# Patient Record
Sex: MALE | Race: WHITE | NOT HISPANIC OR LATINO | Employment: FULL TIME | URBAN - METROPOLITAN AREA
[De-identification: names, ages, dates, MRNs, and addresses within clinical notes are randomized per-mention and may not be internally consistent; named-entity substitution may affect disease eponyms.]

---

## 2017-01-01 ENCOUNTER — APPOINTMENT (OUTPATIENT)
Dept: LAB | Facility: MEDICAL CENTER | Age: 82
End: 2017-01-01
Payer: MEDICARE

## 2017-01-01 ENCOUNTER — ALLSCRIPTS OFFICE VISIT (OUTPATIENT)
Dept: OTHER | Facility: OTHER | Age: 82
End: 2017-01-01

## 2017-01-01 ENCOUNTER — HOSPITAL ENCOUNTER (OUTPATIENT)
Dept: NON INVASIVE DIAGNOSTICS | Facility: CLINIC | Age: 82
Discharge: HOME | End: 2017-09-25
Payer: MEDICARE

## 2017-01-01 ENCOUNTER — GENERIC CONVERSION - ENCOUNTER (OUTPATIENT)
Dept: OTHER | Facility: OTHER | Age: 82
End: 2017-01-01

## 2017-01-01 ENCOUNTER — APPOINTMENT (OUTPATIENT)
Dept: RADIOLOGY | Facility: HOSPITAL | Age: 82
End: 2017-01-01
Payer: MEDICARE

## 2017-01-01 ENCOUNTER — APPOINTMENT (OUTPATIENT)
Dept: WOUND CARE | Facility: HOSPITAL | Age: 82
End: 2017-01-01
Payer: MEDICARE

## 2017-01-01 ENCOUNTER — APPOINTMENT (OUTPATIENT)
Dept: PHYSICAL THERAPY | Facility: CLINIC | Age: 82
End: 2017-01-01
Payer: MEDICARE

## 2017-01-01 ENCOUNTER — ANESTHESIA EVENT (OUTPATIENT)
Dept: PERIOP | Facility: HOSPITAL | Age: 82
End: 2017-01-01
Payer: MEDICARE

## 2017-01-01 ENCOUNTER — HOSPITAL ENCOUNTER (OUTPATIENT)
Dept: NON INVASIVE DIAGNOSTICS | Facility: CLINIC | Age: 82
Discharge: HOME | End: 2017-08-23
Payer: MEDICARE

## 2017-01-01 ENCOUNTER — TRANSCRIBE ORDERS (OUTPATIENT)
Dept: ADMINISTRATIVE | Facility: HOSPITAL | Age: 82
End: 2017-01-01

## 2017-01-01 ENCOUNTER — ANESTHESIA (OUTPATIENT)
Dept: PERIOP | Facility: HOSPITAL | Age: 82
End: 2017-01-01
Payer: MEDICARE

## 2017-01-01 ENCOUNTER — HOSPITAL ENCOUNTER (OUTPATIENT)
Dept: RADIOLOGY | Facility: HOSPITAL | Age: 82
Discharge: HOME | End: 2017-11-01
Attending: ORTHOPAEDIC SURGERY
Payer: COMMERCIAL

## 2017-01-01 ENCOUNTER — HOSPITAL ENCOUNTER (OUTPATIENT)
Facility: HOSPITAL | Age: 82
Setting detail: OUTPATIENT SURGERY
Discharge: HOME | End: 2017-12-22
Attending: SURGERY | Admitting: SURGERY
Payer: MEDICARE

## 2017-01-01 ENCOUNTER — APPOINTMENT (OUTPATIENT)
Dept: LAB | Facility: CLINIC | Age: 82
End: 2017-01-01
Payer: MEDICARE

## 2017-01-01 ENCOUNTER — APPOINTMENT (EMERGENCY)
Dept: RADIOLOGY | Facility: HOSPITAL | Age: 82
DRG: 091 | End: 2017-01-01
Payer: MEDICARE

## 2017-01-01 ENCOUNTER — HOSPITAL ENCOUNTER (INPATIENT)
Facility: HOSPITAL | Age: 82
LOS: 9 days | Discharge: NURSING, REHAB OR LONG-TERM CARE | DRG: 091 | End: 2017-10-20
Attending: EMERGENCY MEDICINE | Admitting: HOSPITALIST
Payer: MEDICARE

## 2017-01-01 ENCOUNTER — APPOINTMENT (INPATIENT)
Dept: RADIOLOGY | Facility: HOSPITAL | Age: 82
DRG: 091 | End: 2017-01-01
Payer: MEDICARE

## 2017-01-01 ENCOUNTER — HOSPITAL ENCOUNTER (OUTPATIENT)
Dept: RADIOLOGY | Facility: HOSPITAL | Age: 82
Discharge: HOME | End: 2017-10-09
Payer: MEDICARE

## 2017-01-01 VITALS
RESPIRATION RATE: 16 BRPM | SYSTOLIC BLOOD PRESSURE: 145 MMHG | HEART RATE: 93 BPM | HEIGHT: 67 IN | TEMPERATURE: 99.3 F | DIASTOLIC BLOOD PRESSURE: 67 MMHG | WEIGHT: 209 LBS | OXYGEN SATURATION: 98 % | BODY MASS INDEX: 32.8 KG/M2

## 2017-01-01 VITALS
HEART RATE: 84 BPM | DIASTOLIC BLOOD PRESSURE: 55 MMHG | BODY MASS INDEX: 33.21 KG/M2 | WEIGHT: 211.6 LBS | SYSTOLIC BLOOD PRESSURE: 113 MMHG | RESPIRATION RATE: 20 BRPM | TEMPERATURE: 98.6 F | OXYGEN SATURATION: 93 % | HEIGHT: 67 IN

## 2017-01-01 DIAGNOSIS — I73.9 PERIPHERAL VASCULAR DISEASE OF EXTREMITY WITH CLAUDICATION (HCC): ICD-10-CM

## 2017-01-01 DIAGNOSIS — E11.9 TYPE 2 DIABETES MELLITUS WITHOUT COMPLICATIONS (HCC): ICD-10-CM

## 2017-01-01 DIAGNOSIS — E78.5 HYPERLIPIDEMIA: ICD-10-CM

## 2017-01-01 DIAGNOSIS — S42.255A CLOSED NONDISPLACED FRACTURE OF GREATER TUBEROSITY OF LEFT HUMERUS: ICD-10-CM

## 2017-01-01 DIAGNOSIS — Z95.2 PRESENCE OF PROSTHETIC HEART VALVE: ICD-10-CM

## 2017-01-01 DIAGNOSIS — Z95.3 S/P AORTIC VALVE REPLACEMENT WITH BIOPROSTHETIC VALVE: Chronic | ICD-10-CM

## 2017-01-01 DIAGNOSIS — I25.10 ATHEROSCLEROTIC HEART DISEASE OF NATIVE CORONARY ARTERY WITHOUT ANGINA PECTORIS: ICD-10-CM

## 2017-01-01 DIAGNOSIS — N18.30 CHRONIC KIDNEY DISEASE, STAGE III (MODERATE) (HCC): ICD-10-CM

## 2017-01-01 DIAGNOSIS — N25.81 SECONDARY HYPERPARATHYROIDISM OF RENAL ORIGIN (HCC): ICD-10-CM

## 2017-01-01 DIAGNOSIS — N18.30 CKD (CHRONIC KIDNEY DISEASE), STAGE III (HCC): ICD-10-CM

## 2017-01-01 DIAGNOSIS — R55 SYNCOPE: ICD-10-CM

## 2017-01-01 DIAGNOSIS — Z86.79 HISTORY OF CHF (CONGESTIVE HEART FAILURE): ICD-10-CM

## 2017-01-01 DIAGNOSIS — S42.252D DISPLACED FRACTURE OF GREATER TUBEROSITY OF LEFT HUMERUS WITH ROUTINE HEALING: ICD-10-CM

## 2017-01-01 DIAGNOSIS — I48.0 PAROXYSMAL ATRIAL FIBRILLATION (HCC): ICD-10-CM

## 2017-01-01 DIAGNOSIS — S01.21XS NASAL LACERATION, SEQUELA: Chronic | ICD-10-CM

## 2017-01-01 DIAGNOSIS — I50.32 CHRONIC DIASTOLIC HEART FAILURE (HCC): Primary | ICD-10-CM

## 2017-01-01 DIAGNOSIS — N18.30 ACUTE RENAL FAILURE SUPERIMPOSED ON STAGE 3 CHRONIC KIDNEY DISEASE (HCC): ICD-10-CM

## 2017-01-01 DIAGNOSIS — E55.9 VITAMIN D DEFICIENCY: ICD-10-CM

## 2017-01-01 DIAGNOSIS — M75.42 IMPINGEMENT SYNDROME OF LEFT SHOULDER: ICD-10-CM

## 2017-01-01 DIAGNOSIS — S49.92XS LEFT UPPER ARM INJURY, SEQUELA: ICD-10-CM

## 2017-01-01 DIAGNOSIS — R53.1 WEAKNESS: Primary | ICD-10-CM

## 2017-01-01 DIAGNOSIS — M79.602 PAIN OF LEFT ARM: ICD-10-CM

## 2017-01-01 DIAGNOSIS — I50.9 HEART FAILURE (HCC): ICD-10-CM

## 2017-01-01 DIAGNOSIS — I70.299 OTHER ATHEROSCLEROSIS OF NATIVE ARTERIES OF EXTREMITIES, UNSPECIFIED EXTREMITY (HCC): ICD-10-CM

## 2017-01-01 DIAGNOSIS — L03.90 CELLULITIS: ICD-10-CM

## 2017-01-01 DIAGNOSIS — I65.23 CAROTID STENOSIS, ASYMPTOMATIC, BILATERAL: ICD-10-CM

## 2017-01-01 DIAGNOSIS — I50.32 CHRONIC DIASTOLIC CHF (CONGESTIVE HEART FAILURE) (HCC): Chronic | ICD-10-CM

## 2017-01-01 DIAGNOSIS — G93.40 ACUTE ENCEPHALOPATHY: ICD-10-CM

## 2017-01-01 DIAGNOSIS — S92.912A TOE FRACTURE, LEFT: ICD-10-CM

## 2017-01-01 DIAGNOSIS — N17.9 ACUTE RENAL FAILURE SUPERIMPOSED ON STAGE 3 CHRONIC KIDNEY DISEASE (HCC): ICD-10-CM

## 2017-01-01 DIAGNOSIS — S42.252A: ICD-10-CM

## 2017-01-01 DIAGNOSIS — I73.9 PERIPHERAL VASCULAR DISEASE OF EXTREMITY WITH CLAUDICATION (HCC): Primary | ICD-10-CM

## 2017-01-01 LAB
ALBUMIN SERPL BCP-MCNC: 2.9 G/DL (ref 3.5–5)
ALBUMIN SERPL BCP-MCNC: 3 G/DL (ref 3.5–5)
ALBUMIN SERPL BCP-MCNC: 3.3 G/DL (ref 3.5–5)
ALBUMIN SERPL BCP-MCNC: 3.7 G/DL (ref 3.5–5)
ALP SERPL-CCNC: 63 U/L (ref 46–116)
ALP SERPL-CCNC: 83 U/L (ref 46–116)
ALP SERPL-CCNC: 92 U/L (ref 46–116)
ALP SERPL-CCNC: 95 U/L (ref 46–116)
ALT SERPL W P-5'-P-CCNC: 16 U/L (ref 12–78)
ALT SERPL W P-5'-P-CCNC: 18 U/L (ref 12–78)
ALT SERPL W P-5'-P-CCNC: 19 U/L (ref 12–78)
ALT SERPL W P-5'-P-CCNC: 22 U/L (ref 12–78)
ANION GAP SERPL CALCULATED.3IONS-SCNC: 4 MMOL/L (ref 4–13)
ANION GAP SERPL CALCULATED.3IONS-SCNC: 6 MMOL/L (ref 4–13)
ANION GAP SERPL CALCULATED.3IONS-SCNC: 7 MMOL/L (ref 4–13)
ANION GAP SERPL CALCULATED.3IONS-SCNC: 8 MMOL/L (ref 4–13)
ASO AB TITR SER LA: NORMAL {TITER}
AST SERPL W P-5'-P-CCNC: 13 U/L (ref 5–45)
AST SERPL W P-5'-P-CCNC: 14 U/L (ref 5–45)
AST SERPL W P-5'-P-CCNC: 18 U/L (ref 5–45)
AST SERPL W P-5'-P-CCNC: 19 U/L (ref 5–45)
ATRIAL RATE: 62 BPM
ATRIAL RATE: 81 BPM
BACTERIA UR QL AUTO: NORMAL /HPF
BASOPHILS # BLD AUTO: 0.02 THOUSANDS/ΜL (ref 0–0.1)
BASOPHILS # BLD AUTO: 0.03 THOUSANDS/ΜL (ref 0–0.1)
BASOPHILS NFR BLD AUTO: 0 % (ref 0–1)
BILIRUB SERPL-MCNC: 0.46 MG/DL (ref 0.2–1)
BILIRUB SERPL-MCNC: 0.47 MG/DL (ref 0.2–1)
BILIRUB SERPL-MCNC: 0.61 MG/DL (ref 0.2–1)
BILIRUB SERPL-MCNC: 0.62 MG/DL (ref 0.2–1)
BILIRUB UR QL STRIP: NEGATIVE
BILIRUB UR QL STRIP: NEGATIVE
BILIRUB UR QL STRIP: NORMAL
BUN SERPL-MCNC: 33 MG/DL (ref 5–25)
BUN SERPL-MCNC: 36 MG/DL (ref 5–25)
BUN SERPL-MCNC: 39 MG/DL (ref 5–25)
BUN SERPL-MCNC: 39 MG/DL (ref 5–25)
BUN SERPL-MCNC: 40 MG/DL (ref 5–25)
BUN SERPL-MCNC: 41 MG/DL (ref 5–25)
BUN SERPL-MCNC: 43 MG/DL (ref 5–25)
BUN SERPL-MCNC: 47 MG/DL (ref 5–25)
BUN SERPL-MCNC: 48 MG/DL (ref 5–25)
BUN SERPL-MCNC: 48 MG/DL (ref 5–25)
BUN SERPL-MCNC: 51 MG/DL (ref 5–25)
BUN SERPL-MCNC: 58 MG/DL (ref 5–25)
CALCIUM SERPL-MCNC: 8.5 MG/DL (ref 8.3–10.1)
CALCIUM SERPL-MCNC: 8.5 MG/DL (ref 8.3–10.1)
CALCIUM SERPL-MCNC: 8.6 MG/DL (ref 8.3–10.1)
CALCIUM SERPL-MCNC: 8.8 MG/DL (ref 8.3–10.1)
CALCIUM SERPL-MCNC: 8.9 MG/DL (ref 8.3–10.1)
CALCIUM SERPL-MCNC: 9 MG/DL (ref 8.3–10.1)
CALCIUM SERPL-MCNC: 9.1 MG/DL (ref 8.3–10.1)
CALCIUM SERPL-MCNC: 9.2 MG/DL (ref 8.3–10.1)
CALCIUM SERPL-MCNC: 9.5 MG/DL (ref 8.3–10.1)
CALCIUM SERPL-MCNC: 9.6 MG/DL (ref 8.3–10.1)
CHLORIDE SERPL-SCNC: 100 MMOL/L (ref 100–108)
CHLORIDE SERPL-SCNC: 100 MMOL/L (ref 100–108)
CHLORIDE SERPL-SCNC: 101 MMOL/L (ref 100–108)
CHLORIDE SERPL-SCNC: 102 MMOL/L (ref 100–108)
CHLORIDE SERPL-SCNC: 103 MMOL/L (ref 100–108)
CHLORIDE SERPL-SCNC: 103 MMOL/L (ref 100–108)
CHLORIDE SERPL-SCNC: 98 MMOL/L (ref 100–108)
CHLORIDE SERPL-SCNC: 98 MMOL/L (ref 100–108)
CLARITY UR: CLEAR
CLARITY UR: CLEAR
CLARITY UR: NORMAL
CLARITY, POC: CLEAR
CO2 SERPL-SCNC: 28 MMOL/L (ref 21–32)
CO2 SERPL-SCNC: 29 MMOL/L (ref 21–32)
CO2 SERPL-SCNC: 30 MMOL/L (ref 21–32)
CO2 SERPL-SCNC: 30 MMOL/L (ref 21–32)
CO2 SERPL-SCNC: 31 MMOL/L (ref 21–32)
COLOR UR: YELLOW
COLOR, POC: YELLOW
CREAT SERPL-MCNC: 1.54 MG/DL (ref 0.6–1.3)
CREAT SERPL-MCNC: 1.68 MG/DL (ref 0.6–1.3)
CREAT SERPL-MCNC: 1.81 MG/DL (ref 0.6–1.3)
CREAT SERPL-MCNC: 1.83 MG/DL (ref 0.6–1.3)
CREAT SERPL-MCNC: 1.84 MG/DL (ref 0.6–1.3)
CREAT SERPL-MCNC: 1.87 MG/DL (ref 0.6–1.3)
CREAT SERPL-MCNC: 1.89 MG/DL (ref 0.6–1.3)
CREAT SERPL-MCNC: 1.9 MG/DL (ref 0.6–1.3)
CREAT SERPL-MCNC: 1.92 MG/DL (ref 0.6–1.3)
CREAT SERPL-MCNC: 1.92 MG/DL (ref 0.6–1.3)
CREAT SERPL-MCNC: 1.93 MG/DL (ref 0.6–1.3)
CREAT SERPL-MCNC: 1.99 MG/DL (ref 0.6–1.3)
CREAT UR-MCNC: 69.1 MG/DL
EOSINOPHIL # BLD AUTO: 0.22 THOUSAND/ΜL (ref 0–0.61)
EOSINOPHIL # BLD AUTO: 0.35 THOUSAND/ΜL (ref 0–0.61)
EOSINOPHIL # BLD AUTO: 0.38 THOUSAND/ΜL (ref 0–0.61)
EOSINOPHIL # BLD AUTO: 0.41 THOUSAND/ΜL (ref 0–0.61)
EOSINOPHIL # BLD AUTO: 0.42 THOUSAND/ΜL (ref 0–0.61)
EOSINOPHIL # BLD AUTO: 0.43 THOUSAND/ΜL (ref 0–0.61)
EOSINOPHIL # BLD AUTO: 0.5 THOUSAND/ΜL (ref 0–0.61)
EOSINOPHIL # BLD AUTO: 0.53 THOUSAND/ΜL (ref 0–0.61)
EOSINOPHIL # BLD AUTO: 0.55 THOUSAND/ΜL (ref 0–0.61)
EOSINOPHIL # BLD AUTO: 0.57 THOUSAND/ΜL (ref 0–0.61)
EOSINOPHIL NFR BLD AUTO: 3 % (ref 0–6)
EOSINOPHIL NFR BLD AUTO: 3 % (ref 0–6)
EOSINOPHIL NFR BLD AUTO: 4 % (ref 0–6)
EOSINOPHIL NFR BLD AUTO: 5 % (ref 0–6)
EOSINOPHIL NFR BLD AUTO: 6 % (ref 0–6)
EOSINOPHIL NFR BLD AUTO: 7 % (ref 0–6)
EOSINOPHIL NFR BLD AUTO: 7 % (ref 0–6)
EOSINOPHIL NFR BLD AUTO: 8 % (ref 0–6)
ERYTHROCYTE [DISTWIDTH] IN BLOOD BY AUTOMATED COUNT: 14.1 % (ref 11.6–15.1)
ERYTHROCYTE [DISTWIDTH] IN BLOOD BY AUTOMATED COUNT: 14.1 % (ref 11.6–15.1)
ERYTHROCYTE [DISTWIDTH] IN BLOOD BY AUTOMATED COUNT: 14.2 % (ref 11.6–15.1)
ERYTHROCYTE [DISTWIDTH] IN BLOOD BY AUTOMATED COUNT: 14.3 % (ref 11.6–15.1)
ERYTHROCYTE [DISTWIDTH] IN BLOOD BY AUTOMATED COUNT: 14.4 % (ref 11.6–15.1)
ERYTHROCYTE [DISTWIDTH] IN BLOOD BY AUTOMATED COUNT: 14.5 % (ref 11.6–15.1)
EST. AVERAGE GLUCOSE BLD GHB EST-MCNC: 146 MG/DL
GFR SERPL CREATININE-BSD FRML MDRD: 31 ML/MIN/1.73SQ M
GFR SERPL CREATININE-BSD FRML MDRD: 32 ML/MIN/1.73SQ M
GFR SERPL CREATININE-BSD FRML MDRD: 33 ML/MIN/1.73SQ M
GFR SERPL CREATININE-BSD FRML MDRD: 34 ML/MIN/1.73SQ M
GFR SERPL CREATININE-BSD FRML MDRD: 34 ML/MIN/1.73SQ M
GFR SERPL CREATININE-BSD FRML MDRD: 36.3 ML/MIN/1.73SQ M
GFR SERPL CREATININE-BSD FRML MDRD: 38 ML/MIN/1.73SQ M
GFR SERPL CREATININE-BSD FRML MDRD: 42 ML/MIN/1.73SQ M
GLUCOSE (HISTORICAL): NORMAL
GLUCOSE P FAST SERPL-MCNC: 175 MG/DL (ref 65–99)
GLUCOSE P FAST SERPL-MCNC: 187 MG/DL (ref 65–99)
GLUCOSE SERPL-MCNC: 100 MG/DL (ref 65–140)
GLUCOSE SERPL-MCNC: 104 MG/DL (ref 65–140)
GLUCOSE SERPL-MCNC: 106 MG/DL (ref 65–140)
GLUCOSE SERPL-MCNC: 111 MG/DL (ref 65–140)
GLUCOSE SERPL-MCNC: 113 MG/DL (ref 65–140)
GLUCOSE SERPL-MCNC: 116 MG/DL (ref 65–140)
GLUCOSE SERPL-MCNC: 118 MG/DL (ref 65–140)
GLUCOSE SERPL-MCNC: 119 MG/DL (ref 65–140)
GLUCOSE SERPL-MCNC: 122 MG/DL (ref 65–140)
GLUCOSE SERPL-MCNC: 122 MG/DL (ref 65–140)
GLUCOSE SERPL-MCNC: 124 MG/DL (ref 65–140)
GLUCOSE SERPL-MCNC: 125 MG/DL (ref 65–140)
GLUCOSE SERPL-MCNC: 125 MG/DL (ref 65–140)
GLUCOSE SERPL-MCNC: 126 MG/DL (ref 65–140)
GLUCOSE SERPL-MCNC: 129 MG/DL (ref 65–140)
GLUCOSE SERPL-MCNC: 130 MG/DL (ref 65–140)
GLUCOSE SERPL-MCNC: 130 MG/DL (ref 65–140)
GLUCOSE SERPL-MCNC: 131 MG/DL (ref 65–140)
GLUCOSE SERPL-MCNC: 131 MG/DL (ref 65–140)
GLUCOSE SERPL-MCNC: 138 MG/DL (ref 65–140)
GLUCOSE SERPL-MCNC: 138 MG/DL (ref 65–140)
GLUCOSE SERPL-MCNC: 141 MG/DL (ref 65–140)
GLUCOSE SERPL-MCNC: 143 MG/DL (ref 65–140)
GLUCOSE SERPL-MCNC: 145 MG/DL (ref 65–140)
GLUCOSE SERPL-MCNC: 149 MG/DL (ref 65–140)
GLUCOSE SERPL-MCNC: 156 MG/DL (ref 65–140)
GLUCOSE SERPL-MCNC: 157 MG/DL (ref 65–140)
GLUCOSE SERPL-MCNC: 163 MG/DL (ref 65–140)
GLUCOSE SERPL-MCNC: 166 MG/DL (ref 65–140)
GLUCOSE SERPL-MCNC: 166 MG/DL (ref 65–140)
GLUCOSE SERPL-MCNC: 170 MG/DL (ref 65–140)
GLUCOSE SERPL-MCNC: 172 MG/DL (ref 65–140)
GLUCOSE SERPL-MCNC: 172 MG/DL (ref 65–140)
GLUCOSE SERPL-MCNC: 175 MG/DL (ref 65–140)
GLUCOSE SERPL-MCNC: 175 MG/DL (ref 65–140)
GLUCOSE SERPL-MCNC: 177 MG/DL (ref 65–140)
GLUCOSE SERPL-MCNC: 181 MG/DL (ref 65–140)
GLUCOSE SERPL-MCNC: 185 MG/DL (ref 65–140)
GLUCOSE SERPL-MCNC: 186 MG/DL (ref 65–140)
GLUCOSE SERPL-MCNC: 189 MG/DL (ref 65–140)
GLUCOSE SERPL-MCNC: 189 MG/DL (ref 65–140)
GLUCOSE SERPL-MCNC: 190 MG/DL (ref 65–140)
GLUCOSE SERPL-MCNC: 190 MG/DL (ref 65–140)
GLUCOSE SERPL-MCNC: 197 MG/DL (ref 65–140)
GLUCOSE SERPL-MCNC: 207 MG/DL (ref 65–140)
GLUCOSE SERPL-MCNC: 213 MG/DL (ref 65–140)
GLUCOSE SERPL-MCNC: 223 MG/DL (ref 65–140)
GLUCOSE SERPL-MCNC: 247 MG/DL (ref 65–140)
GLUCOSE SERPL-MCNC: 52 MG/DL (ref 65–140)
GLUCOSE UR STRIP-MCNC: NEGATIVE MG/DL
GLUCOSE UR STRIP-MCNC: NEGATIVE MG/DL
HBA1C MFR BLD: 6.7 % (ref 4.2–6.3)
HCT VFR BLD AUTO: 33.5 % (ref 36.5–49.3)
HCT VFR BLD AUTO: 33.7 % (ref 36.5–49.3)
HCT VFR BLD AUTO: 34.2 % (ref 36.5–49.3)
HCT VFR BLD AUTO: 34.2 % (ref 36.5–49.3)
HCT VFR BLD AUTO: 34.5 % (ref 36.5–49.3)
HCT VFR BLD AUTO: 34.8 % (ref 36.5–49.3)
HCT VFR BLD AUTO: 35 % (ref 36.5–49.3)
HCT VFR BLD AUTO: 35.2 % (ref 36.5–49.3)
HCT VFR BLD AUTO: 36.6 % (ref 36.5–49.3)
HCT VFR BLD AUTO: 39.1 % (ref 36.5–49.3)
HCT VFR BLD AUTO: 39.4 % (ref 36.5–49.3)
HGB BLD-MCNC: 10.8 G/DL (ref 12–17)
HGB BLD-MCNC: 10.9 G/DL (ref 12–17)
HGB BLD-MCNC: 11.1 G/DL (ref 12–17)
HGB BLD-MCNC: 11.2 G/DL (ref 12–17)
HGB BLD-MCNC: 11.2 G/DL (ref 12–17)
HGB BLD-MCNC: 11.5 G/DL (ref 12–17)
HGB BLD-MCNC: 11.5 G/DL (ref 12–17)
HGB BLD-MCNC: 11.6 G/DL (ref 12–17)
HGB BLD-MCNC: 11.9 G/DL (ref 12–17)
HGB BLD-MCNC: 12.8 G/DL (ref 12–17)
HGB BLD-MCNC: 13 G/DL (ref 12–17)
HGB UR QL STRIP.AUTO: NEGATIVE
HGB UR QL STRIP.AUTO: NEGATIVE
HGB UR QL STRIP.AUTO: NORMAL
HYALINE CASTS #/AREA URNS LPF: NORMAL /LPF
INR PPP: 1.08 (ref 0.86–1.16)
KETONES UR STRIP-MCNC: NEGATIVE MG/DL
KETONES UR STRIP-MCNC: NEGATIVE MG/DL
KETONES UR STRIP-MCNC: NORMAL MG/DL
LEUKOCYTE ESTERASE UR QL STRIP: NEGATIVE
LEUKOCYTE ESTERASE UR QL STRIP: NEGATIVE
LEUKOCYTE ESTERASE UR QL STRIP: NORMAL
LYMPHOCYTES # BLD AUTO: 1.49 THOUSANDS/ΜL (ref 0.6–4.47)
LYMPHOCYTES # BLD AUTO: 1.49 THOUSANDS/ΜL (ref 0.6–4.47)
LYMPHOCYTES # BLD AUTO: 1.51 THOUSANDS/ΜL (ref 0.6–4.47)
LYMPHOCYTES # BLD AUTO: 1.6 THOUSANDS/ΜL (ref 0.6–4.47)
LYMPHOCYTES # BLD AUTO: 1.77 THOUSANDS/ΜL (ref 0.6–4.47)
LYMPHOCYTES # BLD AUTO: 1.8 THOUSANDS/ΜL (ref 0.6–4.47)
LYMPHOCYTES # BLD AUTO: 1.93 THOUSANDS/ΜL (ref 0.6–4.47)
LYMPHOCYTES # BLD AUTO: 1.96 THOUSANDS/ΜL (ref 0.6–4.47)
LYMPHOCYTES # BLD AUTO: 1.98 THOUSANDS/ΜL (ref 0.6–4.47)
LYMPHOCYTES # BLD AUTO: 2 THOUSANDS/ΜL (ref 0.6–4.47)
LYMPHOCYTES NFR BLD AUTO: 17 % (ref 14–44)
LYMPHOCYTES NFR BLD AUTO: 17 % (ref 14–44)
LYMPHOCYTES NFR BLD AUTO: 18 % (ref 14–44)
LYMPHOCYTES NFR BLD AUTO: 19 % (ref 14–44)
LYMPHOCYTES NFR BLD AUTO: 20 % (ref 14–44)
LYMPHOCYTES NFR BLD AUTO: 21 % (ref 14–44)
LYMPHOCYTES NFR BLD AUTO: 24 % (ref 14–44)
LYMPHOCYTES NFR BLD AUTO: 26 % (ref 14–44)
MAGNESIUM SERPL-MCNC: 2.5 MG/DL (ref 1.6–2.6)
MCH RBC QN AUTO: 27.3 PG (ref 26.8–34.3)
MCH RBC QN AUTO: 27.5 PG (ref 26.8–34.3)
MCH RBC QN AUTO: 27.8 PG (ref 26.8–34.3)
MCH RBC QN AUTO: 27.9 PG (ref 26.8–34.3)
MCH RBC QN AUTO: 28.1 PG (ref 26.8–34.3)
MCH RBC QN AUTO: 28.1 PG (ref 26.8–34.3)
MCH RBC QN AUTO: 28.2 PG (ref 26.8–34.3)
MCH RBC QN AUTO: 28.2 PG (ref 26.8–34.3)
MCH RBC QN AUTO: 28.3 PG (ref 26.8–34.3)
MCH RBC QN AUTO: 28.4 PG (ref 26.8–34.3)
MCH RBC QN AUTO: 28.9 PG (ref 26.8–34.3)
MCHC RBC AUTO-ENTMCNC: 31.3 G/DL (ref 31.4–37.4)
MCHC RBC AUTO-ENTMCNC: 31.8 G/DL (ref 31.4–37.4)
MCHC RBC AUTO-ENTMCNC: 32.5 G/DL (ref 31.4–37.4)
MCHC RBC AUTO-ENTMCNC: 32.5 G/DL (ref 31.4–37.4)
MCHC RBC AUTO-ENTMCNC: 32.7 G/DL (ref 31.4–37.4)
MCHC RBC AUTO-ENTMCNC: 32.7 G/DL (ref 31.4–37.4)
MCHC RBC AUTO-ENTMCNC: 32.9 G/DL (ref 31.4–37.4)
MCHC RBC AUTO-ENTMCNC: 32.9 G/DL (ref 31.4–37.4)
MCHC RBC AUTO-ENTMCNC: 33 G/DL (ref 31.4–37.4)
MCHC RBC AUTO-ENTMCNC: 33 G/DL (ref 31.4–37.4)
MCHC RBC AUTO-ENTMCNC: 33.9 G/DL (ref 31.4–37.4)
MCV RBC AUTO: 85 FL (ref 82–98)
MCV RBC AUTO: 86 FL (ref 82–98)
MCV RBC AUTO: 87 FL (ref 82–98)
MCV RBC AUTO: 88 FL (ref 82–98)
MONOCYTES # BLD AUTO: 0.56 THOUSAND/ΜL (ref 0.17–1.22)
MONOCYTES # BLD AUTO: 0.63 THOUSAND/ΜL (ref 0.17–1.22)
MONOCYTES # BLD AUTO: 0.66 THOUSAND/ΜL (ref 0.17–1.22)
MONOCYTES # BLD AUTO: 0.67 THOUSAND/ΜL (ref 0.17–1.22)
MONOCYTES # BLD AUTO: 0.73 THOUSAND/ΜL (ref 0.17–1.22)
MONOCYTES # BLD AUTO: 0.77 THOUSAND/ΜL (ref 0.17–1.22)
MONOCYTES # BLD AUTO: 0.77 THOUSAND/ΜL (ref 0.17–1.22)
MONOCYTES # BLD AUTO: 0.78 THOUSAND/ΜL (ref 0.17–1.22)
MONOCYTES # BLD AUTO: 0.82 THOUSAND/ΜL (ref 0.17–1.22)
MONOCYTES # BLD AUTO: 0.85 THOUSAND/ΜL (ref 0.17–1.22)
MONOCYTES NFR BLD AUTO: 10 % (ref 4–12)
MONOCYTES NFR BLD AUTO: 10 % (ref 4–12)
MONOCYTES NFR BLD AUTO: 7 % (ref 4–12)
MONOCYTES NFR BLD AUTO: 8 % (ref 4–12)
MONOCYTES NFR BLD AUTO: 9 % (ref 4–12)
NEUTROPHILS # BLD AUTO: 4.38 THOUSANDS/ΜL (ref 1.85–7.62)
NEUTROPHILS # BLD AUTO: 4.41 THOUSANDS/ΜL (ref 1.85–7.62)
NEUTROPHILS # BLD AUTO: 4.58 THOUSANDS/ΜL (ref 1.85–7.62)
NEUTROPHILS # BLD AUTO: 4.8 THOUSANDS/ΜL (ref 1.85–7.62)
NEUTROPHILS # BLD AUTO: 4.98 THOUSANDS/ΜL (ref 1.85–7.62)
NEUTROPHILS # BLD AUTO: 5.09 THOUSANDS/ΜL (ref 1.85–7.62)
NEUTROPHILS # BLD AUTO: 5.57 THOUSANDS/ΜL (ref 1.85–7.62)
NEUTROPHILS # BLD AUTO: 6.24 THOUSANDS/ΜL (ref 1.85–7.62)
NEUTROPHILS # BLD AUTO: 7.18 THOUSANDS/ΜL (ref 1.85–7.62)
NEUTROPHILS # BLD AUTO: 7.61 THOUSANDS/ΜL (ref 1.85–7.62)
NEUTS SEG NFR BLD AUTO: 59 % (ref 43–75)
NEUTS SEG NFR BLD AUTO: 59 % (ref 43–75)
NEUTS SEG NFR BLD AUTO: 60 % (ref 43–75)
NEUTS SEG NFR BLD AUTO: 62 % (ref 43–75)
NEUTS SEG NFR BLD AUTO: 62 % (ref 43–75)
NEUTS SEG NFR BLD AUTO: 67 % (ref 43–75)
NEUTS SEG NFR BLD AUTO: 69 % (ref 43–75)
NEUTS SEG NFR BLD AUTO: 70 % (ref 43–75)
NEUTS SEG NFR BLD AUTO: 71 % (ref 43–75)
NEUTS SEG NFR BLD AUTO: 72 % (ref 43–75)
NITRITE UR QL STRIP: NEGATIVE
NITRITE UR QL STRIP: NEGATIVE
NITRITE UR QL STRIP: NORMAL
NON-SQ EPI CELLS URNS QL MICRO: NORMAL /HPF
NRBC BLD AUTO-RTO: 0 /100 WBCS
NT-PROBNP SERPL-MCNC: 1076 PG/ML
P AXIS: 41 DEGREES
P AXIS: 84 DEGREES
PH UR STRIP.AUTO: 5 [PH]
PH UR STRIP.AUTO: 5.5 [PH] (ref 4.5–8)
PH UR STRIP.AUTO: 6 [PH] (ref 4.5–8)
PLATELET # BLD AUTO: 162 THOUSANDS/UL (ref 149–390)
PLATELET # BLD AUTO: 178 THOUSANDS/UL (ref 149–390)
PLATELET # BLD AUTO: 183 THOUSANDS/UL (ref 149–390)
PLATELET # BLD AUTO: 187 THOUSANDS/UL (ref 149–390)
PLATELET # BLD AUTO: 195 THOUSANDS/UL (ref 149–390)
PLATELET # BLD AUTO: 203 THOUSANDS/UL (ref 149–390)
PLATELET # BLD AUTO: 207 THOUSANDS/UL (ref 149–390)
PLATELET # BLD AUTO: 214 THOUSANDS/UL (ref 149–390)
PLATELET # BLD AUTO: 216 THOUSANDS/UL (ref 149–390)
PLATELET # BLD AUTO: 235 THOUSANDS/UL (ref 149–390)
PLATELET # BLD AUTO: 236 THOUSANDS/UL (ref 149–390)
PMV BLD AUTO: 10.1 FL (ref 8.9–12.7)
PMV BLD AUTO: 10.2 FL (ref 8.9–12.7)
PMV BLD AUTO: 10.7 FL (ref 8.9–12.7)
PMV BLD AUTO: 10.8 FL (ref 8.9–12.7)
PMV BLD AUTO: 9.5 FL (ref 8.9–12.7)
PMV BLD AUTO: 9.7 FL (ref 8.9–12.7)
PMV BLD AUTO: 9.9 FL (ref 8.9–12.7)
POTASSIUM SERPL-SCNC: 3.8 MMOL/L (ref 3.5–5.3)
POTASSIUM SERPL-SCNC: 4.1 MMOL/L (ref 3.5–5.3)
POTASSIUM SERPL-SCNC: 4.2 MMOL/L (ref 3.5–5.3)
POTASSIUM SERPL-SCNC: 4.2 MMOL/L (ref 3.5–5.3)
POTASSIUM SERPL-SCNC: 4.3 MMOL/L (ref 3.5–5.3)
POTASSIUM SERPL-SCNC: 4.3 MMOL/L (ref 3.5–5.3)
POTASSIUM SERPL-SCNC: 4.4 MMOL/L (ref 3.5–5.3)
POTASSIUM SERPL-SCNC: 4.4 MMOL/L (ref 3.5–5.3)
POTASSIUM SERPL-SCNC: 4.5 MMOL/L (ref 3.5–5.3)
POTASSIUM SERPL-SCNC: 4.7 MMOL/L (ref 3.5–5.3)
POTASSIUM SERPL-SCNC: 4.8 MMOL/L (ref 3.5–5.3)
POTASSIUM SERPL-SCNC: 4.8 MMOL/L (ref 3.5–5.3)
PR INTERVAL: 242 MS
PR INTERVAL: 268 MS
PROT SERPL-MCNC: 6.4 G/DL (ref 6.4–8.2)
PROT SERPL-MCNC: 7 G/DL (ref 6.4–8.2)
PROT SERPL-MCNC: 7.4 G/DL (ref 6.4–8.2)
PROT SERPL-MCNC: 7.5 G/DL (ref 6.4–8.2)
PROT UR STRIP-MCNC: NEGATIVE MG/DL
PROT UR STRIP-MCNC: NEGATIVE MG/DL
PROT UR STRIP-MCNC: NORMAL MG/DL
PROT UR-MCNC: 13 MG/DL
PROT/CREAT UR: 0.19 MG/G{CREAT} (ref 0–0.1)
PROTHROMBIN TIME: 14 SECONDS (ref 12.1–14.4)
QRS AXIS: -22 DEGREES
QRS AXIS: -26 DEGREES
QRSD INTERVAL: 128 MS
QRSD INTERVAL: 130 MS
QT INTERVAL: 404 MS
QT INTERVAL: 422 MS
QTC INTERVAL: 428 MS
QTC INTERVAL: 469 MS
RBC # BLD AUTO: 3.86 MILLION/UL (ref 3.88–5.62)
RBC # BLD AUTO: 3.95 MILLION/UL (ref 3.88–5.62)
RBC # BLD AUTO: 3.96 MILLION/UL (ref 3.88–5.62)
RBC # BLD AUTO: 3.99 MILLION/UL (ref 3.88–5.62)
RBC # BLD AUTO: 4.02 MILLION/UL (ref 3.88–5.62)
RBC # BLD AUTO: 4.02 MILLION/UL (ref 3.88–5.62)
RBC # BLD AUTO: 4.07 MILLION/UL (ref 3.88–5.62)
RBC # BLD AUTO: 4.09 MILLION/UL (ref 3.88–5.62)
RBC # BLD AUTO: 4.32 MILLION/UL (ref 3.88–5.62)
RBC # BLD AUTO: 4.55 MILLION/UL (ref 3.88–5.62)
RBC # BLD AUTO: 4.61 MILLION/UL (ref 3.88–5.62)
RBC #/AREA URNS AUTO: NORMAL /HPF
SODIUM SERPL-SCNC: 135 MMOL/L (ref 136–145)
SODIUM SERPL-SCNC: 137 MMOL/L (ref 136–145)
SODIUM SERPL-SCNC: 138 MMOL/L (ref 136–145)
SODIUM SERPL-SCNC: 141 MMOL/L (ref 136–145)
SP GR UR STRIP.AUTO: 1
SP GR UR STRIP.AUTO: 1.01 (ref 1–1.03)
SP GR UR STRIP.AUTO: 1.01 (ref 1–1.03)
T WAVE AXIS: 41 DEGREES
T WAVE AXIS: 44 DEGREES
TROPONIN I SERPL-MCNC: <0.02 NG/ML
TROPONIN I SERPL-MCNC: <0.02 NG/ML
URATE SERPL-MCNC: 8.1 MG/DL (ref 4.2–8)
UROBILINOGEN UR QL STRIP.AUTO: 0.2
UROBILINOGEN UR QL STRIP.AUTO: 0.2 E.U./DL
UROBILINOGEN UR QL STRIP.AUTO: 0.2 E.U./DL
VENTRICULAR RATE: 62 BPM
VENTRICULAR RATE: 81 BPM
WBC # BLD AUTO: 10.48 THOUSAND/UL (ref 4.31–10.16)
WBC # BLD AUTO: 10.69 THOUSAND/UL (ref 4.31–10.16)
WBC # BLD AUTO: 7.45 THOUSAND/UL (ref 4.31–10.16)
WBC # BLD AUTO: 7.46 THOUSAND/UL (ref 4.31–10.16)
WBC # BLD AUTO: 7.57 THOUSAND/UL (ref 4.31–10.16)
WBC # BLD AUTO: 7.59 THOUSAND/UL (ref 4.31–10.16)
WBC # BLD AUTO: 7.77 THOUSAND/UL (ref 4.31–10.16)
WBC # BLD AUTO: 8.07 THOUSAND/UL (ref 4.31–10.16)
WBC # BLD AUTO: 8.27 THOUSAND/UL (ref 4.31–10.16)
WBC # BLD AUTO: 8.31 THOUSAND/UL (ref 4.31–10.16)
WBC # BLD AUTO: 8.83 THOUSAND/UL (ref 4.31–10.16)
WBC #/AREA URNS AUTO: NORMAL /HPF

## 2017-01-01 PROCEDURE — 76937 US GUIDE VASCULAR ACCESS: CPT

## 2017-01-01 PROCEDURE — 85025 COMPLETE CBC W/AUTO DIFF WBC: CPT | Performed by: SURGERY

## 2017-01-01 PROCEDURE — 93923 UPR/LXTR ART STDY 3+ LVLS: CPT

## 2017-01-01 PROCEDURE — 82948 REAGENT STRIP/BLOOD GLUCOSE: CPT

## 2017-01-01 PROCEDURE — 73560 X-RAY EXAM OF KNEE 1 OR 2: CPT

## 2017-01-01 PROCEDURE — C1769 GUIDE WIRE: HCPCS | Performed by: SURGERY

## 2017-01-01 PROCEDURE — 97535 SELF CARE MNGMENT TRAINING: CPT

## 2017-01-01 PROCEDURE — 85025 COMPLETE CBC W/AUTO DIFF WBC: CPT | Performed by: HOSPITALIST

## 2017-01-01 PROCEDURE — 85025 COMPLETE CBC W/AUTO DIFF WBC: CPT

## 2017-01-01 PROCEDURE — 97110 THERAPEUTIC EXERCISES: CPT

## 2017-01-01 PROCEDURE — 80053 COMPREHEN METABOLIC PANEL: CPT | Performed by: PHYSICIAN ASSISTANT

## 2017-01-01 PROCEDURE — 75710 ARTERY X-RAYS ARM/LEG: CPT

## 2017-01-01 PROCEDURE — 97140 MANUAL THERAPY 1/> REGIONS: CPT

## 2017-01-01 PROCEDURE — 80048 BASIC METABOLIC PNL TOTAL CA: CPT | Performed by: HOSPITALIST

## 2017-01-01 PROCEDURE — 97530 THERAPEUTIC ACTIVITIES: CPT

## 2017-01-01 PROCEDURE — 97161 PT EVAL LOW COMPLEX 20 MIN: CPT

## 2017-01-01 PROCEDURE — G8990 OTHER PT/OT CURRENT STATUS: HCPCS

## 2017-01-01 PROCEDURE — 80053 COMPREHEN METABOLIC PANEL: CPT

## 2017-01-01 PROCEDURE — 36415 COLL VENOUS BLD VENIPUNCTURE: CPT

## 2017-01-01 PROCEDURE — 97163 PT EVAL HIGH COMPLEX 45 MIN: CPT

## 2017-01-01 PROCEDURE — G8991 OTHER PT/OT GOAL STATUS: HCPCS

## 2017-01-01 PROCEDURE — 80048 BASIC METABOLIC PNL TOTAL CA: CPT | Performed by: PHYSICIAN ASSISTANT

## 2017-01-01 PROCEDURE — 86063 ANTISTREPTOLYSIN O SCREEN: CPT | Performed by: INTERNAL MEDICINE

## 2017-01-01 PROCEDURE — G8978 MOBILITY CURRENT STATUS: HCPCS

## 2017-01-01 PROCEDURE — 97112 NEUROMUSCULAR REEDUCATION: CPT

## 2017-01-01 PROCEDURE — 73030 X-RAY EXAM OF SHOULDER: CPT

## 2017-01-01 PROCEDURE — 93225 XTRNL ECG REC<48 HRS REC: CPT

## 2017-01-01 PROCEDURE — 83735 ASSAY OF MAGNESIUM: CPT | Performed by: PHYSICIAN ASSISTANT

## 2017-01-01 PROCEDURE — 85610 PROTHROMBIN TIME: CPT

## 2017-01-01 PROCEDURE — 93306 TTE W/DOPPLER COMPLETE: CPT

## 2017-01-01 PROCEDURE — C1887 CATHETER, GUIDING: HCPCS | Performed by: SURGERY

## 2017-01-01 PROCEDURE — 97116 GAIT TRAINING THERAPY: CPT

## 2017-01-01 PROCEDURE — 71020 HB CHEST X-RAY 2VW FRONTAL&LATL: CPT

## 2017-01-01 PROCEDURE — G8979 MOBILITY GOAL STATUS: HCPCS

## 2017-01-01 PROCEDURE — 85027 COMPLETE CBC AUTOMATED: CPT | Performed by: PHYSICIAN ASSISTANT

## 2017-01-01 PROCEDURE — 83880 ASSAY OF NATRIURETIC PEPTIDE: CPT | Performed by: PHYSICIAN ASSISTANT

## 2017-01-01 PROCEDURE — G8988 SELF CARE GOAL STATUS: HCPCS

## 2017-01-01 PROCEDURE — 93226 XTRNL ECG REC<48 HR SCAN A/R: CPT

## 2017-01-01 PROCEDURE — 85025 COMPLETE CBC W/AUTO DIFF WBC: CPT | Performed by: PHYSICIAN ASSISTANT

## 2017-01-01 PROCEDURE — C1894 INTRO/SHEATH, NON-LASER: HCPCS | Performed by: SURGERY

## 2017-01-01 PROCEDURE — 84484 ASSAY OF TROPONIN QUANT: CPT | Performed by: PHYSICIAN ASSISTANT

## 2017-01-01 PROCEDURE — 83036 HEMOGLOBIN GLYCOSYLATED A1C: CPT | Performed by: PHYSICIAN ASSISTANT

## 2017-01-01 PROCEDURE — 36415 COLL VENOUS BLD VENIPUNCTURE: CPT | Performed by: EMERGENCY MEDICINE

## 2017-01-01 PROCEDURE — C1760 CLOSURE DEV, VASC: HCPCS | Performed by: SURGERY

## 2017-01-01 PROCEDURE — 81001 URINALYSIS AUTO W/SCOPE: CPT

## 2017-01-01 PROCEDURE — 82570 ASSAY OF URINE CREATININE: CPT

## 2017-01-01 PROCEDURE — 75625 CONTRAST EXAM ABDOMINL AORTA: CPT

## 2017-01-01 PROCEDURE — 97166 OT EVAL MOD COMPLEX 45 MIN: CPT

## 2017-01-01 PROCEDURE — 99285 EMERGENCY DEPT VISIT HI MDM: CPT

## 2017-01-01 PROCEDURE — 96360 HYDRATION IV INFUSION INIT: CPT

## 2017-01-01 PROCEDURE — 93880 EXTRACRANIAL BILAT STUDY: CPT

## 2017-01-01 PROCEDURE — 80048 BASIC METABOLIC PNL TOTAL CA: CPT | Performed by: EMERGENCY MEDICINE

## 2017-01-01 PROCEDURE — 93005 ELECTROCARDIOGRAM TRACING: CPT | Performed by: PHYSICIAN ASSISTANT

## 2017-01-01 PROCEDURE — 81002 URINALYSIS NONAUTO W/O SCOPE: CPT | Performed by: EMERGENCY MEDICINE

## 2017-01-01 PROCEDURE — 84156 ASSAY OF PROTEIN URINE: CPT

## 2017-01-01 PROCEDURE — 93005 ELECTROCARDIOGRAM TRACING: CPT | Performed by: EMERGENCY MEDICINE

## 2017-01-01 PROCEDURE — 84550 ASSAY OF BLOOD/URIC ACID: CPT | Performed by: HOSPITALIST

## 2017-01-01 PROCEDURE — 99213 OFFICE O/P EST LOW 20 MIN: CPT | Performed by: PREVENTIVE MEDICINE

## 2017-01-01 PROCEDURE — 73060 X-RAY EXAM OF HUMERUS: CPT

## 2017-01-01 PROCEDURE — 81003 URINALYSIS AUTO W/O SCOPE: CPT

## 2017-01-01 PROCEDURE — 80048 BASIC METABOLIC PNL TOTAL CA: CPT | Performed by: SURGERY

## 2017-01-01 PROCEDURE — 73630 X-RAY EXAM OF FOOT: CPT

## 2017-01-01 PROCEDURE — G8987 SELF CARE CURRENT STATUS: HCPCS

## 2017-01-01 PROCEDURE — 93925 LOWER EXTREMITY STUDY: CPT

## 2017-01-01 PROCEDURE — 85025 COMPLETE CBC W/AUTO DIFF WBC: CPT | Performed by: EMERGENCY MEDICINE

## 2017-01-01 DEVICE — CLOSURE DEVICE MYNX ACE 6F/7FR: Type: IMPLANTABLE DEVICE | Site: GROIN | Status: FUNCTIONAL

## 2017-01-01 RX ORDER — FEBUXOSTAT 40 MG/1
40 TABLET, FILM COATED ORAL DAILY
Status: DISCONTINUED | OUTPATIENT
Start: 2017-01-01 | End: 2017-01-01

## 2017-01-01 RX ORDER — TORSEMIDE 20 MG/1
40 TABLET ORAL EVERY OTHER DAY
Status: DISCONTINUED | OUTPATIENT
Start: 2017-01-01 | End: 2017-01-01 | Stop reason: HOSPADM

## 2017-01-01 RX ORDER — MAGNESIUM HYDROXIDE/ALUMINUM HYDROXICE/SIMETHICONE 120; 1200; 1200 MG/30ML; MG/30ML; MG/30ML
30 SUSPENSION ORAL EVERY 6 HOURS PRN
Status: DISCONTINUED | OUTPATIENT
Start: 2017-01-01 | End: 2017-01-01 | Stop reason: HOSPADM

## 2017-01-01 RX ORDER — PRAVASTATIN SODIUM 20 MG
20 TABLET ORAL
Status: DISCONTINUED | OUTPATIENT
Start: 2017-01-01 | End: 2017-01-01 | Stop reason: HOSPADM

## 2017-01-01 RX ORDER — HEPARIN SODIUM 5000 [USP'U]/ML
5000 INJECTION, SOLUTION INTRAVENOUS; SUBCUTANEOUS EVERY 8 HOURS SCHEDULED
Status: DISCONTINUED | OUTPATIENT
Start: 2017-01-01 | End: 2017-01-01 | Stop reason: HOSPADM

## 2017-01-01 RX ORDER — ONDANSETRON 2 MG/ML
INJECTION INTRAMUSCULAR; INTRAVENOUS AS NEEDED
Status: DISCONTINUED | OUTPATIENT
Start: 2017-01-01 | End: 2017-01-01 | Stop reason: SURG

## 2017-01-01 RX ORDER — TRAMADOL HYDROCHLORIDE 50 MG/1
50 TABLET ORAL EVERY 6 HOURS PRN
Status: DISCONTINUED | OUTPATIENT
Start: 2017-01-01 | End: 2017-01-01 | Stop reason: HOSPADM

## 2017-01-01 RX ORDER — AMLODIPINE BESYLATE 2.5 MG/1
2.5 TABLET ORAL DAILY
Status: DISCONTINUED | OUTPATIENT
Start: 2017-01-01 | End: 2017-01-01 | Stop reason: HOSPADM

## 2017-01-01 RX ORDER — SODIUM CHLORIDE 9 MG/ML
100 INJECTION, SOLUTION INTRAVENOUS CONTINUOUS
Status: DISCONTINUED | OUTPATIENT
Start: 2017-01-01 | End: 2017-01-01 | Stop reason: HOSPADM

## 2017-01-01 RX ORDER — DEXTROSE MONOHYDRATE 25 G/50ML
50 INJECTION, SOLUTION INTRAVENOUS ONCE
Status: DISCONTINUED | OUTPATIENT
Start: 2017-01-01 | End: 2017-01-01

## 2017-01-01 RX ORDER — ATORVASTATIN CALCIUM 40 MG/1
40 TABLET, FILM COATED ORAL DAILY
COMMUNITY

## 2017-01-01 RX ORDER — LIDOCAINE 50 MG/G
1 PATCH TOPICAL EVERY 24 HOURS
Qty: 30 PATCH | Refills: 0 | Status: SHIPPED | OUTPATIENT
Start: 2017-01-01 | End: 2017-01-01 | Stop reason: ALTCHOICE

## 2017-01-01 RX ORDER — METHOCARBAMOL 500 MG/1
500 TABLET, FILM COATED ORAL EVERY 8 HOURS PRN
Status: DISCONTINUED | OUTPATIENT
Start: 2017-01-01 | End: 2017-01-01 | Stop reason: HOSPADM

## 2017-01-01 RX ORDER — LIDOCAINE HYDROCHLORIDE 10 MG/ML
INJECTION, SOLUTION EPIDURAL; INFILTRATION; INTRACAUDAL; PERINEURAL AS NEEDED
Status: DISCONTINUED | OUTPATIENT
Start: 2017-01-01 | End: 2017-01-01 | Stop reason: HOSPADM

## 2017-01-01 RX ORDER — FENTANYL CITRATE/PF 50 MCG/ML
25 SYRINGE (ML) INJECTION
Status: DISCONTINUED | OUTPATIENT
Start: 2017-01-01 | End: 2017-01-01 | Stop reason: HOSPADM

## 2017-01-01 RX ORDER — FENTANYL CITRATE 50 UG/ML
INJECTION, SOLUTION INTRAMUSCULAR; INTRAVENOUS AS NEEDED
Status: DISCONTINUED | OUTPATIENT
Start: 2017-01-01 | End: 2017-01-01 | Stop reason: SURG

## 2017-01-01 RX ORDER — PROPOFOL 10 MG/ML
INJECTION, EMULSION INTRAVENOUS CONTINUOUS PRN
Status: DISCONTINUED | OUTPATIENT
Start: 2017-01-01 | End: 2017-01-01 | Stop reason: SURG

## 2017-01-01 RX ORDER — RAMIPRIL 2.5 MG/1
2.5 CAPSULE ORAL DAILY
Status: DISCONTINUED | OUTPATIENT
Start: 2017-01-01 | End: 2017-01-01

## 2017-01-01 RX ORDER — CEPHALEXIN 500 MG/1
500 CAPSULE ORAL EVERY 12 HOURS SCHEDULED
Qty: 10 CAPSULE | Refills: 0 | Status: SHIPPED | OUTPATIENT
Start: 2017-01-01 | End: 2017-01-01 | Stop reason: HOSPADM

## 2017-01-01 RX ORDER — TORSEMIDE 20 MG/1
40 TABLET ORAL EVERY OTHER DAY
Refills: 0
Start: 2017-01-01 | End: 2018-01-01 | Stop reason: SDUPTHER

## 2017-01-01 RX ORDER — CEPHALEXIN 250 MG/1
250 CAPSULE ORAL 4 TIMES DAILY
Qty: 40 CAPSULE | Refills: 0
Start: 2017-01-01 | End: 2017-01-01

## 2017-01-01 RX ORDER — ASPIRIN 81 MG/1
81 TABLET, CHEWABLE ORAL EVERY OTHER DAY
Status: DISCONTINUED | OUTPATIENT
Start: 2017-01-01 | End: 2017-01-01 | Stop reason: HOSPADM

## 2017-01-01 RX ORDER — ACETAMINOPHEN 325 MG/1
975 TABLET ORAL EVERY 8 HOURS SCHEDULED
Status: DISCONTINUED | OUTPATIENT
Start: 2017-01-01 | End: 2017-01-01 | Stop reason: HOSPADM

## 2017-01-01 RX ORDER — ACETYLCYSTEINE 200 MG/ML
600 SOLUTION ORAL; RESPIRATORY (INHALATION) ONCE
Status: COMPLETED | OUTPATIENT
Start: 2017-01-01 | End: 2017-01-01

## 2017-01-01 RX ORDER — ACETAMINOPHEN 325 MG/1
650 TABLET ORAL EVERY 4 HOURS PRN
Status: DISCONTINUED | OUTPATIENT
Start: 2017-01-01 | End: 2017-01-01

## 2017-01-01 RX ORDER — RAMIPRIL 2.5 MG/1
2.5 CAPSULE ORAL DAILY
COMMUNITY
End: 2018-01-01 | Stop reason: HOSPADM

## 2017-01-01 RX ORDER — FEBUXOSTAT 40 MG/1
40 TABLET, FILM COATED ORAL DAILY
Status: DISCONTINUED | OUTPATIENT
Start: 2017-01-01 | End: 2017-01-01 | Stop reason: HOSPADM

## 2017-01-01 RX ORDER — TRAMADOL HYDROCHLORIDE 50 MG/1
50 TABLET ORAL EVERY 6 HOURS PRN
COMMUNITY
End: 2017-01-01

## 2017-01-01 RX ORDER — INSULIN ASPART 100 [IU]/ML
20 INJECTION, SUSPENSION SUBCUTANEOUS
Status: DISCONTINUED | OUTPATIENT
Start: 2017-01-01 | End: 2017-01-01 | Stop reason: HOSPADM

## 2017-01-01 RX ORDER — ACETAMINOPHEN 325 MG/1
650 TABLET ORAL EVERY 6 HOURS PRN
Status: DISCONTINUED | OUTPATIENT
Start: 2017-01-01 | End: 2017-01-01 | Stop reason: HOSPADM

## 2017-01-01 RX ORDER — PARICALCITOL 1 UG/1
1 CAPSULE, LIQUID FILLED ORAL 3 TIMES WEEKLY
Status: DISCONTINUED | OUTPATIENT
Start: 2017-01-01 | End: 2017-01-01

## 2017-01-01 RX ORDER — TORSEMIDE 20 MG/1
20 TABLET ORAL EVERY OTHER DAY
Refills: 0
Start: 2017-01-01 | End: 2017-01-01 | Stop reason: ALTCHOICE

## 2017-01-01 RX ORDER — SODIUM CHLORIDE 9 MG/ML
20 INJECTION, SOLUTION INTRAVENOUS CONTINUOUS
Status: DISCONTINUED | OUTPATIENT
Start: 2017-01-01 | End: 2017-01-01 | Stop reason: HOSPADM

## 2017-01-01 RX ORDER — CEPHALEXIN 500 MG/1
500 CAPSULE ORAL EVERY 12 HOURS SCHEDULED
Status: DISCONTINUED | OUTPATIENT
Start: 2017-01-01 | End: 2017-01-01

## 2017-01-01 RX ORDER — ONDANSETRON 2 MG/ML
4 INJECTION INTRAMUSCULAR; INTRAVENOUS EVERY 6 HOURS PRN
Status: DISCONTINUED | OUTPATIENT
Start: 2017-01-01 | End: 2017-01-01 | Stop reason: HOSPADM

## 2017-01-01 RX ORDER — TRAMADOL HYDROCHLORIDE 50 MG/1
50 TABLET ORAL EVERY 6 HOURS PRN
Qty: 30 TABLET | Refills: 0 | Status: SHIPPED | OUTPATIENT
Start: 2017-01-01 | End: 2018-01-01

## 2017-01-01 RX ORDER — CEPHALEXIN 250 MG/1
250 CAPSULE ORAL 4 TIMES DAILY
Status: DISCONTINUED | OUTPATIENT
Start: 2017-01-01 | End: 2017-01-01 | Stop reason: HOSPADM

## 2017-01-01 RX ORDER — INSULIN ASPART 100 [IU]/ML
20 INJECTION, SUSPENSION SUBCUTANEOUS
Refills: 0
Start: 2017-01-01 | End: 2018-01-01 | Stop reason: SDUPTHER

## 2017-01-01 RX ORDER — RAMIPRIL 2.5 MG/1
2.5 CAPSULE ORAL DAILY
Status: DISCONTINUED | OUTPATIENT
Start: 2017-01-01 | End: 2017-01-01 | Stop reason: HOSPADM

## 2017-01-01 RX ORDER — DEXTROSE MONOHYDRATE 25 G/50ML
25 INJECTION, SOLUTION INTRAVENOUS ONCE
Status: COMPLETED | OUTPATIENT
Start: 2017-01-01 | End: 2017-01-01

## 2017-01-01 RX ORDER — INSULIN ASPART 100 [IU]/ML
32 INJECTION, SUSPENSION SUBCUTANEOUS
Status: ON HOLD | COMMUNITY
End: 2017-01-01

## 2017-01-01 RX ORDER — AMOXICILLIN 250 MG
1 CAPSULE ORAL 2 TIMES DAILY
Status: DISCONTINUED | OUTPATIENT
Start: 2017-01-01 | End: 2017-01-01 | Stop reason: HOSPADM

## 2017-01-01 RX ORDER — TRAMADOL HYDROCHLORIDE 50 MG/1
50 TABLET ORAL EVERY 6 HOURS PRN
COMMUNITY
End: 2017-01-01 | Stop reason: HOSPADM

## 2017-01-01 RX ORDER — PANTOPRAZOLE SODIUM 40 MG/1
40 TABLET, DELAYED RELEASE ORAL
Status: DISCONTINUED | OUTPATIENT
Start: 2017-01-01 | End: 2017-01-01 | Stop reason: HOSPADM

## 2017-01-01 RX ORDER — TORSEMIDE 20 MG/1
20 TABLET ORAL EVERY OTHER DAY
Status: DISCONTINUED | OUTPATIENT
Start: 2017-01-01 | End: 2017-01-01 | Stop reason: HOSPADM

## 2017-01-01 RX ORDER — B-COMPLEX WITH VITAMIN C
1 TABLET ORAL DAILY
Status: DISCONTINUED | OUTPATIENT
Start: 2017-01-01 | End: 2017-01-01 | Stop reason: HOSPADM

## 2017-01-01 RX ORDER — NITROGLYCERIN 0.4 MG/1
0.4 TABLET SUBLINGUAL
Status: DISCONTINUED | OUTPATIENT
Start: 2017-01-01 | End: 2017-01-01 | Stop reason: HOSPADM

## 2017-01-01 RX ORDER — CHLORHEXIDINE GLUCONATE 0.12 MG/ML
15 RINSE ORAL ONCE
Status: DISCONTINUED | OUTPATIENT
Start: 2017-01-01 | End: 2017-01-01

## 2017-01-01 RX ORDER — PARICALCITOL 1 UG/1
1 CAPSULE, LIQUID FILLED ORAL 3 TIMES WEEKLY
Status: DISCONTINUED | OUTPATIENT
Start: 2017-01-01 | End: 2017-01-01 | Stop reason: HOSPADM

## 2017-01-01 RX ORDER — OXYCODONE HYDROCHLORIDE 5 MG/1
2.5 TABLET ORAL EVERY 6 HOURS PRN
Status: DISCONTINUED | OUTPATIENT
Start: 2017-01-01 | End: 2017-01-01 | Stop reason: HOSPADM

## 2017-01-01 RX ORDER — FUROSEMIDE 10 MG/ML
40 INJECTION INTRAMUSCULAR; INTRAVENOUS
Status: DISCONTINUED | OUTPATIENT
Start: 2017-01-01 | End: 2017-01-01

## 2017-01-01 RX ORDER — LIDOCAINE 50 MG/G
1 PATCH TOPICAL EVERY 24 HOURS
Status: DISCONTINUED | OUTPATIENT
Start: 2017-01-01 | End: 2017-01-01 | Stop reason: HOSPADM

## 2017-01-01 RX ADMIN — PANTOPRAZOLE SODIUM 40 MG: 40 TABLET, DELAYED RELEASE ORAL at 05:20

## 2017-01-01 RX ADMIN — CEFAZOLIN SODIUM 1000 MG: 1 SOLUTION INTRAVENOUS at 19:13

## 2017-01-01 RX ADMIN — INSULIN ASPART 20 UNITS: 100 INJECTION, SUSPENSION SUBCUTANEOUS at 08:23

## 2017-01-01 RX ADMIN — DICLOFENAC 2 G: 10 GEL TOPICAL at 08:12

## 2017-01-01 RX ADMIN — HEPARIN SODIUM 5000 UNITS: 5000 INJECTION, SOLUTION INTRAVENOUS; SUBCUTANEOUS at 06:20

## 2017-01-01 RX ADMIN — PANTOPRAZOLE SODIUM 40 MG: 40 TABLET, DELAYED RELEASE ORAL at 06:20

## 2017-01-01 RX ADMIN — HEPARIN SODIUM 5000 UNITS: 5000 INJECTION, SOLUTION INTRAVENOUS; SUBCUTANEOUS at 06:08

## 2017-01-01 RX ADMIN — HEPARIN SODIUM 5000 UNITS: 5000 INJECTION, SOLUTION INTRAVENOUS; SUBCUTANEOUS at 14:49

## 2017-01-01 RX ADMIN — PRAVASTATIN SODIUM 20 MG: 20 TABLET ORAL at 16:30

## 2017-01-01 RX ADMIN — PRAVASTATIN SODIUM 20 MG: 20 TABLET ORAL at 16:38

## 2017-01-01 RX ADMIN — ASPIRIN 81 MG 81 MG: 81 TABLET ORAL at 08:20

## 2017-01-01 RX ADMIN — CEFAZOLIN SODIUM 1000 MG: 1 SOLUTION INTRAVENOUS at 21:01

## 2017-01-01 RX ADMIN — ACETAMINOPHEN 975 MG: 325 TABLET, FILM COATED ORAL at 22:09

## 2017-01-01 RX ADMIN — INSULIN LISPRO 2 UNITS: 100 INJECTION, SOLUTION INTRAVENOUS; SUBCUTANEOUS at 17:34

## 2017-01-01 RX ADMIN — AMLODIPINE BESYLATE 2.5 MG: 2.5 TABLET ORAL at 10:36

## 2017-01-01 RX ADMIN — DICLOFENAC 2 G: 10 GEL TOPICAL at 12:10

## 2017-01-01 RX ADMIN — PRAVASTATIN SODIUM 20 MG: 20 TABLET ORAL at 16:37

## 2017-01-01 RX ADMIN — INSULIN ASPART 20 UNITS: 100 INJECTION, SUSPENSION SUBCUTANEOUS at 08:26

## 2017-01-01 RX ADMIN — METOPROLOL TARTRATE 75 MG: 50 TABLET ORAL at 21:14

## 2017-01-01 RX ADMIN — CALCIUM CARBONATE 500 MG (1,250 MG)-VITAMIN D3 200 UNIT TABLET 1 TABLET: at 10:36

## 2017-01-01 RX ADMIN — DICLOFENAC 2 G: 10 GEL TOPICAL at 09:50

## 2017-01-01 RX ADMIN — ASPIRIN 81 MG 81 MG: 81 TABLET ORAL at 08:33

## 2017-01-01 RX ADMIN — INSULIN ASPART 20 UNITS: 100 INJECTION, SUSPENSION SUBCUTANEOUS at 16:40

## 2017-01-01 RX ADMIN — PANTOPRAZOLE SODIUM 40 MG: 40 TABLET, DELAYED RELEASE ORAL at 06:01

## 2017-01-01 RX ADMIN — ACETAMINOPHEN 975 MG: 325 TABLET, FILM COATED ORAL at 21:44

## 2017-01-01 RX ADMIN — INSULIN LISPRO 1 UNITS: 100 INJECTION, SOLUTION INTRAVENOUS; SUBCUTANEOUS at 21:16

## 2017-01-01 RX ADMIN — ACETAMINOPHEN 975 MG: 325 TABLET, FILM COATED ORAL at 06:07

## 2017-01-01 RX ADMIN — TORSEMIDE 40 MG: 20 TABLET ORAL at 08:33

## 2017-01-01 RX ADMIN — HEPARIN SODIUM 5000 UNITS: 5000 INJECTION, SOLUTION INTRAVENOUS; SUBCUTANEOUS at 21:04

## 2017-01-01 RX ADMIN — HEPARIN SODIUM 5000 UNITS: 5000 INJECTION, SOLUTION INTRAVENOUS; SUBCUTANEOUS at 06:03

## 2017-01-01 RX ADMIN — RAMIPRIL 2.5 MG: 2.5 CAPSULE ORAL at 08:28

## 2017-01-01 RX ADMIN — CALCIUM CARBONATE 500 MG (1,250 MG)-VITAMIN D3 200 UNIT TABLET 1 TABLET: at 08:19

## 2017-01-01 RX ADMIN — HEPARIN SODIUM 5000 UNITS: 5000 INJECTION, SOLUTION INTRAVENOUS; SUBCUTANEOUS at 21:08

## 2017-01-01 RX ADMIN — CALCIUM CARBONATE 500 MG (1,250 MG)-VITAMIN D3 200 UNIT TABLET 1 TABLET: at 08:20

## 2017-01-01 RX ADMIN — INSULIN ASPART 20 UNITS: 100 INJECTION, SUSPENSION SUBCUTANEOUS at 17:33

## 2017-01-01 RX ADMIN — INSULIN LISPRO 1 UNITS: 100 INJECTION, SOLUTION INTRAVENOUS; SUBCUTANEOUS at 16:38

## 2017-01-01 RX ADMIN — RAMIPRIL 2.5 MG: 2.5 CAPSULE ORAL at 08:36

## 2017-01-01 RX ADMIN — ACETAMINOPHEN 975 MG: 325 TABLET, FILM COATED ORAL at 05:24

## 2017-01-01 RX ADMIN — CEFAZOLIN SODIUM 1000 MG: 1 SOLUTION INTRAVENOUS at 06:28

## 2017-01-01 RX ADMIN — DICLOFENAC 2 G: 10 GEL TOPICAL at 12:50

## 2017-01-01 RX ADMIN — Medication 1 TABLET: at 09:48

## 2017-01-01 RX ADMIN — LIDOCAINE 1 PATCH: 50 PATCH CUTANEOUS at 06:23

## 2017-01-01 RX ADMIN — ACETAMINOPHEN 975 MG: 325 TABLET, FILM COATED ORAL at 15:05

## 2017-01-01 RX ADMIN — ACETAMINOPHEN 975 MG: 325 TABLET, FILM COATED ORAL at 05:20

## 2017-01-01 RX ADMIN — PRAVASTATIN SODIUM 20 MG: 20 TABLET ORAL at 16:48

## 2017-01-01 RX ADMIN — FEBUXOSTAT 40 MG: 40 TABLET, FILM COATED ORAL at 08:34

## 2017-01-01 RX ADMIN — METOPROLOL TARTRATE 75 MG: 50 TABLET ORAL at 22:10

## 2017-01-01 RX ADMIN — PARICALCITOL 1 MCG: 1 CAPSULE, LIQUID FILLED ORAL at 08:28

## 2017-01-01 RX ADMIN — HEPARIN SODIUM 5000 UNITS: 5000 INJECTION, SOLUTION INTRAVENOUS; SUBCUTANEOUS at 15:29

## 2017-01-01 RX ADMIN — INSULIN LISPRO 1 UNITS: 100 INJECTION, SOLUTION INTRAVENOUS; SUBCUTANEOUS at 17:20

## 2017-01-01 RX ADMIN — DICLOFENAC 2 G: 10 GEL TOPICAL at 21:04

## 2017-01-01 RX ADMIN — DICLOFENAC 2 G: 10 GEL TOPICAL at 11:47

## 2017-01-01 RX ADMIN — HEPARIN SODIUM 5000 UNITS: 5000 INJECTION, SOLUTION INTRAVENOUS; SUBCUTANEOUS at 21:46

## 2017-01-01 RX ADMIN — CALCIUM CARBONATE 500 MG (1,250 MG)-VITAMIN D3 200 UNIT TABLET 1 TABLET: at 08:33

## 2017-01-01 RX ADMIN — METOPROLOL TARTRATE 75 MG: 50 TABLET ORAL at 08:33

## 2017-01-01 RX ADMIN — METOPROLOL TARTRATE 75 MG: 50 TABLET ORAL at 09:48

## 2017-01-01 RX ADMIN — ACETAMINOPHEN 975 MG: 325 TABLET, FILM COATED ORAL at 21:17

## 2017-01-01 RX ADMIN — DICLOFENAC 2 G: 10 GEL TOPICAL at 12:13

## 2017-01-01 RX ADMIN — INSULIN ASPART 20 UNITS: 100 INJECTION, SUSPENSION SUBCUTANEOUS at 08:20

## 2017-01-01 RX ADMIN — FEBUXOSTAT 40 MG: 40 TABLET, FILM COATED ORAL at 08:49

## 2017-01-01 RX ADMIN — CALCIUM CARBONATE 500 MG (1,250 MG)-VITAMIN D3 200 UNIT TABLET 1 TABLET: at 08:57

## 2017-01-01 RX ADMIN — HEPARIN SODIUM 5000 UNITS: 5000 INJECTION, SOLUTION INTRAVENOUS; SUBCUTANEOUS at 14:36

## 2017-01-01 RX ADMIN — ACETAMINOPHEN 975 MG: 325 TABLET, FILM COATED ORAL at 14:14

## 2017-01-01 RX ADMIN — PRAVASTATIN SODIUM 20 MG: 20 TABLET ORAL at 16:40

## 2017-01-01 RX ADMIN — DICLOFENAC 2 G: 10 GEL TOPICAL at 21:34

## 2017-01-01 RX ADMIN — DICLOFENAC 2 G: 10 GEL TOPICAL at 17:48

## 2017-01-01 RX ADMIN — INSULIN LISPRO 2 UNITS: 100 INJECTION, SOLUTION INTRAVENOUS; SUBCUTANEOUS at 21:15

## 2017-01-01 RX ADMIN — INSULIN LISPRO 2 UNITS: 100 INJECTION, SOLUTION INTRAVENOUS; SUBCUTANEOUS at 16:40

## 2017-01-01 RX ADMIN — LIDOCAINE 1 PATCH: 50 PATCH CUTANEOUS at 06:18

## 2017-01-01 RX ADMIN — ACETAMINOPHEN 975 MG: 325 TABLET, FILM COATED ORAL at 15:10

## 2017-01-01 RX ADMIN — DICLOFENAC 2 G: 10 GEL TOPICAL at 12:30

## 2017-01-01 RX ADMIN — Medication 1 TABLET: at 17:07

## 2017-01-01 RX ADMIN — Medication 1 TABLET: at 09:00

## 2017-01-01 RX ADMIN — HEPARIN SODIUM 5000 UNITS: 5000 INJECTION, SOLUTION INTRAVENOUS; SUBCUTANEOUS at 13:58

## 2017-01-01 RX ADMIN — PROPOFOL 70 MCG/KG/MIN: 10 INJECTION, EMULSION INTRAVENOUS at 08:08

## 2017-01-01 RX ADMIN — ACETAMINOPHEN 650 MG: 325 TABLET, FILM COATED ORAL at 15:27

## 2017-01-01 RX ADMIN — FEBUXOSTAT 40 MG: 40 TABLET, FILM COATED ORAL at 08:21

## 2017-01-01 RX ADMIN — LIDOCAINE 1 PATCH: 50 PATCH CUTANEOUS at 05:00

## 2017-01-01 RX ADMIN — DICLOFENAC 2 G: 10 GEL TOPICAL at 21:17

## 2017-01-01 RX ADMIN — ACETAMINOPHEN 975 MG: 325 TABLET, FILM COATED ORAL at 21:30

## 2017-01-01 RX ADMIN — AMLODIPINE BESYLATE 2.5 MG: 2.5 TABLET ORAL at 09:00

## 2017-01-01 RX ADMIN — LIDOCAINE 1 PATCH: 50 PATCH CUTANEOUS at 05:25

## 2017-01-01 RX ADMIN — DICLOFENAC 2 G: 10 GEL TOPICAL at 12:00

## 2017-01-01 RX ADMIN — INSULIN LISPRO 1 UNITS: 100 INJECTION, SOLUTION INTRAVENOUS; SUBCUTANEOUS at 11:43

## 2017-01-01 RX ADMIN — Medication 1 TABLET: at 10:36

## 2017-01-01 RX ADMIN — CEFAZOLIN SODIUM 1000 MG: 1 SOLUTION INTRAVENOUS at 08:23

## 2017-01-01 RX ADMIN — HEPARIN SODIUM 5000 UNITS: 5000 INJECTION, SOLUTION INTRAVENOUS; SUBCUTANEOUS at 21:16

## 2017-01-01 RX ADMIN — ACETAMINOPHEN 975 MG: 325 TABLET, FILM COATED ORAL at 21:15

## 2017-01-01 RX ADMIN — INSULIN LISPRO 1 UNITS: 100 INJECTION, SOLUTION INTRAVENOUS; SUBCUTANEOUS at 21:47

## 2017-01-01 RX ADMIN — ACETAMINOPHEN 975 MG: 325 TABLET, FILM COATED ORAL at 21:36

## 2017-01-01 RX ADMIN — METOPROLOL TARTRATE 75 MG: 50 TABLET ORAL at 08:20

## 2017-01-01 RX ADMIN — PARICALCITOL 1 MCG: 1 CAPSULE, LIQUID FILLED ORAL at 09:05

## 2017-01-01 RX ADMIN — CALCIUM CARBONATE 500 MG (1,250 MG)-VITAMIN D3 200 UNIT TABLET 1 TABLET: at 09:49

## 2017-01-01 RX ADMIN — HEPARIN SODIUM 5000 UNITS: 5000 INJECTION, SOLUTION INTRAVENOUS; SUBCUTANEOUS at 14:15

## 2017-01-01 RX ADMIN — DICLOFENAC 2 G: 10 GEL TOPICAL at 17:42

## 2017-01-01 RX ADMIN — INSULIN ASPART 20 UNITS: 100 INJECTION, SUSPENSION SUBCUTANEOUS at 16:29

## 2017-01-01 RX ADMIN — INSULIN LISPRO 1 UNITS: 100 INJECTION, SOLUTION INTRAVENOUS; SUBCUTANEOUS at 12:13

## 2017-01-01 RX ADMIN — FEBUXOSTAT 40 MG: 40 TABLET, FILM COATED ORAL at 08:22

## 2017-01-01 RX ADMIN — ACETAMINOPHEN 975 MG: 325 TABLET, FILM COATED ORAL at 06:03

## 2017-01-01 RX ADMIN — DICLOFENAC 2 G: 10 GEL TOPICAL at 08:24

## 2017-01-01 RX ADMIN — RAMIPRIL 2.5 MG: 2.5 CAPSULE ORAL at 08:21

## 2017-01-01 RX ADMIN — ACETAMINOPHEN 975 MG: 325 TABLET, FILM COATED ORAL at 05:06

## 2017-01-01 RX ADMIN — FEBUXOSTAT 40 MG: 40 TABLET, FILM COATED ORAL at 08:28

## 2017-01-01 RX ADMIN — CEPHALEXIN 500 MG: 500 CAPSULE ORAL at 14:14

## 2017-01-01 RX ADMIN — ONDANSETRON 4 MG: 2 INJECTION INTRAMUSCULAR; INTRAVENOUS at 09:19

## 2017-01-01 RX ADMIN — PRAVASTATIN SODIUM 20 MG: 20 TABLET ORAL at 17:07

## 2017-01-01 RX ADMIN — INSULIN ASPART 20 UNITS: 100 INJECTION, SUSPENSION SUBCUTANEOUS at 08:32

## 2017-01-01 RX ADMIN — INSULIN LISPRO 1 UNITS: 100 INJECTION, SOLUTION INTRAVENOUS; SUBCUTANEOUS at 12:10

## 2017-01-01 RX ADMIN — HEPARIN SODIUM 5000 UNITS: 5000 INJECTION, SOLUTION INTRAVENOUS; SUBCUTANEOUS at 22:09

## 2017-01-01 RX ADMIN — FUROSEMIDE 40 MG: 10 INJECTION, SOLUTION INTRAMUSCULAR; INTRAVENOUS at 08:12

## 2017-01-01 RX ADMIN — FUROSEMIDE 40 MG: 10 INJECTION, SOLUTION INTRAMUSCULAR; INTRAVENOUS at 03:48

## 2017-01-01 RX ADMIN — HEPARIN SODIUM 5000 UNITS: 5000 INJECTION, SOLUTION INTRAVENOUS; SUBCUTANEOUS at 05:24

## 2017-01-01 RX ADMIN — PHENYLEPHRINE HYDROCHLORIDE 20 MCG/MIN: 10 INJECTION INTRAVENOUS at 08:27

## 2017-01-01 RX ADMIN — METOPROLOL TARTRATE 75 MG: 50 TABLET ORAL at 21:32

## 2017-01-01 RX ADMIN — RAMIPRIL 2.5 MG: 2.5 CAPSULE ORAL at 08:25

## 2017-01-01 RX ADMIN — METOPROLOL TARTRATE 75 MG: 50 TABLET ORAL at 08:09

## 2017-01-01 RX ADMIN — HEPARIN SODIUM 5000 UNITS: 5000 INJECTION, SOLUTION INTRAVENOUS; SUBCUTANEOUS at 04:56

## 2017-01-01 RX ADMIN — DICLOFENAC 2 G: 10 GEL TOPICAL at 21:32

## 2017-01-01 RX ADMIN — AMLODIPINE BESYLATE 2.5 MG: 2.5 TABLET ORAL at 08:20

## 2017-01-01 RX ADMIN — AMLODIPINE BESYLATE 2.5 MG: 2.5 TABLET ORAL at 08:22

## 2017-01-01 RX ADMIN — INSULIN ASPART 20 UNITS: 100 INJECTION, SUSPENSION SUBCUTANEOUS at 07:08

## 2017-01-01 RX ADMIN — KETAMINE HYDROCHLORIDE 0.5 MG/KG/HR: 50 INJECTION, SOLUTION INTRAMUSCULAR; INTRAVENOUS at 08:10

## 2017-01-01 RX ADMIN — CEFAZOLIN SODIUM 1000 MG: 1 SOLUTION INTRAVENOUS at 06:05

## 2017-01-01 RX ADMIN — Medication 1 TABLET: at 08:10

## 2017-01-01 RX ADMIN — FEBUXOSTAT 40 MG: 40 TABLET, FILM COATED ORAL at 08:53

## 2017-01-01 RX ADMIN — CEFAZOLIN SODIUM 1000 MG: 1 SOLUTION INTRAVENOUS at 09:00

## 2017-01-01 RX ADMIN — TORSEMIDE 20 MG: 20 TABLET ORAL at 09:49

## 2017-01-01 RX ADMIN — LIDOCAINE 1 PATCH: 50 PATCH CUTANEOUS at 04:56

## 2017-01-01 RX ADMIN — DICLOFENAC 2 G: 10 GEL TOPICAL at 16:39

## 2017-01-01 RX ADMIN — HEPARIN SODIUM 5000 UNITS: 5000 INJECTION, SOLUTION INTRAVENOUS; SUBCUTANEOUS at 05:45

## 2017-01-01 RX ADMIN — DEXTROSE MONOHYDRATE 25 ML: 25 INJECTION, SOLUTION INTRAVENOUS at 02:09

## 2017-01-01 RX ADMIN — ACETAMINOPHEN 975 MG: 325 TABLET, FILM COATED ORAL at 21:46

## 2017-01-01 RX ADMIN — PARICALCITOL 1 MCG: 1 CAPSULE, LIQUID FILLED ORAL at 14:15

## 2017-01-01 RX ADMIN — INSULIN LISPRO 3 UNITS: 100 INJECTION, SOLUTION INTRAVENOUS; SUBCUTANEOUS at 12:48

## 2017-01-01 RX ADMIN — FUROSEMIDE 40 MG: 10 INJECTION, SOLUTION INTRAMUSCULAR; INTRAVENOUS at 15:20

## 2017-01-01 RX ADMIN — Medication 1 TABLET: at 08:11

## 2017-01-01 RX ADMIN — PANTOPRAZOLE SODIUM 40 MG: 40 TABLET, DELAYED RELEASE ORAL at 05:45

## 2017-01-01 RX ADMIN — Medication 1 TABLET: at 08:33

## 2017-01-01 RX ADMIN — ACETAMINOPHEN 975 MG: 325 TABLET, FILM COATED ORAL at 06:01

## 2017-01-01 RX ADMIN — INSULIN LISPRO 1 UNITS: 100 INJECTION, SOLUTION INTRAVENOUS; SUBCUTANEOUS at 11:47

## 2017-01-01 RX ADMIN — PRAVASTATIN SODIUM 20 MG: 20 TABLET ORAL at 17:34

## 2017-01-01 RX ADMIN — Medication 1 TABLET: at 08:26

## 2017-01-01 RX ADMIN — Medication 1 TABLET: at 08:40

## 2017-01-01 RX ADMIN — PRAVASTATIN SODIUM 20 MG: 20 TABLET ORAL at 17:20

## 2017-01-01 RX ADMIN — FEBUXOSTAT 40 MG: 40 TABLET, FILM COATED ORAL at 08:24

## 2017-01-01 RX ADMIN — CEFAZOLIN SODIUM 1000 MG: 1 SOLUTION INTRAVENOUS at 20:26

## 2017-01-01 RX ADMIN — Medication 1 TABLET: at 08:20

## 2017-01-01 RX ADMIN — PANTOPRAZOLE SODIUM 40 MG: 40 TABLET, DELAYED RELEASE ORAL at 06:03

## 2017-01-01 RX ADMIN — DICLOFENAC 2 G: 10 GEL TOPICAL at 17:21

## 2017-01-01 RX ADMIN — DICLOFENAC 2 G: 10 GEL TOPICAL at 21:47

## 2017-01-01 RX ADMIN — TORSEMIDE 20 MG: 20 TABLET ORAL at 08:27

## 2017-01-01 RX ADMIN — ACETAMINOPHEN 975 MG: 325 TABLET, FILM COATED ORAL at 15:22

## 2017-01-01 RX ADMIN — HEPARIN SODIUM 5000 UNITS: 5000 INJECTION, SOLUTION INTRAVENOUS; SUBCUTANEOUS at 14:18

## 2017-01-01 RX ADMIN — CEFAZOLIN SODIUM 2000 MG: 2 SOLUTION INTRAVENOUS at 08:12

## 2017-01-01 RX ADMIN — DICLOFENAC 2 G: 10 GEL TOPICAL at 22:15

## 2017-01-01 RX ADMIN — TORSEMIDE 40 MG: 20 TABLET ORAL at 08:20

## 2017-01-01 RX ADMIN — METOPROLOL TARTRATE 75 MG: 50 TABLET ORAL at 21:16

## 2017-01-01 RX ADMIN — AMLODIPINE BESYLATE 2.5 MG: 2.5 TABLET ORAL at 08:55

## 2017-01-01 RX ADMIN — AMLODIPINE BESYLATE 2.5 MG: 2.5 TABLET ORAL at 08:33

## 2017-01-01 RX ADMIN — CEFAZOLIN SODIUM 1000 MG: 1 SOLUTION INTRAVENOUS at 19:45

## 2017-01-01 RX ADMIN — CEFAZOLIN SODIUM 1000 MG: 1 SOLUTION INTRAVENOUS at 06:15

## 2017-01-01 RX ADMIN — INSULIN ASPART 20 UNITS: 100 INJECTION, SUSPENSION SUBCUTANEOUS at 07:16

## 2017-01-01 RX ADMIN — METOPROLOL TARTRATE 75 MG: 50 TABLET ORAL at 10:35

## 2017-01-01 RX ADMIN — AMLODIPINE BESYLATE 2.5 MG: 2.5 TABLET ORAL at 08:26

## 2017-01-01 RX ADMIN — ACETAMINOPHEN 975 MG: 325 TABLET, FILM COATED ORAL at 15:29

## 2017-01-01 RX ADMIN — Medication 1 TABLET: at 18:59

## 2017-01-01 RX ADMIN — HEPARIN SODIUM 5000 UNITS: 5000 INJECTION, SOLUTION INTRAVENOUS; SUBCUTANEOUS at 15:22

## 2017-01-01 RX ADMIN — Medication 1 TABLET: at 17:34

## 2017-01-01 RX ADMIN — METOPROLOL TARTRATE 75 MG: 50 TABLET ORAL at 21:43

## 2017-01-01 RX ADMIN — FEBUXOSTAT 40 MG: 40 TABLET, FILM COATED ORAL at 09:48

## 2017-01-01 RX ADMIN — CALCIUM CARBONATE 500 MG (1,250 MG)-VITAMIN D3 200 UNIT TABLET 1 TABLET: at 08:11

## 2017-01-01 RX ADMIN — TORSEMIDE 20 MG: 20 TABLET ORAL at 08:40

## 2017-01-01 RX ADMIN — OXYCODONE HYDROCHLORIDE 2.5 MG: 5 TABLET ORAL at 06:47

## 2017-01-01 RX ADMIN — DICLOFENAC 2 G: 10 GEL TOPICAL at 08:48

## 2017-01-01 RX ADMIN — CEFAZOLIN SODIUM 1000 MG: 1 SOLUTION INTRAVENOUS at 08:25

## 2017-01-01 RX ADMIN — DICLOFENAC 2 G: 10 GEL TOPICAL at 18:59

## 2017-01-01 RX ADMIN — ACETAMINOPHEN 975 MG: 325 TABLET, FILM COATED ORAL at 14:22

## 2017-01-01 RX ADMIN — Medication 1 TABLET: at 08:32

## 2017-01-01 RX ADMIN — RAMIPRIL 2.5 MG: 2.5 CAPSULE ORAL at 08:55

## 2017-01-01 RX ADMIN — RAMIPRIL 2.5 MG: 2.5 CAPSULE ORAL at 08:54

## 2017-01-01 RX ADMIN — HEPARIN SODIUM 5000 UNITS: 5000 INJECTION, SOLUTION INTRAVENOUS; SUBCUTANEOUS at 21:36

## 2017-01-01 RX ADMIN — HEPARIN SODIUM 5000 UNITS: 5000 INJECTION, SOLUTION INTRAVENOUS; SUBCUTANEOUS at 05:20

## 2017-01-01 RX ADMIN — INSULIN ASPART 20 UNITS: 100 INJECTION, SUSPENSION SUBCUTANEOUS at 16:37

## 2017-01-01 RX ADMIN — DICLOFENAC 2 G: 10 GEL TOPICAL at 08:35

## 2017-01-01 RX ADMIN — AMLODIPINE BESYLATE 2.5 MG: 2.5 TABLET ORAL at 08:19

## 2017-01-01 RX ADMIN — RAMIPRIL 2.5 MG: 2.5 CAPSULE ORAL at 09:49

## 2017-01-01 RX ADMIN — RAMIPRIL 2.5 MG: 2.5 CAPSULE ORAL at 08:22

## 2017-01-01 RX ADMIN — DICLOFENAC 2 G: 10 GEL TOPICAL at 08:21

## 2017-01-01 RX ADMIN — PANTOPRAZOLE SODIUM 40 MG: 40 TABLET, DELAYED RELEASE ORAL at 05:06

## 2017-01-01 RX ADMIN — ASPIRIN 81 MG 81 MG: 81 TABLET ORAL at 08:10

## 2017-01-01 RX ADMIN — DICLOFENAC 2 G: 10 GEL TOPICAL at 08:53

## 2017-01-01 RX ADMIN — Medication 1 TABLET: at 17:49

## 2017-01-01 RX ADMIN — INSULIN LISPRO 2 UNITS: 100 INJECTION, SOLUTION INTRAVENOUS; SUBCUTANEOUS at 12:05

## 2017-01-01 RX ADMIN — HEPARIN SODIUM 5000 UNITS: 5000 INJECTION, SOLUTION INTRAVENOUS; SUBCUTANEOUS at 15:06

## 2017-01-01 RX ADMIN — ACETAMINOPHEN 975 MG: 325 TABLET, FILM COATED ORAL at 06:20

## 2017-01-01 RX ADMIN — METOPROLOL TARTRATE 75 MG: 50 TABLET ORAL at 09:00

## 2017-01-01 RX ADMIN — Medication 1 TABLET: at 08:27

## 2017-01-01 RX ADMIN — AMLODIPINE BESYLATE 2.5 MG: 2.5 TABLET ORAL at 09:48

## 2017-01-01 RX ADMIN — DICLOFENAC 2 G: 10 GEL TOPICAL at 17:18

## 2017-01-01 RX ADMIN — Medication 1 TABLET: at 17:20

## 2017-01-01 RX ADMIN — LIDOCAINE 1 PATCH: 50 PATCH CUTANEOUS at 06:08

## 2017-01-01 RX ADMIN — ACETAMINOPHEN 975 MG: 325 TABLET, FILM COATED ORAL at 14:35

## 2017-01-01 RX ADMIN — RAMIPRIL 2.5 MG: 2.5 CAPSULE ORAL at 08:11

## 2017-01-01 RX ADMIN — Medication 1 TABLET: at 08:19

## 2017-01-01 RX ADMIN — METOPROLOL TARTRATE 75 MG: 50 TABLET ORAL at 21:03

## 2017-01-01 RX ADMIN — LIDOCAINE 1 PATCH: 50 PATCH CUTANEOUS at 03:46

## 2017-01-01 RX ADMIN — LIDOCAINE 1 PATCH: 50 PATCH CUTANEOUS at 04:25

## 2017-01-01 RX ADMIN — ACETYLCYSTEINE 600 MG: 200 SOLUTION ORAL; RESPIRATORY (INHALATION) at 06:08

## 2017-01-01 RX ADMIN — INSULIN LISPRO 1 UNITS: 100 INJECTION, SOLUTION INTRAVENOUS; SUBCUTANEOUS at 16:40

## 2017-01-01 RX ADMIN — HEPARIN SODIUM 5000 UNITS: 5000 INJECTION, SOLUTION INTRAVENOUS; SUBCUTANEOUS at 15:10

## 2017-01-01 RX ADMIN — METOPROLOL TARTRATE 75 MG: 50 TABLET ORAL at 08:26

## 2017-01-01 RX ADMIN — Medication 1 TABLET: at 10:35

## 2017-01-01 RX ADMIN — METOPROLOL TARTRATE 75 MG: 50 TABLET ORAL at 08:19

## 2017-01-01 RX ADMIN — INSULIN LISPRO 1 UNITS: 100 INJECTION, SOLUTION INTRAVENOUS; SUBCUTANEOUS at 12:30

## 2017-01-01 RX ADMIN — SODIUM CHLORIDE 1000 ML: 0.9 INJECTION, SOLUTION INTRAVENOUS at 00:29

## 2017-01-01 RX ADMIN — PANTOPRAZOLE SODIUM 40 MG: 40 TABLET, DELAYED RELEASE ORAL at 04:57

## 2017-01-01 RX ADMIN — DICLOFENAC 2 G: 10 GEL TOPICAL at 08:28

## 2017-01-01 RX ADMIN — CALCIUM CARBONATE 500 MG (1,250 MG)-VITAMIN D3 200 UNIT TABLET 1 TABLET: at 08:22

## 2017-01-01 RX ADMIN — FEBUXOSTAT 40 MG: 40 TABLET, FILM COATED ORAL at 10:38

## 2017-01-01 RX ADMIN — PANTOPRAZOLE SODIUM 40 MG: 40 TABLET, DELAYED RELEASE ORAL at 06:07

## 2017-01-01 RX ADMIN — SODIUM CHLORIDE: 0.9 INJECTION, SOLUTION INTRAVENOUS at 07:25

## 2017-01-01 RX ADMIN — INSULIN LISPRO 1 UNITS: 100 INJECTION, SOLUTION INTRAVENOUS; SUBCUTANEOUS at 08:56

## 2017-01-01 RX ADMIN — METOPROLOL TARTRATE 75 MG: 50 TABLET ORAL at 08:55

## 2017-01-01 RX ADMIN — FENTANYL CITRATE 25 MCG: 50 INJECTION, SOLUTION INTRAMUSCULAR; INTRAVENOUS at 08:24

## 2017-01-01 RX ADMIN — INSULIN ASPART 20 UNITS: 100 INJECTION, SUSPENSION SUBCUTANEOUS at 17:08

## 2017-01-01 RX ADMIN — FUROSEMIDE 40 MG: 10 INJECTION, SOLUTION INTRAMUSCULAR; INTRAVENOUS at 07:57

## 2017-01-01 RX ADMIN — DICLOFENAC 2 G: 10 GEL TOPICAL at 21:44

## 2017-01-01 RX ADMIN — ACETAMINOPHEN 975 MG: 325 TABLET, FILM COATED ORAL at 14:49

## 2017-01-01 RX ADMIN — INSULIN ASPART 20 UNITS: 100 INJECTION, SUSPENSION SUBCUTANEOUS at 07:45

## 2017-01-01 RX ADMIN — METOPROLOL TARTRATE 75 MG: 50 TABLET ORAL at 20:26

## 2017-01-01 RX ADMIN — Medication 1 TABLET: at 22:09

## 2017-01-01 RX ADMIN — RAMIPRIL 2.5 MG: 2.5 CAPSULE ORAL at 17:18

## 2017-01-01 RX ADMIN — HEPARIN SODIUM 5000 UNITS: 5000 INJECTION, SOLUTION INTRAVENOUS; SUBCUTANEOUS at 21:17

## 2017-01-01 RX ADMIN — DICLOFENAC 2 G: 10 GEL TOPICAL at 12:25

## 2017-01-01 RX ADMIN — DICLOFENAC 2 G: 10 GEL TOPICAL at 17:44

## 2017-01-01 RX ADMIN — PRAVASTATIN SODIUM 20 MG: 20 TABLET ORAL at 17:18

## 2017-01-01 RX ADMIN — OXYCODONE HYDROCHLORIDE 2.5 MG: 5 TABLET ORAL at 17:14

## 2017-01-01 RX ADMIN — ACETAMINOPHEN 975 MG: 325 TABLET, FILM COATED ORAL at 21:03

## 2017-01-01 RX ADMIN — CEFAZOLIN SODIUM 1000 MG: 1 SOLUTION INTRAVENOUS at 19:06

## 2017-01-01 RX ADMIN — ACETAMINOPHEN 975 MG: 325 TABLET, FILM COATED ORAL at 21:08

## 2017-01-01 RX ADMIN — PANTOPRAZOLE SODIUM 40 MG: 40 TABLET, DELAYED RELEASE ORAL at 05:25

## 2017-01-01 RX ADMIN — DICLOFENAC 2 G: 10 GEL TOPICAL at 12:57

## 2017-01-01 RX ADMIN — INSULIN ASPART 20 UNITS: 100 INJECTION, SUSPENSION SUBCUTANEOUS at 08:41

## 2017-01-01 RX ADMIN — TORSEMIDE 20 MG: 20 TABLET ORAL at 09:02

## 2017-01-01 RX ADMIN — INSULIN LISPRO 1 UNITS: 100 INJECTION, SOLUTION INTRAVENOUS; SUBCUTANEOUS at 21:17

## 2017-01-01 RX ADMIN — AMLODIPINE BESYLATE 2.5 MG: 2.5 TABLET ORAL at 08:10

## 2017-01-01 RX ADMIN — ACETAMINOPHEN 975 MG: 325 TABLET, FILM COATED ORAL at 04:56

## 2017-01-01 RX ADMIN — HEPARIN SODIUM 5000 UNITS: 5000 INJECTION, SOLUTION INTRAVENOUS; SUBCUTANEOUS at 06:01

## 2017-01-01 RX ADMIN — Medication 1 TABLET: at 16:39

## 2017-01-01 RX ADMIN — INSULIN ASPART 20 UNITS: 100 INJECTION, SUSPENSION SUBCUTANEOUS at 08:18

## 2017-01-01 RX ADMIN — LIDOCAINE 1 PATCH: 50 PATCH CUTANEOUS at 04:50

## 2017-01-01 RX ADMIN — HEPARIN SODIUM 5000 UNITS: 5000 INJECTION, SOLUTION INTRAVENOUS; SUBCUTANEOUS at 06:18

## 2017-01-01 RX ADMIN — HEPARIN SODIUM 5000 UNITS: 5000 INJECTION, SOLUTION INTRAVENOUS; SUBCUTANEOUS at 21:44

## 2017-01-01 RX ADMIN — METOPROLOL TARTRATE 75 MG: 50 TABLET ORAL at 21:35

## 2017-01-01 RX ADMIN — CEFAZOLIN SODIUM 1000 MG: 1 SOLUTION INTRAVENOUS at 19:51

## 2017-01-01 RX ADMIN — LIDOCAINE 1 PATCH: 50 PATCH CUTANEOUS at 06:00

## 2017-01-01 RX ADMIN — METOPROLOL TARTRATE 75 MG: 50 TABLET ORAL at 21:12

## 2017-01-01 RX ADMIN — TORSEMIDE 40 MG: 20 TABLET ORAL at 08:19

## 2017-01-01 RX ADMIN — CALCIUM CARBONATE 500 MG (1,250 MG)-VITAMIN D3 200 UNIT TABLET 1 TABLET: at 08:26

## 2017-01-01 RX ADMIN — INSULIN LISPRO 2 UNITS: 100 INJECTION, SOLUTION INTRAVENOUS; SUBCUTANEOUS at 13:06

## 2017-01-01 RX ADMIN — INSULIN ASPART 20 UNITS: 100 INJECTION, SUSPENSION SUBCUTANEOUS at 17:20

## 2017-01-01 RX ADMIN — CALCIUM CARBONATE 500 MG (1,250 MG)-VITAMIN D3 200 UNIT TABLET 1 TABLET: at 09:00

## 2017-01-01 RX ADMIN — FENTANYL CITRATE 25 MCG: 50 INJECTION, SOLUTION INTRAMUSCULAR; INTRAVENOUS at 08:11

## 2017-01-01 RX ADMIN — ACETAMINOPHEN 975 MG: 325 TABLET, FILM COATED ORAL at 06:18

## 2017-01-01 RX ADMIN — ACETAMINOPHEN 975 MG: 325 TABLET, FILM COATED ORAL at 14:18

## 2017-01-01 RX ADMIN — HEPARIN SODIUM 5000 UNITS: 5000 INJECTION, SOLUTION INTRAVENOUS; SUBCUTANEOUS at 21:32

## 2017-01-01 RX ADMIN — PANTOPRAZOLE SODIUM 40 MG: 40 TABLET, DELAYED RELEASE ORAL at 06:18

## 2017-01-01 RX ADMIN — INSULIN LISPRO 1 UNITS: 100 INJECTION, SOLUTION INTRAVENOUS; SUBCUTANEOUS at 12:25

## 2017-01-01 RX ADMIN — DICLOFENAC 2 G: 10 GEL TOPICAL at 10:37

## 2017-01-01 RX ADMIN — CEFAZOLIN SODIUM 1000 MG: 1 SOLUTION INTRAVENOUS at 18:59

## 2017-01-01 RX ADMIN — INSULIN ASPART 20 UNITS: 100 INJECTION, SUSPENSION SUBCUTANEOUS at 16:48

## 2017-01-01 RX ADMIN — DICLOFENAC 2 G: 10 GEL TOPICAL at 17:38

## 2017-01-01 RX ADMIN — CEFAZOLIN SODIUM 1000 MG: 1 SOLUTION INTRAVENOUS at 04:57

## 2017-01-01 RX ADMIN — HEPARIN SODIUM 5000 UNITS: 5000 INJECTION, SOLUTION INTRAVENOUS; SUBCUTANEOUS at 05:06

## 2017-01-01 RX ADMIN — DICLOFENAC 2 G: 10 GEL TOPICAL at 12:05

## 2017-01-02 ENCOUNTER — HOSPITAL ENCOUNTER (OUTPATIENT)
Dept: RADIOLOGY | Age: 82
Discharge: HOME | End: 2017-01-02
Attending: PHYSICIAN ASSISTANT | Admitting: PHYSICIAN ASSISTANT
Payer: MEDICARE

## 2017-01-02 ENCOUNTER — TRANSCRIBE ORDERS (OUTPATIENT)
Dept: URGENT CARE | Age: 82
End: 2017-01-02

## 2017-01-02 ENCOUNTER — APPOINTMENT (OUTPATIENT)
Dept: URGENT CARE | Age: 82
End: 2017-01-02
Payer: MEDICARE

## 2017-01-02 ENCOUNTER — HOSPITAL ENCOUNTER (INPATIENT)
Facility: HOSPITAL | Age: 82
LOS: 2 days | Discharge: HOME | DRG: 280 | End: 2017-01-05
Attending: EMERGENCY MEDICINE | Admitting: HOSPITALIST
Payer: MEDICARE

## 2017-01-02 DIAGNOSIS — I48.0 PAROXYSMAL ATRIAL FIBRILLATION (HCC): ICD-10-CM

## 2017-01-02 DIAGNOSIS — E11.9 TYPE 2 DIABETES MELLITUS (HCC): ICD-10-CM

## 2017-01-02 DIAGNOSIS — R06.2 WHEEZING: ICD-10-CM

## 2017-01-02 DIAGNOSIS — R09.02 HYPOXIA: ICD-10-CM

## 2017-01-02 DIAGNOSIS — I50.33 ACUTE ON CHRONIC DIASTOLIC (CONGESTIVE) HEART FAILURE (HCC): ICD-10-CM

## 2017-01-02 DIAGNOSIS — I50.9 HEART FAILURE (HCC): ICD-10-CM

## 2017-01-02 DIAGNOSIS — I50.9 CONGESTIVE HEART FAILURE (HCC): Primary | ICD-10-CM

## 2017-01-02 PROCEDURE — 94640 AIRWAY INHALATION TREATMENT: CPT | Performed by: FAMILY MEDICINE

## 2017-01-02 PROCEDURE — 99214 OFFICE O/P EST MOD 30 MIN: CPT | Performed by: FAMILY MEDICINE

## 2017-01-02 PROCEDURE — G0463 HOSPITAL OUTPT CLINIC VISIT: HCPCS | Performed by: FAMILY MEDICINE

## 2017-01-02 PROCEDURE — 71020 HB CHEST X-RAY 2VW FRONTAL&LATL: CPT

## 2017-01-03 ENCOUNTER — APPOINTMENT (INPATIENT)
Dept: RADIOLOGY | Facility: HOSPITAL | Age: 82
DRG: 280 | End: 2017-01-03
Payer: MEDICARE

## 2017-01-03 ENCOUNTER — APPOINTMENT (INPATIENT)
Dept: NON INVASIVE DIAGNOSTICS | Facility: HOSPITAL | Age: 82
DRG: 280 | End: 2017-01-03
Attending: HOSPITALIST
Payer: MEDICARE

## 2017-01-03 ENCOUNTER — GENERIC CONVERSION - ENCOUNTER (OUTPATIENT)
Dept: OTHER | Facility: OTHER | Age: 82
End: 2017-01-03

## 2017-01-03 PROBLEM — J40 BRONCHITIS: Status: ACTIVE | Noted: 2017-01-03

## 2017-01-03 PROBLEM — R06.00 DOE (DYSPNEA ON EXERTION): Status: ACTIVE | Noted: 2017-01-03

## 2017-01-03 PROBLEM — R05.9 COUGH: Status: ACTIVE | Noted: 2017-01-03

## 2017-01-03 PROBLEM — R09.02 HYPOXIA: Status: ACTIVE | Noted: 2017-01-03

## 2017-01-03 PROBLEM — E11.9 TYPE 2 DIABETES MELLITUS (HCC): Status: ACTIVE | Noted: 2017-01-03

## 2017-01-03 PROBLEM — J96.01 ACUTE RESPIRATORY FAILURE WITH HYPOXIA (HCC): Status: ACTIVE | Noted: 2017-01-03

## 2017-01-03 LAB
ALBUMIN SERPL BCP-MCNC: 3.1 G/DL (ref 3.5–5)
ALP SERPL-CCNC: 95 U/L (ref 46–116)
ALT SERPL W P-5'-P-CCNC: 17 U/L (ref 12–78)
ANION GAP SERPL CALCULATED.3IONS-SCNC: 6 MMOL/L (ref 4–13)
AST SERPL W P-5'-P-CCNC: 14 U/L (ref 5–45)
ATRIAL RATE: 90 BPM
BASOPHILS # BLD AUTO: 0.01 THOUSANDS/ΜL (ref 0–0.1)
BASOPHILS NFR BLD AUTO: 0 % (ref 0–1)
BILIRUB SERPL-MCNC: 0.53 MG/DL (ref 0.2–1)
BUN SERPL-MCNC: 27 MG/DL (ref 5–25)
CALCIUM SERPL-MCNC: 8.8 MG/DL (ref 8.3–10.1)
CHLORIDE SERPL-SCNC: 97 MMOL/L (ref 100–108)
CHOLEST SERPL-MCNC: 125 MG/DL (ref 50–200)
CO2 SERPL-SCNC: 34 MMOL/L (ref 21–32)
CREAT SERPL-MCNC: 2.08 MG/DL (ref 0.6–1.3)
EOSINOPHIL # BLD AUTO: 0.09 THOUSAND/ΜL (ref 0–0.61)
EOSINOPHIL NFR BLD AUTO: 2 % (ref 0–6)
ERYTHROCYTE [DISTWIDTH] IN BLOOD BY AUTOMATED COUNT: 15.2 % (ref 11.6–15.1)
ERYTHROCYTE [DISTWIDTH] IN BLOOD BY AUTOMATED COUNT: 15.2 % (ref 11.6–15.1)
GFR SERPL CREATININE-BSD FRML MDRD: 31 ML/MIN/1.73SQ M
GLUCOSE SERPL-MCNC: 189 MG/DL (ref 65–140)
GLUCOSE SERPL-MCNC: 192 MG/DL (ref 65–140)
GLUCOSE SERPL-MCNC: 196 MG/DL (ref 65–140)
GLUCOSE SERPL-MCNC: 222 MG/DL (ref 65–140)
GLUCOSE SERPL-MCNC: 226 MG/DL (ref 65–140)
HCT VFR BLD AUTO: 37 % (ref 36.5–49.3)
HCT VFR BLD AUTO: 38 % (ref 36.5–49.3)
HDLC SERPL-MCNC: 35 MG/DL (ref 40–60)
HGB BLD-MCNC: 11.9 G/DL (ref 12–17)
HGB BLD-MCNC: 12.1 G/DL (ref 12–17)
LDLC SERPL CALC-MCNC: 71 MG/DL (ref 0–100)
LYMPHOCYTES # BLD AUTO: 1.74 THOUSANDS/ΜL (ref 0.6–4.47)
LYMPHOCYTES NFR BLD AUTO: 28 % (ref 14–44)
MCH RBC QN AUTO: 26.9 PG (ref 26.8–34.3)
MCH RBC QN AUTO: 27.1 PG (ref 26.8–34.3)
MCHC RBC AUTO-ENTMCNC: 31.8 G/DL (ref 31.4–37.4)
MCHC RBC AUTO-ENTMCNC: 32.2 G/DL (ref 31.4–37.4)
MCV RBC AUTO: 84 FL (ref 82–98)
MCV RBC AUTO: 85 FL (ref 82–98)
MONOCYTES # BLD AUTO: 0.74 THOUSAND/ΜL (ref 0.17–1.22)
MONOCYTES NFR BLD AUTO: 12 % (ref 4–12)
NEUTROPHILS # BLD AUTO: 3.51 THOUSANDS/ΜL (ref 1.85–7.62)
NEUTS SEG NFR BLD AUTO: 58 % (ref 43–75)
NRBC BLD AUTO-RTO: 0 /100 WBCS
NT-PROBNP SERPL-MCNC: 1795 PG/ML
P AXIS: 92 DEGREES
PLATELET # BLD AUTO: 175 THOUSANDS/UL (ref 149–390)
PLATELET # BLD AUTO: 175 THOUSANDS/UL (ref 149–390)
PMV BLD AUTO: 10.1 FL (ref 8.9–12.7)
PMV BLD AUTO: 10.1 FL (ref 8.9–12.7)
POTASSIUM SERPL-SCNC: 3.6 MMOL/L (ref 3.5–5.3)
PR INTERVAL: 240 MS
PROT SERPL-MCNC: 7 G/DL (ref 6.4–8.2)
QRS AXIS: -57 DEGREES
QRSD INTERVAL: 126 MS
QT INTERVAL: 366 MS
QTC INTERVAL: 447 MS
RBC # BLD AUTO: 4.39 MILLION/UL (ref 3.88–5.62)
RBC # BLD AUTO: 4.49 MILLION/UL (ref 3.88–5.62)
SODIUM SERPL-SCNC: 137 MMOL/L (ref 136–145)
SPECIMEN SOURCE: NORMAL
T WAVE AXIS: 78 DEGREES
TRIGL SERPL-MCNC: 96 MG/DL
TROPONIN I BLD-MCNC: 0.08 NG/ML (ref 0–0.08)
TROPONIN I SERPL-MCNC: 0.07 NG/ML
TROPONIN I SERPL-MCNC: 0.09 NG/ML
TSH SERPL DL<=0.05 MIU/L-ACNC: 2.11 UIU/ML (ref 0.36–3.74)
VENTRICULAR RATE: 90 BPM
WBC # BLD AUTO: 5.57 THOUSAND/UL (ref 4.31–10.16)
WBC # BLD AUTO: 6.14 THOUSAND/UL (ref 4.31–10.16)

## 2017-01-03 PROCEDURE — 85027 COMPLETE CBC AUTOMATED: CPT | Performed by: PHYSICIAN ASSISTANT

## 2017-01-03 PROCEDURE — 80061 LIPID PANEL: CPT | Performed by: PHYSICIAN ASSISTANT

## 2017-01-03 PROCEDURE — 85025 COMPLETE CBC W/AUTO DIFF WBC: CPT | Performed by: EMERGENCY MEDICINE

## 2017-01-03 PROCEDURE — 84484 ASSAY OF TROPONIN QUANT: CPT

## 2017-01-03 PROCEDURE — 83880 ASSAY OF NATRIURETIC PEPTIDE: CPT | Performed by: EMERGENCY MEDICINE

## 2017-01-03 PROCEDURE — 93005 ELECTROCARDIOGRAM TRACING: CPT | Performed by: EMERGENCY MEDICINE

## 2017-01-03 PROCEDURE — 93970 EXTREMITY STUDY: CPT

## 2017-01-03 PROCEDURE — 36415 COLL VENOUS BLD VENIPUNCTURE: CPT | Performed by: EMERGENCY MEDICINE

## 2017-01-03 PROCEDURE — 93308 TTE F-UP OR LMTD: CPT

## 2017-01-03 PROCEDURE — 84443 ASSAY THYROID STIM HORMONE: CPT | Performed by: PHYSICIAN ASSISTANT

## 2017-01-03 PROCEDURE — 82948 REAGENT STRIP/BLOOD GLUCOSE: CPT

## 2017-01-03 PROCEDURE — 80053 COMPREHEN METABOLIC PANEL: CPT | Performed by: EMERGENCY MEDICINE

## 2017-01-03 PROCEDURE — 99285 EMERGENCY DEPT VISIT HI MDM: CPT

## 2017-01-03 PROCEDURE — 84484 ASSAY OF TROPONIN QUANT: CPT | Performed by: PHYSICIAN ASSISTANT

## 2017-01-03 RX ORDER — AMLODIPINE BESYLATE 5 MG/1
5 TABLET ORAL DAILY
Status: DISCONTINUED | OUTPATIENT
Start: 2017-01-03 | End: 2017-01-05 | Stop reason: HOSPADM

## 2017-01-03 RX ORDER — ASPIRIN 81 MG/1
81 TABLET ORAL EVERY OTHER DAY
Status: DISCONTINUED | OUTPATIENT
Start: 2017-01-03 | End: 2017-01-05 | Stop reason: HOSPADM

## 2017-01-03 RX ORDER — FEBUXOSTAT 40 MG/1
40 TABLET, FILM COATED ORAL DAILY
Status: DISCONTINUED | OUTPATIENT
Start: 2017-01-03 | End: 2017-01-05 | Stop reason: HOSPADM

## 2017-01-03 RX ORDER — LEVALBUTEROL 1.25 MG/.5ML
0.63 SOLUTION, CONCENTRATE RESPIRATORY (INHALATION) EVERY 8 HOURS PRN
Status: DISCONTINUED | OUTPATIENT
Start: 2017-01-03 | End: 2017-01-03

## 2017-01-03 RX ORDER — PRAVASTATIN SODIUM 20 MG
20 TABLET ORAL
Status: DISCONTINUED | OUTPATIENT
Start: 2017-01-03 | End: 2017-01-05 | Stop reason: HOSPADM

## 2017-01-03 RX ORDER — CALCITRIOL 0.25 UG/1
0.25 CAPSULE, LIQUID FILLED ORAL DAILY
Status: DISCONTINUED | OUTPATIENT
Start: 2017-01-03 | End: 2017-01-05 | Stop reason: HOSPADM

## 2017-01-03 RX ORDER — FUROSEMIDE 10 MG/ML
40 INJECTION INTRAMUSCULAR; INTRAVENOUS ONCE
Status: COMPLETED | OUTPATIENT
Start: 2017-01-03 | End: 2017-01-03

## 2017-01-03 RX ORDER — B-COMPLEX WITH VITAMIN C
1 TABLET ORAL DAILY
Status: DISCONTINUED | OUTPATIENT
Start: 2017-01-03 | End: 2017-01-05 | Stop reason: HOSPADM

## 2017-01-03 RX ORDER — METOPROLOL TARTRATE 50 MG/1
50 TABLET, FILM COATED ORAL 2 TIMES DAILY
COMMUNITY
End: 2017-01-05 | Stop reason: HOSPADM

## 2017-01-03 RX ORDER — ASPIRIN 81 MG/1
162 TABLET ORAL EVERY OTHER DAY
Status: DISCONTINUED | OUTPATIENT
Start: 2017-01-04 | End: 2017-01-05 | Stop reason: HOSPADM

## 2017-01-03 RX ORDER — PANTOPRAZOLE SODIUM 40 MG/1
40 TABLET, DELAYED RELEASE ORAL
Status: DISCONTINUED | OUTPATIENT
Start: 2017-01-03 | End: 2017-01-05 | Stop reason: HOSPADM

## 2017-01-03 RX ORDER — MAGNESIUM HYDROXIDE/ALUMINUM HYDROXICE/SIMETHICONE 120; 1200; 1200 MG/30ML; MG/30ML; MG/30ML
30 SUSPENSION ORAL EVERY 6 HOURS PRN
Status: DISCONTINUED | OUTPATIENT
Start: 2017-01-03 | End: 2017-01-05 | Stop reason: HOSPADM

## 2017-01-03 RX ORDER — PARICALCITOL 1 UG/1
1 CAPSULE, LIQUID FILLED ORAL DAILY
Status: ON HOLD | COMMUNITY
End: 2017-01-05

## 2017-01-03 RX ORDER — FEBUXOSTAT 40 MG/1
40 TABLET, FILM COATED ORAL DAILY
COMMUNITY
End: 2018-01-01 | Stop reason: HOSPADM

## 2017-01-03 RX ORDER — INSULIN ASPART 100 [IU]/ML
32 INJECTION, SUSPENSION SUBCUTANEOUS
Status: DISCONTINUED | OUTPATIENT
Start: 2017-01-03 | End: 2017-01-04

## 2017-01-03 RX ORDER — ACETAMINOPHEN 325 MG/1
650 TABLET ORAL EVERY 6 HOURS PRN
Status: DISCONTINUED | OUTPATIENT
Start: 2017-01-03 | End: 2017-01-05 | Stop reason: HOSPADM

## 2017-01-03 RX ORDER — ONDANSETRON 2 MG/ML
4 INJECTION INTRAMUSCULAR; INTRAVENOUS EVERY 6 HOURS PRN
Status: DISCONTINUED | OUTPATIENT
Start: 2017-01-03 | End: 2017-01-05 | Stop reason: HOSPADM

## 2017-01-03 RX ORDER — SODIUM CHLORIDE 9 MG/ML
50 INJECTION, SOLUTION INTRAVENOUS ONCE
Status: DISCONTINUED | OUTPATIENT
Start: 2017-01-03 | End: 2017-01-05 | Stop reason: HOSPADM

## 2017-01-03 RX ORDER — POTASSIUM CHLORIDE 20 MEQ/1
20 TABLET, EXTENDED RELEASE ORAL DAILY
Status: DISCONTINUED | OUTPATIENT
Start: 2017-01-03 | End: 2017-01-05 | Stop reason: HOSPADM

## 2017-01-03 RX ORDER — FUROSEMIDE 10 MG/ML
40 INJECTION INTRAMUSCULAR; INTRAVENOUS
Status: DISCONTINUED | OUTPATIENT
Start: 2017-01-03 | End: 2017-01-05 | Stop reason: HOSPADM

## 2017-01-03 RX ORDER — DOCUSATE SODIUM 100 MG/1
100 CAPSULE, LIQUID FILLED ORAL 2 TIMES DAILY
Status: DISCONTINUED | OUTPATIENT
Start: 2017-01-03 | End: 2017-01-05 | Stop reason: HOSPADM

## 2017-01-03 RX ORDER — INSULIN ASPART 100 [IU]/ML
28 INJECTION, SUSPENSION SUBCUTANEOUS EVERY EVENING
Status: DISCONTINUED | OUTPATIENT
Start: 2017-01-03 | End: 2017-01-04

## 2017-01-03 RX ADMIN — CALCIUM CARBONATE 500 MG (1,250 MG)-VITAMIN D3 200 UNIT TABLET 1 TABLET: at 08:39

## 2017-01-03 RX ADMIN — INSULIN LISPRO 2 UNITS: 100 INJECTION, SOLUTION INTRAVENOUS; SUBCUTANEOUS at 12:32

## 2017-01-03 RX ADMIN — METOPROLOL TARTRATE 75 MG: 50 TABLET ORAL at 08:42

## 2017-01-03 RX ADMIN — PANTOPRAZOLE SODIUM 40 MG: 40 TABLET, DELAYED RELEASE ORAL at 05:54

## 2017-01-03 RX ADMIN — FUROSEMIDE 40 MG: 10 INJECTION, SOLUTION INTRAMUSCULAR; INTRAVENOUS at 08:26

## 2017-01-03 RX ADMIN — INSULIN LISPRO 1 UNITS: 100 INJECTION, SOLUTION INTRAVENOUS; SUBCUTANEOUS at 17:15

## 2017-01-03 RX ADMIN — DOCUSATE SODIUM 100 MG: 100 CAPSULE, LIQUID FILLED ORAL at 08:41

## 2017-01-03 RX ADMIN — AMLODIPINE BESYLATE 5 MG: 5 TABLET ORAL at 08:40

## 2017-01-03 RX ADMIN — INSULIN ASPART 28 UNITS: 100 INJECTION, SUSPENSION SUBCUTANEOUS at 18:01

## 2017-01-03 RX ADMIN — FUROSEMIDE 40 MG: 10 INJECTION, SOLUTION INTRAMUSCULAR; INTRAVENOUS at 01:54

## 2017-01-03 RX ADMIN — FUROSEMIDE 40 MG: 10 INJECTION, SOLUTION INTRAMUSCULAR; INTRAVENOUS at 15:17

## 2017-01-03 RX ADMIN — METOPROLOL TARTRATE 75 MG: 50 TABLET ORAL at 20:26

## 2017-01-03 RX ADMIN — APIXABAN 2.5 MG: 2.5 TABLET, FILM COATED ORAL at 08:41

## 2017-01-03 RX ADMIN — CALCITRIOL 0.25 MCG: 0.25 CAPSULE, LIQUID FILLED ORAL at 08:41

## 2017-01-03 RX ADMIN — APIXABAN 2.5 MG: 2.5 TABLET, FILM COATED ORAL at 18:00

## 2017-01-03 RX ADMIN — Medication 1 TABLET: at 08:40

## 2017-01-03 RX ADMIN — INSULIN ASPART 32 UNITS: 100 INJECTION, SUSPENSION SUBCUTANEOUS at 08:34

## 2017-01-03 RX ADMIN — ASPIRIN 81 MG: 81 TABLET, COATED ORAL at 08:39

## 2017-01-03 RX ADMIN — INSULIN LISPRO 2 UNITS: 100 INJECTION, SOLUTION INTRAVENOUS; SUBCUTANEOUS at 08:34

## 2017-01-03 RX ADMIN — POTASSIUM CHLORIDE 20 MEQ: 1500 TABLET, EXTENDED RELEASE ORAL at 08:42

## 2017-01-03 RX ADMIN — PRAVASTATIN SODIUM 20 MG: 20 TABLET ORAL at 18:00

## 2017-01-03 RX ADMIN — DOCUSATE SODIUM 100 MG: 100 CAPSULE, LIQUID FILLED ORAL at 18:00

## 2017-01-04 LAB
ANION GAP SERPL CALCULATED.3IONS-SCNC: 7 MMOL/L (ref 4–13)
BUN SERPL-MCNC: 29 MG/DL (ref 5–25)
CALCIUM SERPL-MCNC: 8.9 MG/DL (ref 8.3–10.1)
CHLORIDE SERPL-SCNC: 96 MMOL/L (ref 100–108)
CO2 SERPL-SCNC: 33 MMOL/L (ref 21–32)
CREAT SERPL-MCNC: 1.76 MG/DL (ref 0.6–1.3)
GFR SERPL CREATININE-BSD FRML MDRD: 37.5 ML/MIN/1.73SQ M
GLUCOSE SERPL-MCNC: 129 MG/DL (ref 65–140)
GLUCOSE SERPL-MCNC: 198 MG/DL (ref 65–140)
GLUCOSE SERPL-MCNC: 208 MG/DL (ref 65–140)
GLUCOSE SERPL-MCNC: 227 MG/DL (ref 65–140)
GLUCOSE SERPL-MCNC: 247 MG/DL (ref 65–140)
MAGNESIUM SERPL-MCNC: 2.3 MG/DL (ref 1.6–2.6)
POTASSIUM SERPL-SCNC: 4 MMOL/L (ref 3.5–5.3)
SODIUM SERPL-SCNC: 136 MMOL/L (ref 136–145)

## 2017-01-04 PROCEDURE — G8978 MOBILITY CURRENT STATUS: HCPCS | Performed by: PHYSICAL THERAPIST

## 2017-01-04 PROCEDURE — 80048 BASIC METABOLIC PNL TOTAL CA: CPT | Performed by: PHYSICIAN ASSISTANT

## 2017-01-04 PROCEDURE — G8979 MOBILITY GOAL STATUS: HCPCS | Performed by: PHYSICAL THERAPIST

## 2017-01-04 PROCEDURE — 82948 REAGENT STRIP/BLOOD GLUCOSE: CPT

## 2017-01-04 PROCEDURE — 97163 PT EVAL HIGH COMPLEX 45 MIN: CPT | Performed by: PHYSICAL THERAPIST

## 2017-01-04 PROCEDURE — 83735 ASSAY OF MAGNESIUM: CPT | Performed by: PHYSICIAN ASSISTANT

## 2017-01-04 RX ORDER — POLYETHYLENE GLYCOL 3350 17 G/17G
17 POWDER, FOR SOLUTION ORAL
Status: DISCONTINUED | OUTPATIENT
Start: 2017-01-04 | End: 2017-01-05 | Stop reason: HOSPADM

## 2017-01-04 RX ORDER — INSULIN ASPART 100 [IU]/ML
30 INJECTION, SUSPENSION SUBCUTANEOUS EVERY EVENING
Status: DISCONTINUED | OUTPATIENT
Start: 2017-01-04 | End: 2017-01-05 | Stop reason: HOSPADM

## 2017-01-04 RX ORDER — SENNOSIDES 8.6 MG
2 TABLET ORAL
Status: DISCONTINUED | OUTPATIENT
Start: 2017-01-04 | End: 2017-01-05 | Stop reason: HOSPADM

## 2017-01-04 RX ORDER — INSULIN ASPART 100 [IU]/ML
34 INJECTION, SUSPENSION SUBCUTANEOUS
Status: DISCONTINUED | OUTPATIENT
Start: 2017-01-05 | End: 2017-01-05 | Stop reason: HOSPADM

## 2017-01-04 RX ADMIN — METOPROLOL TARTRATE 75 MG: 50 TABLET ORAL at 21:32

## 2017-01-04 RX ADMIN — INSULIN ASPART 32 UNITS: 100 INJECTION, SUSPENSION SUBCUTANEOUS at 07:06

## 2017-01-04 RX ADMIN — DOCUSATE SODIUM 100 MG: 100 CAPSULE, LIQUID FILLED ORAL at 17:35

## 2017-01-04 RX ADMIN — FUROSEMIDE 40 MG: 10 INJECTION, SOLUTION INTRAMUSCULAR; INTRAVENOUS at 17:34

## 2017-01-04 RX ADMIN — PRAVASTATIN SODIUM 20 MG: 20 TABLET ORAL at 17:35

## 2017-01-04 RX ADMIN — INSULIN LISPRO 2 UNITS: 100 INJECTION, SOLUTION INTRAVENOUS; SUBCUTANEOUS at 12:39

## 2017-01-04 RX ADMIN — CALCIUM CARBONATE 500 MG (1,250 MG)-VITAMIN D3 200 UNIT TABLET 1 TABLET: at 09:43

## 2017-01-04 RX ADMIN — POTASSIUM CHLORIDE 20 MEQ: 1500 TABLET, EXTENDED RELEASE ORAL at 09:44

## 2017-01-04 RX ADMIN — METOPROLOL TARTRATE 75 MG: 50 TABLET ORAL at 09:43

## 2017-01-04 RX ADMIN — DOCUSATE SODIUM 100 MG: 100 CAPSULE, LIQUID FILLED ORAL at 09:43

## 2017-01-04 RX ADMIN — SENNOSIDES 17.2 MG: 8.6 TABLET, FILM COATED ORAL at 21:33

## 2017-01-04 RX ADMIN — Medication 1 TABLET: at 09:44

## 2017-01-04 RX ADMIN — PANTOPRAZOLE SODIUM 40 MG: 40 TABLET, DELAYED RELEASE ORAL at 07:00

## 2017-01-04 RX ADMIN — ASPIRIN 162 MG: 81 TABLET, COATED ORAL at 09:49

## 2017-01-04 RX ADMIN — INSULIN LISPRO 2 UNITS: 100 INJECTION, SOLUTION INTRAVENOUS; SUBCUTANEOUS at 07:06

## 2017-01-04 RX ADMIN — FUROSEMIDE 40 MG: 10 INJECTION, SOLUTION INTRAMUSCULAR; INTRAVENOUS at 07:07

## 2017-01-04 RX ADMIN — INSULIN LISPRO 3 UNITS: 100 INJECTION, SOLUTION INTRAVENOUS; SUBCUTANEOUS at 17:36

## 2017-01-04 RX ADMIN — APIXABAN 2.5 MG: 2.5 TABLET, FILM COATED ORAL at 17:37

## 2017-01-04 RX ADMIN — FEBUXOSTAT 40 MG: 40 TABLET, FILM COATED ORAL at 12:38

## 2017-01-04 RX ADMIN — CALCITRIOL 0.25 MCG: 0.25 CAPSULE, LIQUID FILLED ORAL at 09:50

## 2017-01-04 RX ADMIN — AMLODIPINE BESYLATE 5 MG: 5 TABLET ORAL at 09:43

## 2017-01-04 RX ADMIN — POLYETHYLENE GLYCOL 3350 17 G: 17 POWDER, FOR SOLUTION ORAL at 21:33

## 2017-01-04 RX ADMIN — INSULIN ASPART 30 UNITS: 100 INJECTION, SUSPENSION SUBCUTANEOUS at 17:36

## 2017-01-04 RX ADMIN — APIXABAN 2.5 MG: 2.5 TABLET, FILM COATED ORAL at 09:45

## 2017-01-05 VITALS
OXYGEN SATURATION: 92 % | BODY MASS INDEX: 33.87 KG/M2 | HEART RATE: 81 BPM | SYSTOLIC BLOOD PRESSURE: 143 MMHG | TEMPERATURE: 98 F | RESPIRATION RATE: 16 BRPM | HEIGHT: 67 IN | DIASTOLIC BLOOD PRESSURE: 97 MMHG | WEIGHT: 215.8 LBS

## 2017-01-05 PROBLEM — R09.02 HYPOXIA: Status: RESOLVED | Noted: 2017-01-03 | Resolved: 2017-01-05

## 2017-01-05 PROBLEM — J40 BRONCHITIS: Status: RESOLVED | Noted: 2017-01-03 | Resolved: 2017-01-05

## 2017-01-05 LAB
ALBUMIN SERPL BCP-MCNC: 3 G/DL (ref 3.5–5)
ALP SERPL-CCNC: 86 U/L (ref 46–116)
ALT SERPL W P-5'-P-CCNC: 16 U/L (ref 12–78)
ANION GAP SERPL CALCULATED.3IONS-SCNC: 7 MMOL/L (ref 4–13)
AST SERPL W P-5'-P-CCNC: 13 U/L (ref 5–45)
BASOPHILS # BLD AUTO: 0.01 THOUSANDS/ΜL (ref 0–0.1)
BASOPHILS NFR BLD AUTO: 0 % (ref 0–1)
BILIRUB SERPL-MCNC: 0.63 MG/DL (ref 0.2–1)
BUN SERPL-MCNC: 27 MG/DL (ref 5–25)
CALCIUM SERPL-MCNC: 9.2 MG/DL (ref 8.3–10.1)
CHLORIDE SERPL-SCNC: 97 MMOL/L (ref 100–108)
CO2 SERPL-SCNC: 33 MMOL/L (ref 21–32)
CREAT SERPL-MCNC: 1.61 MG/DL (ref 0.6–1.3)
EOSINOPHIL # BLD AUTO: 0.06 THOUSAND/ΜL (ref 0–0.61)
EOSINOPHIL NFR BLD AUTO: 1 % (ref 0–6)
ERYTHROCYTE [DISTWIDTH] IN BLOOD BY AUTOMATED COUNT: 15.1 % (ref 11.6–15.1)
GFR SERPL CREATININE-BSD FRML MDRD: 41.6 ML/MIN/1.73SQ M
GLUCOSE SERPL-MCNC: 173 MG/DL (ref 65–140)
GLUCOSE SERPL-MCNC: 179 MG/DL (ref 65–140)
GLUCOSE SERPL-MCNC: 200 MG/DL (ref 65–140)
HCT VFR BLD AUTO: 38.3 % (ref 36.5–49.3)
HGB BLD-MCNC: 12.4 G/DL (ref 12–17)
LYMPHOCYTES # BLD AUTO: 1.77 THOUSANDS/ΜL (ref 0.6–4.47)
LYMPHOCYTES NFR BLD AUTO: 23 % (ref 14–44)
MCH RBC QN AUTO: 27.6 PG (ref 26.8–34.3)
MCHC RBC AUTO-ENTMCNC: 32.4 G/DL (ref 31.4–37.4)
MCV RBC AUTO: 85 FL (ref 82–98)
MONOCYTES # BLD AUTO: 0.67 THOUSAND/ΜL (ref 0.17–1.22)
MONOCYTES NFR BLD AUTO: 9 % (ref 4–12)
NEUTROPHILS # BLD AUTO: 5.11 THOUSANDS/ΜL (ref 1.85–7.62)
NEUTS SEG NFR BLD AUTO: 67 % (ref 43–75)
NRBC BLD AUTO-RTO: 0 /100 WBCS
NT-PROBNP SERPL-MCNC: 1153 PG/ML
PLATELET # BLD AUTO: 195 THOUSANDS/UL (ref 149–390)
PMV BLD AUTO: 10.4 FL (ref 8.9–12.7)
POTASSIUM SERPL-SCNC: 4 MMOL/L (ref 3.5–5.3)
PROT SERPL-MCNC: 6.8 G/DL (ref 6.4–8.2)
RBC # BLD AUTO: 4.49 MILLION/UL (ref 3.88–5.62)
SODIUM SERPL-SCNC: 137 MMOL/L (ref 136–145)
WBC # BLD AUTO: 7.64 THOUSAND/UL (ref 4.31–10.16)

## 2017-01-05 PROCEDURE — 97116 GAIT TRAINING THERAPY: CPT

## 2017-01-05 PROCEDURE — 82948 REAGENT STRIP/BLOOD GLUCOSE: CPT

## 2017-01-05 PROCEDURE — 85025 COMPLETE CBC W/AUTO DIFF WBC: CPT | Performed by: FAMILY MEDICINE

## 2017-01-05 PROCEDURE — 83880 ASSAY OF NATRIURETIC PEPTIDE: CPT | Performed by: FAMILY MEDICINE

## 2017-01-05 PROCEDURE — 97110 THERAPEUTIC EXERCISES: CPT

## 2017-01-05 PROCEDURE — 80053 COMPREHEN METABOLIC PANEL: CPT | Performed by: FAMILY MEDICINE

## 2017-01-05 RX ORDER — DOCUSATE SODIUM 100 MG/1
100 CAPSULE, LIQUID FILLED ORAL 2 TIMES DAILY
Qty: 60 CAPSULE | Refills: 0 | Status: SHIPPED | OUTPATIENT
Start: 2017-01-05 | End: 2017-01-01

## 2017-01-05 RX ORDER — TORSEMIDE 20 MG/1
40 TABLET ORAL DAILY
Qty: 60 TABLET | Refills: 0 | Status: SHIPPED | OUTPATIENT
Start: 2017-01-05 | End: 2017-01-01 | Stop reason: HOSPADM

## 2017-01-05 RX ORDER — TORSEMIDE 20 MG/1
40 TABLET ORAL DAILY
Qty: 60 TABLET | Refills: 0 | Status: SHIPPED | OUTPATIENT
Start: 2017-01-05 | End: 2017-01-05 | Stop reason: HOSPADM

## 2017-01-05 RX ORDER — INSULIN ASPART 100 [IU]/ML
35 INJECTION, SUSPENSION SUBCUTANEOUS
Qty: 1050 UNITS | Refills: 0
Start: 2017-01-05 | End: 2017-01-01 | Stop reason: HOSPADM

## 2017-01-05 RX ORDER — METOPROLOL TARTRATE 75 MG/1
75 TABLET, FILM COATED ORAL EVERY 12 HOURS SCHEDULED
Qty: 60 TABLET | Refills: 0
Start: 2017-01-05 | End: 2018-01-01 | Stop reason: ALTCHOICE

## 2017-01-05 RX ORDER — INSULIN ASPART 100 [IU]/ML
30 INJECTION, SUSPENSION SUBCUTANEOUS EVERY EVENING
Qty: 900 UNITS | Refills: 0
Start: 2017-01-05 | End: 2017-01-01

## 2017-01-05 RX ORDER — PARICALCITOL 1 UG/1
1 CAPSULE, LIQUID FILLED ORAL 3 TIMES WEEKLY
Qty: 12 CAPSULE | Refills: 0 | Status: SHIPPED | OUTPATIENT
Start: 2017-01-06 | End: 2018-01-01 | Stop reason: HOSPADM

## 2017-01-05 RX ADMIN — INSULIN LISPRO 2 UNITS: 100 INJECTION, SOLUTION INTRAVENOUS; SUBCUTANEOUS at 12:09

## 2017-01-05 RX ADMIN — DOCUSATE SODIUM 100 MG: 100 CAPSULE, LIQUID FILLED ORAL at 08:36

## 2017-01-05 RX ADMIN — Medication 1 TABLET: at 08:36

## 2017-01-05 RX ADMIN — FUROSEMIDE 40 MG: 10 INJECTION, SOLUTION INTRAMUSCULAR; INTRAVENOUS at 08:36

## 2017-01-05 RX ADMIN — METOPROLOL TARTRATE 75 MG: 50 TABLET ORAL at 08:38

## 2017-01-05 RX ADMIN — FEBUXOSTAT 40 MG: 40 TABLET, FILM COATED ORAL at 08:36

## 2017-01-05 RX ADMIN — ASPIRIN 81 MG: 81 TABLET, COATED ORAL at 08:37

## 2017-01-05 RX ADMIN — AMLODIPINE BESYLATE 5 MG: 5 TABLET ORAL at 08:37

## 2017-01-05 RX ADMIN — CALCIUM CARBONATE 500 MG (1,250 MG)-VITAMIN D3 200 UNIT TABLET 1 TABLET: at 08:37

## 2017-01-05 RX ADMIN — INSULIN ASPART 34 UNITS: 100 INJECTION, SUSPENSION SUBCUTANEOUS at 06:25

## 2017-01-05 RX ADMIN — PANTOPRAZOLE SODIUM 40 MG: 40 TABLET, DELAYED RELEASE ORAL at 05:31

## 2017-01-05 RX ADMIN — CALCITRIOL 0.25 MCG: 0.25 CAPSULE, LIQUID FILLED ORAL at 08:36

## 2017-01-05 RX ADMIN — POTASSIUM CHLORIDE 20 MEQ: 1500 TABLET, EXTENDED RELEASE ORAL at 08:36

## 2017-01-05 RX ADMIN — INSULIN LISPRO 1 UNITS: 100 INJECTION, SOLUTION INTRAVENOUS; SUBCUTANEOUS at 06:26

## 2017-01-05 RX ADMIN — APIXABAN 2.5 MG: 2.5 TABLET, FILM COATED ORAL at 08:37

## 2017-01-10 ENCOUNTER — APPOINTMENT (OUTPATIENT)
Dept: LAB | Facility: CLINIC | Age: 82
End: 2017-01-10
Payer: MEDICARE

## 2017-01-10 ENCOUNTER — ALLSCRIPTS OFFICE VISIT (OUTPATIENT)
Dept: OTHER | Facility: OTHER | Age: 82
End: 2017-01-10

## 2017-01-10 DIAGNOSIS — I50.9 HEART FAILURE (HCC): ICD-10-CM

## 2017-01-10 LAB
ANION GAP SERPL CALCULATED.3IONS-SCNC: 6 MMOL/L (ref 4–13)
BUN SERPL-MCNC: 39 MG/DL (ref 5–25)
CALCIUM SERPL-MCNC: 9.3 MG/DL (ref 8.3–10.1)
CHLORIDE SERPL-SCNC: 98 MMOL/L (ref 100–108)
CO2 SERPL-SCNC: 34 MMOL/L (ref 21–32)
CREAT SERPL-MCNC: 2.18 MG/DL (ref 0.6–1.3)
GFR SERPL CREATININE-BSD FRML MDRD: 29.3 ML/MIN/1.73SQ M
GLUCOSE SERPL-MCNC: 203 MG/DL (ref 65–140)
POTASSIUM SERPL-SCNC: 4.4 MMOL/L (ref 3.5–5.3)
SODIUM SERPL-SCNC: 138 MMOL/L (ref 136–145)

## 2017-01-10 PROCEDURE — 36415 COLL VENOUS BLD VENIPUNCTURE: CPT

## 2017-01-10 PROCEDURE — 80048 BASIC METABOLIC PNL TOTAL CA: CPT

## 2017-01-11 ENCOUNTER — ALLSCRIPTS OFFICE VISIT (OUTPATIENT)
Dept: OTHER | Facility: OTHER | Age: 82
End: 2017-01-11

## 2017-01-12 ENCOUNTER — GENERIC CONVERSION - ENCOUNTER (OUTPATIENT)
Dept: OTHER | Facility: OTHER | Age: 82
End: 2017-01-12

## 2017-01-24 ENCOUNTER — APPOINTMENT (OUTPATIENT)
Dept: LAB | Facility: MEDICAL CENTER | Age: 82
End: 2017-01-24
Payer: MEDICARE

## 2017-01-24 ENCOUNTER — TRANSCRIBE ORDERS (OUTPATIENT)
Dept: ADMINISTRATIVE | Facility: HOSPITAL | Age: 82
End: 2017-01-24

## 2017-01-24 DIAGNOSIS — I48.0 PAROXYSMAL ATRIAL FIBRILLATION (HCC): ICD-10-CM

## 2017-01-24 LAB
ANION GAP SERPL CALCULATED.3IONS-SCNC: 7 MMOL/L (ref 4–13)
BUN SERPL-MCNC: 39 MG/DL (ref 5–25)
CALCIUM SERPL-MCNC: 9.5 MG/DL (ref 8.3–10.1)
CHLORIDE SERPL-SCNC: 100 MMOL/L (ref 100–108)
CO2 SERPL-SCNC: 31 MMOL/L (ref 21–32)
CREAT SERPL-MCNC: 1.96 MG/DL (ref 0.6–1.3)
GFR SERPL CREATININE-BSD FRML MDRD: 33.2 ML/MIN/1.73SQ M
GLUCOSE SERPL-MCNC: 171 MG/DL (ref 65–140)
MAGNESIUM SERPL-MCNC: 2.5 MG/DL (ref 1.6–2.6)
POTASSIUM SERPL-SCNC: 4.4 MMOL/L (ref 3.5–5.3)
SODIUM SERPL-SCNC: 138 MMOL/L (ref 136–145)

## 2017-01-24 PROCEDURE — 83735 ASSAY OF MAGNESIUM: CPT

## 2017-01-24 PROCEDURE — 80048 BASIC METABOLIC PNL TOTAL CA: CPT

## 2017-01-24 PROCEDURE — 36415 COLL VENOUS BLD VENIPUNCTURE: CPT

## 2017-02-01 ENCOUNTER — ALLSCRIPTS OFFICE VISIT (OUTPATIENT)
Dept: OTHER | Facility: OTHER | Age: 82
End: 2017-02-01

## 2017-02-03 ENCOUNTER — GENERIC CONVERSION - ENCOUNTER (OUTPATIENT)
Dept: OTHER | Facility: OTHER | Age: 82
End: 2017-02-03

## 2017-02-08 ENCOUNTER — HOSPITAL ENCOUNTER (OUTPATIENT)
Dept: NON INVASIVE DIAGNOSTICS | Facility: CLINIC | Age: 82
Discharge: HOME | End: 2017-02-08
Payer: MEDICARE

## 2017-02-08 DIAGNOSIS — I73.9 PERIPHERAL VASCULAR DISEASE OF EXTREMITY WITH CLAUDICATION (HCC): ICD-10-CM

## 2017-02-08 DIAGNOSIS — I65.23 CAROTID STENOSIS, ASYMPTOMATIC, BILATERAL: ICD-10-CM

## 2017-02-08 PROCEDURE — 93925 LOWER EXTREMITY STUDY: CPT

## 2017-02-08 PROCEDURE — 93880 EXTRACRANIAL BILAT STUDY: CPT

## 2017-02-08 PROCEDURE — 93923 UPR/LXTR ART STDY 3+ LVLS: CPT

## 2017-02-13 ENCOUNTER — ALLSCRIPTS OFFICE VISIT (OUTPATIENT)
Dept: OTHER | Facility: OTHER | Age: 82
End: 2017-02-13

## 2017-02-27 ENCOUNTER — LAB (OUTPATIENT)
Dept: LAB | Facility: MEDICAL CENTER | Age: 82
End: 2017-02-27
Payer: MEDICARE

## 2017-02-27 ENCOUNTER — TRANSCRIBE ORDERS (OUTPATIENT)
Dept: ADMINISTRATIVE | Facility: HOSPITAL | Age: 82
End: 2017-02-27

## 2017-02-27 DIAGNOSIS — N18.4 CHRONIC KIDNEY DISEASE, STAGE IV (SEVERE) (HCC): ICD-10-CM

## 2017-02-27 DIAGNOSIS — D47.2 MONOCLONAL PARAPROTEINEMIA: Primary | ICD-10-CM

## 2017-02-27 LAB
ALBUMIN SERPL BCP-MCNC: 3.4 G/DL (ref 3.5–5)
ALP SERPL-CCNC: 85 U/L (ref 46–116)
ALT SERPL W P-5'-P-CCNC: 19 U/L (ref 12–78)
ANION GAP SERPL CALCULATED.3IONS-SCNC: 7 MMOL/L (ref 4–13)
AST SERPL W P-5'-P-CCNC: 14 U/L (ref 5–45)
BACTERIA UR QL AUTO: ABNORMAL /HPF
BASOPHILS # BLD AUTO: 0.02 THOUSANDS/ΜL (ref 0–0.1)
BASOPHILS NFR BLD AUTO: 0 % (ref 0–1)
BILIRUB SERPL-MCNC: 0.66 MG/DL (ref 0.2–1)
BILIRUB UR QL STRIP: NEGATIVE
BUN SERPL-MCNC: 21 MG/DL (ref 5–25)
CALCIUM SERPL-MCNC: 9.5 MG/DL (ref 8.3–10.1)
CHLORIDE SERPL-SCNC: 101 MMOL/L (ref 100–108)
CLARITY UR: CLEAR
CO2 SERPL-SCNC: 30 MMOL/L (ref 21–32)
COLOR UR: YELLOW
CREAT SERPL-MCNC: 1.75 MG/DL (ref 0.6–1.3)
CREAT UR-MCNC: 81.7 MG/DL
EOSINOPHIL # BLD AUTO: 0.38 THOUSAND/ΜL (ref 0–0.61)
EOSINOPHIL NFR BLD AUTO: 5 % (ref 0–6)
ERYTHROCYTE [DISTWIDTH] IN BLOOD BY AUTOMATED COUNT: 14.3 % (ref 11.6–15.1)
GFR SERPL CREATININE-BSD FRML MDRD: 37.8 ML/MIN/1.73SQ M
GLUCOSE SERPL-MCNC: 230 MG/DL (ref 65–140)
GLUCOSE UR STRIP-MCNC: ABNORMAL MG/DL
HCT VFR BLD AUTO: 41.1 % (ref 36.5–49.3)
HGB BLD-MCNC: 12.8 G/DL (ref 12–17)
HGB UR QL STRIP.AUTO: NEGATIVE
HYALINE CASTS #/AREA URNS LPF: ABNORMAL /LPF
IGA SERPL-MCNC: 167 MG/DL (ref 70–400)
IGG SERPL-MCNC: 859 MG/DL (ref 700–1600)
IGM SERPL-MCNC: 367 MG/DL (ref 40–230)
KETONES UR STRIP-MCNC: NEGATIVE MG/DL
LEUKOCYTE ESTERASE UR QL STRIP: NEGATIVE
LYMPHOCYTES # BLD AUTO: 2.06 THOUSANDS/ΜL (ref 0.6–4.47)
LYMPHOCYTES NFR BLD AUTO: 26 % (ref 14–44)
MAGNESIUM SERPL-MCNC: 2.6 MG/DL (ref 1.6–2.6)
MCH RBC QN AUTO: 26.5 PG (ref 26.8–34.3)
MCHC RBC AUTO-ENTMCNC: 31.1 G/DL (ref 31.4–37.4)
MCV RBC AUTO: 85 FL (ref 82–98)
MONOCYTES # BLD AUTO: 0.58 THOUSAND/ΜL (ref 0.17–1.22)
MONOCYTES NFR BLD AUTO: 7 % (ref 4–12)
NEUTROPHILS # BLD AUTO: 4.72 THOUSANDS/ΜL (ref 1.85–7.62)
NEUTS SEG NFR BLD AUTO: 62 % (ref 43–75)
NITRITE UR QL STRIP: NEGATIVE
NON-SQ EPI CELLS URNS QL MICRO: ABNORMAL /HPF
NRBC BLD AUTO-RTO: 0 /100 WBCS
PH UR STRIP.AUTO: 7 [PH] (ref 4.5–8)
PHOSPHATE SERPL-MCNC: 2.8 MG/DL (ref 2.3–4.1)
PLATELET # BLD AUTO: 255 THOUSANDS/UL (ref 149–390)
PMV BLD AUTO: 10.4 FL (ref 8.9–12.7)
POTASSIUM SERPL-SCNC: 4.1 MMOL/L (ref 3.5–5.3)
PROT SERPL-MCNC: 7.4 G/DL (ref 6.4–8.2)
PROT UR STRIP-MCNC: ABNORMAL MG/DL
PROT UR-MCNC: 28 MG/DL
PROT/CREAT UR: 0.34 MG/G{CREAT} (ref 0–0.1)
PTH-INTACT SERPL-MCNC: 61.2 PG/ML (ref 14–72)
RBC # BLD AUTO: 4.83 MILLION/UL (ref 3.88–5.62)
RBC #/AREA URNS AUTO: ABNORMAL /HPF
SODIUM SERPL-SCNC: 138 MMOL/L (ref 136–145)
SP GR UR STRIP.AUTO: 1.02 (ref 1–1.03)
UROBILINOGEN UR QL STRIP.AUTO: 1 E.U./DL
WBC # BLD AUTO: 7.8 THOUSAND/UL (ref 4.31–10.16)
WBC #/AREA URNS AUTO: ABNORMAL /HPF

## 2017-02-27 PROCEDURE — 83735 ASSAY OF MAGNESIUM: CPT

## 2017-02-27 PROCEDURE — 83970 ASSAY OF PARATHORMONE: CPT

## 2017-02-27 PROCEDURE — 83883 ASSAY NEPHELOMETRY NOT SPEC: CPT

## 2017-02-27 PROCEDURE — 85025 COMPLETE CBC W/AUTO DIFF WBC: CPT | Performed by: INTERNAL MEDICINE

## 2017-02-27 PROCEDURE — 82570 ASSAY OF URINE CREATININE: CPT

## 2017-02-27 PROCEDURE — 86334 IMMUNOFIX E-PHORESIS SERUM: CPT | Performed by: INTERNAL MEDICINE

## 2017-02-27 PROCEDURE — 84156 ASSAY OF PROTEIN URINE: CPT

## 2017-02-27 PROCEDURE — 36415 COLL VENOUS BLD VENIPUNCTURE: CPT | Performed by: INTERNAL MEDICINE

## 2017-02-27 PROCEDURE — 82784 ASSAY IGA/IGD/IGG/IGM EACH: CPT | Performed by: INTERNAL MEDICINE

## 2017-02-27 PROCEDURE — 81001 URINALYSIS AUTO W/SCOPE: CPT

## 2017-02-27 PROCEDURE — 84165 PROTEIN E-PHORESIS SERUM: CPT | Performed by: INTERNAL MEDICINE

## 2017-02-27 PROCEDURE — 84100 ASSAY OF PHOSPHORUS: CPT

## 2017-02-27 PROCEDURE — 80053 COMPREHEN METABOLIC PANEL: CPT | Performed by: INTERNAL MEDICINE

## 2017-02-28 LAB
KAPPA LC FREE SER-MCNC: 42.7 MG/L (ref 3.3–19.4)
KAPPA LC FREE/LAMBDA FREE SER: 1.39 {RATIO} (ref 0.26–1.65)
LAMBDA LC FREE SERPL-MCNC: 30.69 MG/L (ref 5.71–26.3)

## 2017-03-01 ENCOUNTER — ALLSCRIPTS OFFICE VISIT (OUTPATIENT)
Dept: OTHER | Facility: OTHER | Age: 82
End: 2017-03-01

## 2017-03-01 LAB
ALBUMIN SERPL ELPH-MCNC: 3.52 G/DL (ref 3.5–5)
ALBUMIN SERPL ELPH-MCNC: 52.5 % (ref 52–65)
ALPHA1 GLOB SERPL ELPH-MCNC: 0.5 G/DL (ref 0.1–0.4)
ALPHA1 GLOB SERPL ELPH-MCNC: 7.4 % (ref 2.5–5)
ALPHA2 GLOB SERPL ELPH-MCNC: 0.93 G/DL (ref 0.4–1.2)
ALPHA2 GLOB SERPL ELPH-MCNC: 13.9 % (ref 7–13)
BETA GLOB ABNORMAL SERPL ELPH-MCNC: 0.51 G/DL (ref 0.4–0.8)
BETA1 GLOB SERPL ELPH-MCNC: 7.6 % (ref 5–13)
BETA2 GLOB SERPL ELPH-MCNC: 4.7 % (ref 2–8)
BETA2+GAMMA GLOB SERPL ELPH-MCNC: 0.31 G/DL (ref 0.2–0.5)
GAMMA GLOB ABNORMAL SERPL ELPH-MCNC: 0.93 G/DL (ref 0.5–1.6)
GAMMA GLOB SERPL ELPH-MCNC: 13.9 % (ref 12–22)
IGG/ALB SER: 1.11 {RATIO} (ref 1.1–1.8)
INTERPRETATION UR IFE-IMP: NORMAL
M PEAK ID 2: 4.5 %
M PROTEIN 1 MFR SERPL ELPH: 2 %
M PROTEIN 1 SERPL ELPH-MCNC: 0.13 G/DL
M PROTEIN 2 SERPL ELPH-MCNC: 0.3 G/DL
PROT SERPL-MCNC: 6.7 G/DL (ref 6.4–8.2)

## 2017-03-03 ENCOUNTER — ALLSCRIPTS OFFICE VISIT (OUTPATIENT)
Dept: OTHER | Facility: OTHER | Age: 82
End: 2017-03-03

## 2017-04-17 ENCOUNTER — APPOINTMENT (OUTPATIENT)
Dept: LAB | Facility: MEDICAL CENTER | Age: 82
End: 2017-04-17
Payer: MEDICARE

## 2017-04-17 ENCOUNTER — TRANSCRIBE ORDERS (OUTPATIENT)
Dept: ADMINISTRATIVE | Facility: HOSPITAL | Age: 82
End: 2017-04-17

## 2017-04-17 DIAGNOSIS — M10.9 GOUT, UNSPECIFIED: ICD-10-CM

## 2017-04-17 DIAGNOSIS — M10.9 GOUT, UNSPECIFIED: Primary | ICD-10-CM

## 2017-04-17 LAB
ALBUMIN SERPL BCP-MCNC: 3.2 G/DL (ref 3.5–5)
ALP SERPL-CCNC: 84 U/L (ref 46–116)
ALT SERPL W P-5'-P-CCNC: 18 U/L (ref 12–78)
ANION GAP SERPL CALCULATED.3IONS-SCNC: 7 MMOL/L (ref 4–13)
AST SERPL W P-5'-P-CCNC: 10 U/L (ref 5–45)
BILIRUB SERPL-MCNC: 0.52 MG/DL (ref 0.2–1)
BUN SERPL-MCNC: 44 MG/DL (ref 5–25)
CALCIUM SERPL-MCNC: 9 MG/DL (ref 8.3–10.1)
CHLORIDE SERPL-SCNC: 102 MMOL/L (ref 100–108)
CO2 SERPL-SCNC: 30 MMOL/L (ref 21–32)
CREAT SERPL-MCNC: 2.14 MG/DL (ref 0.6–1.3)
ERYTHROCYTE [DISTWIDTH] IN BLOOD BY AUTOMATED COUNT: 15.4 % (ref 11.6–15.1)
GFR SERPL CREATININE-BSD FRML MDRD: 30 ML/MIN/1.73SQ M
GLUCOSE P FAST SERPL-MCNC: 198 MG/DL (ref 65–99)
HCT VFR BLD AUTO: 38.5 % (ref 36.5–49.3)
HGB BLD-MCNC: 11.9 G/DL (ref 12–17)
MCH RBC QN AUTO: 26.2 PG (ref 26.8–34.3)
MCHC RBC AUTO-ENTMCNC: 30.9 G/DL (ref 31.4–37.4)
MCV RBC AUTO: 85 FL (ref 82–98)
PLATELET # BLD AUTO: 187 THOUSANDS/UL (ref 149–390)
PMV BLD AUTO: 10.2 FL (ref 8.9–12.7)
POTASSIUM SERPL-SCNC: 4.4 MMOL/L (ref 3.5–5.3)
PROT SERPL-MCNC: 6.8 G/DL (ref 6.4–8.2)
RBC # BLD AUTO: 4.55 MILLION/UL (ref 3.88–5.62)
SODIUM SERPL-SCNC: 139 MMOL/L (ref 136–145)
URATE SERPL-MCNC: 7.1 MG/DL (ref 4.2–8)
WBC # BLD AUTO: 8.23 THOUSAND/UL (ref 4.31–10.16)

## 2017-04-17 PROCEDURE — 80053 COMPREHEN METABOLIC PANEL: CPT

## 2017-04-17 PROCEDURE — 84550 ASSAY OF BLOOD/URIC ACID: CPT

## 2017-04-17 PROCEDURE — 36415 COLL VENOUS BLD VENIPUNCTURE: CPT

## 2017-04-17 PROCEDURE — 85027 COMPLETE CBC AUTOMATED: CPT

## 2017-04-27 ENCOUNTER — GENERIC CONVERSION - ENCOUNTER (OUTPATIENT)
Dept: OTHER | Facility: OTHER | Age: 82
End: 2017-04-27

## 2017-05-10 ENCOUNTER — APPOINTMENT (OUTPATIENT)
Dept: LAB | Facility: MEDICAL CENTER | Age: 82
End: 2017-05-10
Payer: MEDICARE

## 2017-05-10 ENCOUNTER — TRANSCRIBE ORDERS (OUTPATIENT)
Dept: ADMINISTRATIVE | Facility: HOSPITAL | Age: 82
End: 2017-05-10

## 2017-05-10 DIAGNOSIS — Z79.4 ENCOUNTER FOR LONG-TERM (CURRENT) USE OF INSULIN (HCC): Primary | ICD-10-CM

## 2017-05-10 DIAGNOSIS — E78.2 MIXED HYPERLIPIDEMIA: ICD-10-CM

## 2017-05-10 LAB
ANION GAP SERPL CALCULATED.3IONS-SCNC: 7 MMOL/L (ref 4–13)
BUN SERPL-MCNC: 34 MG/DL (ref 5–25)
CALCIUM SERPL-MCNC: 9.4 MG/DL (ref 8.3–10.1)
CHLORIDE SERPL-SCNC: 99 MMOL/L (ref 100–108)
CHOLEST SERPL-MCNC: 137 MG/DL (ref 50–200)
CO2 SERPL-SCNC: 29 MMOL/L (ref 21–32)
CREAT SERPL-MCNC: 1.88 MG/DL (ref 0.6–1.3)
CREAT UR-MCNC: 90.3 MG/DL
EST. AVERAGE GLUCOSE BLD GHB EST-MCNC: 209 MG/DL
GFR SERPL CREATININE-BSD FRML MDRD: 34.8 ML/MIN/1.73SQ M
GLUCOSE P FAST SERPL-MCNC: 201 MG/DL (ref 65–99)
HBA1C MFR BLD: 8.9 % (ref 4.2–6.3)
HDLC SERPL-MCNC: 36 MG/DL (ref 40–60)
LDLC SERPL CALC-MCNC: 89 MG/DL (ref 0–100)
MICROALBUMIN UR-MCNC: 44.3 MG/L (ref 0–20)
MICROALBUMIN/CREAT 24H UR: 49 MG/G CREATININE (ref 0–30)
POTASSIUM SERPL-SCNC: 4.7 MMOL/L (ref 3.5–5.3)
SODIUM SERPL-SCNC: 135 MMOL/L (ref 136–145)
TRIGL SERPL-MCNC: 61 MG/DL
TSH SERPL DL<=0.05 MIU/L-ACNC: 2.77 UIU/ML (ref 0.36–3.74)

## 2017-05-10 PROCEDURE — 36415 COLL VENOUS BLD VENIPUNCTURE: CPT | Performed by: INTERNAL MEDICINE

## 2017-05-10 PROCEDURE — 82570 ASSAY OF URINE CREATININE: CPT | Performed by: INTERNAL MEDICINE

## 2017-05-10 PROCEDURE — 80048 BASIC METABOLIC PNL TOTAL CA: CPT | Performed by: INTERNAL MEDICINE

## 2017-05-10 PROCEDURE — 80061 LIPID PANEL: CPT | Performed by: INTERNAL MEDICINE

## 2017-05-10 PROCEDURE — 83036 HEMOGLOBIN GLYCOSYLATED A1C: CPT | Performed by: INTERNAL MEDICINE

## 2017-05-10 PROCEDURE — 84443 ASSAY THYROID STIM HORMONE: CPT | Performed by: INTERNAL MEDICINE

## 2017-05-10 PROCEDURE — 82043 UR ALBUMIN QUANTITATIVE: CPT | Performed by: INTERNAL MEDICINE

## 2017-05-23 ENCOUNTER — ALLSCRIPTS OFFICE VISIT (OUTPATIENT)
Dept: OTHER | Facility: OTHER | Age: 82
End: 2017-05-23

## 2017-05-23 LAB
BILIRUB UR QL STRIP: NORMAL
CLARITY UR: NORMAL
COLOR UR: YELLOW
GLUCOSE (HISTORICAL): NORMAL
HGB UR QL STRIP.AUTO: NORMAL
KETONES UR STRIP-MCNC: NORMAL MG/DL
LEUKOCYTE ESTERASE UR QL STRIP: NORMAL
NITRITE UR QL STRIP: NORMAL
PH UR STRIP.AUTO: 6 [PH]
PROT UR STRIP-MCNC: NORMAL MG/DL
SP GR UR STRIP.AUTO: 0.01
UROBILINOGEN UR QL STRIP.AUTO: 0.2

## 2017-10-10 NOTE — PROGRESS NOTES
Assessment  1  Arm pain, lateral, left (729 5) (M09 602)    Plan  Arm pain, lateral, left    · * XR HUMERUS LEFT; Status:Active; Requested GOA:[id number];    · * XR SHOULDER 2+ VIEW LEFT; Status:Active; Requested JSP:[id number];    · Wheelchair Lightweight; Status:Complete;   Done: 70WQN6563    Discussion/Summary    Left arm pain  Patient will try Tylenol and or tramadol for pain  He will obtain x-rays as ordered for further evaluation  He will return to the office later this week to have sutures removed from his lacerations  Chief Complaint  Pt is here w/ c/o of left arm pain s/p fall on Saturday  All meds/allergies reviewed and updated w/ pt  History of Present Illness  HPI: Patient sustained a fall at a hotel and fell onto marbled tile floor  He subsequently had a laceration on his forehead and nose and received several stitches  During emergency room evaluation he had a CAT scan but at the time did not have significant left arm pain  He developed more discomfort in the following 24-48 hours  Review of Systems    Constitutional: no fever or chills, feels well, no tiredness, no recent weight loss or weight gain  Cardiovascular: no complaints of slow or fast heart rate, no chest pain, no palpitations, no leg claudication or lower extremity edema  Respiratory: no complaints of shortness of breath, no wheezing or cough, no dyspnea on exertion, no orthopnea or PND  Musculoskeletal: as noted in HPI  Active Problems  1  Aortic valve disorder (424 1) (I35 9)   2  Back pain (724 5) (M54 9)   3  Benign essential hypertension (401 1) (I10)   4  Carotid stenosis (433 10) (I65 29)   5  Chronic diastolic congestive heart failure (428 32,428 0) (I50 32)   6  Chronic kidney disease, stage 3 (585 3) (N18 3)   7  CKD (chronic kidney disease), stage 4 (severe) (585 4) (N18 4)   8  Congestive heart failure (428 0) (I50 9)   9  Coronary atherosclerosis of native coronary vessel (414 01) (I25 10)   10   Cough with hemoptysis (786 39) (R04 2)   11  Degenerative joint disease (715 90) (M19 90)   12  Diabetes Mellitus (250 00)   13  Dyspnea (786 09) (R06 00)   14  Edema (782 3) (R60 9)   15  Esophageal reflux (530 81) (K21 9)   16  Extremity atherosclerosis with intermittent claudication (440 21) (I70 219)   17  Gout (274 9) (M10 9)   18  Hiccups (786 8) (R06 6)   19  Hyperlipidemia (272 4) (E78 5)   20  Hyperparathyroidism (252 00) (E21 3)   21  Hypoxia (799 02) (R09 02)   22  Left low back pain (724 2) (M54 5)   23  Mild mitral regurgitation (424 0) (I34 0)   24  Monoclonal gammopathy of undetermined significance (273 1) (D47 2)   25  Need for vaccination with 13-polyvalent pneumococcal conjugate vaccine (V03 82) (Z23)   26  Paroxysmal atrial fibrillation (427 31) (I48 0)   27  Peripheral vascular disease (443 9) (I73 9)   28  S/P AVR (V43 3) (Z95 2)   29  Screening for diabetic retinopathy (V80 2) (Z13 5)   30  Secondary hyperparathyroidism, renal (588 81) (N25 81)   31  SOB (shortness of breath) on exertion (786 05) (R06 02)   32  Tachycardia (785 0) (R00 0)   33  Visit for pre-operative examination (V72 84) (Z01 818)   34  Vitamin D deficiency (268 9) (E55 9)   35  Wheeze (786 07) (R06 2)    Past Medical History  1  History of Dysfunction of both eustachian tubes (381 81) (H69 83)   2  History of Dyspnea (786 09) (R06 00)   3  History of Embolism, arterial, leg, right (444 22) (I74 3)   4  History of Herniated disc (722 2)   5  History of acute bronchitis (V12 69) (Z87 09)   6  History of cough   7  History of hematuria (V13 09) (Z87 448)   8  History of muscle pain (V13 59) (Z87 39)   9  History of myocardial infarction (412) (I25 2)   10  History of pneumonia (V12 61) (Z87 01)   11  History of Premature atrial contractions (427 61) (I49 1)   12  History of Productive cough (786 2) (R05)   13  History of URI, acute (465 9) (J06 9)    Family History  Sibling    1   Family history of Diabetes    Social History   · Denies alcohol consumption (V49 89) (Z78 9)   · Denied: History of Drug use   · Former smoker (V15 82) (Z87 891)   · Former Smoker Cigarettes - Heavy (20-25 / Day)  The social history was reviewed and updated today  Surgical History  1  History of Aortic Valve Replacement   2  History of Bypass Graft Using Vein: Femoral-popliteal   3  History of CABG   4  History of Cataract Surgery   5  History of Knee Replacement    Current Meds   1  AmLODIPine Besylate 2 5 MG Oral Tablet; TAKE ONE TABLET TUESDAY, THURSDAY,   SATURDAY; Therapy: 21Tgo6923 to (Evaluate:07Jun2018)  Requested for: 12Jun2017 Recorded   2  Aspirin EC 81 MG Oral Tablet Delayed Release; TAKE 1 TABLET DAILY; Therapy: 17JBD3154 to (Evaluate:06Jan2018); Last Rx:11Jan2017 Ordered   3  Centrum Silver TABS; Take 1 tablet daily; Therapy: (Recorded:05Vbr9604) to Recorded   4  Eliquis 2 5 MG Oral Tablet; take 1 tablet by mouth twice daily; Therapy: 03Ftx3718 to (Lynda Jasso)  Requested for: 70Kfg3016; Last   Rx:88Dsl1724 Ordered   5  Metoprolol Tartrate 25 MG Oral Tablet; TAKE 1 TABLET TWICE DAILY ALONG WITH THE   50 MG TABLET; Therapy: (Recorded:99Iqp5701) to  Requested for: 87Ipf0497 Recorded   6  Metoprolol Tartrate 50 MG Oral Tablet; Take 1 tablet by mouth twice daily along with 1   tablet of 25 mg;   Therapy: 31QEC9654 to (Evaluate:67Ufy3270)  Requested for: 68Odu3937 Recorded   7  NovoLOG Mix 70/30 FlexPen (70-30) 100 UNIT/ML SUSP; Use 38 units in the am and 32   units in the pm;   Therapy: (Recorded:22Fjq7291) to Recorded   8  Omeprazole 20 MG Oral Capsule Delayed Release; take 1 capsule by mouth daily; Therapy: 23JZM3309 to (Evaluate:76Huo4309)  Requested for: 11Uig6913; Last   Rx:42Mbt8887 Ordered   9  Paricalcitol 1 MCG Oral Capsule; take 1 capsule by mouth ON MONDAY, [facility]; Therapy: 86MXV5269 to (Kristina Olmstead)  Requested for: 85CTA0951; Last   Rx:17Fty4195 Ordered   10   Ramipril 2 5 MG Oral Capsule; take 1 capsule by mouth every morning; Therapy: 50UHM6701 to (Evaluate:80Jpp4261)  Requested for: 40EOM8828; Last    Rx:89Rit5102 Ordered   11  Simvastatin 10 MG Oral Tablet; TAKE 1 TABLET BY MOUTH DAILY; Therapy: 30BSI0687 to (Evaluate:12Apr2018)  Requested for: 73Lxv6593; Last    Rx:52Sdn6373 Ordered   12  Torsemide 20 MG Oral Tablet; TAKE 2 TABLETS BY MOUTH DAILY ON MWF, 1 PO REST    OF WEEK; Therapy: 57UNA9691 to (Evaluate:29Wck1811)  Requested for: 05Qyx3891 Recorded   13  TraMADol HCl - 50 MG Oral Tablet; ONE PO Q 8 HRS  PRN; Therapy: 47UQK9248 to (Last Rx:12Jun2017) Ordered   14  Uloric 40 MG Oral Tablet; TAKE 1 TABLET DAILY; Therapy: (Recorded:46Lbn1320) to Recorded    The medication list was reviewed and updated today  Allergies  1  Acetaminophen-Codeine SOLN   2  Hydrocodone-Acetaminophen TABS   3  MetFORMIN HCl TABS   4  Codeine Derivatives   5  hydrocodone    Vitals   Recorded: 73LQS5826 09:15AM   Temperature 97 9 F   Heart Rate 88   Respiration 16   Systolic 773   Diastolic 62   Height 5 ft 7 in   Weight 229 lb 2 oz   BMI Calculated 35 89   BSA Calculated 2 14     Physical Exam    Constitutional   General appearance: No acute distress, well appearing and well nourished  Pulmonary   Respiratory effort: No increased work of breathing or signs of respiratory distress  Auscultation of lungs: Clear to auscultation, equal breath sounds bilaterally, no wheezes, no rales, no rhonci  Cardiovascular   Auscultation of heart: Normal rate and rhythm, normal S1 and S2, without murmurs  Musculoskeletal   Inspection/palpation of joints, bones, and muscles: Abnormal  -+5/5 muscle strength with grasping of his left hand  Decrease range of motion with shoulder elevation or abduction  Patient with no point tenderness of shoulder or elbow  No ecchymosis noted on upper arm     Skin   Skin and subcutaneous tissue: Abnormal  -Patient is a laceration above his right eyebrow approximately 1 inch in length as well as a laceration on the bridge of his nose approximately 1 inch in length  There is no discharge noted or any signs of infection  There is some dry scabs on laceration  Patient has multiple ecchymotic areas which are not unusual for his being on Eliquis medication  Future Appointments    Date/Time Provider Specialty Site   22/02/8045 01:17 PM LUKE Munoz  [address]   11/02/2017 03:15 PM Doctor, José Anaya MD Vascular Surgery [address] [place] [address]   02/28/2018 02:00 PM LUKE Jin   Hematology Oncology [facility]   11/08/2017 03:30 PM Angélica Alexis DO Nephrology [facility]     Signatures   Electronically signed by : LUKE Palomo ; Oct  9 7976 12:44PM EST                       (Author)

## 2017-10-11 PROBLEM — I25.10 CORONARY ARTERY DISEASE INVOLVING NATIVE CORONARY ARTERY OF NATIVE HEART: Chronic | Status: ACTIVE | Noted: 2017-01-01

## 2017-10-11 PROBLEM — S01.21XS: Chronic | Status: ACTIVE | Noted: 2017-01-01

## 2017-10-11 PROBLEM — Z95.3 S/P AORTIC VALVE REPLACEMENT WITH BIOPROSTHETIC VALVE: Chronic | Status: ACTIVE | Noted: 2017-01-01

## 2017-10-11 PROBLEM — G93.40 ACUTE ENCEPHALOPATHY: Status: ACTIVE | Noted: 2017-01-01

## 2017-10-11 NOTE — CONSULTS
[address]  [de-identified] y o  male MRN: [id number]  Unit/Bed#: ED 8      Chief Complaint:   left shoulder pain    HPI:  [de-identified] yo RHD male complaining of left shoulder pain s/p mechanical fall on 10/8/2017  Patient reports not having this pain before or having previous surgeries on the shoulder  Pain is made worse by movement or pressure  Other than a sutured nasal laceration, no other injuries from fall  No fevers, sweats, or chills  Review Of Systems:   · Skin: Normal  · Neuro: See HPI  · Musculoskeletal: See HPI  · 14 point review of systems negative except as stated above     Past Medical History:   Past Medical History:   Diagnosis Date    Cardiac disease     [facility] [address] [facility]     [facility]     Hypertension     Renal disorder        Past Surgical History:   Past Surgical History:   Procedure Laterality Date    AORTIC VALVE REPLACEMENT      CARDIAC VALVE REPLACEMENT      CATARACT EXTRACTION, BILATERAL      COLONOSCOPY      REPLACEMENT TOTAL KNEE BILATERAL      VASCULAR SURGERY         Family History:  Family history reviewed and non-contributory  History reviewed  No pertinent family history  Social History:  Social History     Social History    Marital status: /Civil Union     Spouse name: N/A    Number of children: N/A    Years of education: N/A     Social History Main Topics    Smoking status: Never Smoker    Smokeless tobacco: Never Used    Alcohol use No    Drug use: No    Sexual activity: No     Other Topics Concern    None     Social History Narrative    None       Allergies: Allergies   Allergen Reactions    Hydrocodone      Other reaction(s):  Other (See Comments)  Heart racing           Labs:    0  Lab Value Date/Time   HCT 33 5 (L) 10/11/2017 0448   HCT 34 2 (L) 10/11/2017 0028   HCT 38 5 04/17/2017 0712   HCT 41 0 11/24/2015 0714   HCT 40 5 10/09/2015 0720   HCT 39 5 04/14/2015 0748   HGB 10 9 (L) 10/11/2017 0448   HGB 11 6 (L) 10/11/2017 0028   HGB 11 9 (L) 04/17/2017 0712   HGB 13 0 11/24/2015 0714   HGB 12 6 10/09/2015 0720   HGB 12 6 04/14/2015 0748   WBC 7 77 10/11/2017 0448   WBC 8 83 10/11/2017 0028   WBC 8 23 04/17/2017 0712   WBC 9 45 11/24/2015 0714   WBC 8 28 10/09/2015 0720   WBC 9 52 04/14/2015 0748   ESR 23 (H) 10/09/2015 0720   CRP 20 8 (H) 10/09/2015 0720       Meds:    Current Facility-Administered Medications:     acetaminophen (TYLENOL) tablet 650 mg, 650 mg, Oral, Q4H PRN, BETI Zimmerman-LUIS MANUEL    aluminum-magnesium hydroxide-simethicone (MYLANTA) 200-200-20 mg/5 mL oral suspension 30 mL, 30 mL, Oral, Q6H PRN, Delaney Yi PA-C    amLODIPine (NORVASC) tablet 2 5 mg, 2 5 mg, Oral, Daily, Delaney Yi PA-C, 2 5 mg at 10/11/17 0810    aspirin chewable tablet 81 mg, 81 mg, Oral, Every Other Day, BETI Zimmerman-LUIS MANUEL, 81 mg at 10/11/17 0810    calcium carbonate-vitamin D (OSCAL-D) 500 mg-200 units per tablet 1 tablet, 1 tablet, Oral, Daily, Altagracia Hernandez PA-C, 1 tablet at 10/11/17 2790    diclofenac sodium (VOLTAREN) 1 % topical gel 2 g, 2 g, Topical, 4x Daily, BETI Zimmerman-C, 2 g at 10/11/17 6993    febuxostat (ULORIC) tablet 40 mg, 40 mg, Oral, Daily, BETI Zimmerman-C    furosemide (LASIX) injection 40 mg, 40 mg, Intravenous, BID (diuretic), BETI Zimmerman-C, 40 mg at 10/11/17 9694    heparin (porcine) subcutaneous injection 5,000 Units, 5,000 Units, Subcutaneous, Q8H [facility], 5,000 Units at 10/11/17 0545 **AND** Platelet count, , , Once, Delaney Yi PA-C    insulin lispro (HumaLOG) 100 units/mL subcutaneous injection 1-6 Units, 1-6 Units, Subcutaneous, TID With Meals **AND** Fingerstick Glucose (POCT), , , 4 times day, Delaney Yi PA-C    insulin lispro (HumaLOG) 100 units/mL subcutaneous injection 1-6 Units, 1-6 Units, Subcutaneous, HS, Delaney Yi PA-C    lidocaine (LIDODERM) 5 % patch 1 patch, 1 patch, Transdermal, Q24H, Delaney Yi PA-C, 1 patch at 10/11/17 0346    methocarbamol (ROBAXIN) tablet 500 mg, 500 mg, Oral, Q8H PRN, BETI Zimmerman-C    metoprolol tartrate (LOPRESSOR) tablet 75 mg, 75 mg, Oral, Q12H KENDRICK, Delaney Yi PA-C, 75 mg at 10/11/17 0809    multivitamin-minerals (CENTRUM) tablet 1 tablet, 1 tablet, Oral, Daily, Dionicioclotilde Ortiz PA-C, 1 tablet at 10/11/17 0810    nitroglycerin (NITROSTAT) SL tablet 0 4 mg, 0 4 mg, Sublingual, Q5 Min PRN, Delaney Yi PA-C    ondansetron (ZOFRAN) injection 4 mg, 4 mg, Intravenous, Q6H PRN, BETI Zimmerman-C    oxyCODONE (ROXICODONE) IR tablet 2 5 mg, 2 5 mg, Oral, Q6H PRN, Delaney Yi PA-C    pantoprazole (PROTONIX) EC tablet 40 mg, 40 mg, Oral, Early Morning, BETI Zimmerman-C, 40 mg at 10/11/17 0545    paricalcitol (ZEMPLAR) capsule 1 mcg, 1 mcg, Oral, Once per day on Mon Wed Fri, FERMÍN ZimmermanC    pravastatin (PRAVACHOL) tablet 20 mg, 20 mg, Oral, Daily With Dinner, Delaney Yi PA-C    ramipril (ALTACE) capsule 2 5 mg, 2 5 mg, Oral, Daily, BETI Zimmerman-C, 2 5 mg at 10/11/17 1600    senna-docusate sodium (SENOKOT S) 8 6-50 mg per tablet 1 tablet, 1 tablet, Oral, BID, BETI Lara-C, 1 tablet at 10/11/17 0637    Current Outpatient Prescriptions:     AMLODIPINE BESYLATE PO, Take 2 5 mg by mouth  , Disp: , Rfl:     aspirin (ECOTRIN LOW STRENGTH) 81 mg EC tablet, Take 81 mg by mouth every other day , Disp: , Rfl:     febuxostat (ULORIC) 40 mg tablet, Take 40 mg by mouth daily, Disp: , Rfl:     insulin aspart protamine-insulin aspart (NovoLOG 70/30) 100 units/mL injection, Inject 35 Units under the skin daily before breakfast for 30 days, Disp: 1050 Units, Rfl: 0    metoprolol tartrate 75 MG TABS, Take 1 tablet by mouth every 12 (twelve) hours for 30 days, Disp: 60 tablet, Rfl: 0    multivitamin-iron-minerals-folic acid (CENTRUM) chewable tablet, Chew 1 tablet daily  , Disp: , Rfl:     omeprazole (PriLOSEC) 20 mg delayed release capsule, Take 20 mg by mouth daily  , Disp: , Rfl:     paricalcitol (ZEMPLAR) 1 mcg capsule, Take 1 capsule by mouth 3 (three) times a week for 30 days Monday , Wednesday, friday, Disp: 12 capsule, Rfl: 0    ramipril (ALTACE) 2 5 mg capsule, Take 2 5 mg by mouth daily, Disp: , Rfl:     simvastatin (ZOCOR) 10 mg tablet, Take 10 mg by mouth daily at bedtime  , Disp: , Rfl:     torsemide (DEMADEX) 20 mg tablet, Take 2 tablets by mouth daily for 30 days, Disp: 60 tablet, Rfl: 0    calcium-vitamin D (OSCAL 500 + D) 500 mg-200 units per tablet, Take 1 tablet by mouth daily  , Disp: , Rfl:     Blood Culture:   No results found for: BLOODCX    Wound Culture:   No results found for: WOUNDCULT    Ins and Outs:  I/O last 24 hours: In: -   Out: 700 [Urine:700]          Physical Exam:   /64   Pulse 72   Temp 97 8 °F (36 6 °C) (Oral)   Resp 17   Wt 104 kg (229 lb)   SpO2 98%   BMI 35 87 kg/m²   Gen: Alert and oriented to person, place, time  HEENT: EOMI, eyes clear, moist mucus membranes, hearing intact  Respiratory: Bilateral chest rise  No audible wheezing found  Cardiovascular: No audible murmurs  Musculoskeletal: left upper extremity  · Skin pink dry and intact  · TTP over lateral aspect of shoulder  · Painful passive range of motion  · Sensation intact to axillary, musculocutaneous, radial, ulna, median nerves  · 5/5 motor strength to axillary, musculocutaneous, radial, ulna, median nerves      Radiology:   I personally reviewed the films  X-rays of left shoulder shows minimally displaced greater tuberosity avulsion fracture  Assessment:  [de-identified] y o male with  left shoulder    Plan:   · NWB LUE in sling  No operative treatment indicated at this time    · Pain control per primary team  · Activity as tolerated  · FU with Dr Aarti Machuca in 2 weeks  · Dispo: Ortho signing off      Zoe Tafoya MD

## 2017-10-11 NOTE — ED PROCEDURE NOTE
Procedure  ECG 12 Lead Documentation  Date/Time: 10/11/2017 12:40 AM  Performed by: Briana Dugan  Authorized by: Harley Marinelli     Indications / Diagnosis:  Weakness  ECG reviewed by me, the ED Provider: yes    Patient location:  ED  Previous ECG:     Previous ECG:  Compared to current    Similarity:  No change    Comparison to cardiac monitor: Yes    Interpretation:     Interpretation: abnormal    Rate:     ECG rate:  81    ECG rate assessment: normal    Rhythm:     Rhythm: A-V block    Ectopy:     Ectopy: none    QRS:     QRS axis:  Normal    QRS intervals:  Normal  Conduction:     Conduction: normal    ST segments:     ST segments:  Normal  T waves:     T waves: normal    Other findings:     Other findings: poor R wave progression

## 2017-10-11 NOTE — ED PROVIDER NOTES
History  Chief Complaint   Patient presents with    Weakness - Generalized     pt fell and was seen in hospital on saturday  Pt was put on tramadol and tonight was slow to respond to questions and weak     51-year-old male history of CHF AFib CKD stage 3 comes to the emergency department for evaluation of weakness and near-syncope  Of note, patient was evaluated last week emergent department due to having a mechanical fall to which he had a laceration that was repaired of his nose  Patient also had left arm pain at that time however did not reveal this complaint to the emergency physician  Patient has been having left arm pain for 1 week and was seen at [facility] last week and was given an x-ray of his left arm  Patient's x-ray results were forwarded to his family physician and have yet to be formally read  Patient's family physician prescribed patient tramadol today for his left arm pain  For the past couple of days patient has been more weak having on balance and an hour and a half prior to ED arrival he had a brief loss of consciousness while his daughter was walking into the bathroom to which she leaned backwards onto his daughter  At this time, patient denies any fever, chest pain, shortness of breath, nausea, vomiting, abdominal pain  Medical management decision making:  I will work patient up for syncope by getting an EKG troponin chest x-ray  I will also assess for metabolic/infectious derangements by ordering a CBC BMP and urinalysis  Ultimately given patient's and difficulty of ambulation and syncopal episode with just generalized asthenia I will admit him to the slim  Prior to Admission Medications   Prescriptions Last Dose Informant Patient Reported? Taking? AMLODIPINE BESYLATE PO 10/10/2017 at Unknown time  Yes Yes   Sig: Take 2 5 mg by mouth     aspirin (ECOTRIN LOW STRENGTH) 81 mg EC tablet 10/10/2017 at Unknown time  Yes Yes   Sig: Take 81 mg by mouth every other day  calcium-vitamin D (OSCAL 500 + D) 500 mg-200 units per tablet   Yes No   Sig: Take 1 tablet by mouth daily  febuxostat (ULORIC) 40 mg tablet 10/10/2017 at Unknown time  Yes Yes   Sig: Take 40 mg by mouth daily   insulin aspart protamine-insulin aspart (NovoLOG 70/30) 100 units/mL injection 10/11/2017 at Unknown time  No Yes   Sig: Inject 35 Units under the skin daily before breakfast for 30 days   metoprolol tartrate 75 MG TABS 10/10/2017 at Unknown time  No Yes   Sig: Take 1 tablet by mouth every 12 (twelve) hours for 30 days   multivitamin-iron-minerals-folic acid (CENTRUM) chewable tablet 10/10/2017 at Unknown time  Yes Yes   Sig: Chew 1 tablet daily  omeprazole (PriLOSEC) 20 mg delayed release capsule 10/10/2017 at Unknown time  Yes Yes   Sig: Take 20 mg by mouth daily  paricalcitol (ZEMPLAR) 1 mcg capsule 10/10/2017 at Unknown time  No Yes   Sig: Take 1 capsule by mouth 3 (three) times a week for 30 days Monday , Wednesday, friday   ramipril (ALTACE) 2 5 mg capsule 10/10/2017 at Unknown time  Yes Yes   Sig: Take 2 5 mg by mouth daily   simvastatin (ZOCOR) 10 mg tablet 10/10/2017 at Unknown time  Yes Yes   Sig: Take 10 mg by mouth daily at bedtime  torsemide (DEMADEX) 20 mg tablet 10/10/2017 at Unknown time  No Yes   Sig: Take 2 tablets by mouth daily for 30 days      Facility-Administered Medications: None       Past Medical History:   Diagnosis Date    Cardiac disease     [facility] [address] [facility]     Diabetes mellitus ([facility] )     Hypertension     Renal disorder        Past Surgical History:   Procedure Laterality Date    AORTIC VALVE REPLACEMENT      CARDIAC VALVE REPLACEMENT      CATARACT EXTRACTION, BILATERAL      COLONOSCOPY      REPLACEMENT TOTAL KNEE BILATERAL      VASCULAR SURGERY         History reviewed  No pertinent family history  I have reviewed and agree with the history as documented      Social History   Substance Use Topics    Smoking status: Never Smoker    Smokeless tobacco: Never Used    Alcohol use No        Review of Systems   Constitutional: Negative for appetite change, chills, diaphoresis, fatigue and fever  HENT: Negative for congestion, ear discharge, ear pain, hearing loss, postnasal drip, rhinorrhea, sneezing and sore throat  Eyes: Negative for pain, discharge and redness  Respiratory: Negative for cough, choking, chest tightness, shortness of breath, wheezing and stridor  Cardiovascular: Negative for chest pain and palpitations  Gastrointestinal: Negative for abdominal distention, abdominal pain, blood in stool, constipation, diarrhea, nausea and vomiting  Genitourinary: Negative for decreased urine volume, difficulty urinating, dysuria, flank pain, frequency and hematuria  Musculoskeletal: Negative for arthralgias, gait problem, joint swelling and neck pain  Skin: Negative for color change, pallor and rash  Allergic/Immunologic: Negative for environmental allergies, food allergies and immunocompromised state  Neurological: Positive for weakness  Negative for dizziness, seizures, light-headedness, numbness and headaches  Hematological: Negative for adenopathy  Does not bruise/bleed easily  Psychiatric/Behavioral: Negative for agitation and behavioral problems  Physical Exam  ED Triage Vitals   Temperature Pulse Respirations Blood Pressure SpO2   10/10/17 2327 10/10/17 2327 10/10/17 2327 10/10/17 2327 10/10/17 2327   (!) 97 °F (36 1 °C) 84 18 113/58 95 %      Temp Source Heart Rate Source Patient Position - Orthostatic VS BP Location FiO2 (%)   10/10/17 2327 10/10/17 2327 10/10/17 2327 10/10/17 2327 --   Tympanic Monitor Sitting Left arm       Pain Score       10/11/17 0300       No Pain           Physical Exam   Constitutional: He is oriented to person, place, and time  He appears well-developed and well-nourished  HENT:   Head: Normocephalic and atraumatic     Nose: Nose normal    Mouth/Throat: Oropharynx is clear and moist    Eyes: Conjunctivae and EOM are normal  Pupils are equal, round, and reactive to light  Neck: Normal range of motion  Neck supple  Cardiovascular: Normal rate, regular rhythm and normal heart sounds  Exam reveals no gallop and no friction rub  No murmur heard  Pulmonary/Chest: Effort normal and breath sounds normal  No respiratory distress  He has no wheezes  He has no rales  Abdominal: Soft  Bowel sounds are normal  He exhibits no distension  There is no tenderness  There is no rebound and no guarding  Musculoskeletal: Normal range of motion  Neurological: He is alert and oriented to person, place, and time  No cranial nerve deficit or sensory deficit  Gait abnormal    Skin: Skin is warm and dry  Psychiatric: He has a normal mood and affect  His behavior is normal    Nursing note and vitals reviewed        ED Medications  Medications   pantoprazole (PROTONIX) EC tablet 40 mg (40 mg Oral Given 10/11/17 0545)   calcium carbonate-vitamin D (OSCAL-D) 500 mg-200 units per tablet 1 tablet (not administered)   aspirin chewable tablet 81 mg (not administered)   pravastatin (PRAVACHOL) tablet 20 mg (not administered)   multivitamin-minerals (CENTRUM) tablet 1 tablet (not administered)   amLODIPine (NORVASC) tablet 2 5 mg (not administered)   febuxostat (ULORIC) tablet 40 mg (not administered)   metoprolol tartrate (LOPRESSOR) tablet 75 mg (not administered)   paricalcitol (ZEMPLAR) capsule 1 mcg (not administered)   ramipril (ALTACE) capsule 2 5 mg (not administered)   ondansetron (ZOFRAN) injection 4 mg (not administered)   senna-docusate sodium (SENOKOT S) 8 6-50 mg per tablet 1 tablet (not administered)   aluminum-magnesium hydroxide-simethicone (MYLANTA) 200-200-20 mg/5 mL oral suspension 30 mL (not administered)   nitroglycerin (NITROSTAT) SL tablet 0 4 mg (not administered)   heparin (porcine) subcutaneous injection 5,000 Units (5,000 Units Subcutaneous Given 10/11/17 0545)   insulin lispro (HumaLOG) 100 units/mL subcutaneous injection 1-6 Units (not administered)   insulin lispro (HumaLOG) 100 units/mL subcutaneous injection 1-6 Units (not administered)   acetaminophen (TYLENOL) tablet 650 mg (not administered)   diclofenac sodium (VOLTAREN) 1 % topical gel 2 g (not administered)   lidocaine (LIDODERM) 5 % patch 1 patch (1 patch Transdermal Medication Applied 10/11/17 0346)   oxyCODONE (ROXICODONE) IR tablet 2 5 mg (not administered)   methocarbamol (ROBAXIN) tablet 500 mg (not administered)   furosemide (LASIX) injection 40 mg (40 mg Intravenous Given 10/11/17 0348)   sodium chloride 0 9 % bolus 1,000 mL (1,000 mL Intravenous New Bag 10/11/17 0029)   dextrose 50 % IV solution 25 mL (25 mL Intravenous Given 10/11/17 0209)       Diagnostic Studies  Labs Reviewed   CBC AND DIFFERENTIAL - Abnormal        Result Value Ref Range Status    Hemoglobin 11 6 (*) 12 0 - 17 0 g/dL Final    Hematocrit 34 2 (*) 36 5 - 49 3 % Final    WBC 8 83  4 31 - 10 16 Thousand/uL Final    RBC 4 02  3 88 - 5 62 Million/uL Final    MCV 85  82 - 98 fL Final    MCH 28 9  26 8 - 34 3 pg Final    MCHC 33 9  31 4 - 37 4 g/dL Final    RDW 14 4  11 6 - 15 1 % Final    MPV 9 9  8 9 - 12 7 fL Final    Platelets 484  991 - 390 Thousands/uL Final    nRBC 0  /100 WBCs Final    Neutrophils Relative 71  43 - 75 % Final    Lymphocytes Relative 17  14 - 44 % Final    Monocytes Relative 9  4 - 12 % Final    Eosinophils Relative 3  0 - 6 % Final    Basophils Relative 0  0 - 1 % Final    Neutrophils Absolute 6 24  1 85 - 7 62 Thousands/µL Final    Lymphocytes Absolute 1 49  0 60 - 4 47 Thousands/µL Final    Monocytes Absolute 0 82  0 17 - 1 22 Thousand/µL Final    Eosinophils Absolute 0 22  0 00 - 0 61 Thousand/µL Final    Basophils Absolute 0 02  0 00 - 0 10 Thousands/µL Final   BASIC METABOLIC PANEL - Abnormal     BUN 39 (*) 5 - 25 mg/dL Final    Creatinine 1 93 (*) 0 60 - 1 30 mg/dL Final    Comment: Standardized to IDMS reference method Glucose 52 (*) 65 - 140 mg/dL Final    Comment:   If the patient is fasting, the ADA then defines impaired fasting glucose as > 100 mg/dL and diabetes as > or equal to 123 mg/dL  Specimen collection should occur prior to Sulfasalazine administration due to the potential for falsely depressed results  Specimen collection should occur prior to Sulfapyridine administration due to the potential for falsely elevated results  Sodium 137  136 - 145 mmol/L Final    Potassium 4 1  3 5 - 5 3 mmol/L Final    Chloride 102  100 - 108 mmol/L Final    CO2 28  21 - 32 mmol/L Final    Anion Gap 7  4 - 13 mmol/L Final    Calcium 8 5  8 3 - 10 1 mg/dL Final    eGFR 32  ml/min/1 73sq m Final    Narrative:     National Kidney Disease Education Program recommendations are as follows:  GFR calculation is accurate only with a steady state creatinine  Chronic Kidney disease less than 60 ml/min/1 73 sq  meters  Kidney failure less than 15 ml/min/1 73 sq  meters  NT-BNP PRO (BRAIN NATRIURETIC PEPTIDE) - Abnormal     NT-proBNP 1,076 (*) <450 pg/mL Final   HEMOGLOBIN A1C - Abnormal     Hemoglobin A1C 6 7 (*) 4 2 - 6 3 % Final      mg/dl Final   COMPREHENSIVE METABOLIC PANEL - Abnormal     Sodium 135 (*) 136 - 145 mmol/L Final    BUN 36 (*) 5 - 25 mg/dL Final    Creatinine 1 90 (*) 0 60 - 1 30 mg/dL Final    Comment: Standardized to IDMS reference method    Glucose 143 (*) 65 - 140 mg/dL Final    Comment:   If the patient is fasting, the ADA then defines impaired fasting glucose as > 100 mg/dL and diabetes as > or equal to 123 mg/dL  Specimen collection should occur prior to Sulfasalazine administration due to the potential for falsely depressed results  Specimen collection should occur prior to Sulfapyridine administration due to the potential for falsely elevated results      Albumin 2 9 (*) 3 5 - 5 0 g/dL Final    Potassium 3 8  3 5 - 5 3 mmol/L Final    Chloride 101  100 - 108 mmol/L Final    CO2 28  21 - 32 mmol/L Final Anion Gap 6  4 - 13 mmol/L Final    Calcium 8 5  8 3 - 10 1 mg/dL Final    AST 18  5 - 45 U/L Final    Comment:   Specimen collection should occur prior to Sulfasalazine administration due to the potential for falsely depressed results  ALT 16  12 - 78 U/L Final    Comment:   Specimen collection should occur prior to Sulfasalazine and/or Sulfapyridine administration due to the potential for falsely depressed results  Alkaline Phosphatase 63  46 - 116 U/L Final    Total Protein 6 4  6 4 - 8 2 g/dL Final    Total Bilirubin 0 61  0 20 - 1 00 mg/dL Final    eGFR 33  ml/min/1 73sq m Final    Narrative:     National Kidney Disease Education Program recommendations are as follows:  GFR calculation is accurate only with a steady state creatinine  Chronic Kidney disease less than 60 ml/min/1 73 sq  meters  Kidney failure less than 15 ml/min/1 73 sq  meters  CBC AND PLATELET - Abnormal     RBC 3 86 (*) 3 88 - 5 62 Million/uL Final    Hemoglobin 10 9 (*) 12 0 - 17 0 g/dL Final    Hematocrit 33 5 (*) 36 5 - 49 3 % Final    WBC 7 77  4 31 - 10 16 Thousand/uL Final    MCV 87  82 - 98 fL Final    MCH 28 2  26 8 - 34 3 pg Final    MCHC 32 5  31 4 - 37 4 g/dL Final    RDW 14 4  11 6 - 15 1 % Final    Platelets 364  604 - 390 Thousands/uL Final    MPV 9 7  8 9 - 12 7 fL Final   TROPONIN I - Normal    Troponin I <0 02  <=0 04 ng/mL Final    Narrative:     [employer] Chemistry analyzer 99% cutoff is > 0 04 ng/mL in network labs    o cTnI 99% cutoff is useful only when applied to patients in the clinical setting of myocardial ischemia  o cTnI 99% cutoff should be interpreted in the context of clinical history, ECG findings and possibly cardiac imaging to establish correct diagnosis  o cTnI 99% cutoff may be suggestive but clearly not indicative of a coronary event without the clinical setting of myocardial ischemia     MAGNESIUM - Normal    Magnesium 2 5  1 6 - 2 6 mg/dL Final   TROPONIN I - Normal    Troponin I <0 02  <=0 04 ng/mL Final    Narrative:     [employer] Chemistry analyzer 99% cutoff is > 0 04 ng/mL in network labs    o cTnI 99% cutoff is useful only when applied to patients in the clinical setting of myocardial ischemia  o cTnI 99% cutoff should be interpreted in the context of clinical history, ECG findings and possibly cardiac imaging to establish correct diagnosis  o cTnI 99% cutoff may be suggestive but clearly not indicative of a coronary event without the clinical setting of myocardial ischemia  POCT URINALYSIS DIPSTICK - Normal    Color, UA yellow   Final    Clarity, UA clear   Final   ED URINE MACROSCOPIC - Normal    Color, UA Yellow   Final    Clarity, UA Clear   Final    pH, UA 5 5  4 5 - 8 0 Final    Leukocytes, UA Negative  Negative Final    Nitrite, UA Negative  Negative Final    Protein, UA Negative  Negative mg/dl Final    Glucose, UA Negative  Negative mg/dl Final    Ketones, UA Negative  Negative mg/dl Final    Urobilinogen, UA 0 2  0 2, 1 0 E U /dl E U /dl Final    Bilirubin, UA Negative  Negative Final    Blood, UA Negative  Negative Final    Specific [place], UA 1 015  1 003 - 1 030 Final    Narrative:     CLINITEK RESULT   POCT GLUCOSE - Normal    POC Glucose 100  65 - 140 mg/dl Final   PLATELET COUNT   TROPONIN I       XR chest 2 views   ED Interpretation   Pulmonary edema      XR knee 1 or 2 vw right    (Results Pending)       Procedures  Procedures      Phone Consults  ED Phone Contact    ED Course  ED Course                                MDM  CritCare Time    Disposition  Final diagnoses:   Weakness   Syncope   History of CHF (congestive heart failure)   CKD (chronic kidney disease), stage III     ED Disposition     ED Disposition Condition Comment    Admit  Case was discussed with Dr Buddy Braden and the patient's admission status was agreed to be Admission Status: inpatient status to the service of Dr Buddy Braden           Follow-up Information    None       Patient's Medications   Discharge Prescriptions    No medications on file     No discharge procedures on file  ED Provider  Attending physically available and evaluated Angelito Turner I managed the patient along with the ED Attending      Electronically Signed by       Chaim Marin MD  Resident  10/11/17 4880

## 2017-10-11 NOTE — PROGRESS NOTES
POST-ADMIT PROGRESS NOTE:    S: Patient feeling well today  No SOB or chest pain  Report pain in right knee  O:  Gen: NAD  HEENT: Laceration nose with sutures in place  Heart: RRR, +murmur  Lungs: CTA B/L  Ext: +edema, right knee TTP and decreased ROM, LUE in sling  Skin: Multiple areas of ecchymoses    A/P:  1  Encephalopathy:  -Likely due to Tramadol use and hypoglycemia  -Improved today    2  Acute on chronic diastolic HF:  -IV diuresis, I&Os  -Cardiology consult pending    3  Left shoulder, minimally displaced greater tuberosity avulsion fracture:  -Radiology reading with no fracture but fracture noted per ortho  -NWB LUE in sling  -F/U ortho two weeks  -PT/OT    4  Nasal laceration:  -sutures placed 10/8/17  Needs removal 10/13/17    5  DM:  -With hypoglycemia on admission  -Will restart 70/30 at lower dose, 20U BID  -Continue sliding scale  -Adjust as needed    6   PAF, s/p bioAVR, CAD:  -AC recently discontinued by outpatient cardiologist  -Continue BB, ASA, statin

## 2017-10-11 NOTE — ED ATTENDING ATTESTATION
Carrie Rodrigues MD, saw and evaluated the patient  I have discussed the patient with the resident/non-physician practitioner and agree with the resident's/non-physician practitioner's findings, Plan of Care, and MDM as documented in the resident's/non-physician practitioner's note, except where noted  All available labs and Radiology studies were reviewed  At this point I agree with the current assessment done in the Emergency Department  I have conducted an independent evaluation of this patient including a focused history of:    Emergency Department Note- William Jacobsen  [de-identified] y o  male MRN: [id number]    Unit/Bed#: ED 08 Encounter: [id number]    William Jacobsen  is a [de-identified] y o  male who presents with   Chief Complaint   Patient presents with    Weakness - Generalized     pt fell and was seen in hospital on saturday  Pt was put on tramadol and tonight was slow to respond to questions and weak         History of Present Illness   HPI:  William Jacobsen  is a [de-identified] y o  male who presents for evaluation of:  Exacerbation of weakness and near syncope several hours ago today  Patient was recently seen after a fall last week and was seen in the ED yesterday at [facility]  Patient started on tramadol recently for pain  Any sort of activity exacerbates the patient's weakness  He denies associated chest pain, dyspnea, fevers, chills, nausea, vomiting, abdominal pain, posterior thoracic pain, lower abdominal pain  The patient does have left upper extremity pain below his shoulder but above his elbow  The a 10 system review of systems is otherwise unremarkable      Review of Systems    Historical Information   Past Medical History:   Diagnosis Date    Cardiac disease     [facility]est[facility] [address] [facility]     Diabetes mellitus [facility] [address] [facility]     Hypertension     Renal disorder      Past Surgical History:   Procedure Laterality Date    AORTIC VALVE REPLACEMENT      CARDIAC VALVE REPLACEMENT      CATARACT EXTRACTION, BILATERAL      COLONOSCOPY      REPLACEMENT TOTAL KNEE BILATERAL      VASCULAR SURGERY       Social History   History   Alcohol Use No     History   Drug Use No     History   Smoking Status    Never Smoker   Smokeless Tobacco    Never Used     Family History: non-contributory    Meds/Allergies   all medications and allergies reviewed  Allergies   Allergen Reactions    Hydrocodone      Other reaction(s): Other (See Comments)  Heart racing       Objective   First Vitals:   Blood Pressure: 113/58 (10/10/17 2327)  Pulse: 84 (10/10/17 2327)  Temperature: (!) 97 °F (36 1 °C) (10/10/17 2327)  Temp Source: Tympanic (10/10/17 2327)  Respirations: 18 (10/10/17 2327)  Weight - Scale: 104 kg (229 lb) (10/10/17 2327)  SpO2: 95 % (10/10/17 2327)    Current Vitals:   Blood Pressure: 111/53 (10/11/17 1145)  Pulse: 66 (10/11/17 1145)  Temperature: 97 8 °F (36 6 °C) (10/11/17 0734)  Temp Source: Oral (10/11/17 0734)  Respirations: 17 (10/11/17 1145)  Weight - Scale: 104 kg (229 lb) (10/10/17 2327)  SpO2: 94 % (10/11/17 1145)      Intake/Output Summary (Last 24 hours) at 10/11/17 1407  Last data filed at 10/11/17 0543   Gross per 24 hour   Intake                0 ml   Output              700 ml   Net             -700 ml       Invasive Devices     Peripheral Intravenous Line            Peripheral IV 10/11/17 Right Antecubital less than 1 day                Physical Exam   Constitutional: He is oriented to person, place, and time  He appears well-developed and well-nourished  HENT:   Head: Normocephalic and atraumatic  Eyes: Conjunctivae are normal  Pupils are equal, round, and reactive to light  Cardiovascular: Normal rate and regular rhythm  Abdominal: Soft  Bowel sounds are normal    Musculoskeletal: Normal range of motion  He exhibits no deformity  Neurological: He is alert and oriented to person, place, and time  Skin: Skin is warm and dry  Psychiatric: He has a normal mood and affect   His behavior is normal  Judgment and thought content normal    Nursing note and vitals reviewed  Medical Decision Makin  Acute asthenia and near syncope: possibly secondary to tramadol initiation; CBC r/o anemia;  Tn and ECG r/o ACS; BMP r/o uremia and hyponatremia    Recent Results (from the past 36 hour(s))   CBC and differential    Collection Time: 10/11/17 12:28 AM   Result Value Ref Range    WBC 8 83 4 31 - 10 16 Thousand/uL    RBC 4 02 3 88 - 5 62 Million/uL    Hemoglobin 11 6 (L) 12 0 - 17 0 g/dL    Hematocrit 34 2 (L) 36 5 - 49 3 %    MCV 85 82 - 98 fL    MCH 28 9 26 8 - 34 3 pg    MCHC 33 9 31 4 - 37 4 g/dL    RDW 14 4 11 6 - 15 1 %    MPV 9 9 8 9 - 12 7 fL    Platelets 664 395 - 929 Thousands/uL    nRBC 0 /100 WBCs    Neutrophils Relative 71 43 - 75 %    Lymphocytes Relative 17 14 - 44 %    Monocytes Relative 9 4 - 12 %    Eosinophils Relative 3 0 - 6 %    Basophils Relative 0 0 - 1 %    Neutrophils Absolute 6 24 1 85 - 7 62 Thousands/µL    Lymphocytes Absolute 1 49 0 60 - 4 47 Thousands/µL    Monocytes Absolute 0 82 0 17 - 1 22 Thousand/µL    Eosinophils Absolute 0 22 0 00 - 0 61 Thousand/µL    Basophils Absolute 0 02 0 00 - 0 10 Thousands/µL   Basic metabolic panel    Collection Time: 10/11/17 12:28 AM   Result Value Ref Range    Sodium 137 136 - 145 mmol/L    Potassium 4 1 3 5 - 5 3 mmol/L    Chloride 102 100 - 108 mmol/L    CO2 28 21 - 32 mmol/L    Anion Gap 7 4 - 13 mmol/L    BUN 39 (H) 5 - 25 mg/dL    Creatinine 1 93 (H) 0 60 - 1 30 mg/dL    Glucose 52 (L) 65 - 140 mg/dL    Calcium 8 5 8 3 - 10 1 mg/dL    eGFR 32 ml/min/1 73sq m   NT-BNP PRO    Collection Time: 10/11/17 12:28 AM   Result Value Ref Range    NT-proBNP 1,076 (H) <450 pg/mL   POCT urinalysis dipstick    Collection Time: 10/11/17 12:47 AM   Result Value Ref Range    Color, UA yellow     Clarity, UA clear    ED Urine Macroscopic    Collection Time: 10/11/17 12:50 AM   Result Value Ref Range    Color, UA Yellow     Clarity, UA Clear     pH, UA 5 5 4 5 - 8 0    Leukocytes, UA Negative Negative    Nitrite, UA Negative Negative    Protein, UA Negative Negative mg/dl    Glucose, UA Negative Negative mg/dl    Ketones, UA Negative Negative mg/dl    Urobilinogen, UA 0 2 0 2, 1 0 E U /dl E U /dl    Bilirubin, UA Negative Negative    Blood, UA Negative Negative    Specific Gravity, UA 1 015 1 003 - 1 030   Troponin I    Collection Time: 10/11/17  2:06 AM   Result Value Ref Range    Troponin I <0 02 <=0 04 ng/mL   Fingerstick Glucose (POCT)    Collection Time: 10/11/17  3:10 AM   Result Value Ref Range    POC Glucose 100 65 - 140 mg/dl   Hemoglobin A1c (Orders if not completed within the last 90 days)    Collection Time: 10/11/17  4:48 AM   Result Value Ref Range    Hemoglobin A1C 6 7 (H) 4 2 - 6 3 %     mg/dl   Comprehensive metabolic panel    Collection Time: 10/11/17  4:48 AM   Result Value Ref Range    Sodium 135 (L) 136 - 145 mmol/L    Potassium 3 8 3 5 - 5 3 mmol/L    Chloride 101 100 - 108 mmol/L    CO2 28 21 - 32 mmol/L    Anion Gap 6 4 - 13 mmol/L    BUN 36 (H) 5 - 25 mg/dL    Creatinine 1 90 (H) 0 60 - 1 30 mg/dL    Glucose 143 (H) 65 - 140 mg/dL    Calcium 8 5 8 3 - 10 1 mg/dL    AST 18 5 - 45 U/L    ALT 16 12 - 78 U/L    Alkaline Phosphatase 63 46 - 116 U/L    Total Protein 6 4 6 4 - 8 2 g/dL    Albumin 2 9 (L) 3 5 - 5 0 g/dL    Total Bilirubin 0 61 0 20 - 1 00 mg/dL    eGFR 33 ml/min/1 73sq m   Magnesium    Collection Time: 10/11/17  4:48 AM   Result Value Ref Range    Magnesium 2 5 1 6 - 2 6 mg/dL   CBC (With Platelets)    Collection Time: 10/11/17  4:48 AM   Result Value Ref Range    WBC 7 77 4 31 - 10 16 Thousand/uL    RBC 3 86 (L) 3 88 - 5 62 Million/uL    Hemoglobin 10 9 (L) 12 0 - 17 0 g/dL    Hematocrit 33 5 (L) 36 5 - 49 3 %    MCV 87 82 - 98 fL    MCH 28 2 26 8 - 34 3 pg    MCHC 32 5 31 4 - 37 4 g/dL    RDW 14 4 11 6 - 15 1 %    Platelets 014 638 - 311 Thousands/uL    MPV 9 7 8 9 - 12 7 fL   Troponin I    Collection Time: 10/11/17  4:48 AM Result Value Ref Range    Troponin I <0 02 <=0 04 ng/mL   Fingerstick Glucose (POCT)    Collection Time: 10/11/17  8:09 AM   Result Value Ref Range    POC Glucose 106 65 - 140 mg/dl   Fingerstick Glucose (POCT)    Collection Time: 10/11/17  1:04 PM   Result Value Ref Range    POC Glucose 197 (H) 65 - 140 mg/dl     XR knee 1 or 2 vw right   Final Result      Post total joint replacement  No evidence of fracture or other acute bony abnormality in the right knee  Workstation performed: [id number]         XR chest 2 views   ED Interpretation   Pulmonary edema      Final Result      Findings suggest pulmonary vascular congestion  Mild enlargement of cardiac silhouette  Findings concur with the referring clinician's preliminary interpretation already in the patient's electronic health record  Workstation performed: [id number]               Portions of the record may have been created with voice recognition software  Occasional wrong word or "sound a like" substitutions may have occurred due to the inherent limitations of voice recognition software  Read the chart carefully and recognize, using context, where substitutions have occurred

## 2017-10-11 NOTE — H&P
History and Physical - [facility] Internal Medicine    Patient Information: Annamarie Arana  [de-identified] y o  male MRN: [id number]  Unit/Bed#: ED 01 Encounter: [id number]  Admitting Physician: Shade Devine PA-C  PCP: Pal Mckeon MD  Date of Admission:  10/11/17    Assessment/Plan:    Hospital Problem List:     Principal Problem:    Acute encephalopathy  Active Problems:    Paroxysmal atrial fibrillation (HCC)    Acute on chronic diastolic congestive heart failure ([facility] )    Acute renal failure superimposed on stage 3 chronic kidney disease ([facility] )    Essential hypertension    Type 2 diabetes mellitus ([facility] )    S/P aortic valve replacement with bioprosthetic valve    Nasal laceration, sequela    Coronary artery disease involving native coronary artery of native heart      Plan for the Primary Problem(s):  ·   Acute encephalopathy-prior to admission difficult to arouse following administration of tramadol around 10:00 p m  mentation has improved and is resolving per patient's wife returning back to baseline multifactorial likely hypoglycemia and toxic encephalopathy secondary to tramadol in the setting of acute on chronic diastolic congestive heart failure  · UA negative for nitrites and leukocytes , chest x-ray pulmonary edema with small left pleural effusion pulmonary read,  · BG 52 upon admission given half an amp of dextrose in the ED resulting to BGs 100   Hold home medication NovoLog 35 units a c  continue insulin sliding scale, Accu-Cheks q i d , repeat hemoglobin A1c  · Neurochecks X 24 hours, telemetry  · PT/OT may need short-term rehabilitation  · If patient mentation continued to climb consider repeat head CT STAT given recent fall on 10/08/2017 while on vacation    ·   Acute on chronic diastolic congestive heart failure (HCC)-increased shortness of breath with 6 lb weight gain ~1 month with worsening bilateral lower extremity edema history of noncompliance   · Chest x-ray-mild pulmonary edema with blunting in the left costophrenic angle preliminary read, pro BNP 1067 common ischial troponin 0 02  · Most recent echocardiogram 09/25/2017 LVEF 65% mild MS with an aortic valve replacement intact moderately dilated left and right atrium  · Consult heart failure team in regards to appropriate diuresis  Initiate Lasix 40 mg b i d  continue to hold home torsemide 40 mg b i d  3 times a week and 20 mg b i d  every other day  · Telemetry, trend troponin, monitor ins/outs, daily standing weights, salt/fluid restriction 1 8 L elevate lower extremities  · Continue home medication-amlodipine, aspirin 81 mg, metoprolol tartrate 75 mg b i d ,simvastatin    ·   Acute renal failure superimposed on stage 3 chronic kidney disease (HCC)-elevated BUN/creatinine 39/1 93 in the setting of acute on chronic diastolic congestive heart failure likely secondary to fluid overload  · UA negative for nitrites and leukocytes   · Continue IV diuresis with Lasix 40 mg b i d  hold home torsemide 40 mg b i d  3 times weekly and 20 mg b i d  every other day  · Monitor ins/outs, daily weights elevate lower extremities  · Hold home nephrotoxins medication-ramipril    ·  Left arm pain -tenderness to palpation of anterior left brachialis status post anterior fall on marble floor 10/08/2017 may be tendinitis however given significant decreased range of motion possible fracture  · Left humeral/shoulder x-ray obtained outpatient PCP preliminary read no signs of bony abnormalities  · Consult orthopedic surgery in regards to appropriate management  · Given transient encephalopathy with tramadol need to be wary of appropriate pain regimen  Continue pain control-Tylenol, lidocaine patch, Voltaren gel, oxycodone 2 5 mg Q 6 p r n  severe pain  · PT/OT consulted    · Nasal laceration, sequela-3 sutures placed on 10/08/2017 by emergency department [facility] in [place]  · Was seen by PCP in the office recommended suture removal this Friday 10/13/2017    May need to remove suture within 48 hours  · Serial wound exams, wound care    Plan for Additional Problems:   ·   Essential hypertension-stable BP  · Continue home medication-amlodipine  · Hold ramipril given acute kidney injury  ·   Type 2 diabetes mellitus (HCC)-uncontrolled hemoglobin A1c 8 9 low BGs upon admission 52  · Hold home medication-NovoLog 35 units a c  given half an amp of dextrose 50 in the ED  · Obtain hemoglobin A1c  · Continue insulin sliding scale, Accu-Cheks q i d , hypoglycemic protocol  ·    Paroxysmal atrial fibrillation (HCC)S/P aortic valve replacement with bioprosthetic valve-sinus rhythm AV block EKG without palpitations, diaphoresis  · Holter monitor on 09/25/2017 revealed no episodes of atrial fibrillation less outpatient cardiologist Dr Cristina Evans discontinued Eliquis given recent fall continue to hold Eliquis await recommendations by Cardiology  Continuation of ASA 81 mg p o  q day for stroke prevention chads Vasc score 2  · Continue home medication-metoprolol tartrate 75 mg b i d   ·   Coronary artery disease involving native coronary artery of native heart-increased shortness of breath with bilateral lower extremity edema fairly stable no evidence of ACS   · Trend troponin, telemetry  · Hold home medication torsemide given IV diuresis with Lasix  · Continue home medication-metoprolol, amlodipine, ASA 81 mg, pravastatin        VTE Prophylaxis: Heparin  / sequential compression device   Code Status:  Full code-discussed with patient and patient's wife at the bedside  POLST: POLST information provided but not completed    Anticipated Length of Stay:  Patient will be admitted on an Inpatient basis with an anticipated length of stay of  at least 2 midnights  Justification for Hospital Stay:  Acute encephalopathy likely toxic encephalopathy and hypoglycemia     Total Time for Visit, including Counseling / Coordination of Care: 1 hour    Greater than 50% of this total time spent on direct patient counseling and coordination of care  Chief Complaint:   "I could not get out of bed today "    History of Present Illness:    Yunior Cuba  is a [de-identified] y o  male past medical history of paroxysmal atrial fibrillation status post aortic valve replacement 2002, hypertension, CAD status post CABG, chronic diastolic congestive heart failure, type 2 diabetes mellitus right who presents with transient altered mental status and bilateral lower extremity edema earlier prior to admission  Patient and patient's wife stated and change in mentation reporting increased weakness with lethargy throughout the day and worsened following administration of tramadol 50 mg around 10:00 p m this evening  Patient was given a total of 2 tablets tramadol 50 mg throughout the day for left arm pain following a mechanical fall for which patient reported his cane slipped out from underneath on 10/08/2017 landing anteriorly face 1st while in [place] last weekend  Patient reported to [facility] for which a head CT negative for hemorrhaging/hematoma per patient's wife and 3 sutures were placed at the nasal bridge given prescription for Keflex X 3 days  Yesterday patient was seen by PCP who prescribed tramadol for an left arm pain and follow-up this Friday for suture removal   Patient's wife attempted to assist patient out of bed however was unable to given weakness primarily in left upper extremity and right lower extremity  Patient's wife also noted transient confusion unwilling to answer questions and hard to arouse  Thus patient was brought to the emergency department for further evaluation of generalized weakness and transient altered mental status  Patient admitted to increased bilateral lower extremity edema shortness of breath with exertion difficulty lying flat with increased weight gain roughly 6 lb within 1 month  Patient admits to compliance of all home medications    Patient denies fevers or chills, dysuria/hematuria, abdominal pain, nausea vomiting and diarrhea  Patient reports high BGs however has not been checking regularly  Of note, patient was recently seen by outpatient cardiologist Dr Marino Posey 09/11/2017 ordered Holter monitor to determine continuation of anticoagulation for paroxysmal atrial fibrillation for which episodes were found and echocardiogram which revealed LVEF of 65% mild MS with an aortic valve replacement  In the ED, patient was found to be hemodynamically stable lower and oriented to person place and time UA was negative for nitrates and leukocytes chest x-ray preliminary results of pulmonary edema, proBNP elevated at 1076 and initial troponin negative at 0 02 and elevation in BUN/creatinine 39/1 93 BG noted to be 52 and was given half an amp of dextrose 50  Patient was admitted to the medical-surgical floor for further evaluation of acute encephalopathy likely toxic given tramadol and hypoglycemia  Review of Systems:    Review of Systems   Constitutional: Positive for activity change, appetite change, fatigue and unexpected weight change  Negative for chills, diaphoresis and fever  HENT: Positive for congestion  Negative for rhinorrhea, sinus pain and sinus pressure  Eyes: Negative for photophobia, redness and visual disturbance  Respiratory: Positive for shortness of breath  Negative for cough, choking, chest tightness and wheezing  Cardiovascular: Positive for leg swelling  Negative for chest pain and palpitations  Gastrointestinal: Positive for constipation  Negative for abdominal pain, diarrhea, nausea, rectal pain and vomiting  Genitourinary: Negative for difficulty urinating, enuresis, flank pain, genital sores, hematuria and urgency  Musculoskeletal: Positive for arthralgias, back pain, gait problem and joint swelling  Negative for myalgias, neck pain and neck stiffness  Skin: Negative for pallor and rash     Neurological: Positive for weakness and light-headedness  Negative for dizziness, tremors, seizures, syncope, facial asymmetry, speech difficulty, numbness and headaches  Psychiatric/Behavioral: Negative for decreased concentration  Past Medical and Surgical History:     Past Medical History:   Diagnosis Date    Cardiac disease     [facility]congestive [facility] failure) ([facility] )     Diabetes mellitus ([facility] )     Hypertension     Renal disorder        Past Surgical History:   Procedure Laterality Date    AORTIC VALVE REPLACEMENT      CARDIAC VALVE REPLACEMENT      CATARACT EXTRACTION, BILATERAL      COLONOSCOPY      REPLACEMENT TOTAL KNEE BILATERAL      VASCULAR SURGERY         Meds/Allergies:    Prior to Admission medications    Medication Sig Start Date End Date Taking? Authorizing Provider   AMLODIPINE BESYLATE PO Take 2 5 mg by mouth     Yes Historical Provider, MD   aspirin (ECOTRIN LOW STRENGTH) 81 mg EC tablet Take 81 mg by mouth every other day  Yes Historical Provider, MD   febuxostat (ULORIC) 40 mg tablet Take 40 mg by mouth daily   Yes Historical Provider, MD   insulin aspart protamine-insulin aspart (NovoLOG 70/30) 100 units/mL injection Inject 35 Units under the skin daily before breakfast for 30 days 1/5/17 10/11/17 Yes Kvng Donaldson MD   metoprolol tartrate 75 MG TABS Take 1 tablet by mouth every 12 (twelve) hours for 30 days 1/5/17 10/11/17 Yes Kvng Donaldson MD   multivitamin-iron-minerals-folic acid (CENTRUM) chewable tablet Chew 1 tablet daily  Yes Historical Provider, MD   omeprazole (PriLOSEC) 20 mg delayed release capsule Take 20 mg by mouth daily  Yes Historical Provider, MD   paricalcitol [facility] 1 mcg capsule Take 1 capsule by mouth 3 (three) times a week for 30 days Monday , Wednesday, friday 1/6/17 10/11/17 Yes Kvng Donaldson MD   ramipril (ALTACE) 2 5 mg capsule Take 2 5 mg by mouth daily   Yes Historical Provider, MD   simvastatin (ZOCOR) 10 mg tablet Take 10 mg by mouth daily at bedtime     Yes Historical Provider, MD torsemide (DEMADEX) 20 mg tablet Take 2 tablets by mouth daily for 30 days 1/5/17 10/11/17 Yes Kvng Donaldson MD   insulin aspart protamine-insulin aspart (NovoLOG 70/30) 100 units/mL injection Inject 30 Units under the skin every evening for 30 days 1/5/17 10/11/17 Yes Kvng Donaldson MD   traMADol Vickye Charlee) 50 mg tablet Take 50 mg by mouth every 6 (six) hours as needed for moderate pain  10/11/17 Yes Historical Provider, MD   calcium-vitamin D (OSCAL 500 + D) 500 mg-200 units per tablet Take 1 tablet by mouth daily  Historical Provider, MD   apixaban (ELIQUIS) 2 5 mg Take 2 5 mg by mouth 2 (two) times a day  10/11/17  Historical Provider, MD   aspirin (ECOTRIN LOW STRENGTH) 81 mg EC tablet Take 162 mg by mouth every other day Alternate days with 81 dose    10/11/17  Historical Provider, MD   docusate sodium (COLACE) 100 mg capsule Take 1 capsule by mouth 2 (two) times a day for 30 days 1/5/17 10/11/17  Kvng Donaldson MD   Vitamin D, Cholecalciferol, 400 UNITS CAPS Take by mouth   10/11/17  Historical Provider, MD     I have reviewed home medications with patient family member  Allergies: Allergies   Allergen Reactions    Hydrocodone      Other reaction(s): Other (See Comments)  Heart racing       Social History:     Marital Status: /Civil Union   Occupation:  Works at a [employer]  Patient Pre-hospital Living Situation:  Lives with wife at home  Patient Pre-hospital Level of Mobility:  Ambulates with cane occasionally   Patient Pre-hospital Diet Restrictions:  None  Substance Use History:   History   Alcohol Use No     History   Smoking Status    Never Smoker   Smokeless Tobacco    Never Used     History   Drug Use No       Family History:    History reviewed  No pertinent family history      Physical Exam:     Vitals:   Blood Pressure: 113/56 (10/11/17 0336)  Pulse: 63 (10/11/17 0336)  Temperature: (!) 97 °F (36 1 °C) (10/10/17 2327)  Temp Source: Tympanic (10/10/17 2327)  Respirations: 18 (10/11/17 4669)  Weight - Scale: 104 kg (229 lb) (10/10/17 6757)  SpO2: 99 % (10/11/17 0336)    Physical Exam   Constitutional: He is oriented to person, place, and time  He appears well-developed  No distress  sitting upright in bed fairly comfortable in no acute distress   HENT:   Head: Normocephalic  Mouth/Throat: Oropharynx is clear and moist    Eyes: Conjunctivae and EOM are normal  Pupils are equal, round, and reactive to light  Right eye exhibits no discharge  Left eye exhibits no discharge  No scleral icterus  Neck: Normal range of motion  Neck supple  JVD present  No tracheal deviation present  No thyromegaly present  Cardiovascular: Regular rhythm and intact distal pulses  Exam reveals no gallop and no friction rub  Murmur heard  Systolic murmur is present with a grade of 3/6   Bilateral lower extremity edema +3 pitting, DP pulses diminished bilaterally   Pulmonary/Chest: Effort normal  He has wheezes  He has no rales  He exhibits no tenderness  Diminished breath sounds at base bilaterally left worse than right expiratory wheezes   Abdominal: Soft  Bowel sounds are normal  He exhibits no distension  There is no tenderness  There is no guarding  No suprapubic tenderness or CVA tenderness   Musculoskeletal: Normal range of motion  He exhibits edema  He exhibits no tenderness or deformity  Lymphadenopathy:     He has no cervical adenopathy  Neurological: He is alert and oriented to person, place, and time  He displays normal reflexes  No cranial nerve deficit  Coordination abnormal    Skin: Skin is warm and dry  No rash noted  He is not diaphoretic  There is erythema  No pallor  Psychiatric: His behavior is normal  Thought content normal        Additional Data:     Lab Results: I have personally reviewed pertinent reports          Results from last 7 days  Lab Units 10/11/17  0028   WBC Thousand/uL 8 83   HEMOGLOBIN g/dL 11 6*   HEMATOCRIT % 34 2*   PLATELETS Thousands/uL 183   NEUTROS PCT % 71   LYMPHS PCT % 17   MONOS PCT % 9   EOS PCT % 3       Results from last 7 days  Lab Units 10/11/17  0028   SODIUM mmol/L 137   POTASSIUM mmol/L 4 1   CHLORIDE mmol/L 102   CO2 mmol/L 28   BUN mg/dL 39*   CREATININE mg/dL 1 93*   CALCIUM mg/dL 8 5   GLUCOSE RANDOM mg/dL 52*           Imaging: I have personally reviewed pertinent films in PACS    No results found  EKG, Pathology, and Other Studies Reviewed on Admission:   · EKG:  Sinus rhythm AV block HR 81, without ST elevation or depression  · Chest x-ray-left minimal pleural effusion with bilateral pulmonary congestion  · Left humeral and shoulder x-ray-pending final results    Allscripts / Epic Records Reviewed: Yes     ** Please Note: This note has been constructed using a voice recognition system   **

## 2017-10-12 PROBLEM — N18.30 STAGE 3 CHRONIC KIDNEY DISEASE (HCC): Status: ACTIVE | Noted: 2017-01-01

## 2017-10-12 PROBLEM — S42.252A CLOSED FRACTURE OF GREATER TUBEROSITY OF LEFT HUMERUS: Status: ACTIVE | Noted: 2017-01-01

## 2017-10-12 NOTE — ASSESSMENT & PLAN NOTE
· BP well controlled while in the hospital, last reading (135/63)  · Continue metoprolol tartrate 75 mg, amlodipine 2 5 mg daily     · Restart ramipril 2 5 mg daily

## 2017-10-12 NOTE — ASSESSMENT & PLAN NOTE
· Reviewed labs from previous admissions and current admission  Creatinine fluctuating at patient's baseline

## 2017-10-12 NOTE — ASSESSMENT & PLAN NOTE
· Continue IV lasix 40 mg BID and continue to hold home medication Torsemide  · Patient reports his SOB has been improving since starting IV Lasix

## 2017-10-12 NOTE — ASSESSMENT & PLAN NOTE
· Continue current diuretic management with IV lasix 40 mg BID  · Transition to PO lasix tomorrow  · Continue to monitor I&O's

## 2017-10-12 NOTE — ASSESSMENT & PLAN NOTE
· Patient was admitted due to [address] [place] [address] following administration of Tramadol around 10 pm  He was also found to be hypoglycemic on admission (BS 52)  Patient was found difficult to arouse by his wife  · Resolved  · Likely toxic metabolic due to Tramadol and hypoglycemia  · Continue to monitor patients mentation and blood sugars     · If mental status worsens, consider repeat head CT, due to patient's recent fall on anticoagulation

## 2017-10-12 NOTE — PROGRESS NOTES
Progress Note - [facility] So[facility]  [de-identified] y o  male MRN: [id number]    Unit/Bed#: -01 Encounter: [id number]    * Acute encephalopathy   Assessment & Plan    · Patient was admitted due to AMS following administration of Tramadol around 10 pm  He was also found to be hypoglycemic on admission (BS 52)  Patient was found difficult to arouse by his wife  · Resolved  · Likely toxic metabolic due to Tramadol and hypoglycemia  · Continue to monitor patients mentation and blood sugars  · If mental status worsens, consider repeat head CT, due to patient's recent fall on anticoagulation          Acute on chronic diastolic congestive heart failure (HCC)   Assessment & Plan    · Continue current diuretic management with IV lasix 40 mg BID  · Transition to PO lasix tomorrow  · Continue to monitor I&O's  Closed fracture of greater tuberosity of left humerus   Assessment & Plan    · Non weight bearing left UE  · F/U Ortho in 2 weeks        Stage 3 chronic kidney disease   Assessment & Plan    · Reviewed labs from previous admissions and current admission  Creatinine fluctuating at patient's baseline  Coronary artery disease involving native coronary artery of native heart   Assessment & Plan    · Continue ASA, statin, BB        Nasal laceration, sequela   Assessment & Plan    ·  Sutures placed on 10/8/17  Suture removal Friday 10/13/17  S/P aortic valve replacement with bioprosthetic valve   Assessment & Plan    · Continue home medications ASA 81 mg QD and continue to hold Eliquis  Outpatient cardiologist discontinued it  [place] (dyspnea on exertion)   Assessment & Plan    · Continue IV lasix 40 mg BID and continue to hold home medication Torsemide  · Patient reports his SOB has been improving since starting IV Lasix             Type 2 diabetes mellitus ([facility], [place] [address] )   Assessment & Plan    · Patient hypoglycemic on admission with blood glucose of 52  · Patient's blood sugars have stabilized  · Restarted Novolog 70/30 at lower dose, 20U BID yesterday  Blood sugars stable  Will likely discharge on this  · Continue sliding scale  · Adjust as needed        Essential hypertension   Assessment & Plan    · BP well controlled while in the hospital, last reading (135/63)  · Continue metoprolol tartrate 75 mg, amlodipine 2 5 mg daily  · Restart ramipril 2 5 mg daily         Paroxysmal atrial fibrillation (HCC)   Assessment & Plan    · Continue home medications BB, ASA, and Statin   · Continue to hold Eliquis  Discontinued by outpatient cardiologist              VTE Pharmacologic Prophylaxis:   Pharmacologic: Heparin  Mechanical VTE Prophylaxis in Place: Yes    Patient Centered Rounds: I have performed bedside rounds with nursing staff today  Discussions with Specialists or Other Care Team Provider: None    Education and Discussions with Family / Patient: Patient and Wife    Time Spent for Care: 30 minutes  More than 50% of total time spent on counseling and coordination of care as described above  Current Length of Stay: 1 day(s)    Current Patient Status: Inpatient   Certification Statement: The patient will continue to require additional inpatient hospital stay due to diuresis  Discharge Plan: Likely discharge tomorrow to rehabilitation facility, if patient agreeable  Code Status: Level 1 - Full Code      Subjective:   Patient reports continuing improvement with SOB and denies having any repeat unresponsive episodes  Patient's wife states that his mental status has returned to baseline  Patient reports his LE edema has significantly decreased  Patient denies any chest pain, palpitations, abdominal pain, tremors, numbness/tingling, or tremors  Patient reports his pain in his left shoulder and right leg is well controlled      Objective:     Vitals:   Temp (24hrs), Av 4 °F (36 9 °C), Min:97 5 °F (36 4 °C), Max:98 9 °F (37 2 °C)    HR:  [70-90] 87  Resp:  [16-20] 16  BP: (118-148)/(55-72) 135/63  SpO2:  [92 %-97 %] 95 %  Body mass index is 34 94 kg/m²  Input and Output Summary (last 24 hours): Intake/Output Summary (Last 24 hours) at 10/12/17 1424  Last data filed at 10/12/17 1100   Gross per 24 hour   Intake              340 ml   Output             1250 ml   Net             -910 ml       Physical Exam:     Physical Exam   Constitutional: He is oriented to person, place, and time  He appears well-developed and well-nourished  No distress  HENT:   Head: Normocephalic and atraumatic  Eyes: EOM are normal  Pupils are equal, round, and reactive to light  Neck: Normal range of motion  Neck supple  No JVD present  No tracheal deviation present  Cardiovascular: Normal rate and regular rhythm  Murmur heard  Pulmonary/Chest: Effort normal and breath sounds normal  No respiratory distress  He has no wheezes  Abdominal: Soft  Bowel sounds are normal  He exhibits no distension  There is no tenderness  Musculoskeletal: He exhibits edema  C-spine non tender to palpation  Decreased strength in the right LE  B/L LE edema  Decreased ROM in left LE and right UE  Neurological: He is alert and oriented to person, place, and time  CN's 2-12 intact  Crude touch in tact and equal B/L  Skin: Skin is warm and dry  Psychiatric: He has a normal mood and affect   His behavior is normal        Additional Data:     Labs:      Results from last 7 days  Lab Units 10/12/17  0446   WBC Thousand/uL 7 57   HEMOGLOBIN g/dL 11 1*   HEMATOCRIT % 33 7*   PLATELETS Thousands/uL 178   NEUTROS PCT % 59   LYMPHS PCT % 26   MONOS PCT % 10   EOS PCT % 5       Results from last 7 days  Lab Units 10/12/17  0446 10/11/17  0448   SODIUM mmol/L 137 135*   POTASSIUM mmol/L 4 2 3 8   CHLORIDE mmol/L 102 101   CO2 mmol/L 28 28   BUN mg/dL 39* 36*   CREATININE mg/dL 1 68* 1 90*   CALCIUM mg/dL 8 6 8 5   TOTAL PROTEIN g/dL  --  6 4   BILIRUBIN TOTAL mg/dL  --  0 61   ALK PHOS U/L  --  63   ALT U/L  -- 16   AST U/L  --  18   GLUCOSE RANDOM mg/dL 143* 143*           * I Have Reviewed All Lab Data Listed Above  * Additional Pertinent Lab Tests Reviewed: Colin 68 Admission Reviewed    Imaging:    Imaging Reports Reviewed Today Include: None  Imaging Personally Reviewed by Myself Includes:  None    Recent Cultures (last 7 days):           Last 24 Hours Medication List:     acetaminophen 975 mg Oral Q8H [facility]   amLODIPine 2 5 mg Oral Daily   aspirin 81 mg Oral Every Other Day   calcium carbonate-vitamin D 1 tablet Oral Daily   diclofenac sodium 2 g Topical 4x Daily   febuxostat 40 mg Oral Daily   furosemide 40 mg Intravenous BID (diuretic)   heparin (porcine) 5,000 Units Subcutaneous Q8H [facility]   insulin aspart protamine-insulin aspart 20 Units Subcutaneous BID AC   insulin lispro 1-6 Units Subcutaneous TID With Meals   insulin lispro 1-6 Units Subcutaneous HS   lidocaine 1 patch Transdermal Q24H   metoprolol tartrate 75 mg Oral Q12H [facility]   multivitamin-minerals 1 tablet Oral Daily   pantoprazole 40 mg Oral Early Morning   [START ON 10/13/2017] paricalcitol 1 mcg Oral Once per day on Mon Wed Fri   pravastatin 20 mg Oral Daily With Dinner   ramipril 2 5 mg Oral Daily   senna-docusate sodium 1 tablet Oral BID        Today, Patient Was Seen By: Roberto Tucker PA-C    ** Please Note: Dragon 360 Dictation voice to text software may have been used in the creation of this document   **

## 2017-10-12 NOTE — ASSESSMENT & PLAN NOTE
· Patient hypoglycemic on admission with blood glucose of 52  · Patient's blood sugars have stabilized  · Restarted Novolog 70/30 at lower dose, 20U BID yesterday  Blood sugars stable  Will likely discharge on this     · Continue sliding scale  · Adjust as needed

## 2017-10-12 NOTE — PLAN OF CARE
Problem: OCCUPATIONAL THERAPY ADULT  Goal: Performs self-care activities at highest level of function for planned discharge setting  See evaluation for individualized goals  Treatment Interventions: ADL retraining, Functional transfer training, Endurance training, Cognitive reorientation, Patient/family training, Equipment evaluation/education, Compensatory technique education, Activityengagement          See flowsheet documentation for full assessment, interventions and recommendations  Limitation: Decreased Safe judgement during ADL, Decreased UE ROM, Decreased endurance, Decreased self-care trans, Non-func L UE  Prognosis: Good  Assessment: Pt is a [de-identified] y o  male who was admitted to [facility] on 10/10/2017 with Acute encephalopathy, weakness, pain and difficulty ambulating s/p fall ~1wk PTA   Pt's problem list also includes PMH of DM, HTN, previous surgery and cardiac disease, CHF, renal disorder, AVR, B TKR, vascular sx  At baseline pt was completing adls independently and ambulated w/ recent use of spc  Pt lives with spouse  Currently pt requires mod to max assist  for overall ADLS and mod a x 1-2  for functional mobility/transfers  Pt currently presents with impairments in the following categories -steps to enter environment, difficulty performing ADLS and difficulty performing IADLS  activity tolerance, endurance, standing balance/tolerance, memory and insight  These impairments, as well as pt's fatigue, pain, WBS  and risk for falls  limit pt's ability to safely engage in all baseline areas of occupation, includinggrooming, bathing, dressing, toileting, functional mobility/transfers, community mobility, work/volunteer work , social participation  and leisure activities  From OT standpoint, recommend inpt rehab upon D/C  OT will continue to follow to address the below stated goals        OT Discharge Recommendation: Short Term Rehab

## 2017-10-12 NOTE — ASSESSMENT & PLAN NOTE
· Continue home medications ASA 81 mg QD and continue to hold Eliquis  Outpatient cardiologist discontinued it

## 2017-10-12 NOTE — CASE MANAGEMENT
Initial Clinical Review    Admission: Date/Time/Statement: 10/11/17 @ 0141     Orders Placed This Encounter   Procedures    Inpatient Admission (expected length of stay for this patient is greater than two midnights)     Standing Status:   Standing     Number of Occurrences:   1     Order Specific Question:   Admitting Physician     Answer:   Pa Strange     Order Specific Question:   Level of Care     Answer:   Med Surg [16]     Order Specific Question:   Estimated length of stay     Answer:   More than 2 Midnights     Order Specific Question:   Certification     Answer:   I certify that inpatient services are medically necessary for this patient for a duration of greater than two midnights  See H&P and MD Progress Notes for additional information about the patient's course of treatment  ED: Date/Time/Mode of Arrival:   ED Arrival Information     Expected Arrival Acuity Means of Arrival Escorted By Service Admission Type    - 10/10/2017 23:20 Urgent Wheelchair Family Member General Medicine Urgent    Arrival Complaint    Fall        Chief Complaint:   Chief Complaint   Patient presents with    Weakness - Generalized     pt fell and was seen in hospital on saturday  Pt was put on tramadol and tonight was slow to respond to questions and weak     History of Illness: [de-identified] y o  male past medical history of paroxysmal atrial fibrillation status post aortic valve replacement 2002, hypertension, CAD status post CABG, chronic diastolic congestive heart failure, type 2 diabetes mellitus right who presents with transient altered mental status and bilateral lower extremity edema earlier prior to admission  Patient and patient's wife stated and change in mentation reporting increased weakness with lethargy throughout the day and worsened following administration of tramadol 50 mg around 10:00 p m this evening    Patient was given a total of 2 tablets tramadol 50 mg throughout the day for left arm pain following a mechanical fall for which patient reported his cane slipped out from underneath on 10/08/2017 landing anteriorly face 1st while in [place] last weekend  Patient reported to [facility] for which a head CT negative for hemorrhaging/hematoma per patient's wife and 3 sutures were placed at the nasal bridge given prescription for Keflex X 3 days  Yesterday patient was seen by PCP who prescribed tramadol for an left arm pain and follow-up this Friday for suture removal   Patient's wife attempted to assist patient out of bed however was unable to given weakness primarily in left upper extremity and right lower extremity  Patient's wife also noted transient confusion unwilling to answer questions and hard to arouse  Thus patient was brought to the emergency department for further evaluation of generalized weakness and transient altered mental status  Patient admitted to increased bilateral lower extremity edema shortness of breath with exertion difficulty lying flat with increased weight gain roughly 6 lb within 1 month  Patient admits to compliance of all home medications  Patient denies fevers or chills, dysuria/hematuria, abdominal pain, nausea vomiting and diarrhea    Patient reports high BGs however has not been checking regularly      Of note, patient was recently seen by outpatient cardiologist Dr Sergio Kruger 09/11/2017 ordered Holter monitor to determine continuation of anticoagulation for paroxysmal atrial fibrillation for which episodes were found and echocardiogram which revealed LVEF of 65% mild MS with an aortic valve replacement        In the ED, patient was found to be hemodynamically stable lower and oriented to person place and time UA was negative for nitrates and leukocytes chest x-ray preliminary results of pulmonary edema, proBNP elevated at 1076 and initial troponin negative at 0 02 and elevation in BUN/creatinine 39/1 93 BG noted to be 52 and was given half an amp of dextrose 50   Patient was admitted to the medical-surgical floor for further evaluation of acute encephalopathy likely toxic given tramadol and hypoglycemia  ED Vital Signs:   ED Triage Vitals   Temperature Pulse Respirations Blood Pressure SpO2   10/10/17 2327 10/10/17 2327 10/10/17 2327 10/10/17 2327 10/10/17 2327   (!) 97 °F (36 1 °C) 84 18 113/58 95 %      Temp Source Heart Rate Source Patient Position - Orthostatic VS BP Location FiO2 (%)   10/10/17 2327 10/10/17 2327 10/10/17 2327 10/10/17 2327 --   Tympanic Monitor Sitting Left arm       Pain Score       10/11/17 0300       No Pain        Wt Readings from Last 1 Encounters:   10/12/17 101 kg (223 lb 1 6 oz)     O2 93% RA    Vital Signs (abnormal): WNL    Abnormal Labs:   BUN 39 (H) 5 - 25 mg/dL     Creatinine 1 93 (H) 0 60 - 1 30 mg/dL     Updated: 10/11/17 0500        WBC 7 77 4 31 - 10 16 Thousand/uL     RBC 3 86 (L) 3 88 - 5 62 Million/uL     Hemoglobin 10 9 (L) 12 0 - 17 0 g/dL     Hematocrit 33 5 (L) 36 5 - 49 3 %      Updated: 10/11/17 0231       Troponin I <0 02 <=0 04 ng/mL     Diagnostic Test Results: CXR - Findings suggest pulmonary vascular congestion  Mild enlargement of cardiac silhouette  Findings concur with the referring clinician's preliminary interpretation already in the patient's electronic health record        ED Treatment:   Medication Administration from 10/10/2017 2320 to 10/11/2017 2124       Date/Time Order Dose Route Action     10/11/2017 0029 sodium chloride 0 9 % bolus 1,000 mL 1,000 mL Intravenous New Bag     10/11/2017 0209 dextrose 50 % IV solution 25 mL 25 mL Intravenous Given     10/11/2017 0545 pantoprazole (PROTONIX) EC tablet 40 mg 40 mg Oral Given     10/11/2017 0811 calcium carbonate-vitamin D (OSCAL-D) 500 mg-200 units per tablet 1 tablet 1 tablet Oral Given     10/11/2017 0810 aspirin chewable tablet 81 mg 81 mg Oral Given     10/11/2017 1630 pravastatin (PRAVACHOL) tablet 20 mg 20 mg Oral Given     10/11/2017 0810 multivitamin-minerals (CENTRUM) tablet 1 tablet 1 tablet Oral Given     10/11/2017 0810 amLODIPine (NORVASC) tablet 2 5 mg 2 5 mg Oral Given     10/11/2017 0809 metoprolol tartrate (LOPRESSOR) tablet 75 mg 75 mg Oral Given     10/11/2017 0811 ramipril (ALTACE) capsule 2 5 mg 2 5 mg Oral Given     10/11/2017 0811 senna-docusate sodium (SENOKOT S) 8 6-50 mg per tablet 1 tablet 1 tablet Oral Given     10/11/2017 1358 heparin (porcine) subcutaneous injection 5,000 Units 5,000 Units Subcutaneous Given     10/11/2017 0545 heparin (porcine) subcutaneous injection 5,000 Units 5,000 Units Subcutaneous Given     10/11/2017 1306 insulin lispro (HumaLOG) 100 units/mL subcutaneous injection 1-6 Units 2 Units Subcutaneous Given     10/11/2017 1527 acetaminophen (TYLENOL) tablet 650 mg 650 mg Oral Given     10/11/2017 1257 diclofenac sodium (VOLTAREN) 1 % topical gel 2 g 2 g Topical Given     10/11/2017 0812 diclofenac sodium (VOLTAREN) 1 % topical gel 2 g 2 g Topical Given     10/11/2017 1519 lidocaine (LIDODERM) 5 % patch 1 patch 1 patch Transdermal Patch Removed     10/11/2017 0346 lidocaine (LIDODERM) 5 % patch 1 patch 1 patch Transdermal Medication Applied     10/11/2017 1520 furosemide (LASIX) injection 40 mg 40 mg Intravenous Given     10/11/2017 0812 furosemide (LASIX) injection 40 mg 40 mg Intravenous Given     10/11/2017 0348 furosemide (LASIX) injection 40 mg 40 mg Intravenous Given     10/11/2017 1629 insulin aspart protamine-insulin aspart (NovoLOG 70/30) 100 units/mL subcutaneous injection 20 Units 20 Units Subcutaneous Given     Past Medical/Surgical History:    Active Ambulatory Problems     Diagnosis Date Noted    [facility]oxysmal [facility] fibrillation ([facility] ) 06/22/2016    Acute on chronic diastolic congestive heart failure ([facility] ) 06/22/2016    Acute renal failure superimposed on stage 3 chronic kidney disease ([facility] ) 06/22/2016    Essential hypertension 06/22/2016    Acute respiratory failure with hypoxia (HCC) 01/03/2017    Type 2 diabetes mellitus ([facility] ) 01/03/2017    MADISON (dyspnea on exertion) 01/03/2017    Cough 01/03/2017     Resolved Ambulatory Problems     Diagnosis Date Noted    Hypoxia 01/03/2017    Bronchitis 01/03/2017     Past Medical History:   Diagnosis Date    Cardiac disease     CHF (congestive heart failure) ([facility] )     Diabetes mellitus ([facility] )     Hypertension     Renal disorder      Admitting Diagnosis: Syncope [R55]  Injury [T14 90XA]  Weakness [R53 1]  CKD (chronic kidney disease), stage III [N18 3]  History of CHF (congestive heart failure) [Z86 79]  Acute encephalopathy [G93 40]  S/P aortic valve replacement with bioprosthetic valve [Z95 3]  Left upper arm injury, sequela [S49 92XS]  Nasal laceration, sequela [O84 60EA]  Chronic diastolic CHF (congestive heart failure) (HCC) [I50 32]  Acute renal failure superimposed on stage 3 chronic kidney disease ([facility] ) [N17 9, N18 3]    Age/Sex: [de-identified] y o  male    Assessment/Plan:   Hospital Problem List:      Principal Problem:    Acute encephalopathy  Active Problems:    Paroxysmal atrial fibrillation (HCC)    Acute on chronic diastolic congestive heart failure (HCC)    Acute renal failure superimposed on stage 3 chronic kidney disease (HCC)    Essential hypertension    Type 2 diabetes mellitus (HCC)    S/P aortic valve replacement with bioprosthetic valve    Nasal laceration, sequela    Coronary artery disease involving native coronary artery of native heart      Plan for the Primary Problem(s):  ·   Acute encephalopathy-prior to admission difficult to arouse following administration of tramadol around 10:00 p m  mentation has improved and is resolving per patient's wife returning back to baseline multifactorial likely hypoglycemia and toxic encephalopathy secondary to tramadol in the setting of acute on chronic diastolic congestive heart failure  ?  UA negative for nitrites and leukocytes , chest x-ray pulmonary edema with small left pleural effusion pulmonary read,  ? BG 52 upon admission given half an amp of dextrose in the ED resulting to BGs 100  Hold home medication NovoLog 35 units a c  continue insulin sliding scale, Accu-Cheks q i d , repeat hemoglobin A1c  ? Neurochecks X 24 hours, telemetry  ? PT/OT may need short-term rehabilitation  ? If patient mentation continued to climb consider repeat head CT STAT given recent fall on 10/08/2017 while on vacation     ·   Acute on chronic diastolic congestive heart failure (HCC)-increased shortness of breath with 6 lb weight gain ~1 month with worsening bilateral lower extremity edema history of noncompliance   ? Chest x-ray-mild pulmonary edema with blunting in the left costophrenic angle preliminary read, pro BNP 1067 common ischial troponin 0 02  ? Most recent echocardiogram 09/25/2017 LVEF 65% mild MS with an aortic valve replacement intact moderately dilated left and right atrium  ? Consult heart failure team in regards to appropriate diuresis  Initiate Lasix 40 mg b i d  continue to hold home torsemide 40 mg b i d  3 times a week and 20 mg b i d  every other day  ? Telemetry, trend troponin, monitor ins/outs, daily standing weights, salt/fluid restriction 1 8 L elevate lower extremities  ? Continue home medication-amlodipine, aspirin 81 mg, metoprolol tartrate 75 mg b i d ,simvastatin     ·   Acute renal failure superimposed on stage 3 chronic kidney disease (HCC)-elevated BUN/creatinine 39/1 93 in the setting of acute on chronic diastolic congestive heart failure likely secondary to fluid overload  ? UA negative for nitrites and leukocytes   ? Continue IV diuresis with Lasix 40 mg b i d  hold home torsemide 40 mg b i d  3 times weekly and 20 mg b i d  every other day  ? Monitor ins/outs, daily weights elevate lower extremities  ?  Hold home nephrotoxins medication-ramipril     ·  Left arm pain -tenderness to palpation of anterior left brachialis status post anterior fall on marble floor 10/08/2017 may be tendinitis however given significant decreased range of motion possible fracture  ? Left humeral/shoulder x-ray obtained outpatient PCP preliminary read no signs of bony abnormalities  ? Consult orthopedic surgery in regards to appropriate management  ? Given transient encephalopathy with tramadol need to be wary of appropriate pain regimen  Continue pain control-Tylenol, lidocaine patch, Voltaren gel, oxycodone 2 5 mg Q 6 p r n  severe pain  ? PT/OT consulted     · Nasal laceration, sequela-3 sutures placed on 10/08/2017 by emergency department [facility] in [place]  ? Was seen by PCP in the office recommended suture removal this Friday 10/13/2017  May need to remove suture within 48 hours  ? Serial wound exams, wound care     Plan for Additional Problems:   ·   Essential hypertension-stable BP  ? Continue home medication-amlodipine  ? Hold ramipril given acute kidney injury  ·   Type 2 diabetes mellitus (HCC)-uncontrolled hemoglobin A1c 8 9 low BGs upon admission 52  ? Hold home medication-NovoLog 35 units a c  given half an amp of dextrose 50 in the ED  ? Obtain hemoglobin A1c  ? Continue insulin sliding scale, Accu-Cheks q i d , hypoglycemic protocol  ·    Paroxysmal atrial fibrillation (HCC)S/P aortic valve replacement with bioprosthetic valve-sinus rhythm AV block EKG without palpitations, diaphoresis  ? Holter monitor on 09/25/2017 revealed no episodes of atrial fibrillation less outpatient cardiologist Dr Roselyn Vazquez discontinued Eliquis given recent fall continue to hold Eliquis await recommendations by Cardiology  Continuation of ASA 81 mg p o  q day for stroke prevention chads Vasc score 2  ? Continue home medication-metoprolol tartrate 75 mg b i d   ·   Coronary artery disease involving native coronary artery of native heart-increased shortness of breath with bilateral lower extremity edema fairly stable no evidence of ACS   ? Trend troponin, telemetry  ?  Hold home medication torsemide given IV diuresis with Lasix  ? Continue home medication-metoprolol, amlodipine, ASA 81 mg, pravastatin     VTE Prophylaxis: Heparin  / sequential compression device   Code Status:  Full code-discussed with patient and patient's wife at the bedside  POLST: POLST information provided but not completed     Anticipated Length of Stay:  Patient will be admitted on an Inpatient basis with an anticipated length of stay of  at least 2 midnights     Justification for Hospital Stay:  Acute encephalopathy likely toxic encephalopathy and hypoglycemia      Admission Orders:  Tele monitoring  Neurological checks q4h  Orthostatic B/P  Wound care  Heart Failure Service cons  Orthopedic Surgery cons  PT/OT eval and treat    Scheduled Meds:   acetaminophen 975 mg Oral Q8H [facility]   amLODIPine 2 5 mg Oral Daily   aspirin 81 mg Oral Every Other Day   calcium carbonate-vitamin D 1 tablet Oral Daily   diclofenac sodium 2 g Topical 4x Daily   febuxostat 40 mg Oral Daily   furosemide 40 mg Intravenous BID (diuretic)   heparin (porcine) 5,000 Units Subcutaneous Q8H [facility]   insulin aspart protamine-insulin aspart 20 Units Subcutaneous BID AC   insulin lispro 1-6 Units Subcutaneous TID With Meals   insulin lispro 1-6 Units Subcutaneous HS   lidocaine 1 patch Transdermal Q24H   metoprolol tartrate 75 mg Oral Q12H [facility]   multivitamin-minerals 1 tablet Oral Daily   pantoprazole 40 mg Oral Early Morning   [START ON 10/13/2017] paricalcitol 1 mcg Oral Once per day on Mon Wed Fri   pravastatin 20 mg Oral Daily With Dinner   senna-docusate sodium 1 tablet Oral BID     Continuous Infusions:    PRN Meds: aluminum-magnesium hydroxide-simethicone    methocarbamol    nitroglycerin    ondansetron    oxyCODONE

## 2017-10-12 NOTE — CONSULTS
Consultation - Cardiology   Geraldine Damian  [de-identified] y o  male MRN: [id number]  Unit/Bed#: -01 Encounter: [id number]    Assessment/Plan     Assessment:    Congestive heart failure with preserved ejection fraction   Likely 2/2 medication non compliance and non adherence to low salt diet   Echo 9/25/17 EF 65%, severe concentric hypertrophy, grade 1 diastolic dysfunction, normal functioning AVR  Increased lower extremity edema, MADISON, pillow orthopnea with hx of 6 lb weight gain   CXR shows increased vascular congestion  Home diuretic management: Torsemide 40 mg daily MWF and 20 daily mg all other days  Placed on IV lasix 40 mg BID   I/0: currently -1500  Wt: 229 -> 223  Outpatient weights fluctuate from 220-225  Continue current diuretic management   Reinforce and educate on importance of taking medications on D/C      Paroxysmal atrial fibrillation s/p AVR with bioprosthetic valve 2002  Holter monitor shows NSR average 77 without episodes Afib or Aflutter   Patient previously on Eliquis but discontinued by Dr Kurt Argueta score of 4  HAS-BLED 2  Continue AC with Aspirin     CAD s/p CABG  SOB and MADISON likley 2/2 to volume overload due to exacerbation of CHF and not likely ACS  Troponin negative x 3   EKG without signs of ischemia   Telemetry without significant events   Continue bb, asa, statin     Essential Hypertension   Well controlled while in hospital   Continue Metoprolol 75 mg BID, and amlodipine 2 5 daily   Ramipril held in setting of SABINE  Resume when SABINE resolves        SABINE on CKD 3  Baseline CR 1 8 -> 2   Currently 1 68  Ramipril on hold     Encephalopathy   Likely 2/2 tramadol and hypoglycemia   Improved, patient AAOx3        History of Present Illness   Physician Requesting Consult: Sofi Ward MD  Reason for Consult / Principal Problem: Acute on Chronic Diastolic Heart Failure   HPI: Geraldine Damian  is a [de-identified]y o  year old male With past medical history HFpEF, CAD status post CABG x 4 2002, paroxysmal atrial fibrillation status post bioprosthetic AVR, hypertension, CKD3, and DM2 who presents with altered mental status and worsening lower extremity edema  10/8/2017 patient had a fall from standing requiring ED visit to [facility]   At that time CT head was negative the patient required sutures to his nose  On follow-up visit with primary care provider 10/10/2017, patient complained of left arm pain and was given prescription of Toradol  Throughout the day the patient's wife noticed confusion and somnolence  Additionally, patient admitted to increased shortness of breath, dyspnea on exertion, bilateral lower extremity edema, 2 pillow orthopnea, and 6 lb weight gain over 1 month  Patient reports that he does not like taking medication and throws away pills some days of the week  He also notes that he does not adhere to a low salt diet  Patient follows with outpatient cardiologist Dr Isai Ron  Most recently seen 9/11/2017  At that time echo and Holter monitor ordered  Echo 9/25/2017 showed ejection fraction of 65%, severe concentric hypertrophy, grade 1 diastolic dysfunction left atrium moderately dilated, right atrium mildly dilated, mild mitral regurgitation, bioprosthetic aortic valve with normal function, mild tricuspid regurgitation  Holter monitor 9/25/2017 revealed normal sinus rhythm with first-degree AV block an average rate of 77 beats per minute, occasional PVCs, frequent PACs, no atrial fibrillation or atrial flutter present  Due to recent test results, Dr Isai Ron discontinued eliquis  In ED initial vital signs showed temperature 97°, pulse 84, respirations 18, blood pressure 113/58, oxygen saturation 95% on room air  Initial vital signs show troponin less than 0 02, hemoglobin 10 9, BNP 1076  EKG shows normal sinus rhythm with first-degree AV block, incomplete left bundle branch block  These findings present on previous EKGs    Chest x-ray shows pulmonary vascular congestion, mild enlargement of cardiac silhouette  Inpatient consult to Heart Failure Service  Performed by: Tashi Vences  Authorized by: Keaton Kirk           Review of Systems   Constitutional: Negative for chills and fever  HENT: Negative for sore throat and trouble swallowing  Eyes: Negative for photophobia and visual disturbance  Respiratory: Positive for shortness of breath  Negative for chest tightness and wheezing  Cardiovascular: Positive for leg swelling  Negative for chest pain and palpitations  Gastrointestinal: Negative for abdominal pain, constipation, diarrhea and nausea  Genitourinary: Negative for difficulty urinating and dysuria  Musculoskeletal: Positive for arthralgias and joint swelling  Neurological: Negative for dizziness, light-headedness and headaches  Historical Information   Past Medical History:   Diagnosis Date    Cardiac disease     [facility]cong[facility] failure) [facility] )     Diabetes mellitus ([facility] )     Hypertension     Renal disorder      Past Surgical History:   Procedure Laterality Date    AORTIC VALVE REPLACEMENT      CARDIAC VALVE REPLACEMENT      CATARACT EXTRACTION, BILATERAL      COLONOSCOPY      REPLACEMENT TOTAL KNEE BILATERAL      VASCULAR SURGERY       History   Alcohol Use No     History   Drug Use No     History   Smoking Status    Former Smoker    Quit date: 10/11/1987   Smokeless Tobacco    Never Used     Family History: History reviewed  No pertinent family history  Meds/Allergies   PTA meds:   Prior to Admission Medications   Prescriptions Last Dose Informant Patient Reported? Taking?    AMLODIPINE BESYLATE PO 10/10/2017 at Unknown time  Yes Yes   Sig: Take 2 5 mg by mouth daily     aspirin (ECOTRIN LOW STRENGTH) 81 mg EC tablet 10/10/2017 at Unknown time  Yes Yes   Sig: Take 81 mg by mouth daily     febuxostat (ULORIC) 40 mg tablet 10/10/2017 at Unknown time  Yes Yes   Sig: Take 40 mg by mouth daily insulin aspart protamine-insulin aspart (NovoLOG 70/30) 100 units/mL injection 10/11/2017 at Unknown time  No Yes   Sig: Inject 35 Units under the skin daily before breakfast for 30 days   Patient taking differently: Inject 38 Units under the skin daily before breakfast     insulin aspart protamine-insulin aspart (NovoLOG 70/30) 100 units/mL injection   Yes Yes   Sig: Inject 32 Units under the skin daily before dinner   metoprolol tartrate 75 MG TABS 10/10/2017 at Unknown time  No Yes   Sig: Take 1 tablet by mouth every 12 (twelve) hours for 30 days   multivitamin-iron-minerals-folic acid (CENTRUM) chewable tablet 10/10/2017 at Unknown time  Yes Yes   Sig: Chew 1 tablet daily  omeprazole (PriLOSEC) 20 mg delayed release capsule 10/10/2017 at Unknown time  Yes Yes   Sig: Take 20 mg by mouth daily  paricalcitol (ZEMPLAR) 1 mcg capsule 10/10/2017 at Unknown time  No Yes   Sig: Take 1 capsule by mouth 3 (three) times a week for 30 days Monday , Wednesday, friday   ramipril (ALTACE) 2 5 mg capsule 10/10/2017 at Unknown time  Yes Yes   Sig: Take 2 5 mg by mouth daily   simvastatin (ZOCOR) 10 mg tablet 10/10/2017 at Unknown time  Yes Yes   Sig: Take 10 mg by mouth daily at bedtime  torsemide (DEMADEX) 20 mg tablet 10/10/2017 at Unknown time  No Yes   Sig: Take 2 tablets by mouth daily for 30 days   Patient taking differently: Take 20 mg by mouth daily     traMADol (ULTRAM) 50 mg tablet   Yes Yes   Sig: Take 50 mg by mouth every 6 (six) hours as needed for moderate pain      Facility-Administered Medications: None     Allergies   Allergen Reactions    Hydrocodone      Other reaction(s): Other (See Comments)  Heart racing       Objective   Vitals: Blood pressure 135/64, pulse 73, temperature 98 8 °F (37 1 °C), temperature source Oral, resp  rate 18, height 5' 7" (1 702 m), weight 101 kg (223 lb 1 6 oz), SpO2 97 %    Orthostatic Blood Pressures    Flowsheet Row Most Recent Value   Blood Pressure  135/64 filed at 10/12/2017 0305   Patient Position - Orthostatic VS  Lying filed at 10/12/2017 0305            Intake/Output Summary (Last 24 hours) at 10/12/17 0801  Last data filed at 10/12/17 0631   Gross per 24 hour   Intake                0 ml   Output              650 ml   Net             -650 ml       Invasive Devices     Peripheral Intravenous Line            Peripheral IV 10/11/17 Right Antecubital 1 day                Physical Exam   Constitutional: He is oriented to person, place, and time  He appears well-developed and well-nourished  No distress  HENT:   Head: Normocephalic and atraumatic  Mouth/Throat: Oropharynx is clear and moist  No oropharyngeal exudate  Eyes: Conjunctivae are normal  Pupils are equal, round, and reactive to light  No scleral icterus  Cardiovascular: Normal rate and regular rhythm  Murmur (systolic ejection) heard  Pulmonary/Chest: Effort normal and breath sounds normal  No respiratory distress  He has no wheezes  He has no rales  Abdominal: Soft  Bowel sounds are normal  He exhibits no distension  There is no tenderness  There is no rebound  Musculoskeletal:   Pitting edema in b/l lower extremities, LUE in sling    Neurological: He is alert and oriented to person, place, and time  Skin: He is not diaphoretic  Psychiatric: He has a normal mood and affect   His behavior is normal  Judgment and thought content normal        Lab Results:   CBC with diff:   Results from last 7 days  Lab Units 10/12/17  0446   WBC Thousand/uL 7 57   RBC Million/uL 3 99   HEMOGLOBIN g/dL 11 1*   HEMATOCRIT % 33 7*   MCV fL 85   MCH pg 27 8   MCHC g/dL 32 9   RDW % 14 1   MPV fL 10 1   PLATELETS Thousands/uL 178     CMP:   Results from last 7 days  Lab Units 10/12/17  0446 10/11/17  0448   SODIUM mmol/L 137 135*   POTASSIUM mmol/L 4 2 3 8   CHLORIDE mmol/L 102 101   CO2 mmol/L 28 28   ANION GAP mmol/L 7 6   BUN mg/dL 39* 36*   CREATININE mg/dL 1 68* 1 90*   GLUCOSE RANDOM mg/dL 143* 143*   CALCIUM mg/dL 8 6 8 5   AST U/L  --  18   ALT U/L  --  16   ALK PHOS U/L  --  63   TOTAL PROTEIN g/dL  --  6 4   ALBUMIN g/dL  --  2 9*   BILIRUBIN TOTAL mg/dL  --  0 61   EGFR ml/min/1 73sq m 38 33     Troponin:   0  Lab Value Date/Time   TROPONINI <0 02 10/11/2017 0448   TROPONINI <0 02 10/11/2017 0206   TROPONINI 0 07 (H) 01/03/2017 0844   TROPONINI 0 09 (H) 01/03/2017 0539   TROPONINI <0 02 06/22/2016 1902     BNP:   Results from last 7 days  Lab Units 10/12/17  0446   SODIUM mmol/L 137   POTASSIUM mmol/L 4 2   CHLORIDE mmol/L 102   CO2 mmol/L 28   ANION GAP mmol/L 7   BUN mg/dL 39*   CREATININE mg/dL 1 68*   GLUCOSE RANDOM mg/dL 143*   CALCIUM mg/dL 8 6   EGFR ml/min/1 73sq m 38     Coags:     TSH:     Magnesium:   Results from last 7 days  Lab Units 10/11/17  0448   MAGNESIUM mg/dL 2 5     Lipid Profile:     Imaging: I have personally reviewed pertinent reports  EKG: NSR with 1st degree AV block   VTE Prophylaxis: Heparin    Code Status: Level 1 - Full Code  Advance Directive and Living Will:      Power of :    POLST:      Counseling / Coordination of Care  Total floor / unit time spent today 45 minutes  Greater than 50% of total time was spent with the patient and / or family counseling and / or coordination of care    A description of the counseling / coordination of care:

## 2017-10-12 NOTE — SOCIAL WORK
PT recommendation for rehab  CM spoke to patient, wife and daughter about making rehab choices, family undecided if they want inpt rehab  Daughter Marlon Buys states they may want Home PT, would like a hospital bed

## 2017-10-12 NOTE — PROGRESS NOTES
New onset redness on top of L foot- noticed 10/12 on 3-11p shift  Outlined area of redness with body marker  Rash not raised, not warm, not itchy, not painful  Will continue to monitor, sticky note to Physicians requesting evaluation of rash during daily rounds tomorrow morning

## 2017-10-12 NOTE — OCCUPATIONAL THERAPY NOTE
[address] [facility] Evaluation     Patient Name: Ozzie Ly  Today's Date: 10/12/2017  Problem List  Patient Active Problem List   Diagnosis    Paroxysmal atrial fibrillation (HCC)    [facility] on chronic diastolic [facility]    Essential hypertension    Acute respiratory failure with hypoxia (HCC)    Type 2 diabetes mellitus ([facility]    MADISON (dyspnea on exertion)    Cough    S/P aortic valve replacement with bioprosthetic valve    Acute encephalopathy    Nasal laceration, sequela    Coronary artery disease involving native coronary artery of native heart    Stage 3 chronic kidney disease     Past Medical History  Past Medical History:   Diagnosis Date    Cardiac disease     [facility]     Diabetes mellitus ([facility] )     Hypertension     Renal disorder      Past Surgical History  Past Surgical History:   Procedure Laterality Date    AORTIC VALVE REPLACEMENT      CARDIAC VALVE REPLACEMENT      CATARACT EXTRACTION, BILATERAL      COLONOSCOPY      REPLACEMENT TOTAL KNEE BILATERAL      VASCULAR SURGERY        10/12/17 1200   Note Type   Note type Eval/Treat   Restrictions/Precautions   Weight Bearing Precautions Per Order Yes   RUE Weight Bearing Per Order WBAT   LUE Weight Bearing Per Order NWB   RLE Weight Bearing Per Order WBAT   LLE Weight Bearing Per Order WBAT   Braces or Orthoses Sling   Other Precautions Chair Alarm; Fall Risk;Pain   Pain Assessment   Pain Assessment No/denies pain   Home Living   Type of [address] Two level;Stairs to enter with rails   Prior Function   Level of [place] Independent with ADLs and functional mobility; Needs assistance with IADLs   Lives With Spouse   Receives Help From Family   ADL Assistance Independent   IADLs Needs assistance   Falls in the last 6 months 1 to 4   Vocational Full time employment   Lifestyle   Autonomy I ADLS AND MOBILITY WITH RECENT USE OF SPC - S/P FALL ~[address] PTA WITH PROGRESSIVE DECLINE IN MOBILITY SINCE FALL RESULTING IN ADMISSION - WIFE MANAGES MAJORITY OF IADLS   Reciprocal Relationships SUPPORTIVE FAMILY   Service to Others WORKS FT AT [facility] [employer] [facility] PTA   Subjective   Subjective OFFERS NO C/O    ADL   Eating Assistance 7  Independent   Grooming Assistance 5  Supervision/Setup   UB Bathing Assistance 3  Moderate Assistance   LB Bathing Assistance 2  Maximal Assistance   UB Dressing Assistance 3  Moderate Assistance   LB Dressing Assistance 2  Maximal Assistance   Toileting Assistance  2  Maximal Assistance   Additional Comments INITIALLY REFUSED TO GET OOB TO TRY TO WALK TO BR 2* C/O PAIN IN LE'S - INSISTED UPON USE OF BEDPAN HOWEVER WHEN THERAPIST RETURNED AFTER PT COMPLETED USE OF BEDPAN AND PT DENIED HAVING ANY PAIN    Bed Mobility   Rolling R 3  Moderate assistance   Rolling L 3  Moderate assistance   Supine to Sit 3  Moderate assistance   Transfers   Sit to Stand 3  Moderate assistance   Additional items (FROM ELEVATED SURFACE)   Stand to Sit 3  Moderate assistance   Stand pivot 3  Moderate assistance   Additional items Assist x 2   Balance   Static Sitting Fair +   Dynamic Sitting Fair   Static Standing Fair -   Dynamic Standing Poor   Activity Tolerance   Activity Tolerance Patient limited by fatigue;Treatment limited secondary to medical complications (Comment)   RUE Assessment   RUE Assessment WFL   LUE Assessment   LUE Assessment (IN SLING - PT MOVING ARM W/O DIFFICULTY )   Cognition   Arousal/Participation Alert   Attention Attends with cues to redirect   Orientation Level Oriented X4   Memory Decreased recall of precautions;Decreased short term memory   Following Commands Follows one step commands without difficulty   Assessment   Limitation Decreased Safe judgement during ADL;Decreased UE ROM; Decreased endurance;Decreased self-care trans; Non-func L UE   Prognosis Good   Assessment Pt is a [de-identified] y o  male who was admitted to [facility] on 10/10/2017 with Acute encephalopathy, weakness, pain and difficulty ambulating s/p fall ~1wk PTA   Pt's problem list also includes PMH of DM, HTN, previous surgery and cardiac disease, CHF, renal disorder, AVR, B TKR, vascular sx  At baseline pt was completing adls independently and ambulated w/ recent use of spc  Pt lives with spouse  Currently pt requires mod to max assist  for overall ADLS and mod a x 1-2  for functional mobility/transfers  Pt currently presents with impairments in the following categories -steps to enter environment, difficulty performing ADLS and difficulty performing IADLS  activity tolerance, endurance, standing balance/tolerance, memory and insight  These impairments, as well as pt's fatigue, pain, WBS  and risk for falls  limit pt's ability to safely engage in all baseline areas of occupation, includinggrooming, bathing, dressing, toileting, functional mobility/transfers, community mobility, work/volunteer work , social participation  and leisure activities  From OT standpoint, recommend inpt rehab upon D/C  OT will continue to follow to address the below stated goals  Goals   Patient Goals go home   LTG Time Frame 10-14   Long Term Goal #1 refer to established goals below   Plan   Treatment Interventions ADL retraining;Functional transfer training; Endurance training;Cognitive reorientation;Patient/family training;Equipment evaluation/education; Compensatory technique education; Activityengagement   Goal Expiration Date 10/26/17   OT Frequency 3-5x/wk   Recommendation   OT Discharge Recommendation Short Term Rehab   Barthel Index   Feeding 10   Bathing 0   Grooming Score 0   Dressing Score 5   Bladder Score 10   Bowels Score 10   Toilet Use Score 5   Transfers (Bed/Chair) Score 5   Mobility (Level Surface) Score 0   Stairs Score 0   Barthel Index Score 45       OCCUPATIONAL THERAPY GOALS:    *MOD I ADLS AFTER SETUP WITH USE OF ADAPTIVE DEVICES AND 1 HANDED TECHNIQUES  *MOD I TOILETING AND CLOTHING MANAGEMENT   *MOD I FUNCTIONAL MOB AND TRANSFERS TO ALL SURFACES WITH FAIR+ TO GOOD BALANCE/SAFETY FOR PARTICIPATION IN DYNAMIC ADLS   *DEMONSTRATE GOOD CARRYOVER WITH SAFE USE OF AD, NWB LUE AND USE OF 1 HANDED TECHNIQUES  DURING FUNCTIONAL TASKS  *ASSESS DME NEEDS  *INCREASE ACTIVITY TOLERANCE TO 40-45 MIN FOR PARTICIPATION IN ADLS AND ENJOYABLE ACTIVITIES   *PT TO PARTICIPATE IN ONGOING FUNCTIONAL COGNITIVE ASSESSMENT WITH GOOD ATTENTION/PARTICIPATION TO ASSIST WITH SAFE D/C RECOMMENDATIONS     Antoinette Tovar, OT

## 2017-10-12 NOTE — PLAN OF CARE
Problem: SAFETY ADULT  Goal: Patient will remain free of falls  INTERVENTIONS:  - Assess patient frequently for physical needs  -  Identify cognitive and physical deficits and behaviors that affect risk of falls    -  [place] fall precautions as indicated by assessment   - Educate patient/family on patient safety including physical limitations  - Instruct patient to call for assistance with activity based on assessment  - Modify environment to reduce risk of injury  - Consider OT/PT consult to assist with strengthening/mobility   Outcome: Progressing    Goal: Maintain or return to baseline ADL function  INTERVENTIONS:  -  Assess patient's ability to carry out ADLs; assess patient's baseline for ADL function and identify physical deficits which impact ability to perform ADLs (bathing, care of mouth/teeth, toileting, grooming, dressing, etc )  - Assess/evaluate cause of self-care deficits   - Assess range of motion  - Assess patient's mobility; develop plan if impaired  - Assess patient's need for assistive devices and provide as appropriate  - Encourage maximum independence but intervene and supervise when necessary  ¯ Involve family in performance of ADLs  ¯ Assess for home care needs following discharge   ¯ Request OT consult to assist with ADL evaluation and planning for discharge  ¯ Provide patient education as appropriate   Outcome: Progressing      Problem: DISCHARGE PLANNING  Goal: Discharge to home or other facility with appropriate resources  INTERVENTIONS:  - Identify barriers to discharge w/patient and caregiver  - Arrange for needed discharge resources and transportation as appropriate  - Identify discharge learning needs (meds, wound care, etc )  - Arrange for interpretive services to assist at discharge as needed  - Refer to Case Management Department for coordinating discharge planning if the patient needs post-hospital services based on physician/advanced practitioner order or complex needs related to functional status, cognitive ability, or social support system   Outcome: Progressing      Problem: METABOLIC, FLUID AND ELECTROLYTES - ADULT  Goal: Fluid balance maintained  INTERVENTIONS:  - Monitor labs and assess for signs and symptoms of volume excess or deficit  - Monitor I/O and WT  - Instruct patient on fluid and nutrition as appropriate   Outcome: Progressing      Problem: Prexisting or High Potential for Compromised Skin Integrity  Goal: Skin integrity is maintained or improved  INTERVENTIONS:  - Identify patients at risk for skin breakdown  - Assess and monitor skin integrity  - Assess and monitor nutrition and hydration status  - Monitor labs (i e  albumin)  - Assess for incontinence   - Turn and reposition patient  - Assist with mobility/ambulation  - Relieve pressure over bony prominences  - Avoid friction and shearing  - Provide appropriate hygiene as needed including keeping skin clean and dry  - Evaluate need for skin moisturizer/barrier cream  - Collaborate with interdisciplinary team (i e  Nutrition, Rehabilitation, etc )   - Patient/family teaching   Outcome: Progressing

## 2017-10-12 NOTE — PHYSICAL THERAPY NOTE
Physical Therapy Evaluation    Patient's Name:Mat Daniel  Today's Date:10/12/17    Patient Active Problem List   Diagnosis    Paroxysmal atrial fibrillation (HCC)    Acute on chronic diastolic congestive heart failure (HCC)    Acute renal failure superimposed on stage 3 chronic kidney disease ([facility] [address] [facility]    Essential hypertension    Acute respiratory failure with hypoxia (HCC)    Type 2 diabetes mellitus (HCC)    MADISON (dyspnea on exertion)    Cough    S/P aortic valve replacement with bioprosthetic valve    Acute encephalopathy    Nasal laceration, sequela    Coronary artery disease involving native coronary artery of native heart       Past Medical History:   Diagnosis Date    Cardiac disease     CHF (congestive heart failure) ([facility] [address] [facility]     Diabetes mellitus ([facility] [address] [facility]     Hypertension     Renal disorder        Past Surgical History:   Procedure Laterality Date    AORTIC VALVE REPLACEMENT      CARDIAC VALVE REPLACEMENT      CATARACT EXTRACTION, BILATERAL      COLONOSCOPY      REPLACEMENT TOTAL KNEE BILATERAL      VASCULAR SURGERY          10/12/17 1145   Note Type   Note type Eval only   Pain Assessment   Pain Assessment No/denies pain   Pain Score No Pain   Home Living   Type of Home House   Home Layout Two level;Stairs to enter with rails   Home Equipment Cane   Additional Comments Pt lives in 2 story home with wife and has 3 NUNO  Prior Function   Level of [place] Independent with ADLs and functional mobility   Lives With Spouse   Receives Help From Family   ADL Assistance Independent   IADLs Independent   Falls in the last 6 months 1 to 4   Vocational Full time employment  ([address] [place] [address])   Restrictions/Precautions   Christ Art Bearing Precautions Per Order Yes   LUE Wells Silvana Bearing Per Order [employer]   Other Precautions Chair Alarm; Bed Alarm;Telemetry; Fall Risk   Cognition   Overall Cognitive Status WFL   Arousal/Participation Alert   Orientation Level Oriented X4   Following Commands Follows one step commands without difficulty   RLE Assessment   RLE Assessment X  (Pt overall strength 3+/5 in RLE assessed with ambulation )   LLE Assessment   LLE Assessment X  (Pt overall strength 3+/5 in LLE assessed with ambulation )   Coordination   Movements are Fluid and Coordinated 0   Sensation WFL   Bed Mobility   Rolling R 3  Moderate assistance   Additional items Assist x 1   Rolling L 3  Moderate assistance   Additional items Assist x 1   Supine to Sit 3  Moderate assistance   Additional items Assist x 2   Additional Comments Pt was left sitting up in chair at end of session  Transfers   Sit to Stand 3  Moderate assistance   Additional items Assist x 2; Increased time required;Verbal cues   Stand to Sit 3  Moderate assistance   Additional items Assist x 2; Increased time required;Verbal cues   Ambulation/Elevation   Gait pattern Forward Flexion;Decreased foot clearance;Shuffling; Short stride; Excessively slow   Gait Assistance 3  Moderate assist   Additional items Assist x 2;Verbal cues   Assistive Device Straight cane   Distance 5'   Balance   Static Sitting Fair +   Dynamic Sitting Fair   Static Standing Fair -   Dynamic Standing Poor +   Ambulatory Poor +   Endurance Deficit   Endurance Deficit Yes   Activity Tolerance   Activity Tolerance Patient limited by fatigue;Treatment limited secondary to medical complications (Comment)   Assessment   Prognosis Fair   Problem List Decreased strength;Decreased endurance; Impaired balance;Decreased mobility; Decreased safety awareness   Assessment Pt is an [de-identified] y/o male who presented with weakness, altered mental status, BLE edema, and near-syncope episode  Pt was admitted 10/8 s/p fall and nose laceration and L arm pain  X-ray showed minimally displaced greater tuberosity avulsion fracture and is NWB on LUE  Pt has a history of HTN, CHF, a-fib, CAD, type 2 DM, and stage 3 CKD  PTA, pt was (I) with ADLs and was ambulating with a SPC   During IE, pt required increased encouragement to participate in therapy session  Pt was able to perform bed mobility with mod Ax1 and performed transfers with mod Ax2  Pt required VCing for proper hand placement during transfer  Pt was able to ambulate 5' from bed to chair with mod Ax2 and SPC  Pt required VCing for use of cane and had flexed posture when ambulating  Pt required frequent reminders about NWB status on LUE  Pt reported feeling fatigued at end of session  Pt would benefit from continued inpatient physical therapy to improve strength, endurance, and balance  Recommend rehab at d/c  Goals   Patient Goals To get better   [facility] Expiration Date 10/22/17   Short Term Goal #1 Pt will be able to ambulate 76' with SPC and min A; pt will perform bed mobility with S; pt will perform tranfers with min A; pt will improve standing balance to fair; pt will require cues less than 25% of the time to remind him of NWB status of LUE   Plan   Treatment/Interventions Functional transfer training;LE strengthening/ROM; Therapeutic exercise; Endurance training;Bed mobility;Gait training;Elevations   PT Frequency 5x/wk   Recommendation   Recommendation Short-term skilled PT   PT - OK to Discharge (when medically stable to rehab)   Modified Tracy Scale   Modified [place] Scale 4   Barthel Index   Feeding 5   Bathing 0   Grooming Score 5   Dressing Score 5   Bladder Score 10   Bowels Score 10   Toilet Use Score 5   Transfers (Bed/Chair) Score 5   Mobility (Level Surface) Score 0   Stairs Score 0   Barthel Index Score 45   Claritza Rahman, SPT

## 2017-10-12 NOTE — ASSESSMENT & PLAN NOTE
· Continue home medications BB, ASA, and Statin   · Continue to hold Eliquis   Discontinued by outpatient cardiologist

## 2017-10-12 NOTE — PLAN OF CARE
Problem: PHYSICAL THERAPY ADULT  Goal: Performs mobility at highest level of function for planned discharge setting  See evaluation for individualized goals  Treatment/Interventions: Functional transfer training, LE strengthening/ROM, Therapeutic exercise, Endurance training, Bed mobility, Gait training, Elevations          See flowsheet documentation for full assessment, interventions and recommendations  Prognosis: Fair  Problem List: Decreased strength, Decreased endurance, Impaired balance, Decreased mobility, Decreased safety awareness  Assessment: Pt is an [de-identified] y/o male who presented with weakness, altered mental status, BLE edema, and near-syncope episode  Pt was admitted 10/8 s/p fall and nose laceration and L arm pain  X-ray showed minimally displaced greater tuberosity avulsion fracture and is NWB on LUE  Pt has a history of HTN, CHF, a-fib, CAD, type 2 DM, and stage 3 CKD  PTA, pt was (I) with ADLs and was ambulating with a SPC  During IE, pt required increased encouragement to participate in therapy session  Pt was able to perform bed mobility with mod Ax1 and performed transfers with mod Ax2  Pt required VCing for proper hand placement during transfer  Pt was able to ambulate 5' from bed to chair with mod Ax2 and SPC  Pt required VCing for use of cane and had flexed posture when ambulating  Pt required frequent reminders about NWB status on LUE  Pt reported feeling fatigued at end of session  Pt would benefit from continued inpatient physical therapy to improve strength, endurance, and balance  Recommend rehab at d/c  Recommendation: Short-term skilled PT     PT - OK to Discharge:  (when medically stable to rehab)    See flowsheet documentation for full assessment

## 2017-10-12 NOTE — SOCIAL WORK
Pt new admit to the floor  CM met with patient and explained cm role  Pt alert and oriented  Pt reports he lives in a 2 story home w/wife Andrei Blind [phone] w/2 lawrence  Pt reports being independent PTA, reports good support at home, he drives and has transport for d/c  Pt reports DME: cane, and Rw, previous rehab w/SL, and previous VNA w/SL  Pts pharmacy is [facility]  POA is wife Ashwini Whittaker (daughter) [phone]  Pt denies hx/admission for drugs/etoh and psych/mental health  CM reviewed d/c planning process including the following: identifying help at home, patient preference for d/c planning needs, Discharge Lounge, [facility] Meds to Bed program, availability of treatment team to discuss questions or concerns patient and/or family may have regarding understanding medications and recognizing signs and symptoms once discharged  CM also encouraged patient to follow up with all recommended appointments after discharge  Patient advised of importance for patient and family to participate in managing patients medical well being

## 2017-10-13 PROBLEM — L03.119 CELLULITIS AND ABSCESS OF FOOT: Status: ACTIVE | Noted: 2017-01-01

## 2017-10-13 PROBLEM — L03.90 CELLULITIS: Status: ACTIVE | Noted: 2017-01-01

## 2017-10-13 PROBLEM — L02.619 CELLULITIS AND ABSCESS OF FOOT: Status: ACTIVE | Noted: 2017-01-01

## 2017-10-13 NOTE — SOCIAL WORK
Pts wife Jyoti Wakefield signed and given a copy of the IMM to appeal d/c  CM informed pts wife of patient's denial to [facility], Nurse Manager Palak present during discussion

## 2017-10-13 NOTE — PROGRESS NOTES
10/13/17 1200   Clinical Encounter Type   Visited With Patient   Amish Encounters   Amish Needs Prayer   Patient Spiritual Encounters   Spiritual Assessment 5   Suffering Severity 5   Fear Level 5   Spiritual Encounter Notes Provided prayer PT and spirtual support     Azul Rouse

## 2017-10-13 NOTE — PLAN OF CARE
Problem: PHYSICAL THERAPY ADULT  Goal: Performs mobility at highest level of function for planned discharge setting  See evaluation for individualized goals  Treatment/Interventions: Functional transfer training, LE strengthening/ROM, Therapeutic exercise, Endurance training, Bed mobility, Gait training, Elevations          See flowsheet documentation for full assessment, interventions and recommendations  Outcome: Progressing  Prognosis: Fair  Problem List: Decreased strength, Decreased endurance, Impaired balance, Decreased safety awareness, Pain, Decreased coordination, Decreased mobility  Assessment: Introduced pt to one handed walker 2* to instability with SPC on evaluation  Pt demonstrated improved ability to complete bed mobility this session with decreased A  Continues to require constant instruction during transfers to avoid use LUE with NWB precautions  Verbal and visual instruction for one handed walker use  Demonstrated improved stability with one handed walker with decreased need for A for balance during ambulation  1 mild LOB which pt was able to self correct with one handed walker  Pt tolerated LE exercises with minimal A  During 5x2 sit<>stand for LE strengthening and balance required increased time and A to ensure balance  Second ambulation with decreased distance 2* to fatigue and decreased activity tolerance  Continues to be high fall risk with decreased knowledge of current limitations and safety awareness  High risk for non compliance with LUE NWB as pt requires continued instruction with poor carryover  Pt will continue to benefit from inpt skilled PT and rehab to maximize functional mobility and decrease risk of falls  Pt and wife offered no additional questions at this time  Barriers to Discharge: Decreased caregiver support  Barriers to Discharge Comments: Pt wife reported on evaluation 10/12 concerns with A pt at home 2* to balance deficits   Reported daughter returned to work and she was worried about caring for pt at home  Recommendation: Post acute IP rehab     PT - OK to Discharge:  (To rehab when medically stable )    See flowsheet documentation for full assessment

## 2017-10-13 NOTE — PROCEDURES
Patient had sutures placed at outside facility on 10/7/17  Sutures were removed today using sterile technique and sterile equipment  Three sutures removed from right eyebrow and four sutures removed from bridge of nose  Patient tolerated procedure well without complication  Lacerations were well-healed and showed no signs of infection

## 2017-10-13 NOTE — PROGRESS NOTES
I was called to re-evaluate the patient's left foot again  The patient was evaluated by podiatrist prior to admission  He was told that he had 3 small fractures in his foot, although I have no imaging to review  He was told he did not need to wear boot and that he could walk on his foot  He was on Eliquis at the time he hurt his foot  The patient's daughter is concerned because the dorsum of his left foot appears red  I examined the patient's foot earlier today and the redness looks the same  In fact, it looks more purple and I suspect delayed bruising  There was a line drawn around the redness last evening and the redness is slightly outside the border  The redness was slightly outside the border this morning when I examined his foot  His foot is tender to palpation of metatarsals  The patient was already started on Keflex this morning, although again I do not suspect infection  He has no white blood cell count or fever  The daughter states she feels as though his foot looks like it has a MRSA infection  She also states his foot was not tender prior to admission  I offered to x-ray his foot, but the patient and family declined  The daughter also demands an Infectious Disease specialist see the patient  I have counseled out infectious Disease consultation is not indicated at this time  SLIM attending to evaluate patient

## 2017-10-13 NOTE — PROGRESS NOTES
Progress Note - Walker Pretty  [de-identified] y o  male MRN: [id number]    Unit/Bed#: -01 Encounter: [id number]    Dispo: Patient medically stable for discharge however has appealed discharge by Medicare  Please see below  * Acute encephalopathy   Assessment & Plan    · Patient was admitted due to AMS following administration of Tramadol around 10 pm  He was also found to be hypoglycemic on admission (BS 52)  Patient was found difficult to arouse by his wife  · Resolved  · Likely toxic metabolic due to Tramadol and hypoglycemia  Acute on chronic diastolic congestive heart failure (HCC)   Assessment & Plan    · Back on home dose of Torsemide  · Resume home dose at discharge  Cellulitis   Assessment & Plan    · LLE cellulitis  · PO Keflex x 5 days        Closed fracture of greater tuberosity of left humerus   Assessment & Plan    · Non weight bearing left UE  · F/U Ortho in 2 weeks        Stage 3 chronic kidney disease   Assessment & Plan    · Reviewed labs from previous admissions and current admission  Creatinine fluctuating at patient's baseline  Coronary artery disease involving native coronary artery of native heart   Assessment & Plan    · Continue ASA, statin, BB        Nasal laceration, sequela   Assessment & Plan    ·  Sutures placed on 10/8/17  Sutures removed today  S/P aortic valve replacement with bioprosthetic valve   Assessment & Plan    · Continue home medications ASA 81 mg QD and continue to hold Eliquis  Outpatient cardiologist discontinued it  Type 2 diabetes mellitus ([address] )   Assessment & Plan    · Patient hypoglycemic on admission with blood glucose of 52  · Patient's blood sugars have stabilized  · Restarted Novolog 70/30 at lower dose, 20U BID yesterday  Blood sugars stable  Will likely discharge on this     · Continue sliding scale  · Adjust as needed        Essential hypertension   Assessment & Plan    ·  BP well controlled while in the hospital, last reading (135/63)  · Continue home meds        Paroxysmal atrial fibrillation (HCC)   Assessment & Plan    · Continue home medications BB, ASA, and Statin   · Continue to hold Eliquis  Discontinued by outpatient cardiologist              VTE Pharmacologic Prophylaxis:   Pharmacologic: Heparin  Mechanical VTE Prophylaxis in Place: Yes    Patient Centered Rounds: I have performed bedside rounds with nursing staff today  Discussions with Specialists or Other Care Team Provider: Cardiology    Education and Discussions with Family / Patient: Patient  Time Spent for Care: 45 minutes  More than 50% of total time spent on counseling and coordination of care as described above  Current Length of Stay: 2 day(s)    Current Patient Status: Inpatient   Certification Statement: The patient will continue to require additional inpatient hospital stay due to patient medically stable but appealing discharge with Medicare  Discharge Plan: Patient is medically stable for discharge  He has been resumed on his home dose of diuretic  Rehab was recommended, however patient and family refusing rehab  Home PT was offered  However, patient's wife does not feel safe taking him home, however she refuses him to be placed in a rehab  They have appealed discharge with Medicare  Code Status: Level 1 - Full Code      Subjective:   Patient feeling well today  No SOB  No chest pain  Some redness of left foot  Sutures were removed today by myself and PA student  Objective:     Vitals:   Temp (24hrs), Av 7 °F (37 1 °C), Min:97 4 °F (36 3 °C), Max:99 3 °F (37 4 °C)    HR:  [74-86] 78  Resp:  [16-18] 18  BP: (118-150)/(56-71) 120/57  SpO2:  [90 %-97 %] 96 %  Body mass index is 35 46 kg/m²  Input and Output Summary (last 24 hours):        Intake/Output Summary (Last 24 hours) at 10/13/17 1451  Last data filed at 10/13/17 1435   Gross per 24 hour   Intake              640 ml   Output             1300 ml   Net             -660 ml       Physical Exam:     Physical Exam   Constitutional: He is oriented to person, place, and time  No distress  HENT:   Laceration on nose and above right eye well-healed  No signs of infection  Sutures removed without difficulty  Eyes: No scleral icterus  Neck: Normal range of motion  Neck supple  Cardiovascular: Normal rate, regular rhythm and normal heart sounds  Pulmonary/Chest: Effort normal and breath sounds normal  No respiratory distress  He has no wheezes  He has no rales  Abdominal: Soft  Bowel sounds are normal  He exhibits no distension  There is no tenderness  Musculoskeletal: He exhibits edema  Redness, warmth dorsum left foot   Neurological: He is alert and oriented to person, place, and time  Skin: Skin is warm and dry  He is not diaphoretic  Psychiatric: He has a normal mood and affect  His behavior is normal        Additional Data:     Labs:      Results from last 7 days  Lab Units 10/12/17  0446   WBC Thousand/uL 7 57   HEMOGLOBIN g/dL 11 1*   HEMATOCRIT % 33 7*   PLATELETS Thousands/uL 178   NEUTROS PCT % 59   LYMPHS PCT % 26   MONOS PCT % 10   EOS PCT % 5       Results from last 7 days  Lab Units 10/13/17  0505  10/11/17  0448   SODIUM mmol/L 137  < > 135*   POTASSIUM mmol/L 4 4  < > 3 8   CHLORIDE mmol/L 102  < > 101   CO2 mmol/L 28  < > 28   BUN mg/dL 41*  < > 36*   CREATININE mg/dL 1 84*  < > 1 90*   CALCIUM mg/dL 8 8  < > 8 5   TOTAL PROTEIN g/dL  --   --  6 4   BILIRUBIN TOTAL mg/dL  --   --  0 61   ALK PHOS U/L  --   --  63   ALT U/L  --   --  16   AST U/L  --   --  18   GLUCOSE RANDOM mg/dL 122  < > 143*   < > = values in this interval not displayed  * I Have Reviewed All Lab Data Listed Above  * Additional Pertinent Lab Tests Reviewed:  All Labs Within Last 24 Hours Reviewed    Imaging:    Imaging Reports Reviewed Today Include: none  Imaging Personally Reviewed by Myself Includes:  none    Recent Cultures (last 7 days):           Last 24 Hours Medication List:     acetaminophen 975 mg Oral Q8H [address]   amLODIPine 2 5 mg Oral Daily   aspirin 81 mg Oral Every Other Day   calcium carbonate-vitamin D 1 tablet Oral Daily   cephalexin 500 mg Oral Q12H [address]   diclofenac sodium 2 g Topical 4x Daily   febuxostat 40 mg Oral Daily   heparin (porcine) 5,000 Units Subcutaneous Q8H [address]   insulin aspart protamine-insulin aspart 20 Units Subcutaneous BID AC   insulin lispro 1-6 Units Subcutaneous TID With Meals   insulin lispro 1-6 Units Subcutaneous HS   lidocaine 1 patch Transdermal Q24H   metoprolol tartrate 75 mg Oral Q12H [address]   multivitamin-minerals 1 tablet Oral Daily   pantoprazole 40 mg Oral Early Morning   paricalcitol 1 mcg Oral Once per day on Mon Wed Fri   pravastatin 20 mg Oral Daily With Dinner   ramipril 2 5 mg Oral Daily   senna-docusate sodium 1 tablet Oral BID   [START ON 10/14/2017] torsemide 20 mg Oral Every Other Day   torsemide 40 mg Oral Every Other Day        Today, Patient Was Seen By: Rd Valencia PA-C    ** Please Note: Dragon 360 Dictation voice to text software may have been used in the creation of this document   **

## 2017-10-13 NOTE — SOCIAL WORK
Pt medically cleared for d/c  Pt is refusing inpt rehab, and wants Home PT  Sent referral to  [facility] for PT via [facility], pt in agreement

## 2017-10-13 NOTE — PLAN OF CARE
Problem: OCCUPATIONAL THERAPY ADULT  Goal: Performs self-care activities at highest level of function for planned discharge setting  See evaluation for individualized goals  Treatment Interventions: ADL retraining, Functional transfer training, Endurance training, Cognitive reorientation, Patient/family training, Equipment evaluation/education, Compensatory technique education, Activityengagement          See flowsheet documentation for full assessment, interventions and recommendations  Outcome: Progressing  Limitation: Decreased Safe judgement during ADL, Decreased UE ROM, Decreased endurance, Decreased self-care trans, Non-func L UE  Prognosis: Good  Assessment: Pt participated in occupational therapy with focus on activity tolerance, UB and LB self-care and functional transfers/standing tolerance for pt engagement in self-care tasks    Pt wife present and supportive throughout session  Pt wife willing to assist pt with LB dressing tasks however pt continues to require min A for functional transfers while adhering to NWB precautions on pt L UE  Pt denied pain to L UE  Monica Cutting Pt would benefit from in-pt rehab to continue to address pt deficits with decreased strength, coordination and balance which currently impair pt ADL and functional mob        OT Discharge Recommendation: Short Term Rehab

## 2017-10-13 NOTE — PHYSICIAN ADVISOR
Current patient class: Inpatient  The patient is currently on Hospital Day: 3      The patient was admitted to the hospital at [address] [place] [address] on 10/11/17 for the following diagnosis:  Syncope [R55]  Injury [T14 90XA]  Weakness [R53 1]  CKD (chronic kidney disease), stage III [N18 3]  History of CHF (congestive heart failure) [Z86 79]  Acute encephalopathy [G93 40]  S/P aortic valve replacement with bioprosthetic valve [Z95 3]  Left upper arm injury, sequela [S49 92XS]  Nasal laceration, sequela [E35 71PF]  Chronic diastolic CHF (congestive heart failure) ([facility]) [I50 32]  Acute renal failure superimposed on stage 3 chronic kidney disease ([address] ) [N17 9, N18 3]       There is documentation in the medical record of an expected length of stay of at least 2 midnights  The patient is therefore expected to satisfy the 2 midnight benchmark and given the 2 midnight presumption is appropriate for INPATIENT ADMISSION  Given this expectation of a satisfying stay, CMS instructs us that the patient is most often appropriate for inpatient admission under part A provided medical necessity is documented in the chart  After review of the relevant documentation, labs, vital signs and test results, the patient is appropriate for INPATIENT ADMISSION  Admission to the hospital as an inpatient is a complex decision making process which requires the practitioner to consider the patients presenting complaint, history and physical examination and all relevant testing  With this in mind, in this case, the patient was deemed appropriate for INPATIENT ADMISSION  After review of the documentation and testing available at the time of the admission I concur with this clinical determination of medical necessity  Rationale is as follows: The patient is a [de-identified] yrs old Male who presented to the ED at 10/10/2017 11:42 PM with a chief complaint of Weakness - Generalized (pt fell and was seen in hospital on saturday   Pt was put on tramadol and tonight was slow to respond to questions and weak)     This 17-year-old male was admitted to the hospital for acute encephalopathy, which have resolved  The patient was found to have hypoglycemia  Patient visually was found to have acute on chronic CHF, and is currently receiving IV Lasix b i d  Godwin Balint At present time the patient is expected to remain hospitalized for at least 2 midnights for continued management, evaluation, of altered mental status, CHF, and closed fracture of the left humerus  Given this, the patient is appropriate for inpatient admission  Patient does have recent fall, requiring close continuous neurological monitoring, acute fracture, acute CHF, and hypoglycemia  Given satisfaction of the benchmark the patient is appropriate for inpatient admission      The patients vitals on arrival were ED Triage Vitals   Temperature Pulse Respirations Blood Pressure SpO2   10/10/17 2327 10/10/17 2327 10/10/17 2327 10/10/17 2327 10/10/17 2327   (!) 97 °F (36 1 °C) 84 18 113/58 95 %      Temp Source Heart Rate Source Patient Position - Orthostatic VS BP Location FiO2 (%)   10/10/17 2327 10/10/17 2327 10/10/17 2327 10/10/17 2327 --   Tympanic Monitor Sitting Left arm       Pain Score       10/11/17 0300       No Pain           Past Medical History:   Diagnosis Date    Cardiac disease     [facility]     Diabetes mellitus [facility]     Hypertension     Renal disorder      Past Surgical History:   Procedure Laterality Date    AORTIC VALVE REPLACEMENT      CARDIAC VALVE REPLACEMENT      CATARACT EXTRACTION, BILATERAL      COLONOSCOPY      REPLACEMENT TOTAL KNEE BILATERAL      VASCULAR SURGERY             Consults have been placed to:   IP CONSULT TO ORTHOPEDIC SURGERY  IP CONSULT TO HEART FAILURE SERVICE    Vitals:    10/12/17 0700 10/12/17 1031 10/12/17 1525 10/12/17 1901   BP: 146/65 135/63 149/66 118/56   Pulse: 70 87 82 74   Resp: 18 16 16 16   Temp: 97 5 °F (36 4 °C) 98 9 °F (37 2 °C) [employer] ) 97 4 °F (36 3 °C) 99 3 °F (37 4 °C)   TempSrc: Oral Oral Oral Oral   SpO2: 93% 95% 95% 97%   Weight:       Height:           Most recent labs:    Recent Labs      10/11/17   0448  10/12/17   0446   WBC  7 77  7 57   HGB  10 9*  11 1*   HCT  33 5*  33 7*   PLT  162  178   K  3 8  4 2   NA  135*  137   CALCIUM  8 5  8 6   BUN  36*  39*   CREATININE  1 90*  1 68*   TROPONINI  <0 02   --    AST  18   --    ALT  16   --    ALKPHOS  63   --    BILITOT  0 61   --        Scheduled Meds:  acetaminophen 975 mg Oral Q8H KENDRICK   amLODIPine 2 5 mg Oral Daily   aspirin 81 mg Oral Every Other Day   calcium carbonate-vitamin D 1 tablet Oral Daily   diclofenac sodium 2 g Topical 4x Daily   febuxostat 40 mg Oral Daily   heparin (porcine) 5,000 Units Subcutaneous Q8H [address]   insulin aspart protamine-insulin aspart 20 Units Subcutaneous BID AC   insulin lispro 1-6 Units Subcutaneous TID With Meals   insulin lispro 1-6 Units Subcutaneous HS   lidocaine 1 patch Transdermal Q24H   metoprolol tartrate 75 mg Oral Q12H [address]   multivitamin-minerals 1 tablet Oral Daily   pantoprazole 40 mg Oral Early Morning   [START ON 10/13/2017] paricalcitol 1 mcg Oral Once per day on Mon Wed Fri   pravastatin 20 mg Oral Daily With Dinner   ramipril 2 5 mg Oral Daily   senna-docusate sodium 1 tablet Oral BID   [START ON 10/13/2017] torsemide 40 mg Oral Every Other Day     Continuous Infusions:   PRN Meds: aluminum-magnesium hydroxide-simethicone    methocarbamol    nitroglycerin    ondansetron    oxyCODONE    Surgical procedures (if appropriate):

## 2017-10-13 NOTE — OCCUPATIONAL THERAPY NOTE
Occupational Therapy Treatment Note       10/13/17 1537   Restrictions/Precautions   Weight Bearing Precautions Per Order Yes   RUE Weight Bearing Per Order WBAT   LUE Weight Bearing Per Order NWB   RLE Weight Bearing Per Order WBAT   LLE Weight Bearing Per Order WBAT   Braces or Orthoses Sling   Other Precautions Fall Risk;Pain;Cognitive   Lifestyle   Autonomy I ADLS AND MOBILITY WITH RECENT USE OF SPC - S/P FALL ~1 WK PTA WITH PROGRESSIVE DECLINE IN MOBILITY SINCE FALL RESULTING IN ADMISSION - WIFE MANAGES MAJORITY OF IADLS   Reciprocal Relationships SUPPORTIVE FAMILY   Service to Others WORKS FT AT  [employer]   Intrinsic Gratification ACTIVE PTA   Pain Assessment   Pain Assessment No/denies pain   Pain Score No Pain   ADL   Where Assessed Chair   Grooming Assistance 5  Supervision/Setup   UB Bathing Assistance 5  Supervision/Setup   LB Bathing Assistance 3  Moderate Assistance   LB Bathing Deficit Right lower leg including foot; Left lower leg including foot; Buttocks   UB Dressing Assistance 3  Moderate Assistance   UB Dressing Deficit Pull around back; Thread RUE; Thread LUE   LB Dressing Assistance 3  Moderate Assistance   LB Dressing Deficit Thread RLE into pants; Thread LLE into pants   Cognition   Overall Cognitive Status WFL   Arousal/Participation Alert   Attention Attends with cues to redirect   Orientation Level Oriented X4   Memory Decreased recall of precautions;Decreased short term memory   Following Commands Follows one step commands without difficulty   Assessment   Assessment Pt participated in occupational therapy with focus on activity tolerance, UB and LB self-care and functional transfers/standing tolerance for pt engagement in self-care tasks    Pt wife present and supportive throughout session  Pt wife willing to assist pt with LB dressing tasks however pt continues to require min A for functional transfers while adhering to NWB precautions on pt L UE  Pt denied pain to L UE  Lynita Ped  Pt would benefit from in-pt rehab to continue to address pt deficits with decreased strength, coordination and balance which currently impair pt ADL and functional mob  Plan   Treatment Interventions ADL retraining; Activityengagement; Functional transfer training; Endurance training;Patient/family training   Goal Expiration Date 10/26/17   Treatment Day 1   OT Frequency 3-5x/wk   Recommendation   OT Discharge Recommendation Short Term Rehab   Barthel Index   Grooming Score 0   Dressing Score 5   Toilet Use Score 5   Transfers (Bed/Chair) Score 5   Mobility (Level Surface) Score 0   Modified [place] Scale   Modified [place] Scale 4     [place] Sides  SHAH/L

## 2017-10-13 NOTE — PHYSICAL THERAPY NOTE
Physical Therapy Note    Patient's Name: Neisha Maldonado      Admitting Diagnosis  Syncope [R55]  Injury [T14 90XA]  Weakness [R53 1]  CKD (chronic kidney disease), stage III [N18 3]  History of CHF (congestive heart failure) [Z86 79]  Acute encephalopathy [G93 40]  S/P aortic valve replacement with bioprosthetic valve [Z95 3]  Left upper arm injury, sequela [S49 92XS]  Nasal laceration, sequela [A11 41TA]  Chronic diastolic CHF (congestive heart failure) (HCC) [I50 32]  Acute renal failure superimposed on stage 3 chronic kidney disease (HCC) [N17 9, N18 3]    Problem List  Patient Active Problem List   Diagnosis    Paroxysmal atrial fibrillation (HCC)    Acute on chronic diastolic congestive heart failure (HCC)    Essential hypertension    Acute respiratory failure with hypoxia (HCC)    Type 2 diabetes mellitus (HCC)    MADISON (dyspnea on exertion)    Cough    S/P aortic valve replacement with bioprosthetic valve    Acute encephalopathy    Nasal laceration, sequela    Coronary artery disease involving native coronary artery of native heart    Stage 3 chronic kidney disease    Closed fracture of greater tuberosity of left humerus    Cellulitis       Past Medical History  Past Medical History:   Diagnosis Date    Cardiac disease     CHF (congestive heart failure) ([address] )     Diabetes mellitus ([address] )     Hypertension     Renal disorder        Past Surgical History  Past Surgical History:   Procedure Laterality Date    AORTIC VALVE REPLACEMENT      CARDIAC VALVE REPLACEMENT      CATARACT EXTRACTION, BILATERAL      COLONOSCOPY      REPLACEMENT TOTAL KNEE BILATERAL      VASCULAR SURGERY          10/13/17 1500   Pain Assessment   Pain Assessment No/denies pain   Pain Score No Pain  (pain in foot when fixing sock)   Pain Location Leg   Pain Orientation Right   Restrictions/Precautions   Weight Bearing Precautions Per Order Yes   LUE Weight Bearing Per Order NWB   Braces or Orthoses Sling   Other Precautions Chair Alarm; Bed Alarm; Impulsive; Fall Risk;Pain;Multiple lines;WBS;Cognitive   General   Chart Reviewed Yes   Additional Pertinent History Pt and wife report concern with pain in foot, report redness  Pt reports both nsg and MD aware, pt reports he was started on new medication for foot  Family/Caregiver Present Yes  (wife)   Cognition   Orientation Level Oriented X4   Subjective   Subjective Wife approached PT in AM, "You were his PT yesterday correct?" After confirming that was correct wife asked "Will my  be seen this afternoon before he leaves?" PT answered yes pt would be seen  PT approached be  aprox 20 min later 2* to wife report that pt was not seen  Explained to manager evaluation was completed day prior  Spoke to wife who approached me since she recgonized me from lengthy converstaion previous day about treatment this PM  Pt agreeable to PT treatment  Wife again reporting on phone to insurance that this was the first time that pt was being seen by therapy  Pt did report to insurance after re introduction that yes he was seen yesterday  Bed Mobility   Supine to Sit 4  Minimal assistance   Additional items Assist x 1; Increased time required   Sit to Supine Unable to assess   Additional items (pt left resting in chair, OT in room to complete ADL)   Transfers   Sit to Stand 3  Moderate assistance   Additional items Assist x 1; Increased time required; Impulsive;Verbal cues  (hand placement instruction, required re education to avoid L)   Stand to Sit 4  Minimal assistance   Additional items Assist x 1   Stand pivot 3  Moderate assistance   Additional items Assist x 1   Ambulation/Elevation   Gait pattern Step to;Shuffling;Excessively slow; Foward flexed;Decreased foot clearance   Gait Assistance 4  Minimal assist   Additional items Assist x 1   Assistive Device 1 handed walker   Distance 120+long sitting resting+ exercise+ long sitting rest + 80   Balance   Static Sitting Fair +   Dynamic Sitting Fair   Static Standing Fair -   Dynamic Standing Poor   Ambulatory Poor   Endurance Deficit   Endurance Deficit Yes   Endurance Deficit Description limited by balance and fatigue   Activity Tolerance   Activity Tolerance Patient limited by fatigue;Patient limited by pain   Exercises   Glute Sets 20 reps; Sitting;AAROM; Bilateral   Hip Flexion 20 reps; Sitting;AAROM; Bilateral   Hip Abduction 20 reps; Sitting;AAROM; Bilateral   Hip Adduction 20 reps; Sitting;AAROM; Bilateral   Knee AROM Long Arc Quad 20 reps; Sitting;AAROM; Bilateral   Ankle Pumps 20 reps; Sitting;AAROM; Bilateral   Marching 20 reps; Sitting;AAROM; Bilateral   Neuro re-ed 5x2 sit<>stand from chair with RUE only   Assessment   Prognosis Fair   Problem List Decreased strength;Decreased endurance; Impaired balance;Decreased safety awareness;Pain;Decreased coordination;Decreased mobility   Assessment Introduced pt to one handed walker 2* to instability with SPC on evaluation  Pt demonstrated improved ability to complete bed mobility this session with decreased A  Continues to require constant instruction during transfers to avoid use LUE with NWB precautions  Verbal and visual instruction for one handed walker use  Demonstrated improved stability with one handed walker with decreased need for A for balance during ambulation  1 mild LOB which pt was able to self correct with one handed walker  Pt tolerated LE exercises with minimal A  During 5x2 sit<>stand for LE strengthening and balance required increased time and A to ensure balance  Second ambulation with decreased distance 2* to fatigue and decreased activity tolerance  Continues to be high fall risk with decreased knowledge of current limitations and safety awareness  High risk for non compliance with LUE NWB as pt requires continued instruction with poor carryover  Pt will continue to benefit from inpt skilled PT and rehab to maximize functional mobility and decrease risk of falls   Pt and wife offered no additional questions at this time  Barriers to Discharge Decreased caregiver support   Barriers to Discharge Comments Pt wife reported on evaluation 10/12 concerns with A pt at home 2* to balance deficits  Reported daughter returned to work and she was worried about caring for pt at home  Goals   Patient Goals To get better  STG Expiration Date 10/22/17   Treatment Day 1   Plan   Treatment/Interventions Functional transfer training;LE strengthening/ROM; Patient/family training;Bed mobility;Gait training;Spoke to nursing; Endurance training;Elevations; Family   PT Frequency 5x/wk  (1x weekend)   Recommendation   Recommendation Post acute IP rehab   Equipment Recommended (one handed walker)   PT - OK to Discharge (To rehab when medically stable )           Janiya Willingham, PT

## 2017-10-13 NOTE — ASSESSMENT & PLAN NOTE
· Patient was admitted due to [address] [place] [address] following administration of Tramadol around 10 pm  He was also found to be hypoglycemic on admission (BS 52)  Patient was found difficult to arouse by his wife  · Resolved  · Likely toxic metabolic due to Tramadol and hypoglycemia

## 2017-10-13 NOTE — PLAN OF CARE
DISCHARGE PLANNING     Discharge to home or other facility with appropriate resources Progressing        DISCHARGE PLANNING - CARE MANAGEMENT     Discharge to post-acute care or home with appropriate resources Progressing        METABOLIC, FLUID AND ELECTROLYTES - ADULT     Fluid balance maintained Progressing        PAIN - ADULT     Verbalizes/displays adequate comfort level or baseline comfort level Progressing        Potential for Falls     Patient will remain free of falls Progressing        Prexisting or High Potential for Compromised Skin Integrity     Skin integrity is maintained or improved Progressing        SAFETY ADULT     Patient will remain free of falls Progressing     Maintain or return to baseline ADL function Progressing

## 2017-10-13 NOTE — SOCIAL WORK
CSWS received a call from [facility] about patient appealing his discharge from the hospital  He is recommended for inpatient rehab, however patient has refused stating he had a family member with a "bad experience " Keshia requests an escalated team meeting to discuss patient and family concerns as pt's wife has expressed that she does not feel patient can be discharged home safely  CSWS met with patient and his wife to discuss concerns per wife and patient they would like to appeal the decision to discharge patient today  CSWS explained that during the appeal process patient will need to remain hospitalized  Patient and his wife were going to call to appeal the discharge decision after CSWS left the room  Family wants patient to remain inpatient for "another to days to get therapy here " Discussed options of going to STR for therapy or therapy at home  Patient and family are not agreeable to STR  Discussed setting up an escalated meeting to discuss concerns with patient, his wife, PT/OT, PCM and Dr Terry Doctor and/or JOSÉ LUIS  Patient's wife needs to "check my book at home" to see when she is available  CSWS requested the meeting to be at 10 am or 11 am on 10/16/17 and requested patient's wife contact this writer back by 5 pm today to confirm a time  CSWS provided updates to Naga BANG-C, PCM and CLARICE Lane  Update: 4:45 PM  Received a return call from patient's wife, Preston Orlando confirming her attendance for a meeting on Monday 10/16/17 at 10 am  Updates provided to CLARICE LANE, SLIM PA-C and PCM

## 2017-10-13 NOTE — PROGRESS NOTES
Progress Note - Cardiology   William Jacobsen  [de-identified] y o  male MRN: [id number]  Unit/Bed#: -01 Encounter: [id number]    Assessment:  Congestive heart failure with preserved ejection fraction   Likely 2/2 stress of recent trauma, IV fluid hydration at that time, medication non compliance  Echo 9/25/17 EF 65%, severe concentric hypertrophy, grade 1 diastolic dysfunction, normal functioning AVR  CXR shows increased vascular congestion  I/0: currently -1800  Wt: 229 -> 223 -> 226 Outpatient weights fluctuate from 220-225  Continue home Torsemide 40 mg daily MWF and 20 daily mg all other days    Paroxysmal atrial fibrillation s/p AVR with bioprosthetic valve 2002  Holter monitor shows NSR average 77 without episodes Afib or Aflutter   Patient previously on Eliquis but discontinued by Dr Manuel Henry s/p recent falls and Holter monitor recording  Continue AC with Aspirin      CAD s/p CABG  SOB and MADISON likley 2/2 to volume overload due to exacerbation of CHF and not likely ACS  Troponin negative x 3   EKG without signs of ischemia   Telemetry without significant events   Continue bb, asa, statin      Essential Hypertension   Well controlled while in hospital   Continue Metoprolol 75 mg BID, and amlodipine 2 5 daily   Ramipril held in setting of SABINE  Resume when SABINE resolves        SABINE on CKD 3  Baseline CR 1 8 -> 2   Currently 1 84  Ramipril on hold     Subjective/Objective   Patient seen and examined sitting comfortably in chair  Notes no shortness of breath, chest pain, palpitations  New rash which he had not noticed before  PT recommending rehab and CM working with family to determine best option       Vitals: /58   Pulse 86   Temp 99 °F (37 2 °C) (Oral)   Resp 18   Ht 5' 7" (1 702 m)   Wt 103 kg (226 lb 6 4 oz)   SpO2 93%   BMI 35 46 kg/m²   Vitals:    10/12/17 0448 10/13/17 0510   Weight: 101 kg (223 lb 1 6 oz) 103 kg (226 lb 6 4 oz)     Orthostatic Blood Pressures    Flowsheet Row Most Recent Value Blood Pressure  125/58 filed at 10/13/2017 2121   Patient Position - Orthostatic VS  Lying filed at 10/13/2017 0733            Intake/Output Summary (Last 24 hours) at 10/13/17 0800  Last data filed at 10/13/17 0319   Gross per 24 hour   Intake              520 ml   Output              900 ml   Net             -380 ml       Invasive Devices     Peripheral Intravenous Line            Peripheral IV 10/11/17 Right Antecubital 2 days                Physical Exam: /58   Pulse 86   Temp 99 °F (37 2 °C) (Oral)   Resp 18   Ht 5' 7" (1 702 m)   Wt 103 kg (226 lb 6 4 oz)   SpO2 93%   BMI 35 46 kg/m²   Physical Exam   Constitutional: He appears well-developed and well-nourished  No distress  HENT:   Mouth/Throat: Oropharynx is clear and moist  No oropharyngeal exudate  Dried blood on nose and upper lip    Eyes: Conjunctivae and EOM are normal  Pupils are equal, round, and reactive to light  No scleral icterus  Cardiovascular: Normal rate and regular rhythm  Murmur (systolic ) heard  Pulmonary/Chest: Effort normal and breath sounds normal  No respiratory distress  He has no wheezes  Abdominal: Soft  He exhibits no distension  There is no tenderness  There is no guarding  Musculoskeletal:   Trace edema in b/l lower extremties, erythematous rash on left foot with drawn border  Skin: He is not diaphoretic  Psychiatric: He has a normal mood and affect  His behavior is normal  Judgment and thought content normal        Lab Results: I have personally reviewed pertinent lab results  Imaging: I have personally reviewed pertinent reports      VTE Pharmacologic Prophylaxis: Reason for no pharmacologic prophylaxis aspirin   VTE Mechanical Prophylaxis: sequential compression device

## 2017-10-14 NOTE — PROGRESS NOTES
[address] Internal Medicine Progress Note  Patient: Lorraine Degree  [de-identified] y o  male   MRN: [id number]  PCP: Natividad Moreno MD  Unit/Bed#: MS [facility] Encounter: [id number]  Date Of Visit: 10/14/17    Assessment:    Principal Problem:    Acute encephalopathy  Active Problems:    Paroxysmal atrial fibrillation (HCC)    Acute on chronic diastolic congestive heart failure ([facility] [address] )    Essential hypertension    Type 2 diabetes mellitus ([facility] [place] [address] )    [place]AD[place] (dyspnea on exertion)    S/P aortic valve replacement with bioprosthetic valve    Nasal laceration, sequela    Coronary artery disease involving native coronary artery of native heart    Stage 3 chronic kidney disease    Closed fracture of greater tuberosity of left humerus    Cellulitis      Plan:    · Acute encephalopathy: resolved  · Acute on chronic diastolic heart failure  · Continues on home regimen  · Left foot pain in context of intraarticular fracture vs less likely cellulitis  · WBC wnl; no fever  · XR complete  · Will discuss with pt's podiatrist  · Cont ancef  · ID eval   · Closed fracture of greater tuberosity of left humerus  · Appreciate ortho input  · NWB LUE  · Will have ortho follow-up in 2 weeks   · CAD:  · Cont ASA, statin, BB  · S/p AVR with bioprosthetic valve  · Continues on home meds  · Type II DM:  · Continue current regimen   · ISS and accuchecks     VTE Pharmacologic Prophylaxis:   Pharmacologic: Heparin  Mechanical VTE Prophylaxis in Place: Yes    Patient Centered Rounds: I have performed bedside rounds with nursing staff today  Discussions with Specialists or Other Care Team Provider: cardiology    Education and Discussions with Family / Patient: patient     Time Spent for Care: 20 minutes  More than 50% of total time spent on counseling and coordination of care as described above      Current Length of Stay: 3 day(s)    Current Patient Status: Inpatient   Certification Statement: The patient will continue to require additional inpatient hospital stay due to evaluation of swelling and pain in left foot  Discharge Plan / Estimated Discharge Date: TBD based on clinical course    Code Status: Level 1 - Full Code    Subjective:   Pt feels well  No fevers or chills  Pain in foot is unchanged  Objective:     Vitals:   Temp (24hrs), Av 9 °F (36 6 °C), Min:97 6 °F (36 4 °C), Max:98 2 °F (36 8 °C)    HR:  [77-86] 81  Resp:  [16-18] 18  BP: (120-143)/(57-71) 143/65  SpO2:  [93 %-96 %] 93 %  Body mass index is 35 46 kg/m²  Input and Output Summary (last 24 hours): Intake/Output Summary (Last 24 hours) at 10/14/17 1029  Last data filed at 10/14/17 0840   Gross per 24 hour   Intake             1090 ml   Output             2350 ml   Net            -1260 ml       Physical Exam:     Physical Exam   Constitutional: He is oriented to person, place, and time  He appears well-developed and well-nourished  No distress  HENT:   Mouth/Throat: Oropharynx is clear and moist  No oropharyngeal exudate  Cardiovascular: Normal rate and regular rhythm  No murmur heard  Pulmonary/Chest: Effort normal and breath sounds normal  No respiratory distress  He has no wheezes  Abdominal: Soft  Bowel sounds are normal  He exhibits no distension  There is no tenderness  Musculoskeletal: He exhibits tenderness  He exhibits no edema  Left foot with increasing purplish discoloration  Remains tender along medial forefoot and 1st metatarsal   Some spreading of color beyond demarcation  No streaking  Not appreciably warmer that R  Neurological: He is alert and oriented to person, place, and time  Skin: He is not diaphoretic  Scattered ecchymoses    Psychiatric: He has a normal mood and affect  His behavior is normal  Judgment and thought content normal    Nursing note and vitals reviewed      Additional Data:     Labs:      Results from last 7 days  Lab Units 10/14/17  0815   WBC Thousand/uL 7 46   HEMOGLOBIN g/dL 11 5*   HEMATOCRIT % 34 8*   PLATELETS Thousands/uL 187   NEUTROS PCT % 67   LYMPHS PCT % 20   MONOS PCT % 8   EOS PCT % 5       Results from last 7 days  Lab Units 10/13/17  0505  10/11/17  0448   SODIUM mmol/L 137  < > 135*   POTASSIUM mmol/L 4 4  < > 3 8   CHLORIDE mmol/L 102  < > 101   CO2 mmol/L 28  < > 28   BUN mg/dL 41*  < > 36*   CREATININE mg/dL 1 84*  < > 1 90*   CALCIUM mg/dL 8 8  < > 8 5   TOTAL PROTEIN g/dL  --   --  6 4   BILIRUBIN TOTAL mg/dL  --   --  0 61   ALK PHOS U/L  --   --  63   ALT U/L  --   --  16   AST U/L  --   --  18   GLUCOSE RANDOM mg/dL 122  < > 143*   < > = values in this interval not displayed  * I Have Reviewed All Lab Data Listed Above  * Additional Pertinent Lab Tests Reviewed:  All Labs Within Last 24 Hours Reviewed    Imaging:    Imaging Reports Reviewed Today Include: XR left foot   Imaging Personally Reviewed by Myself Includes:      Recent Cultures (last 7 days):           Last 24 Hours Medication List:     acetaminophen 975 mg Oral Q8H [address]   amLODIPine 2 5 mg Oral Daily   aspirin 81 mg Oral Every Other Day   calcium carbonate-vitamin D 1 tablet Oral Daily   cefazolin 1,000 mg Intravenous Q12H   diclofenac sodium 2 g Topical 4x Daily   febuxostat 40 mg Oral Daily   heparin (porcine) 5,000 Units Subcutaneous Q8H [address]   insulin aspart protamine-insulin aspart 20 Units Subcutaneous BID AC   insulin lispro 1-6 Units Subcutaneous TID With Meals   insulin lispro 1-6 Units Subcutaneous HS   lidocaine 1 patch Transdermal Q24H   metoprolol tartrate 75 mg Oral Q12H [address]   multivitamin-minerals 1 tablet Oral Daily   pantoprazole 40 mg Oral Early Morning   paricalcitol 1 mcg Oral Once per day on Mon Wed Fri   pravastatin 20 mg Oral Daily With Dinner   ramipril 2 5 mg Oral Daily   senna-docusate sodium 1 tablet Oral BID   torsemide 20 mg Oral Every Other Day   torsemide 40 mg Oral Every Other Day        Today, Patient Was Seen By: Angely Montenegro MD    ** Please Note: This note has been constructed using a voice recognition system   **

## 2017-10-14 NOTE — PLAN OF CARE
Problem: OCCUPATIONAL THERAPY ADULT  Goal: Performs self-care activities at highest level of function for planned discharge setting  See evaluation for individualized goals  Treatment Interventions: ADL retraining, Functional transfer training, Endurance training, Cognitive reorientation, Patient/family training, Equipment evaluation/education, Compensatory technique education, Activityengagement          See flowsheet documentation for full assessment, interventions and recommendations  Outcome: Progressing  Limitation: Decreased Safe judgement during ADL, Decreased UE ROM, Decreased endurance, Decreased self-care trans, Non-func L UE  Prognosis: Good  Assessment: Pt participated in occupational therapy with focus on activity tolerance, functional transfers/mob, UB and LB self-care  Pt daughter infrancie present and supportive of pt participation with therapy  Pt improvement noted for pt bed mob and functional transfer 2* pt motivated to go home and not to rehab  Pt able to tolerate all UB and LB ADL tasks out of bed to bedside chair  Pt continues to require assist for functional transfers 2* pt decreased overall strength  Pt will require in-pt rehab to continue to address pt noted deficits which currently impair pt ADL and functional mob        OT Discharge Recommendation: Short Term Rehab

## 2017-10-14 NOTE — PLAN OF CARE
Problem: PHYSICAL THERAPY ADULT  Goal: Performs mobility at highest level of function for planned discharge setting  See evaluation for individualized goals  Treatment/Interventions: Functional transfer training, LE strengthening/ROM, Therapeutic exercise, Endurance training, Bed mobility, Gait training, Elevations          See flowsheet documentation for full assessment, interventions and recommendations  Outcome: Not Progressing  Prognosis: Fair  Problem List: Decreased endurance, Impaired balance, Decreased mobility, Impaired judgement, Decreased safety awareness, Pain, Orthopedic restrictions  Assessment: pt participated c le theraputic exercise c rests  new ,l toes /foot pain + errythema from past few days c sitting standing  required moderate A for bed mobility exiting l side of bed and not using lue in sling  minimal A back to bed c hob elevated  he required minimal /moderate A for transfers to stand c one handed sw  x 2  pain increased to 10/10  side stepped to top of bed and returned supine  pillows under lue in sling, ble's reggie socks removed  during session dr Dale Jasso arrived and assessing  I was deferring ambulation until completed but dr Dale Jasso noted to con't to mobilize today  pt will benefit rehab but family and pt refusing  may benefit ortho consult for l foot ? possible surgical shoe if he can't fit his shoes on  presently no wb restrictions  spoke c nurse of the same   Barriers to Discharge: Decreased caregiver support  Barriers to Discharge Comments: Pt wife reported on evaluation 10/12 concerns with A pt at home 2* to balance deficits  Reported daughter returned to work and she was worried about caring for pt at home  Recommendation: Post acute IP rehab (family refuses)     PT - OK to Discharge: Yes (rehab)    See flowsheet documentation for full assessment

## 2017-10-14 NOTE — CONSULTS
Consultation - Infectious Disease   Vanessa Mancilla  [de-identified] y o  male MRN: [id number]  Unit/Bed#: -01 Encounter: [id number]      Inpatient consult to Infectious Diseases  Consult performed by: Juan Francisco Rodriguez ordered by: [address], [address]:   Impression:  1  Hemorrhagic cellulitis of the left foot  2  Mildly displaced intra-articular fracture of the base of the proximal phalanx of the left great toe  3  Diabetes mellitus type 2 with PAD S/P bilateral lower extremity vascular bypass procedures and nephropathy with CKD  4  Traumatic  facial laceration  5  S/P encephalopathy secondary to hypoglycemia and tramadol  6  S/P bovine AVR and CABG    Recommendations:    Discuss with the primary service  1   Will order ASO titer  2  Agree with cefazolin 1 g q 12 hours IV  3  Would keep left lower extremity elevated      HISTORY OF PRESENT ILLNESS:    Reason for Consult:  Left foot cellulitis  HPI: Vanessa Mancilla  is a [de-identified]y o  year old male with diabetes mellitus type 2 nephropathy and PAD who was well until 1 week ago when he tripped over his cane and fell on his face sustaining facial lacerations, fracture of his left hallux space and soft tissue injury of his left upper extremity  With his discomfort he was begun on tramadol on 10/09  He took 2 doses on the following day and was noted to become transiently weak  In the ER he had hypoglycemia with a glucose of 52  Although initially his left foot did not give him pain, over the last several days he noted increased redness of the dorsum of the left foot as well as contusion will type swelling of the left 1st and 2nd toes     According to his family the erythema of the dorsal foot increased along with significant discomfort  He denied any fever or chills  He was initially placed on cephalexin and that was changed yesterday to cefazolin 1 g q 12 hours IV        Review of Systems   Constitutional: Positive for activity change (Secondary to left foot pain with ambulation)  Eyes: Positive for visual disturbance ( S/P cataract surgery)  Musculoskeletal: Positive for gait problem ( secondary to left foot pain)  Skin: Positive for color change ( contused areas of the left 1st and 2nd toe and erythema of the dorsum left foot) and wound ( several facial wounds )  A [id number] point system-based review of systems is otherwise negative  PAST MEDICAL HISTORY:  Past Medical History:   Diagnosis Date    Cardiac disease     [facility]     Diabetes mellitus [facility]     Hypertension     Renal disorder      Past Surgical History:   Procedure Laterality Date    AORTIC VALVE REPLACEMENT      CARDIAC VALVE REPLACEMENT      CATARACT EXTRACTION, BILATERAL      COLONOSCOPY      REPLACEMENT TOTAL KNEE BILATERAL      VASCULAR SURGERY         FAMILY HISTORY:  Non-contributory    SOCIAL HISTORY:  Social History   /Civil Union  History   Alcohol Use No     History   Drug Use No     History   Smoking Status    Former Smoker    Quit date: 10/11/1987   Smokeless Tobacco    Never Used       ALLERGIES:  Allergies   Allergen Reactions    Hydrocodone      Other reaction(s): Other (See Comments)  Heart racing       MEDICATIONS:  All current active medications have been reviewed        PHYSICAL EXAM:  HR:  [77-86] 86  Resp:  [16-20] 16  BP: (112-143)/(58-71) 112/59  SpO2:  [93 %-96 %] 93 %  Temp (24hrs), Av 1 °F (36 7 °C), Min:97 6 °F (36 4 °C), Max:98 6 °F (37 °C)  Current: Temperature: 98 6 °F (37 °C)    Intake/Output Summary (Last 24 hours) at 10/14/17 1600  Last data filed at 10/14/17 0915   Gross per 24 hour   Intake             1350 ml   Output             1400 ml   Net              -50 ml       General Appearance:  Appearing well, nontoxic, and in no distress, appears stated age   Head:  Normocephalic, facial lacerations   Eyes:  PERRL, conjunctiva pink and sclera anicteric, both eyes Nose: Nares normal, mucosa normal, no drainage   Throat: Oropharynx moist without lesions; lips, mucosa, and tongue normal; be edentulous   Neck: Supple, symmetrical, trachea midline, no adenopathy, no tenderness/mass/nodules   Back:   Symmetric, no curvature, ROM normal, no CVA tenderness   Lungs:   Clear to auscultation bilaterally, no audible wheezes, rhonchi and rales, respirations unlabored   Chest Wall:  No tenderness or deformity   Heart:  Sternotomy Regular rate and rhythm, S1, S2 normal, grade 1/6 aortic systolic murmur,    Abdomen:   Soft, non-tender, non-distended, positive bowel sounds, no masses, no organomegaly    No CVA tenderness   Extremities: Surgical scars lower extremities, marked contusions of the left 1st and 2nd toes with +2/4 edema, +2/4 hemorrhagic type erythema of the dorsal left foot   Skin: Surgical scars, facial lacerations as above, left foot as above   Neurologic: Alert and oriented times 3            Invasive Devices:   Peripheral IV 10/11/17 Right Antecubital (Active)   Site Assessment Clean;Dry; Intact 10/14/2017  3:00 PM   Dressing Type Transparent 10/14/2017  3:00 PM   Line Status Flushed;Saline locked 10/14/2017  3:00 PM   Dressing Status Clean;Dry; Intact 10/14/2017  3:00 PM   Dressing Change Due 10/15/17 10/13/2017  8:00 AM   Reason Not Rotated Not due 10/13/2017 12:00 AM       LABS, IMAGING, & OTHER STUDIES:  Lab Results:      I have personally reviewed pertinent labs        Results from last 7 days  Lab Units 10/14/17  0815 10/12/17  0446 10/11/17  0448   WBC Thousand/uL 7 46 7 57 7 77   HEMOGLOBIN g/dL 11 5* 11 1* 10 9*   PLATELETS Thousands/uL 187 178 162       Results from last 7 days  Lab Units 10/13/17  0505 10/12/17  0446 10/11/17  0448   SODIUM mmol/L 137 137 135*   POTASSIUM mmol/L 4 4 4 2 3 8   CHLORIDE mmol/L 102 102 101   CO2 mmol/L 28 28 28   ANION GAP mmol/L 7 7 6   BUN mg/dL 41* 39* 36*   CREATININE mg/dL 1 84* 1 68* 1 90*   EGFR ml/min/1 73sq m 34 38 33 GLUCOSE RANDOM mg/dL 122 143* 143*   CALCIUM mg/dL 8 8 8 6 8 5   AST U/L  --   --  18   ALT U/L  --   --  16   ALK PHOS U/L  --   --  63   TOTAL PROTEIN g/dL  --   --  6 4   ALBUMIN g/dL  --   --  2 9*   BILIRUBIN TOTAL mg/dL  --   --  0 61           Imaging Studies:   I have personally reviewed pertinent imaging study reports and images in PACS  EKG, Pathology, and Other Studies:   I have personally reviewed pertinent reports

## 2017-10-14 NOTE — PHYSICAL THERAPY NOTE
PHYSICAL THERAPY NOTE          Patient Name: Jennifer Ramsey's Date: 10/14/2017     10/14/17 0536   Pain Assessment   Pain Assessment No/denies pain   Pain Score Worst Possible Pain   Pain Location Foot   Pain Orientation Left   Hospital Pain Intervention(s) Ambulation/increased activity;Repositioned;Elevated; Emotional support; Rest   Restrictions/Precautions   Weight Bearing Precautions Per Order Yes   LUE Weight Bearing Per Order NWB   LLE Weight Bearing Per Order (no wb restriction noted  spoke c nurse jessica if needs ortho c)   Braces or Orthoses Sling  (toes)   Other Precautions Impulsive; Chair Alarm;WBS; Limb alert; Fall Risk;Pain  (noted pt's l toes purple  wife noted fx when fell last saturd)   General   Chart Reviewed Yes   Response to Previous Treatment Patient reporting fatigue but able to participate  Family/Caregiver Present (daughter and pt's spouse,dr kate morales,noted to [place])   Cognition   Overall Cognitive Status WFL   Arousal/Participation Alert   Attention Attends with cues to redirect   Orientation Level Oriented X4   Following Commands Follows one step commands with increased time or repetition   Subjective   Subjective pt agreed to PT  little c/o pain l arm to start  did sit and recent return to bed  (family answered of pt's l toes purple broke when fell last saturday  they went to own dr Pablo Peterson no treatment for it  daughter noted pt c low bs and near fall c her A admitted to hospital  )   Bed Mobility   Rolling R 3  Moderate assistance   Additional items Assist x 1; Increased time required;Verbal cues;LE management;HOB elevated  (exit l side  hob about 27* daughter wanted to increase toA=no)   Supine to Sit 3  Moderate assistance   Additional items Assist x 1;HOB elevated; Increased time required;Verbal cues;LE management  (wanted pt to lean trunk to r ue to A sitting l side of bed )   Sit to Supine 4 Minimal assistance   Additional items Assist x 1;LE management;Verbal cues; Increased time required;HOB elevated   Transfers   Sit to Stand 3  Moderate assistance  (c height of bed elevated)   Additional items Assist x 1;Verbal cues; Increased time required; Bedrails  ( 2 trials  )   Stand to Sit 4  Minimal assistance   Additional items Assist x 1;Bedrails;Verbal cues   Ambulation/Elevation   Gait pattern Antalgic; Forward Flexion;Decreased foot clearance;Decreased R stance;Decreased L stance; Excessively slow; Short stride   Gait Assistance 4  Minimal assist   Additional items Assist x 1;Verbal cues; Tactile cues   Assistive Device 1 handed walker  (manual A of lue in sling not to wb,daughter intervening PTA)   Distance 2 feet to L to top of bed too much l foot pain to con't   Balance   Static Sitting Fair +   Static Standing Fair -   Dynamic Standing Fair -   Endurance Deficit   Endurance Deficit Yes   Activity Tolerance   Activity Tolerance Patient limited by fatigue;Patient limited by pain   Exercises   THR 15 reps; Supine;AAROM;AROM; Bilateral   Assessment   Prognosis Fair   Problem List Decreased endurance; Impaired balance;Decreased mobility; Impaired judgement;Decreased safety awareness;Pain;Orthopedic restrictions   Assessment pt participated c le theraputic exercise c rests  new ,l toes /foot pain + errythema from past few days c sitting standing  required moderate A for bed mobility exiting l side of bed and not using lue in sling  minimal A back to bed c hob elevated  he required minimal /moderate A for transfers to stand c one handed sw  x 2  pain increased to 10/10  side stepped to top of bed and returned supine  pillows under lue in sling, ble's reggie socks removed  during session dr Bouchra Nava arrived and assessing  I was deferring ambulation until completed but dr Bouchra Nava noted to con't to mobilize today  pt will benefit rehab but family and pt refusing  may benefit ortho consult for l foot ?  possible surgical shoe if he can't fit his shoes on  presently no wb restrictions  spoke c nurse of the same   Barriers to Discharge Decreased caregiver support   Goals   Patient Goals not walk due to increased pain c sitting standing wbat c one handed sw  STG Expiration Date 10/22/17   Treatment Day 2  (yesterday spoke kait rae PT of pt's eval treatment for toda)   Plan   Treatment/Interventions Functional transfer training;LE strengthening/ROM; Therapeutic exercise; Endurance training;Patient/family training;Equipment eval/education; Bed mobility;Gait training; Compensatory technique education;Spoke to MD;Spoke to nursing;OT;Family  (after session spoke kait chapa + shavon dpt )   Progress Slow progress, decreased activity tolerance   PT Frequency Weekend; Once a day  (per butch PT see pt 6-7 x week)   Recommendation   Recommendation Post acute IP rehab  (family refuses)   Equipment Recommended Other (Comment)  (1 handed walker )   PT - OK to Discharge Yes  (rehab)

## 2017-10-14 NOTE — PROGRESS NOTES
Progress Note - Cardiology   Angelito Turner  [de-identified] y o  male MRN: [id number]  Unit/Bed#: -01 Encounter: [id number]    Assessment:  [de-identified]year old man with diastolic CHF, currently compensated  Aortic valve disease with prior AVR  Paroxysmal atrial fibrillation, previously on anticoagulation  Mechanical fall with resultant trauma  In this setting, anticoagulation was stopped  Initially with volume overload on admission which responded well to IV diuresis  Recommendations:  Continue his oral dose of torsemide which is Monday Wednesday Friday 40 mg, and the remaining days 20 mg  Off anticoagulation given fall  Evaluation of left lower extremity erythema per primary team and Infectious Disease  Did discuss with patient and his daughter that some element of ecchymosis will spread secondary to anticoagulant use with trauma, even as a delayed response  Volume status is stable  Outpatient follow-up with Dr Clif Bowser  Stable cardiovascular status  Will sign off, please call with additional questions or concerns  Subjective/Objective     Subjective:  No change in breathing  Overall, this is comfortable  Concerns regarding left lower extremity erythema      Objective:    Vitals: /65   Pulse 81   Temp 97 6 °F (36 4 °C) (Oral)   Resp 18   Ht 5' 7" (1 702 m)   Wt 103 kg (226 lb 6 4 oz)   SpO2 93%   BMI 35 46 kg/m²   Vitals:    10/12/17 0448 10/13/17 0510   Weight: 101 kg (223 lb 1 6 oz) 103 kg (226 lb 6 4 oz)     Orthostatic Blood Pressures    Flowsheet Row Most Recent Value   Blood Pressure  143/65 filed at 10/14/2017 0700   Patient Position - Orthostatic VS  Lying filed at 10/14/2017 0700          Intake/Output Summary (Last 24 hours) at 10/14/17 0908  Last data filed at 10/14/17 0840   Gross per 24 hour   Intake             1090 ml   Output             2350 ml   Net            -1260 ml     Physical Exam:   General appearance: alert and in no acute distress  Head: Normocephalic, without obvious abnormality, atraumatic  Neck: No elevation in JVP  Lungs: Clear to auscultation  Heart: S1, S2  + 2/6 SANJEEV  Abdomen: obese, soft, nontender  Extremities: LUE in sling  LLE ecchymosis, erythema is outlined  Neurologic: Grossly normal  Alert and oriented      Medications:    Current Facility-Administered Medications:     acetaminophen (TYLENOL) tablet 975 mg, 975 mg, Oral, Q8H [facility], Rick Lane PA-C, 975 mg at 10/14/17 0603    aluminum-magnesium hydroxide-simethicone (MYLANTA) 200-200-20 mg/5 mL oral suspension 30 mL, 30 mL, Oral, Q6H PRN, Delaney Yi PA-C    amLODIPine (NORVASC) tablet 2 5 mg, 2 5 mg, Oral, Daily, Delaney Yi PA-C, 2 5 mg at 10/13/17 9318    aspirin chewable tablet 81 mg, 81 mg, Oral, Every Other Day, Vanna Pandey PA-C, 81 mg at 10/13/17 0820    calcium carbonate-vitamin D (OSCAL-D) 500 mg-200 units per tablet 1 tablet, 1 tablet, Oral, Daily, Vanna Pandey PA-C, 1 tablet at 10/13/17 9111    ceFAZolin (ANCEF) IVPB (premix) 1,000 mg, 1,000 mg, Intravenous, Q12H, Trent Lewis MD, Last Rate: 100 mL/hr at 10/14/17 0605, 1,000 mg at 10/14/17 0605    diclofenac sodium (VOLTAREN) 1 % topical gel 2 g, 2 g, Topical, 4x Daily, Delaney Yi PA-C, 2 g at 10/13/17 2134    febuxostat (ULORIC) tablet 40 mg, 40 mg, Oral, Daily, Delaney Yi PA-C, 40 mg at 10/13/17 8258    heparin (porcine) subcutaneous injection 5,000 Units, 5,000 Units, Subcutaneous, Q8H [facility], 5,000 Units at 10/14/17 0603 **AND** [CANCELED] Platelet count, , , Once, Delaney Yi PA-C    insulin aspart protamine-insulin aspart (NovoLOG 70/30) 100 units/mL subcutaneous injection 20 Units, 20 Units, Subcutaneous, BID AC, Rick Lane PA-C, 20 Units at 10/14/17 0745    insulin lispro (HumaLOG) 100 units/mL subcutaneous injection 1-6 Units, 1-6 Units, Subcutaneous, TID With Meals, 1 Units at 10/13/17 1225 **AND** Fingerstick Glucose (POCT), , , 4 times day, Delaney Yi PA-C    insulin lispro (HumaLOG) 100 units/mL subcutaneous injection 1-6 Units, 1-6 Units, Subcutaneous, HS, Delaney Yi PA-C    lidocaine (LIDODERM) 5 % patch 1 patch, 1 patch, Transdermal, Q24H, Delaney Yi PA-C, 1 patch at 10/14/17 0600    methocarbamol (ROBAXIN) tablet 500 mg, 500 mg, Oral, Q8H PRN, Delaney Yi PA-C    metoprolol tartrate (LOPRESSOR) tablet 75 mg, 75 mg, Oral, Q12H [address], Delaney Yi, PA-C, 75 mg at 10/13/17 2132    multivitamin-minerals (CENTRUM) tablet 1 tablet, 1 tablet, Oral, Daily, Mitjoann Yeh PA-C, 1 tablet at 10/13/17 0819    nitroglycerin (NITROSTAT) SL tablet 0 4 mg, 0 4 mg, Sublingual, Q5 Min PRN, Delaney Yi PA-C    ondansetron (ZOFRAN) injection 4 mg, 4 mg, Intravenous, Q6H PRN, Delaney Yi PA-C    oxyCODONE (ROXICODONE) IR tablet 2 5 mg, 2 5 mg, Oral, Q6H PRN, Delaney Yi PA-C    pantoprazole (PROTONIX) EC tablet 40 mg, 40 mg, Oral, Early Morning, Delaney Yi PA-C, 40 mg at 10/14/17 0603    paricalcitol (ZEMPLAR) capsule 1 mcg, 1 mcg, Oral, Once per day on Mon Wed Fri, Delaney Yi PA-C, 1 mcg at 10/13/17 1415    pravastatin (PRAVACHOL) tablet 20 mg, 20 mg, Oral, Daily With Dinner, Delaney Yi PA-C, 20 mg at 10/13/17 1648    ramipril (ALTACE) capsule 2 5 mg, 2 5 mg, Oral, Daily, Elva Carmona, PA-C, 2 5 mg at 10/13/17 7043    senna-docusate sodium (SENOKOT S) 8 6-50 mg per tablet 1 tablet, 1 tablet, Oral, BID, Delaney Yi PA-C, 1 tablet at 10/13/17 0819    torsemide (DEMADEX) tablet 20 mg, 20 mg, Oral, Every Other Day, Owen Fiore MD    torsemide [facility]) tablet 40 mg, 40 mg, Oral, Every Other Day, Owen Fiore MD, 40 mg at 10/13/17 7215    Lab Results:    Results from last 7 days  Lab Units 10/11/17  0448 10/11/17  0206   TROPONIN I ng/mL <0 02 <0 02       Results from last 7 days  Lab Units 10/14/17  0815 10/12/17  0446 10/11/17  0448   WBC Thousand/uL 7 46 7 57 7 77   HEMOGLOBIN g/dL 11 5* 11 1* 10 9*   HEMATOCRIT % 34 8* 33 7* 33 5*   PLATELETS Thousands/uL 187 178 162           Results from last 7 days  Lab Units 10/13/17  0505 10/12/17  0446 10/11/17  0448   SODIUM mmol/L 137 137 135*   POTASSIUM mmol/L 4 4 4 2 3 8   CHLORIDE mmol/L 102 102 101   CO2 mmol/L 28 28 28   BUN mg/dL 41* 39* 36*   CREATININE mg/dL 1 84* 1 68* 1 90*   CALCIUM mg/dL 8 8 8 6 8 5   TOTAL PROTEIN g/dL  --   --  6 4   BILIRUBIN TOTAL mg/dL  --   --  0 61   ALK PHOS U/L  --   --  63   ALT U/L  --   --  16   AST U/L  --   --  18   GLUCOSE RANDOM mg/dL 122 143* 143*           Results from last 7 days  Lab Units 10/11/17  0448   MAGNESIUM mg/dL 2 5     Telemetry: Personally reviewed  No significant arrhythmias noted    Echo:  LEFT VENTRICLE:  Systolic function was normal  Ejection fraction was estimated to be 65 %  There was severe concentric hypertrophy  Doppler parameters were consistent with abnormal left ventricular relaxation (grade 1 diastolic dysfunction)      RIGHT VENTRICLE:  The size was normal   Systolic function was normal      LEFT ATRIUM:  The atrium was moderately dilated      RIGHT ATRIUM:  The atrium was mildly dilated      MITRAL VALVE:  There was very mild stenosis  There was mild regurgitation      AORTIC VALVE:  A bioprosthesis was present  It exhibited normal function    Valve mean gradient was 11 mmHg      TRICUSPID VALVE:  There was mild regurgitation

## 2017-10-14 NOTE — PLAN OF CARE
Problem: PAIN - ADULT  Goal: Verbalizes/displays adequate comfort level or baseline comfort level  Interventions:  - Encourage patient to monitor pain and request assistance  - Assess pain using appropriate pain scale  - Administer analgesics based on type and severity of pain and evaluate response  - Implement non-pharmacological measures as appropriate and evaluate response  - Consider cultural and social influences on pain and pain management  - Notify physician/advanced practitioner if interventions unsuccessful or patient reports new pain   Outcome: Progressing      Problem: SAFETY ADULT  Goal: Patient will remain free of falls  INTERVENTIONS:  - Assess patient frequently for physical needs  -  Identify cognitive and physical deficits and behaviors that affect risk of falls    -  [place] fall precautions as indicated by assessment   - Educate patient/family on patient safety including physical limitations  - Instruct patient to call for assistance with activity based on assessment  - Modify environment to reduce risk of injury  - Consider OT/PT consult to assist with strengthening/mobility    Outcome: Progressing    Goal: Maintain or return to baseline ADL function  INTERVENTIONS:  -  Assess patient's ability to carry out ADLs; assess patient's baseline for ADL function and identify physical deficits which impact ability to perform ADLs (bathing, care of mouth/teeth, toileting, grooming, dressing, etc )  - Assess/evaluate cause of self-care deficits   - Assess range of motion  - Assess patient's mobility; develop plan if impaired  - Assess patient's need for assistive devices and provide as appropriate  - Encourage maximum independence but intervene and supervise when necessary  ¯ Involve family in performance of ADLs  ¯ Assess for home care needs following discharge   ¯ Request OT consult to assist with ADL evaluation and planning for discharge  ¯ Provide patient education as appropriate   Outcome: Progressing      Problem: Potential for Falls  Goal: Patient will remain free of falls  INTERVENTIONS:  - Assess patient frequently for physical needs  -  Identify cognitive and physical deficits and behaviors that affect risk of falls    -  [place] fall precautions as indicated by assessment   - Educate patient/family on patient safety including physical limitations  - Instruct patient to call for assistance with activity based on assessment  - Modify environment to reduce risk of injury  - Consider OT/PT consult to assist with strengthening/mobility    Outcome: Progressing

## 2017-10-14 NOTE — OCCUPATIONAL THERAPY NOTE
Occupational Therapy Treatment Note     10/14/17 1306   Restrictions/Precautions   Weight Bearing Precautions Per Order Yes   RUE Weight Bearing Per Order WBAT   LUE Weight Bearing Per Order NWB   RLE Weight Bearing Per Order WBAT   LLE Weight Bearing Per Order WBAT   Braces or Orthoses Sling   Other Precautions Fall Risk;Pain;Cognitive   Lifestyle   Autonomy I ADLS AND MOBILITY WITH RECENT USE OF SPC - S/P FALL ~1 WK PTA WITH PROGRESSIVE DECLINE IN MOBILITY SINCE FALL RESULTING IN ADMISSION - WIFE MANAGES MAJORITY OF IADLS   Reciprocal Relationships SUPPORTIVE FAMILY   Service to Others WORKS FT AT [employer]   Intrinsic Gratification ACTIVE PTA   Pain Assessment   Pain Assessment No/denies pain   Pain Score No Pain   ADL   Where Assessed Chair   Grooming Assistance 5  Supervision/Setup   UB Bathing Assistance 5  Supervision/Setup   LB Bathing Assistance 3  Moderate Assistance   UB Dressing Assistance 3  Moderate Assistance   Bed Mobility   Supine to Sit 4  Minimal assistance   Transfers   Sit to Stand 4  Minimal assistance   Stand to Sit 4  Minimal assistance   Cognition   Overall Cognitive Status [facility]   Arousal/Participation Alert   Attention Attends with cues to redirect   Orientation Level Oriented X4   Memory Decreased recall of precautions;Decreased short term memory   Following Commands Follows one step commands without difficulty   Assessment   Assessment Pt participated in occupational therapy with focus on activity tolerance, functional transfers/mob, UB and LB self-care  Pt daughter initailly present and supportive of pt participation with therapy  Pt improvement noted for pt bed mob and functional transfer 2* pt motivated to go home and not to rehab  Pt able to tolerate all UB and LB ADL tasks out of bed to bedside chair  Pt continues to require assist for functional transfers 2* pt decreased overall strength   Pt will require in-pt rehab to continue to address pt noted deficits which currently impair pt ADL and functional mob  Plan   Treatment Interventions ADL retraining; Activityengagement; Functional transfer training;Patient/family training   Goal Expiration Date 10/26/17   Treatment Day 2   OT Frequency 3-5x/wk   Recommendation   OT Discharge Recommendation Short Term Rehab   Barthel Index   Grooming Score 0   Dressing Score 5   Toilet Use Score 5   Transfers (Bed/Chair) Score 5   Mobility (Level Surface) Score 0   Modified Tracy Scale   Modified Tracy Scale 4       Wendy LEIA/L

## 2017-10-15 NOTE — PLAN OF CARE
Problem: PAIN - ADULT  Goal: Verbalizes/displays adequate comfort level or baseline comfort level  Interventions:  - Encourage patient to monitor pain and request assistance  - Assess pain using appropriate pain scale  - Administer analgesics based on type and severity of pain and evaluate response  - Implement non-pharmacological measures as appropriate and evaluate response  - Consider cultural and social influences on pain and pain management  - Notify physician/advanced practitioner if interventions unsuccessful or patient reports new pain   Outcome: Progressing      Problem: SAFETY ADULT  Goal: Patient will remain free of falls  INTERVENTIONS:  - Assess patient frequently for physical needs  -  Identify cognitive and physical deficits and behaviors that affect risk of falls    -  [place] fall precautions as indicated by assessment   - Educate patient/family on patient safety including physical limitations  - Instruct patient to call for assistance with activity based on assessment  - Modify environment to reduce risk of injury  - Consider OT/PT consult to assist with strengthening/mobility    Outcome: Progressing    Goal: Maintain or return to baseline ADL function  INTERVENTIONS:  -  Assess patient's ability to carry out ADLs; assess patient's baseline for ADL function and identify physical deficits which impact ability to perform ADLs (bathing, care of mouth/teeth, toileting, grooming, dressing, etc )  - Assess/evaluate cause of self-care deficits   - Assess range of motion  - Assess patient's mobility; develop plan if impaired  - Assess patient's need for assistive devices and provide as appropriate  - Encourage maximum independence but intervene and supervise when necessary  ¯ Involve family in performance of ADLs  ¯ Assess for home care needs following discharge   ¯ Request OT consult to assist with ADL evaluation and planning for discharge  ¯ Provide patient education as appropriate   Outcome: Progressing      Problem: Potential for Falls  Goal: Patient will remain free of falls  INTERVENTIONS:  - Assess patient frequently for physical needs  -  Identify cognitive and physical deficits and behaviors that affect risk of falls    -  [place] fall precautions as indicated by assessment   - Educate patient/family on patient safety including physical limitations  - Instruct patient to call for assistance with activity based on assessment  - Modify environment to reduce risk of injury  - Consider OT/PT consult to assist with strengthening/mobility    Outcome: Progressing

## 2017-10-15 NOTE — PLAN OF CARE
Problem: PAIN - ADULT  Goal: Verbalizes/displays adequate comfort level or baseline comfort level  Interventions:  - Encourage patient to monitor pain and request assistance  - Assess pain using appropriate pain scale  - Administer analgesics based on type and severity of pain and evaluate response  - Implement non-pharmacological measures as appropriate and evaluate response  - Consider cultural and social influences on pain and pain management  - Notify physician/advanced practitioner if interventions unsuccessful or patient reports new pain   Outcome: Progressing      Problem: SAFETY ADULT  Goal: Patient will remain free of falls  INTERVENTIONS:  - Assess patient frequently for physical needs  -  Identify cognitive and physical deficits and behaviors that affect risk of falls    -  [place] fall precautions as indicated by assessment   - Educate patient/family on patient safety including physical limitations  - Instruct patient to call for assistance with activity based on assessment  - Modify environment to reduce risk of injury  - Consider OT/PT consult to assist with strengthening/mobility    Outcome: Progressing    Goal: Maintain or return to baseline ADL function  INTERVENTIONS:  -  Assess patient's ability to carry out ADLs; assess patient's baseline for ADL function and identify physical deficits which impact ability to perform ADLs (bathing, care of mouth/teeth, toileting, grooming, dressing, etc )  - Assess/evaluate cause of self-care deficits   - Assess range of motion  - Assess patient's mobility; develop plan if impaired  - Assess patient's need for assistive devices and provide as appropriate  - Encourage maximum independence but intervene and supervise when necessary  ¯ Involve family in performance of ADLs  ¯ Assess for home care needs following discharge   ¯ Request OT consult to assist with ADL evaluation and planning for discharge  ¯ Provide patient education as appropriate   Outcome: Progressing      Problem: DISCHARGE PLANNING  Goal: Discharge to home or other facility with appropriate resources  INTERVENTIONS:  - Identify barriers to discharge w/patient and caregiver  - Arrange for needed discharge resources and transportation as appropriate  - Identify discharge learning needs (meds, wound care, etc )  - Arrange for interpretive services to assist at discharge as needed  - Refer to Case Management Department for coordinating discharge planning if the patient needs post-hospital services based on physician/advanced practitioner order or complex needs related to functional status, cognitive ability, or social support system   Outcome: Progressing      Problem: METABOLIC, FLUID AND ELECTROLYTES - ADULT  Goal: Fluid balance maintained  INTERVENTIONS:  - Monitor labs and assess for signs and symptoms of volume excess or deficit  - Monitor I/O and WT  - Instruct patient on fluid and nutrition as appropriate   Outcome: Progressing      Problem: Prexisting or High Potential for Compromised Skin Integrity  Goal: Skin integrity is maintained or improved  INTERVENTIONS:  - Identify patients at risk for skin breakdown  - Assess and monitor skin integrity  - Assess and monitor nutrition and hydration status  - Monitor labs (i e  albumin)  - Assess for incontinence   - Turn and reposition patient  - Assist with mobility/ambulation  - Relieve pressure over bony prominences  - Avoid friction and shearing  - Provide appropriate hygiene as needed including keeping skin clean and dry  - Evaluate need for skin moisturizer/barrier cream  - Collaborate with interdisciplinary team (i e  Nutrition, Rehabilitation, etc )   - Patient/family teaching   Outcome: Progressing      Problem: Potential for Falls  Goal: Patient will remain free of falls  INTERVENTIONS:  - Assess patient frequently for physical needs  -  Identify cognitive and physical deficits and behaviors that affect risk of falls    -  [place] fall precautions as indicated by assessment   - Educate patient/family on patient safety including physical limitations  - Instruct patient to call for assistance with activity based on assessment  - Modify environment to reduce risk of injury  - Consider OT/PT consult to assist with strengthening/mobility    Outcome: Progressing      Problem: DISCHARGE PLANNING - CARE MANAGEMENT  Goal: Discharge to post-acute care or home with appropriate resources  INTERVENTIONS:  - Conduct assessment to determine patient/family and health care team treatment goals, and need for post-acute services based on payer coverage, community resources, and patient preferences, and barriers to discharge  - Address psychosocial, clinical, and financial barriers to discharge as identified in assessment in conjunction with the patient/family and health care team  - Arrange appropriate level of post-acute services according to patient's   needs and preference and payer coverage in collaboration with the physician and health care team  - Communicate with and update the patient/family, physician, and health care team regarding progress on the discharge plan  - Arrange appropriate transportation to post-acute venues  - Discharge to home/facility when medically cleared   Outcome: Progressing

## 2017-10-15 NOTE — PROGRESS NOTES
[facility] Progress Note  Patient: Ric Davis  [de-identified] y o  male   MRN: [id number]  PCP: Lovely Story MD  Unit/Bed#: -[facility] Encounter: [id number]  Date Of Visit: 10/15/17    Assessment:    Principal Problem:    Acute encephalopathy  Active Problems:    Paroxysmal atrial fibrillation (HCC)    Acute on chronic diastolic congestive heart failure ([facility] [address] )    Essential hypertension    Type 2 diabetes mellitus ([facility] [address] )    MADISON (dyspnea on exertion)    S/P aortic valve replacement with bioprosthetic valve    Nasal laceration, sequela    Coronary artery disease involving native coronary artery of native heart    Stage 3 chronic kidney disease    Closed fracture of greater tuberosity of left humerus    Cellulitis      Plan:    · Acute encephalopathy: resolved  · Acute on chronic diastolic heart failure  ? Continues on home regimen  · Left foot pain in context of intraarticular fracture  ? Treating for presumed cellulitis  ? WBC wnl; no fever  ? XR complete  ? Cont ancef  ? ID eval; appreciate input  ? ASO pending   · Closed fracture of greater tuberosity of left humerus  ? Appreciate ortho input  ? NWB LUE  ? Will have ortho follow-up in 2 weeks   · CAD:  ? Cont ASA, statin, BB  · S/p AVR with bioprosthetic valve  ? Continues on home meds  · Type II DM:  ? Continue current regimen   ? ISS and accuchecks       VTE Pharmacologic Prophylaxis:   Pharmacologic: Heparin  Mechanical VTE Prophylaxis in Place: Yes    Patient Centered Rounds: I have performed bedside rounds with nursing staff today  Discussions with Specialists or Other Care Team Provider: none    Education and Discussions with Family / Patient: patient and wife at bedside     Time Spent for Care: 20 minutes  More than 50% of total time spent on counseling and coordination of care as described above      Current Length of Stay: 4 day(s)    Current Patient Status: Inpatient   Certification Statement: The patient will continue to require additional inpatient hospital stay due to IV antibiotics for presumed cellulitis     Discharge Plan / Estimated Discharge Date: TBD based on clinical course    Code Status: Level 1 - Full Code    Subjective:   PT feels well  No fever or chills  Pain in foot is improved  Objective:     Vitals:   Temp (24hrs), Av 7 °F (37 1 °C), Min:98 °F (36 7 °C), Max:99 7 °F (37 6 °C)    HR:  [72-97] 72  Resp:  [16-20] 16  BP: ()/(53-65) 136/63  SpO2:  [93 %-95 %] 95 %  Body mass index is 34 08 kg/m²  Input and Output Summary (last 24 hours): Intake/Output Summary (Last 24 hours) at 10/15/17 1114  Last data filed at 10/15/17 0946   Gross per 24 hour   Intake              970 ml   Output              925 ml   Net               45 ml       Physical Exam:     Physical Exam   HENT:   Mouth/Throat: Oropharynx is clear and moist  No oropharyngeal exudate  Cardiovascular: Normal rate and regular rhythm  Pulmonary/Chest: Effort normal and breath sounds normal  No respiratory distress  He has no wheezes  Abdominal: Soft  Bowel sounds are normal    Musculoskeletal:   LLE: foot with similar appearance  No induration  No warmth; subcutaneous bruising migrating with gravity   Skin: Skin is warm and dry  No rash noted  No erythema  No pallor         Additional Data:     Labs:      Results from last 7 days  Lab Units 10/15/17  0435   WBC Thousand/uL 8 07   HEMOGLOBIN g/dL 10 8*   HEMATOCRIT % 34 5*   PLATELETS Thousands/uL 203   NEUTROS PCT % 60   LYMPHS PCT % 24   MONOS PCT % 9   EOS PCT % 7*       Results from last 7 days  Lab Units 10/15/17  0435  10/11/17  0448   SODIUM mmol/L 137  < > 135*   POTASSIUM mmol/L 4 8  < > 3 8   CHLORIDE mmol/L 100  < > 101   CO2 mmol/L 31  < > 28   BUN mg/dL 47*  < > 36*   CREATININE mg/dL 1 99*  < > 1 90*   CALCIUM mg/dL 9 0  < > 8 5   TOTAL PROTEIN g/dL  --   --  6 4   BILIRUBIN TOTAL mg/dL  --   --  0 61   ALK PHOS U/L  --   --  63   ALT U/L  --   --  16   AST U/L  --   --  18   GLUCOSE RANDOM mg/dL 104  < > 143*   < > = values in this interval not displayed  * I Have Reviewed All Lab Data Listed Above  * Additional Pertinent Lab Tests Reviewed: [address] Admission Reviewed    Imaging:    Imaging Reports Reviewed Today Include:   Imaging Personally Reviewed by Myself Includes:      Recent Cultures (last 7 days):           Last 24 Hours Medication List:     acetaminophen 975 mg Oral Q8H [address]   amLODIPine 2 5 mg Oral Daily   aspirin 81 mg Oral Every Other Day   calcium carbonate-vitamin D 1 tablet Oral Daily   cefazolin 1,000 mg Intravenous Q12H   diclofenac sodium 2 g Topical 4x Daily   febuxostat 40 mg Oral Daily   heparin (porcine) 5,000 Units Subcutaneous Q8H [address]   insulin aspart protamine-insulin aspart 20 Units Subcutaneous BID AC   insulin lispro 1-6 Units Subcutaneous TID With Meals   insulin lispro 1-6 Units Subcutaneous HS   lidocaine 1 patch Transdermal Q24H   metoprolol tartrate 75 mg Oral Q12H [address]   multivitamin-minerals 1 tablet Oral Daily   pantoprazole 40 mg Oral Early Morning   paricalcitol 1 mcg Oral Once per day on Mon Wed Fri   pravastatin 20 mg Oral Daily With Dinner   ramipril 2 5 mg Oral Daily   senna-docusate sodium 1 tablet Oral BID   torsemide 20 mg Oral Every Other Day   torsemide 40 mg Oral Every Other Day        Today, Patient Was Seen By: Verónica Rod MD    ** Please Note: This note has been constructed using a voice recognition system   **

## 2017-10-16 NOTE — PLAN OF CARE
Problem: OCCUPATIONAL THERAPY ADULT  Goal: Performs self-care activities at highest level of function for planned discharge setting  See evaluation for individualized goals  Treatment Interventions: ADL retraining, Functional transfer training, Endurance training, Cognitive reorientation, Patient/family training, Equipment evaluation/education, Compensatory technique education, Activityengagement          See flowsheet documentation for full assessment, interventions and recommendations  Limitation: Decreased Safe judgement during ADL, Decreased UE ROM, Decreased endurance, Decreased self-care trans, Non-func L UE  Prognosis: Good  Assessment: Pt  participated in functional OT session with focus on bed mobility, sitting balance, and attempted sit>stand  Pt  requires mod assist for bed mobility and demonstrates G sitting balance EOB ~ 20 minutes  Pt  requires cues for carryover of NWB L UE sling and remains limited by c/o increased pain anterior L foot  Pt  requires max assist sit>stand and unable to complete due to c/o significant pain L foot  Requires max assist to reposition back to bed  PTA pt  was completely independent and active  At this time would recommend inpt rehab upon d/c and ortho consult/physician follow up to address WB status  Will follow to address goals established on initial evaluation        OT Discharge Recommendation: Short Term Rehab

## 2017-10-16 NOTE — PROGRESS NOTES
[address] [facility] Progress Note  Patient: Modesta Gaviria  [de-identified] y o  male   MRN: [id number]  PCP: Kyung Belle MD  Unit/Bed#: -[facility] Encounter: [id number]  Date Of Visit: 10/16/17    Assessment:    Principal Problem:    Acute encephalopathy  Active Problems:    Paroxysmal atrial fibrillation (HCC)    Acute on chronic diastolic congestive heart failure ([facility] [address] )    Essential hypertension    Type 2 diabetes mellitus ([facility] [address] )    MADISON (dyspnea on exertion)    S/P aortic valve replacement with bioprosthetic valve    Nasal laceration, sequela    Coronary artery disease involving native coronary artery of native heart    Stage 3 chronic kidney disease    Closed fracture of greater tuberosity of left humerus    Cellulitis    Plan:  · Acute encephalopathy: resolved  · Acute on chronic diastolic heart failure  ? Continues on home regimen  · Left foot pain in context of intraarticular fracture  ? ADDENDUM:   ? spoke to pt's podiatrist Yvonne Cotto DPM who notes no change in appearance of XR from Tuesday (done in Dr Manjeet Espinoza office)  ? Treating for presumed cellulitis  ? WBC wnl; no fever  ? XR complete  ? Cont ancef day 3  ? ID eval; appreciate input  ? ASO negative  · Closed fracture of greater tuberosity of left humerus  ? Appreciate ortho input  ? NWB LUE  ? Will have ortho follow-up in 2 weeks   · CAD:  ? Cont ASA, statin, BB  · S/p AVR with bioprosthetic valve  · Continues on home meds  · Type II DM:  ? Continue current regimen   ? ISS and accuchecks   · Dispo:  ? Care meeting today to discuss goals and expectations of his stay       VTE Pharmacologic Prophylaxis:   Pharmacologic: Heparin  Mechanical VTE Prophylaxis in Place: Yes    Patient Centered Rounds: I have performed bedside rounds with nursing staff today  Discussions with Specialists or Other Care Team Provider: ID    Education and Discussions with Family / Patient: patient and family at bedside     Time Spent for Care: 20 minutes    More than 50% of total time spent on counseling and coordination of care as described above  Current Length of Stay: 5 day(s)    Current Patient Status: Inpatient   Certification Statement: The patient will continue to require additional inpatient hospital stay due to improving cellulitis, but still unable to bear weight on extremity; unsafe discharge plan     Discharge Plan / Estimated Discharge Date: TBD based on clinical course; will likely need additional day of abx at least      Code Status: Level 1 - Full Code    Subjective:   Pt believes the foot looks and feels good  Still unable to bear weight (When he did his pain was exacerbated)  No f/c overnight  No other complaints  Objective:     Vitals:   Temp (24hrs), Av 4 °F (36 9 °C), Min:97 8 °F (36 6 °C), Max:99 5 °F (37 5 °C)    HR:  [81-89] 89  Resp:  [18] 18  BP: (122-152)/(56-69) 152/69  SpO2:  [90 %-92 %] 90 %  Body mass index is 33 8 kg/m²  Input and Output Summary (last 24 hours): Intake/Output Summary (Last 24 hours) at 10/16/17 4484  Last data filed at 10/16/17 0715   Gross per 24 hour   Intake              220 ml   Output             2125 ml   Net            -1905 ml       Physical Exam:     Physical Exam   Constitutional: He is oriented to person, place, and time  He appears well-developed and well-nourished  No distress  Cardiovascular: Normal rate and regular rhythm  No murmur heard  Pulmonary/Chest: Effort normal and breath sounds normal  No respiratory distress  He has no wheezes  Abdominal: Soft  Bowel sounds are normal  He exhibits no distension  There is no tenderness  There is no rebound  Musculoskeletal: He exhibits tenderness (along dorsum of foot )  Neurological: He is alert and oriented to person, place, and time  Skin: Skin is warm and dry  No rash noted  He is not diaphoretic  No erythema  Psychiatric: He has a normal mood and affect  His behavior is normal    Nursing note and vitals reviewed      Additional Data: Labs:      Results from last 7 days  Lab Units 10/16/17  0514   WBC Thousand/uL 8 31   HEMOGLOBIN g/dL 11 2*   HEMATOCRIT % 34 2*   PLATELETS Thousands/uL 214   NEUTROS PCT % 62   LYMPHS PCT % 24   MONOS PCT % 8   EOS PCT % 6       Results from last 7 days  Lab Units 10/16/17  0514  10/11/17  0448   SODIUM mmol/L 135*  < > 135*   POTASSIUM mmol/L 4 2  < > 3 8   CHLORIDE mmol/L 98*  < > 101   CO2 mmol/L 29  < > 28   BUN mg/dL 48*  < > 36*   CREATININE mg/dL 1 92*  < > 1 90*   CALCIUM mg/dL 8 9  < > 8 5   TOTAL PROTEIN g/dL  --   --  6 4   BILIRUBIN TOTAL mg/dL  --   --  0 61   ALK PHOS U/L  --   --  63   ALT U/L  --   --  16   AST U/L  --   --  18   GLUCOSE RANDOM mg/dL 118  < > 143*   < > = values in this interval not displayed  * I Have Reviewed All Lab Data Listed Above  * Additional Pertinent Lab Tests Reviewed:  All Labs Within Last 24 Hours Reviewed    Imaging:    Imaging Reports Reviewed Today Include:   Imaging Personally Reviewed by Myself Includes:      Recent Cultures (last 7 days):           Last 24 Hours Medication List:     acetaminophen 975 mg Oral Q8H [address]   amLODIPine 2 5 mg Oral Daily   aspirin 81 mg Oral Every Other Day   calcium carbonate-vitamin D 1 tablet Oral Daily   cefazolin 1,000 mg Intravenous Q12H   diclofenac sodium 2 g Topical 4x Daily   febuxostat 40 mg Oral Daily   heparin (porcine) 5,000 Units Subcutaneous Q8H [address]   insulin aspart protamine-insulin aspart 20 Units Subcutaneous BID AC   insulin lispro 1-6 Units Subcutaneous TID With Meals   insulin lispro 1-6 Units Subcutaneous HS   lidocaine 1 patch Transdermal Q24H   metoprolol tartrate 75 mg Oral Q12H [address]   multivitamin-minerals 1 tablet Oral Daily   pantoprazole 40 mg Oral Early Morning   paricalcitol 1 mcg Oral Once per day on Mon Wed Fri   pravastatin 20 mg Oral Daily With Dinner   ramipril 2 5 mg Oral Daily   senna-docusate sodium 1 tablet Oral BID   torsemide 20 mg Oral Every Other Day   torsemide 40 mg Oral Every Other Day        Today, Patient Was Seen By: Tammy Betancourt MD    ** Please Note: This note has been constructed using a voice recognition system   **

## 2017-10-16 NOTE — PROGRESS NOTES
Progress Note - Infectious Disease   Geraldine Divers  [de-identified] y o  male MRN: [id number]  Unit/Bed#: -01 Encounter: [id number]      Impression:  1  Hemorrhagic cellulitis of the left foot  2  Mildly displaced intra-articular fracture of the base of the proximal phalanx of the left great toe  3  DM type 2 with PAD S/P bilateral lower extremity vascular bypass procedures and nephropathy with CKD  4  Traumatic facial laceration  5  S/P encephalopathy secondary to hypoglycemia and tramadol  6  S/P bovine AVR and CABG    Recommendations:  1   results of ASO titer are negative  2  Extent of erythema and intensity of the area on the left foot is unchanged from yesterday  We will continue cefazolin 1 g q 12 hours IV for now  3  Keep left lower extremity elevated    Antibiotics:  1  Cefazolin 1 g q 12 hours IV, day 2  Subjective: The patient still has left foot pain with pressure and  is not able to bear weight on the left foot without significant pain  Denies fevers, chills, or sweats  Denies nausea, vomiting, or diarrhea  Objective:  Vitals:  HR:  [78-89] 86  Resp:  [18] 18  BP: (129-152)/(58-69) 129/58  SpO2:  [90 %-95 %] 95 %  Temp (24hrs), Av 4 °F (36 9 °C), Min:97 8 °F (36 6 °C), Max:99 5 °F (37 5 °C)  Current: Temperature: 98 4 °F (36 9 °C)    Physical Exam:     General Appearance:  Alert, nontoxic, no acute distress  Throat: Oropharynx moist without lesions  Lips, mucosa, and tongue normal   Neck: Supple, symmetrical, trachea midline, no adenopathy,  no tenderness/mass/nodules   Lungs:   Clear to auscultation bilaterally, no audible wheezes, rhonchi or rales; respirations unlabored   Heart:  Sternotomy scar Regular rate and rhythm, S1, S2 normal, grade 1/6 aortic systolic murmur    Abdomen:   Soft, non-tender, non-distended, positive bowel sounds    No masses, no organomegaly    No CVA tenderness   Extremities: Surgical scars, marked contused areas of the left 1st and 2nd toe with edema, hemorrhagic erythema of the left dorsal area of the foot is unchanged from yesterday  There is still direct tenderness over the area   Skin: As above, surgical scars, facial lacerations that are healing  Invasive Devices     Peripheral Intravenous Line            Peripheral IV 10/15/17 Right Antecubital 1 day                Labs, Imaging, & Other studies:   All pertinent labs were personally reviewed    Results from last 7 days  Lab Units 10/16/17  0514 10/15/17  0435 10/14/17  0815   WBC Thousand/uL 8 31 8 07 7 46   HEMOGLOBIN g/dL 11 2* 10 8* 11 5*   PLATELETS Thousands/uL 214 203 187       Results from last 7 days  Lab Units 10/16/17  0514 10/15/17  0435 10/13/17  0505  10/11/17  0448   SODIUM mmol/L 135* 137 137  < > 135*   POTASSIUM mmol/L 4 2 4 8 4 4  < > 3 8   CHLORIDE mmol/L 98* 100 102  < > 101   CO2 mmol/L 29 31 28  < > 28   ANION GAP mmol/L 8 6 7  < > 6   BUN mg/dL 48* 47* 41*  < > 36*   CREATININE mg/dL 1 92* 1 99* 1 84*  < > 1 90*   EGFR ml/min/1 73sq m 32 31 34  < > 33   GLUCOSE RANDOM mg/dL 118 104 122  < > 143*   CALCIUM mg/dL 8 9 9 0 8 8  < > 8 5   AST U/L  --   --   --   --  18   ALT U/L  --   --   --   --  16   ALK PHOS U/L  --   --   --   --  63   TOTAL PROTEIN g/dL  --   --   --   --  6 4   ALBUMIN g/dL  --   --   --   --  2 9*   BILIRUBIN TOTAL mg/dL  --   --   --   --  0 61   < > = values in this interval not displayed

## 2017-10-16 NOTE — PHYSICAL THERAPY NOTE
PT TREATMENT       10/16/17 1245   Pain Assessment   Pain Assessment 0-10   Pain Score 6   Pain Type Acute pain   Pain Location Foot   Pain Orientation Left   Restrictions/Precautions   LUE Weight Bearing Per Order NWB   LLE Weight Bearing Per Order (awaiting clarification per physician )   Braces or Orthoses Sling  (L UE )   Other Precautions Pain; Fall Risk   General   Chart Reviewed Yes   Response to Previous Treatment Patient with no complaints from previous session  Family/Caregiver Present Yes  (SPOUSE)   Cognition   Overall Cognitive Status WFL   Subjective   Subjective "I CANT STAND ON MY FOOT"   Endurance Deficit   Endurance Deficit Yes   Endurance Deficit Description PAIN (L FOOT)    Activity Tolerance   Activity Tolerance Patient limited by pain   Exercises   Heelslides Supine;10 reps;Bilateral;AROM   Glute Sets Supine;10 reps;Bilateral;AROM   Hip Flexion Supine;10 reps;Bilateral;AROM  (SLR)   Hip Abduction Supine;10 reps;AROM; Bilateral   Knee AROM Short Arc Quad Supine;10 reps;Bilateral;AROM   Ankle Pumps Supine;20 reps;AROM; Right   Assessment   Prognosis Fair   Problem List Decreased strength;Decreased endurance; Impaired balance;Decreased mobility; Decreased skin integrity;Pain   Assessment PT INITIATED TREATMENT SESSION WHICH PATIENT AGREEABLE TO  PRIOR TO PT TREATMENT SESSION OT HAD RECENTLY PROVIDED SERVICES TO PATIENT AND REPORTED TRIALING SEATED POSITION EOB X20 MINUTES IN ADDITION TO SIT-->STAND TRANSFER  PER OT PATIENT REQUIRED MOD/MAX-X2 TO TRIAL STANDING HOWEVER WAS LIMITED SECONDARY TO PAIN IN L FOOT  DURING PT TREATMENT SESSION PATIENT PERFORMED ACTIVE B/L LE THER-EX DURING WHICH PT PROVIDED EDUCATION ON PURPOSE OF VARIOUS PRESCRIBED EXERCISES  HE DEMONSTRATED GOOD TOLERANCE (NO INCREASE IN PAIN) AND POST TREATMENT SESSION WAS SUPINE IN BED WITH L LE ELEVATED ON PILLOW AND HEEL OFFLOADED   PATIENT PRESENTING WITHOUT FURTHER PT QUESTIONS AT THIS TIME  AWAITING CLARIFICATION OF WB STATUS OF L LE FOR FUTURE OOB MOBILITY (RECOMMEND MAINTENANCE OF L LE NWB UNTIL FURTHER CLARIFICATION) IF PATIENT REQUESTING OOB MOBILITY  RECOMMENDATION FOR STR REMAINS APPROPRIATE AS PATIENT'S MOBILITY REMAINS SIGNIFICANLTY LIMITED AS COMPARED TO BASELINE  HE WILL BENEFIT FROM CONTINUED SKILLED INPT PT THIS ADMISSION IN ORDER TO ACHIEVE MAXIMIAL FUNCTION AND SAFETY  Goals   Patient Goals "LETS DO EXERCISE THEN"   [facility] Expiration Date 10/22/17   Treatment Day 3   Plan   Treatment/Interventions Functional transfer training;LE strengthening/ROM; Therapeutic exercise; Endurance training;Bed mobility;OT   Progress Slow progress, medical status limitations  (PAIN IN L FOOT )   PT Frequency 5x/wk; Weekend  (1-2X/WEEKEND )   Recommendation   Recommendation Short-term skilled PT   PT - OK to Discharge Yes  (TO STR WHEN MED SHAHNAZ )   Hearne Reasons, PT

## 2017-10-16 NOTE — CASE MANAGEMENT
Continued Stay Review    Date: 10/15    Vital Signs: Temp (24hrs), Av 7 °F (37 1 °C), Min:98 °F (36 7 °C), Max:99 7 °F (37 6 °C)     HR:  [72-97] 72  Resp:  [16-20] 16  BP: ()/(53-65) 136/63  SpO2:  [93 %-95 %] 95 %  Body mass index is 34 08 kg/m²       Medications:   Scheduled Meds:   acetaminophen 975 mg Oral Q8H [address]   amLODIPine 2 5 mg Oral Daily   aspirin 81 mg Oral Every Other Day   calcium carbonate-vitamin D 1 tablet Oral Daily   cefazolin 1,000 mg Intravenous Q12H   diclofenac sodium 2 g Topical 4x Daily   febuxostat 40 mg Oral Daily   heparin (porcine) 5,000 Units Subcutaneous Q8H [address]   insulin aspart protamine-insulin aspart 20 Units Subcutaneous BID AC   insulin lispro 1-6 Units Subcutaneous TID With Meals   insulin lispro 1-6 Units Subcutaneous HS   lidocaine 1 patch Transdermal Q24H   metoprolol tartrate 75 mg Oral Q12H [address]   multivitamin-minerals 1 tablet Oral Daily   pantoprazole 40 mg Oral Early Morning   paricalcitol 1 mcg Oral Once per day on    pravastatin 20 mg Oral Daily With Dinner   ramipril 2 5 mg Oral Daily   senna-docusate sodium 1 tablet Oral BID   torsemide 20 mg Oral Every Other Day   torsemide 40 mg Oral Every Other Day     Continuous Infusions:    PRN Meds: aluminum-magnesium hydroxide-simethicone    methocarbamol    nitroglycerin    ondansetron    oxyCODONE    Abnormal Labs/Diagnostic Results:    BUN 47 (H) 5 - 25 mg/dL     Creatinine 1 99 (H) 0 60 - 1 30 mg/dL      Age/Sex: [de-identified] y o  male     Assessment/Plan:   Assessment:     Principal Problem:    Acute encephalopathy  Active Problems:    Paroxysmal atrial fibrillation (HCC)    Acute on chronic diastolic congestive heart failure (HCC)    Essential hypertension    Type 2 diabetes mellitus (HCC)    MADISON (dyspnea on exertion)    S/P aortic valve replacement with bioprosthetic valve    Nasal laceration, sequela    Coronary artery disease involving native coronary artery of native heart    Stage 3 chronic kidney disease    Closed fracture of greater tuberosity of left humerus    Cellulitis     Plan:     · Acute encephalopathy: resolved  · Acute on chronic diastolic heart failure  ? Continues on home regimen  · Left foot pain in context of intraarticular fracture  ? Treating for presumed cellulitis  ? WBC wnl; no fever  ? XR complete  ? Cont ancef  ? ID eval; appreciate input  ? ASO pending   · Closed fracture of greater tuberosity of left humerus  ? Appreciate ortho input  ? NWB LUE  ? Will have ortho follow-up in 2 weeks   · CAD:  ? Cont ASA, statin, BB  · S/p AVR with bioprosthetic valve  ? Continues on home meds  · Type II DM:  ? Continue current regimen   ? ISS and accuchecks      VTE Pharmacologic Prophylaxis:   Pharmacologic: Heparin  Mechanical VTE Prophylaxis in Place: Yes    Current Length of Stay: 4 day(s)     Current Patient Status: Inpatient   Certification Statement: The patient will continue to require additional inpatient hospital stay due to IV antibiotics for presumed cellulitis      Discharge Plan: Inpatient rehab recommended but pt declining   Team treatment scheduled for 10/16 regarding d/c plan

## 2017-10-16 NOTE — SOCIAL WORK
Escalated Team Meeting held today with the following individuals present: Dr [facility]), patient, Angelia Corbett (patient's wife), Yvette Cobb (MS2 PCM), Omer Moore (PT clinical manager), Rogerio Tabor (OT clinical manager), Jim Valdovinos (CM), and Colt Jose (CSWS)  Patient complains of pain when bearing weight on his left foot  Patient expresses that a few days ago he was walking fine and does not understand why this is worse  Dr Cherise Jarrett explained that repeated use of foot could have caused exacerbation and could explain his increase in patient  Dr Cherise Jarrett discussed that he will need to speak with ID to discuss if patient can be transitioned from IV antibiotics to PO antibiotics  If patient is switched to PO medications he could be cleared for discharge as early as tomorrow, 10/17/17  Discussed recommendations from PT/OT of patient going to SNF for STR  Expressed concerns that patient is a high fall risk  PT/OT also clarified that they will only see patient's one time per day  Patient's wife expressed dissatisfaction that patient is not seen multiple times per day  OT recommends a repeat x-ray of patient's foot, possible consult to Ortho if a boot is needed based on x-ray results  At this time Dr Cherise Jarrett had left the room, PCM to follow up with nursing to consult about ordering a x-ray per wife's request      Patient's wife expressed concerns about the rehab facilities as she has had "bad experiences" with facilities  CSWS encouraged patient's wife to call and tour facilities  Per wife she is unable to tour facilities because "I don't walk good " Discussed calling or looking online at different facilities or having a trusted family member visit facilities  CSWS provided wife with the SNF list and explained preferred providers and CMS ratings  Patient's daughter will be in to visit today after work and will also review the list  Discussed that CM will need choices by tomorrow morning   Per wife "I don't think that is enough time to look for a place "  Patient's wife further stated "medicare told me if I am not comfortable with his discharge date that I should call back " CSWS again stated that SNF choices will need to be provided tomorrow, 10/17/17, if they are agreeable to STR, if not patient will be discharged home with [facility] however this remains a safety concern due to the recommendation of STR and patient being a high fall risk

## 2017-10-16 NOTE — OCCUPATIONAL THERAPY NOTE
Occupational Therapy Treatment Note      Rossana Stephens     10/16/2017    Patient Active Problem List   Diagnosis    Paroxysmal atrial fibrillation (HCC)    Acute on chronic diastolic congestive heart failure (HCC)    Essential hypertension    Acute respiratory failure with hypoxia (HCC)    Type 2 diabetes mellitus (HCC)    MADISON (dyspnea on exertion)    Cough    S/P aortic valve replacement with bioprosthetic valve    Acute encephalopathy    Nasal laceration, sequela    Coronary artery disease involving native coronary artery of native heart    Stage 3 chronic kidney disease    Closed fracture of greater tuberosity of left humerus    Cellulitis       Past Medical History:   Diagnosis Date    Cardiac disease     [facility] [address] [facility]     [facility]     Hypertension     Renal disorder        Past Surgical History:   Procedure Laterality Date    AORTIC VALVE REPLACEMENT      CARDIAC VALVE REPLACEMENT      CATARACT EXTRACTION, BILATERAL      COLONOSCOPY      REPLACEMENT TOTAL KNEE BILATERAL      VASCULAR SURGERY          10/16/17 1510   Restrictions/Precautions   LUE Weight Bearing Per Order NWB   Other Precautions (wbat L LE however increased pain- MD aware, rec ortho consul)   General   Family/Caregiver Present wife present during session    Lifestyle   Autonomy complete independence PTA- all self care, driving, working, active  Educated patient on the importance of participation in activity including activity EOB      Pain Assessment   Pain Assessment 0-10   Pain Score 4  (pain increased dependent position- 6, WB 10)   Hospital Pain Intervention(s) Repositioned   Response to Interventions unchanged   Bed Mobility   Supine to Sit 3  Moderate assistance   Sit to Supine 3  Moderate assistance   Transfers   Sit to Stand 2  Maximal assistance   Additional items (1 HANDED WALKER)   Cognition   Overall Cognitive Status [facility]   Arousal/Participation Alert   Attention Within functional limits   Activity Tolerance   Activity Tolerance Patient limited by pain   Medical Staff Made Aware SPOKE WITH NURSING    Assessment   Assessment Pt  participated in functional OT session with focus on bed mobility, sitting balance, and attempted sit>stand  Pt  requires mod assist for bed mobility and demonstrates G sitting balance EOB ~ 20 minutes  Pt  requires cues for carryover of NWB L UE sling and remains limited by c/o increased pain anterior L foot  Pt  requires max assist sit>stand and unable to complete due to c/o significant pain L foot  Requires max assist to reposition back to bed  PTA pt  was completely independent and active  At this time would recommend inpt rehab upon d/c and ortho consult/physician follow up to address WB status  Will follow to address goals established on initial evaluation  Plan   Treatment Interventions ADL retraining;Functional transfer training;Patient/family training;Equipment evaluation/education; Activityengagement   Goal Expiration Date 10/26/17   Treatment Day 3   OT Frequency 3-5x/wk   Recommendation   OT Discharge Recommendation Short Term Rehab     Lindsay León, OT

## 2017-10-16 NOTE — PLAN OF CARE
Problem: PAIN - ADULT  Goal: Verbalizes/displays adequate comfort level or baseline comfort level  Interventions:  - Encourage patient to monitor pain and request assistance  - Assess pain using appropriate pain scale  - Administer analgesics based on type and severity of pain and evaluate response  - Implement non-pharmacological measures as appropriate and evaluate response  - Consider cultural and social influences on pain and pain management  - Notify physician/advanced practitioner if interventions unsuccessful or patient reports new pain   Outcome: Progressing      Problem: SAFETY ADULT  Goal: Patient will remain free of falls  INTERVENTIONS:  - Assess patient frequently for physical needs  -  Identify cognitive and physical deficits and behaviors that affect risk of falls  -  [place] fall precautions as indicated by assessment   - Educate patient/family on patient safety including physical limitations  - Instruct patient to call for assistance with activity based on assessment  - Modify environment to reduce risk of injury  - Consider OT/PT consult to assist with strengthening/mobility    Outcome: Not Progressing      Problem: Potential for Falls  Goal: Patient will remain free of falls  INTERVENTIONS:  - Assess patient frequently for physical needs  -  Identify cognitive and physical deficits and behaviors that affect risk of falls    -  [place] fall precautions as indicated by assessment   - Educate patient/family on patient safety including physical limitations  - Instruct patient to call for assistance with activity based on assessment  - Modify environment to reduce risk of injury  - Consider OT/PT consult to assist with strengthening/mobility    Outcome: Not Progressing

## 2017-10-16 NOTE — PLAN OF CARE
Problem: PHYSICAL THERAPY ADULT  Goal: Performs mobility at highest level of function for planned discharge setting  See evaluation for individualized goals  Treatment/Interventions: Functional transfer training, LE strengthening/ROM, Therapeutic exercise, Endurance training, Bed mobility, Gait training, Elevations          See flowsheet documentation for full assessment, interventions and recommendations  Outcome: Not Progressing  Prognosis: Fair  Problem List: Decreased strength, Decreased endurance, Impaired balance, Decreased mobility, Decreased skin integrity, Pain  Assessment: PT INITIATED TREATMENT SESSION WHICH PATIENT AGREEABLE TO  PRIOR TO PT TREATMENT SESSION OT HAD RECENTLY PROVIDED SERVICES TO PATIENT AND REPORTED TRIALING SEATED POSITION EOB X20 MINUTES IN ADDITION TO SIT-->STAND TRANSFER  PER OT PATIENT REQUIRED MOD/MAX-X2 TO TRIAL STANDING HOWEVER WAS LIMITED SECONDARY TO PAIN IN L FOOT  DURING PT TREATMENT SESSION PATIENT PERFORMED ACTIVE B/L LE THER-EX DURING WHICH PT PROVIDED EDUCATION ON PURPOSE OF VARIOUS PRESCRIBED EXERCISES  HE DEMONSTRATED GOOD TOLERANCE (NO INCREASE IN PAIN) AND POST TREATMENT SESSION WAS SUPINE IN BED WITH L LE ELEVATED ON PILLOW AND HEEL OFFLOADED  PATIENT PRESENTING WITHOUT FURTHER PT QUESTIONS AT THIS TIME  AWAITING CLARIFICATION OF WB STATUS OF L LE FOR FUTURE OOB MOBILITY (RECOMMEND MAINTENANCE OF L LE NWB UNTIL FURTHER CLARIFICATION) IF PATIENT REQUESTING OOB MOBILITY  RECOMMENDATION FOR STR REMAINS APPROPRIATE AS PATIENT'S MOBILITY REMAINS SIGNIFICANLTY LIMITED AS COMPARED TO BASELINE  HE WILL BENEFIT FROM CONTINUED SKILLED INPT PT THIS ADMISSION IN ORDER TO ACHIEVE MAXIMIAL FUNCTION AND SAFETY  Barriers to Discharge: Decreased caregiver support  Barriers to Discharge Comments: Pt wife reported on evaluation 10/12 concerns with A pt at home 2* to balance deficits  Reported daughter returned to work and she was worried about caring for pt at home  Recommendation: (S) Short-term skilled PT     PT - OK to Discharge: (S) Yes (TO STR WHEN MED [facility] )    See flowsheet documentation for full assessment     Jose Nunez, PT

## 2017-10-17 NOTE — PROGRESS NOTES
Noticed a medium-sized lump on top of patient's R hand- patient states it is painful when I palpate, patient's daughter states the lump was larger when he first fell, and is now getting smaller  Lump is circular in shape, and feels hard  Called Rae Ward with [facility] to make the team aware, Nydia Corrigan will come to the bedside later tonight to examine   Will continue to monitor

## 2017-10-17 NOTE — PROGRESS NOTES
Progress Note - Infectious Disease   Kaden Gun  [de-identified] y o  male MRN: [id number]  Unit/Bed#: -01 Encounter: [id number]      Impression:  1  Hemorrhagic cellulitis of the left foot  2  Mildly displaced intra-articular fracture of the base of the proximal phalanx of the left great toe  3  DM type 2 with PAD S/P bilateral lower extremity vascular bypass procedures and nephropathy with CKD  4  Traumatic facial laceration  5  S/P encephalopathy secondary to hypoglycemia and tramadol  6  S/P bovine AVR and CABG    Recommendations:  1   results of ASO titer are negative  2  Extent of erythema and intensity of the area on the left foot is slightly decreased from yesterday  Has less tenderness  We will continue cefazolin 1 g q 12 hours IV for now  3  Keep left lower extremity elevated    Antibiotics:  1  Cefazolin 1 g q 12 hours IV, day 3  Subjective:  He said that his left foot pain is somewhat decreased from yesterday and he was able to stand on it for a short time  Denies fevers, chills, or sweats  Denies nausea, vomiting, or diarrhea  Objective:  Vitals:  HR:  [73-86] 82  Resp:  [18] 18  BP: (110-163)/(56-72) 163/72  SpO2:  [92 %-95 %] 94 %  Temp (24hrs), Av 2 °F (36 8 °C), Min:97 5 °F (36 4 °C), Max:98 7 °F (37 1 °C)  Current: Temperature: 97 5 °F (36 4 °C)    Physical Exam:     General Appearance:  Alert, nontoxic, no acute distress  Throat: Oropharynx moist without lesions  Lips, mucosa, and tongue normal   Neck: Supple, symmetrical, trachea midline, no adenopathy,  no tenderness/mass/nodules   Lungs:   Clear to auscultation bilaterally, no audible wheezes, rhonchi or rales; respirations unlabored   Heart:  Sternotomy scar Regular rate and rhythm, S1, S2 normal, grade 1/6 aortic systolic murmur    Abdomen:   Soft, non-tender, non-distended, positive bowel sounds    No masses, no organomegaly    No CVA tenderness   Extremities: Surgical scars, marked contused areas of the left 1st and 2nd toe with edema, hemorrhagic erythema of the left dorsal area of the foot is unchanged from yesterday  There is less direct tenderness over the area   Skin: As above, surgical scars, facial lacerations that are healing  Invasive Devices     Peripheral Intravenous Line            Peripheral IV 10/15/17 Right Antecubital 2 days                Labs, Imaging, & Other studies:   All pertinent labs were personally reviewed    Results from last 7 days  Lab Units 10/17/17  0450 10/16/17  0514 10/15/17  0435   WBC Thousand/uL 7 45 8 31 8 07   HEMOGLOBIN g/dL 11 2* 11 2* 10 8*   PLATELETS Thousands/uL 216 214 203       Results from last 7 days  Lab Units 10/17/17  0450 10/16/17  0514 10/15/17  0435  10/11/17  0448   SODIUM mmol/L 138 135* 137  < > 135*   POTASSIUM mmol/L 4 3 4 2 4 8  < > 3 8   CHLORIDE mmol/L 101 98* 100  < > 101   CO2 mmol/L 29 29 31  < > 28   ANION GAP mmol/L 8 8 6  < > 6   BUN mg/dL 48* 48* 47*  < > 36*   CREATININE mg/dL 1 83* 1 92* 1 99*  < > 1 90*   EGFR ml/min/1 73sq m 34 32 31  < > 33   GLUCOSE RANDOM mg/dL 130 118 104  < > 143*   CALCIUM mg/dL 9 1 8 9 9 0  < > 8 5   AST U/L  --   --   --   --  18   ALT U/L  --   --   --   --  16   ALK PHOS U/L  --   --   --   --  63   TOTAL PROTEIN g/dL  --   --   --   --  6 4   ALBUMIN g/dL  --   --   --   --  2 9*   BILIRUBIN TOTAL mg/dL  --   --   --   --  0 61   < > = values in this interval not displayed

## 2017-10-17 NOTE — SOCIAL WORK
CSWS and MSW CM met with patient and his wife, Jhony Lopes in patient's room to review SNF choices  Discussed that patient could be discharged as early as tomorrow, 10/18/17  Patient's wife choices were: [facility], [facility], [facility]  At this time patient and wife would like to see what facility is willing to accept patient and will then decide their preference for STR once all above facilities make a determination  MSW CM to make referrals to STR  Both patient and wife consented to above referrals being sent today

## 2017-10-17 NOTE — SOCIAL WORK
Pts wife Arik Lynn supplied [facility] of choices: [facility], [facility], [facility], [facility]  Sent referrals via [facility]

## 2017-10-17 NOTE — PLAN OF CARE
Problem: OCCUPATIONAL THERAPY ADULT  Goal: Performs self-care activities at highest level of function for planned discharge setting  See evaluation for individualized goals  Treatment Interventions: ADL retraining, Functional transfer training, Endurance training, Cognitive reorientation, Patient/family training, Equipment evaluation/education, Compensatory technique education, Activityengagement          See flowsheet documentation for full assessment, interventions and recommendations  Outcome: Progressing  Limitation: Decreased Safe judgement during ADL, Decreased UE ROM, Decreased endurance, Decreased self-care trans, Non-func L UE  Prognosis: Good  Assessment: Pt  participated in functional OT treatment with focus on independence with basic self care  Noted much improvement from previous session  Pt  requires min assist for bed mobility demonstrating f+/g sitting balance EOB- fluctuates with task and pain level  Pt  able to complete UB self care with min assist with cues for NWB LUE and LB self care with max assist however noted increased standing balance and tolerance requiring initially min assist sit>stand with cues for hand placement 1 handed walker  Pt  able to complete transfer to bedside chair and repositioned with foot elevated on stool  Reports pain level 5 post activity  From OT standpoint conitnues to demonstrate significant progress however would benefit from inpt rehab to maximize independence  Will follow to address previously stated goals        OT Discharge Recommendation: Short Term Rehab

## 2017-10-17 NOTE — PROGRESS NOTES
[facility] Internal Medicine Progress Note  Patient: William Jacobsen  [de-identified] y o  male   MRN: [id number]  PCP: Danyell Bateman MD  Unit/Bed#: [facility] Encounter: [id number]  Date Of Visit: 10/17/17    Assessment:    Principal Problem:    Acute encephalopathy  Active Problems:    Paroxysmal atrial fibrillation (HCC)    Acute on chronic diastolic congestive heart failure ([facility] [address] )    Essential hypertension    Type 2 diabetes mellitus ([facility] [address] )    MADISON (dyspnea on exertion)    S/P aortic valve replacement with bioprosthetic valve    Nasal laceration, sequela    Coronary artery disease involving native coronary artery of native heart    Stage 3 chronic kidney disease    Closed fracture of greater tuberosity of left humerus    Cellulitis      Plan:    · Acute encephalopathy: resolved  · Acute on chronic diastolic heart failure  ? Continues on home regimen, torsemide 40mg & 20 mg EOD  · Left foot pain in context of intraarticular fracture:  ? Xray showing intraarticular fracture of left proximal phalynx  ? Evaluated by ID & suspect hemorrhagic cellulitis - being treated with I ancef - foot looks better - cont ancef D-3 - f/u ID rec on timing to change to PO  ? ASO negative  · Closed fracture of greater tuberosity of left humerus  ? Appreciate ortho input  ? NWB LUE  ? Will have ortho follow-up in 2 weeks   · CAD:  ? Cont ASA, statin, BB  · S/p AVR with bioprosthetic valve  ? Continues on home meds  · Type II DM:  ? Continue current regimen, BS acceptable   ? ISS and accuchecks   · Dispo: wife talking about rehab places but not investigated them      VTE Pharmacologic Prophylaxis:   Pharmacologic: Heparin  Mechanical VTE Prophylaxis in Place: Yes    Patient Centered Rounds: I have performed bedside rounds with nursing staff today  Discussions with Specialists or Other Care Team Provider:     Education and Discussions with Family / Patient: patient & wife    Time Spent for Care: 45 minutes    More than 50% of total time spent on counseling and coordination of care as described above  Current Length of Stay: 6 day(s)    Current Patient Status: Inpatient   Certification Statement: The patient will continue to require additional inpatient hospital stay due to IV abx & Dc plan    Discharge Plan / Estimated Discharge Date: ready for DC once on oral Abx    Code Status: Level 1 - Full Code      Subjective:   Feels ok, no CP/SOB, no abdo pain    Objective:     Vitals:   Temp (24hrs), Av 1 °F (36 7 °C), Min:97 5 °F (36 4 °C), Max:98 7 °F (37 1 °C)    HR:  [73-82] 80  Resp:  [17-18] 17  BP: (110-163)/(56-72) 126/60  SpO2:  [92 %-94 %] 93 %  Body mass index is 33 36 kg/m²  Input and Output Summary (last 24 hours): Intake/Output Summary (Last 24 hours) at 10/17/17 1725  Last data filed at 10/17/17 1721   Gross per 24 hour   Intake             1481 ml   Output             2125 ml   Net             -644 ml       Physical Exam:     Physical Exam   Constitutional: He is oriented to person, place, and time  He appears well-developed and well-nourished  HENT:   Head: Normocephalic and atraumatic  Eyes: Conjunctivae are normal  No scleral icterus  Cardiovascular: Normal rate, regular rhythm and normal heart sounds  Exam reveals no gallop and no friction rub  No murmur heard  Pulmonary/Chest: Effort normal and breath sounds normal  No respiratory distress  He has no wheezes  Abdominal: Soft  He exhibits no distension  There is no tenderness  Musculoskeletal: He exhibits no edema  Left toe looks better   Neurological: He is alert and oriented to person, place, and time         Additional Data:     Labs:      Results from last 7 days  Lab Units 10/17/17  0450   WBC Thousand/uL 7 45   HEMOGLOBIN g/dL 11 2*   HEMATOCRIT % 35 2*   PLATELETS Thousands/uL 216   NEUTROS PCT % 59   LYMPHS PCT % 24   MONOS PCT % 9   EOS PCT % 8*       Results from last 7 days  Lab Units 10/17/17  0450  10/11/17  0448   SODIUM mmol/L 138  < > 135* POTASSIUM mmol/L 4 3  < > 3 8   CHLORIDE mmol/L 101  < > 101   CO2 mmol/L 29  < > 28   BUN mg/dL 48*  < > 36*   CREATININE mg/dL 1 83*  < > 1 90*   CALCIUM mg/dL 9 1  < > 8 5   TOTAL PROTEIN g/dL  --   --  6 4   BILIRUBIN TOTAL mg/dL  --   --  0 61   ALK PHOS U/L  --   --  63   ALT U/L  --   --  16   AST U/L  --   --  18   GLUCOSE RANDOM mg/dL 130  < > 143*   < > = values in this interval not displayed  * I Have Reviewed All Lab Data Listed Above  * Additional Pertinent Lab Tests Reviewed: All Labs Within Last 24 Hours Reviewed    Imaging:    Imaging Reports Reviewed Today Include:   Imaging Personally Reviewed by Myself Includes:      Recent Cultures (last 7 days):           Last 24 Hours Medication List:     acetaminophen 975 mg Oral Q8H [address]   amLODIPine 2 5 mg Oral Daily   aspirin 81 mg Oral Every Other Day   calcium carbonate-vitamin D 1 tablet Oral Daily   cefazolin 1,000 mg Intravenous Q12H   diclofenac sodium 2 g Topical 4x Daily   febuxostat 40 mg Oral Daily   heparin (porcine) 5,000 Units Subcutaneous Q8H [address]   insulin aspart protamine-insulin aspart 20 Units Subcutaneous BID AC   insulin lispro 1-6 Units Subcutaneous TID With Meals   insulin lispro 1-6 Units Subcutaneous HS   lidocaine 1 patch Transdermal Q24H   metoprolol tartrate 75 mg Oral Q12H [address]   multivitamin-minerals 1 tablet Oral Daily   pantoprazole 40 mg Oral Early Morning   paricalcitol 1 mcg Oral Once per day on Mon Wed Fri   pravastatin 20 mg Oral Daily With Dinner   ramipril 2 5 mg Oral Daily   senna-docusate sodium 1 tablet Oral BID   torsemide 20 mg Oral Every Other Day   torsemide 40 mg Oral Every Other Day        Today, Patient Was Seen By: Nuris Brown MD    ** Please Note: This note has been constructed using a voice recognition system   **

## 2017-10-17 NOTE — MEDICAL STUDENT
Subjective: Mr Charity Vaughn was admitted on 10/11/17for an episode of altered mental status  Etiology most likely toxic metabolic, as his blood glucose was 52 on admission and he had recently started tramadol s/p fall  Patient was then treated for a CHF exacerbation, which has since resolved, and currently is being treated for a presumed hemorragic cellulitis of the left foot  Patient reports today he is feeling very good  He reported being able to walk to the bathroom with his walker and minimal pain  Patient denies SOB, chest pain, dyspnea on exertion or laying down, or abdominal pain  Patient reported he had a BM today  Patient does report having some pain in the right UE, left foot, and right knee (all body parts which sustained injury from his fall)  Objective:  Vitals: T: 97 5, P: 82, RR: 18, Bp: 163/72 O2%: 94% on RA  Alert and oriented x3  Patient in no acute distress  HEENT: B/L ecchymosis of inferior orbits, scab on nose from nasal laceration  Cardio: Regular rate and rhythm  Systolic Murmur noted  Pulm: Normal effort  CTAB  No wheezes, rales, or rhonchi  GI: Soft  Non-distended  NTTP  Normoactive BS x 4 quadrants  M S : Normal ROM in all 4 extremities  Slight tenderness to palpation in dorsum of left foot and proximal humerus  P V : No edema noted on the lower extremities  Assessment/Plan:  1  Acute encephalopathy: resolved  2  CHF exacerbation:   - Patient has no s/s of volume overload   - Continue on home regimen of torsemide 40 mg MWF and 20 mg on the other days  3  Left foot pain/erythema, intraarticular fracture   - Erythema/intensity of area seems to be reduced   - Treating for presumed cellulitis  - Continue cefazolin 1g Q12 hours (day 3), as per ID recommendations   - Keep left foot elevated  4  Closed fracture of greater tuberosity of left humerus   - Normal ROM/WB of LUE   - Ortho follow up in 2 weeks    5  HTN:   - Continue BB   - Elevated BP this morning of 163/72, if remains elevated may need to add BP med  6  CAD:   - Continue statin, BB, ASA  7  S/P AVR w/biprothetic valve   - Continue on  CCB, Bb, ASA, statin  8   Type 2 Dm:   - Continue current regimen of ISS and accuchecks    Problem List:  Left foot cellulitis/Pain  Closed fx of greater tuberosity of the humerus  Acute encephalopathy  Type 2 DM  CAD  PAF  CHF  HTN  Aortic valve replacement bioprosthetic valve  CKD

## 2017-10-17 NOTE — OCCUPATIONAL THERAPY NOTE
Occupational Therapy Treatment Note      Ozzie Ly     10/17/2017    Patient Active Problem List   Diagnosis    Paroxysmal atrial fibrillation (HCC)    Acute on chronic diastolic congestive heart failure ([facility] [address] )    Essential hypertension    Acute respiratory failure with hypoxia (HCC)    Type 2 diabetes mellitus (HCC)    MADISON (dyspnea on exertion)    Cough    S/P aortic valve replacement with bioprosthetic valve    Acute encephalopathy    Nasal laceration, sequela    Coronary artery disease involving native coronary artery of native heart    Stage 3 chronic kidney disease    Closed fracture of greater tuberosity of left humerus    Cellulitis       Past Medical History:   Diagnosis Date    Cardiac disease     [facility]cong[facility]     Diabetes mellitus ([facility] )     Hypertension     Renal disorder        Past Surgical History:   Procedure Laterality Date    AORTIC VALVE REPLACEMENT      CARDIAC VALVE REPLACEMENT      CATARACT EXTRACTION, BILATERAL      COLONOSCOPY      REPLACEMENT TOTAL KNEE BILATERAL      VASCULAR SURGERY          10/17/17 1110   Restrictions/Precautions   LUE Weight Bearing Per Order NWB   Braces or Orthoses Sling   Other Precautions Fall Risk;Pain   Pain Assessment   Pain Assessment 0-10   Pain Score 5   Pain Type Acute pain   Pain Location Foot   Pain Orientation Left   Hospital Pain Intervention(s) Repositioned   Response to Interventions improved  (pain- 0 at rest, 8 initially w/ sitting eob, 5 in chair)   ADL   Grooming Assistance 5  Supervision/Setup   UB Bathing Assistance 4  Minimal Assistance   LB Bathing Assistance 3  Moderate Assistance   UB Dressing Assistance 4  Minimal Assistance   LB Dressing Assistance 2  Maximal Assistance   Bed Mobility   Supine to Sit 4  Minimal assistance   Transfers   Sit to Stand 3  Moderate assistance   Additional items Assist x 2   Additional items (1 handed walker)   Cognition   Overall Cognitive Status [facility] Arousal/Participation Alert   Attention Within functional limits   Orientation Level Oriented X4   Following Commands Follows all commands and directions without difficulty   Activity Tolerance   Activity Tolerance Patient tolerated treatment well   Assessment   Assessment Pt  participated in functional OT treatment with focus on independence with basic self care  Noted much improvement from previous session  Pt  requires min assist for bed mobility demonstrating f+/g sitting balance EOB- fluctuates with task and pain level  Pt  able to complete UB self care with min assist with cues for NWB LUE and LB self care with max assist however noted increased standing balance and tolerance requiring initially min assist sit>stand with cues for hand placement 1 handed walker  Pt  able to complete transfer to bedside chair and repositioned with foot elevated on stool  Reports pain level 5 post activity  From OT standpoint conitnues to demonstrate significant progress however would benefit from inpt rehab to maximize independence  Will follow to address previously stated goals  Plan   Treatment Interventions ADL retraining; Activityengagement   Goal Expiration Date 10/26/17   Treatment Day 4   OT Frequency 3-5x/wk   Recommendation   OT Discharge Recommendation Short Term Rehab     Estela Castellanos, OT

## 2017-10-18 NOTE — PHYSICAL THERAPY NOTE
Physical Therapy Progress Note     10/18/17 1223   Pain Assessment   Pain Assessment 0-10   Pain Score 3   Pain Type Acute pain   Pain Location Foot   Pain Orientation Left   Hospital Pain Intervention(s) Ambulation/increased activity   Response to Interventions tolerated    Restrictions/Precautions   LUE Weight Bearing Per Order NWB   RLE Weight Bearing Per Order WBAT   LLE Weight Bearing Per Order WBAT   Braces or Orthoses Sling   Other Precautions Fall Risk;Pain   General   Chart Reviewed Yes   Response to Previous Treatment Patient with no complaints from previous session  Family/Caregiver Present Yes  (wife)   Cognition   Arousal/Participation Alert; Cooperative   Orientation Level Oriented X4   Following Commands Follows all commands and directions without difficulty   Subjective   Subjective states  that  ft still hurts     Bed Mobility   Supine to Sit 4  Minimal assistance   Additional items Assist x 1;HOB elevated; Bedrails; Increased time required;Verbal cues   Sit to Supine 4  Minimal assistance   Additional items Assist x 1;Bedrails;HOB elevated; Increased time required;Verbal cues   Transfers   Sit to Stand 4  Minimal assistance   Additional items Assist x 1; Increased time required;Verbal cues   Stand to Sit 4  Minimal assistance   Additional items Assist x 1; Increased time required;Verbal cues   Ambulation/Elevation   Gait pattern Antalgic;Narrow MARGO; Decreased foot clearance; Short stride; Step to; Forward Flexion   Gait Assistance 4  Minimal assist   Additional items Assist x 1;Verbal cues; Tactile cues   Assistive Device 1 handed walker  (chr follow)   Distance 30ft x2  (seated  rest)   Balance   Static Sitting Fair +   Static Standing Poor +   Ambulatory Poor   Endurance Deficit   Endurance Deficit Yes   Endurance Deficit Description pain  fatigue   Activity Tolerance   Activity Tolerance Patient limited by fatigue;Patient limited by pain   Assessment   Prognosis Fair   Problem List Decreased strength;Decreased endurance; Impaired balance;Decreased mobility;Orthopedic restrictions;Pain;Obesity   Assessment pt ios  able to   comlpete  all phases of  mob  c min A    he  requires  verbal and tactile instruction  for  proper  technique   safety  and to maintain nwb  left ue 100% of the time      he did amb  30 ft x2 using   one hand  walker   gait is  very slow  c short stride and  flexed posture   no gross LOB noted      he  fatgiues  quickly and requested to  return to bed       pt  will need  cont PT intervention    Goals   Patient Goals none stated   STG Expiration Date 10/22/17   Treatment Day 4   Plan   Treatment/Interventions Functional transfer training;Patient/family training;Bed mobility;Gait training;Spoke to nursing   Progress Slow progress, medical status limitations   PT Frequency 5x/wk; Weekend   Recommendation   Recommendation Short-term skilled PT   Equipment Recommended (one hand  walker)   PT - OK to Discharge Yes  (to rehab  when med ready )     Jackson Hinojosa, PTA

## 2017-10-18 NOTE — PROGRESS NOTES
[address] Internal Medicine Progress Note  Patient: Dylan Israel  [de-identified] y o  male   MRN: [id number]  PCP: Maday Hernandez MD  Unit/Bed#: -[facility] Encounter: [id number]  Date Of Visit: 10/18/17    Assessment:    Principal Problem:    Acute encephalopathy  Active Problems:    Paroxysmal atrial fibrillation (HCC)    Acute on chronic diastolic congestive heart failure ([facility] [address] )    Essential hypertension    Type 2 diabetes mellitus ([facility] [address] )    MADISON (dyspnea on exertion)    S/P aortic valve replacement with bioprosthetic valve    Nasal laceration, sequela    Coronary artery disease involving native coronary artery of native heart    Stage 3 chronic kidney disease    Closed fracture of greater tuberosity of left humerus    Cellulitis      Plan:    · Acute encephalopathy: resolved  · Acute on chronic diastolic heart failure  ? Continues on home regimen, torsemide 40mg & 20 mg EOD  · Left foot pain in context of intraarticular fracture:  ? Xray showing intraarticular fracture of left proximal phalynx  ? Evaluated by ID & suspect hemorrhagic cellulitis - being treated with I ancef - foot looks better - cont ancef D-4 - f/u ID rec on timing to change to PO  ? ASO negative  · Closed fracture of greater tuberosity of left humerus  ? Appreciate ortho input  ? NWB LUE  ? Will have ortho follow-up in 2 weeks   · CAD:  ? Cont ASA, statin, BB  · S/p AVR with bioprosthetic valve  ? Continues on home meds  · Type II DM:  ? Continue current regimen, BS acceptable   ? ISS and accuchecks   · Dispo: wife talking about rehab places but not investigated them      VTE Pharmacologic Prophylaxis:   Pharmacologic: Heparin  Mechanical VTE Prophylaxis in Place: Yes    Patient Centered Rounds: I have performed bedside rounds with nursing staff today  Discussions with Specialists or Other Care Team Provider:     Education and Discussions with Family / Patient: patient    Time Spent for Care: 45 minutes    More than 50% of total time spent on counseling and coordination of care as described above  Current Length of Stay: 7 day(s)    Current Patient Status: Inpatient   Certification Statement: The patient will continue to require additional inpatient hospital stay due to IV abx    Discharge Plan / Estimated Discharge Date: once able to switch to oral Abx    Code Status: Level 1 - Full Code      Subjective:   Feels good, no CP/SOB/abdo pain/n/v    Objective:     Vitals:   Temp (24hrs), Av 5 °F (36 9 °C), Min:98 2 °F (36 8 °C), Max:98 8 °F (37 1 °C)    HR:  [73-88] 87  Resp:  [17-18] 18  BP: (114-132)/(56-60) 132/56  SpO2:  [91 %-93 %] 91 %  Body mass index is 33 36 kg/m²  Input and Output Summary (last 24 hours): Intake/Output Summary (Last 24 hours) at 10/18/17 1013  Last data filed at 10/18/17 0850   Gross per 24 hour   Intake              911 ml   Output              875 ml   Net               36 ml       Physical Exam:     Physical Exam   Constitutional: He is oriented to person, place, and time  He appears well-developed and well-nourished  HENT:   Head: Normocephalic and atraumatic  Eyes: Conjunctivae are normal  No scleral icterus  Cardiovascular: Normal rate, regular rhythm and normal heart sounds  Exam reveals no gallop and no friction rub  No murmur heard  Pulmonary/Chest: Effort normal and breath sounds normal  No respiratory distress  He has no wheezes  Musculoskeletal:   Left toe still has some swelling   Neurological: He is alert and oriented to person, place, and time           Additional Data:     Labs:      Results from last 7 days  Lab Units 10/17/17  0450   WBC Thousand/uL 7 45   HEMOGLOBIN g/dL 11 2*   HEMATOCRIT % 35 2*   PLATELETS Thousands/uL 216   NEUTROS PCT % 59   LYMPHS PCT % 24   MONOS PCT % 9   EOS PCT % 8*       Results from last 7 days  Lab Units 10/17/17  0450   SODIUM mmol/L 138   POTASSIUM mmol/L 4 3   CHLORIDE mmol/L 101   CO2 mmol/L 29   BUN mg/dL 48*   CREATININE mg/dL 1 83*   CALCIUM mg/dL 9 1 GLUCOSE RANDOM mg/dL 130           * I Have Reviewed All Lab Data Listed Above  * Additional Pertinent Lab Tests Reviewed: No New Labs Available For Today    Imaging:    Imaging Reports Reviewed Today Include:   Imaging Personally Reviewed by Myself Includes:      Recent Cultures (last 7 days):           Last 24 Hours Medication List:     acetaminophen 975 mg Oral Q8H [address]   amLODIPine 2 5 mg Oral Daily   aspirin 81 mg Oral Every Other Day   calcium carbonate-vitamin D 1 tablet Oral Daily   cefazolin 1,000 mg Intravenous Q12H   diclofenac sodium 2 g Topical 4x Daily   febuxostat 40 mg Oral Daily   heparin (porcine) 5,000 Units Subcutaneous Q8H [address]   insulin aspart protamine-insulin aspart 20 Units Subcutaneous BID AC   insulin lispro 1-6 Units Subcutaneous TID With Meals   insulin lispro 1-6 Units Subcutaneous HS   lidocaine 1 patch Transdermal Q24H   metoprolol tartrate 75 mg Oral Q12H [address]   multivitamin-minerals 1 tablet Oral Daily   pantoprazole 40 mg Oral Early Morning   paricalcitol 1 mcg Oral Once per day on Mon Wed Fri   pravastatin 20 mg Oral Daily With Dinner   ramipril 2 5 mg Oral Daily   senna-docusate sodium 1 tablet Oral BID   torsemide 20 mg Oral Every Other Day   torsemide 40 mg Oral Every Other Day        Today, Patient Was Seen By: Anupama Davis MD    ** Please Note: This note has been constructed using a voice recognition system   **

## 2017-10-18 NOTE — PLAN OF CARE
Problem: PHYSICAL THERAPY ADULT  Goal: Performs mobility at highest level of function for planned discharge setting  See evaluation for individualized goals  Treatment/Interventions: Functional transfer training, LE strengthening/ROM, Therapeutic exercise, Endurance training, Bed mobility, Gait training, Elevations          See flowsheet documentation for full assessment, interventions and recommendations  Outcome: Progressing  Prognosis: Fair  Problem List: Decreased strength, Decreased endurance, Impaired balance, Decreased mobility, Orthopedic restrictions, Pain, Obesity  Assessment: pt ios  able to   comlpete  all phases of  mob  c min A    he  requires  verbal and tactile instruction  for  proper  technique   safety  and to maintain nwb  left ue 100% of the time      he did amb  30 ft x2 using   one hand  walker   gait is  very slow  c short stride and  flexed posture   no gross LOB noted      he  fatgiues  quickly and requested to  return to bed       pt  will need  cont PT intervention   Barriers to Discharge: Decreased caregiver support  Barriers to Discharge Comments: Pt wife reported on evaluation 10/12 concerns with A pt at home 2* to balance deficits  Reported daughter returned to work and she was worried about caring for pt at home  Recommendation: Short-term skilled PT     PT - OK to Discharge: Yes (to rehab  when med ready )    See flowsheet documentation for full assessment

## 2017-10-18 NOTE — MEDICAL STUDENT
Subjective:   Mr Osiel Dobson was admitted on 10/11/17 for an episode of altered mental status  Etiology most likely toxic metabolic, as his blood glucose was 52 on admission and he had recently started tramadol s/p fall  Patient was then treated for a CHF exacerbation, which has since resolved, and currently is being treated for a presumed hemorragic cellulitis of the left foot  Patient reports today he is feeling very good  He reported being able to walk to the bathroom with his walker and minimal pain  Patient rates his left foot pain as 4/10 and left arm pain as a 2/10  Patient denies SOB, chest pain, dyspnea on exertion or laying down, or abdominal pain  Patient reported he had a BM today  Patient denied any HA or dysuria as well  Discussed with patient if he had spoke with his wife about going to rehab facility  Patient stated he is leaving that decision up to his wife  Objective:  Vitals: T: 98 6, P: 87, RR: 18, Bp: 132/56 O2%: 91% on RA  Alert and oriented x3  Patient in no acute distress  HEENT: B/L ecchymosis of inferior orbits, scab on nose from nasal laceration  Cardio: Regular rate and rhythm  Systolic Murmur noted  Pulm: Normal effort  CTAB  No wheezes, rales, or rhonchi  GI: Soft  Non-distended  NTTP  Normoactive BS x 4 quadrants  M S : Normal ROM in all 4 extremities  Slight tenderness to palpation in dorsum of left foot and proximal humerus  NWB RUE   P V : No edema noted on the lower extremities  Distal pulses equal/intact B/L  Skin: Erythema and ecchymosis of the left foot  Reduced from initial demarcation  Assessment/Plan:  1  Acute encephalopathy: resolved  2  CHF exacerbation:                        - Patient has no s/s of volume overload                        - Continue on home regimen of torsemide 40 mg and 20 mg every other day    3  Left foot pain/erythema, intraarticular fracture                        - Erythema/intensity of area seems to be reduced from previous; however in comparison to yesterday,   appears the same             - X-ray showed intraarticular fracture of the left proximal phalynx                        - Evaluated by ID and treating for presumed cellulitis  - Continue Ancef 1g Q12 hours (day 4), F/U with ID on when to transition to PO abx                        - Keep left foot elevated             - ASO negative  4  Closed fracture of greater tuberosity of left humerus                        - Normal ROM, but NWB of LUE                        - Ortho follow up in 2 weeks  5  HTN:                        - Continue BB  6  CAD:                        - Continue statin, BB, ASA  7  S/P AVR w/biprothetic valve                        - Continue on  CCB, Bb, ASA, statin  8   Type 2 Dm:                        - Continue current regimen of ISS and accuchecks    Dispo: Still waiting on patient's wife to make decision regarding rehabilitation facility       Problem List:  Left foot cellulitis/Pain  Closed fx of greater tuberosity of the humerus  Acute encephalopathy  Type 2 DM  CAD  PAF  CHF  HTN  Aortic valve replacement bioprosthetic valve  CKD

## 2017-10-18 NOTE — PROGRESS NOTES
Progress Note - Infectious Disease   Hipolito Gerard  [de-identified] y o  male MRN: [id number]  Unit/Bed#: -01 Encounter: [id number]      Impression:  1  Hemorrhagic cellulitis of the left foot  2  Mildly displaced intra-articular fracture of the base of the proximal phalanx of the left great toe  3  DM type 2 with PAD S/P bilateral lower extremity vascular bypass procedures and nephropathy with CKD  4  Traumatic facial laceration  5  S/P encephalopathy secondary to hypoglycemia and tramadol  6  S/P bovine AVR and CABG    Recommendations:  1   results of ASO titer are negative  2  Extent of erythema and intensity of the area on the left foot is further decreased from yesterday  Has less tenderness  We will continue cefazolin 1 g q 12 hours IV for now  3  Keep left lower extremity elevated    Antibiotics:  1  Cefazolin 1 g q 12 hours IV, day 4  Subjective:  He said that his left foot pain is somewhat decreased from yesterday but still present  Denies fevers, chills, or sweats  Denies nausea, vomiting, or diarrhea  Objective:  Vitals:  HR:  [73-88] 86  Resp:  [18] 18  BP: (114-132)/(56-57) 121/57  SpO2:  [91 %-93 %] 93 %  Temp (24hrs), Av 4 °F (36 9 °C), Min:97 7 °F (36 5 °C), Max:98 8 °F (37 1 °C)  Current: Temperature: 97 7 °F (36 5 °C)    Physical Exam:     General Appearance:  Alert, nontoxic, no acute distress  Throat: Oropharynx moist without lesions  Lips, mucosa, and tongue normal   Neck: Supple, symmetrical, trachea midline, no adenopathy,  no tenderness/mass/nodules   Lungs:   Clear to auscultation bilaterally, no audible wheezes, rhonchi or rales; respirations unlabored   Heart:  Sternotomy scar Regular rate and rhythm, S1, S2 normal, grade 1/6 aortic systolic murmur    Abdomen:   Soft, non-tender, non-distended, positive bowel sounds    No masses, no organomegaly    No CVA tenderness   Extremities: Surgical scars, marked contused areas of the left 1st and 2nd toe with edema, hemorrhagic erythema of the left dorsal area of the foot is decreased from yesterday  There is less direct tenderness over the area   Skin: As above, surgical scars, facial lacerations that are healing           Invasive Devices     Peripheral Intravenous Line            Peripheral IV 10/15/17 Right Antecubital 3 days                Labs, Imaging, & Other studies:   All pertinent labs were personally reviewed    Results from last 7 days  Lab Units 10/17/17  0450 10/16/17  0514 10/15/17  0435   WBC Thousand/uL 7 45 8 31 8 07   HEMOGLOBIN g/dL 11 2* 11 2* 10 8*   PLATELETS Thousands/uL 216 214 203       Results from last 7 days  Lab Units 10/17/17  0450 10/16/17  0514 10/15/17  0435   SODIUM mmol/L 138 135* 137   POTASSIUM mmol/L 4 3 4 2 4 8   CHLORIDE mmol/L 101 98* 100   CO2 mmol/L 29 29 31   ANION GAP mmol/L 8 8 6   BUN mg/dL 48* 48* 47*   CREATININE mg/dL 1 83* 1 92* 1 99*   EGFR ml/min/1 73sq m 34 32 31   GLUCOSE RANDOM mg/dL 130 118 104   CALCIUM mg/dL 9 1 8 9 9 0

## 2017-10-19 NOTE — PROGRESS NOTES
10/19/17 1500   Clinical Encounter Type   Visited With Patient   Shinto Encounters   Shinto Needs Prayer   Patient Spiritual Encounters   Spiritual Assessment 5   Suffering Severity 5   Fear Level 5   Spiritual Encounter Notes Provided PT prayer and spiritual support        Edgar Olvera

## 2017-10-19 NOTE — PROGRESS NOTES
[address] Internal Medicine Progress Note  Patient: Royal Fowler  [de-identified] y o  male   MRN: [id number]  PCP: Clayton Martin MD  Unit/Bed#: [facility] Encounter: [id number]  Date Of Visit: 10/19/17    Assessment:    Principal Problem:    Acute encephalopathy  Active Problems:    Paroxysmal atrial fibrillation (HCC)    Acute on chronic diastolic congestive heart failure ([facility] [facility] )    Essential hypertension    Type 2 diabetes mellitus ([facility] [address] )    MADISON (dyspnea on exertion)    S/P aortic valve replacement with bioprosthetic valve    Nasal laceration, sequela    Coronary artery disease involving native coronary artery of native heart    Stage 3 chronic kidney disease    Closed fracture of greater tuberosity of left humerus    Cellulitis      Plan:    · Acute encephalopathy: resolved  · Acute on chronic diastolic heart failure  ? Continues on home regimen, torsemide 40mg & 20 mg EOD  · Left foot pain in context of intraarticular fracture:  ? Xray showing intraarticular fracture of left proximal phalynx  ? Evaluated by ID & suspect hemorrhagic cellulitis - being treated with IV ancef - foot looks better - cont ancef D-5  Explained to wife there is component of traumatic hematoma/bruise  ? ASO negative  · Closed fracture of greater tuberosity of left humerus  ? Appreciate ortho input  ? NWB LUE  ? Will have ortho follow-up in 2 weeks   · CAD:  ? Cont ASA, statin, BB  · S/p AVR with bioprosthetic valve  ? Continues on home meds  · Type II DM:  ? Continue current regimen, BS acceptable   ? ISS and accuchecks   · Dispo: wife is ok with him going to rehab tomorrow if he is medically cleared      VTE Pharmacologic Prophylaxis:   Pharmacologic: Heparin  Mechanical VTE Prophylaxis in Place: Yes    Patient Centered Rounds: I have performed bedside rounds with nursing staff today      Discussions with Specialists or Other Care Team Provider: ID    Education and Discussions with Family / Patient: patient & wife    Time Spent for Care: 39 minutes  More than 50% of total time spent on counseling and coordination of care as described above  Current Length of Stay: 8 day(s)    Current Patient Status: Inpatient   Certification Statement: The patient will continue to require additional inpatient hospital stay due to IV abx    Discharge Plan / Estimated Discharge Date: in 1-2 days    Code Status: Level 1 - Full Code      Subjective:   Feels good, no Cp/SOB, no bado pina/n /v    Objective:     Vitals:   Temp (24hrs), Av 4 °F (36 9 °C), Min:97 7 °F (36 5 °C), Max:98 7 °F (37 1 °C)    HR:  [63-86] 77  Resp:  [18] 18  BP: (103-121)/(51-57) 116/51  SpO2:  [90 %-94 %] 90 %  Body mass index is 33 14 kg/m²  Input and Output Summary (last 24 hours): Intake/Output Summary (Last 24 hours) at 10/19/17 1211  Last data filed at 10/19/17 1021   Gross per 24 hour   Intake              240 ml   Output             1500 ml   Net            -1260 ml       Physical Exam:     Physical Exam   Constitutional: He is oriented to person, place, and time  He appears well-developed and well-nourished  HENT:   Head: Normocephalic and atraumatic  Eyes: Conjunctivae are normal  No scleral icterus  Cardiovascular: Normal rate and regular rhythm  Exam reveals no gallop and no friction rub  No murmur heard  Pulmonary/Chest: Effort normal and breath sounds normal  No respiratory distress  He has no wheezes  Abdominal: Soft  He exhibits no distension  There is no tenderness  Musculoskeletal: He exhibits no edema  Left toe swelling - little better, has underlyign bruise   Neurological: He is alert and oriented to person, place, and time         Additional Data:     Labs:      Results from last 7 days  Lab Units 10/19/17  0502   WBC Thousand/uL 7 59   HEMOGLOBIN g/dL 11 5*   HEMATOCRIT % 35 0*   PLATELETS Thousands/uL 236   NEUTROS PCT % 62   LYMPHS PCT % 21   MONOS PCT % 10   EOS PCT % 7*       Results from last 7 days  Lab Units 10/19/17  0502   SODIUM mmol/L 135*   POTASSIUM mmol/L 4 5   CHLORIDE mmol/L 98*   CO2 mmol/L 29   BUN mg/dL 51*   CREATININE mg/dL 1 89*   CALCIUM mg/dL 9 2   GLUCOSE RANDOM mg/dL 111           * I Have Reviewed All Lab Data Listed Above  * Additional Pertinent Lab Tests Reviewed: All Labs Within Last 24 Hours Reviewed    Imaging:    Imaging Reports Reviewed Today Include:   Imaging Personally Reviewed by Myself Includes:      Recent Cultures (last 7 days):           Last 24 Hours Medication List:     acetaminophen 975 mg Oral Q8H [address]   amLODIPine 2 5 mg Oral Daily   aspirin 81 mg Oral Every Other Day   calcium carbonate-vitamin D 1 tablet Oral Daily   cefazolin 1,000 mg Intravenous Q12H   diclofenac sodium 2 g Topical 4x Daily   febuxostat 40 mg Oral Daily   heparin (porcine) 5,000 Units Subcutaneous Q8H [address]   insulin aspart protamine-insulin aspart 20 Units Subcutaneous BID AC   insulin lispro 1-6 Units Subcutaneous TID With Meals   insulin lispro 1-6 Units Subcutaneous HS   lidocaine 1 patch Transdermal Q24H   metoprolol tartrate 75 mg Oral Q12H [address]   multivitamin-minerals 1 tablet Oral Daily   pantoprazole 40 mg Oral Early Morning   paricalcitol 1 mcg Oral Once per day on Mon Wed Fri   pravastatin 20 mg Oral Daily With Dinner   ramipril 2 5 mg Oral Daily   senna-docusate sodium 1 tablet Oral BID   torsemide 20 mg Oral Every Other Day   torsemide 40 mg Oral Every Other Day        Today, Patient Was Seen By: Josette Lal MD    ** Please Note: This note has been constructed using a voice recognition system   **

## 2017-10-19 NOTE — PROGRESS NOTES
Progress Note - Infectious Disease   Ted Shaikh  [de-identified] y o  male MRN: [id number]  Unit/Bed#: -01 Encounter: [id number]      Impression:  1  Hemorrhagic cellulitis of the left foot  2  Mildly displaced intra-articular fracture of the base of the proximal phalanx of the left great toe  3  DM type 2 with PAD S/P bilateral lower extremity vascular bypass procedures and nephropathy with CKD  4  Traumatic facial laceration  5  S/P encephalopathy secondary to hypoglycemia and tramadol  6  S/P bovine AVR and CABG    Recommendations:  1   results of ASO titer are negative  2  Extent of erythema and intensity of the area on the left foot is further decreased from yesterday  Has less tenderness  Could switch to p o  cephalexin 500 mg q i d  times 10 days tomorrow  3  Patient has been accepted at [facility] and the family is now okay with him being discharged tomorrow if his progress continues  4  Would continue to monitor CBC diff and BMP while on cephalexin    Antibiotics:  1  Cefazolin 1 g q 12 hours IV, day 5  Subjective:  He was able to walk today with much less pain  Denies fevers, chills, or sweats  Denies nausea, vomiting, or diarrhea  Objective:  Vitals:  HR:  [63-88] 88  Resp:  [18] 18  BP: (103-118)/(51-56) 118/54  SpO2:  [90 %-95 %] 95 %  Temp (24hrs), Av 4 °F (36 9 °C), Min:97 9 °F (36 6 °C), Max:98 7 °F (37 1 °C)  Current: Temperature: 97 9 °F (36 6 °C)    Physical Exam:     General Appearance:  Alert, nontoxic, no acute distress  Throat: Oropharynx moist without lesions  Lips, mucosa, and tongue normal   Neck: Supple, symmetrical, trachea midline, no adenopathy,  no tenderness/mass/nodules   Lungs:   Clear to auscultation bilaterally, no audible wheezes, rhonchi or rales; respirations unlabored   Heart:  Sternotomy scar Regular rate and rhythm, S1, S2 normal, grade 1/6 aortic systolic murmur    Abdomen:   Soft, non-tender, non-distended, positive bowel sounds    No masses, no organomegaly    No CVA tenderness   Extremities: Surgical scars, marked contused areas of the left 1st and 2nd toe with edema, hemorrhagic erythema of the left dorsal area of the foot is further decreased from yesterday  There is minimal tenderness over the area   Skin: As above, surgical scars, facial lacerations that are healing           Invasive Devices     Peripheral Intravenous Line            Peripheral IV 10/19/17 Left Wrist less than 1 day                Labs, Imaging, & Other studies:   All pertinent labs were personally reviewed    Results from last 7 days  Lab Units 10/19/17  0502 10/17/17  0450 10/16/17  0514   WBC Thousand/uL 7 59 7 45 8 31   HEMOGLOBIN g/dL 11 5* 11 2* 11 2*   PLATELETS Thousands/uL 236 216 214       Results from last 7 days  Lab Units 10/19/17  0502 10/17/17  0450 10/16/17  0514   SODIUM mmol/L 135* 138 135*   POTASSIUM mmol/L 4 5 4 3 4 2   CHLORIDE mmol/L 98* 101 98*   CO2 mmol/L 29 29 29   ANION GAP mmol/L 8 8 8   BUN mg/dL 51* 48* 48*   CREATININE mg/dL 1 89* 1 83* 1 92*   EGFR ml/min/1 73sq m 33 34 32   GLUCOSE RANDOM mg/dL 111 130 118   CALCIUM mg/dL 9 2 9 1 8 9

## 2017-10-19 NOTE — PHYSICAL THERAPY NOTE
Physical Therapy Progress Note     10/19/17 0377   Pain Assessment   Pain Assessment 0-10   Pain Score 3   Pain Type Acute pain   Pain Location Foot   Pain Orientation Left   Hospital Pain Intervention(s) Ambulation/increased activity   Response to Interventions tolerated    Restrictions/Precautions   LUE Weight Bearing Per Order NWB   Braces or Orthoses Sling   Other Precautions Bed Alarm;WBS; Telemetry; Fall Risk;Pain   General   Chart Reviewed Yes   Response to Previous Treatment Patient with no complaints from previous session  Family/Caregiver Present Yes  (wife)   Cognition   Overall Cognitive Status WFL   Arousal/Participation Alert; Cooperative   Orientation Level Oriented X4   Following Commands Follows all commands and directions without difficulty   Subjective   Subjective states that he is tired    Bed Mobility   Supine to Sit 4  Minimal assistance   Additional items Assist x 1;HOB elevated; Bedrails; Increased time required;Verbal cues   Sit to Supine 4  Minimal assistance   Additional items Assist x 1;HOB elevated; Bedrails; Increased time required;LE management   Transfers   Sit to Stand 4  Minimal assistance   Additional items Assist x 1;Verbal cues   Stand to Sit 4  Minimal assistance   Additional items Assist x 1;Verbal cues   Ambulation/Elevation   Gait pattern Narrow MARGO; Forward Flexion;Decreased foot clearance   Gait Assistance 4  Minimal assist   Additional items Assist x 1;Verbal cues; Tactile cues   Assistive Device 1 handed walker   Distance 80ft x2 60ft   Balance   Static Sitting Good   Static Standing Poor +   Ambulatory Poor   Endurance Deficit   Endurance Deficit Yes   Endurance Deficit Description fatigue   Activity Tolerance   Activity Tolerance Patient limited by fatigue   Assessment   Prognosis Fair   Problem List Decreased strength;Decreased endurance; Impaired balance;Decreased mobility   Assessment pt is fatigued  but is  moving better this session     he remains nWB on  left  UE still needing instruction  to maintain  100% of the time      pt  did amb  further     80x2 ft  using  rw  he  required seated  rest   between  gt trials      gait is  slow  c step to pattern   mildly unsteady   he  will need cont PT intervention     Barriers to Discharge Inaccessible home environment   Goals   Patient Goals none stated   STG Expiration Date 10/22/17   Treatment Day 5   Plan   Treatment/Interventions Functional transfer training;Patient/family training;Bed mobility;Gait training;Spoke to nursing   Progress Slow progress, medical status limitations   PT Frequency 5x/wk; Weekend   Recommendation   Recommendation Short-term skilled PT   Equipment Recommended (one hand  walker )   PT - OK to Discharge Yes  (to rehab when med ready )     Maribel Roberts, PTA

## 2017-10-19 NOTE — MEDICAL STUDENT
Subjective:   Mr Mitesh Wei was admitted on 10/11/17 for an episode of altered mental status  Etiology most likely toxic metabolic, as his blood glucose was 52 on admission and he had recently started tramadol s/p fall  Patient was then treated for a CHF exacerbation, which has since resolved, and currently is being treated for a presumed hemorragic cellulitis of the left foot  Patient reports today he is feeling slightly depressed  He states he occasionally feels this way  States he misses his  family members  Patient also reported that yesterday with PT went well  He was up and walked with the walker half way around the nurses station  Patient states he would like more Pt  I discussed with the patient the importance of getting him to a rehab facility for continued Pt  Patient states his pain is minimal in his left foot 2/10 and he is able to bear weight on it  Patient denies SOB, chest pain, dyspnea on exertion or laying down, or abdominal pain  Patient denied any Ha, numbness, or dysuria as well  Patient stated he occasionally feels tingling in the left foot  Discussed with patient if he had spoke with his wife about going to rehab facility  Patient stated he is leaving that decision up to his wife and he is unsure of what she wants him to do      Objective:  Vitals: T: 98 7, P: 77, RR: 18, Bp: 116/51 O2%: 90% on RA  Alert and oriented x3  Patient in no acute distress  HEENT: B/L ecchymosis of inferior orbits, scab on nose from nasal laceration  Cardio: Regular rate and rhythm  Systolic Murmur noted  Pulm: Normal effort  CTAB  No wheezes, rales, or rhonchi  GI: Soft  Non-distended  NTTP  Normoactive BS x 4 quadrants  M S : Normal ROM in all 4 extremities  Slight tenderness to palpation in dorsum of left foot  NWB RUE   P V : No edema noted on the lower extremities, other than swelling at the great toe due to fracture  Distal pulses equal/intact B/L  Skin: Erythema and ecchymosis of the left foot  Reduced demarcation  Improved from original presentation  Labs: Patient found to be slightly hyponatremic on AM labs (135)  Will continue to monitor, with daily BMP  Patients Cr, HgB/Hct still stable at what seems to be patient's baseline      Assessment/Plan:  1  Acute encephalopathy: resolved  2  CHF exacerbation:                        - Patient has no s/s of volume overload                        - Continue on home regimen of torsemide 40 mg and 20 mg every other day  3  Left foot pain/erythema, intraarticular fracture                        - Erythema/intensity of area seems to be reduced             - X-ray showed intraarticular fracture of the left proximal phalynx                        - Evaluated by ID and treating for presumed cellulitis                        - Continue Ancef 1g Q12 hours (day 5), F/U with ID on when to transition to PO abx                        - Keep left foot elevated                        - ASO negative  4  Closed fracture of greater tuberosity of left humerus                        - Normal ROM, but NWB of LUE                        - Ortho follow up in 2 weeks  5  HTN:                        - Continue BB  6  CAD:                        - Continue statin, BB, ASA  7  S/P AVR w/biprothetic valve                        - Continue on  CCB, Bb, ASA, statin  8  Type 2 Dm:                        - Continue current regimen of ISS and accuchecks             - Blood glucose acceptable     Dispo: Still waiting on patient's wife, Quinn Albion, to make decision regarding rehabilitation facility   Will follow up with DOMINGO and Quinn Padillar later today      Problem List:  Left foot cellulitis/Pain  Closed fx of greater tuberosity of the humerus  Acute encephalopathy  Type 2 DM  CAD  PAF  CHF  HTN  Aortic valve replacement bioprosthetic valve  CKD

## 2017-10-19 NOTE — CASE MANAGEMENT
Continued Stay Review    Date: 10/19/2017     Vital Signs: /51   Pulse 77   Temp 98 7 °F (37 1 °C) (Oral)   Resp 18   Ht 5' 7" (1 702 m)   Wt 96 kg (211 lb 9 6 oz)   SpO2 90%   BMI 33 14 kg/m²     Medications:   Scheduled Meds:   acetaminophen 975 mg Oral Q8H [facility]   amLODIPine 2 5 mg Oral Daily   aspirin 81 mg Oral Every Other Day   calcium carbonate-vitamin D 1 tablet Oral Daily   cefazolin 1,000 mg Intravenous Q12H   diclofenac sodium 2 g Topical 4x Daily   febuxostat 40 mg Oral Daily   heparin (porcine) 5,000 Units Subcutaneous Q8H [address]   insulin aspart protamine-insulin aspart 20 Units Subcutaneous BID AC   insulin lispro 1-6 Units Subcutaneous TID With Meals   insulin lispro 1-6 Units Subcutaneous HS   lidocaine 1 patch Transdermal Q24H   metoprolol tartrate 75 mg Oral Q12H [address]   multivitamin-minerals 1 tablet Oral Daily   pantoprazole 40 mg Oral Early Morning   paricalcitol 1 mcg Oral Once per day on Mon Wed Fri   pravastatin 20 mg Oral Daily With Dinner   ramipril 2 5 mg Oral Daily   senna-docusate sodium 1 tablet Oral BID   torsemide 20 mg Oral Every Other Day   torsemide 40 mg Oral Every Other Day     Continuous Infusions:    PRN Meds: not used:   aluminum-magnesium hydroxide-simethicone    methocarbamol    nitroglycerin    ondansetron    oxyCODONE    Abnormal Labs/Diagnostic Results:   Glucose 131; 172  Na 135  Cl 98  Bun 51  Creatinine 1 89  hgb 11 5, hct 35    Age/Sex: [de-identified] y o  male     Assessment/Plan: Acute encephalopathy: resolved  2  CHF exacerbation:                        - Patient has no s/s of volume overload                        - Continue on home regimen of torsemide 40 mg and 20 mg every other day    3  Left foot pain/erythema, intraarticular fracture                        - Erythema/intensity of area seems to be reduced                                  - X-ray showed intraarticular fracture of the left proximal phalynx                        - Evaluated by ID and treating for presumed cellulitis                        - Continue Ancef 1g Q12 hours (day 5), F/U with ID on when to transition to PO abx                        - Keep left foot elevated                        - ASO negative  4  Closed fracture of greater tuberosity of left humerus                        - Normal ROM, but NWB of LUE                        - Ortho follow up in 2 weeks  5  HTN:                        - Continue BB  6  CAD:                        - Continue statin, BB, ASA  7  S/P AVR w/biprothetic valve                        - Continue on  CCB, Bb, ASA, statin  8  Type 2 Dm:                        - Continue current regimen of ISS and accuchecks                                  - Blood glucose acceptable     Dispo: Still waiting on patient's wife, Mile Puckett, to make decision regarding rehabilitation facility  Discharge Plan:  PT recommends rehab

## 2017-10-19 NOTE — PLAN OF CARE
Problem: PHYSICAL THERAPY ADULT  Goal: Performs mobility at highest level of function for planned discharge setting  See evaluation for individualized goals  Treatment/Interventions: Functional transfer training, LE strengthening/ROM, Therapeutic exercise, Endurance training, Bed mobility, Gait training, Elevations          See flowsheet documentation for full assessment, interventions and recommendations  Outcome: Progressing  Prognosis: Fair  Problem List: Decreased strength, Decreased endurance, Impaired balance, Decreased mobility  Assessment: pt is fatigued  but is  moving better this session   he remains nWB on  left  UE  still needing instruction  to maintain  100% of the time      pt  did amb  further     80x2 ft  using  rw  he  required seated  rest   between  gt trials      gait is  slow  c step to pattern   mildly unsteady   he  will need cont PT intervention    Barriers to Discharge: Inaccessible home environment  Barriers to Discharge Comments: Pt wife reported on evaluation 10/12 concerns with A pt at home 2* to balance deficits  Reported daughter returned to work and she was worried about caring for pt at home  Recommendation: Short-term skilled PT     PT - OK to Discharge: Yes (to rehab when med ready )    See flowsheet documentation for full assessment

## 2017-10-20 NOTE — PROGRESS NOTES
Progress Note - Infectious Disease   Vanessa Mancilla  [de-identified] y o  male MRN: [id number]  Unit/Bed#: -01 Encounter: [id number]      Impression:  1  Hemorrhagic cellulitis of the left foot  2  Mildly displaced intra-articular fracture of the base of the proximal phalanx of the left great toe  3  DM type 2 with PAD S/P bilateral lower extremity vascular bypass procedures and nephropathy with CKD  4  Traumatic facial laceration  5  S/P encephalopathy secondary to hypoglycemia and tramadol  6  S/P bovine AVR and CABG    Recommendations:  1  Extent of erythema and intensity of the area on the left foot continues to slowly decrease  Remains worse when in the dependent position  Has less tenderness  Will switch to p o  cephalexin 250 mg q i d  x 10 days as outpatient  2  Patient has been accepted at [facility] and the family is now okay with him being discharged today  3   Would continue to monitor CBC diff and BMP while on cephalexin    Antibiotics:  1  Cephalexin 250 mg q i d , day 6 total antibiotic Rx  Subjective:  Able to bear weight and walk with less pain  Denies fevers, chills, or sweats  Denies nausea, vomiting, or diarrhea  Objective:  Vitals:  HR:  [84-88] 84  Resp:  [18-20] 20  BP: (109-118)/(54-55) 113/55  SpO2:  [93 %-95 %] 93 %  Temp (24hrs), Av 2 °F (36 8 °C), Min:97 9 °F (36 6 °C), Max:98 6 °F (37 °C)  Current: Temperature: 98 6 °F (37 °C)    Physical Exam:     General Appearance:  Alert, nontoxic, no acute distress  Extremities: Surgical scars,  contused areas of the left 1st and 2nd toe with edema, hemorrhagic erythema of the left dorsal area of the foot is further decreased from yesterday  There is minimal tenderness over the dorsal area   Skin: As above, surgical scars, facial lacerations that are healing  Invasive Devices     Peripheral Intravenous Line            Peripheral IV 10/19/17 Left Wrist 1 day                Labs, Imaging, & Other studies:    All pertinent labs were personally reviewed    Results from last 7 days  Lab Units 10/19/17  0502 10/17/17  0450 10/16/17  0514   WBC Thousand/uL 7 59 7 45 8 31   HEMOGLOBIN g/dL 11 5* 11 2* 11 2*   PLATELETS Thousands/uL 236 216 214       Results from last 7 days  Lab Units 10/19/17  0502 10/17/17  0450 10/16/17  0514   SODIUM mmol/L 135* 138 135*   POTASSIUM mmol/L 4 5 4 3 4 2   CHLORIDE mmol/L 98* 101 98*   CO2 mmol/L 29 29 29   ANION GAP mmol/L 8 8 8   BUN mg/dL 51* 48* 48*   CREATININE mg/dL 1 89* 1 83* 1 92*   EGFR ml/min/1 73sq m 33 34 32   GLUCOSE RANDOM mg/dL 111 130 118   CALCIUM mg/dL 9 2 9 1 8 9

## 2017-10-20 NOTE — PLAN OF CARE
Problem: PHYSICAL THERAPY ADULT  Goal: Performs mobility at highest level of function for planned discharge setting  See evaluation for individualized goals  Treatment/Interventions: Functional transfer training, LE strengthening/ROM, Therapeutic exercise, Endurance training, Bed mobility, Gait training, Elevations          See flowsheet documentation for full assessment, interventions and recommendations  Outcome: Progressing  Prognosis: Good  Problem List: Decreased strength, Decreased endurance, Impaired balance, Decreased mobility  Assessment: Patient lying supine in bed, agreeable to participate in therapy  He demonstrates improved mobility while navigating in the bed to perform all transfers  He was able to stand min A with one UE to  RW  He notes fatigue during ambulation requiring standing rest breaks  He ambulates household distances at this time  Patient notes weakness while performing TE  He would continue to benefit from skilled PT to maximize functional independence  Barriers to Discharge: Inaccessible home environment  Barriers to Discharge Comments: Pt wife reported on evaluation 10/12 concerns with A pt at home 2* to balance deficits  Reported daughter returned to work and she was worried about caring for pt at home  Recommendation: Short-term skilled PT     PT - OK to Discharge: Yes (to inpatient rehab when medically stable)    See flowsheet documentation for full assessment

## 2017-10-20 NOTE — MEDICAL STUDENT
Subjective:   Patient reports today he is feeling slightly depressed  He states he is ready to get out of the hospital and get started with rehab, feels he is going "stir crazy " Patient reported that yesterday with PT went well, he walked around the nurses station with no shortness of breath and minimal pain  Patient reports his pain today is a 4/10 and considers it "annoying" more than painful  Patient denies SOB, chest pain, dyspnea on exertion or laying down, or abdominal pain  Patient denied any headache, numbness, or dysuria as well  Patient stated he occasionally feels tingling in the left foot        Objective:  Vitals: T: 98 6, P: 84, RR: 20, Bp: 113/55 O2%: 93% on RA  Alert and oriented x3  Patient in no acute distress  HEENT: B/L ecchymosis of inferior orbits, scab on nose from nasal laceration  Cardio: Regular rate and rhythm  Systolic Murmur noted  Pulm: Normal effort  CTAB  No wheezes, rales, or rhonchi  GI: Soft  Non-distended  NTTP  Normoactive BS x 4 quadrants  M S : Normal ROM in all 4 extremities  Slight tenderness to palpation in dorsum of left foot  NWB LUE   P V : No edema noted on the lower extremities, other than swelling at the great toe due to fracture  Distal pulses equal/intact B/L  Skin: Erythema and ecchymosis of the left foot  Reduced demarcation  Improved from original presentation  Sensation and full ROM intact in B/L Le, and injured foot       Assessment/Plan:  1  Acute encephalopathy: resolved  2  CHF exacerbation:                        - Patient has no s/s of volume overload                        - Continue on home regimen of torsemide 40 mg and 20 mg every other day    3  Left foot pain/erythema, intraarticular fracture                        - Erythema/intensity of area seems to be reduced                         - X-ray showed intraarticular fracture of the left proximal phalynx                        - Evaluated by ID and treating for presumed cellulitis                        - Patient should be transitioned to PO cephalexin 500 mg QID x 10 days and should be discharged to [facility] today for rehabilitation                        - Keep left foot elevated                        - ASO negative  4  Closed fracture of greater tuberosity of left humerus                        - Normal ROM, but NWB of LUE                        - Ortho follow up in 2 weeks  5  HTN:                        - Continue BB  6  CAD:                        - Continue statin, BB, ASA  7  S/P AVR w/biprothetic valve                        - Continue on  CCB, Bb, ASA, statin  8   Type 2 Dm:                        - Continue current regimen of ISS and accuchecks                                  - Blood glucose acceptable     Dispo: Patient should be discharged today on PO cephalexin 500 mg QID x 10 days to [facility] for rehabilitation      Problem List:  Left foot cellulitis/Pain  Closed fx of greater tuberosity of the humerus  Acute encephalopathy  Type 2 DM  CAD  PAF  CHF  HTN  Aortic valve replacement bioprosthetic valve  CKD

## 2017-10-20 NOTE — DISCHARGE SUMMARY
Transition of Care Discharge Summary - [facility] Internal Medicine    Patient Information: Mag Pereira  [de-identified] y o  male MRN: [id number]  Unit/Bed#: -01 Encounter: [id number]    Discharging Physician / Practitioner: Gabrielle Haynes MD  PCP: Rosemarie Aguilar MD  Admission Date: 10/10/2017  Discharge Date: 10/20/17    Disposition:      Short Term Rehab or SNF at [place] [address] (see below)    For Discharges to [facility] SNF:   · Dennis Sage MD - Unable to speak to physician, no message left  Reason for Admission: encephalopathy    Discharge Diagnoses:     Principal Problem:    Acute encephalopathy  Active Problems:    Paroxysmal atrial fibrillation (HCC)    Acute on chronic diastolic congestive heart failure (HCC)    Essential hypertension    Type 2 diabetes mellitus (HCC)    MADISON (dyspnea on exertion)    S/P aortic valve replacement with bioprosthetic valve    Nasal laceration, sequela    Coronary artery disease involving native coronary artery of native heart    Stage 3 chronic kidney disease    Closed fracture of greater tuberosity of left humerus    Cellulitis  Resolved Problems:    * No resolved hospital problems  *      Consultations During Hospital Stay:  · Cardiology  · Orthopedic surgery  · Infectious disease    Procedures Performed:     · none    Medication Adjustments and Discharge Medications:  · Summary of Medication Adjustments made as a result of this hospitalization: Oral torsemide is now 40 mg once a day, alternate with 20 mg once a day  Being discharged on oral Keflex 250 mg QID for 10 days  · Medication Dosing Tapers - Please refer to Discharge Medication List for details on any medication dosing tapers (if applicable to patient)  · Medications being temporarily held (include recommended restart time):   · Discharge Medication List: See after visit summary for reconciled discharge medications       Wound Care Recommendations:  When applicable, please see wound care section of After Visit Summary  Diet Recommendations at Discharge:  Diet -        Diet Orders            Start     Ordered    10/11/17 0256  Diet Marcelino/CHO Controlled; Consistent Carbohydrate Diet Level 2 (5 carb servings/75 grams CHO/meal); Sodium 2 Gm, Fluid Restriction 1800 ML  Diet effective now     Question Answer Comment   Diet Type Marcelino/CHO Controlled    Marcelino/CHO Controlled Consistent Carbohydrate Diet Level 2 (5 carb servings/75 grams CHO/meal)    Other Restriction(s): Sodium 2 Gm    Other Restriction(s): Fluid Restriction 1800 ML    RD to adjust diet per protocol? Yes        10/11/17 0255      ·     Instructions for any Catheters / Lines Present at Discharge (including removal date, if applicable):     Significant Findings / Test Results:     · Small chip fracture of the greater tuberosity of left humerus  · Left foot xray: Mildly displaced, intra-articular fracture of the base of the proximal phalanx of the great toe  Diffuse soft tissue swelling of the foot  · Right knee xray: Post total joint replacement  No evidence of fracture or other acute bony abnormality in the right knee  · CXR: Findings suggest pulmonary vascular congestion  Mild enlargement of cardiac silhouette    Incidental Findings:   · As above     Test Results Pending at Discharge (will require follow up):   · none     Outpatient Tests Requested:  · CBC & BMP on 10/23/17 & 24/33/51    Complications:  none    Hospital Course:     Chaim Lynn  is a [de-identified] y o  male patient who originally presented to the hospital on 10/10/2017 due to altered mentation  past medical history of paroxysmal atrial fibrillation status post aortic valve replacement 2002, hypertension, CAD status post CABG, chronic diastolic congestive heart failure, type 2 diabetes mellitus right who presents with transient altered mental status and bilateral lower extremity edema earlier prior to admission    Patient and patient's wife stated and change in mentation reporting increased weakness with lethargy throughout the day and worsened following administration of tramadol 50 mg around 10:00 p m this evening  Patient was given a total of 2 tablets tramadol 50 mg throughout the day for left arm pain following a mechanical fall for which patient reported his cane slipped out from underneath on 10/08/2017 landing anteriorly face 1st while in [place] last weekend  Patient reported to [facility] for which a head CT negative for hemorrhaging/hematoma per patient's wife and 3 sutures were placed at the nasal bridge given prescription for Keflex X 3 days  Yesterday patient was seen by PCP who prescribed tramadol for an left arm pain and follow-up this Friday for suture removal   Patient's wife attempted to assist patient out of bed however was unable to given weakness primarily in left upper extremity and right lower extremity  Patient's wife also noted transient confusion unwilling to answer questions and hard to arouse  Thus patient was brought to the emergency department for further evaluation of generalized weakness and transient altered mental status  · Acute encephalopathy: POA, suspected to be from tramadol or hypoglycemia  resolved  · Acute on chronic diastolic heart failure: found to be volume overloaded on admission  Treated with IV diuresis & then stabilized on oral torsemide 40 mg QD alternate with 20 mg QD  · Left foot pain erythma & hematoma: in context of intraarticular fracture: Xray showing intraarticular fracture of left proximal phalynx  Evaluated by ID & suspect hemorrhagic cellulitis - treated with IV ancef for 5 days 7 changed to oral keflex 250 QID for another 10 days  Theres is significant component & hematoma & bruise to his toe discoloration on top of possible infection  Explained to wife there is component of traumatic hematoma/bruise  ASO negative  · Closed fracture of greater tuberosity of left humerus: Appreciate ortho input  Non weight bearing on left upper extremity & follow up with ortho in 2 weeks  · CAD: Continue ASA, statin, BB  · S/p AVR with bioprosthetic valve: Continues on home meds  · Paro afib: used to be on eliquis but taken off it by outpatient cardiology  · Type II DM: Continue insulin 70/30 - 20 U BID     Condition at Discharge: stable     Discharge Day Visit / Exam:     Subjective:  No CP/SOB, no adbo pain/n/v  Vitals: Blood Pressure: 113/55 (10/20/17 0851)  Pulse: 84 (10/20/17 0851)  Temperature: 98 6 °F (37 °C) (10/20/17 0851)  Temp Source: Oral (10/20/17 0851)  Respirations: 20 (10/20/17 0851)  Height: 5' 7" (170 2 cm) (10/11/17 2141)  Weight - Scale: 96 kg (211 lb 9 6 oz) (10/19/17 0557)  SpO2: 93 % (10/20/17 0851)  Exam:   Physical Exam   Constitutional: He is oriented to person, place, and time  He appears well-developed and well-nourished  HENT:   Head: Normocephalic and atraumatic  Eyes: Conjunctivae are normal  No scleral icterus  Cardiovascular: Normal rate, regular rhythm and normal heart sounds  Exam reveals no gallop and no friction rub  No murmur heard  Pulmonary/Chest: Effort normal and breath sounds normal  No respiratory distress  He has no wheezes  Abdominal: Soft  He exhibits no distension  There is no tenderness  Musculoskeletal: He exhibits no edema  Right great toe - improving redness & blackness, still has erythma on dorsum of foot   Neurological: He is alert and oriented to person, place, and time  Discussion with Family: wife    Goals of Care Discussions:  · Code Status at Discharge: Level 1 - Full Code  · Were there any Goals of Care Discussions during Hospitalization?: No  · Results of any General Goals of Care Discussions:    · POLST Completed: No   · If POLST Completed, Summary of POLST Agreement Provided Here:    · OK to Rehospitalize if Needed?  Yes    Discharge instructions/Information to patient and family:   See after visit summary section titled Discharge Instructions for information provided to patient and family  Planned Readmission: no      Discharge Statement:  I spent 45 minutes discharging the patient  This time was spent on the day of discharge  I had direct contact with the patient on the day of discharge  Greater than 50% of the total time was spent examining patient, answering all patient questions, arranging and discussing plan of care with patient as well as directly providing post-discharge instructions  Additional time then spent on discharge activities      ** Please Note: This note has been constructed using a voice recognition system **

## 2017-10-20 NOTE — PHYSICAL THERAPY NOTE
Physical Therapy Progress Note        10/20/17 1244   Pain Assessment   Pain Assessment 0-10   Pain Score No Pain   Pain Type Acute pain   Pain Location Foot   Pain Orientation Left   Hospital Pain Intervention(s) Ambulation/increased activity;Repositioned;Distraction; Emotional support   Response to Interventions Tolerated   Restrictions/Precautions   Weight Bearing Precautions Per Order Yes   RUE Weight Bearing Per Order WBAT   LUE Weight Bearing Per Order NWB   RLE Weight Bearing Per Order WBAT   LLE Weight Bearing Per Order WBAT   Braces or Orthoses Sling   Other Precautions Fall Risk;WBS; Bed Alarm   General   Chart Reviewed Yes   Response to Previous Treatment Patient with no complaints from previous session  Family/Caregiver Present No   Cognition   Overall Cognitive Status WFL   Arousal/Participation Alert; Cooperative   Following Commands Follows all commands and directions without difficulty   Bed Mobility   Rolling R 5  Supervision   Additional items Bedrails; Increased time required   Rolling L 5  Supervision   Additional items Bedrails   Supine to Sit 4  Minimal assistance   Additional items Assist x 1;Bedrails; Increased time required;Verbal cues   Sit to Supine 4  Minimal assistance   Additional items Assist x 1;Bedrails; Increased time required;Verbal cues   Transfers   Sit to Stand 4  Minimal assistance   Additional items Assist x 1; Increased time required;Verbal cues   Stand to Sit 4  Minimal assistance   Additional items Assist x 1; Increased time required;Verbal cues   Stand pivot 4  Minimal assistance   Additional items Assist x 1; Increased time required;Verbal cues   Ambulation/Elevation   Gait pattern Narrow MARGO; Decreased foot clearance; Forward Flexion   Gait Assistance 4  Minimal assist   Additional items Assist x 1;Verbal cues   Assistive Device 1 handed walker   Distance 60 feet x 2    Balance   Static Sitting Good   Dynamic Sitting Fair +   Static Standing Fair   Dynamic Standing Fair - Ambulatory Fair -   Endurance Deficit   Endurance Deficit Yes   Endurance Deficit Description Fatigue   Activity Tolerance   Activity Tolerance Patient limited by fatigue   Exercises   Heelslides Supine;10 reps;Bilateral   Hip Flexion Sitting;10 reps;Bilateral   Hip Abduction Supine;10 reps;Bilateral   Hip Adduction Supine;10 reps;Bilateral   Knee AROM Long Arc Quad Sitting;10 reps;Bilateral   Ankle Pumps Supine;20 reps;Bilateral   Assessment   Prognosis Good   Problem List Decreased strength;Decreased endurance; Impaired balance;Decreased mobility   Assessment Patient lying supine in bed, agreeable to participate in therapy  He demonstrates improved mobility while navigating in the bed to perform all transfers  He was able to stand min A with one UE to  RW  He notes fatigue during ambulation requiring standing rest breaks  He ambulates household distances at this time  Patient notes weakness while performing TE  He would continue to benefit from skilled PT to maximize functional independence  Barriers to Discharge Inaccessible home environment   Goals   Patient Goals None stated   STG Expiration Date 10/22/17   Treatment Day 6   Plan   Treatment/Interventions Functional transfer training;LE strengthening/ROM; Therapeutic exercise; Endurance training;Bed mobility;Gait training;Spoke to nursing   Progress Progressing toward goals   PT Frequency 5x/wk  (1x weekend)   Recommendation   Recommendation Short-term skilled PT   Equipment Recommended (one handed walker)   PT - OK to Discharge Yes  (to inpatient rehab when medically stable)     Hayley Joshi PTA

## 2017-11-02 NOTE — PROGRESS NOTES
Assessment  1  Closed nondisplaced fracture of greater tuberosity of left humerus, initial encounter   (812 03) (S42 255A)    Plan  Closed nondisplaced fracture of greater tuberosity of left humerus, initial encounter    · Follow-up visit in 1 month Evaluation and Treatment  Follow-up  Status: Hold For -  Scheduling  Requested for: 06INU3289   · Please refer to the patient information sheet that was provided at your office visit today for  information on  your current condition ; Status:Complete;   Done: 17DTV0227   · Closed treatment of greater humeral tuberosity fx without manipulation - POC;  Status:Complete;   Done: 59FKJ2705 12:51PM   · *1 - [facility]  For left greater tuberosity fracture  May do passive and active assisted range of motion shoulder  No strengthening  No need for sling  Status: Active  Requested for: 37MMB8391  Care Summary provided  : Yes  Closed traumatic minimally displaced fracture of greater tuberosity of left humerus with  routine healing    · * XR SHOULDER 2+ VIEW LEFT; Status:Active - Retrospective By Protocol Authorization; Requested for:[id number];     Discussion/Summary    [de-identified] male with a healing left greater tuberosity fracture  We will start him in therapy for passive range of motion and active assisted range of motion of his shoulder Herndon Molina back in follow-up in another 4 weeks for repeat x-rays and examination  Chief Complaint  1  Shoulder Pain  Left shoulder pain      History of Present Illness  HPI: [de-identified] male presenting for follow-up after a fall approximately 3 weeks ago  He initially sustained a chip fracture of his left greater tuberosity was placed in a sling  Since then he has had improvement in her shoulder pain but complains mainly of stiffness today  Pain is well located over the lateral aspect shoulder made worse with direct contact attempted motion is relieved by rest  Denies any numbness or tingling   Prior to the fall he was able to actively move his shoulder up to 90Â°   patient's medical history, surgical history, social history, family history, medications, allergies, and review of systems were reviewed and updated today  of Systems:  Constitutional: No fever or chills, feels well, no tiredness, no recent weight gain or loss  Eyes: No complaints of eyesight problems, no red eyes  ENT: No loss of hearing, no nosebleeds, no sore throat  Cardiovascular: No chest pain, palpitations, leg claudication, or lower extremity edema  Respiratory: No shortness of breath, wheezing, or cough  Gastrointestinal: No abdominal pain, constipation, nausea / vomiting, no diarrhea  Genitourinary: No dysruia or incontinence  Musculoskeletal: As noted in HPI  Integumentary: No rash or skin lesions, no itching or dry skin, no wounds  Neurological: No headache, confusion, numbness or tingling, or dizziness  Endocrine: No muscle weakness, frequent urination, or excessive thirst  Psychiatric: No suicidal thoughts, anxiety, or depression      Active Problems  1  Aortic valve disorder (424 1) (I35 9)   2  Arm pain, lateral, left (729 5) (M79 602)   3  Back pain (724 5) (M54 9)   4  Benign essential hypertension (401 1) (I10)   5  Carotid stenosis (433 10) (I65 29)   6  Chronic diastolic congestive heart failure (428 32,428 0) (I50 32)   7  Chronic kidney disease, stage 3 (585 3) (N18 3)   8  CKD (chronic kidney disease), stage 4 (severe) (585 4) (N18 4)   9  Closed traumatic minimally displaced fracture of greater tuberosity of left humerus with   routine healing (V54 11) (S42 252D)   10  Congestive heart failure (428 0) (I50 9)   11  Coronary atherosclerosis of native coronary vessel (414 01) (I25 10)   12  Cough with hemoptysis (786 39) (R04 2)   13  Degenerative joint disease (715 90) (M19 90)   14  Diabetes Mellitus (250 00)   15  Dyspnea (786 09) (R06 00)   16  Edema (782 3) (R60 9)   17  Esophageal reflux (530 81) (K21 9)   18   Extremity atherosclerosis with intermittent claudication (440 21) (I70 219)   19  Gout (274 9) (M10 9)   20  Hiccups (786 8) (R06 6)   21  Hyperlipidemia (272 4) (E78 5)   22  Hyperparathyroidism (252 00) (E21 3)   23  Hypoxia (799 02) (R09 02)   24  Left low back pain (724 2) (M54 5)   25  Mild mitral regurgitation (424 0) (I34 0)   26  Monoclonal gammopathy of undetermined significance (273 1) (D47 2)   27  Need for vaccination with 13-polyvalent pneumococcal conjugate vaccine (V03 82) (Z23)   28  Paroxysmal atrial fibrillation (427 31) (I48 0)   29  Peripheral vascular disease (443 9) (I73 9)   30  S/P AVR (V43 3) (Z95 2)   31  Screening for diabetic retinopathy (V80 2) (Z13 5)   32  Secondary hyperparathyroidism, renal (588 81) (N25 81)   33  SOB (shortness of breath) on exertion (786 05) (R06 02)   34  Tachycardia (785 0) (R00 0)   35  Visit for pre-operative examination (V72 84) (Z01 818)   36  Vitamin D deficiency (268 9) (E55 9)   37   Wheeze (786 07) (R06 2)    Past Medical History   · History of Dysfunction of both eustachian tubes (381 81) (H69 83)   · History of Dyspnea (786 09) (R06 00)   · History of Embolism, arterial, leg, right (444 22) (I74 3)   · History of Herniated disc (722 2)   · History of acute bronchitis (V12 69) (Z87 09)   · History of cough   · History of hematuria (V13 09) (Z87 448)   · History of muscle pain (V13 59) (Z87 39)   · History of myocardial infarction (412) (I25 2)   · History of pneumonia (V12 61) (Z87 01)   · History of Premature atrial contractions (427 61) (I49 1)   · History of Productive cough (786 2) (R05)   · History of URI, acute (465 9) (J06 9)    Surgical History   · History of Aortic Valve Replacement   · History of Bypass Graft Using Vein: Femoral-popliteal   · History of CABG   · History of Cataract Surgery   · History of Knee Replacement    Family History   · Family history of Diabetes    Social History   · Denies alcohol consumption (G44 81) (Z78 9)   · Denied: History of Drug use   · Former smoker (U84 08) (L22 309)   · Former Smoker Cigarettes - Heavy (20-25 / Day)    Current Meds   1  AmLODIPine Besylate 2 5 MG Oral Tablet; TAKE ONE TABLET TUESDAY, THURSDAY,   SATURDAY; Therapy: 40Zsa2418 to (Evaluate:07Jun2018)  Requested for: 12Jun2017 Recorded   2  Aspirin EC 81 MG Oral Tablet Delayed Release; TAKE 1 TABLET DAILY; Therapy: 25IAT9919 to (Evaluate:06Jan2018); Last Rx:11Jan2017 Ordered   3  Centrum Silver TABS; Take 1 tablet daily; Therapy: (Recorded:24Cey8040) to Recorded   4  Metoprolol Tartrate 25 MG Oral Tablet; TAKE 1 TABLET TWICE DAILY ALONG WITH THE   50 MG TABLET; Therapy: (Recorded:11Sep2017) to  Requested for: 11Sep2017 Recorded   5  Metoprolol Tartrate 50 MG Oral Tablet; Take 1 tablet by mouth twice daily along with 1   tablet of 25 mg;   Therapy: 00OMD1464 to (Evaluate:20Qjn0411)  Requested for: 11Sep2017 Recorded   6  NovoLOG Mix 70/30 FlexPen (70-30) 100 UNIT/ML SUSP; Use 38 units in the am and 32   units in the pm;   Therapy: (Recorded:38Xmt3550) to Recorded   7  Omeprazole 20 MG Oral Capsule Delayed Release; take 1 capsule by mouth daily; Therapy: 14OCC6992 to (Evaluate:06Lud8967)  Requested for: 50Ylc6627; Last   Rx:92Ssl7165 Ordered   8  Paricalcitol 1 MCG Oral Capsule; take 1 capsule by mouth ON MONDAY, [address] [place] [address]; Therapy: 20GDS9528 to (Darleene Appl)  Requested for: 87NVY6727; Last   Rx:15Qzb2484 Ordered   9  Ramipril 2 5 MG Oral Capsule; take 1 capsule by mouth every morning; Therapy: 26PNJ8125 to (Evaluate:77Fdm7580)  Requested for: 14QIF2351; Last   Rx:06Axk3999 Ordered   10  Simvastatin 10 MG Oral Tablet; TAKE 1 TABLET BY MOUTH DAILY; Therapy: 55SCT1893 to (Evaluate:12Apr2018)  Requested for: 80Rcf0199; Last    Rx:74Qjj5830 Ordered   11  Torsemide 20 MG Oral Tablet; TAKE 2 TABLETS BY MOUTH DAILY ON MWF, 1 PO REST    OF WEEK; Therapy: 24FFJ6858 to (Evaluate:67Pwe7018)  Requested for: 11Sep2017 Recorded   12   TraMADol HCl - 50 MG Oral Tablet; ONE PO Q 8 HRS  PRN; Therapy: 40GBC1900 to (Last Rx:12Jun2017) Ordered   13  Uloric 40 MG Oral Tablet; TAKE 1 TABLET DAILY; Therapy: (Recorded:40Eum6732) to Recorded    Allergies  1  Acetaminophen-Codeine SOLN   2  Hydrocodone-Acetaminophen TABS   3  MetFORMIN HCl TABS   4  Codeine Derivatives   5  hydrocodone    Vitals  Signs   Heart Rate: 80  Systolic: 284  Diastolic: 63  Height: 5 ft 7 in  Weight: 216 lb 8 oz  BMI Calculated: 33 91  BSA Calculated: 2 09    Physical Exam      General: No acute distress, age-appropriate  Neck: Supple, trachea midline  HEENT: Normocephalic atraumatic, mucous membranes are moist, sclera are nonicteric  Cardiovascular: No discernable arrhythmia  Respiratory: Breathing is even and unlabored, no stridor or audible wheezing  Psychiatric: Awake alert and oriented x3, normal mood and affect  Abdomen: Without rebound or guarding  Left Basic: (Tender palpation over the greater tuberosity  Shoulder passive range of motion to 90Â° active range of motion to 30Â°  40/5 strength external rotation and negative belly press negative bear hug)    Skin: was evaluated and demonstrated no masses, no abrasions, no erythema, no effusion, no edema, no fluctuance, no induration, no laceration, no ulceration, no lymphadenopathy  Left Upper Neurovascular: were evaluated and demonstrated: The patient has normal motor strength to the median, ulnar and radial nerve  Normal sensation to the median, ulnar, and radial nerve  Normal pulses in the radial and ulnar artery  Capillary refill is < 2 seconds  Results/Data  I personally reviewed the films/images/results in the office today  My interpretation follows  X-ray Review X-rays of the left shoulder shows callus formation over the greater tuberosity fracture        Attending Note  Attending Note [facility]: Attending Note: I interviewed, took the history and examined the patient,-- I discussed the case with the Resident and reviewed the Resident's note-- and-- I agree with the Resident management plan as it was presented to me  Level of Participation: I was present in clinic and examined the patient  Patient's History: Left shoulder pain  Status post a fall  We are treating him for greater tuberosity fracture to the left side  He is doing well today  Pain improving  No numbness tingling fevers or chills  Examination demonstrates forward flexion and abduction to 90Â°  He can externally rotate 45, full internal rotation noted  No tenderness to palpation greater tuberosity fracture  At this point, left greater tuberosity fracture will be treated non operatively  Discontinue sling, begin passive and active assisted range of motion exercises  Follow up with me for 4 weeks repeat x-rays left shoulder  I agree with the Resident's note  Future Appointments    Date/Time Provider Specialty Site   11/28/2017 01:45 PM LUKE Sanchez  Orthopedic Surgery [facility]   11/02/2017 03:15 PM Doctor, Dean Padron MD Vascular Surgery [address]   02/28/2018 02:00 PM LUKE Randolph  Hematology Oncology [facility] MEDICAL ONCOLOGY   11/14/2017 10:00 AM LUKE Graham  Cardiology [facility] CARDIOLOGY [facility]   11/08/2017 03:30 PM Evans Dailey DO Nephrology  [address] [facility]     Signatures   Electronically signed by :  LUKE Manning ; Nov 1 2017 10:48AM EST                       (Author)    Electronically signed by : LUKE Cleary ; Nov 1 2017 12:52PM EST                       (Author)

## 2017-11-03 NOTE — PROGRESS NOTES
Assessment  1  Chronic kidney disease, stage 3 (585 3) (N18 3)   2  Asymptomatic bilateral carotid artery stenosis (433 10,433 30) (I65 23)   3  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,L97 909)    Plan  Arm pain, lateral, left, Atherosclerosis of artery of extremity with ulceration    · Schedule Surgery Treatment  Procedure  Status: Hold For - Scheduling  Requested for:  42UND1688   Ordered; For: Arm pain, lateral, left, Atherosclerosis of artery of extremity with ulceration; Ordered By: Annette Larsen Performed:  Due: 82JQF0819   · (1) [place]; Status:Active; Requested ZKP:[id number];    Perform:[facility]; YCS:28AAB1175; Ordered; For:Arm pain, lateral, left, Atherosclerosis of artery of extremity with ulceration; Ordered By:Humera Larsen;   · (1) CBC/ PLT (NO DIFF); Status:Active; Requested BGT:71ECH4035;    Perform:[facility]; CTT:05EBY0188; Ordered; For:Arm pain, lateral, left, Atherosclerosis of artery of extremity with ulceration; Ordered By:Humera Larsen;   · (1) PT WITH INR; Status:Active; Requested YMO:[id number];    Perform:[facility]; GNU:04VQP0775; Ordered; For:Arm pain, lateral, left, Atherosclerosis of artery of extremity with ulceration; Ordered By:Annette Larsen;    Discussion/Summary  Discussion Summary:   Patient is an [de-identified] yo M w/ HTN, HLD, afib (no longer on Eliquis), CAD s/p CABG, DM, bioprostehtic aortic valve replacement, PAD s/p R fem-TP trunk bypass '02 by Dr Bill Simpson s/p PTA for mid graft stenosis in '03 by Dr Abi Gillespie, presents now after mechanical fall with L foot wound/infectionAtherosclerosis with nonhealing foot woundwith foot wound after fall, concerning for infection, recently treated for this in house --2-3 wks agoLEADs which show L SFA stenosis and ABIs: 0 54/42/9pathophysiology of arterial disease as well as effect of DM on wound healing and infection; I think it unlikely that he will heal or clear infection without revascularization; did discussed risks and benefits including risk of contrast nephropathy and permanent kidney injurynephro clearance Leigh Ann Vaca), plan for IVF hydration and CO2;f/u with your podiatrist as soon as possible for further eval of foot and need for debridementfor LLE angiogram, R femoral accessMedicationsASA/statinwas stopped after his fall (hx of afib)Carotid stenosiscarotid DU which shows R 50-69% and L 70% stenosis, lisa 318/69 ratio 2 98remains asymptomaticDU in 6 mos (will order at next visit)  Counseling Documentation With Imm: The patient, patient's family was counseled regarding diagnostic results,-- instructions for management,-- prognosis,-- risks and benefits of treatment options,-- importance of compliance with treatment  Chief Complaint  Chief Complaint Free Text Note Form: I'm here to reestablish care with a my new doctor  was last seen in 2012 by Dr Robert Dockery  He had a CV and ADIEL on 8/23/17  He denies any TIA or stroke symptoms  He has no facial droopiness or one sided weakness  He denies any back or abdomen pain  He denies any pain with walking  He denies any pain at night  History of Present Illness  HPI: Patient is an [de-identified] yo M w/ HTN, HLD, afib (no longer on Eliquis), CAD s/p CABG, DM, bioprostehtic aortic valve replacement, PAD s/p R fem-TP trunk bypass '02 by Dr Robert Dockery s/p PTA for mid graft stenosis in '03 by Dr Rossana Waldrop, presents now after mechanical fall with L foot wound/infection  had a fall about a month ago with lacerations to face requiring sutures, significant bruising throughout  Eliquis stopped  He was recovering from this and noted to have erythema of L foot  He was admitted to [facility] for several days for cellulitis of foot  Has not followed up with podiatry since then  Sees Dr Charles Tate but otherwise not doing any wound care  No vascular consultation called in house  denies symptoms of claudication, rest pain  He has DM with neuropathy   Denies TIA/CVA Review of Systems  Complete Male - Vasc:   Constitutional: No fever or chills, feels well, no tiredness, no recent weight gain or weight loss  Eyes: No sudden vision loss, no blurred vision, no double vision  ENT: no loss of hearing, no nosebleeds, no hoarseness  Cardiovascular: no leg pain with walking, but-- no chest pain, regular heart rate-- and-- no intermittent leg claudication  Respiratory: no wheezing,-- no cough,-- no shortness of breath during exertion,-- no orthopnea-- and-- no complaints of PND, but-- shortness of breath  Gastrointestinal: No nausea, No vomiting, no diarrhea, no blood in stool  Genitourinary: no dysuria, no hematuria, No urinary incontinence, no erectile dysfunction  Musculoskeletal: myalgias-- and-- limb swelling, but-- lumbar pain,-- no joint swelling,-- no limb pain-- and-- no joint stiffness  Integumentary: skin wound, but-- ulcers  Neurological: difficulty walking, but-- no dementia, no headache, no numbness, no limb weakness, no dizziness, no difficulty walking  Psychiatric: no depression, no mood disorders, no anxiety  Hematologic/Lymphatic: a tendency for easy bleeding-- and-- a tendency for easy bruising, but-- no swollen glands-- and-- no swollen glands in the neck  ROS Reviewed:   ROS reviewed  Active Problems  1  Aortic valve disorder (424 1) (I35 9)   2  Arm pain, lateral, left (729 5) (M79 602)   3  Asymptomatic bilateral carotid artery stenosis (433 10,433 30) (I65 23)   4  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,L97 909)   5  Back pain (724 5) (M54 9)   6  Benign essential hypertension (401 1) (I10)   7  Chronic diastolic congestive heart failure (428 32,428 0) (I50 32)   8  Chronic kidney disease, stage 3 (585 3) (N18 3)   9  CKD (chronic kidney disease), stage 4 (severe) (585 4) (N18 4)   10   Closed nondisplaced fracture of greater tuberosity of left humerus, initial encounter    (812 03) (A62 567T) 11  Closed traumatic minimally displaced fracture of greater tuberosity of left humerus with    routine healing (V54 11) (S42 252D)   12  Congestive heart failure (428 0) (I50 9)   13  Coronary atherosclerosis of native coronary vessel (414 01) (I25 10)   14  Cough with hemoptysis (786 39) (R04 2)   15  Degenerative joint disease (715 90) (M19 90)   16  Diabetes Mellitus (250 00)   17  Dyspnea (786 09) (R06 00)   18  Edema (782 3) (R60 9)   19  Esophageal reflux (530 81) (K21 9)   20  Gout (274 9) (M10 9)   21  Hiccups (786 8) (R06 6)   22  Hyperlipidemia (272 4) (E78 5)   23  Hyperparathyroidism (252 00) (E21 3)   24  Hypoxia (799 02) (R09 02)   25  Left low back pain (724 2) (M54 5)   26  Mild mitral regurgitation (424 0) (I34 0)   27  Monoclonal gammopathy of undetermined significance (273 1) (D47 2)   28  Need for vaccination with 13-polyvalent pneumococcal conjugate vaccine (V03 82) (Z23)   29  Paroxysmal atrial fibrillation (427 31) (I48 0)   30  Peripheral vascular disease (443 9) (I73 9)   31  S/P AVR (V43 3) (Z95 2)   32  Screening for diabetic retinopathy (V80 2) (Z13 5)   33  Secondary hyperparathyroidism, renal (588 81) (N25 81)   34  SOB (shortness of breath) on exertion (786 05) (R06 02)   35  Tachycardia (785 0) (R00 0)   36  Visit for pre-operative examination (V72 84) (Z01 818)   37  Vitamin D deficiency (268 9) (E55 9)   38  Wheeze (786 07) (R06 2)    Past Medical History  1  History of Dysfunction of both eustachian tubes (381 81) (H69 83)   2  History of Dyspnea (786 09) (R06 00)   3  History of Embolism, arterial, leg, right (444 22) (I74 3)   4  History of Herniated disc (722 2)   5  History of acute bronchitis (V12 69) (Z87 09)   6  History of cough   7  History of hematuria (V13 09) (Z87 448)   8  History of muscle pain (V13 59) (Z87 39)   9  History of myocardial infarction (412) (I25 2)   10  History of pneumonia (V12 61) (Z87 01)   11   History of Premature atrial contractions (427 61) (I49 1)   12  History of Productive cough (786 2) (R05)   13  History of URI, acute (465 9) (J06 9)  Active Problems And Past Medical History Reviewed: The active problems and past medical history were reviewed and updated today  Surgical History  1  History of Aortic Valve Replacement   2  History of Bypass Graft Using Vein: Femoral-popliteal   3  History of CABG   4  History of Cataract Surgery   5  History of Knee Replacement  Surgical History Reviewed: The surgical history was reviewed and updated today  Family History  Sibling    1  Family history of Diabetes  Family History Reviewed: The family history was reviewed and updated today  Social History   · Denies alcohol consumption (V49 89) (Z78 9)   · Denied: History of Drug use   · Former smoker (V15 82) (U68 446)   · Former Smoker Cigarettes - Heavy (20-25 / Day)  Social History Reviewed: The social history was reviewed and updated today  The social history was reviewed and is unchanged  Current Meds   1  AmLODIPine Besylate 2 5 MG Oral Tablet; TAKE ONE TABLET TUESDAY, THURSDAY,   SATURDAY; Therapy: 95Bis0002 to (Evaluate:07Jun2018)  Requested for: 12Jun2017 Recorded   2  Aspirin EC 81 MG Oral Tablet Delayed Release; TAKE 1 TABLET DAILY; Therapy: 07ILC5467 to (Evaluate:06Jan2018); Last Rx:11Jan2017 Ordered   3  Centrum Silver TABS; Take 1 tablet daily; Therapy: (Recorded:64Uwq7832) to Recorded   4  Metoprolol Tartrate 25 MG Oral Tablet; TAKE 1 TABLET TWICE DAILY ALONG WITH THE   50 MG TABLET; Therapy: (Recorded:58Mvb6534) to  Requested for: 11Sep2017 Recorded   5  Metoprolol Tartrate 50 MG Oral Tablet; Take 1 tablet by mouth twice daily along with 1   tablet of 25 mg;   Therapy: 89MBD2332 to (Evaluate:04Bwl5116)  Requested for: 95Uzm0600 Recorded   6  NovoLOG Mix 70/30 FlexPen (70-30) 100 UNIT/ML SUSP; Use 38 units in the am and 32   units in the pm;   Therapy: (Recorded:42Obd4737) to Recorded   7   Omeprazole 20 MG Oral Capsule Delayed Release; take 1 capsule by mouth daily; Therapy: 13EQV2347 to (Evaluate:91Pyb8536)  Requested for: 35Edb2114; Last   Rx:40Haw8041 Ordered   8  Paricalcitol 1 MCG Oral Capsule; take 1 capsule by mouth ON MONDAY, [facility]; Therapy: 02BXU9678 to (Klaudia Yeager)  Requested for: 52MFM9575; Last   Rx:27Dkr1369 Ordered   9  Ramipril 2 5 MG Oral Capsule; take 1 capsule by mouth every morning; Therapy: 45WIX4489 to (Evaluate:01Drd3868)  Requested for: 04VPT7877; Last   Rx:89Yui8659 Ordered   10  Simvastatin 10 MG Oral Tablet; TAKE 1 TABLET BY MOUTH DAILY; Therapy: 62MYA7472 to (Evaluate:20Anz7958)  Requested for: 61Wtu4643; Last    Rx:88Ter5741 Ordered   11  Torsemide 20 MG Oral Tablet; TAKE 2 TABLETS BY MOUTH DAILY ON MWF, 1 PO REST    OF WEEK; Therapy: 74DIH8161 to (Evaluate:93Etn6255)  Requested for: 16Qmn1288 Recorded   12  Uloric 40 MG Oral Tablet; TAKE 1 TABLET DAILY; Therapy: (Recorded:89Ezz6757) to Recorded  Medication List Reviewed: The medication list was reviewed and updated today  Allergies  1  Acetaminophen-Codeine SOLN   2  Hydrocodone-Acetaminophen TABS   3  MetFORMIN HCl TABS   4  Codeine Derivatives   5  hydrocodone    Vitals  Vital Signs    Recorded: 59QUG1429 03:46PM Recorded: 91UGE6001 03:43PM   Heart Rate  74, R Radial   Pulse Quality  Regular, R Radial   Respiration Quality  Normal   Respiration  16   Systolic 095, LUE, Sitting 118, RUE, Sitting   Diastolic 60, LUE, Sitting 52, RUE, Sitting   Height  5 ft 7 in   Weight  216 lb    BMI Calculated  33 83   BSA Calculated  2 09     Physical Exam    Carotid: no bruit heard on the right-- and-- no bruit on the left  Radial: right 2+-- and-- left 2+  Femoral: right 2+-- and-- left 2+  Popliteal: left 0  Posterior tibialis: right 0-- and-- left 0  Dorsalis pedis: right 0-- and-- left 0  Distal Pulse Exam:   There was a palpable graft pulse at 2+ at knee  delayed L foot       Extremities: left foot 2+ pitting edema  LE Varicose Veins: No Varicose Veins are Present  The heart rate was normal  The rhythm was regular  Heart sounds: normal S1-- and-- normal S2    Murmurs: No murmurs were heard  Pulmonary   Respiratory effort: No increased work of breathing or signs of respiratory distress  Auscultation of lungs: Clear to auscultation  No wheezing, no rales, no rhonchi  Abdomen   Abdomen: Abdomen soft, non-tender, no masses, non distended, no rebound tenderness  Psychiatric   Orientation to person, place and time: Normal -- poor memory  Mood and affect: Normal    Eyes   Conjunctiva and lids: No swelling, erythema, or discharge  Ears, Nose, Mouth, and Throat   Hearing: Normal    Neck   Neck: No jugular venous distention, normal ROM and extension  No cervical adenopathy, trachea midline, neck supple  Neurologic   Motor skills intact  Musculoskeletal   Gait and station:-- wheelchair  Skin   Skin and subcutaneous tissue:-- (L great toe with discoloration, swelling, blister/wound between 1st and 2nd toe with foul odor concerning for underlying infection)      Hydration Order Form  Vascular Hydration Physician Orders:   Location: [address] [place] [address]   Admission Type: 4 hours pre- and post- Arteriogram/CTA hydration    Diagnosis:   Test Pending: Aortogram with lower extremity runoff  Mucomyst 1200 mg, one dose every 12 hours PO, total 4 doses, To be initiated upon admission      IV NSS at 100mL/hr continuous, to begin upon admission 4 hours prior to procedure/study and continuing for 4 hours after procedure/study           Future Appointments    Date/Time Provider Specialty Site   11/28/2017 01:45 PM LUKE Lucero  Orthopedic Surgery [address]   02/28/2018 02:00 PM LUKE Roach  Hematology Oncology [facility]   11/14/2017 10:00 AM LUKE Clark   Cardiology [facility] CARDIOLOGY [place]   11/08/2017 03:30 PM Armond Eisenmenger, DO Nephrology [address] [facility] [address]     Surgery Scheduling Form  Vascular Surgery Scheduling Form [facility] Standard:     Location: [facility]   Confirmation Number:   Procedure Date:   Requested Time:   Physician Doctor  Co-Surgeon:   [facility] Required:   Bed: Outpatient- No Bed Required  Anesthesia: Conscious Sedation  PROCEDURE DETAILS   Procedure: LLE angiogram, R femoral access; needs CO2  Laterality: Left   Anticipated frozen section:   Procedure Codes:   Pre-op diagnosis:   Diagnosis Code(s):   Case Length: level 2  Equipment:   Equipment Needs:   Implants/Representative:     REGISTRATION & FINANCIAL CLEARANCE     Amount Paid/Date:   FA Initials:   Insurance:   Policy Number: Group Number:     PRE-ADMISSION TESTING & CLINICAL INFORMATION   PAT Location: No PATs Needed     Consults Needed   Anesthesia Consult:   Medical Consult:   Cardiac Consult:        ALLERGIES AND ALERTS   Latex Allergy: NO   Penicillin Allergy: NO   Malignant Hyperthermia:   Diabetic Patient: YES     COMMENTS   Scheduling Information Provided By:     IN OFFICE USE   Urgency: Urgent <3wks   Additional Bed Requirements:   CD to Hospital   Is the patient able to walk up a flight of stairs, walk up a hill or do heavy housework WITHOUT having chest pain or shortness of breath? NO  Patient is currently taking Aspirin   does not need to hold Aspirin prior to procedure/surgery  Patient does not take Eliquis         needs nephro clearance        Signatures   Electronically signed by : Margarita Heller MD; Nov 2 2017  5:13PM EST                       (Author)

## 2017-11-15 NOTE — PROGRESS NOTES
Assessment  Assessed    1  Benign essential hypertension (401 1) (I10)   2  Coronary atherosclerosis of native coronary vessel (414 01) (I25 10)   3  Hyperlipidemia (272 4) (E78 5)   4  S/P AVR (V43 3) (Z95 2)   5  Paroxysmal atrial fibrillation (427 31) (I48 0)    Plan  Benign essential hypertension, Coronary atherosclerosis of native coronary vessel,Hyperlipidemia, Paroxysmal atrial fibrillation    · Follow-up visit in 6 months Evaluation and Treatment  Follow-up  Status: Hold For -Scheduling  Requested for: [id number]   Ordered;Benign essential hypertension, Coronary atherosclerosis of native coronary vessel, Hyperlipidemia, Paroxysmal atrial fibrillation; Ordered By: Carolina Ross Performed:  Due: 36SEO8281    Discussion/Summary  Cardiology Discussion Summary Free Text Note Form [facility]:   I will continue his present medical regimen  He appears well compensated  I've asked him to call if there is a problem in the interim otherwise I will see him in follow-up in 6 months time  Chief Complaint  Chief Complaint Free Text Note Form: Hospital f/u  History of Present Illness  Cardiology HPI Free Text Note Form [facility]: Patient is here for a follow-up visit  He was last seen by me in September 2017  Since that time he had an admission to the hospital  He has issues with a foot infection and has been seen by vascular surgery and an angiogram is planned  Nephrology is involved in the preparation for this  He did have an echocardiogram done in September of this year which demonstrated preserved LV systolic function with LVH and diastolic dysfunction  He was noted to have mild mixed mitral valve disease with an appropriately functioning bioprosthesis in the aortic position   He had a Holter monitor done in September as well which showed no evidence of atrial fibrillation and because of his wife's concern in reference to chronic anticoagulation the patient was felt to be appropriate to come off of Eliquis which he was on  He had had a fall also  Prosthetic Heart Valve Follow-Up: The patient is being seen for a routine clinic follow-up of a prosthetic heart valve  The patient is currently asymptomatic  Atrial Fibrillation (Follow-Up): The patient presents with paroxysmal atrial fibrillation  The treatment strategy for this patient is rate control  Symptoms:   Coronary Artery Disease (Follow-Up): The patient states he has been doing well with his coronary artery disease symptoms since the last visit  He has no known CAD complications  Symptoms:   Hyperlipidemia (Follow-Up): The patient states his hyperlipidemia has been under good control since the last visit  He has no significant interval events  Symptoms: The patient is currently asymptomatic  Hypertension (Follow-Up): The patient presents for follow-up of essential hypertension  The patient states he has been doing well with his blood pressure control since the last visit  Symptoms:      Review of Systems  Cardiology Male ROS:    Cardiac: No complaints of chest pain, no palpitations, no fainiting  Skin: No complaints of nonhealing sores or skin rash  Genitourinary: No complaints of recurrent urinary tract infections, frequent urination at night, difficult urination, blood in urine, kidney stones, loss of bladder control, no kidney or prostate problems, no erectile dysfunction  Psychological: No complaints of feeling depressed, anxiety, panic attacks, or difficulty concentrating  General: No complaints of trouble sleeping, lack of energy, fatigue, appetite changes, weight changes, fever, frequent infections, or night sweats  Respiratory: No complaints of shortness of breath, cough with sputum, or wheezing  HEENT: No complaints of serious problems, hearing problems, nose problems, throat problems, or snoring    Gastrointestinal: No complaints of liver problems, nausea, vomiting, heartburn, constipation, bloody stools, diarrhea, problems swallowing, adbominal pain, or rectal bleeding  Hematologic: No complaints of bleeding disorders, anemia, blood clots, or excessive brusing  Neurological: No complaints of numbness, tingling, dizziness, weakness, seizures, headaches, syncope or fainting, AM fatigue, daytime sleepiness, no witnessed apnea episodes  Musculoskeletal: No complaints of arthritis, back pain, or painfull swelling  Active Problems  Problems    1  Acute renal failure (584 9) (N17 9)   2  Aortic valve disorder (424 1) (I35 9)   3  Arm pain, lateral, left (729 5) (M79 602)   4  Asymptomatic bilateral carotid artery stenosis (433 10,433 30) (I65 23)   5  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,L97 909)   6  Back pain (724 5) (M54 9)   7  Benign essential hypertension (401 1) (I10)   8  Cellulitis of foot (682 7) (L03 119)   9  Chronic diastolic congestive heart failure (428 32,428 0) (I50 32)   10  Chronic kidney disease, stage 3 (585 3) (N18 3)   11  Closed nondisplaced fracture of greater tuberosity of left humerus, initial encounter  (812 03) (S42 255A)   12  Closed traumatic minimally displaced fracture of greater tuberosity of left humerus with  routine healing (V54 11) (S42 252D)   13  Congestive heart failure (428 0) (I50 9)   14  Coronary atherosclerosis of native coronary vessel (414 01) (I25 10)   15  Cough with hemoptysis (786 39) (R04 2)   16  Degenerative joint disease (715 90) (M19 90)   17  DM2 (diabetes mellitus, type 2) (250 00) (E11 9)   18  Dyspnea (786 09) (R06 00)   19  Edema (782 3) (R60 9)   20  Esophageal reflux (530 81) (K21 9)   21  Gout (274 9) (M10 9)   22  Hiccups (786 8) (R06 6)   23  Hyperlipidemia (272 4) (E78 5)   24  Hyperparathyroidism (252 00) (E21 3)   25  Hypoglycemia (251 2) (E16 2)   26  Hypoxia (799 02) (R09 02)   27  Left low back pain (724 2) (M54 5)   28  Mild mitral regurgitation (424 0) (I34 0)   29   Monoclonal gammopathy of undetermined significance (273 1) (D47 2) 30  Need for vaccination with 13-polyvalent pneumococcal conjugate vaccine (V03 82) (Z23)   31  Paroxysmal atrial fibrillation (427 31) (I48 0)   32  Peripheral vascular disease (443 9) (I73 9)   33  S/P AVR (V43 3) (Z95 2)   34  Screening for diabetic retinopathy (V80 2) (Z13 5)   35  Secondary hyperparathyroidism, renal (588 81) (N25 81)   36  SOB (shortness of breath) on exertion (786 05) (R06 02)   37  Stage 4 chronic kidney disease (585 4) (N18 4)   38  Tachycardia (785 0) (R00 0)   39  Visit for pre-operative examination (V72 84) (Z01 818)   40  Vitamin D deficiency (268 9) (E55 9)   41  Wheeze (786 07) (R06 2)    Past Medical History  Problems    1  History of Dysfunction of both eustachian tubes (381 81) (H69 83)   2  History of Dyspnea (786 09) (R06 00)   3  History of Embolism, arterial, leg, right (444 22) (I74 3)   4  History of Herniated disc (722 2)   5  History of acute bronchitis (V12 69) (Z87 09)   6  History of cough   7  History of hematuria (V13 09) (Z87 448)   8  History of muscle pain (V13 59) (Z87 39)   9  History of myocardial infarction (412) (I25 2)   10  History of pneumonia (V12 61) (Z87 01)   11  History of Premature atrial contractions (427 61) (I49 1)   12  History of Productive cough (786 2) (R05)   13  History of URI, acute (465 9) (J06 9)  Active Problems And Past Medical History Reviewed: The active problems and past medical history were reviewed and updated today  Surgical History  Problems    1  History of Aortic Valve Replacement   2  History of Bypass Graft Using Vein: Femoral-popliteal   3  History of CABG   4  History of Cataract Surgery   5  History of Knee Replacement    Family History  Sibling    1  Family history of Diabetes    Social History  Problems    · Denies alcohol consumption (V49 89) (Z78 9)   · Denied: History of Drug use   · Former smoker (V15 82) (I19 822)   · Former Smoker Cigarettes - Heavy (20-25 / Day)    Current Meds   1   Aspirin EC 81 MG Oral Tablet Delayed Release; TAKE 1 TABLET DAILY; Therapy: 55ORM4662 to (Evaluate:06Jan2018); Last Rx:11Jan2017 Ordered   2  Centrum Silver TABS; Take 1 tablet daily; Therapy: (Recorded:64Bfa2378) to Recorded   3  Cephalexin 500 MG Oral Capsule; one po tid; Therapy: 34QUJ9605 to (Last Rx:10Nov2017) Ordered   4  Metoprolol Tartrate 25 MG Oral Tablet; TAKE 1 TABLET TWICE DAILY ALONG WITH THE 50 MG TABLET; Therapy: (Recorded:20Dqa2679) to  Requested for: 38Ogl1325 Recorded   5  Metoprolol Tartrate 50 MG Oral Tablet; Take 1 tablet by mouth twice daily along with 1 tablet of 25 mg; Therapy: 04DLX5810 to (Evaluate:10Dec2017)  Requested for: 11Sep2017 Recorded   6  NovoLOG Mix 70/30 FlexPen (70-30) 100 UNIT/ML SUSP; Use 24 units in the am and 24 units in the pm; Therapy: (Recorded:10Nov2017) to Recorded   7  Omeprazole 20 MG Oral Capsule Delayed Release; take 1 capsule by mouth daily; Therapy: 73ADX9389 to (Evaluate:65Idk1042)  Requested for: 82Zay1440; Last Rx:54Ebx5823 Ordered   8  Paricalcitol 1 MCG Oral Capsule; take 1 capsule by mouth ON MONDAY, [facility]; Therapy: 70HPV2576 to (Gerryestene Essex)  Requested for: 42ERK9832; Last Rx:90Kub6371 Ordered   9  Ramipril 2 5 MG Oral Capsule; take 1 capsule by mouth every morning; Therapy: 68JDG3404 to (Evaluate:95Xms0032)  Requested for: 16CSW4333; Last Rx:44Fcy0410 Ordered   10  Simvastatin 10 MG Oral Tablet; TAKE 1 TABLET BY MOUTH DAILY; Therapy: 99UVP2818 to (Evaluate:12Apr2018)  Requested for: 62Nvx7259; Last  Rx:80Xtf5718 Ordered   11  Torsemide 20 MG Oral Tablet; TAKE 1 TABLET ONCE DAILY; Therapy: 88ZEM9913 to  Requested for: 78CAW2875 Recorded   12  Uloric 40 MG Oral Tablet; TAKE 1 TABLET DAILY; Therapy: (Recorded:92Mva9486) to Recorded  Medication List Reviewed: The medication list was reviewed and updated today  Allergies  Medication    1  Acetaminophen-Codeine SOLN   2  Hydrocodone-Acetaminophen TABS   3  MetFORMIN HCl TABS   4   Codeine Derivatives   5  hydrocodone    Physical Exam   Constitutional  General appearance: No acute distress, well appearing and well nourished  Eyes  Conjunctiva and Sclera examination: Conjunctiva pink, sclera anicteric  Neck  Neck and thyroid: Normal, supple, trachea midline, no thyromegaly  Pulmonary  Respiratory effort: No increased work of breathing or signs of respiratory distress  Auscultation of lungs: Clear to auscultation, no rales, no rhonchi, no wheezing, good air movement  Cardiovascular  Palpation of heart: Normal PMI, no thrills  Auscultation of heart: Abnormal  -- S1-S2 with an early peaking systolic murmur heard at right upper sternal border  Examination of extremities for edema and/or varicosities: Abnormal  -- edema  Chest - Chest: Normal   Musculoskeletal Gait and station: Normal gait  -- Digits and nails: Normal without clubbing or cyanosis  Neurologic - Speech: Normal    Psychiatric - Orientation to person, place, and time: Normal -- Mood and affect: Normal       Health Management  Health Maintenance   Medicare Annual Wellness Visit; every 1 year; Next Due: 54Fps7602; Overdue    Future Appointments    Date/Time Provider Specialty Site   11/28/2017 01:45 PM LUKE Zarate  Orthopedic Surgery [address]   02/28/2018 02:00 PM LUKE Walton   Hematology Oncology [facility] MEDICAL ONCOLOGY   12/15/2017 01:40 PM Nimesh Cardona DO Nephrology [address] [facility]       Signatures   Electronically signed by : LUKE Arreola ; Nov 14 2017 10:40AM EST                       (Author)

## 2017-11-29 NOTE — PROGRESS NOTES
Assessment    1  Shoulder pain, left (129 41) (M25 512)   2  Impingement syndrome of left shoulder (726 2) (C90 42)  8 weeks following injury to the left shoulder this patient has persistent shoulder pain, and exam consistent with impingement symptoms  In all likelihood, he may have suffered a left shoulder rotator cuff tear  To manage his symptoms, to alleviate pain and to promote improve function, an injection Corticosteroid followed by physical therapy is indicated  The injection is advised, accepted, and administered as outlined above  I would welcome the opportunity see his patient back the office in 8 weeks' time for follow-up   Plan  Impingement syndrome of left shoulder    · *1 - [facility]  *  Status: Active  Requested for: 69ALK6765  Care Summary provided  : Yes   · Follow-up visit in 2 months Evaluation and Treatment  Follow-up  Status: Complete Done: 80YXX5974  Shoulder pain, left    · Betamethasone Sod Phos & Acet 6 (3-3) MG/ML Injection Suspension    History of Present Illness  [de-identified] right-hand-dominant male presents for evaluation of pain and dysfunction in his left shoulder  He fell several weeks ago during which his shoulder was injured  He reports persistence of pain, as well as persistence of inability to perform active for flexion and active abduction activities with his left shoulder  He describes no neck pain, he describes no paresthesia to the left upper extremity  Review of Systems   Constitutional: No fever or chills, feels well, no tiredness, no recent weight loss or weight gain  Eyes: No complaints of red eyes, no eyesight problems  ENT: no complaints of loss of hearing, no nosebleeds, no sore throat  Cardiovascular: No complaints of chest pain, no palpitations, no leg claudication or lower extremity edema  Respiratory: No complaints of shortness of breath, no wheezing, no cough    Gastrointestinal: No complaints of abdominal pain, no constipation, no nausea or vomiting, no diarrhea or bloody stools  Genitourinary: No complaints of dysuria or incontinence, no hesitancy, no nocturia  Musculoskeletal: as noted in HPI  Integumentary: No complaints of skin rash or lesion, no itching or dry skin, no skin wounds  Neurological: No complaints of headache, no confusion, no numbness or tingling, no dizziness  Psychiatric: No suicidal thoughts, no anxiety, no depression  Endocrine: No muscle weakness, no frequent urination, no excessive thirst, no feelings of weakness  Active Problems    1  Acute renal failure (584 9) (N17 9)   2  Aortic valve disorder (424 1) (I35 9)   3  Arm pain, lateral, left (729 5) (M79 602)   4  Asymptomatic bilateral carotid artery stenosis (433 10,433 30) (I65 23)   5  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,L97 909)   6  Back pain (724 5) (M54 9)   7  Benign essential hypertension (401 1) (I10)   8  Cellulitis of foot (682 7) (L03 119)   9  Chronic diastolic congestive heart failure (428 32,428 0) (I50 32)   10  Chronic kidney disease, stage 3 (585 3) (N18 3)   11  Closed nondisplaced fracture of greater tuberosity of left humerus, initial encounter  (812 03) (S42 255A)   12  Closed traumatic minimally displaced fracture of greater tuberosity of left humerus with  routine healing (V54 11) (S42 252D)   13  Congestive heart failure (428 0) (I50 9)   14  Coronary atherosclerosis of native coronary vessel (414 01) (I25 10)   15  Cough with hemoptysis (786 39) (R04 2)   16  Degenerative joint disease (715 90) (M19 90)   17  DM2 (diabetes mellitus, type 2) (250 00) (E11 9)   18  Dyspnea (786 09) (R06 00)   19  Edema (782 3) (R60 9)   20  Esophageal reflux (530 81) (K21 9)   21  Gout (274 9) (M10 9)   22  Hiccups (786 8) (R06 6)   23  Hyperlipidemia (272 4) (E78 5)   24  Hyperparathyroidism (252 00) (E21 3)   25  Hypoglycemia (251 2) (E16 2)   26  Hypoxia (799 02) (R09 02)   27   Left low back pain (724 2) (M54 5)   28  Mild mitral regurgitation (424 0) (I34 0)   29  Monoclonal gammopathy of undetermined significance (273 1) (D47 2)   30  Need for vaccination with 13-polyvalent pneumococcal conjugate vaccine (V03 82) (Z23)   31  Paroxysmal atrial fibrillation (427 31) (I48 0)   32  Peripheral vascular disease (443 9) (I73 9)   33  S/P AVR (V43 3) (Z95 2)   34  Screening for diabetic retinopathy (V80 2) (Z13 5)   35  Secondary hyperparathyroidism, renal (588 81) (N25 81)   36  Shoulder pain, left (719 41) (M25 512)   37  SOB (shortness of breath) on exertion (786 05) (R06 02)   38  Stage 4 chronic kidney disease (585 4) (N18 4)   39  Tachycardia (785 0) (R00 0)   40  Visit for pre-operative examination (V72 84) (Z01 818)   41  Vitamin D deficiency (268 9) (E55 9)   42  Wheeze (786 07) (R06 2)    Past Medical History   · History of Dysfunction of both eustachian tubes (381 81) (H69 83)   · History of Dyspnea (786 09) (R06 00)   · History of Embolism, arterial, leg, right (444 22) (I74 3)   · History of Herniated disc (722 2)   · History of acute bronchitis (V12 69) (Z87 09)   · History of cough   · History of hematuria (V13 09) (Z87 448)   · History of muscle pain (V13 59) (Z87 39)   · History of myocardial infarction (412) (I25 2)   · History of pneumonia (V12 61) (Z87 01)   · History of Premature atrial contractions (427 61) (I49 1)   · History of Productive cough (786 2) (R05)   · History of URI, acute (465 9) (J06 9)    The active problems and past medical history were reviewed and updated today  Surgical History   · History of Aortic Valve Replacement   · History of Bypass Graft Using Vein: Femoral-popliteal   · History of CABG   · History of Cataract Surgery   · History of Knee Replacement    The surgical history was reviewed and updated today         Family History  Sibling    · Family history of Diabetes    Social History     · Denies alcohol consumption (V49 89) (Z78 9)   · Denied: History of Drug use   · Former smoker (V15 82) (S44 823)   · Former Smoker Cigarettes - Heavy (20-25 / Day)    Current Meds   1  Aspirin EC 81 MG Oral Tablet Delayed Release; TAKE 1 TABLET DAILY; Therapy: 80KNC8667 to (Evaluate:06Jan2018); Last Rx:11Jan2017 Ordered   2  Centrum Silver TABS; Take 1 tablet daily; Therapy: (Recorded:99Nyf1231) to Recorded   3  Cephalexin 500 MG Oral Capsule; one po tid; Therapy: 96ZEB3737 to (Last Rx:10Nov2017) Ordered   4  Metoprolol Tartrate 25 MG Oral Tablet; TAKE 1 TABLET TWICE DAILY ALONG WITH THE 50 MG TABLET; Therapy: (Recorded:94Wlh8563) to  Requested for: 31Tda0353 Recorded   5  Metoprolol Tartrate 50 MG Oral Tablet; Take 1 tablet by mouth twice daily along with 1 tablet of 25 mg; Therapy: 52XMG0023 to (Evaluate:10Dec2017)  Requested for: 75Clg2105 Recorded   6  NovoLOG Mix 70/30 FlexPen (70-30) 100 UNIT/ML SUSP; Use 24 units in the am and 24 units in the pm; Therapy: (Recorded:10Nov2017) to Recorded   7  Omeprazole 20 MG Oral Capsule Delayed Release; take 1 capsule by mouth daily; Therapy: 89TAG9291 to (Evaluate:20Aug2018)  Requested for: 81Xis8397; Last Rx:91Jqz4793 Ordered   8  Paricalcitol 1 MCG Oral Capsule; take 1 capsule by mouth ON MONDAY, [facility]; Therapy: 77ADB1258 to (Yaya Carvajal)  Requested for: 66OWH0599; Last Rx:00Viu5567 Ordered   9  Ramipril 2 5 MG Oral Capsule; take 1 capsule by mouth every morning; Therapy: 46LOI3585 to (Evaluate:63Ymc3207)  Requested for: 29BXH5048; Last Rx:51Tia7798 Ordered   10  Simvastatin 10 MG Oral Tablet; TAKE 1 TABLET BY MOUTH DAILY; Therapy: 18ZAD1584 to (Evaluate:12Apr2018)  Requested for: 09Xgl2494; Last  Rx:80Kpm2667 Ordered   11  Torsemide 20 MG Oral Tablet; TAKE 1 TABLET ONCE DAILY; Therapy: 35GVA6963 to  Requested for: 66WNM9111 Recorded   12  Uloric 40 MG Oral Tablet; TAKE 1 TABLET DAILY; Therapy: (Recorded:39Bdt4915) to Recorded    Allergies  1  Acetaminophen-Codeine SOLN   2  Hydrocodone-Acetaminophen TABS   3   MetFORMIN HCl TABS   4  Codeine Derivatives   5  hydrocodone    Vitals   Recorded: 00KXQ9554 02:00PM   Heart Rate 87   Systolic 574   Diastolic 66   Height Unobtainable Yes   Weight Unobtainable Yes       Physical Exam  He sits quite comfortably in a wheelchair  His neck is nontender  He has restriction of flexion extension and sidebending and rotation of the right left side his left shoulder has no abnormal soft tissues  His before meals joint is nontender  His left shoulder is no effusion  There is significant decrease in active motion of the foot flexion and abduction planes  His passive arcs of motion are full  There is weakness of external rotation 10 testing with his arm at side, and with his arm abducted 90Â°  There is a soft and positive drop arm sign  The positive primary positive second impingement sign      Results/Data  I personally reviewed the films/images/results in the office today  My interpretation follows  X-ray Review I reviewed recent x-rays that show a located glenohumeral joint, there is likely a healed fracture of the left proximal humerus present  Procedure    Procedure: Injection of the left subacromial bursa  Indication:  inflammation-- and-- joint pain  Risk, benefits and alternatives were discussed with the patient  Alcohol was used to prep the area  ethyl chloride spray was used as a topical anesthetic  A 22-gauge was used to inject 2 mL of 1% Lidocaine,-- 2 mL 0 25% Bupivacaine-- and-- 2 mL of 6mg/mL betamethasone  Complications: none  Follow-up in the office in 8 week(s)  Therapy  Rehabilitation Services Referral:  Patient Status: acute  Diagnosis: Left shoulder pain, impingement  Rehabilitation Services: evaluate and treat patient as needed  Exercise/Treatment: AROM/PROM,-- shoulder-- and-- strengthening/PRE  Frequency: 3 times per week, for 4 weeks  Please send progress report  I hereby certify that the services indicated above are medically necessary        Future Appointments    Date/Time Provider Specialty Site   02/02/2018 09:00 AM LUKE Cervantes  Orthopedic Surgery [facility] [employer]   02/28/2018 02:00 PM LUKE Penaloza   Hematology Oncology CANCER CARE [facility] MEDICAL ONCOLOGY   12/15/2017 01:40 PM Johnny Bashir DO Nephrology [address] [facility] [address]       Signatures   Electronically signed by : Evalyn Collet, M D ; Nov 28 2017  5:22PM EST                       (Author)

## 2017-12-14 NOTE — PROGRESS NOTES
Assessment    1  Benign essential hypertension (401 1) (I10)   2  Visit for pre-operative examination (V72 84) (Z01 818)   3  URI, acute (465 9) (J06 9)    Plan  Congestive heart failure    · (1) CBC/PLT/DIFF; Status:Active; Requested for:78Izt4224;   DM2 (diabetes mellitus, type 2)    · (1) COMPREHENSIVE METABOLIC PANEL; Status:Active; Requested for:83Nph9098;    · (1) PT WITH INR; Status:Active; Requested for:41Yll9191;   URI, acute    · Azithromycin 250 MG Oral Tablet; TAKE 2 TABLETS ON DAY 1 THEN TAKE 1TABLET A DAY FOR 4 DAYS    Discussion/Summary    Preoperative consultation prior to angiogram  Patient was recently seen by his cardiologist as well as nephrologist  He appears to be medically stable for upcoming procedure as described however he will obtain pre-admission testing blood test prior to giving medical clearance  Patient's blood pressure is stable at this time he will continue with current regimen of medications  respiratory infection  Patient given prescription for Zithromax Z-Kd take as directed  Patient will call if symptoms persist after medication completed  Chief Complaint  Pt states that he began not feeling well approx 1 week ago  Pt's wife states that he sounds congested to her  She is concerned since he has an arteriogram scheduled on 12/22/2017  Pt is cough and chest congestion  Pt states that cough is mostly dry, but when he does bring up mucous it is yellowish in color  wife also relates he is scheduled for an angiogram of his lower extremity on December 22, 2017 and required history and physical and pre-admission testing  History of Present Illness  HPI: Patient with nonhealing skin lesion on left foot area  He does experience what appears to be claudication with any extended ambulating  He has been seen by his nephrologist for chronic kidney disease  Review of Systems   Constitutional: feeling tired  ENT: as noted in HPI    Cardiovascular: no complaints of slow or fast heart rate, no chest pain, no palpitations, no leg claudication or lower extremity edema  Respiratory: cough  Gastrointestinal: no complaints of abdominal pain, no constipation, no nausea or vomiting, no diarrhea or bloody stools  Genitourinary: no complaints of dysuria or incontinence, no hesitancy, no nocturia, no genital lesion, no inadequacy of penile erection  Musculoskeletal: as noted in HPI  Integumentary: as noted in HPI  Active Problems    1  Acute renal failure (584 9) (N17 9)   2  Aortic valve disorder (424 1) (I35 9)   3  Arm pain, lateral, left (729 5) (M79 602)   4  Asymptomatic bilateral carotid artery stenosis (433 10,433 30) (I65 23)   5  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,L97 909)   6  Back pain (724 5) (M54 9)   7  Benign essential hypertension (401 1) (I10)   8  Cellulitis of foot (682 7) (L03 119)   9  Chronic diastolic congestive heart failure (428 32,428 0) (I50 32)   10  Chronic kidney disease, stage 3 (585 3) (N18 3)   11  Closed nondisplaced fracture of greater tuberosity of left humerus, initial encounter  (812 03) (S42 255A)   12  Closed traumatic minimally displaced fracture of greater tuberosity of left humerus with  routine healing (V54 11) (S42 252D)   13  Congestive heart failure (428 0) (I50 9)   14  Coronary atherosclerosis of native coronary vessel (414 01) (I25 10)   15  Cough with hemoptysis (786 39) (R04 2)   16  Degenerative joint disease (715 90) (M19 90)   17  DM2 (diabetes mellitus, type 2) (250 00) (E11 9)   18  Dyspnea (786 09) (R06 00)   19  Edema (782 3) (R60 9)   20  Esophageal reflux (530 81) (K21 9)   21  Gout (274 9) (M10 9)   22  Hiccups (786 8) (R06 6)   23  Hyperlipidemia (272 4) (E78 5)   24  Hyperparathyroidism (252 00) (E21 3)   25  Hypoglycemia (251 2) (E16 2)   26  Hypoxia (799 02) (R09 02)   27  Impingement syndrome of left shoulder (726 2) (M75 42)   28  Left low back pain (724 2) (M54 5)   29   Mild mitral regurgitation (424 0) (I34 0)   30  Monoclonal gammopathy of undetermined significance (273 1) (D47 2)   31  Need for vaccination with 13-polyvalent pneumococcal conjugate vaccine (V03 82) (Z23)   32  Paroxysmal atrial fibrillation (427 31) (I48 0)   33  Peripheral vascular disease (443 9) (I73 9)   34  S/P AVR (V43 3) (Z95 2)   35  Screening for diabetic retinopathy (V80 2) (Z13 5)   36  Secondary hyperparathyroidism, renal (588 81) (N25 81)   37  Shoulder pain, left (719 41) (M25 512)   38  SOB (shortness of breath) on exertion (786 05) (R06 02)   39  Stage 4 chronic kidney disease (585 4) (N18 4)   40  Tachycardia (785 0) (R00 0)   41  Visit for pre-operative examination (V72 84) (Z01 818)   42  Vitamin D deficiency (268 9) (E55 9)   43  Wheeze (786 07) (R06 2)    Past Medical History  1  History of Dysfunction of both eustachian tubes (381 81) (H69 83)   2  History of Dyspnea (786 09) (R06 00)   3  History of Embolism, arterial, leg, right (444 22) (I74 3)   4  History of Herniated disc (722 2)   5  History of acute bronchitis (V12 69) (Z87 09)   6  History of cough   7  History of hematuria (V13 09) (Z87 448)   8  History of muscle pain (V13 59) (Z87 39)   9  History of myocardial infarction (412) (I25 2)   10  History of pneumonia (V12 61) (Z87 01)   11  History of Premature atrial contractions (427 61) (I49 1)   12  History of Productive cough (786 2) (R05)    Family History  Sibling    1  Family history of Diabetes    Social History   · Denies alcohol consumption (V49 89) (Z78 9)   · Denied: History of Drug use   · Former smoker (V15 82) (Z87 891)   · Former Smoker Cigarettes - Heavy (20-25 / Day)  The social history was reviewed and updated today  Surgical History    1  History of Aortic Valve Replacement   2  History of Bypass Graft Using Vein: Femoral-popliteal   3  History of CABG   4  History of Cataract Surgery   5  History of Knee Replacement    Current Meds   1   Aspirin EC 81 MG Oral Tablet Delayed Release; TAKE 1 TABLET DAILY; Therapy: 82DPE2712 to (Evaluate:06Jan2018); Last Rx:11Jan2017 Ordered   2  Atorvastatin Calcium 40 MG Oral Tablet; Take 1 tablet daily; Therapy: 49YUC5360 to (Evaluate:24Nov2018) Recorded   3  Centrum Silver TABS; Take 1 tablet daily; Therapy: (Recorded:01Aug2016) to Recorded   4  Metoprolol Tartrate 25 MG Oral Tablet; TAKE 1 TABLET TWICE DAILY ALONG WITH THE 50 MG TABLET; Therapy: (Recorded:11Sep2017) to  Requested for: 11Sep2017 Recorded   5  Metoprolol Tartrate 50 MG Oral Tablet; Take 1 tablet by mouth twice daily along with 1 tablet of 25 mg; Therapy: 85EBO0160 to (Evaluate:10Dec2017)  Requested for: 11Sep2017 Recorded   6  NovoLOG Mix 70/30 FlexPen (70-30) 100 UNIT/ML SUSP; Use 24 units in the am and 24 units in the pm; Therapy: (Recorded:10Nov2017) to Recorded   7  Omeprazole 20 MG Oral Capsule Delayed Release; take 1 capsule by mouth daily; Therapy: 84XRT0122 to (Evaluate:20Aug2018)  Requested for: 38Wgv9779; Last Rx:48Ifp9902 Ordered   8  Paricalcitol 1 MCG Oral Capsule; take 1 capsule by mouth ON MONDAY, [address] [facility]; Therapy: 44WQV3707 to (Aylin Poag)  Requested for: 40WEM4426; Last Rx:16Ecp9771 Ordered   9  Ramipril 2 5 MG Oral Capsule; take 1 capsule by mouth every morning; Therapy: 12NIP8261 to (Evaluate:70Ygi3511)  Requested for: 52YHH0417; Last Rx:64Fpb4085 Ordered   10  Torsemide 20 MG Oral Tablet; TAKE 1 TABLET ONCE DAILY; Therapy: 08NQA7226 to  Requested for: 60HBK6141 Recorded   11  Uloric 40 MG Oral Tablet; TAKE 1 TABLET DAILY; Therapy: (Recorded:10Qxa6721) to Recorded    The medication list was reviewed and updated today  Allergies  1  Acetaminophen-Codeine SOLN   2  Hydrocodone-Acetaminophen TABS   3  MetFORMIN HCl TABS   4   Codeine Derivatives   5  hydrocodone    Vitals   Recorded: 12Dec2017 02:07PM   Temperature 96 8 F   Heart Rate 82   Systolic 611   Diastolic 68   Height 5 ft 7 in   Weight 214 lb 8 oz   BMI Calculated 33 6   BSA Calculated 2 08       Physical Exam   Constitutional  General appearance: No acute distress, well appearing and well nourished  Ears, Nose, Mouth, and Throat  External inspection of ears and nose: Normal    Otoscopic examination: Tympanic membrance translucent with normal light reflex  Canals patent without erythema  Oropharynx: Normal with no erythema, edema, exudate or lesions  Pulmonary  Respiratory effort: No increased work of breathing or signs of respiratory distress  Auscultation of lungs: Clear to auscultation, equal breath sounds bilaterally, no wheezes, no rales, no rhonci  -- Harsh cough  Cardiovascular  Auscultation of heart: Normal rate and rhythm, normal S1 and S2, without murmurs  Examination of extremities for edema and/or varicosities: Abnormal  -- Patient with trace edema while wearing bilateral support stockings on lower extremities  Abdomen  Abdomen: Non-tender, no masses  Liver and spleen: No hepatomegaly or splenomegaly  Lymphatic  Palpation of lymph nodes in neck: No lymphadenopathy  Musculoskeletal  Gait and station: Abnormal  -- Patient ambulates slowly without assistive devices but had appears to have unsteady gait  Psychiatric  Orientation to person, place and time: Normal    Mood and affect: Normal          Future Appointments    Date/Time Provider Specialty Site   02/02/2018 09:00 AM LUKE Singh  Orthopedic Surgery [facility] [address] [facility] ([place], [place])   02/28/2018 02:00 PM LUKE Pitts   Hematology Oncology [facility] MEDICAL ONCOLOGY   01/18/2018 10:00 AM Kelli Riddle MD Vascular Surgery [facility] [place]   12/15/2017 01:40 PM Jenise Lee DO Nephrology [address] [facility]       Signatures   Electronically signed by : LUKE Warren ; Dec 13 9292  6:47AM EST                       (Author)

## 2017-12-19 NOTE — PRE-PROCEDURE INSTRUCTIONS
Pre-Surgery Instructions:   Medication Instructions    AMLODIPINE BESYLATE PO Patient was instructed per "E-Preop"    aspirin (ECOTRIN LOW STRENGTH) 81 mg EC tablet Patient was instructed by Physician and understands   atorvastatin (LIPITOR) 40 mg tablet Patient was instructed per "E-Preop"    febuxostat (ULORIC) 40 mg tablet Patient was instructed per "E-Preop"    metoprolol tartrate 75 MG TABS Patient was instructed per "E-Preop"    multivitamin-iron-minerals-folic acid (CENTRUM) chewable tablet Instructed patient per Anesthesia Guidelines   omeprazole (PriLOSEC) 20 mg delayed release capsule Patient was instructed per "E-Preop"    paricalcitol (ZEMPLAR) 1 mcg capsule Patient was instructed per "E-Preop"    ramipril (ALTACE) 2 5 mg capsule Patient was instructed per "E-Preop"    torsemide (DEMADEX) 20 mg tablet Patient was instructed by Physician and understands  Medication Instruction (ACE/ARB - Blood Pressure Medication)    Please do not take this medication on the morning of your day of surgery  Please restart your medications as soon as clinically feasible  Ramipril 2 5 MG Oral Capsule            [employer] Directive    Please bring your [employer] Directive to the hospital on the day of surgery  [employer] Directive        Medication Instruction (Aspirin - Blood Thinners)    Your surgeon may have you stop aspirin up to a week early if you are having intracranial, spine, middle ear, posterior eye or prostate surgery  If not please continue to take this medication on your normal schedule  If you take this in the morning you may do so with a sip of water  Aspirin EC 81 MG Oral Tablet Delayed Release          Medication Instruction (Beta Blocker)    Please continue to take this medication on your normal schedule  If this is an oral medication and you take in the morning, you may do so with a sip of water          Metoprolol Tartrate 50 MG Oral Tablet, Metoprolol Tartrate 25 MG Oral Tablet          Medication Instruction (Diabetic Medication)    If you are taking long-acting insulin (i e  glargine or Lantus) please only take one-half your usual nighttime dose the night before surgery  If you are taking short-acting insulin or oral diabetes medications, then omit the morning dose the day of surgery  If taking metformin (i e  Glucophage), discontinue this medication for 24 hours prior to surgery  If you have an insulin pump, continue pump the morning of surgery at a basal rate only  NovoLOG Mix 70/30 FlexPen (70-30) 100 UNIT/ML SUSP        Dialysis    If undergoing dialysis, please perform 24 to 48 before surgery and avoid interfering with dialysis schedule  Stage 4 chronic kidney disease        Medication Instruction (Diuretics - Water Pills)    Please continue the following medications up to the evening before surgery, but do not take it on the day of surgery  Torsemide 20 MG Oral Tablet          NPO Instructions    Please do not eat or drink anything prior to your surgery as follows: For AM Surgery times = stop at midnight the night before  For PM Surgery times = Midnight to 8AM clear liquids only (Jello, broth, 7up, Sprite, apple juice, black coffee, black tea, Gatorade)  If you are supposed to take any of your medications, do so with a sip of water  Failure to follow these instructions can lead to an increased risk of lung complications and may result in a delay or cancellation of your procedure  If you have any questions, contact your institution for further instructions  Medical Procedure Risk        Medication Instruction (Reflux Disease)    Please continue to take this medication on your normal schedule  If this is an oral medication and you take in the morning, you may do so with a sip of water          Esophageal reflux

## 2017-12-21 NOTE — ANESTHESIA PREPROCEDURE EVALUATION
Review of Systems/Medical History  Patient summary reviewed  Chart reviewed  No history of anesthetic complications     Cardiovascular  EKG reviewed, Exercise tolerance: poor,  Hypertension controlled, CAD, , Dysrhythmias, , CHF compensated CHF, [place],   Comment: S/p CABG and AVR 15 yrs ago  H/o paroxysmal Afib in  the past  Now in sinus  No anticoagulants  LEFT VENTRICLE:  Systolic function was normal  Ejection fraction was estimated to be 65 %  There was severe concentric hypertrophy  Doppler parameters were consistent with abnormal left ventricular relaxation (grade 1 diastolic dysfunction)      RIGHT VENTRICLE:  The size was normal   Systolic function was normal ,  Pulmonary  Shortness of breath, ,        GI/Hepatic            Endo/Other  Diabetes , Parathyroid disease , Arthritis  Comment: H/o hyperparathyroidism   GYN       Hematology      Comment: H/o monoclonal gammopathy Musculoskeletal       Neurology   Psychology           Physical Exam    Airway    Mallampati score: II         Dental   lower dentures and upper dentures,     Cardiovascular  Rhythm: regular, Rate: normal, Cardiovascular exam normal    Pulmonary  Pulmonary exam normal Breath sounds clear to auscultation,     Other Findings        Anesthesia Plan  ASA Score- 3     Anesthesia Type- IV sedation with anesthesia with ASA Monitors  Additional Monitors:   Airway Plan:         Plan Factors-    Induction- intravenous  Postoperative Plan-     Informed Consent- Anesthetic plan and risks discussed with patient and spouse  I personally reviewed this patient with the CRNA  Discussed and agreed on the Anesthesia Plan with the CRNA  Cami Sheppard

## 2017-12-22 NOTE — ANESTHESIA POSTPROCEDURE EVALUATION
Post-Op Assessment Note      CV Status:  Stable    Mental Status:  Alert and awake    Hydration Status:  Euvolemic    PONV Controlled:  Controlled    Airway Patency:  Patent    Post Op Vitals Reviewed: Yes          Staff: CRNA           BP   120/71   Temp   98 2   Pulse  78   Resp   16   SpO2   99

## 2017-12-22 NOTE — DISCHARGE INSTRUCTIONS
Angiogram   WHAT YOU NEED TO KNOW:   An angiogram is used to examine blood flow through your arteries  Arteries carry blood from your heart to your body  DISCHARGE INSTRUCTIONS:   Call 911 for any of the following:   · You have any of the following signs of a heart attack:      ¨ Squeezing, pressure, or pain in your chest that lasts longer than 5 minutes or returns    ¨ Discomfort or pain in your back, neck, jaw, stomach, or arm     ¨ Trouble breathing    ¨ Nausea or vomiting    ¨ Lightheadedness or a sudden cold sweat, especially with chest pain or trouble breathing    · You have any of the following signs of a stroke:      ¨ Numbness or drooping on one side of your face     ¨ Weakness in an arm or leg    ¨ Confusion or difficulty speaking    ¨ Dizziness, a severe headache, or vision loss  Seek care immediately if:   · Your arm or leg feels warm, tender, and painful  It may look swollen and red  · The leg or arm used for your angiogram is numb, painful, or changes color  · The bruise at your catheter site gets bigger or becomes swollen  · Your wound does not stop bleeding even after you apply firm pressure for 10 minutes  · You have weakness in an arm or leg  · You become confused or have difficulty speaking  · You have dizziness, a severe headache, or vision loss  Contact your healthcare provider if:   · You have a fever  · Your catheter site is red, leaks pus, or smells bad  · You have increasing pain at your catheter site  · You have questions or concerns about your condition or care  Follow up with your healthcare provider as directed:  Write down your questions so you remember to ask them during your visits  Watch for bleeding and bruising: It is normal to have a bruise and soreness where the catheter went in  Contact your healthcare provider if your bruise gets larger  If your wound bleeds, use your hand to put pressure on the bandage   If you do not have a bandage, use a clean cloth to put pressure over and just above the puncture site  Seek care immediately if the bleeding does not stop within 10 minutes  Protect your leg after your procedure:  Rest for the remainder of the day of your procedure  Keep your arm or leg straight as much as possible  If the angiogram catheter was put in your leg, do not use stairs for a few days after your angiogram  When you must use stairs, step up with the leg that was not used for the angiogram  Straighten this leg to move the other leg up to the next step without putting stress on it  You may be told not to lift more than 15 pounds for 5 days after your procedure  Your healthcare provider will tell you when it is safe to drive and start doing your other normal daily activities  Go slowly at first    Keep your wound clean and dry:  Ask your healthcare provider when you can bathe  Do not take baths or go in pools or hot tubs  Remove the pressure bandage before you shower  Carefully wash the wound with soap and water  Dry the area and put on new, clean bandages as directed  Change your bandage if it gets wet or dirty  Check your incision every day for signs of infection such as swelling, redness, or pus  Drink liquids as directed:  Ask your healthcare provider how much liquid to drink each day and which liquids are best for you  Liquids will help your body flush out the contrast liquid used during the procedure  Limit alcohol:  Do not drink alcohol for 24 hours after your procedure  Then limit alcohol  Women should limit alcohol to 1 drink a day  Men should limit alcohol to 2 drinks a day  A drink of alcohol is 12 ounces of beer, 5 ounces of wine, or 1½ ounces of liquor  Do not smoke:  Nicotine and other chemicals in cigarettes and cigars can damage your blood vessels  Ask your healthcare provider for information if you currently smoke and need help to quit  E-cigarettes or smokeless tobacco still contain nicotine   Talk to your healthcare provider before you use these products  © 2017 [employer] Information is for End User's use only and may not be sold, redistributed or otherwise used for commercial purposes  All illustrations and images included in CareNotes® are the copyrighted property of A D A M , Inc  or Jeff Mcarthur  The above information is an  only  It is not intended as medical advice for individual conditions or treatments  Talk to your doctor, nurse or pharmacist before following any medical regimen to see if it is safe and effective for you

## 2018-01-01 ENCOUNTER — TELEPHONE (OUTPATIENT)
Dept: CARDIOLOGY CLINIC | Facility: CLINIC | Age: 83
End: 2018-01-01

## 2018-01-01 ENCOUNTER — OFFICE VISIT (OUTPATIENT)
Dept: PHYSICAL THERAPY | Facility: CLINIC | Age: 83
End: 2018-01-01
Payer: MEDICARE

## 2018-01-01 ENCOUNTER — TELEPHONE (OUTPATIENT)
Dept: NEPHROLOGY | Facility: CLINIC | Age: 83
End: 2018-01-01

## 2018-01-01 ENCOUNTER — OFFICE VISIT (OUTPATIENT)
Dept: VASCULAR SURGERY | Facility: CLINIC | Age: 83
End: 2018-01-01
Payer: MEDICARE

## 2018-01-01 ENCOUNTER — ALLSCRIPTS OFFICE VISIT (OUTPATIENT)
Dept: OTHER | Facility: OTHER | Age: 83
End: 2018-01-01

## 2018-01-01 ENCOUNTER — HOSPITAL ENCOUNTER (OUTPATIENT)
Dept: NON INVASIVE DIAGNOSTICS | Facility: CLINIC | Age: 83
Discharge: HOME | End: 2018-05-18
Payer: COMMERCIAL

## 2018-01-01 ENCOUNTER — APPOINTMENT (OUTPATIENT)
Dept: LAB | Facility: MEDICAL CENTER | Age: 83
End: 2018-01-01
Payer: MEDICARE

## 2018-01-01 ENCOUNTER — TELEPHONE (OUTPATIENT)
Dept: OTHER | Facility: HOSPITAL | Age: 83
End: 2018-01-01

## 2018-01-01 ENCOUNTER — APPOINTMENT (EMERGENCY)
Dept: RADIOLOGY | Facility: HOSPITAL | Age: 83
DRG: 280 | End: 2018-01-01
Payer: MEDICARE

## 2018-01-01 ENCOUNTER — APPOINTMENT (INPATIENT)
Dept: RADIOLOGY | Facility: HOSPITAL | Age: 83
DRG: 280 | End: 2018-01-01
Payer: MEDICARE

## 2018-01-01 ENCOUNTER — APPOINTMENT (OUTPATIENT)
Dept: PHYSICAL THERAPY | Facility: CLINIC | Age: 83
End: 2018-01-01
Payer: MEDICARE

## 2018-01-01 ENCOUNTER — EVALUATION (OUTPATIENT)
Dept: PHYSICAL THERAPY | Facility: CLINIC | Age: 83
End: 2018-01-01
Payer: MEDICARE

## 2018-01-01 ENCOUNTER — APPOINTMENT (INPATIENT)
Dept: RADIOLOGY | Facility: HOSPITAL | Age: 83
DRG: 291 | End: 2018-01-01
Payer: MEDICARE

## 2018-01-01 ENCOUNTER — TELEPHONE (OUTPATIENT)
Dept: VASCULAR SURGERY | Facility: CLINIC | Age: 83
End: 2018-01-01

## 2018-01-01 ENCOUNTER — TRANSCRIBE ORDERS (OUTPATIENT)
Dept: ADMINISTRATIVE | Facility: HOSPITAL | Age: 83
End: 2018-01-01

## 2018-01-01 ENCOUNTER — OFFICE VISIT (OUTPATIENT)
Dept: NEPHROLOGY | Facility: CLINIC | Age: 83
End: 2018-01-01
Payer: MEDICARE

## 2018-01-01 ENCOUNTER — TRANSITIONAL CARE MANAGEMENT (OUTPATIENT)
Dept: FAMILY MEDICINE CLINIC | Facility: CLINIC | Age: 83
End: 2018-01-01

## 2018-01-01 ENCOUNTER — APPOINTMENT (INPATIENT)
Dept: NON INVASIVE DIAGNOSTICS | Facility: HOSPITAL | Age: 83
DRG: 291 | End: 2018-01-01
Payer: MEDICARE

## 2018-01-01 ENCOUNTER — APPOINTMENT (INPATIENT)
Dept: NON INVASIVE DIAGNOSTICS | Facility: HOSPITAL | Age: 83
DRG: 280 | End: 2018-01-01
Payer: MEDICARE

## 2018-01-01 ENCOUNTER — APPOINTMENT (INPATIENT)
Dept: ULTRASOUND IMAGING | Facility: HOSPITAL | Age: 83
DRG: 280 | End: 2018-01-01
Payer: MEDICARE

## 2018-01-01 ENCOUNTER — TELEPHONE (OUTPATIENT)
Dept: FAMILY MEDICINE CLINIC | Facility: CLINIC | Age: 83
End: 2018-01-01

## 2018-01-01 ENCOUNTER — HOSPITAL ENCOUNTER (INPATIENT)
Facility: HOSPITAL | Age: 83
LOS: 5 days | DRG: 291 | End: 2018-05-30
Attending: EMERGENCY MEDICINE | Admitting: HOSPITALIST
Payer: MEDICARE

## 2018-01-01 ENCOUNTER — APPOINTMENT (OUTPATIENT)
Dept: LAB | Facility: CLINIC | Age: 83
End: 2018-01-01
Payer: MEDICARE

## 2018-01-01 ENCOUNTER — TRANSITIONAL CARE MANAGEMENT (OUTPATIENT)
Dept: NEPHROLOGY | Facility: CLINIC | Age: 83
End: 2018-01-01

## 2018-01-01 ENCOUNTER — OFFICE VISIT (OUTPATIENT)
Dept: HEMATOLOGY ONCOLOGY | Facility: CLINIC | Age: 83
End: 2018-01-01
Payer: MEDICARE

## 2018-01-01 ENCOUNTER — APPOINTMENT (EMERGENCY)
Dept: RADIOLOGY | Facility: HOSPITAL | Age: 83
DRG: 291 | End: 2018-01-01
Payer: MEDICARE

## 2018-01-01 ENCOUNTER — APPOINTMENT (EMERGENCY)
Dept: ULTRASOUND IMAGING | Facility: HOSPITAL | Age: 83
DRG: 280 | End: 2018-01-01
Payer: MEDICARE

## 2018-01-01 ENCOUNTER — OFFICE VISIT (OUTPATIENT)
Dept: FAMILY MEDICINE CLINIC | Facility: CLINIC | Age: 83
End: 2018-01-01
Payer: MEDICARE

## 2018-01-01 ENCOUNTER — TRANSCRIBE ORDERS (OUTPATIENT)
Dept: LAB | Facility: CLINIC | Age: 83
End: 2018-01-01

## 2018-01-01 ENCOUNTER — OFFICE VISIT (OUTPATIENT)
Dept: OBGYN CLINIC | Facility: MEDICAL CENTER | Age: 83
End: 2018-01-01
Payer: MEDICARE

## 2018-01-01 ENCOUNTER — HOSPITAL ENCOUNTER (INPATIENT)
Facility: HOSPITAL | Age: 83
LOS: 37 days | Discharge: NURSING, REHAB OR LONG-TERM CARE | DRG: 280 | End: 2018-05-11
Attending: EMERGENCY MEDICINE | Admitting: INTERNAL MEDICINE
Payer: MEDICARE

## 2018-01-01 ENCOUNTER — TELEPHONE (OUTPATIENT)
Dept: ADMINISTRATIVE | Facility: HOSPITAL | Age: 83
End: 2018-01-01

## 2018-01-01 VITALS
TEMPERATURE: 97.2 F | BODY MASS INDEX: 35.39 KG/M2 | DIASTOLIC BLOOD PRESSURE: 82 MMHG | SYSTOLIC BLOOD PRESSURE: 122 MMHG | HEART RATE: 84 BPM | RESPIRATION RATE: 16 BRPM | WEIGHT: 225.5 LBS | HEIGHT: 67 IN

## 2018-01-01 VITALS
DIASTOLIC BLOOD PRESSURE: 62 MMHG | WEIGHT: 229.13 LBS | BODY MASS INDEX: 35.96 KG/M2 | SYSTOLIC BLOOD PRESSURE: 108 MMHG | HEART RATE: 88 BPM | HEIGHT: 67 IN | TEMPERATURE: 97.9 F | RESPIRATION RATE: 16 BRPM

## 2018-01-01 VITALS
HEIGHT: 67 IN | SYSTOLIC BLOOD PRESSURE: 148 MMHG | BODY MASS INDEX: 35.69 KG/M2 | TEMPERATURE: 98.4 F | HEART RATE: 86 BPM | DIASTOLIC BLOOD PRESSURE: 72 MMHG | WEIGHT: 227.38 LBS

## 2018-01-01 VITALS
DIASTOLIC BLOOD PRESSURE: 58 MMHG | HEIGHT: 67 IN | SYSTOLIC BLOOD PRESSURE: 96 MMHG | HEART RATE: 83 BPM | WEIGHT: 220 LBS | BODY MASS INDEX: 34.53 KG/M2

## 2018-01-01 VITALS
WEIGHT: 225.25 LBS | TEMPERATURE: 98.5 F | DIASTOLIC BLOOD PRESSURE: 62 MMHG | RESPIRATION RATE: 16 BRPM | SYSTOLIC BLOOD PRESSURE: 112 MMHG | BODY MASS INDEX: 35.35 KG/M2 | HEIGHT: 67 IN | HEART RATE: 72 BPM

## 2018-01-01 VITALS
BODY MASS INDEX: 36.6 KG/M2 | HEART RATE: 84 BPM | OXYGEN SATURATION: 100 % | DIASTOLIC BLOOD PRESSURE: 62 MMHG | TEMPERATURE: 95.7 F | SYSTOLIC BLOOD PRESSURE: 98 MMHG | RESPIRATION RATE: 22 BRPM | HEIGHT: 67 IN | WEIGHT: 233.2 LBS

## 2018-01-01 VITALS
HEIGHT: 67 IN | DIASTOLIC BLOOD PRESSURE: 72 MMHG | BODY MASS INDEX: 34.76 KG/M2 | HEART RATE: 84 BPM | SYSTOLIC BLOOD PRESSURE: 118 MMHG | WEIGHT: 221.5 LBS

## 2018-01-01 VITALS
HEART RATE: 66 BPM | SYSTOLIC BLOOD PRESSURE: 140 MMHG | WEIGHT: 228.5 LBS | WEIGHT: 227 LBS | HEIGHT: 67 IN | HEART RATE: 95 BPM | SYSTOLIC BLOOD PRESSURE: 138 MMHG | HEIGHT: 67 IN | DIASTOLIC BLOOD PRESSURE: 72 MMHG | DIASTOLIC BLOOD PRESSURE: 72 MMHG | BODY MASS INDEX: 35.87 KG/M2 | OXYGEN SATURATION: 94 % | BODY MASS INDEX: 35.63 KG/M2 | OXYGEN SATURATION: 97 % | RESPIRATION RATE: 16 BRPM | TEMPERATURE: 96.6 F

## 2018-01-01 VITALS
HEART RATE: 74 BPM | BODY MASS INDEX: 33.9 KG/M2 | SYSTOLIC BLOOD PRESSURE: 120 MMHG | RESPIRATION RATE: 16 BRPM | HEIGHT: 67 IN | DIASTOLIC BLOOD PRESSURE: 60 MMHG | WEIGHT: 216 LBS

## 2018-01-01 VITALS
WEIGHT: 216.5 LBS | HEART RATE: 80 BPM | BODY MASS INDEX: 33.98 KG/M2 | DIASTOLIC BLOOD PRESSURE: 63 MMHG | HEIGHT: 67 IN | SYSTOLIC BLOOD PRESSURE: 109 MMHG

## 2018-01-01 VITALS — WEIGHT: 220 LBS | HEIGHT: 67 IN | BODY MASS INDEX: 34.53 KG/M2

## 2018-01-01 VITALS — WEIGHT: 226 LBS | HEIGHT: 67 IN | BODY MASS INDEX: 35.47 KG/M2

## 2018-01-01 VITALS
RESPIRATION RATE: 16 BRPM | TEMPERATURE: 97.8 F | DIASTOLIC BLOOD PRESSURE: 65 MMHG | HEIGHT: 67 IN | HEART RATE: 91 BPM | OXYGEN SATURATION: 99 % | WEIGHT: 194 LBS | SYSTOLIC BLOOD PRESSURE: 110 MMHG | BODY MASS INDEX: 30.45 KG/M2

## 2018-01-01 VITALS
DIASTOLIC BLOOD PRESSURE: 82 MMHG | WEIGHT: 227 LBS | HEIGHT: 67 IN | BODY MASS INDEX: 35.63 KG/M2 | SYSTOLIC BLOOD PRESSURE: 128 MMHG

## 2018-01-01 VITALS
HEART RATE: 67 BPM | HEIGHT: 67 IN | BODY MASS INDEX: 33.83 KG/M2 | DIASTOLIC BLOOD PRESSURE: 68 MMHG | WEIGHT: 215.56 LBS | SYSTOLIC BLOOD PRESSURE: 120 MMHG

## 2018-01-01 VITALS
BODY MASS INDEX: 34.79 KG/M2 | TEMPERATURE: 97.5 F | HEIGHT: 67 IN | HEART RATE: 76 BPM | WEIGHT: 221.63 LBS | SYSTOLIC BLOOD PRESSURE: 128 MMHG | RESPIRATION RATE: 16 BRPM | DIASTOLIC BLOOD PRESSURE: 68 MMHG

## 2018-01-01 VITALS
HEART RATE: 84 BPM | WEIGHT: 214.5 LBS | BODY MASS INDEX: 33.67 KG/M2 | HEIGHT: 67 IN | SYSTOLIC BLOOD PRESSURE: 110 MMHG | DIASTOLIC BLOOD PRESSURE: 70 MMHG | RESPIRATION RATE: 16 BRPM | TEMPERATURE: 96.9 F

## 2018-01-01 VITALS
HEART RATE: 72 BPM | HEIGHT: 67 IN | TEMPERATURE: 98 F | DIASTOLIC BLOOD PRESSURE: 70 MMHG | BODY MASS INDEX: 32.49 KG/M2 | WEIGHT: 207 LBS | SYSTOLIC BLOOD PRESSURE: 110 MMHG | RESPIRATION RATE: 14 BRPM

## 2018-01-01 VITALS
HEIGHT: 67 IN | DIASTOLIC BLOOD PRESSURE: 60 MMHG | RESPIRATION RATE: 16 BRPM | TEMPERATURE: 97.3 F | HEART RATE: 82 BPM | WEIGHT: 220 LBS | BODY MASS INDEX: 34.53 KG/M2 | SYSTOLIC BLOOD PRESSURE: 118 MMHG

## 2018-01-01 VITALS
SYSTOLIC BLOOD PRESSURE: 120 MMHG | BODY MASS INDEX: 33.9 KG/M2 | RESPIRATION RATE: 16 BRPM | HEIGHT: 67 IN | HEART RATE: 74 BPM | WEIGHT: 216 LBS | OXYGEN SATURATION: 92 % | DIASTOLIC BLOOD PRESSURE: 70 MMHG | TEMPERATURE: 97.2 F

## 2018-01-01 VITALS
HEIGHT: 67 IN | RESPIRATION RATE: 18 BRPM | DIASTOLIC BLOOD PRESSURE: 76 MMHG | HEART RATE: 70 BPM | SYSTOLIC BLOOD PRESSURE: 114 MMHG

## 2018-01-01 VITALS — SYSTOLIC BLOOD PRESSURE: 107 MMHG | DIASTOLIC BLOOD PRESSURE: 66 MMHG | HEART RATE: 87 BPM

## 2018-01-01 VITALS
SYSTOLIC BLOOD PRESSURE: 120 MMHG | BODY MASS INDEX: 35 KG/M2 | DIASTOLIC BLOOD PRESSURE: 50 MMHG | HEART RATE: 64 BPM | HEIGHT: 67 IN | WEIGHT: 223 LBS

## 2018-01-01 VITALS
SYSTOLIC BLOOD PRESSURE: 104 MMHG | HEART RATE: 82 BPM | BODY MASS INDEX: 33.67 KG/M2 | HEIGHT: 67 IN | DIASTOLIC BLOOD PRESSURE: 68 MMHG | WEIGHT: 214.5 LBS | TEMPERATURE: 96.8 F

## 2018-01-01 VITALS
HEIGHT: 67 IN | SYSTOLIC BLOOD PRESSURE: 116 MMHG | WEIGHT: 210 LBS | DIASTOLIC BLOOD PRESSURE: 80 MMHG | BODY MASS INDEX: 32.96 KG/M2

## 2018-01-01 VITALS
HEART RATE: 82 BPM | DIASTOLIC BLOOD PRESSURE: 82 MMHG | SYSTOLIC BLOOD PRESSURE: 128 MMHG | RESPIRATION RATE: 19 BRPM | HEIGHT: 67 IN

## 2018-01-01 VITALS
HEIGHT: 67 IN | RESPIRATION RATE: 24 BRPM | WEIGHT: 202.82 LBS | HEART RATE: 115 BPM | SYSTOLIC BLOOD PRESSURE: 104 MMHG | OXYGEN SATURATION: 99 % | TEMPERATURE: 97.5 F | BODY MASS INDEX: 31.83 KG/M2 | DIASTOLIC BLOOD PRESSURE: 64 MMHG

## 2018-01-01 VITALS
HEIGHT: 67 IN | BODY MASS INDEX: 36.1 KG/M2 | SYSTOLIC BLOOD PRESSURE: 148 MMHG | HEART RATE: 64 BPM | WEIGHT: 230 LBS | DIASTOLIC BLOOD PRESSURE: 60 MMHG

## 2018-01-01 DIAGNOSIS — N18.4 CKD (CHRONIC KIDNEY DISEASE) STAGE 4, GFR 15-29 ML/MIN (HCC): ICD-10-CM

## 2018-01-01 DIAGNOSIS — N18.30 CHRONIC KIDNEY DISEASE, STAGE III (MODERATE) (HCC): ICD-10-CM

## 2018-01-01 DIAGNOSIS — S91.302A NON HEALING LEFT HEEL WOUND: ICD-10-CM

## 2018-01-01 DIAGNOSIS — I70.299 ATHEROSCLEROSIS OF ARTERY OF EXTREMITY WITH ULCERATION (HCC): ICD-10-CM

## 2018-01-01 DIAGNOSIS — N17.9 ACUTE-ON-CHRONIC KIDNEY INJURY (HCC): Primary | ICD-10-CM

## 2018-01-01 DIAGNOSIS — I73.9 PERIPHERAL ARTERIAL DISEASE (HCC): ICD-10-CM

## 2018-01-01 DIAGNOSIS — R60.9 EDEMA, UNSPECIFIED TYPE: ICD-10-CM

## 2018-01-01 DIAGNOSIS — G47.00 INSOMNIA: ICD-10-CM

## 2018-01-01 DIAGNOSIS — Z01.818 PRE-OP EXAM: Primary | ICD-10-CM

## 2018-01-01 DIAGNOSIS — I50.33 ACUTE ON CHRONIC DIASTOLIC CONGESTIVE HEART FAILURE (HCC): ICD-10-CM

## 2018-01-01 DIAGNOSIS — I10 ESSENTIAL HYPERTENSION: Chronic | ICD-10-CM

## 2018-01-01 DIAGNOSIS — M10.9 PODAGRA: Primary | ICD-10-CM

## 2018-01-01 DIAGNOSIS — N18.4 CKD (CHRONIC KIDNEY DISEASE), STAGE IV (HCC): Primary | ICD-10-CM

## 2018-01-01 DIAGNOSIS — I10 BENIGN ESSENTIAL HYPERTENSION: Chronic | ICD-10-CM

## 2018-01-01 DIAGNOSIS — Z01.89 ENCOUNTER FOR COMPETENCY EVALUATION: ICD-10-CM

## 2018-01-01 DIAGNOSIS — S42.255D CLOSED NONDISPLACED FRACTURE OF GREATER TUBEROSITY OF LEFT HUMERUS WITH ROUTINE HEALING, SUBSEQUENT ENCOUNTER: Primary | ICD-10-CM

## 2018-01-01 DIAGNOSIS — E08.40 DIABETES MELLITUS DUE TO UNDERLYING CONDITION WITH DIABETIC NEUROPATHY, UNSPECIFIED LONG TERM INSULIN USE STATUS: Primary | ICD-10-CM

## 2018-01-01 DIAGNOSIS — E11.65 TYPE 2 DIABETES MELLITUS WITH HYPERGLYCEMIA, WITH LONG-TERM CURRENT USE OF INSULIN (HCC): ICD-10-CM

## 2018-01-01 DIAGNOSIS — Z79.4 ENCOUNTER FOR LONG-TERM (CURRENT) USE OF INSULIN (HCC): ICD-10-CM

## 2018-01-01 DIAGNOSIS — E08.40 DIABETES MELLITUS DUE TO UNDERLYING CONDITION WITH DIABETIC NEUROPATHY, UNSPECIFIED LONG TERM INSULIN USE STATUS: ICD-10-CM

## 2018-01-01 DIAGNOSIS — E78.2 MIXED HYPERLIPIDEMIA: ICD-10-CM

## 2018-01-01 DIAGNOSIS — I50.9 CONGESTIVE HEART FAILURE (HCC): ICD-10-CM

## 2018-01-01 DIAGNOSIS — Z79.4 TYPE 2 DIABETES MELLITUS WITH HYPERGLYCEMIA, WITH LONG-TERM CURRENT USE OF INSULIN (HCC): ICD-10-CM

## 2018-01-01 DIAGNOSIS — N25.81 SECONDARY HYPERPARATHYROIDISM (HCC): ICD-10-CM

## 2018-01-01 DIAGNOSIS — I73.9 PERIPHERAL ARTERIAL DISEASE (HCC): Primary | ICD-10-CM

## 2018-01-01 DIAGNOSIS — L97.909 ATHEROSCLEROSIS OF ARTERY OF EXTREMITY WITH ULCERATION (HCC): Primary | ICD-10-CM

## 2018-01-01 DIAGNOSIS — G89.29 CHRONIC LEFT SHOULDER PAIN: Primary | ICD-10-CM

## 2018-01-01 DIAGNOSIS — I50.9 CHF EXACERBATION (HCC): Primary | ICD-10-CM

## 2018-01-01 DIAGNOSIS — E55.9 VITAMIN D DEFICIENCY: ICD-10-CM

## 2018-01-01 DIAGNOSIS — D63.1 ANEMIA IN STAGE 4 CHRONIC KIDNEY DISEASE (HCC): ICD-10-CM

## 2018-01-01 DIAGNOSIS — I70.299 ATHEROSCLEROSIS OF ARTERY OF EXTREMITY WITH ULCERATION (HCC): Primary | ICD-10-CM

## 2018-01-01 DIAGNOSIS — M25.512 CHRONIC LEFT SHOULDER PAIN: Primary | ICD-10-CM

## 2018-01-01 DIAGNOSIS — E87.70 FLUID OVERLOAD: ICD-10-CM

## 2018-01-01 DIAGNOSIS — I50.32 CHRONIC DIASTOLIC CONGESTIVE HEART FAILURE (HCC): Chronic | ICD-10-CM

## 2018-01-01 DIAGNOSIS — N18.4 ANEMIA IN STAGE 4 CHRONIC KIDNEY DISEASE (HCC): ICD-10-CM

## 2018-01-01 DIAGNOSIS — D47.2 BICLONAL GAMMOPATHY: ICD-10-CM

## 2018-01-01 DIAGNOSIS — N17.9 ACUTE-ON-CHRONIC KIDNEY INJURY (HCC): ICD-10-CM

## 2018-01-01 DIAGNOSIS — R77.8 ELEVATED TROPONIN: ICD-10-CM

## 2018-01-01 DIAGNOSIS — Z01.818 PRE-OP EXAM: ICD-10-CM

## 2018-01-01 DIAGNOSIS — N39.0 UTI (URINARY TRACT INFECTION): ICD-10-CM

## 2018-01-01 DIAGNOSIS — N18.30 STAGE 3 CHRONIC KIDNEY DISEASE (HCC): Primary | ICD-10-CM

## 2018-01-01 DIAGNOSIS — I48.20 CHRONIC ATRIAL FIBRILLATION (HCC): ICD-10-CM

## 2018-01-01 DIAGNOSIS — L03.116 CELLULITIS OF FOOT, LEFT: ICD-10-CM

## 2018-01-01 DIAGNOSIS — I50.9 CHF (CONGESTIVE HEART FAILURE) (HCC): Primary | ICD-10-CM

## 2018-01-01 DIAGNOSIS — L97.909 ATHEROSCLEROSIS OF ARTERY OF EXTREMITY WITH ULCERATION (HCC): ICD-10-CM

## 2018-01-01 DIAGNOSIS — L97.509 FOOT ULCER (HCC): ICD-10-CM

## 2018-01-01 DIAGNOSIS — K21.9 ESOPHAGEAL REFLUX: ICD-10-CM

## 2018-01-01 DIAGNOSIS — D47.2 MONOCLONAL GAMMOPATHY: ICD-10-CM

## 2018-01-01 DIAGNOSIS — I46.9 CARDIAC ARREST (HCC): ICD-10-CM

## 2018-01-01 DIAGNOSIS — D47.2 MGUS (MONOCLONAL GAMMOPATHY OF UNKNOWN SIGNIFICANCE): Primary | ICD-10-CM

## 2018-01-01 DIAGNOSIS — N18.30 STAGE 3 CHRONIC KIDNEY DISEASE (HCC): ICD-10-CM

## 2018-01-01 DIAGNOSIS — N18.9 ACUTE-ON-CHRONIC KIDNEY INJURY (HCC): Primary | ICD-10-CM

## 2018-01-01 DIAGNOSIS — N18.9 ACUTE-ON-CHRONIC KIDNEY INJURY (HCC): ICD-10-CM

## 2018-01-01 DIAGNOSIS — R33.9 URINARY RETENTION: ICD-10-CM

## 2018-01-01 DIAGNOSIS — R06.02 SHORTNESS OF BREATH: ICD-10-CM

## 2018-01-01 DIAGNOSIS — K59.00 CONSTIPATION: ICD-10-CM

## 2018-01-01 DIAGNOSIS — R60.9 PERIPHERAL EDEMA: Primary | ICD-10-CM

## 2018-01-01 LAB
25(OH)D3 SERPL-MCNC: 34.8 NG/ML (ref 30–100)
ALBUMIN SERPL BCP-MCNC: 3.1 G/DL (ref 3.5–5)
ALBUMIN SERPL BCP-MCNC: 3.2 G/DL (ref 3.5–5)
ALBUMIN SERPL BCP-MCNC: 3.3 G/DL (ref 3.5–5)
ALBUMIN SERPL BCP-MCNC: 3.3 G/DL (ref 3.5–5)
ALBUMIN SERPL BCP-MCNC: 3.6 G/DL (ref 3.5–5)
ALBUMIN SERPL BCP-MCNC: 3.7 G/DL (ref 3.5–5)
ALBUMIN SERPL BCP-MCNC: 4.1 G/DL (ref 3.5–5)
ALBUMIN SERPL BCP-MCNC: 4.2 G/DL (ref 3.5–5)
ALBUMIN SERPL BCP-MCNC: 4.4 G/DL (ref 3.5–5)
ALBUMIN SERPL ELPH-MCNC: 3.77 G/DL (ref 3.5–5)
ALBUMIN SERPL ELPH-MCNC: 55.4 % (ref 52–65)
ALP SERPL-CCNC: 108 U/L (ref 46–116)
ALP SERPL-CCNC: 123 U/L (ref 46–116)
ALP SERPL-CCNC: 67 U/L (ref 46–116)
ALP SERPL-CCNC: 74 U/L (ref 46–116)
ALP SERPL-CCNC: 75 U/L (ref 46–116)
ALP SERPL-CCNC: 76 U/L (ref 46–116)
ALP SERPL-CCNC: 78 U/L (ref 46–116)
ALP SERPL-CCNC: 94 U/L (ref 46–116)
ALP SERPL-CCNC: 98 U/L (ref 46–116)
ALPHA1 GLOB SERPL ELPH-MCNC: 0.4 G/DL (ref 0.1–0.4)
ALPHA1 GLOB SERPL ELPH-MCNC: 5.9 % (ref 2.5–5)
ALPHA2 GLOB SERPL ELPH-MCNC: 0.97 G/DL (ref 0.4–1.2)
ALPHA2 GLOB SERPL ELPH-MCNC: 14.3 % (ref 7–13)
ALT SERPL W P-5'-P-CCNC: 17 U/L (ref 12–78)
ALT SERPL W P-5'-P-CCNC: 18 U/L (ref 12–78)
ALT SERPL W P-5'-P-CCNC: 20 U/L (ref 12–78)
ALT SERPL W P-5'-P-CCNC: 24 U/L (ref 12–78)
ALT SERPL W P-5'-P-CCNC: 25 U/L (ref 12–78)
ALT SERPL W P-5'-P-CCNC: 25 U/L (ref 12–78)
ALT SERPL W P-5'-P-CCNC: 28 U/L (ref 12–78)
ALT SERPL W P-5'-P-CCNC: 31 U/L (ref 12–78)
ALT SERPL W P-5'-P-CCNC: 38 U/L (ref 12–78)
ANION GAP SERPL CALCULATED.3IONS-SCNC: 10 MMOL/L (ref 4–13)
ANION GAP SERPL CALCULATED.3IONS-SCNC: 11 MMOL/L (ref 4–13)
ANION GAP SERPL CALCULATED.3IONS-SCNC: 12 MMOL/L (ref 4–13)
ANION GAP SERPL CALCULATED.3IONS-SCNC: 13 MMOL/L (ref 4–13)
ANION GAP SERPL CALCULATED.3IONS-SCNC: 14 MMOL/L (ref 4–13)
ANION GAP SERPL CALCULATED.3IONS-SCNC: 14 MMOL/L (ref 4–13)
ANION GAP SERPL CALCULATED.3IONS-SCNC: 5 MMOL/L (ref 4–13)
ANION GAP SERPL CALCULATED.3IONS-SCNC: 6 MMOL/L (ref 4–13)
ANION GAP SERPL CALCULATED.3IONS-SCNC: 7 MMOL/L (ref 4–13)
ANION GAP SERPL CALCULATED.3IONS-SCNC: 7 MMOL/L (ref 4–13)
ANION GAP SERPL CALCULATED.3IONS-SCNC: 8 MMOL/L (ref 4–13)
ANION GAP SERPL CALCULATED.3IONS-SCNC: 9 MMOL/L (ref 4–13)
APTT PPP: 28 SECONDS (ref 24–36)
APTT PPP: 29 SECONDS (ref 23–35)
APTT PPP: 29 SECONDS (ref 24–36)
APTT PPP: 30 SECONDS (ref 23–35)
APTT PPP: 30 SECONDS (ref 24–36)
APTT PPP: 77 SECONDS (ref 24–36)
APTT PPP: 77 SECONDS (ref 24–36)
APTT PPP: 78 SECONDS (ref 24–36)
APTT PPP: 80 SECONDS (ref 24–36)
AST SERPL W P-5'-P-CCNC: 14 U/L (ref 5–45)
AST SERPL W P-5'-P-CCNC: 16 U/L (ref 5–45)
AST SERPL W P-5'-P-CCNC: 16 U/L (ref 5–45)
AST SERPL W P-5'-P-CCNC: 17 U/L (ref 5–45)
AST SERPL W P-5'-P-CCNC: 18 U/L (ref 5–45)
ATRIAL RATE: 114 BPM
ATRIAL RATE: 119 BPM
ATRIAL RATE: 121 BPM
ATRIAL RATE: 121 BPM
ATRIAL RATE: 276 BPM
BACTERIA BLD CULT: NORMAL
BACTERIA BLD CULT: NORMAL
BACTERIA UR CULT: ABNORMAL
BACTERIA UR CULT: ABNORMAL
BACTERIA UR QL AUTO: ABNORMAL /HPF
BACTERIA UR QL AUTO: ABNORMAL /HPF
BACTERIA UR QL AUTO: NORMAL /HPF
BACTERIA UR QL AUTO: NORMAL /HPF
BASE EX.OXY STD BLDV CALC-SCNC: 47.9 % (ref 60–80)
BASE EX.OXY STD BLDV CALC-SCNC: 84.1 % (ref 60–80)
BASE EX.OXY STD BLDV CALC-SCNC: 92 % (ref 60–80)
BASE EX.OXY STD BLDV CALC-SCNC: 94.6 % (ref 60–80)
BASE EXCESS BLDV CALC-SCNC: -1.1 MMOL/L
BASE EXCESS BLDV CALC-SCNC: 10.7 MMOL/L
BASE EXCESS BLDV CALC-SCNC: 14.7 MMOL/L
BASE EXCESS BLDV CALC-SCNC: 15.3 MMOL/L
BASOPHILS # BLD AUTO: 0 THOUSANDS/ΜL (ref 0–0.1)
BASOPHILS # BLD AUTO: 0.01 THOUSANDS/ΜL (ref 0–0.1)
BASOPHILS # BLD AUTO: 0.02 THOUSANDS/ΜL (ref 0–0.1)
BASOPHILS # BLD AUTO: 0.03 THOUSANDS/ΜL (ref 0–0.1)
BASOPHILS NFR BLD AUTO: 0 % (ref 0–1)
BASOPHILS NFR BLD AUTO: 1 % (ref 0–1)
BETA GLOB ABNORMAL SERPL ELPH-MCNC: 0.47 G/DL (ref 0.4–0.8)
BETA1 GLOB SERPL ELPH-MCNC: 6.9 % (ref 5–13)
BETA2 GLOB SERPL ELPH-MCNC: 4.2 % (ref 2–8)
BETA2+GAMMA GLOB SERPL ELPH-MCNC: 0.29 G/DL (ref 0.2–0.5)
BILIRUB SERPL-MCNC: 0.5 MG/DL (ref 0.2–1)
BILIRUB SERPL-MCNC: 0.66 MG/DL (ref 0.2–1)
BILIRUB SERPL-MCNC: 0.66 MG/DL (ref 0.2–1)
BILIRUB SERPL-MCNC: 0.75 MG/DL (ref 0.2–1)
BILIRUB SERPL-MCNC: 0.79 MG/DL (ref 0.2–1)
BILIRUB SERPL-MCNC: 0.96 MG/DL (ref 0.2–1)
BILIRUB SERPL-MCNC: 1.03 MG/DL (ref 0.2–1)
BILIRUB SERPL-MCNC: 1.2 MG/DL (ref 0.2–1)
BILIRUB SERPL-MCNC: 1.2 MG/DL (ref 0.2–1)
BILIRUB UR QL STRIP: ABNORMAL
BILIRUB UR QL STRIP: NEGATIVE
BUN SERPL-MCNC: 105 MG/DL (ref 5–25)
BUN SERPL-MCNC: 105 MG/DL (ref 5–25)
BUN SERPL-MCNC: 107 MG/DL (ref 5–25)
BUN SERPL-MCNC: 108 MG/DL (ref 5–25)
BUN SERPL-MCNC: 108 MG/DL (ref 5–25)
BUN SERPL-MCNC: 109 MG/DL (ref 5–25)
BUN SERPL-MCNC: 109 MG/DL (ref 5–25)
BUN SERPL-MCNC: 112 MG/DL (ref 5–25)
BUN SERPL-MCNC: 113 MG/DL (ref 5–25)
BUN SERPL-MCNC: 115 MG/DL (ref 5–25)
BUN SERPL-MCNC: 118 MG/DL (ref 5–25)
BUN SERPL-MCNC: 120 MG/DL (ref 5–25)
BUN SERPL-MCNC: 126 MG/DL (ref 5–25)
BUN SERPL-MCNC: 127 MG/DL (ref 5–25)
BUN SERPL-MCNC: 130 MG/DL (ref 5–25)
BUN SERPL-MCNC: 131 MG/DL (ref 5–25)
BUN SERPL-MCNC: 132 MG/DL (ref 5–25)
BUN SERPL-MCNC: 134 MG/DL (ref 5–25)
BUN SERPL-MCNC: 135 MG/DL (ref 5–25)
BUN SERPL-MCNC: 136 MG/DL (ref 5–25)
BUN SERPL-MCNC: 136 MG/DL (ref 5–25)
BUN SERPL-MCNC: 137 MG/DL (ref 5–25)
BUN SERPL-MCNC: 139 MG/DL (ref 5–25)
BUN SERPL-MCNC: 141 MG/DL (ref 5–25)
BUN SERPL-MCNC: 144 MG/DL (ref 5–25)
BUN SERPL-MCNC: 145 MG/DL (ref 5–25)
BUN SERPL-MCNC: 147 MG/DL (ref 5–25)
BUN SERPL-MCNC: 155 MG/DL (ref 5–25)
BUN SERPL-MCNC: 158 MG/DL (ref 5–25)
BUN SERPL-MCNC: 160 MG/DL (ref 5–25)
BUN SERPL-MCNC: 161 MG/DL (ref 5–25)
BUN SERPL-MCNC: 162 MG/DL (ref 5–25)
BUN SERPL-MCNC: 162 MG/DL (ref 5–25)
BUN SERPL-MCNC: 163 MG/DL (ref 5–25)
BUN SERPL-MCNC: 164 MG/DL (ref 5–25)
BUN SERPL-MCNC: 164 MG/DL (ref 5–25)
BUN SERPL-MCNC: 165 MG/DL (ref 5–25)
BUN SERPL-MCNC: 32 MG/DL (ref 5–25)
BUN SERPL-MCNC: 47 MG/DL (ref 5–25)
BUN SERPL-MCNC: 49 MG/DL (ref 5–25)
BUN SERPL-MCNC: 75 MG/DL (ref 5–25)
BUN SERPL-MCNC: 76 MG/DL (ref 5–25)
BUN SERPL-MCNC: 78 MG/DL (ref 5–25)
BUN SERPL-MCNC: 83 MG/DL (ref 5–25)
BUN SERPL-MCNC: 92 MG/DL (ref 5–25)
BUN SERPL-MCNC: 95 MG/DL (ref 5–25)
CALCIUM SERPL-MCNC: 10 MG/DL
CALCIUM SERPL-MCNC: 10 MG/DL (ref 8.3–10.1)
CALCIUM SERPL-MCNC: 10.1 MG/DL (ref 8.3–10.1)
CALCIUM SERPL-MCNC: 10.2 MG/DL (ref 8.3–10.1)
CALCIUM SERPL-MCNC: 10.3 MG/DL (ref 8.3–10.1)
CALCIUM SERPL-MCNC: 10.8 MG/DL (ref 8.3–10.1)
CALCIUM SERPL-MCNC: 8.8 MG/DL (ref 8.3–10.1)
CALCIUM SERPL-MCNC: 8.9 MG/DL (ref 8.3–10.1)
CALCIUM SERPL-MCNC: 9 MG/DL (ref 8.3–10.1)
CALCIUM SERPL-MCNC: 9 MG/DL (ref 8.3–10.1)
CALCIUM SERPL-MCNC: 9.1 MG/DL (ref 8.3–10.1)
CALCIUM SERPL-MCNC: 9.1 MG/DL (ref 8.3–10.1)
CALCIUM SERPL-MCNC: 9.2 MG/DL (ref 8.3–10.1)
CALCIUM SERPL-MCNC: 9.2 MG/DL (ref 8.3–10.1)
CALCIUM SERPL-MCNC: 9.3 MG/DL
CALCIUM SERPL-MCNC: 9.3 MG/DL (ref 8.3–10.1)
CALCIUM SERPL-MCNC: 9.4 MG/DL
CALCIUM SERPL-MCNC: 9.4 MG/DL (ref 8.3–10.1)
CALCIUM SERPL-MCNC: 9.5 MG/DL
CALCIUM SERPL-MCNC: 9.5 MG/DL (ref 8.3–10.1)
CALCIUM SERPL-MCNC: 9.6 MG/DL
CALCIUM SERPL-MCNC: 9.6 MG/DL (ref 8.3–10.1)
CALCIUM SERPL-MCNC: 9.7 MG/DL (ref 8.3–10.1)
CALCIUM SERPL-MCNC: 9.7 MG/DL (ref 8.3–10.1)
CALCIUM SERPL-MCNC: 9.8 MG/DL
CALCIUM SERPL-MCNC: 9.8 MG/DL
CALCIUM SERPL-MCNC: 9.8 MG/DL (ref 8.3–10.1)
CALCIUM SERPL-MCNC: 9.9 MG/DL (ref 8.3–10.1)
CHLORIDE SERPL-SCNC: 100 MMOL/L (ref 100–108)
CHLORIDE SERPL-SCNC: 101 MMOL/L (ref 100–108)
CHLORIDE SERPL-SCNC: 102 MMOL/L (ref 100–108)
CHLORIDE SERPL-SCNC: 85 MMOL/L (ref 100–108)
CHLORIDE SERPL-SCNC: 86 MMOL/L (ref 100–108)
CHLORIDE SERPL-SCNC: 87 MMOL/L (ref 100–108)
CHLORIDE SERPL-SCNC: 87 MMOL/L (ref 100–108)
CHLORIDE SERPL-SCNC: 89 MMOL/L (ref 100–108)
CHLORIDE SERPL-SCNC: 89 MMOL/L (ref 100–108)
CHLORIDE SERPL-SCNC: 90 MMOL/L (ref 100–108)
CHLORIDE SERPL-SCNC: 90 MMOL/L (ref 100–108)
CHLORIDE SERPL-SCNC: 91 MMOL/L (ref 100–108)
CHLORIDE SERPL-SCNC: 92 MMOL/L (ref 100–108)
CHLORIDE SERPL-SCNC: 93 MMOL/L (ref 100–108)
CHLORIDE SERPL-SCNC: 94 MMOL/L (ref 100–108)
CHLORIDE SERPL-SCNC: 95 MMOL/L (ref 100–108)
CHLORIDE SERPL-SCNC: 96 MMOL/L (ref 100–108)
CHLORIDE SERPL-SCNC: 97 MMOL/L (ref 100–108)
CHLORIDE SERPL-SCNC: 98 MMOL/L (ref 100–108)
CHLORIDE SERPL-SCNC: 99 MMOL/L (ref 100–108)
CHOLEST SERPL-MCNC: 110 MG/DL (ref 50–200)
CK SERPL-CCNC: 65 U/L (ref 39–308)
CLARITY UR: ABNORMAL
CLARITY UR: ABNORMAL
CLARITY UR: CLEAR
CO2 SERPL-SCNC: 25 MMOL/L (ref 21–32)
CO2 SERPL-SCNC: 26 MMOL/L (ref 21–32)
CO2 SERPL-SCNC: 27 MMOL/L (ref 21–32)
CO2 SERPL-SCNC: 28 MMOL/L (ref 21–32)
CO2 SERPL-SCNC: 29 MMOL/L (ref 21–32)
CO2 SERPL-SCNC: 30 MMOL/L (ref 21–32)
CO2 SERPL-SCNC: 31 MMOL/L (ref 21–32)
CO2 SERPL-SCNC: 32 MMOL/L (ref 21–32)
CO2 SERPL-SCNC: 33 MMOL/L (ref 21–32)
CO2 SERPL-SCNC: 33 MMOL/L (ref 21–32)
CO2 SERPL-SCNC: 34 MMOL/L (ref 21–32)
CO2 SERPL-SCNC: 35 MMOL/L (ref 21–32)
CO2 SERPL-SCNC: 35 MMOL/L (ref 21–32)
CO2 SERPL-SCNC: 37 MMOL/L (ref 21–32)
CO2 SERPL-SCNC: 38 MMOL/L (ref 21–32)
CO2 SERPL-SCNC: 39 MMOL/L (ref 21–32)
CO2 SERPL-SCNC: 39 MMOL/L (ref 21–32)
CO2 SERPL-SCNC: 40 MMOL/L (ref 21–32)
CO2 SERPL-SCNC: 41 MMOL/L (ref 21–32)
CO2 SERPL-SCNC: 42 MMOL/L (ref 21–32)
COLOR UR: ABNORMAL
COLOR UR: YELLOW
CREAT SERPL-MCNC: 1.81 MG/DL (ref 0.6–1.3)
CREAT SERPL-MCNC: 2 MG/DL (ref 0.6–1.3)
CREAT SERPL-MCNC: 2.22 MG/DL (ref 0.6–1.3)
CREAT SERPL-MCNC: 2.36 MG/DL (ref 0.6–1.3)
CREAT SERPL-MCNC: 2.47 MG/DL (ref 0.6–1.3)
CREAT SERPL-MCNC: 2.47 MG/DL (ref 0.6–1.3)
CREAT SERPL-MCNC: 2.48 MG/DL (ref 0.6–1.3)
CREAT SERPL-MCNC: 2.49 MG/DL (ref 0.6–1.3)
CREAT SERPL-MCNC: 2.55 MG/DL (ref 0.6–1.3)
CREAT SERPL-MCNC: 2.58 MG/DL (ref 0.6–1.3)
CREAT SERPL-MCNC: 2.63 MG/DL (ref 0.6–1.3)
CREAT SERPL-MCNC: 2.65 MG/DL (ref 0.6–1.3)
CREAT SERPL-MCNC: 2.69 MG/DL (ref 0.6–1.3)
CREAT SERPL-MCNC: 2.7 MG/DL (ref 0.6–1.3)
CREAT SERPL-MCNC: 2.71 MG/DL (ref 0.6–1.3)
CREAT SERPL-MCNC: 2.72 MG/DL (ref 0.6–1.3)
CREAT SERPL-MCNC: 2.74 MG/DL (ref 0.6–1.3)
CREAT SERPL-MCNC: 2.76 MG/DL (ref 0.6–1.3)
CREAT SERPL-MCNC: 2.78 MG/DL (ref 0.6–1.3)
CREAT SERPL-MCNC: 2.79 MG/DL (ref 0.6–1.3)
CREAT SERPL-MCNC: 2.8 MG/DL (ref 0.6–1.3)
CREAT SERPL-MCNC: 2.81 MG/DL (ref 0.6–1.3)
CREAT SERPL-MCNC: 2.81 MG/DL (ref 0.6–1.3)
CREAT SERPL-MCNC: 2.86 MG/DL (ref 0.6–1.3)
CREAT SERPL-MCNC: 2.87 MG/DL (ref 0.6–1.3)
CREAT SERPL-MCNC: 2.88 MG/DL (ref 0.6–1.3)
CREAT SERPL-MCNC: 2.89 MG/DL (ref 0.6–1.3)
CREAT SERPL-MCNC: 2.9 MG/DL (ref 0.6–1.3)
CREAT SERPL-MCNC: 2.92 MG/DL (ref 0.6–1.3)
CREAT SERPL-MCNC: 2.95 MG/DL (ref 0.6–1.3)
CREAT SERPL-MCNC: 2.95 MG/DL (ref 0.6–1.3)
CREAT SERPL-MCNC: 2.96 MG/DL (ref 0.6–1.3)
CREAT SERPL-MCNC: 3.01 MG/DL (ref 0.6–1.3)
CREAT SERPL-MCNC: 3.02 MG/DL (ref 0.6–1.3)
CREAT SERPL-MCNC: 3.02 MG/DL (ref 0.6–1.3)
CREAT SERPL-MCNC: 3.07 MG/DL (ref 0.6–1.3)
CREAT SERPL-MCNC: 3.09 MG/DL (ref 0.6–1.3)
CREAT SERPL-MCNC: 3.19 MG/DL (ref 0.6–1.3)
CREAT SERPL-MCNC: 3.26 MG/DL (ref 0.6–1.3)
CREAT SERPL-MCNC: 3.28 MG/DL (ref 0.6–1.3)
CREAT UR-MCNC: 71.2 MG/DL
CREAT UR-MCNC: 95.4 MG/DL
CRP SERPL HS-MCNC: 50.62 MG/L
DEPRECATED D DIMER PPP: 1436 NG/ML (FEU) (ref 0–424)
EOSINOPHIL # BLD AUTO: 0.01 THOUSAND/ΜL (ref 0–0.61)
EOSINOPHIL # BLD AUTO: 0.02 THOUSAND/ΜL (ref 0–0.61)
EOSINOPHIL # BLD AUTO: 0.03 THOUSAND/ΜL (ref 0–0.61)
EOSINOPHIL # BLD AUTO: 0.04 THOUSAND/ΜL (ref 0–0.61)
EOSINOPHIL # BLD AUTO: 0.04 THOUSAND/ΜL (ref 0–0.61)
EOSINOPHIL # BLD AUTO: 0.06 THOUSAND/ΜL (ref 0–0.61)
EOSINOPHIL # BLD AUTO: 0.07 THOUSAND/ΜL (ref 0–0.61)
EOSINOPHIL # BLD AUTO: 0.09 THOUSAND/ΜL (ref 0–0.61)
EOSINOPHIL # BLD AUTO: 0.09 THOUSAND/ΜL (ref 0–0.61)
EOSINOPHIL # BLD AUTO: 0.11 THOUSAND/ΜL (ref 0–0.61)
EOSINOPHIL # BLD AUTO: 0.12 THOUSAND/ΜL (ref 0–0.61)
EOSINOPHIL # BLD AUTO: 0.15 THOUSAND/ΜL (ref 0–0.61)
EOSINOPHIL # BLD AUTO: 0.18 THOUSAND/ΜL (ref 0–0.61)
EOSINOPHIL # BLD AUTO: 0.31 THOUSAND/ΜL (ref 0–0.61)
EOSINOPHIL # BLD AUTO: 0.54 THOUSAND/ΜL (ref 0–0.61)
EOSINOPHIL NFR BLD AUTO: 0 % (ref 0–6)
EOSINOPHIL NFR BLD AUTO: 1 % (ref 0–6)
EOSINOPHIL NFR BLD AUTO: 2 % (ref 0–6)
EOSINOPHIL NFR BLD AUTO: 4 % (ref 0–6)
EOSINOPHIL NFR BLD AUTO: 6 % (ref 0–6)
ERYTHROCYTE [DISTWIDTH] IN BLOOD BY AUTOMATED COUNT: 14.5 % (ref 11.6–15.1)
ERYTHROCYTE [DISTWIDTH] IN BLOOD BY AUTOMATED COUNT: 14.5 % (ref 11.6–15.1)
ERYTHROCYTE [DISTWIDTH] IN BLOOD BY AUTOMATED COUNT: 14.6 % (ref 11.6–15.1)
ERYTHROCYTE [DISTWIDTH] IN BLOOD BY AUTOMATED COUNT: 14.7 % (ref 11.6–15.1)
ERYTHROCYTE [DISTWIDTH] IN BLOOD BY AUTOMATED COUNT: 14.8 % (ref 11.6–15.1)
ERYTHROCYTE [DISTWIDTH] IN BLOOD BY AUTOMATED COUNT: 14.9 % (ref 11.6–15.1)
ERYTHROCYTE [DISTWIDTH] IN BLOOD BY AUTOMATED COUNT: 14.9 % (ref 11.6–15.1)
ERYTHROCYTE [DISTWIDTH] IN BLOOD BY AUTOMATED COUNT: 15 % (ref 11.6–15.1)
ERYTHROCYTE [DISTWIDTH] IN BLOOD BY AUTOMATED COUNT: 15.2 % (ref 11.6–15.1)
ERYTHROCYTE [DISTWIDTH] IN BLOOD BY AUTOMATED COUNT: 15.2 % (ref 11.6–15.1)
ERYTHROCYTE [DISTWIDTH] IN BLOOD BY AUTOMATED COUNT: 15.9 % (ref 11.6–15.1)
ERYTHROCYTE [DISTWIDTH] IN BLOOD BY AUTOMATED COUNT: 16 % (ref 11.6–15.1)
ERYTHROCYTE [DISTWIDTH] IN BLOOD BY AUTOMATED COUNT: 16.2 % (ref 11.6–15.1)
ERYTHROCYTE [DISTWIDTH] IN BLOOD BY AUTOMATED COUNT: 16.4 % (ref 11.6–15.1)
ERYTHROCYTE [DISTWIDTH] IN BLOOD BY AUTOMATED COUNT: 16.5 % (ref 11.6–15.1)
ERYTHROCYTE [DISTWIDTH] IN BLOOD BY AUTOMATED COUNT: 16.7 % (ref 11.6–15.1)
ERYTHROCYTE [DISTWIDTH] IN BLOOD BY AUTOMATED COUNT: 16.8 % (ref 11.6–15.1)
ERYTHROCYTE [DISTWIDTH] IN BLOOD BY AUTOMATED COUNT: 16.9 % (ref 11.6–15.1)
ERYTHROCYTE [DISTWIDTH] IN BLOOD BY AUTOMATED COUNT: 17 % (ref 11.6–15.1)
ERYTHROCYTE [DISTWIDTH] IN BLOOD BY AUTOMATED COUNT: 17.1 % (ref 11.6–15.1)
EST. AVERAGE GLUCOSE BLD GHB EST-MCNC: 171 MG/DL
GAMMA GLOB ABNORMAL SERPL ELPH-MCNC: 0.9 G/DL (ref 0.5–1.6)
GAMMA GLOB SERPL ELPH-MCNC: 13.3 % (ref 12–22)
GFR SERPL CREATININE-BSD FRML MDRD: 17 ML/MIN/1.73SQ M
GFR SERPL CREATININE-BSD FRML MDRD: 18 ML/MIN/1.73SQ M
GFR SERPL CREATININE-BSD FRML MDRD: 18 ML/MIN/1.73SQ M
GFR SERPL CREATININE-BSD FRML MDRD: 19 ML/MIN/1.73SQ M
GFR SERPL CREATININE-BSD FRML MDRD: 20 ML/MIN/1.73SQ M
GFR SERPL CREATININE-BSD FRML MDRD: 21 ML/MIN/1.73SQ M
GFR SERPL CREATININE-BSD FRML MDRD: 22 ML/MIN/1.73SQ M
GFR SERPL CREATININE-BSD FRML MDRD: 23 ML/MIN/1.73SQ M
GFR SERPL CREATININE-BSD FRML MDRD: 24 ML/MIN/1.73SQ M
GFR SERPL CREATININE-BSD FRML MDRD: 25 ML/MIN/1.73SQ M
GFR SERPL CREATININE-BSD FRML MDRD: 27 ML/MIN/1.73SQ M
GFR SERPL CREATININE-BSD FRML MDRD: 31 ML/MIN/1.73SQ M
GFR SERPL CREATININE-BSD FRML MDRD: 35 ML/MIN/1.73SQ M
GLUCOSE P FAST SERPL-MCNC: 147 MG/DL (ref 65–99)
GLUCOSE P FAST SERPL-MCNC: 155 MG/DL (ref 65–99)
GLUCOSE P FAST SERPL-MCNC: 255 MG/DL (ref 65–99)
GLUCOSE SERPL-MCNC: 101 MG/DL (ref 65–140)
GLUCOSE SERPL-MCNC: 101 MG/DL (ref 65–140)
GLUCOSE SERPL-MCNC: 102 MG/DL (ref 65–140)
GLUCOSE SERPL-MCNC: 103 MG/DL (ref 65–140)
GLUCOSE SERPL-MCNC: 103 MG/DL (ref 65–140)
GLUCOSE SERPL-MCNC: 104 MG/DL (ref 65–140)
GLUCOSE SERPL-MCNC: 104 MG/DL (ref 65–140)
GLUCOSE SERPL-MCNC: 105 MG/DL (ref 65–140)
GLUCOSE SERPL-MCNC: 106 MG/DL (ref 65–140)
GLUCOSE SERPL-MCNC: 108 MG/DL (ref 65–140)
GLUCOSE SERPL-MCNC: 109 MG/DL (ref 65–140)
GLUCOSE SERPL-MCNC: 113 MG/DL (ref 65–140)
GLUCOSE SERPL-MCNC: 113 MG/DL (ref 65–140)
GLUCOSE SERPL-MCNC: 116 MG/DL (ref 65–140)
GLUCOSE SERPL-MCNC: 117 MG/DL (ref 65–140)
GLUCOSE SERPL-MCNC: 118 MG/DL (ref 65–140)
GLUCOSE SERPL-MCNC: 121 MG/DL (ref 65–140)
GLUCOSE SERPL-MCNC: 122 MG/DL (ref 65–140)
GLUCOSE SERPL-MCNC: 124 MG/DL (ref 65–140)
GLUCOSE SERPL-MCNC: 125 MG/DL (ref 65–140)
GLUCOSE SERPL-MCNC: 126 MG/DL (ref 65–140)
GLUCOSE SERPL-MCNC: 127 MG/DL (ref 65–140)
GLUCOSE SERPL-MCNC: 128 MG/DL (ref 65–140)
GLUCOSE SERPL-MCNC: 129 MG/DL (ref 65–140)
GLUCOSE SERPL-MCNC: 131 MG/DL (ref 65–140)
GLUCOSE SERPL-MCNC: 131 MG/DL (ref 65–140)
GLUCOSE SERPL-MCNC: 132 MG/DL (ref 65–140)
GLUCOSE SERPL-MCNC: 132 MG/DL (ref 65–140)
GLUCOSE SERPL-MCNC: 134 MG/DL (ref 65–140)
GLUCOSE SERPL-MCNC: 135 MG/DL (ref 65–140)
GLUCOSE SERPL-MCNC: 136 MG/DL (ref 65–140)
GLUCOSE SERPL-MCNC: 137 MG/DL (ref 65–140)
GLUCOSE SERPL-MCNC: 137 MG/DL (ref 65–140)
GLUCOSE SERPL-MCNC: 138 MG/DL (ref 65–140)
GLUCOSE SERPL-MCNC: 138 MG/DL (ref 65–140)
GLUCOSE SERPL-MCNC: 139 MG/DL (ref 65–140)
GLUCOSE SERPL-MCNC: 140 MG/DL (ref 65–140)
GLUCOSE SERPL-MCNC: 140 MG/DL (ref 65–140)
GLUCOSE SERPL-MCNC: 141 MG/DL (ref 65–140)
GLUCOSE SERPL-MCNC: 142 MG/DL (ref 65–140)
GLUCOSE SERPL-MCNC: 144 MG/DL (ref 65–140)
GLUCOSE SERPL-MCNC: 144 MG/DL (ref 65–140)
GLUCOSE SERPL-MCNC: 145 MG/DL (ref 65–140)
GLUCOSE SERPL-MCNC: 147 MG/DL (ref 65–140)
GLUCOSE SERPL-MCNC: 148 MG/DL (ref 65–140)
GLUCOSE SERPL-MCNC: 148 MG/DL (ref 65–140)
GLUCOSE SERPL-MCNC: 149 MG/DL (ref 65–140)
GLUCOSE SERPL-MCNC: 151 MG/DL (ref 65–140)
GLUCOSE SERPL-MCNC: 152 MG/DL (ref 65–140)
GLUCOSE SERPL-MCNC: 152 MG/DL (ref 65–140)
GLUCOSE SERPL-MCNC: 153 MG/DL (ref 65–140)
GLUCOSE SERPL-MCNC: 153 MG/DL (ref 65–140)
GLUCOSE SERPL-MCNC: 154 MG/DL (ref 65–140)
GLUCOSE SERPL-MCNC: 155 MG/DL (ref 65–140)
GLUCOSE SERPL-MCNC: 156 MG/DL (ref 65–140)
GLUCOSE SERPL-MCNC: 157 MG/DL (ref 65–140)
GLUCOSE SERPL-MCNC: 157 MG/DL (ref 65–140)
GLUCOSE SERPL-MCNC: 158 MG/DL (ref 65–140)
GLUCOSE SERPL-MCNC: 158 MG/DL (ref 65–140)
GLUCOSE SERPL-MCNC: 159 MG/DL (ref 65–140)
GLUCOSE SERPL-MCNC: 160 MG/DL (ref 65–140)
GLUCOSE SERPL-MCNC: 161 MG/DL (ref 65–140)
GLUCOSE SERPL-MCNC: 162 MG/DL (ref 65–140)
GLUCOSE SERPL-MCNC: 164 MG/DL (ref 65–140)
GLUCOSE SERPL-MCNC: 165 MG/DL (ref 65–140)
GLUCOSE SERPL-MCNC: 166 MG/DL (ref 65–140)
GLUCOSE SERPL-MCNC: 167 MG/DL (ref 65–140)
GLUCOSE SERPL-MCNC: 167 MG/DL (ref 65–140)
GLUCOSE SERPL-MCNC: 168 MG/DL (ref 65–140)
GLUCOSE SERPL-MCNC: 168 MG/DL (ref 65–140)
GLUCOSE SERPL-MCNC: 169 MG/DL (ref 65–140)
GLUCOSE SERPL-MCNC: 170 MG/DL (ref 65–140)
GLUCOSE SERPL-MCNC: 171 MG/DL (ref 65–140)
GLUCOSE SERPL-MCNC: 172 MG/DL (ref 65–140)
GLUCOSE SERPL-MCNC: 175 MG/DL (ref 65–140)
GLUCOSE SERPL-MCNC: 175 MG/DL (ref 65–140)
GLUCOSE SERPL-MCNC: 176 MG/DL (ref 65–140)
GLUCOSE SERPL-MCNC: 177 MG/DL (ref 65–140)
GLUCOSE SERPL-MCNC: 178 MG/DL (ref 65–140)
GLUCOSE SERPL-MCNC: 180 MG/DL (ref 65–140)
GLUCOSE SERPL-MCNC: 181 MG/DL (ref 65–140)
GLUCOSE SERPL-MCNC: 182 MG/DL (ref 65–140)
GLUCOSE SERPL-MCNC: 182 MG/DL (ref 65–140)
GLUCOSE SERPL-MCNC: 184 MG/DL (ref 65–140)
GLUCOSE SERPL-MCNC: 185 MG/DL (ref 65–140)
GLUCOSE SERPL-MCNC: 185 MG/DL (ref 65–140)
GLUCOSE SERPL-MCNC: 186 MG/DL (ref 65–140)
GLUCOSE SERPL-MCNC: 188 MG/DL (ref 65–140)
GLUCOSE SERPL-MCNC: 190 MG/DL (ref 65–140)
GLUCOSE SERPL-MCNC: 191 MG/DL (ref 65–140)
GLUCOSE SERPL-MCNC: 194 MG/DL (ref 65–140)
GLUCOSE SERPL-MCNC: 196 MG/DL (ref 65–140)
GLUCOSE SERPL-MCNC: 197 MG/DL (ref 65–140)
GLUCOSE SERPL-MCNC: 197 MG/DL (ref 65–140)
GLUCOSE SERPL-MCNC: 199 MG/DL (ref 65–140)
GLUCOSE SERPL-MCNC: 199 MG/DL (ref 65–140)
GLUCOSE SERPL-MCNC: 200 MG/DL (ref 65–140)
GLUCOSE SERPL-MCNC: 200 MG/DL (ref 65–140)
GLUCOSE SERPL-MCNC: 201 MG/DL (ref 65–140)
GLUCOSE SERPL-MCNC: 202 MG/DL (ref 65–140)
GLUCOSE SERPL-MCNC: 203 MG/DL (ref 65–140)
GLUCOSE SERPL-MCNC: 203 MG/DL (ref 65–140)
GLUCOSE SERPL-MCNC: 204 MG/DL (ref 65–140)
GLUCOSE SERPL-MCNC: 204 MG/DL (ref 65–140)
GLUCOSE SERPL-MCNC: 205 MG/DL (ref 65–140)
GLUCOSE SERPL-MCNC: 206 MG/DL (ref 65–140)
GLUCOSE SERPL-MCNC: 210 MG/DL (ref 65–140)
GLUCOSE SERPL-MCNC: 211 MG/DL (ref 65–140)
GLUCOSE SERPL-MCNC: 211 MG/DL (ref 65–140)
GLUCOSE SERPL-MCNC: 212 MG/DL (ref 65–140)
GLUCOSE SERPL-MCNC: 212 MG/DL (ref 65–140)
GLUCOSE SERPL-MCNC: 213 MG/DL (ref 65–140)
GLUCOSE SERPL-MCNC: 215 MG/DL (ref 65–140)
GLUCOSE SERPL-MCNC: 215 MG/DL (ref 65–140)
GLUCOSE SERPL-MCNC: 216 MG/DL (ref 65–140)
GLUCOSE SERPL-MCNC: 217 MG/DL (ref 65–140)
GLUCOSE SERPL-MCNC: 223 MG/DL (ref 65–140)
GLUCOSE SERPL-MCNC: 228 MG/DL (ref 65–140)
GLUCOSE SERPL-MCNC: 230 MG/DL (ref 65–140)
GLUCOSE SERPL-MCNC: 230 MG/DL (ref 65–140)
GLUCOSE SERPL-MCNC: 231 MG/DL (ref 65–140)
GLUCOSE SERPL-MCNC: 232 MG/DL (ref 65–140)
GLUCOSE SERPL-MCNC: 236 MG/DL (ref 65–140)
GLUCOSE SERPL-MCNC: 238 MG/DL (ref 65–140)
GLUCOSE SERPL-MCNC: 238 MG/DL (ref 65–140)
GLUCOSE SERPL-MCNC: 246 MG/DL (ref 65–140)
GLUCOSE SERPL-MCNC: 248 MG/DL (ref 65–140)
GLUCOSE SERPL-MCNC: 248 MG/DL (ref 65–140)
GLUCOSE SERPL-MCNC: 250 MG/DL (ref 65–140)
GLUCOSE SERPL-MCNC: 254 MG/DL (ref 65–140)
GLUCOSE SERPL-MCNC: 254 MG/DL (ref 65–140)
GLUCOSE SERPL-MCNC: 255 MG/DL (ref 65–140)
GLUCOSE SERPL-MCNC: 257 MG/DL (ref 65–140)
GLUCOSE SERPL-MCNC: 259 MG/DL (ref 65–140)
GLUCOSE SERPL-MCNC: 260 MG/DL (ref 65–140)
GLUCOSE SERPL-MCNC: 261 MG/DL (ref 65–140)
GLUCOSE SERPL-MCNC: 263 MG/DL (ref 65–140)
GLUCOSE SERPL-MCNC: 267 MG/DL (ref 65–140)
GLUCOSE SERPL-MCNC: 269 MG/DL (ref 65–140)
GLUCOSE SERPL-MCNC: 271 MG/DL (ref 65–140)
GLUCOSE SERPL-MCNC: 284 MG/DL (ref 65–140)
GLUCOSE SERPL-MCNC: 285 MG/DL (ref 65–140)
GLUCOSE SERPL-MCNC: 310 MG/DL (ref 65–140)
GLUCOSE SERPL-MCNC: 310 MG/DL (ref 65–140)
GLUCOSE SERPL-MCNC: 340 MG/DL (ref 65–140)
GLUCOSE SERPL-MCNC: 370 MG/DL (ref 65–140)
GLUCOSE SERPL-MCNC: 375 MG/DL (ref 65–140)
GLUCOSE SERPL-MCNC: 382 MG/DL (ref 65–140)
GLUCOSE SERPL-MCNC: 53 MG/DL (ref 65–140)
GLUCOSE SERPL-MCNC: 56 MG/DL (ref 65–140)
GLUCOSE SERPL-MCNC: 61 MG/DL (ref 65–140)
GLUCOSE SERPL-MCNC: 65 MG/DL (ref 65–140)
GLUCOSE SERPL-MCNC: 81 MG/DL (ref 65–140)
GLUCOSE SERPL-MCNC: 86 MG/DL (ref 65–140)
GLUCOSE SERPL-MCNC: 86 MG/DL (ref 65–140)
GLUCOSE SERPL-MCNC: 89 MG/DL (ref 65–140)
GLUCOSE SERPL-MCNC: 90 MG/DL (ref 65–140)
GLUCOSE SERPL-MCNC: 90 MG/DL (ref 65–140)
GLUCOSE SERPL-MCNC: 94 MG/DL (ref 65–140)
GLUCOSE SERPL-MCNC: 94 MG/DL (ref 65–140)
GLUCOSE SERPL-MCNC: 99 MG/DL (ref 65–140)
GLUCOSE UR STRIP-MCNC: NEGATIVE MG/DL
HBA1C MFR BLD HPLC: 6.5 %
HBA1C MFR BLD: 7.6 % (ref 4.2–6.3)
HCO3 BLDV-SCNC: 25 MMOL/L (ref 24–30)
HCO3 BLDV-SCNC: 36.1 MMOL/L (ref 24–30)
HCO3 BLDV-SCNC: 39.8 MMOL/L (ref 24–30)
HCO3 BLDV-SCNC: 40.8 MMOL/L (ref 24–30)
HCT VFR BLD AUTO: 25.5 % (ref 36.5–49.3)
HCT VFR BLD AUTO: 25.7 % (ref 36.5–49.3)
HCT VFR BLD AUTO: 26 % (ref 36.5–49.3)
HCT VFR BLD AUTO: 26.3 % (ref 36.5–49.3)
HCT VFR BLD AUTO: 26.6 % (ref 36.5–49.3)
HCT VFR BLD AUTO: 26.8 % (ref 36.5–49.3)
HCT VFR BLD AUTO: 27.1 % (ref 36.5–49.3)
HCT VFR BLD AUTO: 27.2 % (ref 36.5–49.3)
HCT VFR BLD AUTO: 27.3 % (ref 36.5–49.3)
HCT VFR BLD AUTO: 27.7 % (ref 36.5–49.3)
HCT VFR BLD AUTO: 27.8 % (ref 36.5–49.3)
HCT VFR BLD AUTO: 28.6 % (ref 36.5–49.3)
HCT VFR BLD AUTO: 28.9 % (ref 36.5–49.3)
HCT VFR BLD AUTO: 29.2 % (ref 36.5–49.3)
HCT VFR BLD AUTO: 29.5 % (ref 36.5–49.3)
HCT VFR BLD AUTO: 29.5 % (ref 36.5–49.3)
HCT VFR BLD AUTO: 29.8 % (ref 36.5–49.3)
HCT VFR BLD AUTO: 30 % (ref 36.5–49.3)
HCT VFR BLD AUTO: 30.4 % (ref 36.5–49.3)
HCT VFR BLD AUTO: 30.7 % (ref 36.5–49.3)
HCT VFR BLD AUTO: 30.8 % (ref 36.5–49.3)
HCT VFR BLD AUTO: 30.9 % (ref 36.5–49.3)
HCT VFR BLD AUTO: 31 % (ref 36.5–49.3)
HCT VFR BLD AUTO: 31.9 % (ref 36.5–49.3)
HCT VFR BLD AUTO: 32.3 % (ref 36.5–49.3)
HCT VFR BLD AUTO: 32.5 % (ref 36.5–49.3)
HCT VFR BLD AUTO: 34.8 % (ref 36.5–49.3)
HCT VFR BLD AUTO: 35.5 % (ref 36.5–49.3)
HCT VFR BLD AUTO: 38 % (ref 36.5–49.3)
HDLC SERPL-MCNC: 31 MG/DL (ref 40–60)
HEMOCCULT SP1 STL QL: NEGATIVE
HGB BLD-MCNC: 10.8 G/DL (ref 12–17)
HGB BLD-MCNC: 11.1 G/DL (ref 12–17)
HGB BLD-MCNC: 12.2 G/DL (ref 12–17)
HGB BLD-MCNC: 8.2 G/DL (ref 12–17)
HGB BLD-MCNC: 8.3 G/DL (ref 12–17)
HGB BLD-MCNC: 8.3 G/DL (ref 12–17)
HGB BLD-MCNC: 8.4 G/DL (ref 12–17)
HGB BLD-MCNC: 8.6 G/DL (ref 12–17)
HGB BLD-MCNC: 8.7 G/DL (ref 12–17)
HGB BLD-MCNC: 8.8 G/DL (ref 12–17)
HGB BLD-MCNC: 9 G/DL (ref 12–17)
HGB BLD-MCNC: 9.1 G/DL (ref 12–17)
HGB BLD-MCNC: 9.2 G/DL (ref 12–17)
HGB BLD-MCNC: 9.3 G/DL (ref 12–17)
HGB BLD-MCNC: 9.4 G/DL (ref 12–17)
HGB BLD-MCNC: 9.5 G/DL (ref 12–17)
HGB BLD-MCNC: 9.5 G/DL (ref 12–17)
HGB BLD-MCNC: 9.6 G/DL (ref 12–17)
HGB BLD-MCNC: 9.6 G/DL (ref 12–17)
HGB BLD-MCNC: 9.9 G/DL (ref 12–17)
HGB BLD-MCNC: 9.9 G/DL (ref 12–17)
HGB UR QL STRIP.AUTO: ABNORMAL
HGB UR QL STRIP.AUTO: ABNORMAL
HGB UR QL STRIP.AUTO: NEGATIVE
HYALINE CASTS #/AREA URNS LPF: NORMAL /LPF
HYALINE CASTS #/AREA URNS LPF: NORMAL /LPF
IGG/ALB SER: 1.24 {RATIO} (ref 1.1–1.8)
INR PPP: 1.04 (ref 0.86–1.16)
INR PPP: 1.11 (ref 0.86–1.16)
INR PPP: 1.21 (ref 0.86–1.17)
INR PPP: 1.25 (ref 0.86–1.17)
INTERPRETATION UR IFE-IMP: NORMAL
KAPPA LC FREE SER-MCNC: 52.5 MG/L (ref 3.3–19.4)
KAPPA LC FREE/LAMBDA FREE SER: 1.68 {RATIO} (ref 0.26–1.65)
KETONES UR STRIP-MCNC: ABNORMAL MG/DL
KETONES UR STRIP-MCNC: NEGATIVE MG/DL
LACTATE SERPL-SCNC: 1.8 MMOL/L (ref 0.5–2)
LAMBDA LC FREE SERPL-MCNC: 31.3 MG/L (ref 5.7–26.3)
LDLC SERPL CALC-MCNC: 66 MG/DL (ref 0–100)
LEUKOCYTE ESTERASE UR QL STRIP: ABNORMAL
LEUKOCYTE ESTERASE UR QL STRIP: ABNORMAL
LEUKOCYTE ESTERASE UR QL STRIP: NEGATIVE
LYMPHOCYTES # BLD AUTO: 0.58 THOUSANDS/ΜL (ref 0.6–4.47)
LYMPHOCYTES # BLD AUTO: 0.83 THOUSANDS/ΜL (ref 0.6–4.47)
LYMPHOCYTES # BLD AUTO: 0.89 THOUSANDS/ΜL (ref 0.6–4.47)
LYMPHOCYTES # BLD AUTO: 1.02 THOUSANDS/ΜL (ref 0.6–4.47)
LYMPHOCYTES # BLD AUTO: 1.03 THOUSANDS/ΜL (ref 0.6–4.47)
LYMPHOCYTES # BLD AUTO: 1.07 THOUSANDS/ΜL (ref 0.6–4.47)
LYMPHOCYTES # BLD AUTO: 1.16 THOUSANDS/ΜL (ref 0.6–4.47)
LYMPHOCYTES # BLD AUTO: 1.16 THOUSANDS/ΜL (ref 0.6–4.47)
LYMPHOCYTES # BLD AUTO: 1.17 THOUSANDS/ΜL (ref 0.6–4.47)
LYMPHOCYTES # BLD AUTO: 1.24 THOUSANDS/ΜL (ref 0.6–4.47)
LYMPHOCYTES # BLD AUTO: 1.3 THOUSANDS/ΜL (ref 0.6–4.47)
LYMPHOCYTES # BLD AUTO: 1.3 THOUSANDS/ΜL (ref 0.6–4.47)
LYMPHOCYTES # BLD AUTO: 1.33 THOUSANDS/ΜL (ref 0.6–4.47)
LYMPHOCYTES # BLD AUTO: 1.34 THOUSANDS/ΜL (ref 0.6–4.47)
LYMPHOCYTES # BLD AUTO: 1.41 THOUSANDS/ΜL (ref 0.6–4.47)
LYMPHOCYTES # BLD AUTO: 1.43 THOUSANDS/ΜL (ref 0.6–4.47)
LYMPHOCYTES # BLD AUTO: 2.49 THOUSANDS/ΜL (ref 0.6–4.47)
LYMPHOCYTES NFR BLD AUTO: 12 % (ref 14–44)
LYMPHOCYTES NFR BLD AUTO: 13 % (ref 14–44)
LYMPHOCYTES NFR BLD AUTO: 13 % (ref 14–44)
LYMPHOCYTES NFR BLD AUTO: 14 % (ref 14–44)
LYMPHOCYTES NFR BLD AUTO: 16 % (ref 14–44)
LYMPHOCYTES NFR BLD AUTO: 17 % (ref 14–44)
LYMPHOCYTES NFR BLD AUTO: 17 % (ref 14–44)
LYMPHOCYTES NFR BLD AUTO: 18 % (ref 14–44)
LYMPHOCYTES NFR BLD AUTO: 19 % (ref 14–44)
LYMPHOCYTES NFR BLD AUTO: 20 % (ref 14–44)
LYMPHOCYTES NFR BLD AUTO: 28 % (ref 14–44)
LYMPHOCYTES NFR BLD AUTO: 7 % (ref 14–44)
M PEAK ID 2: 4.3 %
M PROTEIN 1 MFR SERPL ELPH: 1.3 %
M PROTEIN 1 SERPL ELPH-MCNC: 0.09 G/DL
M PROTEIN 2 SERPL ELPH-MCNC: 0.29 G/DL
MAGNESIUM SERPL-MCNC: 2.5 MG/DL (ref 1.6–2.6)
MAGNESIUM SERPL-MCNC: 2.5 MG/DL (ref 1.6–2.6)
MAGNESIUM SERPL-MCNC: 2.6 MG/DL (ref 1.6–2.6)
MAGNESIUM SERPL-MCNC: 2.6 MG/DL (ref 1.6–2.6)
MAGNESIUM SERPL-MCNC: 2.7 MG/DL (ref 1.6–2.6)
MAGNESIUM SERPL-MCNC: 2.8 MG/DL (ref 1.6–2.6)
MAGNESIUM SERPL-MCNC: 2.9 MG/DL (ref 1.6–2.6)
MAGNESIUM SERPL-MCNC: 3.1 MG/DL (ref 1.6–2.6)
MAGNESIUM SERPL-MCNC: 3.2 MG/DL (ref 1.6–2.6)
MCH RBC QN AUTO: 26.2 PG (ref 26.8–34.3)
MCH RBC QN AUTO: 26.3 PG (ref 26.8–34.3)
MCH RBC QN AUTO: 26.5 PG (ref 26.8–34.3)
MCH RBC QN AUTO: 26.5 PG (ref 26.8–34.3)
MCH RBC QN AUTO: 26.6 PG (ref 26.8–34.3)
MCH RBC QN AUTO: 26.7 PG (ref 26.8–34.3)
MCH RBC QN AUTO: 26.8 PG (ref 26.8–34.3)
MCH RBC QN AUTO: 26.8 PG (ref 26.8–34.3)
MCH RBC QN AUTO: 27 PG (ref 26.8–34.3)
MCH RBC QN AUTO: 27 PG (ref 26.8–34.3)
MCH RBC QN AUTO: 27.1 PG (ref 26.8–34.3)
MCH RBC QN AUTO: 27.1 PG (ref 26.8–34.3)
MCH RBC QN AUTO: 27.3 PG (ref 26.8–34.3)
MCH RBC QN AUTO: 27.3 PG (ref 26.8–34.3)
MCH RBC QN AUTO: 27.4 PG (ref 26.8–34.3)
MCH RBC QN AUTO: 27.5 PG (ref 26.8–34.3)
MCH RBC QN AUTO: 27.5 PG (ref 26.8–34.3)
MCH RBC QN AUTO: 27.6 PG (ref 26.8–34.3)
MCHC RBC AUTO-ENTMCNC: 29.8 G/DL (ref 31.4–37.4)
MCHC RBC AUTO-ENTMCNC: 30.1 G/DL (ref 31.4–37.4)
MCHC RBC AUTO-ENTMCNC: 30.1 G/DL (ref 31.4–37.4)
MCHC RBC AUTO-ENTMCNC: 30.3 G/DL (ref 31.4–37.4)
MCHC RBC AUTO-ENTMCNC: 30.5 G/DL (ref 31.4–37.4)
MCHC RBC AUTO-ENTMCNC: 30.6 G/DL (ref 31.4–37.4)
MCHC RBC AUTO-ENTMCNC: 30.6 G/DL (ref 31.4–37.4)
MCHC RBC AUTO-ENTMCNC: 30.7 G/DL (ref 31.4–37.4)
MCHC RBC AUTO-ENTMCNC: 30.9 G/DL (ref 31.4–37.4)
MCHC RBC AUTO-ENTMCNC: 31 G/DL (ref 31.4–37.4)
MCHC RBC AUTO-ENTMCNC: 31.2 G/DL (ref 31.4–37.4)
MCHC RBC AUTO-ENTMCNC: 31.3 G/DL (ref 31.4–37.4)
MCHC RBC AUTO-ENTMCNC: 31.5 G/DL (ref 31.4–37.4)
MCHC RBC AUTO-ENTMCNC: 31.6 G/DL (ref 31.4–37.4)
MCHC RBC AUTO-ENTMCNC: 31.7 G/DL (ref 31.4–37.4)
MCHC RBC AUTO-ENTMCNC: 31.9 G/DL (ref 31.4–37.4)
MCHC RBC AUTO-ENTMCNC: 31.9 G/DL (ref 31.4–37.4)
MCHC RBC AUTO-ENTMCNC: 32 G/DL (ref 31.4–37.4)
MCHC RBC AUTO-ENTMCNC: 32.1 G/DL (ref 31.4–37.4)
MCHC RBC AUTO-ENTMCNC: 32.1 G/DL (ref 31.4–37.4)
MCHC RBC AUTO-ENTMCNC: 32.2 G/DL (ref 31.4–37.4)
MCHC RBC AUTO-ENTMCNC: 32.3 G/DL (ref 31.4–37.4)
MCHC RBC AUTO-ENTMCNC: 32.3 G/DL (ref 31.4–37.4)
MCHC RBC AUTO-ENTMCNC: 32.5 G/DL (ref 31.4–37.4)
MCHC RBC AUTO-ENTMCNC: 32.5 G/DL (ref 31.4–37.4)
MCV RBC AUTO: 84 FL (ref 82–98)
MCV RBC AUTO: 84 FL (ref 82–98)
MCV RBC AUTO: 85 FL (ref 82–98)
MCV RBC AUTO: 86 FL (ref 82–98)
MCV RBC AUTO: 87 FL (ref 82–98)
MCV RBC AUTO: 88 FL (ref 82–98)
MCV RBC AUTO: 89 FL (ref 82–98)
MCV RBC AUTO: 90 FL (ref 82–98)
MONOCYTES # BLD AUTO: 0.49 THOUSAND/ΜL (ref 0.17–1.22)
MONOCYTES # BLD AUTO: 0.51 THOUSAND/ΜL (ref 0.17–1.22)
MONOCYTES # BLD AUTO: 0.51 THOUSAND/ΜL (ref 0.17–1.22)
MONOCYTES # BLD AUTO: 0.53 THOUSAND/ΜL (ref 0.17–1.22)
MONOCYTES # BLD AUTO: 0.55 THOUSAND/ΜL (ref 0.17–1.22)
MONOCYTES # BLD AUTO: 0.58 THOUSAND/ΜL (ref 0.17–1.22)
MONOCYTES # BLD AUTO: 0.61 THOUSAND/ΜL (ref 0.17–1.22)
MONOCYTES # BLD AUTO: 0.61 THOUSAND/ΜL (ref 0.17–1.22)
MONOCYTES # BLD AUTO: 0.64 THOUSAND/ΜL (ref 0.17–1.22)
MONOCYTES # BLD AUTO: 0.65 THOUSAND/ΜL (ref 0.17–1.22)
MONOCYTES # BLD AUTO: 0.65 THOUSAND/ΜL (ref 0.17–1.22)
MONOCYTES # BLD AUTO: 0.66 THOUSAND/ΜL (ref 0.17–1.22)
MONOCYTES # BLD AUTO: 0.66 THOUSAND/ΜL (ref 0.17–1.22)
MONOCYTES # BLD AUTO: 0.68 THOUSAND/ΜL (ref 0.17–1.22)
MONOCYTES # BLD AUTO: 0.82 THOUSAND/ΜL (ref 0.17–1.22)
MONOCYTES NFR BLD AUTO: 10 % (ref 4–12)
MONOCYTES NFR BLD AUTO: 11 % (ref 4–12)
MONOCYTES NFR BLD AUTO: 6 % (ref 4–12)
MONOCYTES NFR BLD AUTO: 7 % (ref 4–12)
MONOCYTES NFR BLD AUTO: 8 % (ref 4–12)
MONOCYTES NFR BLD AUTO: 9 % (ref 4–12)
MONOCYTES NFR BLD AUTO: 9 % (ref 4–12)
NEUTROPHILS # BLD AUTO: 4.13 THOUSANDS/ΜL (ref 1.85–7.62)
NEUTROPHILS # BLD AUTO: 4.61 THOUSANDS/ΜL (ref 1.85–7.62)
NEUTROPHILS # BLD AUTO: 4.66 THOUSANDS/ΜL (ref 1.85–7.62)
NEUTROPHILS # BLD AUTO: 4.98 THOUSANDS/ΜL (ref 1.85–7.62)
NEUTROPHILS # BLD AUTO: 5 THOUSANDS/ΜL (ref 1.85–7.62)
NEUTROPHILS # BLD AUTO: 5.05 THOUSANDS/ΜL (ref 1.85–7.62)
NEUTROPHILS # BLD AUTO: 5.24 THOUSANDS/ΜL (ref 1.85–7.62)
NEUTROPHILS # BLD AUTO: 5.26 THOUSANDS/ΜL (ref 1.85–7.62)
NEUTROPHILS # BLD AUTO: 5.32 THOUSANDS/ΜL (ref 1.85–7.62)
NEUTROPHILS # BLD AUTO: 5.6 THOUSANDS/ΜL (ref 1.85–7.62)
NEUTROPHILS # BLD AUTO: 5.64 THOUSANDS/ΜL (ref 1.85–7.62)
NEUTROPHILS # BLD AUTO: 5.76 THOUSANDS/ΜL (ref 1.85–7.62)
NEUTROPHILS # BLD AUTO: 5.85 THOUSANDS/ΜL (ref 1.85–7.62)
NEUTROPHILS # BLD AUTO: 5.86 THOUSANDS/ΜL (ref 1.85–7.62)
NEUTROPHILS # BLD AUTO: 6.1 THOUSANDS/ΜL (ref 1.85–7.62)
NEUTROPHILS # BLD AUTO: 6.29 THOUSANDS/ΜL (ref 1.85–7.62)
NEUTROPHILS # BLD AUTO: 7.06 THOUSANDS/ΜL (ref 1.85–7.62)
NEUTS SEG NFR BLD AUTO: 59 % (ref 43–75)
NEUTS SEG NFR BLD AUTO: 70 % (ref 43–75)
NEUTS SEG NFR BLD AUTO: 71 % (ref 43–75)
NEUTS SEG NFR BLD AUTO: 72 % (ref 43–75)
NEUTS SEG NFR BLD AUTO: 72 % (ref 43–75)
NEUTS SEG NFR BLD AUTO: 74 % (ref 43–75)
NEUTS SEG NFR BLD AUTO: 76 % (ref 43–75)
NEUTS SEG NFR BLD AUTO: 77 % (ref 43–75)
NEUTS SEG NFR BLD AUTO: 78 % (ref 43–75)
NEUTS SEG NFR BLD AUTO: 87 % (ref 43–75)
NITRITE UR QL STRIP: NEGATIVE
NITRITE UR QL STRIP: POSITIVE
NON-SQ EPI CELLS URNS QL MICRO: ABNORMAL /HPF
NON-SQ EPI CELLS URNS QL MICRO: ABNORMAL /HPF
NON-SQ EPI CELLS URNS QL MICRO: NORMAL /HPF
NON-SQ EPI CELLS URNS QL MICRO: NORMAL /HPF
NRBC BLD AUTO-RTO: 0 /100 WBCS
NT-PROBNP SERPL-MCNC: 5207 PG/ML
NT-PROBNP SERPL-MCNC: ABNORMAL PG/ML
O2 CT BLDV-SCNC: 12.7 ML/DL
O2 CT BLDV-SCNC: 13.4 ML/DL
O2 CT BLDV-SCNC: 13.9 ML/DL
O2 CT BLDV-SCNC: 8.1 ML/DL
P AXIS: -26 DEGREES
P AXIS: 143 DEGREES
P AXIS: 55 DEGREES
P AXIS: 75 DEGREES
PCO2 BLDV: 47.3 MM HG (ref 42–50)
PCO2 BLDV: 51.8 MM HG (ref 42–50)
PCO2 BLDV: 52.8 MM HG (ref 42–50)
PCO2 BLDV: 55.1 MM HG (ref 42–50)
PH BLDV: 7.34 [PH] (ref 7.3–7.4)
PH BLDV: 7.45 [PH] (ref 7.3–7.4)
PH BLDV: 7.49 [PH] (ref 7.3–7.4)
PH BLDV: 7.5 [PH] (ref 7.3–7.4)
PH UR STRIP.AUTO: 5 [PH] (ref 4.5–8)
PH UR STRIP.AUTO: 6 [PH] (ref 4.5–8)
PH UR STRIP.AUTO: 7 [PH] (ref 4.5–8)
PHOSPHATE SERPL-MCNC: 3.4 MG/DL (ref 2.3–4.1)
PHOSPHATE SERPL-MCNC: 3.8 MG/DL (ref 2.3–4.1)
PHOSPHATE SERPL-MCNC: 4.1 MG/DL (ref 2.3–4.1)
PHOSPHATE SERPL-MCNC: 4.2 MG/DL (ref 2.3–4.1)
PHOSPHATE SERPL-MCNC: 4.4 MG/DL (ref 2.3–4.1)
PHOSPHATE SERPL-MCNC: 4.8 MG/DL (ref 2.3–4.1)
PHOSPHATE SERPL-MCNC: 5.4 MG/DL (ref 2.3–4.1)
PLATELET # BLD AUTO: 117 THOUSANDS/UL (ref 149–390)
PLATELET # BLD AUTO: 123 THOUSANDS/UL (ref 149–390)
PLATELET # BLD AUTO: 130 THOUSANDS/UL (ref 149–390)
PLATELET # BLD AUTO: 131 THOUSANDS/UL (ref 149–390)
PLATELET # BLD AUTO: 131 THOUSANDS/UL (ref 149–390)
PLATELET # BLD AUTO: 132 THOUSANDS/UL (ref 149–390)
PLATELET # BLD AUTO: 136 THOUSANDS/UL (ref 149–390)
PLATELET # BLD AUTO: 140 THOUSANDS/UL (ref 149–390)
PLATELET # BLD AUTO: 142 THOUSANDS/UL (ref 149–390)
PLATELET # BLD AUTO: 142 THOUSANDS/UL (ref 149–390)
PLATELET # BLD AUTO: 145 THOUSANDS/UL (ref 149–390)
PLATELET # BLD AUTO: 147 THOUSANDS/UL (ref 149–390)
PLATELET # BLD AUTO: 147 THOUSANDS/UL (ref 149–390)
PLATELET # BLD AUTO: 152 THOUSANDS/UL (ref 149–390)
PLATELET # BLD AUTO: 153 THOUSANDS/UL (ref 149–390)
PLATELET # BLD AUTO: 159 THOUSANDS/UL (ref 149–390)
PLATELET # BLD AUTO: 162 THOUSANDS/UL (ref 149–390)
PLATELET # BLD AUTO: 168 THOUSANDS/UL (ref 149–390)
PLATELET # BLD AUTO: 170 THOUSANDS/UL (ref 149–390)
PLATELET # BLD AUTO: 170 THOUSANDS/UL (ref 149–390)
PLATELET # BLD AUTO: 171 THOUSANDS/UL (ref 149–390)
PLATELET # BLD AUTO: 173 THOUSANDS/UL (ref 149–390)
PLATELET # BLD AUTO: 175 THOUSANDS/UL (ref 149–390)
PLATELET # BLD AUTO: 176 THOUSANDS/UL (ref 149–390)
PLATELET # BLD AUTO: 177 THOUSANDS/UL (ref 149–390)
PLATELET # BLD AUTO: 182 THOUSANDS/UL (ref 149–390)
PLATELET # BLD AUTO: 198 THOUSANDS/UL (ref 149–390)
PLATELET # BLD AUTO: 201 THOUSANDS/UL (ref 149–390)
PLATELET # BLD AUTO: 226 THOUSANDS/UL (ref 149–390)
PLATELET # BLD AUTO: 248 THOUSANDS/UL (ref 149–390)
PMV BLD AUTO: 10 FL (ref 8.9–12.7)
PMV BLD AUTO: 10 FL (ref 8.9–12.7)
PMV BLD AUTO: 10.1 FL (ref 8.9–12.7)
PMV BLD AUTO: 10.1 FL (ref 8.9–12.7)
PMV BLD AUTO: 10.2 FL (ref 8.9–12.7)
PMV BLD AUTO: 10.2 FL (ref 8.9–12.7)
PMV BLD AUTO: 10.3 FL (ref 8.9–12.7)
PMV BLD AUTO: 10.4 FL (ref 8.9–12.7)
PMV BLD AUTO: 10.4 FL (ref 8.9–12.7)
PMV BLD AUTO: 10.5 FL (ref 8.9–12.7)
PMV BLD AUTO: 10.6 FL (ref 8.9–12.7)
PMV BLD AUTO: 10.7 FL (ref 8.9–12.7)
PMV BLD AUTO: 10.8 FL (ref 8.9–12.7)
PMV BLD AUTO: 10.9 FL (ref 8.9–12.7)
PMV BLD AUTO: 9.3 FL (ref 8.9–12.7)
PMV BLD AUTO: 9.3 FL (ref 8.9–12.7)
PMV BLD AUTO: 9.4 FL (ref 8.9–12.7)
PMV BLD AUTO: 9.6 FL (ref 8.9–12.7)
PMV BLD AUTO: 9.6 FL (ref 8.9–12.7)
PMV BLD AUTO: 9.7 FL (ref 8.9–12.7)
PMV BLD AUTO: 9.9 FL (ref 8.9–12.7)
PMV BLD AUTO: 9.9 FL (ref 8.9–12.7)
PO2 BLDV: 103.4 MM HG (ref 35–45)
PO2 BLDV: 28.1 MM HG (ref 35–45)
PO2 BLDV: 47.3 MM HG (ref 35–45)
PO2 BLDV: 71.3 MM HG (ref 35–45)
POTASSIUM SERPL-SCNC: 2.8 MMOL/L (ref 3.5–5.3)
POTASSIUM SERPL-SCNC: 3.1 MMOL/L (ref 3.5–5.3)
POTASSIUM SERPL-SCNC: 3.2 MMOL/L (ref 3.5–5.3)
POTASSIUM SERPL-SCNC: 3.2 MMOL/L (ref 3.5–5.3)
POTASSIUM SERPL-SCNC: 3.3 MMOL/L (ref 3.5–5.3)
POTASSIUM SERPL-SCNC: 3.3 MMOL/L (ref 3.5–5.3)
POTASSIUM SERPL-SCNC: 3.4 MMOL/L (ref 3.5–5.3)
POTASSIUM SERPL-SCNC: 3.5 MMOL/L (ref 3.5–5.3)
POTASSIUM SERPL-SCNC: 3.6 MMOL/L (ref 3.5–5.3)
POTASSIUM SERPL-SCNC: 3.7 MMOL/L (ref 3.5–5.3)
POTASSIUM SERPL-SCNC: 3.8 MMOL/L (ref 3.5–5.3)
POTASSIUM SERPL-SCNC: 3.8 MMOL/L (ref 3.5–5.3)
POTASSIUM SERPL-SCNC: 3.9 MMOL/L (ref 3.5–5.3)
POTASSIUM SERPL-SCNC: 4 MMOL/L (ref 3.5–5.3)
POTASSIUM SERPL-SCNC: 4 MMOL/L (ref 3.5–5.3)
POTASSIUM SERPL-SCNC: 4.2 MMOL/L (ref 3.5–5.3)
POTASSIUM SERPL-SCNC: 4.3 MMOL/L (ref 3.5–5.3)
POTASSIUM SERPL-SCNC: 4.4 MMOL/L (ref 3.5–5.3)
POTASSIUM SERPL-SCNC: 4.5 MMOL/L (ref 3.5–5.3)
POTASSIUM SERPL-SCNC: 4.6 MMOL/L (ref 3.5–5.3)
POTASSIUM SERPL-SCNC: 4.8 MMOL/L (ref 3.5–5.3)
POTASSIUM SERPL-SCNC: 4.9 MMOL/L (ref 3.5–5.3)
POTASSIUM SERPL-SCNC: 4.9 MMOL/L (ref 3.5–5.3)
POTASSIUM SERPL-SCNC: 5 MMOL/L (ref 3.5–5.3)
POTASSIUM SERPL-SCNC: 5.2 MMOL/L (ref 3.5–5.3)
POTASSIUM SERPL-SCNC: 5.2 MMOL/L (ref 3.5–5.3)
POTASSIUM SERPL-SCNC: 5.3 MMOL/L (ref 3.5–5.3)
PROT SERPL-MCNC: 6.6 G/DL (ref 6.4–8.2)
PROT SERPL-MCNC: 6.8 G/DL (ref 6.4–8.2)
PROT SERPL-MCNC: 6.9 G/DL (ref 6.4–8.2)
PROT SERPL-MCNC: 7.4 G/DL (ref 6.4–8.2)
PROT UR STRIP-MCNC: ABNORMAL MG/DL
PROT UR STRIP-MCNC: NEGATIVE MG/DL
PROT UR-MCNC: 15 MG/DL
PROT/CREAT UR: 0.16 MG/G{CREAT} (ref 0–0.1)
PROTHROMBIN TIME: 13.9 SECONDS (ref 12.1–14.4)
PROTHROMBIN TIME: 14.7 SECONDS (ref 12.1–14.4)
PROTHROMBIN TIME: 15.4 SECONDS (ref 11.8–14.2)
PROTHROMBIN TIME: 15.8 SECONDS (ref 11.8–14.2)
PTH-INTACT SERPL-MCNC: 107.6 PG/ML (ref 14–72)
PTH-INTACT SERPL-MCNC: 192.1 PG/ML (ref 18.4–80.1)
QRS AXIS: -35 DEGREES
QRS AXIS: -44 DEGREES
QRS AXIS: -48 DEGREES
QRS AXIS: -50 DEGREES
QRS AXIS: -53 DEGREES
QRSD INTERVAL: 128 MS
QRSD INTERVAL: 128 MS
QRSD INTERVAL: 132 MS
QRSD INTERVAL: 136 MS
QRSD INTERVAL: 138 MS
QT INTERVAL: 342 MS
QT INTERVAL: 344 MS
QT INTERVAL: 346 MS
QT INTERVAL: 356 MS
QT INTERVAL: 400 MS
QTC INTERVAL: 428 MS
QTC INTERVAL: 453 MS
QTC INTERVAL: 471 MS
QTC INTERVAL: 483 MS
QTC INTERVAL: 505 MS
RBC # BLD AUTO: 3 MILLION/UL (ref 3.88–5.62)
RBC # BLD AUTO: 3.03 MILLION/UL (ref 3.88–5.62)
RBC # BLD AUTO: 3.05 MILLION/UL (ref 3.88–5.62)
RBC # BLD AUTO: 3.06 MILLION/UL (ref 3.88–5.62)
RBC # BLD AUTO: 3.14 MILLION/UL (ref 3.88–5.62)
RBC # BLD AUTO: 3.14 MILLION/UL (ref 3.88–5.62)
RBC # BLD AUTO: 3.18 MILLION/UL (ref 3.88–5.62)
RBC # BLD AUTO: 3.19 MILLION/UL (ref 3.88–5.62)
RBC # BLD AUTO: 3.21 MILLION/UL (ref 3.88–5.62)
RBC # BLD AUTO: 3.26 MILLION/UL (ref 3.88–5.62)
RBC # BLD AUTO: 3.26 MILLION/UL (ref 3.88–5.62)
RBC # BLD AUTO: 3.29 MILLION/UL (ref 3.88–5.62)
RBC # BLD AUTO: 3.36 MILLION/UL (ref 3.88–5.62)
RBC # BLD AUTO: 3.37 MILLION/UL (ref 3.88–5.62)
RBC # BLD AUTO: 3.37 MILLION/UL (ref 3.88–5.62)
RBC # BLD AUTO: 3.45 MILLION/UL (ref 3.88–5.62)
RBC # BLD AUTO: 3.5 MILLION/UL (ref 3.88–5.62)
RBC # BLD AUTO: 3.5 MILLION/UL (ref 3.88–5.62)
RBC # BLD AUTO: 3.54 MILLION/UL (ref 3.88–5.62)
RBC # BLD AUTO: 3.56 MILLION/UL (ref 3.88–5.62)
RBC # BLD AUTO: 3.57 MILLION/UL (ref 3.88–5.62)
RBC # BLD AUTO: 3.67 MILLION/UL (ref 3.88–5.62)
RBC # BLD AUTO: 3.73 MILLION/UL (ref 3.88–5.62)
RBC # BLD AUTO: 3.73 MILLION/UL (ref 3.88–5.62)
RBC # BLD AUTO: 4.04 MILLION/UL (ref 3.88–5.62)
RBC # BLD AUTO: 4.15 MILLION/UL (ref 3.88–5.62)
RBC # BLD AUTO: 4.44 MILLION/UL (ref 3.88–5.62)
RBC #/AREA URNS AUTO: ABNORMAL /HPF
RBC #/AREA URNS AUTO: ABNORMAL /HPF
RBC #/AREA URNS AUTO: NORMAL /HPF
RBC #/AREA URNS AUTO: NORMAL /HPF
SODIUM 24H UR-SCNC: 8 MOL/L
SODIUM SERPL-SCNC: 126 MMOL/L (ref 136–145)
SODIUM SERPL-SCNC: 126 MMOL/L (ref 136–145)
SODIUM SERPL-SCNC: 127 MMOL/L (ref 136–145)
SODIUM SERPL-SCNC: 128 MMOL/L (ref 136–145)
SODIUM SERPL-SCNC: 129 MMOL/L (ref 136–145)
SODIUM SERPL-SCNC: 130 MMOL/L (ref 136–145)
SODIUM SERPL-SCNC: 131 MMOL/L (ref 136–145)
SODIUM SERPL-SCNC: 132 MMOL/L (ref 136–145)
SODIUM SERPL-SCNC: 133 MMOL/L (ref 136–145)
SODIUM SERPL-SCNC: 133 MMOL/L (ref 136–145)
SODIUM SERPL-SCNC: 134 MMOL/L (ref 136–145)
SODIUM SERPL-SCNC: 134 MMOL/L (ref 136–145)
SODIUM SERPL-SCNC: 135 MMOL/L (ref 136–145)
SODIUM SERPL-SCNC: 136 MMOL/L (ref 136–145)
SODIUM SERPL-SCNC: 137 MMOL/L (ref 136–145)
SODIUM SERPL-SCNC: 138 MMOL/L (ref 136–145)
SODIUM SERPL-SCNC: 139 MMOL/L (ref 136–145)
SODIUM SERPL-SCNC: 140 MMOL/L (ref 136–145)
SODIUM SERPL-SCNC: 141 MMOL/L (ref 136–145)
SODIUM SERPL-SCNC: 143 MMOL/L (ref 136–145)
SP GR UR STRIP.AUTO: 1.01 (ref 1–1.03)
SP GR UR STRIP.AUTO: 1.02 (ref 1–1.03)
SP GR UR STRIP.AUTO: <=1.005 (ref 1–1.03)
T WAVE AXIS: 126 DEGREES
T WAVE AXIS: 126 DEGREES
T WAVE AXIS: 129 DEGREES
T WAVE AXIS: 129 DEGREES
T WAVE AXIS: 132 DEGREES
TRIGL SERPL-MCNC: 65 MG/DL
TROPONIN I SERPL-MCNC: 0.06 NG/ML
TROPONIN I SERPL-MCNC: 0.07 NG/ML
TROPONIN I SERPL-MCNC: 0.09 NG/ML
TROPONIN I SERPL-MCNC: 0.11 NG/ML
TROPONIN I SERPL-MCNC: 0.14 NG/ML
TROPONIN I SERPL-MCNC: 0.2 NG/ML
TROPONIN I SERPL-MCNC: 0.24 NG/ML
TROPONIN I SERPL-MCNC: 0.5 NG/ML
TSH SERPL DL<=0.05 MIU/L-ACNC: 3.27 UIU/ML (ref 0.36–3.74)
URATE SERPL-MCNC: 6.5 MG/DL (ref 4.2–8)
UROBILINOGEN UR QL STRIP.AUTO: 0.2 E.U./DL
UROBILINOGEN UR QL STRIP.AUTO: >=8 E.U./DL
VENTRICULAR RATE: 103 BPM
VENTRICULAR RATE: 114 BPM
VENTRICULAR RATE: 119 BPM
VENTRICULAR RATE: 121 BPM
VENTRICULAR RATE: 69 BPM
WBC # BLD AUTO: 5.8 THOUSAND/UL (ref 4.31–10.16)
WBC # BLD AUTO: 6.39 THOUSAND/UL (ref 4.31–10.16)
WBC # BLD AUTO: 6.53 THOUSAND/UL (ref 4.31–10.16)
WBC # BLD AUTO: 6.63 THOUSAND/UL (ref 4.31–10.16)
WBC # BLD AUTO: 6.69 THOUSAND/UL (ref 4.31–10.16)
WBC # BLD AUTO: 6.85 THOUSAND/UL (ref 4.31–10.16)
WBC # BLD AUTO: 6.89 THOUSAND/UL (ref 4.31–10.16)
WBC # BLD AUTO: 7.04 THOUSAND/UL (ref 4.31–10.16)
WBC # BLD AUTO: 7.13 THOUSAND/UL (ref 4.31–10.16)
WBC # BLD AUTO: 7.41 THOUSAND/UL (ref 4.31–10.16)
WBC # BLD AUTO: 7.44 THOUSAND/UL (ref 4.31–10.16)
WBC # BLD AUTO: 7.45 THOUSAND/UL (ref 4.31–10.16)
WBC # BLD AUTO: 7.61 THOUSAND/UL (ref 4.31–10.16)
WBC # BLD AUTO: 7.65 THOUSAND/UL (ref 4.31–10.16)
WBC # BLD AUTO: 7.71 THOUSAND/UL (ref 4.31–10.16)
WBC # BLD AUTO: 7.77 THOUSAND/UL (ref 4.31–10.16)
WBC # BLD AUTO: 7.81 THOUSAND/UL (ref 4.31–10.16)
WBC # BLD AUTO: 7.84 THOUSAND/UL (ref 4.31–10.16)
WBC # BLD AUTO: 7.92 THOUSAND/UL (ref 4.31–10.16)
WBC # BLD AUTO: 8.06 THOUSAND/UL (ref 4.31–10.16)
WBC # BLD AUTO: 8.18 THOUSAND/UL (ref 4.31–10.16)
WBC # BLD AUTO: 8.31 THOUSAND/UL (ref 4.31–10.16)
WBC # BLD AUTO: 8.73 THOUSAND/UL (ref 4.31–10.16)
WBC # BLD AUTO: 8.76 THOUSAND/UL (ref 4.31–10.16)
WBC # BLD AUTO: 8.81 THOUSAND/UL (ref 4.31–10.16)
WBC # BLD AUTO: 8.99 THOUSAND/UL (ref 4.31–10.16)
WBC # BLD AUTO: 9.64 THOUSAND/UL (ref 4.31–10.16)
WBC #/AREA URNS AUTO: ABNORMAL /HPF
WBC #/AREA URNS AUTO: ABNORMAL /HPF
WBC #/AREA URNS AUTO: NORMAL /HPF
WBC #/AREA URNS AUTO: NORMAL /HPF

## 2018-01-01 PROCEDURE — G8978 MOBILITY CURRENT STATUS: HCPCS

## 2018-01-01 PROCEDURE — 97530 THERAPEUTIC ACTIVITIES: CPT

## 2018-01-01 PROCEDURE — 0YHJ33Z INSERTION OF INFUSION DEVICE INTO LEFT LOWER LEG, PERCUTANEOUS APPROACH: ICD-10-PCS | Performed by: EMERGENCY MEDICINE

## 2018-01-01 PROCEDURE — 82948 REAGENT STRIP/BLOOD GLUCOSE: CPT

## 2018-01-01 PROCEDURE — 99232 SBSQ HOSP IP/OBS MODERATE 35: CPT | Performed by: PHYSICIAN ASSISTANT

## 2018-01-01 PROCEDURE — 99232 SBSQ HOSP IP/OBS MODERATE 35: CPT | Performed by: INTERNAL MEDICINE

## 2018-01-01 PROCEDURE — 82570 ASSAY OF URINE CREATININE: CPT

## 2018-01-01 PROCEDURE — 83735 ASSAY OF MAGNESIUM: CPT | Performed by: FAMILY MEDICINE

## 2018-01-01 PROCEDURE — 93923 UPR/LXTR ART STDY 3+ LVLS: CPT

## 2018-01-01 PROCEDURE — 99232 SBSQ HOSP IP/OBS MODERATE 35: CPT | Performed by: FAMILY MEDICINE

## 2018-01-01 PROCEDURE — 85025 COMPLETE CBC W/AUTO DIFF WBC: CPT | Performed by: INTERNAL MEDICINE

## 2018-01-01 PROCEDURE — 97112 NEUROMUSCULAR REEDUCATION: CPT

## 2018-01-01 PROCEDURE — 80048 BASIC METABOLIC PNL TOTAL CA: CPT | Performed by: HOSPITALIST

## 2018-01-01 PROCEDURE — 97116 GAIT TRAINING THERAPY: CPT

## 2018-01-01 PROCEDURE — 36415 COLL VENOUS BLD VENIPUNCTURE: CPT

## 2018-01-01 PROCEDURE — 97140 MANUAL THERAPY 1/> REGIONS: CPT | Performed by: PHYSICAL THERAPIST

## 2018-01-01 PROCEDURE — 83735 ASSAY OF MAGNESIUM: CPT

## 2018-01-01 PROCEDURE — 99233 SBSQ HOSP IP/OBS HIGH 50: CPT | Performed by: INTERNAL MEDICINE

## 2018-01-01 PROCEDURE — 85730 THROMBOPLASTIN TIME PARTIAL: CPT | Performed by: INTERNAL MEDICINE

## 2018-01-01 PROCEDURE — 97010 HOT OR COLD PACKS THERAPY: CPT

## 2018-01-01 PROCEDURE — 84484 ASSAY OF TROPONIN QUANT: CPT | Performed by: EMERGENCY MEDICINE

## 2018-01-01 PROCEDURE — 97110 THERAPEUTIC EXERCISES: CPT | Performed by: PHYSICAL THERAPIST

## 2018-01-01 PROCEDURE — 85025 COMPLETE CBC W/AUTO DIFF WBC: CPT | Performed by: PHYSICIAN ASSISTANT

## 2018-01-01 PROCEDURE — 31500 INSERT EMERGENCY AIRWAY: CPT | Performed by: ANESTHESIOLOGY

## 2018-01-01 PROCEDURE — 80053 COMPREHEN METABOLIC PANEL: CPT | Performed by: EMERGENCY MEDICINE

## 2018-01-01 PROCEDURE — 80053 COMPREHEN METABOLIC PANEL: CPT | Performed by: INTERNAL MEDICINE

## 2018-01-01 PROCEDURE — 93970 EXTREMITY STUDY: CPT

## 2018-01-01 PROCEDURE — 31500 INSERT EMERGENCY AIRWAY: CPT | Performed by: PHYSICIAN ASSISTANT

## 2018-01-01 PROCEDURE — 83735 ASSAY OF MAGNESIUM: CPT | Performed by: INTERNAL MEDICINE

## 2018-01-01 PROCEDURE — G8979 MOBILITY GOAL STATUS: HCPCS

## 2018-01-01 PROCEDURE — 84100 ASSAY OF PHOSPHORUS: CPT | Performed by: INTERNAL MEDICINE

## 2018-01-01 PROCEDURE — 97110 THERAPEUTIC EXERCISES: CPT

## 2018-01-01 PROCEDURE — G8987 SELF CARE CURRENT STATUS: HCPCS

## 2018-01-01 PROCEDURE — 36556 INSERT NON-TUNNEL CV CATH: CPT | Performed by: ANESTHESIOLOGY

## 2018-01-01 PROCEDURE — 93970 EXTREMITY STUDY: CPT | Performed by: SURGERY

## 2018-01-01 PROCEDURE — 81001 URINALYSIS AUTO W/SCOPE: CPT | Performed by: INTERNAL MEDICINE

## 2018-01-01 PROCEDURE — 85027 COMPLETE CBC AUTOMATED: CPT | Performed by: INTERNAL MEDICINE

## 2018-01-01 PROCEDURE — 36415 COLL VENOUS BLD VENIPUNCTURE: CPT | Performed by: EMERGENCY MEDICINE

## 2018-01-01 PROCEDURE — 71045 X-RAY EXAM CHEST 1 VIEW: CPT

## 2018-01-01 PROCEDURE — 80048 BASIC METABOLIC PNL TOTAL CA: CPT | Performed by: INTERNAL MEDICINE

## 2018-01-01 PROCEDURE — 87077 CULTURE AEROBIC IDENTIFY: CPT | Performed by: INTERNAL MEDICINE

## 2018-01-01 PROCEDURE — 96365 THER/PROPH/DIAG IV INF INIT: CPT

## 2018-01-01 PROCEDURE — 97167 OT EVAL HIGH COMPLEX 60 MIN: CPT

## 2018-01-01 PROCEDURE — 85025 COMPLETE CBC W/AUTO DIFF WBC: CPT | Performed by: FAMILY MEDICINE

## 2018-01-01 PROCEDURE — 82805 BLOOD GASES W/O2 SATURATION: CPT | Performed by: EMERGENCY MEDICINE

## 2018-01-01 PROCEDURE — G8984 CARRY CURRENT STATUS: HCPCS | Performed by: PHYSICAL THERAPIST

## 2018-01-01 PROCEDURE — G8978 MOBILITY CURRENT STATUS: HCPCS | Performed by: PHYSICAL THERAPIST

## 2018-01-01 PROCEDURE — 99213 OFFICE O/P EST LOW 20 MIN: CPT | Performed by: NURSE PRACTITIONER

## 2018-01-01 PROCEDURE — 84484 ASSAY OF TROPONIN QUANT: CPT | Performed by: PHYSICIAN ASSISTANT

## 2018-01-01 PROCEDURE — 94760 N-INVAS EAR/PLS OXIMETRY 1: CPT

## 2018-01-01 PROCEDURE — 85730 THROMBOPLASTIN TIME PARTIAL: CPT | Performed by: FAMILY MEDICINE

## 2018-01-01 PROCEDURE — 96375 TX/PRO/DX INJ NEW DRUG ADDON: CPT

## 2018-01-01 PROCEDURE — 83880 ASSAY OF NATRIURETIC PEPTIDE: CPT | Performed by: EMERGENCY MEDICINE

## 2018-01-01 PROCEDURE — 93010 ELECTROCARDIOGRAM REPORT: CPT | Performed by: INTERNAL MEDICINE

## 2018-01-01 PROCEDURE — 85610 PROTHROMBIN TIME: CPT | Performed by: INTERNAL MEDICINE

## 2018-01-01 PROCEDURE — 99239 HOSP IP/OBS DSCHRG MGMT >30: CPT | Performed by: INTERNAL MEDICINE

## 2018-01-01 PROCEDURE — 86334 IMMUNOFIX E-PHORESIS SERUM: CPT

## 2018-01-01 PROCEDURE — 99285 EMERGENCY DEPT VISIT HI MDM: CPT

## 2018-01-01 PROCEDURE — 93005 ELECTROCARDIOGRAM TRACING: CPT

## 2018-01-01 PROCEDURE — 71046 X-RAY EXAM CHEST 2 VIEWS: CPT

## 2018-01-01 PROCEDURE — 5A1935Z RESPIRATORY VENTILATION, LESS THAN 24 CONSECUTIVE HOURS: ICD-10-PCS | Performed by: ANESTHESIOLOGY

## 2018-01-01 PROCEDURE — 36415 COLL VENOUS BLD VENIPUNCTURE: CPT | Performed by: INTERNAL MEDICINE

## 2018-01-01 PROCEDURE — 85025 COMPLETE CBC W/AUTO DIFF WBC: CPT | Performed by: EMERGENCY MEDICINE

## 2018-01-01 PROCEDURE — 99357 PR PROLONGED SERV,INPATIENT,EA ADD 30 MIN: CPT | Performed by: PHYSICIAN ASSISTANT

## 2018-01-01 PROCEDURE — 80048 BASIC METABOLIC PNL TOTAL CA: CPT | Performed by: PHYSICIAN ASSISTANT

## 2018-01-01 PROCEDURE — 99214 OFFICE O/P EST MOD 30 MIN: CPT | Performed by: FAMILY MEDICINE

## 2018-01-01 PROCEDURE — 94660 CPAP INITIATION&MGMT: CPT

## 2018-01-01 PROCEDURE — 97140 MANUAL THERAPY 1/> REGIONS: CPT

## 2018-01-01 PROCEDURE — 93925 LOWER EXTREMITY STUDY: CPT

## 2018-01-01 PROCEDURE — 99222 1ST HOSP IP/OBS MODERATE 55: CPT | Performed by: INTERNAL MEDICINE

## 2018-01-01 PROCEDURE — 84100 ASSAY OF PHOSPHORUS: CPT | Performed by: NURSE PRACTITIONER

## 2018-01-01 PROCEDURE — G8990 OTHER PT/OT CURRENT STATUS: HCPCS

## 2018-01-01 PROCEDURE — 99223 1ST HOSP IP/OBS HIGH 75: CPT | Performed by: PHYSICIAN ASSISTANT

## 2018-01-01 PROCEDURE — 80048 BASIC METABOLIC PNL TOTAL CA: CPT | Performed by: NURSE PRACTITIONER

## 2018-01-01 PROCEDURE — 97112 NEUROMUSCULAR REEDUCATION: CPT | Performed by: PHYSICAL THERAPIST

## 2018-01-01 PROCEDURE — 99232 SBSQ HOSP IP/OBS MODERATE 35: CPT | Performed by: NURSE PRACTITIONER

## 2018-01-01 PROCEDURE — 0BH17EZ INSERTION OF ENDOTRACHEAL AIRWAY INTO TRACHEA, VIA NATURAL OR ARTIFICIAL OPENING: ICD-10-PCS | Performed by: ANESTHESIOLOGY

## 2018-01-01 PROCEDURE — 97116 GAIT TRAINING THERAPY: CPT | Performed by: PHYSICAL THERAPIST

## 2018-01-01 PROCEDURE — 82270 OCCULT BLOOD FECES: CPT | Performed by: PHYSICIAN ASSISTANT

## 2018-01-01 PROCEDURE — 93925 LOWER EXTREMITY STUDY: CPT | Performed by: SURGERY

## 2018-01-01 PROCEDURE — 83735 ASSAY OF MAGNESIUM: CPT | Performed by: NURSE PRACTITIONER

## 2018-01-01 PROCEDURE — 80053 COMPREHEN METABOLIC PANEL: CPT | Performed by: FAMILY MEDICINE

## 2018-01-01 PROCEDURE — 83883 ASSAY NEPHELOMETRY NOT SPEC: CPT

## 2018-01-01 PROCEDURE — 99233 SBSQ HOSP IP/OBS HIGH 50: CPT | Performed by: FAMILY MEDICINE

## 2018-01-01 PROCEDURE — 85027 COMPLETE CBC AUTOMATED: CPT | Performed by: NURSE PRACTITIONER

## 2018-01-01 PROCEDURE — 80061 LIPID PANEL: CPT

## 2018-01-01 PROCEDURE — G8985 CARRY GOAL STATUS: HCPCS | Performed by: PHYSICAL THERAPIST

## 2018-01-01 PROCEDURE — 84443 ASSAY THYROID STIM HORMONE: CPT | Performed by: EMERGENCY MEDICINE

## 2018-01-01 PROCEDURE — 87186 SC STD MICRODIL/AGAR DIL: CPT | Performed by: INTERNAL MEDICINE

## 2018-01-01 PROCEDURE — 83735 ASSAY OF MAGNESIUM: CPT | Performed by: HOSPITALIST

## 2018-01-01 PROCEDURE — 99233 SBSQ HOSP IP/OBS HIGH 50: CPT | Performed by: PHYSICIAN ASSISTANT

## 2018-01-01 PROCEDURE — 36556 INSERT NON-TUNNEL CV CATH: CPT | Performed by: PHYSICIAN ASSISTANT

## 2018-01-01 PROCEDURE — 82805 BLOOD GASES W/O2 SATURATION: CPT | Performed by: INTERNAL MEDICINE

## 2018-01-01 PROCEDURE — 76770 US EXAM ABDO BACK WALL COMP: CPT

## 2018-01-01 PROCEDURE — 85730 THROMBOPLASTIN TIME PARTIAL: CPT | Performed by: EMERGENCY MEDICINE

## 2018-01-01 PROCEDURE — 93306 TTE W/DOPPLER COMPLETE: CPT

## 2018-01-01 PROCEDURE — 81001 URINALYSIS AUTO W/SCOPE: CPT

## 2018-01-01 PROCEDURE — 82550 ASSAY OF CK (CPK): CPT | Performed by: EMERGENCY MEDICINE

## 2018-01-01 PROCEDURE — 73630 X-RAY EXAM OF FOOT: CPT

## 2018-01-01 PROCEDURE — 97164 PT RE-EVAL EST PLAN CARE: CPT | Performed by: PHYSICAL THERAPIST

## 2018-01-01 PROCEDURE — 99238 HOSP IP/OBS DSCHRG MGMT 30/<: CPT | Performed by: PHYSICIAN ASSISTANT

## 2018-01-01 PROCEDURE — 99233 SBSQ HOSP IP/OBS HIGH 50: CPT | Performed by: NURSE PRACTITIONER

## 2018-01-01 PROCEDURE — 83605 ASSAY OF LACTIC ACID: CPT | Performed by: EMERGENCY MEDICINE

## 2018-01-01 PROCEDURE — 82805 BLOOD GASES W/O2 SATURATION: CPT | Performed by: NURSE PRACTITIONER

## 2018-01-01 PROCEDURE — 85049 AUTOMATED PLATELET COUNT: CPT | Performed by: HOSPITALIST

## 2018-01-01 PROCEDURE — 85610 PROTHROMBIN TIME: CPT | Performed by: EMERGENCY MEDICINE

## 2018-01-01 PROCEDURE — 92610 EVALUATE SWALLOWING FUNCTION: CPT

## 2018-01-01 PROCEDURE — G8991 OTHER PT/OT GOAL STATUS: HCPCS

## 2018-01-01 PROCEDURE — 87040 BLOOD CULTURE FOR BACTERIA: CPT | Performed by: EMERGENCY MEDICINE

## 2018-01-01 PROCEDURE — 84484 ASSAY OF TROPONIN QUANT: CPT | Performed by: HOSPITALIST

## 2018-01-01 PROCEDURE — 99213 OFFICE O/P EST LOW 20 MIN: CPT | Performed by: ORTHOPAEDIC SURGERY

## 2018-01-01 PROCEDURE — 87147 CULTURE TYPE IMMUNOLOGIC: CPT | Performed by: INTERNAL MEDICINE

## 2018-01-01 PROCEDURE — 93926 LOWER EXTREMITY STUDY: CPT

## 2018-01-01 PROCEDURE — 97530 THERAPEUTIC ACTIVITIES: CPT | Performed by: PHYSICAL THERAPIST

## 2018-01-01 PROCEDURE — 85379 FIBRIN DEGRADATION QUANT: CPT | Performed by: EMERGENCY MEDICINE

## 2018-01-01 PROCEDURE — 86141 C-REACTIVE PROTEIN HS: CPT | Performed by: EMERGENCY MEDICINE

## 2018-01-01 PROCEDURE — 5A12012 PERFORMANCE OF CARDIAC OUTPUT, SINGLE, MANUAL: ICD-10-PCS | Performed by: INTERNAL MEDICINE

## 2018-01-01 PROCEDURE — 99223 1ST HOSP IP/OBS HIGH 75: CPT | Performed by: INTERNAL MEDICINE

## 2018-01-01 PROCEDURE — G8988 SELF CARE GOAL STATUS: HCPCS

## 2018-01-01 PROCEDURE — 80053 COMPREHEN METABOLIC PANEL: CPT

## 2018-01-01 PROCEDURE — 99214 OFFICE O/P EST MOD 30 MIN: CPT | Performed by: INTERNAL MEDICINE

## 2018-01-01 PROCEDURE — 82570 ASSAY OF URINE CREATININE: CPT | Performed by: INTERNAL MEDICINE

## 2018-01-01 PROCEDURE — 86334 IMMUNOFIX E-PHORESIS SERUM: CPT | Performed by: PATHOLOGY

## 2018-01-01 PROCEDURE — 82306 VITAMIN D 25 HYDROXY: CPT | Performed by: INTERNAL MEDICINE

## 2018-01-01 PROCEDURE — 84550 ASSAY OF BLOOD/URIC ACID: CPT

## 2018-01-01 PROCEDURE — 85027 COMPLETE CBC AUTOMATED: CPT | Performed by: PHYSICIAN ASSISTANT

## 2018-01-01 PROCEDURE — 99214 OFFICE O/P EST MOD 30 MIN: CPT | Performed by: SURGERY

## 2018-01-01 PROCEDURE — 97163 PT EVAL HIGH COMPLEX 45 MIN: CPT

## 2018-01-01 PROCEDURE — 93005 ELECTROCARDIOGRAM TRACING: CPT | Performed by: EMERGENCY MEDICINE

## 2018-01-01 PROCEDURE — 84156 ASSAY OF PROTEIN URINE: CPT

## 2018-01-01 PROCEDURE — 84100 ASSAY OF PHOSPHORUS: CPT

## 2018-01-01 PROCEDURE — 84165 PROTEIN E-PHORESIS SERUM: CPT

## 2018-01-01 PROCEDURE — 83970 ASSAY OF PARATHORMONE: CPT

## 2018-01-01 PROCEDURE — 93005 ELECTROCARDIOGRAM TRACING: CPT | Performed by: INTERNAL MEDICINE

## 2018-01-01 PROCEDURE — 93306 TTE W/DOPPLER COMPLETE: CPT | Performed by: INTERNAL MEDICINE

## 2018-01-01 PROCEDURE — 99356 PR PROLONGED SVC I/P OR OBS SETTING 1ST HOUR: CPT | Performed by: INTERNAL MEDICINE

## 2018-01-01 PROCEDURE — 5A2204Z RESTORATION OF CARDIAC RHYTHM, SINGLE: ICD-10-PCS | Performed by: INTERNAL MEDICINE

## 2018-01-01 PROCEDURE — 99223 1ST HOSP IP/OBS HIGH 75: CPT | Performed by: HOSPITALIST

## 2018-01-01 PROCEDURE — 99213 OFFICE O/P EST LOW 20 MIN: CPT | Performed by: INTERNAL MEDICINE

## 2018-01-01 PROCEDURE — 93922 UPR/L XTREMITY ART 2 LEVELS: CPT | Performed by: SURGERY

## 2018-01-01 PROCEDURE — 97161 PT EVAL LOW COMPLEX 20 MIN: CPT

## 2018-01-01 PROCEDURE — 93971 EXTREMITY STUDY: CPT

## 2018-01-01 PROCEDURE — 84165 PROTEIN E-PHORESIS SERUM: CPT | Performed by: PATHOLOGY

## 2018-01-01 PROCEDURE — 87086 URINE CULTURE/COLONY COUNT: CPT | Performed by: INTERNAL MEDICINE

## 2018-01-01 PROCEDURE — 93971 EXTREMITY STUDY: CPT | Performed by: SURGERY

## 2018-01-01 PROCEDURE — 84300 ASSAY OF URINE SODIUM: CPT | Performed by: INTERNAL MEDICINE

## 2018-01-01 PROCEDURE — 97164 PT RE-EVAL EST PLAN CARE: CPT

## 2018-01-01 PROCEDURE — G8979 MOBILITY GOAL STATUS: HCPCS | Performed by: PHYSICAL THERAPIST

## 2018-01-01 PROCEDURE — 83970 ASSAY OF PARATHORMONE: CPT | Performed by: INTERNAL MEDICINE

## 2018-01-01 PROCEDURE — 80048 BASIC METABOLIC PNL TOTAL CA: CPT

## 2018-01-01 PROCEDURE — 83036 HEMOGLOBIN GLYCOSYLATED A1C: CPT | Performed by: INTERNAL MEDICINE

## 2018-01-01 PROCEDURE — 85025 COMPLETE CBC W/AUTO DIFF WBC: CPT

## 2018-01-01 PROCEDURE — 85027 COMPLETE CBC AUTOMATED: CPT | Performed by: HOSPITALIST

## 2018-01-01 PROCEDURE — 0T9B70Z DRAINAGE OF BLADDER WITH DRAINAGE DEVICE, VIA NATURAL OR ARTIFICIAL OPENING: ICD-10-PCS | Performed by: INTERNAL MEDICINE

## 2018-01-01 RX ORDER — FUROSEMIDE 10 MG/ML
40 INJECTION INTRAMUSCULAR; INTRAVENOUS ONCE
Status: DISCONTINUED | OUTPATIENT
Start: 2018-01-01 | End: 2018-01-01

## 2018-01-01 RX ORDER — ACETAZOLAMIDE 250 MG/1
250 TABLET ORAL EVERY 8 HOURS SCHEDULED
Status: DISCONTINUED | OUTPATIENT
Start: 2018-01-01 | End: 2018-01-01

## 2018-01-01 RX ORDER — TAMSULOSIN HYDROCHLORIDE 0.4 MG/1
0.4 CAPSULE ORAL
Status: DISCONTINUED | OUTPATIENT
Start: 2018-01-01 | End: 2018-01-01 | Stop reason: HOSPADM

## 2018-01-01 RX ORDER — POTASSIUM CHLORIDE 20 MEQ/1
40 TABLET, EXTENDED RELEASE ORAL ONCE
Status: COMPLETED | OUTPATIENT
Start: 2018-01-01 | End: 2018-01-01

## 2018-01-01 RX ORDER — ACETAZOLAMIDE 250 MG/1
250 TABLET ORAL EVERY 12 HOURS SCHEDULED
Status: DISCONTINUED | OUTPATIENT
Start: 2018-01-01 | End: 2018-01-01

## 2018-01-01 RX ORDER — ATORVASTATIN CALCIUM 40 MG/1
40 TABLET, FILM COATED ORAL
Status: DISCONTINUED | OUTPATIENT
Start: 2018-01-01 | End: 2018-01-01 | Stop reason: HOSPADM

## 2018-01-01 RX ORDER — HEPARIN SODIUM 10000 [USP'U]/100ML
3-20 INJECTION, SOLUTION INTRAVENOUS
Status: DISCONTINUED | OUTPATIENT
Start: 2018-01-01 | End: 2018-01-01 | Stop reason: HOSPADM

## 2018-01-01 RX ORDER — ALBUTEROL SULFATE 2.5 MG/3ML
2.5 SOLUTION RESPIRATORY (INHALATION) EVERY 6 HOURS PRN
Status: DISCONTINUED | OUTPATIENT
Start: 2018-01-01 | End: 2018-01-01

## 2018-01-01 RX ORDER — PARICALCITOL 1 UG/1
1 CAPSULE, LIQUID FILLED ORAL DAILY
COMMUNITY
End: 2018-01-01 | Stop reason: SDUPTHER

## 2018-01-01 RX ORDER — MAGNESIUM SULFATE 500 MG/ML
VIAL (ML) INJECTION CODE/TRAUMA/SEDATION MEDICATION
Status: COMPLETED | OUTPATIENT
Start: 2018-01-01 | End: 2018-01-01

## 2018-01-01 RX ORDER — ALBUMIN (HUMAN) 12.5 G/50ML
12.5 SOLUTION INTRAVENOUS ONCE
Status: COMPLETED | OUTPATIENT
Start: 2018-01-01 | End: 2018-01-01

## 2018-01-01 RX ORDER — TORSEMIDE 20 MG/1
40 TABLET ORAL DAILY
Refills: 0
Start: 2018-01-01 | End: 2018-01-01 | Stop reason: SDUPTHER

## 2018-01-01 RX ORDER — POTASSIUM CHLORIDE 20 MEQ/1
20 TABLET, EXTENDED RELEASE ORAL ONCE
Status: COMPLETED | OUTPATIENT
Start: 2018-01-01 | End: 2018-01-01

## 2018-01-01 RX ORDER — TOLVAPTAN 15 MG/1
15 TABLET ORAL ONCE
Status: COMPLETED | OUTPATIENT
Start: 2018-01-01 | End: 2018-01-01

## 2018-01-01 RX ORDER — ZOLPIDEM TARTRATE 5 MG/1
5 TABLET ORAL
Status: DISCONTINUED | OUTPATIENT
Start: 2018-01-01 | End: 2018-01-01 | Stop reason: HOSPADM

## 2018-01-01 RX ORDER — SODIUM BICARBONATE 84 MG/ML
INJECTION, SOLUTION INTRAVENOUS CODE/TRAUMA/SEDATION MEDICATION
Status: COMPLETED | OUTPATIENT
Start: 2018-01-01 | End: 2018-01-01

## 2018-01-01 RX ORDER — FUROSEMIDE 10 MG/ML
80 INJECTION INTRAMUSCULAR; INTRAVENOUS
Status: DISCONTINUED | OUTPATIENT
Start: 2018-01-01 | End: 2018-01-01

## 2018-01-01 RX ORDER — BUMETANIDE 0.25 MG/ML
1.5 INJECTION INTRAMUSCULAR; INTRAVENOUS CONTINUOUS
Status: DISCONTINUED | OUTPATIENT
Start: 2018-01-01 | End: 2018-01-01 | Stop reason: HOSPADM

## 2018-01-01 RX ORDER — INSULIN ASPART 100 [IU]/ML
24 INJECTION, SUSPENSION SUBCUTANEOUS
COMMUNITY

## 2018-01-01 RX ORDER — ALLOPURINOL 100 MG/1
200 TABLET ORAL DAILY
Status: DISCONTINUED | OUTPATIENT
Start: 2018-01-01 | End: 2018-01-01 | Stop reason: HOSPADM

## 2018-01-01 RX ORDER — ALBUMIN (HUMAN) 12.5 G/50ML
25 SOLUTION INTRAVENOUS EVERY 8 HOURS
Status: DISCONTINUED | OUTPATIENT
Start: 2018-01-01 | End: 2018-01-01

## 2018-01-01 RX ORDER — DOCUSATE SODIUM 100 MG/1
100 CAPSULE, LIQUID FILLED ORAL 2 TIMES DAILY PRN
Status: DISCONTINUED | OUTPATIENT
Start: 2018-01-01 | End: 2018-01-01 | Stop reason: HOSPADM

## 2018-01-01 RX ORDER — BUMETANIDE 0.25 MG/ML
2 INJECTION, SOLUTION INTRAMUSCULAR; INTRAVENOUS EVERY 12 HOURS
Status: DISCONTINUED | OUTPATIENT
Start: 2018-01-01 | End: 2018-01-01

## 2018-01-01 RX ORDER — FEBUXOSTAT 40 MG/1
40 TABLET, FILM COATED ORAL DAILY
COMMUNITY

## 2018-01-01 RX ORDER — TAMSULOSIN HYDROCHLORIDE 0.4 MG/1
0.4 CAPSULE ORAL
Refills: 0
Start: 2018-01-01

## 2018-01-01 RX ORDER — AMIODARONE HYDROCHLORIDE 50 MG/ML
INJECTION, SOLUTION INTRAVENOUS CODE/TRAUMA/SEDATION MEDICATION
Status: COMPLETED | OUTPATIENT
Start: 2018-01-01 | End: 2018-01-01

## 2018-01-01 RX ORDER — DOCUSATE SODIUM 100 MG/1
100 CAPSULE, LIQUID FILLED ORAL 2 TIMES DAILY
Refills: 0
Start: 2018-01-01

## 2018-01-01 RX ORDER — PARICALCITOL 1 UG/1
1 CAPSULE, LIQUID FILLED ORAL 3 TIMES WEEKLY
Status: DISCONTINUED | OUTPATIENT
Start: 2018-01-01 | End: 2018-01-01

## 2018-01-01 RX ORDER — CALCIUM CHLORIDE 100 MG/ML
SYRINGE (ML) INTRAVENOUS CODE/TRAUMA/SEDATION MEDICATION
Status: COMPLETED | OUTPATIENT
Start: 2018-01-01 | End: 2018-01-01

## 2018-01-01 RX ORDER — METOPROLOL SUCCINATE 25 MG/1
12.5 TABLET, EXTENDED RELEASE ORAL DAILY
Status: DISCONTINUED | OUTPATIENT
Start: 2018-01-01 | End: 2018-01-01 | Stop reason: HOSPADM

## 2018-01-01 RX ORDER — DIPHENHYDRAMINE HCL 25 MG
12.5 TABLET ORAL
Qty: 30 TABLET | Refills: 0
Start: 2018-01-01

## 2018-01-01 RX ORDER — INSULIN ASPART 100 [IU]/ML
INJECTION, SUSPENSION SUBCUTANEOUS
Refills: 1 | COMMUNITY
Start: 2018-01-01 | End: 2018-01-01 | Stop reason: HOSPADM

## 2018-01-01 RX ORDER — POTASSIUM CHLORIDE 20 MEQ/1
40 TABLET, EXTENDED RELEASE ORAL 2 TIMES DAILY
Status: DISCONTINUED | OUTPATIENT
Start: 2018-01-01 | End: 2018-01-01

## 2018-01-01 RX ORDER — IBUPROFEN 600 MG/1
600 TABLET ORAL ONCE
Status: COMPLETED | OUTPATIENT
Start: 2018-01-01 | End: 2018-01-01

## 2018-01-01 RX ORDER — ASPIRIN 81 MG/1
81 TABLET ORAL DAILY
Status: DISCONTINUED | OUTPATIENT
Start: 2018-01-01 | End: 2018-01-01 | Stop reason: HOSPADM

## 2018-01-01 RX ORDER — RAMIPRIL 1.25 MG/1
2.5 CAPSULE ORAL DAILY
Status: DISCONTINUED | OUTPATIENT
Start: 2018-01-01 | End: 2018-01-01

## 2018-01-01 RX ORDER — SPIRONOLACTONE 25 MG/1
25 TABLET ORAL DAILY
Status: DISCONTINUED | OUTPATIENT
Start: 2018-01-01 | End: 2018-01-01

## 2018-01-01 RX ORDER — ACETAMINOPHEN 325 MG/1
650 TABLET ORAL EVERY 6 HOURS PRN
Status: DISCONTINUED | OUTPATIENT
Start: 2018-01-01 | End: 2018-01-01

## 2018-01-01 RX ORDER — ONDANSETRON 2 MG/ML
4 INJECTION INTRAMUSCULAR; INTRAVENOUS EVERY 6 HOURS PRN
Status: DISCONTINUED | OUTPATIENT
Start: 2018-01-01 | End: 2018-01-01 | Stop reason: HOSPADM

## 2018-01-01 RX ORDER — BUMETANIDE 0.25 MG/ML
1 INJECTION, SOLUTION INTRAMUSCULAR; INTRAVENOUS EVERY 12 HOURS
Status: DISCONTINUED | OUTPATIENT
Start: 2018-01-01 | End: 2018-01-01

## 2018-01-01 RX ORDER — FUROSEMIDE 10 MG/ML
60 INJECTION INTRAMUSCULAR; INTRAVENOUS
Status: DISCONTINUED | OUTPATIENT
Start: 2018-01-01 | End: 2018-01-01

## 2018-01-01 RX ORDER — ATORVASTATIN CALCIUM 40 MG/1
40 TABLET, FILM COATED ORAL DAILY
Status: DISCONTINUED | OUTPATIENT
Start: 2018-01-01 | End: 2018-01-01 | Stop reason: HOSPADM

## 2018-01-01 RX ORDER — HEPARIN SODIUM 5000 [USP'U]/ML
5000 INJECTION, SOLUTION INTRAVENOUS; SUBCUTANEOUS EVERY 8 HOURS SCHEDULED
Status: DISCONTINUED | OUTPATIENT
Start: 2018-01-01 | End: 2018-01-01

## 2018-01-01 RX ORDER — METOPROLOL SUCCINATE 25 MG/1
12.5 TABLET, EXTENDED RELEASE ORAL EVERY 12 HOURS
Refills: 0
Start: 2018-01-01

## 2018-01-01 RX ORDER — METOLAZONE 5 MG/1
10 TABLET ORAL
Status: DISCONTINUED | OUTPATIENT
Start: 2018-01-01 | End: 2018-01-01

## 2018-01-01 RX ORDER — METOPROLOL SUCCINATE 25 MG/1
25 TABLET, EXTENDED RELEASE ORAL EVERY 12 HOURS
Status: DISCONTINUED | OUTPATIENT
Start: 2018-01-01 | End: 2018-01-01

## 2018-01-01 RX ORDER — TOLVAPTAN 15 MG/1
7.5 TABLET ORAL ONCE
Status: DISCONTINUED | OUTPATIENT
Start: 2018-01-01 | End: 2018-01-01 | Stop reason: SDUPTHER

## 2018-01-01 RX ORDER — ACETAMINOPHEN 325 MG/1
650 TABLET ORAL EVERY 6 HOURS PRN
Status: DISCONTINUED | OUTPATIENT
Start: 2018-01-01 | End: 2018-01-01 | Stop reason: HOSPADM

## 2018-01-01 RX ORDER — TOLVAPTAN 15 MG/1
7.5 TABLET ORAL ONCE
Status: COMPLETED | OUTPATIENT
Start: 2018-01-01 | End: 2018-01-01

## 2018-01-01 RX ORDER — SPIRONOLACTONE 25 MG/1
50 TABLET ORAL DAILY
Status: DISCONTINUED | OUTPATIENT
Start: 2018-01-01 | End: 2018-01-01

## 2018-01-01 RX ORDER — METOPROLOL TARTRATE 50 MG/1
75 TABLET, FILM COATED ORAL 2 TIMES DAILY
Status: DISCONTINUED | OUTPATIENT
Start: 2018-01-01 | End: 2018-01-01

## 2018-01-01 RX ORDER — METOLAZONE 5 MG/1
5 TABLET ORAL ONCE
Status: COMPLETED | OUTPATIENT
Start: 2018-01-01 | End: 2018-01-01

## 2018-01-01 RX ORDER — INSULIN GLARGINE 100 [IU]/ML
20 INJECTION, SOLUTION SUBCUTANEOUS
Status: DISCONTINUED | OUTPATIENT
Start: 2018-01-01 | End: 2018-01-01 | Stop reason: HOSPADM

## 2018-01-01 RX ORDER — SEVELAMER HYDROCHLORIDE 800 MG/1
800 TABLET, FILM COATED ORAL
Refills: 0 | Status: ON HOLD
Start: 2018-01-01 | End: 2018-01-01

## 2018-01-01 RX ORDER — ALBUMIN (HUMAN) 12.5 G/50ML
12.5 SOLUTION INTRAVENOUS EVERY 8 HOURS
Status: DISCONTINUED | OUTPATIENT
Start: 2018-01-01 | End: 2018-01-01

## 2018-01-01 RX ORDER — METOLAZONE 5 MG/1
10 TABLET ORAL 4 TIMES DAILY
Status: DISCONTINUED | OUTPATIENT
Start: 2018-01-01 | End: 2018-01-01

## 2018-01-01 RX ORDER — METOPROLOL SUCCINATE 25 MG/1
12.5 TABLET, EXTENDED RELEASE ORAL ONCE
Status: COMPLETED | OUTPATIENT
Start: 2018-01-01 | End: 2018-01-01

## 2018-01-01 RX ORDER — DEXTROSE MONOHYDRATE 25 G/50ML
INJECTION, SOLUTION INTRAVENOUS
Status: DISPENSED
Start: 2018-01-01 | End: 2018-01-01

## 2018-01-01 RX ORDER — LANOLIN ALCOHOL/MO/W.PET/CERES
3 CREAM (GRAM) TOPICAL
Status: DISCONTINUED | OUTPATIENT
Start: 2018-01-01 | End: 2018-01-01

## 2018-01-01 RX ORDER — DOXERCALCIFEROL 0.5 UG/1
0.5 CAPSULE ORAL 3 TIMES WEEKLY
Status: DISCONTINUED | OUTPATIENT
Start: 2018-01-01 | End: 2018-01-01 | Stop reason: HOSPADM

## 2018-01-01 RX ORDER — ALBUMIN (HUMAN) 12.5 G/50ML
25 SOLUTION INTRAVENOUS ONCE
Status: COMPLETED | OUTPATIENT
Start: 2018-01-01 | End: 2018-01-01

## 2018-01-01 RX ORDER — ATROPINE SULFATE 0.1 MG/ML
INJECTION, SOLUTION ENDOTRACHEAL; INTRAMUSCULAR; INTRAVENOUS; SUBCUTANEOUS CODE/TRAUMA/SEDATION MEDICATION
Status: COMPLETED | OUTPATIENT
Start: 2018-01-01 | End: 2018-01-01

## 2018-01-01 RX ORDER — FUROSEMIDE 10 MG/ML
20 INJECTION INTRAMUSCULAR; INTRAVENOUS ONCE
Status: COMPLETED | OUTPATIENT
Start: 2018-01-01 | End: 2018-01-01

## 2018-01-01 RX ORDER — FUROSEMIDE 10 MG/ML
40 SYRINGE (ML) INJECTION CONTINUOUS
Status: DISCONTINUED | OUTPATIENT
Start: 2018-01-01 | End: 2018-01-01

## 2018-01-01 RX ORDER — ZOLPIDEM TARTRATE 5 MG/1
5 TABLET ORAL
Qty: 30 TABLET | Refills: 0
Start: 2018-01-01 | End: 2018-01-01

## 2018-01-01 RX ORDER — NITROGLYCERIN 0.4 MG/1
0.4 TABLET SUBLINGUAL
Status: DISCONTINUED | OUTPATIENT
Start: 2018-01-01 | End: 2018-01-01

## 2018-01-01 RX ORDER — HEPARIN SODIUM 1000 [USP'U]/ML
4000 INJECTION, SOLUTION INTRAVENOUS; SUBCUTANEOUS ONCE
Status: COMPLETED | OUTPATIENT
Start: 2018-01-01 | End: 2018-01-01

## 2018-01-01 RX ORDER — ECHINACEA PURPUREA EXTRACT 125 MG
1 TABLET ORAL AS NEEDED
Status: DISCONTINUED | OUTPATIENT
Start: 2018-01-01 | End: 2018-01-01 | Stop reason: HOSPADM

## 2018-01-01 RX ORDER — POTASSIUM CHLORIDE 20 MEQ/1
40 TABLET, EXTENDED RELEASE ORAL 2 TIMES DAILY
Status: COMPLETED | OUTPATIENT
Start: 2018-01-01 | End: 2018-01-01

## 2018-01-01 RX ORDER — CALCIUM CARBONATE 200(500)MG
1000 TABLET,CHEWABLE ORAL DAILY PRN
Status: DISCONTINUED | OUTPATIENT
Start: 2018-01-01 | End: 2018-01-01 | Stop reason: HOSPADM

## 2018-01-01 RX ORDER — METOPROLOL SUCCINATE 25 MG/1
25 TABLET, EXTENDED RELEASE ORAL DAILY
Status: DISCONTINUED | OUTPATIENT
Start: 2018-01-01 | End: 2018-01-01

## 2018-01-01 RX ORDER — SACCHAROMYCES BOULARDII 250 MG
250 CAPSULE ORAL 2 TIMES DAILY
Status: DISCONTINUED | OUTPATIENT
Start: 2018-01-01 | End: 2018-01-01 | Stop reason: HOSPADM

## 2018-01-01 RX ORDER — POLYETHYLENE GLYCOL 3350 17 G/17G
17 POWDER, FOR SOLUTION ORAL DAILY PRN
Status: DISCONTINUED | OUTPATIENT
Start: 2018-01-01 | End: 2018-01-01 | Stop reason: HOSPADM

## 2018-01-01 RX ORDER — ATORVASTATIN CALCIUM 40 MG/1
40 TABLET, FILM COATED ORAL DAILY
Status: DISCONTINUED | OUTPATIENT
Start: 2018-01-01 | End: 2018-01-01

## 2018-01-01 RX ORDER — ACETAMINOPHEN 325 MG/1
975 TABLET ORAL EVERY 8 HOURS SCHEDULED
Status: DISCONTINUED | OUTPATIENT
Start: 2018-01-01 | End: 2018-01-01

## 2018-01-01 RX ORDER — INSULIN GLARGINE 100 [IU]/ML
25 INJECTION, SOLUTION SUBCUTANEOUS
Status: DISCONTINUED | OUTPATIENT
Start: 2018-01-01 | End: 2018-01-01

## 2018-01-01 RX ORDER — TORSEMIDE 20 MG/1
TABLET ORAL
Qty: 360 TABLET | Refills: 5 | Status: SHIPPED | OUTPATIENT
Start: 2018-01-01

## 2018-01-01 RX ORDER — POLYETHYLENE GLYCOL 3350 17 G/17G
17 POWDER, FOR SOLUTION ORAL DAILY PRN
Qty: 14 EACH | Refills: 0
Start: 2018-01-01

## 2018-01-01 RX ORDER — FEBUXOSTAT 40 MG/1
40 TABLET, FILM COATED ORAL DAILY PRN
Status: DISCONTINUED | OUTPATIENT
Start: 2018-01-01 | End: 2018-01-01

## 2018-01-01 RX ORDER — POTASSIUM CHLORIDE 750 MG/1
10 TABLET, EXTENDED RELEASE ORAL DAILY
Status: DISCONTINUED | OUTPATIENT
Start: 2018-01-01 | End: 2018-01-01

## 2018-01-01 RX ORDER — AMOXICILLIN 250 MG/1
500 CAPSULE ORAL EVERY 12 HOURS SCHEDULED
Status: COMPLETED | OUTPATIENT
Start: 2018-01-01 | End: 2018-01-01

## 2018-01-01 RX ORDER — ALBUMIN (HUMAN) 12.5 G/50ML
25 SOLUTION INTRAVENOUS EVERY 12 HOURS
Status: DISCONTINUED | OUTPATIENT
Start: 2018-01-01 | End: 2018-01-01

## 2018-01-01 RX ORDER — METOPROLOL SUCCINATE 25 MG/1
12.5 TABLET, EXTENDED RELEASE ORAL EVERY 12 HOURS
Status: DISCONTINUED | OUTPATIENT
Start: 2018-01-01 | End: 2018-01-01 | Stop reason: HOSPADM

## 2018-01-01 RX ORDER — ACETAMINOPHEN 325 MG/1
975 TABLET ORAL EVERY 8 HOURS PRN
Status: DISCONTINUED | OUTPATIENT
Start: 2018-01-01 | End: 2018-01-01

## 2018-01-01 RX ORDER — LANOLIN ALCOHOL/MO/W.PET/CERES
6 CREAM (GRAM) TOPICAL
Status: DISCONTINUED | OUTPATIENT
Start: 2018-01-01 | End: 2018-01-01 | Stop reason: HOSPADM

## 2018-01-01 RX ORDER — TORSEMIDE 20 MG/1
40 TABLET ORAL 2 TIMES DAILY
Qty: 60 TABLET | Refills: 5 | Status: SHIPPED | OUTPATIENT
Start: 2018-01-01 | End: 2018-01-01 | Stop reason: SDUPTHER

## 2018-01-01 RX ORDER — FUROSEMIDE 10 MG/ML
40 INJECTION INTRAMUSCULAR; INTRAVENOUS
Status: DISCONTINUED | OUTPATIENT
Start: 2018-01-01 | End: 2018-01-01

## 2018-01-01 RX ORDER — INSULIN GLARGINE 100 [IU]/ML
20 INJECTION, SOLUTION SUBCUTANEOUS
Qty: 10 ML | Refills: 0
Start: 2018-01-01

## 2018-01-01 RX ORDER — DOCUSATE SODIUM 100 MG/1
100 CAPSULE, LIQUID FILLED ORAL 2 TIMES DAILY
Status: DISCONTINUED | OUTPATIENT
Start: 2018-01-01 | End: 2018-01-01 | Stop reason: HOSPADM

## 2018-01-01 RX ORDER — CEPHALEXIN 250 MG/1
250 CAPSULE ORAL EVERY 8 HOURS SCHEDULED
Status: DISCONTINUED | OUTPATIENT
Start: 2018-01-01 | End: 2018-01-01

## 2018-01-01 RX ORDER — PANTOPRAZOLE SODIUM 20 MG/1
20 TABLET, DELAYED RELEASE ORAL
Status: DISCONTINUED | OUTPATIENT
Start: 2018-01-01 | End: 2018-01-01 | Stop reason: HOSPADM

## 2018-01-01 RX ORDER — EPINEPHRINE 0.1 MG/ML
SYRINGE (ML) INJECTION CODE/TRAUMA/SEDATION MEDICATION
Status: COMPLETED | OUTPATIENT
Start: 2018-01-01 | End: 2018-01-01

## 2018-01-01 RX ORDER — FEBUXOSTAT 40 MG/1
40 TABLET, FILM COATED ORAL DAILY
Status: DISCONTINUED | OUTPATIENT
Start: 2018-01-01 | End: 2018-01-01

## 2018-01-01 RX ORDER — SEVELAMER HYDROCHLORIDE 800 MG/1
800 TABLET, FILM COATED ORAL
Status: DISCONTINUED | OUTPATIENT
Start: 2018-01-01 | End: 2018-01-01

## 2018-01-01 RX ORDER — ALBUMIN (HUMAN) 12.5 G/50ML
25 SOLUTION INTRAVENOUS
Status: COMPLETED | OUTPATIENT
Start: 2018-01-01 | End: 2018-01-01

## 2018-01-01 RX ORDER — ACETAMINOPHEN 325 MG/1
650 TABLET ORAL EVERY 8 HOURS PRN
Refills: 0
Start: 2018-01-01

## 2018-01-01 RX ORDER — TORSEMIDE 20 MG/1
20 TABLET ORAL DAILY
Qty: 50 TABLET | Refills: 5
Start: 2018-01-01 | End: 2018-01-01 | Stop reason: HOSPADM

## 2018-01-01 RX ORDER — POLYETHYLENE GLYCOL 3350 17 G/17G
17 POWDER, FOR SOLUTION ORAL DAILY
Status: DISCONTINUED | OUTPATIENT
Start: 2018-01-01 | End: 2018-01-01 | Stop reason: HOSPADM

## 2018-01-01 RX ORDER — BUMETANIDE 0.25 MG/ML
1 INJECTION, SOLUTION INTRAMUSCULAR; INTRAVENOUS ONCE
Status: DISCONTINUED | OUTPATIENT
Start: 2018-01-01 | End: 2018-01-01

## 2018-01-01 RX ORDER — PARICALCITOL 1 UG/1
1 CAPSULE, LIQUID FILLED ORAL 3 TIMES WEEKLY
COMMUNITY

## 2018-01-01 RX ORDER — FUROSEMIDE 10 MG/ML
40 INJECTION INTRAMUSCULAR; INTRAVENOUS ONCE
Status: COMPLETED | OUTPATIENT
Start: 2018-01-01 | End: 2018-01-01

## 2018-01-01 RX ORDER — ALBUMIN (HUMAN) 12.5 G/50ML
25 SOLUTION INTRAVENOUS EVERY 6 HOURS
Status: DISCONTINUED | OUTPATIENT
Start: 2018-01-01 | End: 2018-01-01

## 2018-01-01 RX ORDER — ALBUMIN, HUMAN INJ 5% 5 %
25 SOLUTION INTRAVENOUS ONCE
Status: COMPLETED | OUTPATIENT
Start: 2018-01-01 | End: 2018-01-01

## 2018-01-01 RX ORDER — METOLAZONE 5 MG/1
10 TABLET ORAL 2 TIMES DAILY
Status: DISCONTINUED | OUTPATIENT
Start: 2018-01-01 | End: 2018-01-01

## 2018-01-01 RX ORDER — ACETAMINOPHEN 325 MG/1
325 TABLET ORAL EVERY 8 HOURS PRN
Status: DISCONTINUED | OUTPATIENT
Start: 2018-01-01 | End: 2018-01-01

## 2018-01-01 RX ORDER — PARICALCITOL 1 UG/1
1 CAPSULE, LIQUID FILLED ORAL 3 TIMES WEEKLY
Status: DISCONTINUED | OUTPATIENT
Start: 2018-01-01 | End: 2018-01-01 | Stop reason: CLARIF

## 2018-01-01 RX ORDER — METOPROLOL TARTRATE 50 MG/1
50 TABLET, FILM COATED ORAL 3 TIMES DAILY
Status: DISCONTINUED | OUTPATIENT
Start: 2018-01-01 | End: 2018-01-01

## 2018-01-01 RX ORDER — LANOLIN ALCOHOL/MO/W.PET/CERES
6 CREAM (GRAM) TOPICAL
Refills: 0
Start: 2018-01-01

## 2018-01-01 RX ORDER — BUMETANIDE 0.25 MG/ML
2 INJECTION INTRAMUSCULAR; INTRAVENOUS CONTINUOUS
Status: DISCONTINUED | OUTPATIENT
Start: 2018-01-01 | End: 2018-01-01

## 2018-01-01 RX ORDER — METOPROLOL TARTRATE 50 MG/1
TABLET, FILM COATED ORAL
Refills: 3 | COMMUNITY
Start: 2018-01-01 | End: 2018-01-01 | Stop reason: HOSPADM

## 2018-01-01 RX ORDER — ALBUMIN (HUMAN) 12.5 G/50ML
25 SOLUTION INTRAVENOUS EVERY 12 HOURS
Status: COMPLETED | OUTPATIENT
Start: 2018-01-01 | End: 2018-01-01

## 2018-01-01 RX ORDER — PANTOPRAZOLE SODIUM 40 MG/1
40 TABLET, DELAYED RELEASE ORAL
Status: DISCONTINUED | OUTPATIENT
Start: 2018-01-01 | End: 2018-01-01 | Stop reason: HOSPADM

## 2018-01-01 RX ORDER — INSULIN GLARGINE 100 [IU]/ML
20 INJECTION, SOLUTION SUBCUTANEOUS
Status: DISCONTINUED | OUTPATIENT
Start: 2018-01-01 | End: 2018-01-01

## 2018-01-01 RX ORDER — CALCIUM CARBONATE 200(500)MG
1000 TABLET,CHEWABLE ORAL DAILY PRN
Refills: 0
Start: 2018-01-01

## 2018-01-01 RX ORDER — HEPARIN SODIUM 5000 [USP'U]/ML
5000 INJECTION, SOLUTION INTRAVENOUS; SUBCUTANEOUS EVERY 8 HOURS SCHEDULED
Status: DISCONTINUED | OUTPATIENT
Start: 2018-01-01 | End: 2018-01-01 | Stop reason: HOSPADM

## 2018-01-01 RX ORDER — SEVELAMER HYDROCHLORIDE 800 MG/1
800 TABLET, FILM COATED ORAL
Status: DISCONTINUED | OUTPATIENT
Start: 2018-01-01 | End: 2018-01-01 | Stop reason: HOSPADM

## 2018-01-01 RX ORDER — BUMETANIDE 0.25 MG/ML
3 INJECTION, SOLUTION INTRAMUSCULAR; INTRAVENOUS ONCE
Status: COMPLETED | OUTPATIENT
Start: 2018-01-01 | End: 2018-01-01

## 2018-01-01 RX ORDER — HEPARIN SODIUM 1000 [USP'U]/ML
4000 INJECTION, SOLUTION INTRAVENOUS; SUBCUTANEOUS AS NEEDED
Status: DISCONTINUED | OUTPATIENT
Start: 2018-01-01 | End: 2018-01-01 | Stop reason: HOSPADM

## 2018-01-01 RX ORDER — RAMIPRIL 2.5 MG/1
2.5 CAPSULE ORAL DAILY
COMMUNITY

## 2018-01-01 RX ORDER — SENNOSIDES 8.6 MG
1 TABLET ORAL DAILY
Status: DISCONTINUED | OUTPATIENT
Start: 2018-01-01 | End: 2018-01-01 | Stop reason: HOSPADM

## 2018-01-01 RX ORDER — METOPROLOL SUCCINATE 50 MG/1
50 TABLET, EXTENDED RELEASE ORAL EVERY 12 HOURS
Status: DISCONTINUED | OUTPATIENT
Start: 2018-01-01 | End: 2018-01-01

## 2018-01-01 RX ORDER — TORSEMIDE 20 MG/1
40 TABLET ORAL DAILY
Status: DISCONTINUED | OUTPATIENT
Start: 2018-01-01 | End: 2018-01-01 | Stop reason: HOSPADM

## 2018-01-01 RX ORDER — DIPHENHYDRAMINE HCL 25 MG
12.5 TABLET ORAL
Status: DISCONTINUED | OUTPATIENT
Start: 2018-01-01 | End: 2018-01-01 | Stop reason: HOSPADM

## 2018-01-01 RX ORDER — DEXTROSE MONOHYDRATE 25 G/50ML
25 INJECTION, SOLUTION INTRAVENOUS ONCE
Status: COMPLETED | OUTPATIENT
Start: 2018-01-01 | End: 2018-01-01

## 2018-01-01 RX ORDER — METOPROLOL SUCCINATE 25 MG/1
12.5 TABLET, EXTENDED RELEASE ORAL EVERY 12 HOURS
Status: DISCONTINUED | OUTPATIENT
Start: 2018-01-01 | End: 2018-01-01

## 2018-01-01 RX ORDER — HEPARIN SODIUM 1000 [USP'U]/ML
2000 INJECTION, SOLUTION INTRAVENOUS; SUBCUTANEOUS AS NEEDED
Status: DISCONTINUED | OUTPATIENT
Start: 2018-01-01 | End: 2018-01-01 | Stop reason: HOSPADM

## 2018-01-01 RX ORDER — INSULIN GLARGINE 100 [IU]/ML
30 INJECTION, SOLUTION SUBCUTANEOUS
Status: DISCONTINUED | OUTPATIENT
Start: 2018-01-01 | End: 2018-01-01

## 2018-01-01 RX ADMIN — HEPARIN SODIUM 5000 UNITS: 5000 INJECTION, SOLUTION INTRAVENOUS; SUBCUTANEOUS at 21:42

## 2018-01-01 RX ADMIN — ATORVASTATIN CALCIUM 40 MG: 40 TABLET, FILM COATED ORAL at 17:01

## 2018-01-01 RX ADMIN — RENAGEL 800 MG: 800 TABLET ORAL at 08:28

## 2018-01-01 RX ADMIN — PANTOPRAZOLE SODIUM 40 MG: 40 TABLET, DELAYED RELEASE ORAL at 05:45

## 2018-01-01 RX ADMIN — ATORVASTATIN CALCIUM 40 MG: 40 TABLET, FILM COATED ORAL at 16:27

## 2018-01-01 RX ADMIN — ALBUMIN HUMAN 25 G: 0.25 SOLUTION INTRAVENOUS at 05:27

## 2018-01-01 RX ADMIN — PANTOPRAZOLE SODIUM 40 MG: 40 TABLET, DELAYED RELEASE ORAL at 05:15

## 2018-01-01 RX ADMIN — INSULIN GLARGINE 20 UNITS: 100 INJECTION, SOLUTION SUBCUTANEOUS at 21:11

## 2018-01-01 RX ADMIN — CEPHALEXIN 250 MG: 250 CAPSULE ORAL at 05:50

## 2018-01-01 RX ADMIN — MELATONIN TAB 3 MG 6 MG: 3 TAB at 21:52

## 2018-01-01 RX ADMIN — PANTOPRAZOLE SODIUM 40 MG: 40 TABLET, DELAYED RELEASE ORAL at 05:14

## 2018-01-01 RX ADMIN — INSULIN GLARGINE 20 UNITS: 100 INJECTION, SOLUTION SUBCUTANEOUS at 22:43

## 2018-01-01 RX ADMIN — INSULIN GLARGINE 25 UNITS: 100 INJECTION, SOLUTION SUBCUTANEOUS at 22:18

## 2018-01-01 RX ADMIN — ALBUMIN HUMAN 12.5 G: 0.25 SOLUTION INTRAVENOUS at 22:21

## 2018-01-01 RX ADMIN — ACETAMINOPHEN 650 MG: 325 TABLET, FILM COATED ORAL at 06:26

## 2018-01-01 RX ADMIN — RENAGEL 800 MG: 800 TABLET ORAL at 17:45

## 2018-01-01 RX ADMIN — Medication 2 MG/HR: at 12:14

## 2018-01-01 RX ADMIN — HEPARIN SODIUM 5000 UNITS: 5000 INJECTION, SOLUTION INTRAVENOUS; SUBCUTANEOUS at 06:02

## 2018-01-01 RX ADMIN — ASPIRIN 81 MG: 81 TABLET, COATED ORAL at 08:00

## 2018-01-01 RX ADMIN — INSULIN LISPRO 10 UNITS: 100 INJECTION, SOLUTION INTRAVENOUS; SUBCUTANEOUS at 09:02

## 2018-01-01 RX ADMIN — HEPARIN SODIUM 5000 UNITS: 5000 INJECTION, SOLUTION INTRAVENOUS; SUBCUTANEOUS at 14:22

## 2018-01-01 RX ADMIN — HEPARIN SODIUM 5000 UNITS: 5000 INJECTION, SOLUTION INTRAVENOUS; SUBCUTANEOUS at 15:00

## 2018-01-01 RX ADMIN — ATORVASTATIN CALCIUM 40 MG: 40 TABLET, FILM COATED ORAL at 17:36

## 2018-01-01 RX ADMIN — INSULIN LISPRO 1 UNITS: 100 INJECTION, SOLUTION INTRAVENOUS; SUBCUTANEOUS at 21:18

## 2018-01-01 RX ADMIN — HEPARIN SODIUM 5000 UNITS: 5000 INJECTION, SOLUTION INTRAVENOUS; SUBCUTANEOUS at 21:44

## 2018-01-01 RX ADMIN — RENAGEL 800 MG: 800 TABLET ORAL at 17:01

## 2018-01-01 RX ADMIN — INSULIN LISPRO 2 UNITS: 100 INJECTION, SOLUTION INTRAVENOUS; SUBCUTANEOUS at 11:17

## 2018-01-01 RX ADMIN — INSULIN LISPRO 12 UNITS: 100 INJECTION, SOLUTION INTRAVENOUS; SUBCUTANEOUS at 10:16

## 2018-01-01 RX ADMIN — MELATONIN TAB 3 MG 6 MG: 3 TAB at 21:37

## 2018-01-01 RX ADMIN — INSULIN LISPRO 12 UNITS: 100 INJECTION, SOLUTION INTRAVENOUS; SUBCUTANEOUS at 11:53

## 2018-01-01 RX ADMIN — ACETAMINOPHEN 650 MG: 325 TABLET ORAL at 05:23

## 2018-01-01 RX ADMIN — HEPARIN SODIUM 5000 UNITS: 5000 INJECTION, SOLUTION INTRAVENOUS; SUBCUTANEOUS at 05:49

## 2018-01-01 RX ADMIN — Medication 1 TABLET: at 08:42

## 2018-01-01 RX ADMIN — DOCUSATE SODIUM 100 MG: 100 CAPSULE, LIQUID FILLED ORAL at 08:57

## 2018-01-01 RX ADMIN — ALBUMIN HUMAN 25 G: 0.25 SOLUTION INTRAVENOUS at 23:55

## 2018-01-01 RX ADMIN — POTASSIUM CHLORIDE 40 MEQ: 1500 TABLET, EXTENDED RELEASE ORAL at 12:52

## 2018-01-01 RX ADMIN — RENAGEL 800 MG: 800 TABLET ORAL at 08:42

## 2018-01-01 RX ADMIN — METOPROLOL SUCCINATE 25 MG: 25 TABLET, EXTENDED RELEASE ORAL at 11:33

## 2018-01-01 RX ADMIN — INSULIN LISPRO 2 UNITS: 100 INJECTION, SOLUTION INTRAVENOUS; SUBCUTANEOUS at 22:00

## 2018-01-01 RX ADMIN — RENAGEL 800 MG: 800 TABLET ORAL at 12:18

## 2018-01-01 RX ADMIN — SPIRONOLACTONE 50 MG: 25 TABLET, FILM COATED ORAL at 08:53

## 2018-01-01 RX ADMIN — INSULIN LISPRO 12 UNITS: 100 INJECTION, SOLUTION INTRAVENOUS; SUBCUTANEOUS at 18:29

## 2018-01-01 RX ADMIN — ALBUMIN HUMAN 25 G: 0.25 SOLUTION INTRAVENOUS at 08:49

## 2018-01-01 RX ADMIN — INSULIN LISPRO 1 UNITS: 100 INJECTION, SOLUTION INTRAVENOUS; SUBCUTANEOUS at 08:42

## 2018-01-01 RX ADMIN — TAMSULOSIN HYDROCHLORIDE 0.4 MG: 0.4 CAPSULE ORAL at 17:08

## 2018-01-01 RX ADMIN — METOPROLOL SUCCINATE 50 MG: 50 TABLET, EXTENDED RELEASE ORAL at 22:00

## 2018-01-01 RX ADMIN — HEPARIN SODIUM 11.1 UNITS/KG/HR: 10000 INJECTION, SOLUTION INTRAVENOUS at 15:07

## 2018-01-01 RX ADMIN — INSULIN LISPRO 2 UNITS: 100 INJECTION, SOLUTION INTRAVENOUS; SUBCUTANEOUS at 18:10

## 2018-01-01 RX ADMIN — MELATONIN 3 MG: 3 TAB ORAL at 21:35

## 2018-01-01 RX ADMIN — HEPARIN SODIUM 5000 UNITS: 5000 INJECTION, SOLUTION INTRAVENOUS; SUBCUTANEOUS at 21:37

## 2018-01-01 RX ADMIN — RENAGEL 800 MG: 800 TABLET ORAL at 17:20

## 2018-01-01 RX ADMIN — Medication 40 MG/HR: at 01:18

## 2018-01-01 RX ADMIN — CEPHALEXIN 250 MG: 250 CAPSULE ORAL at 14:03

## 2018-01-01 RX ADMIN — METOPROLOL TARTRATE 25 MG: 25 TABLET ORAL at 22:52

## 2018-01-01 RX ADMIN — INSULIN LISPRO 2 UNITS: 100 INJECTION, SOLUTION INTRAVENOUS; SUBCUTANEOUS at 21:30

## 2018-01-01 RX ADMIN — DOCUSATE SODIUM 100 MG: 100 CAPSULE, LIQUID FILLED ORAL at 08:24

## 2018-01-01 RX ADMIN — ALBUMIN HUMAN 12.5 G: 0.25 SOLUTION INTRAVENOUS at 10:48

## 2018-01-01 RX ADMIN — INSULIN LISPRO 12 UNITS: 100 INJECTION, SOLUTION INTRAVENOUS; SUBCUTANEOUS at 08:01

## 2018-01-01 RX ADMIN — ATORVASTATIN CALCIUM 40 MG: 40 TABLET, FILM COATED ORAL at 17:20

## 2018-01-01 RX ADMIN — INSULIN LISPRO 12 UNITS: 100 INJECTION, SOLUTION INTRAVENOUS; SUBCUTANEOUS at 14:02

## 2018-01-01 RX ADMIN — ASPIRIN 81 MG: 81 TABLET, COATED ORAL at 10:14

## 2018-01-01 RX ADMIN — POTASSIUM CHLORIDE 30 MEQ: 1500 TABLET, EXTENDED RELEASE ORAL at 17:28

## 2018-01-01 RX ADMIN — ACETAMINOPHEN 975 MG: 325 TABLET, FILM COATED ORAL at 21:12

## 2018-01-01 RX ADMIN — INSULIN LISPRO 12 UNITS: 100 INJECTION, SOLUTION INTRAVENOUS; SUBCUTANEOUS at 08:48

## 2018-01-01 RX ADMIN — CEFAZOLIN SODIUM 1000 MG: 1 SOLUTION INTRAVENOUS at 05:41

## 2018-01-01 RX ADMIN — RENAGEL 800 MG: 800 TABLET ORAL at 13:27

## 2018-01-01 RX ADMIN — HEPARIN SODIUM 5000 UNITS: 5000 INJECTION, SOLUTION INTRAVENOUS; SUBCUTANEOUS at 05:54

## 2018-01-01 RX ADMIN — ACETAMINOPHEN 650 MG: 325 TABLET, FILM COATED ORAL at 16:38

## 2018-01-01 RX ADMIN — ASPIRIN 81 MG: 81 TABLET, COATED ORAL at 08:20

## 2018-01-01 RX ADMIN — TOLVAPTAN 7.5 MG: 15 TABLET ORAL at 17:25

## 2018-01-01 RX ADMIN — ATORVASTATIN CALCIUM 40 MG: 40 TABLET, FILM COATED ORAL at 17:17

## 2018-01-01 RX ADMIN — ALBUMIN HUMAN 25 G: 0.25 SOLUTION INTRAVENOUS at 11:03

## 2018-01-01 RX ADMIN — POTASSIUM CHLORIDE 10 MEQ: 750 TABLET, EXTENDED RELEASE ORAL at 08:24

## 2018-01-01 RX ADMIN — PANTOPRAZOLE SODIUM 40 MG: 40 TABLET, DELAYED RELEASE ORAL at 05:01

## 2018-01-01 RX ADMIN — INSULIN LISPRO 10 UNITS: 100 INJECTION, SOLUTION INTRAVENOUS; SUBCUTANEOUS at 16:47

## 2018-01-01 RX ADMIN — MELATONIN 3 MG: 3 TAB ORAL at 22:00

## 2018-01-01 RX ADMIN — DEXTROSE MONOHYDRATE 25 ML: 25 INJECTION, SOLUTION INTRAVENOUS at 03:06

## 2018-01-01 RX ADMIN — TORSEMIDE 40 MG: 20 TABLET ORAL at 18:26

## 2018-01-01 RX ADMIN — METOPROLOL SUCCINATE 25 MG: 25 TABLET, EXTENDED RELEASE ORAL at 21:24

## 2018-01-01 RX ADMIN — MELATONIN 3 MG: 3 TAB ORAL at 21:25

## 2018-01-01 RX ADMIN — METOLAZONE 10 MG: 5 TABLET ORAL at 17:40

## 2018-01-01 RX ADMIN — TORSEMIDE 40 MG: 20 TABLET ORAL at 08:46

## 2018-01-01 RX ADMIN — MELATONIN 3 MG: 3 TAB ORAL at 22:43

## 2018-01-01 RX ADMIN — Medication 1 CAPSULE: at 13:18

## 2018-01-01 RX ADMIN — POTASSIUM CHLORIDE 40 MEQ: 1500 TABLET, EXTENDED RELEASE ORAL at 11:25

## 2018-01-01 RX ADMIN — MELATONIN TAB 3 MG 6 MG: 3 TAB at 21:57

## 2018-01-01 RX ADMIN — TAMSULOSIN HYDROCHLORIDE 0.4 MG: 0.4 CAPSULE ORAL at 17:38

## 2018-01-01 RX ADMIN — DOCUSATE SODIUM 100 MG: 100 CAPSULE, LIQUID FILLED ORAL at 08:49

## 2018-01-01 RX ADMIN — TAMSULOSIN HYDROCHLORIDE 0.4 MG: 0.4 CAPSULE ORAL at 17:25

## 2018-01-01 RX ADMIN — METOPROLOL SUCCINATE 50 MG: 50 TABLET, EXTENDED RELEASE ORAL at 08:53

## 2018-01-01 RX ADMIN — INSULIN LISPRO 12 UNITS: 100 INJECTION, SOLUTION INTRAVENOUS; SUBCUTANEOUS at 13:26

## 2018-01-01 RX ADMIN — ATORVASTATIN CALCIUM 40 MG: 40 TABLET, FILM COATED ORAL at 18:02

## 2018-01-01 RX ADMIN — POLYETHYLENE GLYCOL 3350 17 G: 17 POWDER, FOR SOLUTION ORAL at 08:01

## 2018-01-01 RX ADMIN — DOCUSATE SODIUM 100 MG: 100 CAPSULE, LIQUID FILLED ORAL at 08:45

## 2018-01-01 RX ADMIN — INSULIN GLARGINE 20 UNITS: 100 INJECTION, SOLUTION SUBCUTANEOUS at 21:39

## 2018-01-01 RX ADMIN — INSULIN LISPRO 10 UNITS: 100 INJECTION, SOLUTION INTRAVENOUS; SUBCUTANEOUS at 16:55

## 2018-01-01 RX ADMIN — INSULIN LISPRO 12 UNITS: 100 INJECTION, SOLUTION INTRAVENOUS; SUBCUTANEOUS at 17:02

## 2018-01-01 RX ADMIN — INSULIN GLARGINE 30 UNITS: 100 INJECTION, SOLUTION SUBCUTANEOUS at 22:13

## 2018-01-01 RX ADMIN — METOLAZONE 10 MG: 5 TABLET ORAL at 17:50

## 2018-01-01 RX ADMIN — Medication 1 CAPSULE: at 18:44

## 2018-01-01 RX ADMIN — TAMSULOSIN HYDROCHLORIDE 0.4 MG: 0.4 CAPSULE ORAL at 16:52

## 2018-01-01 RX ADMIN — Medication 250 MG: at 17:05

## 2018-01-01 RX ADMIN — INSULIN LISPRO 10 UNITS: 100 INJECTION, SOLUTION INTRAVENOUS; SUBCUTANEOUS at 08:27

## 2018-01-01 RX ADMIN — HEPARIN SODIUM 5000 UNITS: 5000 INJECTION, SOLUTION INTRAVENOUS; SUBCUTANEOUS at 21:25

## 2018-01-01 RX ADMIN — HEPARIN SODIUM 4000 UNITS: 1000 INJECTION, SOLUTION INTRAVENOUS; SUBCUTANEOUS at 03:10

## 2018-01-01 RX ADMIN — MELATONIN TAB 3 MG 6 MG: 3 TAB at 21:14

## 2018-01-01 RX ADMIN — ACETAMINOPHEN 975 MG: 325 TABLET, FILM COATED ORAL at 05:49

## 2018-01-01 RX ADMIN — INSULIN LISPRO 1 UNITS: 100 INJECTION, SOLUTION INTRAVENOUS; SUBCUTANEOUS at 12:58

## 2018-01-01 RX ADMIN — Medication 2 MG/HR: at 04:22

## 2018-01-01 RX ADMIN — POTASSIUM CHLORIDE 10 MEQ: 750 TABLET, EXTENDED RELEASE ORAL at 10:13

## 2018-01-01 RX ADMIN — INSULIN LISPRO 2 UNITS: 100 INJECTION, SOLUTION INTRAVENOUS; SUBCUTANEOUS at 21:56

## 2018-01-01 RX ADMIN — MELATONIN 3 MG: 3 TAB ORAL at 21:40

## 2018-01-01 RX ADMIN — HEPARIN SODIUM 5000 UNITS: 5000 INJECTION, SOLUTION INTRAVENOUS; SUBCUTANEOUS at 21:38

## 2018-01-01 RX ADMIN — INSULIN LISPRO 2 UNITS: 100 INJECTION, SOLUTION INTRAVENOUS; SUBCUTANEOUS at 17:47

## 2018-01-01 RX ADMIN — TORSEMIDE 40 MG: 20 TABLET ORAL at 09:41

## 2018-01-01 RX ADMIN — Medication 1.5 MG/HR: at 11:15

## 2018-01-01 RX ADMIN — Medication 2 MG/HR: at 15:50

## 2018-01-01 RX ADMIN — HEPARIN SODIUM 5000 UNITS: 5000 INJECTION, SOLUTION INTRAVENOUS; SUBCUTANEOUS at 05:19

## 2018-01-01 RX ADMIN — MELATONIN 3 MG: 3 TAB ORAL at 21:58

## 2018-01-01 RX ADMIN — ACETAMINOPHEN 975 MG: 325 TABLET, FILM COATED ORAL at 05:14

## 2018-01-01 RX ADMIN — ATORVASTATIN CALCIUM 40 MG: 40 TABLET, FILM COATED ORAL at 17:25

## 2018-01-01 RX ADMIN — INSULIN LISPRO 1 UNITS: 100 INJECTION, SOLUTION INTRAVENOUS; SUBCUTANEOUS at 12:21

## 2018-01-01 RX ADMIN — PANTOPRAZOLE SODIUM 40 MG: 40 TABLET, DELAYED RELEASE ORAL at 06:31

## 2018-01-01 RX ADMIN — METOPROLOL SUCCINATE 12.5 MG: 25 TABLET, EXTENDED RELEASE ORAL at 22:22

## 2018-01-01 RX ADMIN — Medication 2 MG/HR: at 13:46

## 2018-01-01 RX ADMIN — ASPIRIN 81 MG: 81 TABLET, COATED ORAL at 09:14

## 2018-01-01 RX ADMIN — CEFAZOLIN SODIUM 1000 MG: 1 SOLUTION INTRAVENOUS at 16:48

## 2018-01-01 RX ADMIN — INSULIN LISPRO 12 UNITS: 100 INJECTION, SOLUTION INTRAVENOUS; SUBCUTANEOUS at 17:57

## 2018-01-01 RX ADMIN — TAMSULOSIN HYDROCHLORIDE 0.4 MG: 0.4 CAPSULE ORAL at 16:53

## 2018-01-01 RX ADMIN — PANTOPRAZOLE SODIUM 40 MG: 40 TABLET, DELAYED RELEASE ORAL at 05:26

## 2018-01-01 RX ADMIN — DOCUSATE SODIUM 100 MG: 100 CAPSULE, LIQUID FILLED ORAL at 08:00

## 2018-01-01 RX ADMIN — HEPARIN SODIUM 5000 UNITS: 5000 INJECTION, SOLUTION INTRAVENOUS; SUBCUTANEOUS at 17:14

## 2018-01-01 RX ADMIN — INSULIN LISPRO 1 UNITS: 100 INJECTION, SOLUTION INTRAVENOUS; SUBCUTANEOUS at 21:35

## 2018-01-01 RX ADMIN — ASPIRIN 81 MG: 81 TABLET, COATED ORAL at 08:52

## 2018-01-01 RX ADMIN — DOCUSATE SODIUM 100 MG: 100 CAPSULE, LIQUID FILLED ORAL at 17:53

## 2018-01-01 RX ADMIN — INSULIN LISPRO 10 UNITS: 100 INJECTION, SOLUTION INTRAVENOUS; SUBCUTANEOUS at 08:17

## 2018-01-01 RX ADMIN — ALBUMIN HUMAN 25 G: 0.25 SOLUTION INTRAVENOUS at 10:07

## 2018-01-01 RX ADMIN — ACETAMINOPHEN 975 MG: 325 TABLET, FILM COATED ORAL at 21:59

## 2018-01-01 RX ADMIN — ASPIRIN 81 MG: 81 TABLET, COATED ORAL at 09:47

## 2018-01-01 RX ADMIN — ACETAZOLAMIDE 250 MG: 250 TABLET ORAL at 22:26

## 2018-01-01 RX ADMIN — HEPARIN SODIUM 5000 UNITS: 5000 INJECTION, SOLUTION INTRAVENOUS; SUBCUTANEOUS at 22:41

## 2018-01-01 RX ADMIN — HEPARIN SODIUM 5000 UNITS: 5000 INJECTION, SOLUTION INTRAVENOUS; SUBCUTANEOUS at 05:01

## 2018-01-01 RX ADMIN — INSULIN LISPRO 12 UNITS: 100 INJECTION, SOLUTION INTRAVENOUS; SUBCUTANEOUS at 12:21

## 2018-01-01 RX ADMIN — METOPROLOL SUCCINATE 25 MG: 25 TABLET, EXTENDED RELEASE ORAL at 09:08

## 2018-01-01 RX ADMIN — HEPARIN SODIUM 5000 UNITS: 5000 INJECTION, SOLUTION INTRAVENOUS; SUBCUTANEOUS at 13:58

## 2018-01-01 RX ADMIN — ACETAMINOPHEN 650 MG: 325 TABLET ORAL at 21:39

## 2018-01-01 RX ADMIN — ACETAZOLAMIDE 250 MG: 250 TABLET ORAL at 12:51

## 2018-01-01 RX ADMIN — ACETAZOLAMIDE 250 MG: 250 TABLET ORAL at 05:52

## 2018-01-01 RX ADMIN — PANTOPRAZOLE SODIUM 40 MG: 40 TABLET, DELAYED RELEASE ORAL at 05:04

## 2018-01-01 RX ADMIN — INSULIN LISPRO 12 UNITS: 100 INJECTION, SOLUTION INTRAVENOUS; SUBCUTANEOUS at 18:51

## 2018-01-01 RX ADMIN — INSULIN LISPRO 2 UNITS: 100 INJECTION, SOLUTION INTRAVENOUS; SUBCUTANEOUS at 22:38

## 2018-01-01 RX ADMIN — MELATONIN TAB 3 MG 6 MG: 3 TAB at 22:44

## 2018-01-01 RX ADMIN — SPIRONOLACTONE 25 MG: 25 TABLET, FILM COATED ORAL at 11:00

## 2018-01-01 RX ADMIN — PANTOPRAZOLE SODIUM 20 MG: 20 TABLET, DELAYED RELEASE ORAL at 06:23

## 2018-01-01 RX ADMIN — INSULIN LISPRO 12 UNITS: 100 INJECTION, SOLUTION INTRAVENOUS; SUBCUTANEOUS at 13:07

## 2018-01-01 RX ADMIN — TAMSULOSIN HYDROCHLORIDE 0.4 MG: 0.4 CAPSULE ORAL at 17:53

## 2018-01-01 RX ADMIN — INSULIN LISPRO 2 UNITS: 100 INJECTION, SOLUTION INTRAVENOUS; SUBCUTANEOUS at 13:00

## 2018-01-01 RX ADMIN — ALBUMIN HUMAN 25 G: 0.25 SOLUTION INTRAVENOUS at 22:30

## 2018-01-01 RX ADMIN — HEPARIN SODIUM 5000 UNITS: 5000 INJECTION, SOLUTION INTRAVENOUS; SUBCUTANEOUS at 05:00

## 2018-01-01 RX ADMIN — INSULIN LISPRO 10 UNITS: 100 INJECTION, SOLUTION INTRAVENOUS; SUBCUTANEOUS at 18:17

## 2018-01-01 RX ADMIN — INSULIN LISPRO 1 UNITS: 100 INJECTION, SOLUTION INTRAVENOUS; SUBCUTANEOUS at 08:55

## 2018-01-01 RX ADMIN — POTASSIUM CHLORIDE 40 MEQ: 1500 TABLET, EXTENDED RELEASE ORAL at 08:28

## 2018-01-01 RX ADMIN — INSULIN LISPRO 12 UNITS: 100 INJECTION, SOLUTION INTRAVENOUS; SUBCUTANEOUS at 18:44

## 2018-01-01 RX ADMIN — ASPIRIN 81 MG: 81 TABLET, COATED ORAL at 08:48

## 2018-01-01 RX ADMIN — ATORVASTATIN CALCIUM 40 MG: 40 TABLET, FILM COATED ORAL at 17:28

## 2018-01-01 RX ADMIN — ATORVASTATIN CALCIUM 40 MG: 40 TABLET, FILM COATED ORAL at 15:53

## 2018-01-01 RX ADMIN — POTASSIUM CHLORIDE 10 MEQ: 750 TABLET, EXTENDED RELEASE ORAL at 09:14

## 2018-01-01 RX ADMIN — RENAGEL 800 MG: 800 TABLET ORAL at 14:03

## 2018-01-01 RX ADMIN — ALBUMIN HUMAN 25 G: 0.25 SOLUTION INTRAVENOUS at 21:39

## 2018-01-01 RX ADMIN — METOLAZONE 10 MG: 5 TABLET ORAL at 17:28

## 2018-01-01 RX ADMIN — TORSEMIDE 40 MG: 20 TABLET ORAL at 08:52

## 2018-01-01 RX ADMIN — POTASSIUM CHLORIDE 10 MEQ: 750 TABLET, EXTENDED RELEASE ORAL at 08:52

## 2018-01-01 RX ADMIN — PANTOPRAZOLE SODIUM 40 MG: 40 TABLET, DELAYED RELEASE ORAL at 05:36

## 2018-01-01 RX ADMIN — INSULIN GLARGINE 30 UNITS: 100 INJECTION, SOLUTION SUBCUTANEOUS at 21:24

## 2018-01-01 RX ADMIN — TAMSULOSIN HYDROCHLORIDE 0.4 MG: 0.4 CAPSULE ORAL at 16:49

## 2018-01-01 RX ADMIN — ASPIRIN 81 MG: 81 TABLET, COATED ORAL at 08:56

## 2018-01-01 RX ADMIN — HEPARIN SODIUM 5000 UNITS: 5000 INJECTION, SOLUTION INTRAVENOUS; SUBCUTANEOUS at 05:24

## 2018-01-01 RX ADMIN — ACETAMINOPHEN 975 MG: 325 TABLET, FILM COATED ORAL at 06:31

## 2018-01-01 RX ADMIN — INSULIN GLARGINE 25 UNITS: 100 INJECTION, SOLUTION SUBCUTANEOUS at 22:27

## 2018-01-01 RX ADMIN — ACETAMINOPHEN 975 MG: 325 TABLET, FILM COATED ORAL at 05:43

## 2018-01-01 RX ADMIN — PANTOPRAZOLE SODIUM 40 MG: 40 TABLET, DELAYED RELEASE ORAL at 05:54

## 2018-01-01 RX ADMIN — INSULIN GLARGINE 30 UNITS: 100 INJECTION, SOLUTION SUBCUTANEOUS at 22:44

## 2018-01-01 RX ADMIN — ACETAMINOPHEN 975 MG: 325 TABLET, FILM COATED ORAL at 14:45

## 2018-01-01 RX ADMIN — INSULIN LISPRO 2 UNITS: 100 INJECTION, SOLUTION INTRAVENOUS; SUBCUTANEOUS at 13:10

## 2018-01-01 RX ADMIN — INSULIN LISPRO 1 UNITS: 100 INJECTION, SOLUTION INTRAVENOUS; SUBCUTANEOUS at 13:05

## 2018-01-01 RX ADMIN — INSULIN LISPRO 2 UNITS: 100 INJECTION, SOLUTION INTRAVENOUS; SUBCUTANEOUS at 19:15

## 2018-01-01 RX ADMIN — ACETAZOLAMIDE 250 MG: 250 TABLET ORAL at 22:18

## 2018-01-01 RX ADMIN — ASPIRIN 81 MG: 81 TABLET, COATED ORAL at 10:20

## 2018-01-01 RX ADMIN — INSULIN GLARGINE 20 UNITS: 100 INJECTION, SOLUTION SUBCUTANEOUS at 22:36

## 2018-01-01 RX ADMIN — MELATONIN TAB 3 MG 6 MG: 3 TAB at 22:51

## 2018-01-01 RX ADMIN — CEPHALEXIN 250 MG: 250 CAPSULE ORAL at 13:45

## 2018-01-01 RX ADMIN — ASPIRIN 81 MG: 81 TABLET, COATED ORAL at 09:09

## 2018-01-01 RX ADMIN — METOPROLOL SUCCINATE 50 MG: 50 TABLET, EXTENDED RELEASE ORAL at 20:47

## 2018-01-01 RX ADMIN — HEPARIN SODIUM 5000 UNITS: 5000 INJECTION, SOLUTION INTRAVENOUS; SUBCUTANEOUS at 13:37

## 2018-01-01 RX ADMIN — METOLAZONE 10 MG: 5 TABLET ORAL at 08:20

## 2018-01-01 RX ADMIN — INSULIN LISPRO 12 UNITS: 100 INJECTION, SOLUTION INTRAVENOUS; SUBCUTANEOUS at 09:12

## 2018-01-01 RX ADMIN — INSULIN GLARGINE 20 UNITS: 100 INJECTION, SOLUTION SUBCUTANEOUS at 21:53

## 2018-01-01 RX ADMIN — BUMETANIDE 3 MG: 0.25 INJECTION INTRAMUSCULAR; INTRAVENOUS at 14:17

## 2018-01-01 RX ADMIN — METOLAZONE 10 MG: 5 TABLET ORAL at 08:33

## 2018-01-01 RX ADMIN — MELATONIN TAB 3 MG 6 MG: 3 TAB at 22:22

## 2018-01-01 RX ADMIN — INSULIN LISPRO 2 UNITS: 100 INJECTION, SOLUTION INTRAVENOUS; SUBCUTANEOUS at 17:51

## 2018-01-01 RX ADMIN — DOCUSATE SODIUM 100 MG: 100 CAPSULE, LIQUID FILLED ORAL at 17:09

## 2018-01-01 RX ADMIN — DOCUSATE SODIUM 100 MG: 100 CAPSULE, LIQUID FILLED ORAL at 17:25

## 2018-01-01 RX ADMIN — ALBUMIN HUMAN 25 G: 0.05 INJECTION, SOLUTION INTRAVENOUS at 07:58

## 2018-01-01 RX ADMIN — TAMSULOSIN HYDROCHLORIDE 0.4 MG: 0.4 CAPSULE ORAL at 19:35

## 2018-01-01 RX ADMIN — ATORVASTATIN CALCIUM 40 MG: 40 TABLET, FILM COATED ORAL at 16:53

## 2018-01-01 RX ADMIN — METOLAZONE 10 MG: 5 TABLET ORAL at 22:26

## 2018-01-01 RX ADMIN — TAMSULOSIN HYDROCHLORIDE 0.4 MG: 0.4 CAPSULE ORAL at 17:17

## 2018-01-01 RX ADMIN — Medication 1 TABLET: at 09:41

## 2018-01-01 RX ADMIN — METOPROLOL SUCCINATE 25 MG: 25 TABLET, EXTENDED RELEASE ORAL at 08:29

## 2018-01-01 RX ADMIN — ATORVASTATIN CALCIUM 40 MG: 40 TABLET, FILM COATED ORAL at 16:47

## 2018-01-01 RX ADMIN — RENAGEL 800 MG: 800 TABLET ORAL at 13:37

## 2018-01-01 RX ADMIN — METOPROLOL SUCCINATE 25 MG: 25 TABLET, EXTENDED RELEASE ORAL at 21:53

## 2018-01-01 RX ADMIN — METOLAZONE 10 MG: 5 TABLET ORAL at 13:05

## 2018-01-01 RX ADMIN — INSULIN LISPRO 2 UNITS: 100 INJECTION, SOLUTION INTRAVENOUS; SUBCUTANEOUS at 13:11

## 2018-01-01 RX ADMIN — ASPIRIN 81 MG: 81 TABLET, COATED ORAL at 08:41

## 2018-01-01 RX ADMIN — ACETAMINOPHEN 650 MG: 325 TABLET, FILM COATED ORAL at 20:53

## 2018-01-01 RX ADMIN — DOCUSATE SODIUM 100 MG: 100 CAPSULE, LIQUID FILLED ORAL at 08:37

## 2018-01-01 RX ADMIN — MELATONIN 3 MG: 3 TAB ORAL at 21:31

## 2018-01-01 RX ADMIN — HEPARIN SODIUM 5000 UNITS: 5000 INJECTION, SOLUTION INTRAVENOUS; SUBCUTANEOUS at 13:45

## 2018-01-01 RX ADMIN — HEPARIN SODIUM 5000 UNITS: 5000 INJECTION, SOLUTION INTRAVENOUS; SUBCUTANEOUS at 06:22

## 2018-01-01 RX ADMIN — INSULIN LISPRO 2 UNITS: 100 INJECTION, SOLUTION INTRAVENOUS; SUBCUTANEOUS at 18:29

## 2018-01-01 RX ADMIN — INSULIN LISPRO 1 UNITS: 100 INJECTION, SOLUTION INTRAVENOUS; SUBCUTANEOUS at 21:45

## 2018-01-01 RX ADMIN — HEPARIN SODIUM 5000 UNITS: 5000 INJECTION, SOLUTION INTRAVENOUS; SUBCUTANEOUS at 13:07

## 2018-01-01 RX ADMIN — TAMSULOSIN HYDROCHLORIDE 0.4 MG: 0.4 CAPSULE ORAL at 16:34

## 2018-01-01 RX ADMIN — DOCUSATE SODIUM 100 MG: 100 CAPSULE, LIQUID FILLED ORAL at 17:45

## 2018-01-01 RX ADMIN — ATORVASTATIN CALCIUM 40 MG: 40 TABLET, FILM COATED ORAL at 17:09

## 2018-01-01 RX ADMIN — RENAGEL 800 MG: 800 TABLET ORAL at 09:13

## 2018-01-01 RX ADMIN — ATORVASTATIN CALCIUM 40 MG: 40 TABLET, FILM COATED ORAL at 16:50

## 2018-01-01 RX ADMIN — INSULIN LISPRO 1 UNITS: 100 INJECTION, SOLUTION INTRAVENOUS; SUBCUTANEOUS at 08:27

## 2018-01-01 RX ADMIN — INSULIN LISPRO 1 UNITS: 100 INJECTION, SOLUTION INTRAVENOUS; SUBCUTANEOUS at 12:24

## 2018-01-01 RX ADMIN — INSULIN LISPRO 2 UNITS: 100 INJECTION, SOLUTION INTRAVENOUS; SUBCUTANEOUS at 22:40

## 2018-01-01 RX ADMIN — TOLVAPTAN 15 MG: 15 TABLET ORAL at 15:03

## 2018-01-01 RX ADMIN — METOLAZONE 5 MG: 5 TABLET ORAL at 18:26

## 2018-01-01 RX ADMIN — DOCUSATE SODIUM 100 MG: 100 CAPSULE, LIQUID FILLED ORAL at 08:43

## 2018-01-01 RX ADMIN — Medication 2 MG/HR: at 01:04

## 2018-01-01 RX ADMIN — INSULIN LISPRO 2 UNITS: 100 INJECTION, SOLUTION INTRAVENOUS; SUBCUTANEOUS at 21:10

## 2018-01-01 RX ADMIN — ACETAMINOPHEN 975 MG: 325 TABLET, FILM COATED ORAL at 21:47

## 2018-01-01 RX ADMIN — HEPARIN SODIUM 5000 UNITS: 5000 INJECTION, SOLUTION INTRAVENOUS; SUBCUTANEOUS at 21:58

## 2018-01-01 RX ADMIN — Medication 2 MG/HR: at 00:28

## 2018-01-01 RX ADMIN — ATROPINE SULFATE 0.4 MG: 0.1 INJECTION, SOLUTION ENDOTRACHEAL; INTRAMUSCULAR; INTRAVENOUS; SUBCUTANEOUS at 07:24

## 2018-01-01 RX ADMIN — ACETAMINOPHEN 975 MG: 325 TABLET, FILM COATED ORAL at 13:18

## 2018-01-01 RX ADMIN — ACETAMINOPHEN 975 MG: 325 TABLET, FILM COATED ORAL at 05:08

## 2018-01-01 RX ADMIN — Medication 1 TABLET: at 09:04

## 2018-01-01 RX ADMIN — HEPARIN SODIUM 11.1 UNITS/KG/HR: 10000 INJECTION, SOLUTION INTRAVENOUS at 17:28

## 2018-01-01 RX ADMIN — Medication 1 MG/HR: at 22:10

## 2018-01-01 RX ADMIN — PANTOPRAZOLE SODIUM 40 MG: 40 TABLET, DELAYED RELEASE ORAL at 05:19

## 2018-01-01 RX ADMIN — Medication 1 MG/HR: at 10:35

## 2018-01-01 RX ADMIN — INSULIN LISPRO 10 UNITS: 100 INJECTION, SOLUTION INTRAVENOUS; SUBCUTANEOUS at 17:46

## 2018-01-01 RX ADMIN — METOPROLOL SUCCINATE 25 MG: 25 TABLET, EXTENDED RELEASE ORAL at 22:20

## 2018-01-01 RX ADMIN — SODIUM BICARBONATE 50 MEQ: 84 INJECTION, SOLUTION INTRAVENOUS at 07:28

## 2018-01-01 RX ADMIN — DOXERCALCIFEROL 0.5 MCG: 0.5 CAPSULE ORAL at 08:54

## 2018-01-01 RX ADMIN — HEPARIN SODIUM 5000 UNITS: 5000 INJECTION, SOLUTION INTRAVENOUS; SUBCUTANEOUS at 21:36

## 2018-01-01 RX ADMIN — INSULIN GLARGINE 25 UNITS: 100 INJECTION, SOLUTION SUBCUTANEOUS at 21:25

## 2018-01-01 RX ADMIN — MELATONIN TAB 3 MG 6 MG: 3 TAB at 21:24

## 2018-01-01 RX ADMIN — PANTOPRAZOLE SODIUM 40 MG: 40 TABLET, DELAYED RELEASE ORAL at 05:57

## 2018-01-01 RX ADMIN — PANTOPRAZOLE SODIUM 40 MG: 40 TABLET, DELAYED RELEASE ORAL at 06:37

## 2018-01-01 RX ADMIN — METOPROLOL TARTRATE 50 MG: 50 TABLET ORAL at 17:09

## 2018-01-01 RX ADMIN — PANTOPRAZOLE SODIUM 20 MG: 20 TABLET, DELAYED RELEASE ORAL at 05:44

## 2018-01-01 RX ADMIN — MELATONIN TAB 3 MG 6 MG: 3 TAB at 21:31

## 2018-01-01 RX ADMIN — DOCUSATE SODIUM 100 MG: 100 CAPSULE, LIQUID FILLED ORAL at 08:20

## 2018-01-01 RX ADMIN — METOLAZONE 10 MG: 5 TABLET ORAL at 08:53

## 2018-01-01 RX ADMIN — METOPROLOL TARTRATE 50 MG: 50 TABLET ORAL at 17:14

## 2018-01-01 RX ADMIN — PANTOPRAZOLE SODIUM 20 MG: 20 TABLET, DELAYED RELEASE ORAL at 05:28

## 2018-01-01 RX ADMIN — TAMSULOSIN HYDROCHLORIDE 0.4 MG: 0.4 CAPSULE ORAL at 17:46

## 2018-01-01 RX ADMIN — INSULIN LISPRO 1 UNITS: 100 INJECTION, SOLUTION INTRAVENOUS; SUBCUTANEOUS at 18:07

## 2018-01-01 RX ADMIN — TAMSULOSIN HYDROCHLORIDE 0.4 MG: 0.4 CAPSULE ORAL at 16:47

## 2018-01-01 RX ADMIN — INSULIN LISPRO 2 UNITS: 100 INJECTION, SOLUTION INTRAVENOUS; SUBCUTANEOUS at 13:02

## 2018-01-01 RX ADMIN — ALBUMIN HUMAN 25 G: 0.25 SOLUTION INTRAVENOUS at 18:59

## 2018-01-01 RX ADMIN — ALBUMIN HUMAN 25 G: 0.25 SOLUTION INTRAVENOUS at 07:52

## 2018-01-01 RX ADMIN — RENAGEL 800 MG: 800 TABLET ORAL at 12:50

## 2018-01-01 RX ADMIN — HEPARIN SODIUM 5000 UNITS: 5000 INJECTION, SOLUTION INTRAVENOUS; SUBCUTANEOUS at 13:23

## 2018-01-01 RX ADMIN — ACETAZOLAMIDE 250 MG: 250 TABLET ORAL at 22:11

## 2018-01-01 RX ADMIN — INSULIN LISPRO 12 UNITS: 100 INJECTION, SOLUTION INTRAVENOUS; SUBCUTANEOUS at 13:14

## 2018-01-01 RX ADMIN — CEFAZOLIN SODIUM 1000 MG: 1 SOLUTION INTRAVENOUS at 16:29

## 2018-01-01 RX ADMIN — INSULIN LISPRO 2 UNITS: 100 INJECTION, SOLUTION INTRAVENOUS; SUBCUTANEOUS at 21:45

## 2018-01-01 RX ADMIN — MELATONIN 3 MG: 3 TAB ORAL at 22:26

## 2018-01-01 RX ADMIN — ACETAMINOPHEN 325 MG: 325 TABLET, FILM COATED ORAL at 22:41

## 2018-01-01 RX ADMIN — TORSEMIDE 40 MG: 20 TABLET ORAL at 08:02

## 2018-01-01 RX ADMIN — Medication 1 MG/HR: at 14:23

## 2018-01-01 RX ADMIN — INSULIN LISPRO 12 UNITS: 100 INJECTION, SOLUTION INTRAVENOUS; SUBCUTANEOUS at 17:07

## 2018-01-01 RX ADMIN — INSULIN LISPRO 1 UNITS: 100 INJECTION, SOLUTION INTRAVENOUS; SUBCUTANEOUS at 17:32

## 2018-01-01 RX ADMIN — INSULIN LISPRO 12 UNITS: 100 INJECTION, SOLUTION INTRAVENOUS; SUBCUTANEOUS at 08:35

## 2018-01-01 RX ADMIN — ACETAMINOPHEN 975 MG: 325 TABLET, FILM COATED ORAL at 11:13

## 2018-01-01 RX ADMIN — TAMSULOSIN HYDROCHLORIDE 0.4 MG: 0.4 CAPSULE ORAL at 17:28

## 2018-01-01 RX ADMIN — CEFAZOLIN SODIUM 1000 MG: 1 SOLUTION INTRAVENOUS at 18:02

## 2018-01-01 RX ADMIN — METOPROLOL SUCCINATE 50 MG: 50 TABLET, EXTENDED RELEASE ORAL at 21:31

## 2018-01-01 RX ADMIN — INSULIN LISPRO 1 UNITS: 100 INJECTION, SOLUTION INTRAVENOUS; SUBCUTANEOUS at 21:40

## 2018-01-01 RX ADMIN — RENAGEL 800 MG: 800 TABLET ORAL at 08:05

## 2018-01-01 RX ADMIN — INSULIN GLARGINE 20 UNITS: 100 INJECTION, SOLUTION SUBCUTANEOUS at 21:38

## 2018-01-01 RX ADMIN — INSULIN LISPRO 1 UNITS: 100 INJECTION, SOLUTION INTRAVENOUS; SUBCUTANEOUS at 16:37

## 2018-01-01 RX ADMIN — HEPARIN SODIUM 5000 UNITS: 5000 INJECTION, SOLUTION INTRAVENOUS; SUBCUTANEOUS at 06:01

## 2018-01-01 RX ADMIN — ASPIRIN 81 MG: 81 TABLET, COATED ORAL at 08:28

## 2018-01-01 RX ADMIN — CEPHALEXIN 250 MG: 250 CAPSULE ORAL at 22:13

## 2018-01-01 RX ADMIN — METOLAZONE 10 MG: 5 TABLET ORAL at 12:51

## 2018-01-01 RX ADMIN — INSULIN LISPRO 1 UNITS: 100 INJECTION, SOLUTION INTRAVENOUS; SUBCUTANEOUS at 11:53

## 2018-01-01 RX ADMIN — INSULIN LISPRO 12 UNITS: 100 INJECTION, SOLUTION INTRAVENOUS; SUBCUTANEOUS at 09:14

## 2018-01-01 RX ADMIN — PANTOPRAZOLE SODIUM 40 MG: 40 TABLET, DELAYED RELEASE ORAL at 05:50

## 2018-01-01 RX ADMIN — MELATONIN TAB 3 MG 6 MG: 3 TAB at 21:41

## 2018-01-01 RX ADMIN — AMOXICILLIN 500 MG: 250 CAPSULE ORAL at 09:41

## 2018-01-01 RX ADMIN — INSULIN LISPRO 10 UNITS: 100 INJECTION, SOLUTION INTRAVENOUS; SUBCUTANEOUS at 13:00

## 2018-01-01 RX ADMIN — EPINEPHRINE 1 MG: 0.1 INJECTION, SOLUTION ENDOTRACHEAL; INTRACARDIAC; INTRAVENOUS at 07:29

## 2018-01-01 RX ADMIN — SENNOSIDES 8.6 MG: 8.6 TABLET, FILM COATED ORAL at 08:01

## 2018-01-01 RX ADMIN — INSULIN LISPRO 12 UNITS: 100 INJECTION, SOLUTION INTRAVENOUS; SUBCUTANEOUS at 08:21

## 2018-01-01 RX ADMIN — METOLAZONE 10 MG: 5 TABLET ORAL at 17:34

## 2018-01-01 RX ADMIN — POTASSIUM CHLORIDE 40 MEQ: 1500 TABLET, EXTENDED RELEASE ORAL at 17:46

## 2018-01-01 RX ADMIN — RENAGEL 800 MG: 800 TABLET ORAL at 08:45

## 2018-01-01 RX ADMIN — ASPIRIN 81 MG: 81 TABLET, COATED ORAL at 10:08

## 2018-01-01 RX ADMIN — METOLAZONE 10 MG: 5 TABLET ORAL at 19:48

## 2018-01-01 RX ADMIN — POTASSIUM CHLORIDE 40 MEQ: 1500 TABLET, EXTENDED RELEASE ORAL at 07:32

## 2018-01-01 RX ADMIN — INSULIN GLARGINE 20 UNITS: 100 INJECTION, SOLUTION SUBCUTANEOUS at 22:07

## 2018-01-01 RX ADMIN — TAMSULOSIN HYDROCHLORIDE 0.4 MG: 0.4 CAPSULE ORAL at 17:16

## 2018-01-01 RX ADMIN — INSULIN LISPRO 10 UNITS: 100 INJECTION, SOLUTION INTRAVENOUS; SUBCUTANEOUS at 08:35

## 2018-01-01 RX ADMIN — DOCUSATE SODIUM 100 MG: 100 CAPSULE, LIQUID FILLED ORAL at 19:34

## 2018-01-01 RX ADMIN — INSULIN LISPRO 10 UNITS: 100 INJECTION, SOLUTION INTRAVENOUS; SUBCUTANEOUS at 12:09

## 2018-01-01 RX ADMIN — DOCUSATE SODIUM 100 MG: 100 CAPSULE, LIQUID FILLED ORAL at 10:05

## 2018-01-01 RX ADMIN — HEPARIN SODIUM 5000 UNITS: 5000 INJECTION, SOLUTION INTRAVENOUS; SUBCUTANEOUS at 22:12

## 2018-01-01 RX ADMIN — TAMSULOSIN HYDROCHLORIDE 0.4 MG: 0.4 CAPSULE ORAL at 17:09

## 2018-01-01 RX ADMIN — ASPIRIN 81 MG: 81 TABLET, COATED ORAL at 08:45

## 2018-01-01 RX ADMIN — METOPROLOL SUCCINATE 12.5 MG: 25 TABLET, EXTENDED RELEASE ORAL at 09:41

## 2018-01-01 RX ADMIN — INSULIN GLARGINE 20 UNITS: 100 INJECTION, SOLUTION SUBCUTANEOUS at 22:38

## 2018-01-01 RX ADMIN — Medication 1 TABLET: at 10:20

## 2018-01-01 RX ADMIN — INSULIN LISPRO 2 UNITS: 100 INJECTION, SOLUTION INTRAVENOUS; SUBCUTANEOUS at 18:28

## 2018-01-01 RX ADMIN — FUROSEMIDE 60 MG: 10 INJECTION, SOLUTION INTRAMUSCULAR; INTRAVENOUS at 05:57

## 2018-01-01 RX ADMIN — ALBUMIN HUMAN 25 G: 0.25 SOLUTION INTRAVENOUS at 17:05

## 2018-01-01 RX ADMIN — RENAGEL 800 MG: 800 TABLET ORAL at 14:15

## 2018-01-01 RX ADMIN — HEPARIN SODIUM 5000 UNITS: 5000 INJECTION, SOLUTION INTRAVENOUS; SUBCUTANEOUS at 21:53

## 2018-01-01 RX ADMIN — DOCUSATE SODIUM 100 MG: 100 CAPSULE, LIQUID FILLED ORAL at 17:17

## 2018-01-01 RX ADMIN — MELATONIN TAB 3 MG 6 MG: 3 TAB at 22:19

## 2018-01-01 RX ADMIN — FUROSEMIDE 20 MG: 10 INJECTION, SOLUTION INTRAMUSCULAR; INTRAVENOUS at 07:13

## 2018-01-01 RX ADMIN — Medication 1 MG/HR: at 06:07

## 2018-01-01 RX ADMIN — CEPHALEXIN 250 MG: 250 CAPSULE ORAL at 21:24

## 2018-01-01 RX ADMIN — POTASSIUM CHLORIDE 40 MEQ: 1500 TABLET, EXTENDED RELEASE ORAL at 09:37

## 2018-01-01 RX ADMIN — ACETAMINOPHEN 975 MG: 325 TABLET, FILM COATED ORAL at 21:43

## 2018-01-01 RX ADMIN — ACETAMINOPHEN 975 MG: 325 TABLET, FILM COATED ORAL at 22:37

## 2018-01-01 RX ADMIN — PANTOPRAZOLE SODIUM 40 MG: 40 TABLET, DELAYED RELEASE ORAL at 05:07

## 2018-01-01 RX ADMIN — EPINEPHRINE 1 MG: 0.1 INJECTION, SOLUTION ENDOTRACHEAL; INTRACARDIAC; INTRAVENOUS at 07:35

## 2018-01-01 RX ADMIN — ASPIRIN 81 MG: 81 TABLET, COATED ORAL at 08:38

## 2018-01-01 RX ADMIN — ALLOPURINOL 200 MG: 100 TABLET ORAL at 12:22

## 2018-01-01 RX ADMIN — ASPIRIN 81 MG: 81 TABLET, COATED ORAL at 09:41

## 2018-01-01 RX ADMIN — HEPARIN SODIUM 5000 UNITS: 5000 INJECTION, SOLUTION INTRAVENOUS; SUBCUTANEOUS at 15:08

## 2018-01-01 RX ADMIN — FUROSEMIDE 80 MG: 10 INJECTION, SOLUTION INTRAMUSCULAR; INTRAVENOUS at 09:41

## 2018-01-01 RX ADMIN — Medication 1 TABLET: at 09:14

## 2018-01-01 RX ADMIN — INSULIN LISPRO 3 UNITS: 100 INJECTION, SOLUTION INTRAVENOUS; SUBCUTANEOUS at 19:27

## 2018-01-01 RX ADMIN — ALBUMIN HUMAN 25 G: 0.25 SOLUTION INTRAVENOUS at 17:02

## 2018-01-01 RX ADMIN — INSULIN LISPRO 1 UNITS: 100 INJECTION, SOLUTION INTRAVENOUS; SUBCUTANEOUS at 21:48

## 2018-01-01 RX ADMIN — POTASSIUM CHLORIDE 40 MEQ: 1500 TABLET, EXTENDED RELEASE ORAL at 17:25

## 2018-01-01 RX ADMIN — METOPROLOL SUCCINATE 50 MG: 50 TABLET, EXTENDED RELEASE ORAL at 10:13

## 2018-01-01 RX ADMIN — FUROSEMIDE 80 MG: 10 INJECTION, SOLUTION INTRAVENOUS at 10:09

## 2018-01-01 RX ADMIN — ALBUMIN HUMAN 25 G: 0.25 SOLUTION INTRAVENOUS at 10:15

## 2018-01-01 RX ADMIN — DOCUSATE SODIUM 100 MG: 100 CAPSULE, LIQUID FILLED ORAL at 17:01

## 2018-01-01 RX ADMIN — ALBUMIN HUMAN 25 G: 0.25 SOLUTION INTRAVENOUS at 15:33

## 2018-01-01 RX ADMIN — INSULIN GLARGINE 15 UNITS: 100 INJECTION, SOLUTION SUBCUTANEOUS at 22:48

## 2018-01-01 RX ADMIN — Medication 2 MG/HR: at 21:00

## 2018-01-01 RX ADMIN — POTASSIUM CHLORIDE 30 MEQ: 1500 TABLET, EXTENDED RELEASE ORAL at 11:00

## 2018-01-01 RX ADMIN — ACETAMINOPHEN 975 MG: 325 TABLET, FILM COATED ORAL at 14:10

## 2018-01-01 RX ADMIN — INSULIN GLARGINE 30 UNITS: 100 INJECTION, SOLUTION SUBCUTANEOUS at 22:45

## 2018-01-01 RX ADMIN — METOLAZONE 10 MG: 5 TABLET ORAL at 13:27

## 2018-01-01 RX ADMIN — DOCUSATE SODIUM 100 MG: 100 CAPSULE, LIQUID FILLED ORAL at 09:04

## 2018-01-01 RX ADMIN — INSULIN LISPRO 1 UNITS: 100 INJECTION, SOLUTION INTRAVENOUS; SUBCUTANEOUS at 22:43

## 2018-01-01 RX ADMIN — INSULIN LISPRO 1 UNITS: 100 INJECTION, SOLUTION INTRAVENOUS; SUBCUTANEOUS at 19:28

## 2018-01-01 RX ADMIN — RENAGEL 800 MG: 800 TABLET ORAL at 09:09

## 2018-01-01 RX ADMIN — INSULIN LISPRO 3 UNITS: 100 INJECTION, SOLUTION INTRAVENOUS; SUBCUTANEOUS at 13:09

## 2018-01-01 RX ADMIN — ACETAMINOPHEN 975 MG: 325 TABLET, FILM COATED ORAL at 22:06

## 2018-01-01 RX ADMIN — METOPROLOL SUCCINATE 50 MG: 50 TABLET, EXTENDED RELEASE ORAL at 09:48

## 2018-01-01 RX ADMIN — INSULIN GLARGINE 20 UNITS: 100 INJECTION, SOLUTION SUBCUTANEOUS at 21:45

## 2018-01-01 RX ADMIN — PANTOPRAZOLE SODIUM 40 MG: 40 TABLET, DELAYED RELEASE ORAL at 05:06

## 2018-01-01 RX ADMIN — Medication 1 TABLET: at 08:23

## 2018-01-01 RX ADMIN — DOCUSATE SODIUM 100 MG: 100 CAPSULE, LIQUID FILLED ORAL at 08:53

## 2018-01-01 RX ADMIN — INSULIN LISPRO 1 UNITS: 100 INJECTION, SOLUTION INTRAVENOUS; SUBCUTANEOUS at 13:26

## 2018-01-01 RX ADMIN — PANTOPRAZOLE SODIUM 40 MG: 40 TABLET, DELAYED RELEASE ORAL at 06:22

## 2018-01-01 RX ADMIN — ASPIRIN 81 MG: 81 TABLET, COATED ORAL at 08:01

## 2018-01-01 RX ADMIN — INSULIN LISPRO 10 UNITS: 100 INJECTION, SOLUTION INTRAVENOUS; SUBCUTANEOUS at 13:57

## 2018-01-01 RX ADMIN — DOCUSATE SODIUM 100 MG: 100 CAPSULE, LIQUID FILLED ORAL at 09:10

## 2018-01-01 RX ADMIN — POTASSIUM CHLORIDE 40 MEQ: 1500 TABLET, EXTENDED RELEASE ORAL at 09:46

## 2018-01-01 RX ADMIN — RENAGEL 800 MG: 800 TABLET ORAL at 09:05

## 2018-01-01 RX ADMIN — TAMSULOSIN HYDROCHLORIDE 0.4 MG: 0.4 CAPSULE ORAL at 17:01

## 2018-01-01 RX ADMIN — AMIODARONE HYDROCHLORIDE 150 MG: 50 INJECTION, SOLUTION INTRAVENOUS at 07:38

## 2018-01-01 RX ADMIN — ACETAMINOPHEN 975 MG: 325 TABLET, FILM COATED ORAL at 16:52

## 2018-01-01 RX ADMIN — TOLVAPTAN 15 MG: 15 TABLET ORAL at 13:19

## 2018-01-01 RX ADMIN — TORSEMIDE 40 MG: 20 TABLET ORAL at 08:24

## 2018-01-01 RX ADMIN — ASPIRIN 81 MG: 81 TABLET, COATED ORAL at 08:43

## 2018-01-01 RX ADMIN — MELATONIN 3 MG: 3 TAB ORAL at 21:06

## 2018-01-01 RX ADMIN — Medication 250 MG: at 08:54

## 2018-01-01 RX ADMIN — INSULIN LISPRO 4 UNITS: 100 INJECTION, SOLUTION INTRAVENOUS; SUBCUTANEOUS at 17:29

## 2018-01-01 RX ADMIN — INSULIN LISPRO 12 UNITS: 100 INJECTION, SOLUTION INTRAVENOUS; SUBCUTANEOUS at 17:28

## 2018-01-01 RX ADMIN — ATORVASTATIN CALCIUM 40 MG: 40 TABLET, FILM COATED ORAL at 17:38

## 2018-01-01 RX ADMIN — TAMSULOSIN HYDROCHLORIDE 0.4 MG: 0.4 CAPSULE ORAL at 17:05

## 2018-01-01 RX ADMIN — Medication 1 MG/HR: at 10:39

## 2018-01-01 RX ADMIN — INSULIN LISPRO 1 UNITS: 100 INJECTION, SOLUTION INTRAVENOUS; SUBCUTANEOUS at 09:16

## 2018-01-01 RX ADMIN — INSULIN GLARGINE 30 UNITS: 100 INJECTION, SOLUTION SUBCUTANEOUS at 21:53

## 2018-01-01 RX ADMIN — TORSEMIDE 40 MG: 20 TABLET ORAL at 11:33

## 2018-01-01 RX ADMIN — TOLVAPTAN 7.5 MG: 15 TABLET ORAL at 17:48

## 2018-01-01 RX ADMIN — METOPROLOL SUCCINATE 50 MG: 50 TABLET, EXTENDED RELEASE ORAL at 21:06

## 2018-01-01 RX ADMIN — INSULIN LISPRO 1 UNITS: 100 INJECTION, SOLUTION INTRAVENOUS; SUBCUTANEOUS at 08:29

## 2018-01-01 RX ADMIN — MELATONIN TAB 3 MG 6 MG: 3 TAB at 22:36

## 2018-01-01 RX ADMIN — DOCUSATE SODIUM 100 MG: 100 CAPSULE, LIQUID FILLED ORAL at 17:42

## 2018-01-01 RX ADMIN — HEPARIN SODIUM 5000 UNITS: 5000 INJECTION, SOLUTION INTRAVENOUS; SUBCUTANEOUS at 10:00

## 2018-01-01 RX ADMIN — Medication 3 G: at 07:20

## 2018-01-01 RX ADMIN — PANTOPRAZOLE SODIUM 40 MG: 40 TABLET, DELAYED RELEASE ORAL at 05:47

## 2018-01-01 RX ADMIN — TAMSULOSIN HYDROCHLORIDE 0.4 MG: 0.4 CAPSULE ORAL at 17:40

## 2018-01-01 RX ADMIN — INSULIN GLARGINE 10 UNITS: 100 INJECTION, SOLUTION SUBCUTANEOUS at 22:22

## 2018-01-01 RX ADMIN — INSULIN LISPRO 1 UNITS: 100 INJECTION, SOLUTION INTRAVENOUS; SUBCUTANEOUS at 21:54

## 2018-01-01 RX ADMIN — MELATONIN TAB 3 MG 6 MG: 3 TAB at 00:57

## 2018-01-01 RX ADMIN — DOCUSATE SODIUM 100 MG: 100 CAPSULE, LIQUID FILLED ORAL at 08:41

## 2018-01-01 RX ADMIN — METOPROLOL TARTRATE 50 MG: 50 TABLET ORAL at 22:00

## 2018-01-01 RX ADMIN — INSULIN GLARGINE 20 UNITS: 100 INJECTION, SOLUTION SUBCUTANEOUS at 22:10

## 2018-01-01 RX ADMIN — ONDANSETRON 4 MG: 2 INJECTION INTRAMUSCULAR; INTRAVENOUS at 22:22

## 2018-01-01 RX ADMIN — TORSEMIDE 40 MG: 20 TABLET ORAL at 09:13

## 2018-01-01 RX ADMIN — IBUPROFEN 600 MG: 600 TABLET ORAL at 12:45

## 2018-01-01 RX ADMIN — ACETAZOLAMIDE 250 MG: 250 TABLET ORAL at 05:19

## 2018-01-01 RX ADMIN — INSULIN LISPRO 10 UNITS: 100 INJECTION, SOLUTION INTRAVENOUS; SUBCUTANEOUS at 10:14

## 2018-01-01 RX ADMIN — Medication 1.5 MG/HR: at 04:18

## 2018-01-01 RX ADMIN — HEPARIN SODIUM 5000 UNITS: 5000 INJECTION, SOLUTION INTRAVENOUS; SUBCUTANEOUS at 06:31

## 2018-01-01 RX ADMIN — INSULIN LISPRO 2 UNITS: 100 INJECTION, SOLUTION INTRAVENOUS; SUBCUTANEOUS at 10:14

## 2018-01-01 RX ADMIN — ATORVASTATIN CALCIUM 40 MG: 40 TABLET, FILM COATED ORAL at 08:49

## 2018-01-01 RX ADMIN — CEPHALEXIN 250 MG: 250 CAPSULE ORAL at 19:14

## 2018-01-01 RX ADMIN — DOCUSATE SODIUM 100 MG: 100 CAPSULE, LIQUID FILLED ORAL at 17:33

## 2018-01-01 RX ADMIN — Medication 2 MG/HR: at 20:27

## 2018-01-01 RX ADMIN — INSULIN LISPRO 1 UNITS: 100 INJECTION, SOLUTION INTRAVENOUS; SUBCUTANEOUS at 21:07

## 2018-01-01 RX ADMIN — INSULIN LISPRO 12 UNITS: 100 INJECTION, SOLUTION INTRAVENOUS; SUBCUTANEOUS at 16:54

## 2018-01-01 RX ADMIN — ATORVASTATIN CALCIUM 40 MG: 40 TABLET, FILM COATED ORAL at 17:00

## 2018-01-01 RX ADMIN — Medication 2 MG/HR: at 04:52

## 2018-01-01 RX ADMIN — ATORVASTATIN CALCIUM 40 MG: 40 TABLET, FILM COATED ORAL at 16:54

## 2018-01-01 RX ADMIN — HEPARIN SODIUM 5000 UNITS: 5000 INJECTION, SOLUTION INTRAVENOUS; SUBCUTANEOUS at 21:17

## 2018-01-01 RX ADMIN — INSULIN LISPRO 2 UNITS: 100 INJECTION, SOLUTION INTRAVENOUS; SUBCUTANEOUS at 16:54

## 2018-01-01 RX ADMIN — ACETAMINOPHEN 975 MG: 325 TABLET, FILM COATED ORAL at 06:37

## 2018-01-01 RX ADMIN — RENAGEL 800 MG: 800 TABLET ORAL at 13:47

## 2018-01-01 RX ADMIN — HEPARIN SODIUM 5000 UNITS: 5000 INJECTION, SOLUTION INTRAVENOUS; SUBCUTANEOUS at 21:57

## 2018-01-01 RX ADMIN — METOPROLOL SUCCINATE 25 MG: 25 TABLET, EXTENDED RELEASE ORAL at 21:44

## 2018-01-01 RX ADMIN — RENAGEL 800 MG: 800 TABLET ORAL at 17:24

## 2018-01-01 RX ADMIN — POTASSIUM CHLORIDE 40 MEQ: 1500 TABLET, EXTENDED RELEASE ORAL at 16:34

## 2018-01-01 RX ADMIN — DOCUSATE SODIUM 100 MG: 100 CAPSULE, LIQUID FILLED ORAL at 08:01

## 2018-01-01 RX ADMIN — TAMSULOSIN HYDROCHLORIDE 0.4 MG: 0.4 CAPSULE ORAL at 17:24

## 2018-01-01 RX ADMIN — ALBUMIN HUMAN 25 G: 0.25 SOLUTION INTRAVENOUS at 02:09

## 2018-01-01 RX ADMIN — MELATONIN TAB 3 MG 6 MG: 3 TAB at 21:17

## 2018-01-01 RX ADMIN — AMOXICILLIN 500 MG: 250 CAPSULE ORAL at 21:38

## 2018-01-01 RX ADMIN — METOPROLOL TARTRATE 50 MG: 50 TABLET ORAL at 17:31

## 2018-01-01 RX ADMIN — Medication 2 MG/HR: at 01:30

## 2018-01-01 RX ADMIN — ASPIRIN 81 MG: 81 TABLET, COATED ORAL at 07:52

## 2018-01-01 RX ADMIN — FUROSEMIDE 80 MG: 10 INJECTION, SOLUTION INTRAVENOUS at 06:30

## 2018-01-01 RX ADMIN — HEPARIN SODIUM 5000 UNITS: 5000 INJECTION, SOLUTION INTRAVENOUS; SUBCUTANEOUS at 13:06

## 2018-01-01 RX ADMIN — INSULIN LISPRO 1 UNITS: 100 INJECTION, SOLUTION INTRAVENOUS; SUBCUTANEOUS at 08:59

## 2018-01-01 RX ADMIN — INSULIN GLARGINE 30 UNITS: 100 INJECTION, SOLUTION SUBCUTANEOUS at 21:17

## 2018-01-01 RX ADMIN — DOCUSATE SODIUM 100 MG: 100 CAPSULE, LIQUID FILLED ORAL at 08:29

## 2018-01-01 RX ADMIN — Medication 1 TABLET: at 08:49

## 2018-01-01 RX ADMIN — ATORVASTATIN CALCIUM 40 MG: 40 TABLET, FILM COATED ORAL at 17:07

## 2018-01-01 RX ADMIN — RENAGEL 800 MG: 800 TABLET ORAL at 07:57

## 2018-01-01 RX ADMIN — Medication 2 MG/HR: at 13:53

## 2018-01-01 RX ADMIN — ATORVASTATIN CALCIUM 40 MG: 40 TABLET, FILM COATED ORAL at 09:16

## 2018-01-01 RX ADMIN — DIPHENHYDRAMINE HCL 12.5 MG: 25 TABLET ORAL at 00:50

## 2018-01-01 RX ADMIN — INSULIN LISPRO 12 UNITS: 100 INJECTION, SOLUTION INTRAVENOUS; SUBCUTANEOUS at 17:39

## 2018-01-01 RX ADMIN — METOPROLOL SUCCINATE 50 MG: 50 TABLET, EXTENDED RELEASE ORAL at 08:28

## 2018-01-01 RX ADMIN — DOCUSATE SODIUM 100 MG: 100 CAPSULE, LIQUID FILLED ORAL at 17:00

## 2018-01-01 RX ADMIN — POTASSIUM CHLORIDE 10 MEQ: 750 TABLET, EXTENDED RELEASE ORAL at 17:48

## 2018-01-01 RX ADMIN — ONDANSETRON 4 MG: 2 INJECTION INTRAMUSCULAR; INTRAVENOUS at 15:39

## 2018-01-01 RX ADMIN — METOPROLOL SUCCINATE 50 MG: 50 TABLET, EXTENDED RELEASE ORAL at 21:35

## 2018-01-01 RX ADMIN — RENAGEL 800 MG: 800 TABLET ORAL at 08:37

## 2018-01-01 RX ADMIN — ASPIRIN 81 MG: 81 TABLET, COATED ORAL at 09:04

## 2018-01-01 RX ADMIN — INSULIN LISPRO 1 UNITS: 100 INJECTION, SOLUTION INTRAVENOUS; SUBCUTANEOUS at 08:21

## 2018-01-01 RX ADMIN — DOCUSATE SODIUM 100 MG: 100 CAPSULE, LIQUID FILLED ORAL at 17:16

## 2018-01-01 RX ADMIN — Medication 1 TABLET: at 09:16

## 2018-01-01 RX ADMIN — METOPROLOL SUCCINATE 50 MG: 50 TABLET, EXTENDED RELEASE ORAL at 21:28

## 2018-01-01 RX ADMIN — INSULIN LISPRO 1 UNITS: 100 INJECTION, SOLUTION INTRAVENOUS; SUBCUTANEOUS at 17:06

## 2018-01-01 RX ADMIN — ALBUMIN HUMAN 25 G: 0.25 SOLUTION INTRAVENOUS at 05:46

## 2018-01-01 RX ADMIN — DOCUSATE SODIUM 100 MG: 100 CAPSULE, LIQUID FILLED ORAL at 17:05

## 2018-01-01 RX ADMIN — MELATONIN TAB 3 MG 6 MG: 3 TAB at 21:44

## 2018-01-01 RX ADMIN — INSULIN LISPRO 2 UNITS: 100 INJECTION, SOLUTION INTRAVENOUS; SUBCUTANEOUS at 13:07

## 2018-01-01 RX ADMIN — INSULIN LISPRO 12 UNITS: 100 INJECTION, SOLUTION INTRAVENOUS; SUBCUTANEOUS at 17:51

## 2018-01-01 RX ADMIN — HEPARIN SODIUM 5000 UNITS: 5000 INJECTION, SOLUTION INTRAVENOUS; SUBCUTANEOUS at 05:36

## 2018-01-01 RX ADMIN — HEPARIN SODIUM 5000 UNITS: 5000 INJECTION, SOLUTION INTRAVENOUS; SUBCUTANEOUS at 14:04

## 2018-01-01 RX ADMIN — TAMSULOSIN HYDROCHLORIDE 0.4 MG: 0.4 CAPSULE ORAL at 17:42

## 2018-01-01 RX ADMIN — METOPROLOL SUCCINATE 50 MG: 50 TABLET, EXTENDED RELEASE ORAL at 22:17

## 2018-01-01 RX ADMIN — INSULIN LISPRO 10 UNITS: 100 INJECTION, SOLUTION INTRAVENOUS; SUBCUTANEOUS at 17:32

## 2018-01-01 RX ADMIN — INSULIN GLARGINE 25 UNITS: 100 INJECTION, SOLUTION SUBCUTANEOUS at 21:35

## 2018-01-01 RX ADMIN — MAGNESIUM SULFATE HEPTAHYDRATE 2 G: 500 INJECTION, SOLUTION INTRAMUSCULAR; INTRAVENOUS at 07:41

## 2018-01-01 RX ADMIN — Medication 40 MG/HR: at 05:55

## 2018-01-01 RX ADMIN — ATORVASTATIN CALCIUM 40 MG: 40 TABLET, FILM COATED ORAL at 17:46

## 2018-01-01 RX ADMIN — FUROSEMIDE 80 MG: 10 INJECTION, SOLUTION INTRAMUSCULAR; INTRAVENOUS at 08:28

## 2018-01-01 RX ADMIN — INSULIN LISPRO 1 UNITS: 100 INJECTION, SOLUTION INTRAVENOUS; SUBCUTANEOUS at 13:56

## 2018-01-01 RX ADMIN — INSULIN LISPRO 1 UNITS: 100 INJECTION, SOLUTION INTRAVENOUS; SUBCUTANEOUS at 17:57

## 2018-01-01 RX ADMIN — Medication 250 MG: at 17:00

## 2018-01-01 RX ADMIN — INSULIN LISPRO 2 UNITS: 100 INJECTION, SOLUTION INTRAVENOUS; SUBCUTANEOUS at 12:48

## 2018-01-01 RX ADMIN — PANTOPRAZOLE SODIUM 40 MG: 40 TABLET, DELAYED RELEASE ORAL at 05:11

## 2018-01-01 RX ADMIN — INSULIN LISPRO 10 UNITS: 100 INJECTION, SOLUTION INTRAVENOUS; SUBCUTANEOUS at 17:11

## 2018-01-01 RX ADMIN — TAMSULOSIN HYDROCHLORIDE 0.4 MG: 0.4 CAPSULE ORAL at 16:41

## 2018-01-01 RX ADMIN — INSULIN GLARGINE 25 UNITS: 100 INJECTION, SOLUTION SUBCUTANEOUS at 22:42

## 2018-01-01 RX ADMIN — ATORVASTATIN CALCIUM 40 MG: 40 TABLET, FILM COATED ORAL at 19:34

## 2018-01-01 RX ADMIN — Medication 1.5 MG/HR: at 19:52

## 2018-01-01 RX ADMIN — INSULIN LISPRO 12 UNITS: 100 INJECTION, SOLUTION INTRAVENOUS; SUBCUTANEOUS at 14:15

## 2018-01-01 RX ADMIN — METOPROLOL SUCCINATE 25 MG: 25 TABLET, EXTENDED RELEASE ORAL at 08:46

## 2018-01-01 RX ADMIN — SPIRONOLACTONE 50 MG: 25 TABLET, FILM COATED ORAL at 09:48

## 2018-01-01 RX ADMIN — INSULIN LISPRO 1 UNITS: 100 INJECTION, SOLUTION INTRAVENOUS; SUBCUTANEOUS at 18:51

## 2018-01-01 RX ADMIN — INSULIN LISPRO 2 UNITS: 100 INJECTION, SOLUTION INTRAVENOUS; SUBCUTANEOUS at 18:58

## 2018-01-01 RX ADMIN — HEPARIN SODIUM 5000 UNITS: 5000 INJECTION, SOLUTION INTRAVENOUS; SUBCUTANEOUS at 15:55

## 2018-01-01 RX ADMIN — METOPROLOL TARTRATE 50 MG: 50 TABLET ORAL at 21:12

## 2018-01-01 RX ADMIN — RENAGEL 800 MG: 800 TABLET ORAL at 17:27

## 2018-01-01 RX ADMIN — INSULIN LISPRO 1 UNITS: 100 INJECTION, SOLUTION INTRAVENOUS; SUBCUTANEOUS at 13:14

## 2018-01-01 RX ADMIN — INSULIN LISPRO 12 UNITS: 100 INJECTION, SOLUTION INTRAVENOUS; SUBCUTANEOUS at 08:42

## 2018-01-01 RX ADMIN — TORSEMIDE 40 MG: 20 TABLET ORAL at 09:05

## 2018-01-01 RX ADMIN — PANTOPRAZOLE SODIUM 40 MG: 40 TABLET, DELAYED RELEASE ORAL at 05:34

## 2018-01-01 RX ADMIN — AMOXICILLIN 500 MG: 250 CAPSULE ORAL at 09:04

## 2018-01-01 RX ADMIN — INSULIN LISPRO 1 UNITS: 100 INJECTION, SOLUTION INTRAVENOUS; SUBCUTANEOUS at 16:51

## 2018-01-01 RX ADMIN — DOCUSATE SODIUM 100 MG: 100 CAPSULE, LIQUID FILLED ORAL at 19:04

## 2018-01-01 RX ADMIN — HEPARIN SODIUM 5000 UNITS: 5000 INJECTION, SOLUTION INTRAVENOUS; SUBCUTANEOUS at 21:54

## 2018-01-01 RX ADMIN — HEPARIN SODIUM 11.1 UNITS/KG/HR: 10000 INJECTION, SOLUTION INTRAVENOUS at 13:40

## 2018-01-01 RX ADMIN — HEPARIN SODIUM 5000 UNITS: 5000 INJECTION, SOLUTION INTRAVENOUS; SUBCUTANEOUS at 21:40

## 2018-01-01 RX ADMIN — INSULIN LISPRO 1 UNITS: 100 INJECTION, SOLUTION INTRAVENOUS; SUBCUTANEOUS at 14:02

## 2018-01-01 RX ADMIN — METOPROLOL SUCCINATE 50 MG: 50 TABLET, EXTENDED RELEASE ORAL at 08:29

## 2018-01-01 RX ADMIN — INSULIN LISPRO 2 UNITS: 100 INJECTION, SOLUTION INTRAVENOUS; SUBCUTANEOUS at 12:51

## 2018-01-01 RX ADMIN — INSULIN LISPRO 1 UNITS: 100 INJECTION, SOLUTION INTRAVENOUS; SUBCUTANEOUS at 08:36

## 2018-01-01 RX ADMIN — ASPIRIN 81 MG: 81 TABLET, COATED ORAL at 10:00

## 2018-01-01 RX ADMIN — ATORVASTATIN CALCIUM 40 MG: 40 TABLET, FILM COATED ORAL at 17:31

## 2018-01-01 RX ADMIN — METOPROLOL TARTRATE 50 MG: 50 TABLET ORAL at 08:42

## 2018-01-01 RX ADMIN — RENAGEL 800 MG: 800 TABLET ORAL at 08:41

## 2018-01-01 RX ADMIN — Medication 1 MG/HR: at 20:33

## 2018-01-01 RX ADMIN — POTASSIUM CHLORIDE 40 MEQ: 1500 TABLET, EXTENDED RELEASE ORAL at 11:50

## 2018-01-01 RX ADMIN — NITROGLYCERIN 0.5 INCH: 20 OINTMENT TOPICAL at 07:05

## 2018-01-01 RX ADMIN — Medication 1 TABLET: at 10:13

## 2018-01-01 RX ADMIN — POTASSIUM CHLORIDE 40 MEQ: 1500 TABLET, EXTENDED RELEASE ORAL at 10:40

## 2018-01-01 RX ADMIN — INSULIN LISPRO 12 UNITS: 100 INJECTION, SOLUTION INTRAVENOUS; SUBCUTANEOUS at 08:39

## 2018-01-01 RX ADMIN — INSULIN LISPRO 2 UNITS: 100 INJECTION, SOLUTION INTRAVENOUS; SUBCUTANEOUS at 17:29

## 2018-01-01 RX ADMIN — INSULIN LISPRO 1 UNITS: 100 INJECTION, SOLUTION INTRAVENOUS; SUBCUTANEOUS at 18:17

## 2018-01-01 RX ADMIN — INSULIN LISPRO 12 UNITS: 100 INJECTION, SOLUTION INTRAVENOUS; SUBCUTANEOUS at 12:52

## 2018-01-01 RX ADMIN — Medication 1 MG/HR: at 11:30

## 2018-01-01 RX ADMIN — INSULIN LISPRO 1 UNITS: 100 INJECTION, SOLUTION INTRAVENOUS; SUBCUTANEOUS at 21:13

## 2018-01-01 RX ADMIN — POTASSIUM CHLORIDE 40 MEQ: 1500 TABLET, EXTENDED RELEASE ORAL at 17:17

## 2018-01-01 RX ADMIN — INSULIN LISPRO 10 UNITS: 100 INJECTION, SOLUTION INTRAVENOUS; SUBCUTANEOUS at 12:21

## 2018-01-01 RX ADMIN — ALLOPURINOL 200 MG: 100 TABLET ORAL at 08:49

## 2018-01-01 RX ADMIN — RENAGEL 800 MG: 800 TABLET ORAL at 18:56

## 2018-01-01 RX ADMIN — Medication 20 MG/HR: at 02:49

## 2018-01-01 RX ADMIN — TAMSULOSIN HYDROCHLORIDE 0.4 MG: 0.4 CAPSULE ORAL at 17:36

## 2018-01-01 RX ADMIN — Medication 2 MG/HR: at 16:11

## 2018-01-01 RX ADMIN — TAMSULOSIN HYDROCHLORIDE 0.4 MG: 0.4 CAPSULE ORAL at 16:36

## 2018-01-01 RX ADMIN — FUROSEMIDE 80 MG: 10 INJECTION, SOLUTION INTRAVENOUS at 17:46

## 2018-01-01 RX ADMIN — METOPROLOL SUCCINATE 50 MG: 50 TABLET, EXTENDED RELEASE ORAL at 22:37

## 2018-01-01 RX ADMIN — FUROSEMIDE 40 MG: 10 INJECTION, SOLUTION INTRAMUSCULAR; INTRAVENOUS at 13:30

## 2018-01-01 RX ADMIN — DOCUSATE SODIUM 100 MG: 100 CAPSULE, LIQUID FILLED ORAL at 08:02

## 2018-01-01 RX ADMIN — PANTOPRAZOLE SODIUM 40 MG: 40 TABLET, DELAYED RELEASE ORAL at 05:23

## 2018-01-01 RX ADMIN — METOPROLOL SUCCINATE 50 MG: 50 TABLET, EXTENDED RELEASE ORAL at 21:58

## 2018-01-01 RX ADMIN — Medication 1 TABLET: at 08:52

## 2018-01-01 RX ADMIN — ATORVASTATIN CALCIUM 40 MG: 40 TABLET, FILM COATED ORAL at 16:34

## 2018-01-01 RX ADMIN — INSULIN LISPRO 1 UNITS: 100 INJECTION, SOLUTION INTRAVENOUS; SUBCUTANEOUS at 08:48

## 2018-01-01 RX ADMIN — DOCUSATE SODIUM 100 MG: 100 CAPSULE, LIQUID FILLED ORAL at 17:30

## 2018-01-01 RX ADMIN — INSULIN GLARGINE 20 UNITS: 100 INJECTION, SOLUTION SUBCUTANEOUS at 21:43

## 2018-01-01 RX ADMIN — INSULIN GLARGINE 25 UNITS: 100 INJECTION, SOLUTION SUBCUTANEOUS at 21:30

## 2018-01-01 RX ADMIN — Medication 250 MG: at 09:16

## 2018-01-01 RX ADMIN — ALBUMIN HUMAN 12.5 G: 0.25 SOLUTION INTRAVENOUS at 17:31

## 2018-01-01 RX ADMIN — PANTOPRAZOLE SODIUM 40 MG: 40 TABLET, DELAYED RELEASE ORAL at 05:49

## 2018-01-01 RX ADMIN — INSULIN GLARGINE 30 UNITS: 100 INJECTION, SOLUTION SUBCUTANEOUS at 21:45

## 2018-01-01 RX ADMIN — RENAGEL 800 MG: 800 TABLET ORAL at 14:04

## 2018-01-01 RX ADMIN — FUROSEMIDE 80 MG: 10 INJECTION, SOLUTION INTRAMUSCULAR; INTRAVENOUS at 16:53

## 2018-01-01 RX ADMIN — Medication 40 MG/HR: at 10:48

## 2018-01-01 RX ADMIN — METOPROLOL SUCCINATE 50 MG: 50 TABLET, EXTENDED RELEASE ORAL at 22:42

## 2018-01-01 RX ADMIN — INSULIN LISPRO 3 UNITS: 100 INJECTION, SOLUTION INTRAVENOUS; SUBCUTANEOUS at 13:50

## 2018-01-01 RX ADMIN — Medication 250 MG: at 08:49

## 2018-01-01 RX ADMIN — RENAGEL 800 MG: 800 TABLET ORAL at 15:46

## 2018-01-01 RX ADMIN — DOCUSATE SODIUM 100 MG: 100 CAPSULE, LIQUID FILLED ORAL at 09:17

## 2018-01-01 RX ADMIN — EPINEPHRINE 1 MG: 0.1 INJECTION, SOLUTION ENDOTRACHEAL; INTRACARDIAC; INTRAVENOUS at 07:26

## 2018-01-01 RX ADMIN — ALBUMIN HUMAN 25 G: 0.25 SOLUTION INTRAVENOUS at 10:11

## 2018-01-01 RX ADMIN — TAMSULOSIN HYDROCHLORIDE 0.4 MG: 0.4 CAPSULE ORAL at 16:27

## 2018-01-01 RX ADMIN — ALLOPURINOL 200 MG: 100 TABLET ORAL at 08:54

## 2018-01-01 RX ADMIN — DOCUSATE SODIUM 100 MG: 100 CAPSULE, LIQUID FILLED ORAL at 17:43

## 2018-01-01 RX ADMIN — ASPIRIN 81 MG: 81 TABLET, COATED ORAL at 08:42

## 2018-01-01 RX ADMIN — ONDANSETRON 4 MG: 2 INJECTION INTRAMUSCULAR; INTRAVENOUS at 09:44

## 2018-01-01 RX ADMIN — ALBUMIN HUMAN 25 G: 0.25 SOLUTION INTRAVENOUS at 03:29

## 2018-01-01 RX ADMIN — Medication 1 TABLET: at 09:08

## 2018-01-01 RX ADMIN — INSULIN LISPRO 1 UNITS: 100 INJECTION, SOLUTION INTRAVENOUS; SUBCUTANEOUS at 11:49

## 2018-01-01 RX ADMIN — METOPROLOL SUCCINATE 12.5 MG: 25 TABLET, EXTENDED RELEASE ORAL at 08:48

## 2018-01-01 RX ADMIN — ACETAZOLAMIDE 250 MG: 250 TABLET ORAL at 17:50

## 2018-01-01 RX ADMIN — METOPROLOL SUCCINATE 25 MG: 25 TABLET, EXTENDED RELEASE ORAL at 09:13

## 2018-01-01 RX ADMIN — TAMSULOSIN HYDROCHLORIDE 0.4 MG: 0.4 CAPSULE ORAL at 17:20

## 2018-01-01 RX ADMIN — HEPARIN SODIUM 5000 UNITS: 5000 INJECTION, SOLUTION INTRAVENOUS; SUBCUTANEOUS at 22:38

## 2018-01-01 RX ADMIN — ASPIRIN 81 MG: 81 TABLET, COATED ORAL at 08:26

## 2018-01-01 RX ADMIN — MELATONIN 3 MG: 3 TAB ORAL at 21:54

## 2018-01-01 RX ADMIN — MELATONIN TAB 3 MG 6 MG: 3 TAB at 21:38

## 2018-01-01 RX ADMIN — HEPARIN SODIUM 5000 UNITS: 5000 INJECTION, SOLUTION INTRAVENOUS; SUBCUTANEOUS at 06:04

## 2018-01-01 RX ADMIN — DOCUSATE SODIUM 100 MG: 100 CAPSULE, LIQUID FILLED ORAL at 17:20

## 2018-01-01 RX ADMIN — Medication 1.5 MG/HR: at 22:34

## 2018-01-01 RX ADMIN — INSULIN GLARGINE 25 UNITS: 100 INJECTION, SOLUTION SUBCUTANEOUS at 21:57

## 2018-01-01 RX ADMIN — ATORVASTATIN CALCIUM 40 MG: 40 TABLET, FILM COATED ORAL at 17:47

## 2018-01-01 RX ADMIN — ASPIRIN 81 MG: 81 TABLET, COATED ORAL at 08:29

## 2018-01-01 RX ADMIN — Medication 1 TABLET: at 09:13

## 2018-01-01 RX ADMIN — INSULIN LISPRO 1 UNITS: 100 INJECTION, SOLUTION INTRAVENOUS; SUBCUTANEOUS at 21:44

## 2018-01-01 RX ADMIN — POTASSIUM CHLORIDE 40 MEQ: 1500 TABLET, EXTENDED RELEASE ORAL at 17:20

## 2018-01-01 RX ADMIN — ATORVASTATIN CALCIUM 40 MG: 40 TABLET, FILM COATED ORAL at 17:43

## 2018-01-01 RX ADMIN — ATORVASTATIN CALCIUM 40 MG: 40 TABLET, FILM COATED ORAL at 16:41

## 2018-01-01 RX ADMIN — METOPROLOL SUCCINATE 50 MG: 50 TABLET, EXTENDED RELEASE ORAL at 08:42

## 2018-01-01 RX ADMIN — ALBUMIN HUMAN 25 G: 0.25 SOLUTION INTRAVENOUS at 10:43

## 2018-01-01 RX ADMIN — ALBUMIN HUMAN 12.5 G: 0.25 SOLUTION INTRAVENOUS at 19:55

## 2018-01-01 RX ADMIN — MELATONIN TAB 3 MG 6 MG: 3 TAB at 21:58

## 2018-01-01 RX ADMIN — INSULIN LISPRO 12 UNITS: 100 INJECTION, SOLUTION INTRAVENOUS; SUBCUTANEOUS at 09:07

## 2018-01-01 RX ADMIN — ASPIRIN 81 MG: 81 TABLET, COATED ORAL at 09:16

## 2018-01-01 RX ADMIN — ACETAMINOPHEN 650 MG: 325 TABLET, FILM COATED ORAL at 05:38

## 2018-01-01 RX ADMIN — RENAGEL 800 MG: 800 TABLET ORAL at 17:43

## 2018-01-01 RX ADMIN — METOPROLOL TARTRATE 25 MG: 25 TABLET ORAL at 08:26

## 2018-01-01 RX ADMIN — DOCUSATE SODIUM 100 MG: 100 CAPSULE, LIQUID FILLED ORAL at 17:07

## 2018-01-01 RX ADMIN — PANTOPRAZOLE SODIUM 40 MG: 40 TABLET, DELAYED RELEASE ORAL at 05:56

## 2018-01-01 RX ADMIN — ALBUMIN HUMAN 25 G: 0.25 SOLUTION INTRAVENOUS at 10:02

## 2018-01-01 RX ADMIN — FUROSEMIDE 80 MG: 10 INJECTION, SOLUTION INTRAMUSCULAR; INTRAVENOUS at 13:17

## 2018-01-01 RX ADMIN — CEFAZOLIN SODIUM 1000 MG: 1 SOLUTION INTRAVENOUS at 05:56

## 2018-01-01 RX ADMIN — METOLAZONE 5 MG: 5 TABLET ORAL at 16:34

## 2018-01-01 RX ADMIN — TAMSULOSIN HYDROCHLORIDE 0.4 MG: 0.4 CAPSULE ORAL at 17:31

## 2018-01-01 RX ADMIN — RENAGEL 800 MG: 800 TABLET ORAL at 13:16

## 2018-01-01 RX ADMIN — HEPARIN SODIUM 5000 UNITS: 5000 INJECTION, SOLUTION INTRAVENOUS; SUBCUTANEOUS at 05:26

## 2018-01-01 RX ADMIN — Medication 2 MG/HR: at 16:22

## 2018-01-01 RX ADMIN — DOCUSATE SODIUM 100 MG: 100 CAPSULE, LIQUID FILLED ORAL at 07:58

## 2018-01-01 RX ADMIN — INSULIN LISPRO 12 UNITS: 100 INJECTION, SOLUTION INTRAVENOUS; SUBCUTANEOUS at 08:54

## 2018-01-01 RX ADMIN — Medication 40 MG/HR: at 23:47

## 2018-01-01 RX ADMIN — CALCIUM CHLORIDE 1 G: 100 INJECTION PARENTERAL at 07:28

## 2018-01-01 RX ADMIN — ASPIRIN 81 MG: 81 TABLET, COATED ORAL at 09:13

## 2018-01-01 RX ADMIN — HEPARIN SODIUM 5000 UNITS: 5000 INJECTION, SOLUTION INTRAVENOUS; SUBCUTANEOUS at 14:46

## 2018-01-01 RX ADMIN — INSULIN LISPRO 12 UNITS: 100 INJECTION, SOLUTION INTRAVENOUS; SUBCUTANEOUS at 19:34

## 2018-01-01 RX ADMIN — RENAGEL 800 MG: 800 TABLET ORAL at 17:17

## 2018-01-01 RX ADMIN — Medication 1 MG/HR: at 22:30

## 2018-01-01 RX ADMIN — ACETAMINOPHEN 975 MG: 325 TABLET, FILM COATED ORAL at 17:14

## 2018-01-01 RX ADMIN — RENAGEL 800 MG: 800 TABLET ORAL at 09:46

## 2018-01-01 RX ADMIN — Medication 1 MG/HR: at 12:17

## 2018-01-01 RX ADMIN — ASPIRIN 81 MG: 81 TABLET, COATED ORAL at 11:05

## 2018-01-01 RX ADMIN — HEPARIN SODIUM 5000 UNITS: 5000 INJECTION, SOLUTION INTRAVENOUS; SUBCUTANEOUS at 21:06

## 2018-01-01 RX ADMIN — INSULIN LISPRO 2 UNITS: 100 INJECTION, SOLUTION INTRAVENOUS; SUBCUTANEOUS at 12:11

## 2018-01-01 RX ADMIN — INSULIN LISPRO 12 UNITS: 100 INJECTION, SOLUTION INTRAVENOUS; SUBCUTANEOUS at 19:15

## 2018-01-01 RX ADMIN — ATORVASTATIN CALCIUM 40 MG: 40 TABLET, FILM COATED ORAL at 16:36

## 2018-01-01 RX ADMIN — ASPIRIN 81 MG: 81 TABLET, COATED ORAL at 08:24

## 2018-01-01 RX ADMIN — INSULIN GLARGINE 30 UNITS: 100 INJECTION, SOLUTION SUBCUTANEOUS at 21:57

## 2018-01-01 RX ADMIN — Medication 2 MG/HR: at 05:46

## 2018-01-01 RX ADMIN — METOLAZONE 10 MG: 5 TABLET ORAL at 21:54

## 2018-01-01 RX ADMIN — MELATONIN 3 MG: 3 TAB ORAL at 22:38

## 2018-01-01 RX ADMIN — METOPROLOL SUCCINATE 50 MG: 50 TABLET, EXTENDED RELEASE ORAL at 11:05

## 2018-01-01 RX ADMIN — INSULIN GLARGINE 20 UNITS: 100 INJECTION, SOLUTION SUBCUTANEOUS at 21:47

## 2018-01-01 RX ADMIN — INSULIN GLARGINE 20 UNITS: 100 INJECTION, SOLUTION SUBCUTANEOUS at 21:06

## 2018-01-01 RX ADMIN — EPINEPHRINE 1 MG: 0.1 INJECTION, SOLUTION ENDOTRACHEAL; INTRACARDIAC; INTRAVENOUS at 07:38

## 2018-01-01 RX ADMIN — Medication 250 MG: at 17:16

## 2018-01-01 RX ADMIN — MELATONIN TAB 3 MG 6 MG: 3 TAB at 21:36

## 2018-01-01 RX ADMIN — RENAGEL 800 MG: 800 TABLET ORAL at 16:52

## 2018-01-01 RX ADMIN — PANTOPRAZOLE SODIUM 40 MG: 40 TABLET, DELAYED RELEASE ORAL at 06:01

## 2018-01-01 RX ADMIN — RENAGEL 800 MG: 800 TABLET ORAL at 16:53

## 2018-01-01 RX ADMIN — SODIUM BICARBONATE 50 MEQ: 84 INJECTION, SOLUTION INTRAVENOUS at 07:33

## 2018-01-01 RX ADMIN — HEPARIN SODIUM 5000 UNITS: 5000 INJECTION, SOLUTION INTRAVENOUS; SUBCUTANEOUS at 05:08

## 2018-01-01 RX ADMIN — DOCUSATE SODIUM 100 MG: 100 CAPSULE, LIQUID FILLED ORAL at 17:50

## 2018-01-01 RX ADMIN — INSULIN LISPRO 4 UNITS: 100 INJECTION, SOLUTION INTRAVENOUS; SUBCUTANEOUS at 17:55

## 2018-01-01 RX ADMIN — ACETAZOLAMIDE 250 MG: 250 TABLET ORAL at 05:36

## 2018-01-01 RX ADMIN — INSULIN LISPRO 1 UNITS: 100 INJECTION, SOLUTION INTRAVENOUS; SUBCUTANEOUS at 13:59

## 2018-01-01 RX ADMIN — Medication 2 MG/HR: at 17:31

## 2018-01-01 RX ADMIN — METOPROLOL TARTRATE 50 MG: 50 TABLET ORAL at 16:47

## 2018-01-01 RX ADMIN — HEPARIN SODIUM 5000 UNITS: 5000 INJECTION, SOLUTION INTRAVENOUS; SUBCUTANEOUS at 05:06

## 2018-01-01 RX ADMIN — ASPIRIN 81 MG: 81 TABLET, COATED ORAL at 08:22

## 2018-01-01 RX ADMIN — METOPROLOL TARTRATE 25 MG: 25 TABLET ORAL at 18:26

## 2018-01-01 RX ADMIN — INSULIN LISPRO 1 UNITS: 100 INJECTION, SOLUTION INTRAVENOUS; SUBCUTANEOUS at 10:21

## 2018-01-01 RX ADMIN — CEPHALEXIN 250 MG: 250 CAPSULE ORAL at 05:34

## 2018-01-01 RX ADMIN — EPINEPHRINE 1 MG: 0.1 INJECTION, SOLUTION ENDOTRACHEAL; INTRACARDIAC; INTRAVENOUS at 07:32

## 2018-01-01 RX ADMIN — RENAGEL 800 MG: 800 TABLET ORAL at 13:05

## 2018-01-01 RX ADMIN — ALBUMIN HUMAN 25 G: 0.25 SOLUTION INTRAVENOUS at 09:40

## 2018-01-01 RX ADMIN — INSULIN LISPRO 1 UNITS: 100 INJECTION, SOLUTION INTRAVENOUS; SUBCUTANEOUS at 08:46

## 2018-01-01 RX ADMIN — INSULIN GLARGINE 20 UNITS: 100 INJECTION, SOLUTION SUBCUTANEOUS at 21:58

## 2018-01-01 RX ADMIN — AMOXICILLIN 500 MG: 250 CAPSULE ORAL at 09:15

## 2018-01-01 RX ADMIN — MELATONIN TAB 3 MG 6 MG: 3 TAB at 22:10

## 2018-01-01 RX ADMIN — ACETAMINOPHEN 975 MG: 325 TABLET, FILM COATED ORAL at 05:45

## 2018-01-01 RX ADMIN — ACETAMINOPHEN 975 MG: 325 TABLET, FILM COATED ORAL at 05:54

## 2018-01-01 RX ADMIN — BUMETANIDE 2 MG: 0.25 INJECTION INTRAMUSCULAR; INTRAVENOUS at 11:27

## 2018-01-01 RX ADMIN — ATORVASTATIN CALCIUM 40 MG: 40 TABLET, FILM COATED ORAL at 08:01

## 2018-01-01 RX ADMIN — Medication 40 MG/HR: at 21:56

## 2018-01-01 RX ADMIN — TAMSULOSIN HYDROCHLORIDE 0.4 MG: 0.4 CAPSULE ORAL at 17:33

## 2018-01-01 RX ADMIN — INSULIN LISPRO 1 UNITS: 100 INJECTION, SOLUTION INTRAVENOUS; SUBCUTANEOUS at 17:21

## 2018-01-01 RX ADMIN — ALBUMIN HUMAN 12.5 G: 0.25 SOLUTION INTRAVENOUS at 02:46

## 2018-01-01 RX ADMIN — HEPARIN SODIUM 5000 UNITS: 5000 INJECTION, SOLUTION INTRAVENOUS; SUBCUTANEOUS at 05:04

## 2018-01-01 RX ADMIN — ACETAMINOPHEN 650 MG: 325 TABLET ORAL at 22:35

## 2018-01-01 RX ADMIN — BUMETANIDE 1 MG: 0.25 INJECTION INTRAMUSCULAR; INTRAVENOUS at 00:42

## 2018-01-01 RX ADMIN — RENAGEL 800 MG: 800 TABLET ORAL at 08:33

## 2018-01-01 RX ADMIN — HEPARIN SODIUM 5000 UNITS: 5000 INJECTION, SOLUTION INTRAVENOUS; SUBCUTANEOUS at 21:30

## 2018-01-01 RX ADMIN — METOLAZONE 10 MG: 5 TABLET ORAL at 07:58

## 2018-01-01 RX ADMIN — INSULIN LISPRO 10 UNITS: 100 INJECTION, SOLUTION INTRAVENOUS; SUBCUTANEOUS at 19:28

## 2018-01-01 RX ADMIN — POTASSIUM CHLORIDE 40 MEQ: 1500 TABLET, EXTENDED RELEASE ORAL at 10:02

## 2018-01-01 RX ADMIN — MELATONIN 3 MG: 3 TAB ORAL at 22:18

## 2018-01-01 RX ADMIN — INSULIN LISPRO 12 UNITS: 100 INJECTION, SOLUTION INTRAVENOUS; SUBCUTANEOUS at 08:46

## 2018-01-01 RX ADMIN — INSULIN LISPRO 12 UNITS: 100 INJECTION, SOLUTION INTRAVENOUS; SUBCUTANEOUS at 13:09

## 2018-01-01 RX ADMIN — DOCUSATE SODIUM 100 MG: 100 CAPSULE, LIQUID FILLED ORAL at 17:47

## 2018-01-01 RX ADMIN — METOPROLOL SUCCINATE 25 MG: 25 TABLET, EXTENDED RELEASE ORAL at 08:22

## 2018-01-01 RX ADMIN — AMOXICILLIN 500 MG: 250 CAPSULE ORAL at 22:10

## 2018-01-01 RX ADMIN — METOPROLOL SUCCINATE 50 MG: 50 TABLET, EXTENDED RELEASE ORAL at 09:32

## 2018-01-01 RX ADMIN — DOCUSATE SODIUM 100 MG: 100 CAPSULE, LIQUID FILLED ORAL at 17:24

## 2018-01-01 RX ADMIN — HEPARIN SODIUM 5000 UNITS: 5000 INJECTION, SOLUTION INTRAVENOUS; SUBCUTANEOUS at 00:52

## 2018-01-01 RX ADMIN — RENAGEL 800 MG: 800 TABLET ORAL at 12:10

## 2018-01-01 RX ADMIN — RENAGEL 800 MG: 800 TABLET ORAL at 13:09

## 2018-01-01 RX ADMIN — HEPARIN SODIUM 5000 UNITS: 5000 INJECTION, SOLUTION INTRAVENOUS; SUBCUTANEOUS at 13:09

## 2018-01-01 RX ADMIN — ATORVASTATIN CALCIUM 40 MG: 40 TABLET, FILM COATED ORAL at 17:29

## 2018-01-01 RX ADMIN — ATORVASTATIN CALCIUM 40 MG: 40 TABLET, FILM COATED ORAL at 08:54

## 2018-01-01 RX ADMIN — METOLAZONE 10 MG: 5 TABLET ORAL at 21:35

## 2018-01-01 RX ADMIN — RENAGEL 800 MG: 800 TABLET ORAL at 08:56

## 2018-01-01 RX ADMIN — MELATONIN 3 MG: 3 TAB ORAL at 21:43

## 2018-01-01 RX ADMIN — RENAGEL 800 MG: 800 TABLET ORAL at 10:12

## 2018-01-01 RX ADMIN — METOLAZONE 10 MG: 5 TABLET ORAL at 17:24

## 2018-01-01 RX ADMIN — RENAGEL 800 MG: 800 TABLET ORAL at 13:39

## 2018-01-01 RX ADMIN — INSULIN LISPRO 2 UNITS: 100 INJECTION, SOLUTION INTRAVENOUS; SUBCUTANEOUS at 22:35

## 2018-01-01 RX ADMIN — INSULIN LISPRO 2 UNITS: 100 INJECTION, SOLUTION INTRAVENOUS; SUBCUTANEOUS at 13:28

## 2018-01-01 RX ADMIN — INSULIN LISPRO 12 UNITS: 100 INJECTION, SOLUTION INTRAVENOUS; SUBCUTANEOUS at 08:30

## 2018-01-01 RX ADMIN — POTASSIUM CHLORIDE 40 MEQ: 1500 TABLET, EXTENDED RELEASE ORAL at 13:06

## 2018-01-01 RX ADMIN — INSULIN LISPRO 10 UNITS: 100 INJECTION, SOLUTION INTRAVENOUS; SUBCUTANEOUS at 13:31

## 2018-01-01 RX ADMIN — Medication 1.5 MG/HR: at 14:41

## 2018-01-01 RX ADMIN — TORSEMIDE 40 MG: 20 TABLET ORAL at 08:22

## 2018-01-01 RX ADMIN — INSULIN LISPRO 3 UNITS: 100 INJECTION, SOLUTION INTRAVENOUS; SUBCUTANEOUS at 17:08

## 2018-01-01 RX ADMIN — INSULIN LISPRO 12 UNITS: 100 INJECTION, SOLUTION INTRAVENOUS; SUBCUTANEOUS at 08:29

## 2018-01-01 RX ADMIN — Medication 1 TABLET: at 08:37

## 2018-01-01 RX ADMIN — PANTOPRAZOLE SODIUM 40 MG: 40 TABLET, DELAYED RELEASE ORAL at 05:41

## 2018-01-01 RX ADMIN — INSULIN LISPRO 10 UNITS: 100 INJECTION, SOLUTION INTRAVENOUS; SUBCUTANEOUS at 18:27

## 2018-01-01 RX ADMIN — POTASSIUM CHLORIDE 10 MEQ: 750 TABLET, EXTENDED RELEASE ORAL at 09:08

## 2018-01-01 RX ADMIN — FUROSEMIDE 80 MG: 10 INJECTION, SOLUTION INTRAMUSCULAR; INTRAVENOUS at 16:27

## 2018-01-01 RX ADMIN — INSULIN LISPRO 1 UNITS: 100 INJECTION, SOLUTION INTRAVENOUS; SUBCUTANEOUS at 09:25

## 2018-01-01 RX ADMIN — MELATONIN TAB 3 MG 6 MG: 3 TAB at 21:53

## 2018-01-01 RX ADMIN — RENAGEL 800 MG: 800 TABLET ORAL at 11:56

## 2018-01-01 RX ADMIN — INSULIN GLARGINE 25 UNITS: 100 INJECTION, SOLUTION SUBCUTANEOUS at 22:12

## 2018-01-01 RX ADMIN — MELATONIN TAB 3 MG 6 MG: 3 TAB at 22:13

## 2018-01-01 RX ADMIN — ACETAMINOPHEN 650 MG: 325 TABLET ORAL at 17:05

## 2018-01-01 RX ADMIN — METOPROLOL TARTRATE 50 MG: 50 TABLET ORAL at 16:35

## 2018-01-01 RX ADMIN — Medication 40 MG/HR: at 11:24

## 2018-01-01 RX ADMIN — ASPIRIN 81 MG: 81 TABLET, COATED ORAL at 09:03

## 2018-01-01 RX ADMIN — INSULIN LISPRO 2 UNITS: 100 INJECTION, SOLUTION INTRAVENOUS; SUBCUTANEOUS at 17:10

## 2018-01-01 RX ADMIN — Medication 2 MG/HR: at 01:10

## 2018-01-01 RX ADMIN — ACETAMINOPHEN 325 MG: 325 TABLET, FILM COATED ORAL at 09:48

## 2018-01-01 RX ADMIN — Medication 1 TABLET: at 08:02

## 2018-01-01 RX ADMIN — ATORVASTATIN CALCIUM 40 MG: 40 TABLET, FILM COATED ORAL at 15:46

## 2018-01-01 RX ADMIN — INSULIN LISPRO 3 UNITS: 100 INJECTION, SOLUTION INTRAVENOUS; SUBCUTANEOUS at 21:38

## 2018-01-01 RX ADMIN — RENAGEL 800 MG: 800 TABLET ORAL at 17:25

## 2018-01-01 RX ADMIN — Medication 1 TABLET: at 08:24

## 2018-01-01 RX ADMIN — ALBUMIN HUMAN 25 G: 0.25 SOLUTION INTRAVENOUS at 21:49

## 2018-01-01 RX ADMIN — INSULIN LISPRO 10 UNITS: 100 INJECTION, SOLUTION INTRAVENOUS; SUBCUTANEOUS at 11:27

## 2018-01-01 RX ADMIN — TAMSULOSIN HYDROCHLORIDE 0.4 MG: 0.4 CAPSULE ORAL at 16:51

## 2018-01-01 RX ADMIN — RENAGEL 800 MG: 800 TABLET ORAL at 17:47

## 2018-01-01 RX ADMIN — METOPROLOL SUCCINATE 50 MG: 50 TABLET, EXTENDED RELEASE ORAL at 08:37

## 2018-01-01 RX ADMIN — INSULIN LISPRO 12 UNITS: 100 INJECTION, SOLUTION INTRAVENOUS; SUBCUTANEOUS at 13:41

## 2018-01-01 RX ADMIN — POTASSIUM CHLORIDE 40 MEQ: 1500 TABLET, EXTENDED RELEASE ORAL at 10:14

## 2018-01-01 RX ADMIN — TAMSULOSIN HYDROCHLORIDE 0.4 MG: 0.4 CAPSULE ORAL at 17:00

## 2018-01-01 RX ADMIN — HEPARIN SODIUM 5000 UNITS: 5000 INJECTION, SOLUTION INTRAVENOUS; SUBCUTANEOUS at 05:41

## 2018-01-01 RX ADMIN — PANTOPRAZOLE SODIUM 40 MG: 40 TABLET, DELAYED RELEASE ORAL at 06:02

## 2018-01-01 RX ADMIN — METOPROLOL SUCCINATE 25 MG: 25 TABLET, EXTENDED RELEASE ORAL at 21:38

## 2018-01-01 RX ADMIN — Medication 1 TABLET: at 08:54

## 2018-01-01 RX ADMIN — Medication 1 TABLET: at 08:01

## 2018-01-01 RX ADMIN — DOCUSATE SODIUM 100 MG: 100 CAPSULE, LIQUID FILLED ORAL at 16:52

## 2018-01-01 RX ADMIN — CEPHALEXIN 250 MG: 250 CAPSULE ORAL at 05:01

## 2018-01-01 RX ADMIN — RENAGEL 800 MG: 800 TABLET ORAL at 10:20

## 2018-01-01 RX ADMIN — ASPIRIN 81 MG: 81 TABLET, COATED ORAL at 08:54

## 2018-01-01 RX ADMIN — ALBUMIN HUMAN 25 G: 0.25 SOLUTION INTRAVENOUS at 18:18

## 2018-01-01 RX ADMIN — ASPIRIN 81 MG: 81 TABLET, COATED ORAL at 08:17

## 2018-01-01 RX ADMIN — RENAGEL 800 MG: 800 TABLET ORAL at 17:28

## 2018-01-01 RX ADMIN — ACETAMINOPHEN 975 MG: 325 TABLET, FILM COATED ORAL at 15:53

## 2018-01-01 RX ADMIN — SPIRONOLACTONE 50 MG: 25 TABLET, FILM COATED ORAL at 08:42

## 2018-01-01 RX ADMIN — POLYETHYLENE GLYCOL 3350 17 G: 17 POWDER, FOR SOLUTION ORAL at 08:56

## 2018-01-01 RX ADMIN — RENAGEL 800 MG: 800 TABLET ORAL at 13:45

## 2018-01-01 RX ADMIN — RENAGEL 800 MG: 800 TABLET ORAL at 08:23

## 2018-01-01 RX ADMIN — HEPARIN SODIUM 5000 UNITS: 5000 INJECTION, SOLUTION INTRAVENOUS; SUBCUTANEOUS at 05:07

## 2018-01-01 RX ADMIN — POTASSIUM CHLORIDE 40 MEQ: 1500 TABLET, EXTENDED RELEASE ORAL at 17:42

## 2018-01-01 RX ADMIN — Medication 250 MG: at 08:01

## 2018-01-01 RX ADMIN — Medication 2 MG/HR: at 08:33

## 2018-01-01 RX ADMIN — DIPHENHYDRAMINE HCL 12.5 MG: 25 TABLET ORAL at 01:07

## 2018-01-01 RX ADMIN — PANTOPRAZOLE SODIUM 40 MG: 40 TABLET, DELAYED RELEASE ORAL at 04:41

## 2018-01-01 RX ADMIN — INSULIN LISPRO 1 UNITS: 100 INJECTION, SOLUTION INTRAVENOUS; SUBCUTANEOUS at 08:35

## 2018-01-01 RX ADMIN — CEPHALEXIN 250 MG: 250 CAPSULE ORAL at 21:52

## 2018-01-01 RX ADMIN — HEPARIN SODIUM 5000 UNITS: 5000 INJECTION, SOLUTION INTRAVENOUS; SUBCUTANEOUS at 22:17

## 2018-01-01 RX ADMIN — DOCUSATE SODIUM 100 MG: 100 CAPSULE, LIQUID FILLED ORAL at 17:02

## 2018-01-01 RX ADMIN — TAMSULOSIN HYDROCHLORIDE 0.4 MG: 0.4 CAPSULE ORAL at 15:46

## 2018-01-01 RX ADMIN — RENAGEL 800 MG: 800 TABLET ORAL at 13:04

## 2018-01-01 RX ADMIN — RENAGEL 800 MG: 800 TABLET ORAL at 17:39

## 2018-01-01 RX ADMIN — INSULIN GLARGINE 25 UNITS: 100 INJECTION, SOLUTION SUBCUTANEOUS at 21:54

## 2018-01-01 RX ADMIN — RENAGEL 800 MG: 800 TABLET ORAL at 19:35

## 2018-01-01 RX ADMIN — RENAGEL 800 MG: 800 TABLET ORAL at 17:53

## 2018-01-01 RX ADMIN — INSULIN LISPRO 1 UNITS: 100 INJECTION, SOLUTION INTRAVENOUS; SUBCUTANEOUS at 09:18

## 2018-01-01 RX ADMIN — Medication 1 TABLET: at 08:56

## 2018-01-01 RX ADMIN — PANTOPRAZOLE SODIUM 20 MG: 20 TABLET, DELAYED RELEASE ORAL at 05:23

## 2018-01-01 RX ADMIN — POTASSIUM CHLORIDE 40 MEQ: 1500 TABLET, EXTENDED RELEASE ORAL at 08:38

## 2018-01-01 RX ADMIN — Medication 20 MG/HR: at 19:07

## 2018-01-01 RX ADMIN — MELATONIN TAB 3 MG 6 MG: 3 TAB at 22:41

## 2018-01-01 RX ADMIN — INSULIN LISPRO 1 UNITS: 100 INJECTION, SOLUTION INTRAVENOUS; SUBCUTANEOUS at 16:49

## 2018-01-01 RX ADMIN — INSULIN LISPRO 2 UNITS: 100 INJECTION, SOLUTION INTRAVENOUS; SUBCUTANEOUS at 16:52

## 2018-01-01 RX ADMIN — INSULIN LISPRO 4 UNITS: 100 INJECTION, SOLUTION INTRAVENOUS; SUBCUTANEOUS at 11:28

## 2018-01-01 RX ADMIN — INSULIN LISPRO 1 UNITS: 100 INJECTION, SOLUTION INTRAVENOUS; SUBCUTANEOUS at 13:09

## 2018-01-01 RX ADMIN — HEPARIN SODIUM 5000 UNITS: 5000 INJECTION, SOLUTION INTRAVENOUS; SUBCUTANEOUS at 14:07

## 2018-01-01 RX ADMIN — INSULIN GLARGINE 20 UNITS: 100 INJECTION, SOLUTION SUBCUTANEOUS at 22:22

## 2018-01-01 RX ADMIN — INSULIN LISPRO 1 UNITS: 100 INJECTION, SOLUTION INTRAVENOUS; SUBCUTANEOUS at 21:42

## 2018-01-01 RX ADMIN — ATORVASTATIN CALCIUM 40 MG: 40 TABLET, FILM COATED ORAL at 17:39

## 2018-01-01 RX ADMIN — ATORVASTATIN CALCIUM 40 MG: 40 TABLET, FILM COATED ORAL at 17:52

## 2018-01-01 RX ADMIN — INSULIN LISPRO 12 UNITS: 100 INJECTION, SOLUTION INTRAVENOUS; SUBCUTANEOUS at 13:02

## 2018-01-01 RX ADMIN — INSULIN LISPRO 1 UNITS: 100 INJECTION, SOLUTION INTRAVENOUS; SUBCUTANEOUS at 13:40

## 2018-01-01 RX ADMIN — METOPROLOL SUCCINATE 50 MG: 50 TABLET, EXTENDED RELEASE ORAL at 08:00

## 2018-01-01 RX ADMIN — INSULIN LISPRO 12 UNITS: 100 INJECTION, SOLUTION INTRAVENOUS; SUBCUTANEOUS at 12:12

## 2018-01-01 RX ADMIN — INSULIN LISPRO 1 UNITS: 100 INJECTION, SOLUTION INTRAVENOUS; SUBCUTANEOUS at 22:27

## 2018-01-01 RX ADMIN — METOLAZONE 10 MG: 5 TABLET ORAL at 08:43

## 2018-01-01 RX ADMIN — METOPROLOL TARTRATE 12.5 MG: 25 TABLET ORAL at 21:57

## 2018-01-01 RX ADMIN — CEPHALEXIN 250 MG: 250 CAPSULE ORAL at 14:28

## 2018-01-01 RX ADMIN — ACETAMINOPHEN 975 MG: 325 TABLET, FILM COATED ORAL at 21:30

## 2018-01-01 RX ADMIN — PANTOPRAZOLE SODIUM 40 MG: 40 TABLET, DELAYED RELEASE ORAL at 04:55

## 2018-01-01 RX ADMIN — HEPARIN SODIUM 4000 UNITS: 1000 INJECTION, SOLUTION INTRAVENOUS; SUBCUTANEOUS at 13:33

## 2018-01-01 RX ADMIN — INSULIN LISPRO 1 UNITS: 100 INJECTION, SOLUTION INTRAVENOUS; SUBCUTANEOUS at 22:23

## 2018-01-01 RX ADMIN — RENAGEL 800 MG: 800 TABLET ORAL at 13:32

## 2018-01-01 RX ADMIN — INSULIN LISPRO 10 UNITS: 100 INJECTION, SOLUTION INTRAVENOUS; SUBCUTANEOUS at 12:57

## 2018-01-01 RX ADMIN — METOPROLOL SUCCINATE 50 MG: 50 TABLET, EXTENDED RELEASE ORAL at 10:20

## 2018-01-01 RX ADMIN — HEPARIN SODIUM 5000 UNITS: 5000 INJECTION, SOLUTION INTRAVENOUS; SUBCUTANEOUS at 14:16

## 2018-01-01 RX ADMIN — RENAGEL 800 MG: 800 TABLET ORAL at 12:51

## 2018-01-01 RX ADMIN — METOPROLOL TARTRATE 75 MG: 50 TABLET ORAL at 09:04

## 2018-01-01 RX ADMIN — INSULIN LISPRO 4 UNITS: 100 INJECTION, SOLUTION INTRAVENOUS; SUBCUTANEOUS at 12:08

## 2018-01-01 RX ADMIN — RENAGEL 800 MG: 800 TABLET ORAL at 18:51

## 2018-01-01 RX ADMIN — Medication 2 MG/HR: at 09:01

## 2018-01-01 RX ADMIN — METOLAZONE 10 MG: 5 TABLET ORAL at 09:47

## 2018-01-01 RX ADMIN — HEPARIN SODIUM 11.1 UNITS/KG/HR: 10000 INJECTION, SOLUTION INTRAVENOUS at 16:49

## 2018-01-01 RX ADMIN — ACETAMINOPHEN 975 MG: 325 TABLET, FILM COATED ORAL at 13:23

## 2018-01-01 RX ADMIN — INSULIN LISPRO 1 UNITS: 100 INJECTION, SOLUTION INTRAVENOUS; SUBCUTANEOUS at 17:39

## 2018-01-01 RX ADMIN — Medication 2 MG/HR: at 09:21

## 2018-01-01 RX ADMIN — DOCUSATE SODIUM 100 MG: 100 CAPSULE, LIQUID FILLED ORAL at 10:13

## 2018-01-01 RX ADMIN — INSULIN LISPRO 1 UNITS: 100 INJECTION, SOLUTION INTRAVENOUS; SUBCUTANEOUS at 13:24

## 2018-01-01 RX ADMIN — TORSEMIDE 40 MG: 20 TABLET ORAL at 09:09

## 2018-01-01 RX ADMIN — PANTOPRAZOLE SODIUM 40 MG: 40 TABLET, DELAYED RELEASE ORAL at 05:52

## 2018-01-01 RX ADMIN — INSULIN LISPRO 10 UNITS: 100 INJECTION, SOLUTION INTRAVENOUS; SUBCUTANEOUS at 12:11

## 2018-01-01 RX ADMIN — Medication 1 TABLET: at 08:45

## 2018-01-01 RX ADMIN — HEPARIN SODIUM 5000 UNITS: 5000 INJECTION, SOLUTION INTRAVENOUS; SUBCUTANEOUS at 21:13

## 2018-01-01 RX ADMIN — HEPARIN SODIUM 5000 UNITS: 5000 INJECTION, SOLUTION INTRAVENOUS; SUBCUTANEOUS at 04:55

## 2018-01-01 RX ADMIN — ASPIRIN 81 MG: 81 TABLET, COATED ORAL at 08:34

## 2018-01-01 RX ADMIN — METOPROLOL SUCCINATE 50 MG: 50 TABLET, EXTENDED RELEASE ORAL at 21:54

## 2018-01-01 RX ADMIN — INSULIN LISPRO 1 UNITS: 100 INJECTION, SOLUTION INTRAVENOUS; SUBCUTANEOUS at 08:02

## 2018-01-01 RX ADMIN — ASPIRIN 81 MG: 81 TABLET, COATED ORAL at 09:32

## 2018-01-01 RX ADMIN — RENAGEL 800 MG: 800 TABLET ORAL at 08:46

## 2018-01-01 RX ADMIN — PANTOPRAZOLE SODIUM 40 MG: 40 TABLET, DELAYED RELEASE ORAL at 06:04

## 2018-01-01 RX ADMIN — ACETAMINOPHEN 975 MG: 325 TABLET, FILM COATED ORAL at 05:01

## 2018-01-01 RX ADMIN — ATORVASTATIN CALCIUM 40 MG: 40 TABLET, FILM COATED ORAL at 17:33

## 2018-01-01 RX ADMIN — METOLAZONE 10 MG: 5 TABLET ORAL at 08:46

## 2018-01-01 RX ADMIN — TAMSULOSIN HYDROCHLORIDE 0.4 MG: 0.4 CAPSULE ORAL at 15:53

## 2018-01-01 RX ADMIN — ATORVASTATIN CALCIUM 40 MG: 40 TABLET, FILM COATED ORAL at 16:48

## 2018-01-01 RX ADMIN — ACETAZOLAMIDE 250 MG: 250 TABLET ORAL at 14:15

## 2018-01-01 RX ADMIN — INSULIN GLARGINE 20 UNITS: 100 INJECTION, SOLUTION SUBCUTANEOUS at 21:13

## 2018-01-01 RX ADMIN — INSULIN GLARGINE 20 UNITS: 100 INJECTION, SOLUTION SUBCUTANEOUS at 00:48

## 2018-01-01 RX ADMIN — ACETAMINOPHEN 650 MG: 325 TABLET, FILM COATED ORAL at 20:40

## 2018-01-01 RX ADMIN — RENAGEL 800 MG: 800 TABLET ORAL at 13:06

## 2018-01-01 RX ADMIN — ACETAMINOPHEN 975 MG: 325 TABLET, FILM COATED ORAL at 20:26

## 2018-01-01 RX ADMIN — ACETAMINOPHEN 325 MG: 325 TABLET, FILM COATED ORAL at 22:18

## 2018-01-01 RX ADMIN — RENAGEL 800 MG: 800 TABLET ORAL at 09:44

## 2018-01-01 RX ADMIN — INSULIN LISPRO 12 UNITS: 100 INJECTION, SOLUTION INTRAVENOUS; SUBCUTANEOUS at 13:45

## 2018-01-01 RX ADMIN — TAMSULOSIN HYDROCHLORIDE 0.4 MG: 0.4 CAPSULE ORAL at 16:48

## 2018-01-01 RX ADMIN — INSULIN LISPRO 1 UNITS: 100 INJECTION, SOLUTION INTRAVENOUS; SUBCUTANEOUS at 21:53

## 2018-01-01 RX ADMIN — RENAGEL 800 MG: 800 TABLET ORAL at 08:20

## 2018-01-01 RX ADMIN — DOCUSATE SODIUM 100 MG: 100 CAPSULE, LIQUID FILLED ORAL at 09:47

## 2018-01-01 RX ADMIN — ACETAMINOPHEN 975 MG: 325 TABLET, FILM COATED ORAL at 05:07

## 2018-01-01 RX ADMIN — INSULIN LISPRO 2 UNITS: 100 INJECTION, SOLUTION INTRAVENOUS; SUBCUTANEOUS at 14:15

## 2018-01-01 RX ADMIN — PANTOPRAZOLE SODIUM 40 MG: 40 TABLET, DELAYED RELEASE ORAL at 05:00

## 2018-01-01 RX ADMIN — HEPARIN SODIUM 5000 UNITS: 5000 INJECTION, SOLUTION INTRAVENOUS; SUBCUTANEOUS at 21:55

## 2018-01-01 RX ADMIN — INSULIN GLARGINE 25 UNITS: 100 INJECTION, SOLUTION SUBCUTANEOUS at 22:00

## 2018-01-01 RX ADMIN — INSULIN LISPRO 12 UNITS: 100 INJECTION, SOLUTION INTRAVENOUS; SUBCUTANEOUS at 17:21

## 2018-01-01 RX ADMIN — Medication 250 MG: at 17:09

## 2018-01-01 RX ADMIN — INSULIN LISPRO 1 UNITS: 100 INJECTION, SOLUTION INTRAVENOUS; SUBCUTANEOUS at 17:00

## 2018-01-01 RX ADMIN — INSULIN LISPRO 12 UNITS: 100 INJECTION, SOLUTION INTRAVENOUS; SUBCUTANEOUS at 13:58

## 2018-01-01 RX ADMIN — ACETAMINOPHEN 975 MG: 325 TABLET, FILM COATED ORAL at 05:06

## 2018-01-01 RX ADMIN — ONDANSETRON 4 MG: 2 INJECTION INTRAMUSCULAR; INTRAVENOUS at 10:21

## 2018-01-01 RX ADMIN — INSULIN LISPRO 10 UNITS: 100 INJECTION, SOLUTION INTRAVENOUS; SUBCUTANEOUS at 09:30

## 2018-01-01 RX ADMIN — INSULIN LISPRO 1 UNITS: 100 INJECTION, SOLUTION INTRAVENOUS; SUBCUTANEOUS at 07:53

## 2018-01-01 RX ADMIN — INSULIN LISPRO 12 UNITS: 100 INJECTION, SOLUTION INTRAVENOUS; SUBCUTANEOUS at 18:58

## 2018-01-01 RX ADMIN — RENAGEL 800 MG: 800 TABLET ORAL at 19:05

## 2018-01-01 RX ADMIN — EPINEPHRINE 1 MG: 0.1 INJECTION, SOLUTION ENDOTRACHEAL; INTRACARDIAC; INTRAVENOUS at 07:41

## 2018-01-01 RX ADMIN — DOCUSATE SODIUM 100 MG: 100 CAPSULE, LIQUID FILLED ORAL at 17:28

## 2018-01-01 RX ADMIN — PANTOPRAZOLE SODIUM 20 MG: 20 TABLET, DELAYED RELEASE ORAL at 06:54

## 2018-01-01 RX ADMIN — INSULIN LISPRO 10 UNITS: 100 INJECTION, SOLUTION INTRAVENOUS; SUBCUTANEOUS at 07:52

## 2018-01-01 RX ADMIN — CEFAZOLIN SODIUM 1000 MG: 1 SOLUTION INTRAVENOUS at 04:53

## 2018-01-01 RX ADMIN — HEPARIN SODIUM 5000 UNITS: 5000 INJECTION, SOLUTION INTRAVENOUS; SUBCUTANEOUS at 04:41

## 2018-01-01 RX ADMIN — TAMSULOSIN HYDROCHLORIDE 0.4 MG: 0.4 CAPSULE ORAL at 17:47

## 2018-01-01 RX ADMIN — TAMSULOSIN HYDROCHLORIDE 0.4 MG: 0.4 CAPSULE ORAL at 17:07

## 2018-01-01 RX ADMIN — RENAGEL 800 MG: 800 TABLET ORAL at 17:07

## 2018-01-01 RX ADMIN — INSULIN LISPRO 2 UNITS: 100 INJECTION, SOLUTION INTRAVENOUS; SUBCUTANEOUS at 21:40

## 2018-01-01 RX ADMIN — ACETAMINOPHEN 975 MG: 325 TABLET, FILM COATED ORAL at 13:28

## 2018-01-01 RX ADMIN — ACETAMINOPHEN 975 MG: 325 TABLET, FILM COATED ORAL at 22:42

## 2018-01-01 RX ADMIN — MELATONIN 3 MG: 3 TAB ORAL at 21:46

## 2018-01-01 RX ADMIN — HEPARIN SODIUM 5000 UNITS: 5000 INJECTION, SOLUTION INTRAVENOUS; SUBCUTANEOUS at 22:44

## 2018-01-01 RX ADMIN — AMOXICILLIN 500 MG: 250 CAPSULE ORAL at 22:22

## 2018-01-01 RX ADMIN — METOPROLOL SUCCINATE 12.5 MG: 25 TABLET, EXTENDED RELEASE ORAL at 00:38

## 2018-01-01 RX ADMIN — ATORVASTATIN CALCIUM 40 MG: 40 TABLET, FILM COATED ORAL at 17:24

## 2018-01-01 RX ADMIN — PANTOPRAZOLE SODIUM 40 MG: 40 TABLET, DELAYED RELEASE ORAL at 05:08

## 2018-01-01 RX ADMIN — DOCUSATE SODIUM 100 MG: 100 CAPSULE, LIQUID FILLED ORAL at 10:20

## 2018-01-01 RX ADMIN — DIPHENHYDRAMINE HCL 12.5 MG: 25 TABLET ORAL at 02:20

## 2018-01-01 RX ADMIN — POTASSIUM CHLORIDE 10 MEQ: 750 TABLET, EXTENDED RELEASE ORAL at 09:05

## 2018-01-01 RX ADMIN — POTASSIUM CHLORIDE 20 MEQ: 1500 TABLET, EXTENDED RELEASE ORAL at 12:47

## 2018-01-01 RX ADMIN — FUROSEMIDE 60 MG: 10 INJECTION, SOLUTION INTRAMUSCULAR; INTRAVENOUS at 18:17

## 2018-01-01 RX ADMIN — METOLAZONE 10 MG: 5 TABLET ORAL at 22:17

## 2018-01-01 RX ADMIN — INSULIN LISPRO 10 UNITS: 100 INJECTION, SOLUTION INTRAVENOUS; SUBCUTANEOUS at 18:09

## 2018-01-01 RX ADMIN — Medication 40 MG/HR: at 22:53

## 2018-01-01 RX ADMIN — METOPROLOL SUCCINATE 50 MG: 50 TABLET, EXTENDED RELEASE ORAL at 08:43

## 2018-01-01 RX ADMIN — INSULIN GLARGINE 30 UNITS: 100 INJECTION, SOLUTION SUBCUTANEOUS at 22:51

## 2018-01-01 RX ADMIN — ALBUMIN HUMAN 12.5 G: 0.25 SOLUTION INTRAVENOUS at 01:24

## 2018-01-01 RX ADMIN — ALBUMIN HUMAN 25 G: 0.25 SOLUTION INTRAVENOUS at 01:18

## 2018-01-01 RX ADMIN — ALBUMIN HUMAN 12.5 G: 0.25 SOLUTION INTRAVENOUS at 11:00

## 2018-01-01 RX ADMIN — ACETAMINOPHEN 325 MG: 325 TABLET, FILM COATED ORAL at 16:02

## 2018-01-01 RX ADMIN — INSULIN LISPRO 2 UNITS: 100 INJECTION, SOLUTION INTRAVENOUS; SUBCUTANEOUS at 17:43

## 2018-01-01 RX ADMIN — INSULIN GLARGINE 30 UNITS: 100 INJECTION, SOLUTION SUBCUTANEOUS at 21:38

## 2018-01-01 RX ADMIN — METOPROLOL TARTRATE 25 MG: 25 TABLET ORAL at 07:52

## 2018-01-01 RX ADMIN — SENNOSIDES 8.6 MG: 8.6 TABLET, FILM COATED ORAL at 08:49

## 2018-01-09 NOTE — MISCELLANEOUS
Message  I received one month of BP readings from Mr Jameson Dense  His BP has been 147///67 for the past month  I have started him on Norvasc 5mg daily  He will continue to monitor his BP daily  Active Problems    1  Aortic valve disorder (424 1) (I35 9)   2  Benign essential hypertension (401 1) (I10)   3  Blood in urine (599 70) (R31 9)   4  Carotid stenosis (433 10) (I65 29)   5  Chronic diastolic congestive heart failure (428 32,428 0) (I50 32)   6  Chronic kidney disease, stage 3 (585 3) (N18 3)   7  Chronic kidney disease, stage IV (severe) (585 4) (N18 4)   8  Coronary atherosclerosis of native coronary vessel (414 01) (I25 10)   9  Degenerative joint disease (715 90) (M19 90)   10  Diabetes Mellitus (250 00)   11  Edema (782 3) (R60 9)   12  Esophageal reflux (530 81) (K21 9)   13  Extremity atherosclerosis with intermittent claudication (440 21) (I70 219)   14  Gout (274 9) (M10 9)   15  Hiccups (786 8) (R06 6)   16  Hyperlipidemia (272 4) (E78 5)   17  Hyperparathyroidism (252 00) (E21 3)   18  Mild mitral regurgitation (424 0) (I34 0)   19  Monoclonal gammopathy of undetermined significance (273 1) (D47 2)   20  Need for vaccination with 13-polyvalent pneumococcal conjugate vaccine (V03 82) (Z23)   21  Paroxysmal atrial fibrillation (427 31) (I48 0)   22  Peripheral vascular disease (443 9) (I73 9)   23  S/P AVR (V43 3) (Z95 2)   24  Screening for diabetic retinopathy (V80 2) (Z13 5)   25  Secondary hyperparathyroidism, renal (588 81) (N25 81)   26  SOB (shortness of breath) on exertion (786 05) (R06 02)   27  Tachycardia (785 0) (R00 0)   28  URI, acute (465 9) (J06 9)   29  Visit for pre-operative examination (V72 84) (Z01 818)   30  Vitamin D deficiency (268 9) (E55 9)    Current Meds   1  Aspirin 81 MG TABS; 1 tab alternating with 2 tabs everyother day; Therapy: (Recorded:01Aug2016) to Recorded   2  Centrum Silver TABS; Take 1 tablet daily;    Therapy: (Recorded:01Aug2016) to Recorded 3  D-1000 1000 UNIT Oral Tablet; TAKE 1 TABLET DAILY AFTER BREAKFAST  Requested   for: 40Xrb4941; Last Rx:32Oah5148 Ordered   4  Eliquis 2 5 MG Oral Tablet; Take 1 tablet by mouth twice daily; Therapy: 61Jjy0180 to (Evaluate:06Aug2017)  Requested for: 11Aug2016; Last   Rx:11Aug2016 Ordered   5  Metoprolol Tartrate 25 MG Oral Tablet; TAKE 1 TABLET TWICE DAILY  Requested for:   11Aug2016; Last Rx:11Aug2016 Ordered   6  Metoprolol Tartrate 50 MG Oral Tablet; TAKE 1 TABLET BY MOUTH TWICE DAILY (along   with 25 mg BID for a total of 75 mg BID); Therapy: 17FBI6995 to (Evaluate:06Aug2017)  Requested for: 11Aug2016 Recorded   7  NovoLOG Mix 70/30 FlexPen (70-30) 100 UNIT/ML SUSP; Use 30 units in the am and 28   units in the pm;   Therapy: (Recorded:01Aug2016) to Recorded   8  Omeprazole 20 MG Oral Capsule Delayed Release; take 1 capsule by mouth daily; Therapy: 26VPP2974 to (Shekhar Mazariegos)  Requested for: 52YNI8335; Last   Rx:36Brg3829 Ordered   9  Paricalcitol 1 MCG Oral Capsule; take 1 capsule by mouth ON MONDAY, [address] [place] [address]; Therapy: 76VUE2528 to (Evaluate:17Nov2016)  Requested for: 57VOT6887; Last   Rx:20Ivv7685 Ordered   10  Simvastatin 10 MG Oral Tablet; TAKE 1 TABLET BY MOUTH DAILY; Therapy: 44ZAC7324 to (Queenie Pearce)  Requested for: 02Aug2016; Last    Rx:43Rxa5327 Ordered   11  Torsemide 20 MG Oral Tablet; TAKE 1 TABLET BY MOUTH TWICE DAILY AS    DIRECTED; Therapy: 64DDV3006 to (Evaluate:76Yre4747)  Requested for: 46Ynu9809; Last    Rx:09Ehx4263 Ordered   12  Uloric 40 MG Oral Tablet; TAKE 1 TABLET DAILY; Therapy: (Recorded:01Aug2016) to Recorded    Allergies    1  Acetaminophen-Codeine SOLN   2  Hydrocodone-Acetaminophen TABS   3  MetFORMIN HCl TABS   4   Codeine Derivatives   5  hydrocodone    Plan  Benign essential hypertension, SOB (shortness of breath) on exertion    · AmLODIPine Besylate 5 MG Oral Tablet; TAKE 1 TABLET DAILY    Signatures   Electronically signed by : Rodolfo Hue, ; Aug 30 2016 10:40AM EST                       (Author)

## 2018-01-11 NOTE — CONSULTS
Chief Complaint    1  Shoulder Pain  Chief Complaint Free Text Note Form: Left shoulder pain      History of Present Illness  HPI: [de-identified] male presenting for follow-up after a fall approximately 3 weeks ago  He initially sustained a chip fracture of his left greater tuberosity was placed in a sling  Since then he has had improvement in her shoulder pain but complains mainly of stiffness today  Pain is well located over the lateral aspect shoulder made worse with direct contact attempted motion is relieved by rest  Denies any numbness or tingling  Prior to the fall he was able to actively move his shoulder up to 90 degrees      The patient's medical history, surgical history, social history, family history, medications, allergies, and review of systems were reviewed and updated today     _______________________________________________________________________________  Review of Systems:   Constitutional: No fever or chills, feels well, no tiredness, no recent weight gain or loss  Eyes: No complaints of eyesight problems, no red eyes  ENT: No loss of hearing, no nosebleeds, no sore throat  Cardiovascular: No chest pain, palpitations, leg claudication, or lower extremity edema  Respiratory: No shortness of breath, wheezing, or cough  Gastrointestinal: No abdominal pain, constipation, nausea / vomiting, no diarrhea  Genitourinary: No dysruia or incontinence  Musculoskeletal: As noted in HPI  Integumentary: No rash or skin lesions, no itching or dry skin, no wounds  Neurological: No headache, confusion, numbness or tingling, or dizziness  Endocrine: No muscle weakness, frequent urination, or excessive thirst  Psychiatric: No suicidal thoughts, anxiety, or depression      Active Problems    1  Aortic valve disorder (424 1) (I35 9)   2  Arm pain, lateral, left (729 5) (M79 602)   3  Back pain (724 5) (M54 9)   4  Benign essential hypertension (401 1) (I10)   5   Carotid stenosis (433 10) (I65 29) 6  Chronic diastolic congestive heart failure (428 32,428 0) (I50 32)   7  Chronic kidney disease, stage 3 (585 3) (N18 3)   8  CKD (chronic kidney disease), stage 4 (severe) (585 4) (N18 4)   9  Closed traumatic minimally displaced fracture of greater tuberosity of left humerus with   routine healing (V54 11) (S42 252D)   10  Congestive heart failure (428 0) (I50 9)   11  Coronary atherosclerosis of native coronary vessel (414 01) (I25 10)   12  Cough with hemoptysis (786 39) (R04 2)   13  Degenerative joint disease (715 90) (M19 90)   14  Diabetes Mellitus (250 00)   15  Dyspnea (786 09) (R06 00)   16  Edema (782 3) (R60 9)   17  Esophageal reflux (530 81) (K21 9)   18  Extremity atherosclerosis with intermittent claudication (440 21) (I70 219)   19  Gout (274 9) (M10 9)   20  Hiccups (786 8) (R06 6)   21  Hyperlipidemia (272 4) (E78 5)   22  Hyperparathyroidism (252 00) (E21 3)   23  Hypoxia (799 02) (R09 02)   24  Left low back pain (724 2) (M54 5)   25  Mild mitral regurgitation (424 0) (I34 0)   26  Monoclonal gammopathy of undetermined significance (273 1) (D47 2)   27  Need for vaccination with 13-polyvalent pneumococcal conjugate vaccine (V03 82) (Z23)   28  Paroxysmal atrial fibrillation (427 31) (I48 0)   29  Peripheral vascular disease (443 9) (I73 9)   30  S/P AVR (V43 3) (Z95 2)   31  Screening for diabetic retinopathy (V80 2) (Z13 5)   32  Secondary hyperparathyroidism, renal (588 81) (N25 81)   33  SOB (shortness of breath) on exertion (786 05) (R06 02)   34  Tachycardia (785 0) (R00 0)   35  Visit for pre-operative examination (V72 84) (Z01 818)   36  Vitamin D deficiency (268 9) (E55 9)   37  Wheeze (786 07) (R06 2)    Past Medical History    1  History of Dysfunction of both eustachian tubes (381 81) (H69 83)   2  History of Dyspnea (786 09) (R06 00)   3  History of Embolism, arterial, leg, right (444 22) (I74 3)   4  History of Herniated disc (722 2)   5   History of acute bronchitis (V12 69) (Z87 09)   6  History of cough   7  History of hematuria (V13 09) (Z87 448)   8  History of muscle pain (V13 59) (Z87 39)   9  History of myocardial infarction (412) (I25 2)   10  History of pneumonia (V12 61) (Z87 01)   11  History of Premature atrial contractions (427 61) (I49 1)   12  History of Productive cough (786 2) (R05)   13  History of URI, acute (465 9) (J06 9)    Surgical History    1  History of Aortic Valve Replacement   2  History of Bypass Graft Using Vein: Femoral-popliteal   3  History of CABG   4  History of Cataract Surgery   5  History of Knee Replacement    Family History    1  Family history of Diabetes    Social History    · Denies alcohol consumption (V49 89) (Z78 9)   · Denied: History of Drug use   · Former smoker (V15 82) (Q26 708)   · Former Smoker Cigarettes - Heavy (20-25 / Day)    Current Meds   1  AmLODIPine Besylate 2 5 MG Oral Tablet; TAKE ONE TABLET TUESDAY, THURSDAY,   SATURDAY; Therapy: 98Dru7092 to (Evaluate:07Jun2018)  Requested for: 12Jun2017 Recorded   2  Aspirin EC 81 MG Oral Tablet Delayed Release; TAKE 1 TABLET DAILY; Therapy: 98GVU5653 to (Evaluate:06Jan2018); Last Rx:11Jan2017 Ordered   3  Centrum Silver TABS; Take 1 tablet daily; Therapy: (Recorded:57Imv4527) to Recorded   4  Metoprolol Tartrate 25 MG Oral Tablet; TAKE 1 TABLET TWICE DAILY ALONG WITH THE   50 MG TABLET; Therapy: (Recorded:39Jmi5421) to  Requested for: 46Abw3208 Recorded   5  Metoprolol Tartrate 50 MG Oral Tablet; Take 1 tablet by mouth twice daily along with 1   tablet of 25 mg;   Therapy: 07ZGY8650 to (Evaluate:31Dgs2027)  Requested for: 84Uom5075 Recorded   6  NovoLOG Mix 70/30 FlexPen (70-30) 100 UNIT/ML SUSP; Use 38 units in the am and 32   units in the pm;   Therapy: (Recorded:83Bmx3034) to Recorded   7  Omeprazole 20 MG Oral Capsule Delayed Release; take 1 capsule by mouth daily; Therapy: 46GVG2248 to (Evaluate:11Vsc9322)  Requested for: 86Eze8781; Last   Rx:33Ewx1478 Ordered   8  Paricalcitol 1 MCG Oral Capsule; take 1 capsule by mouth ON MONDAY, [facility]; Therapy: 62BEW8649 to (Theta Yenny)  Requested for: 54SGM0785; Last   Rx:62Jnb8956 Ordered   9  Ramipril 2 5 MG Oral Capsule; take 1 capsule by mouth every morning; Therapy: 97HGM3239 to (Evaluate:65Hbs8052)  Requested for: 72LPH8998; Last   Rx:68Cda5891 Ordered   10  Simvastatin 10 MG Oral Tablet; TAKE 1 TABLET BY MOUTH DAILY; Therapy: 44HMD2833 to (Evaluate:53Cph5820)  Requested for: 07Xio1953; Last    Rx:21Iut1354 Ordered   11  Torsemide 20 MG Oral Tablet; TAKE 2 TABLETS BY MOUTH DAILY ON MWF, 1 PO REST    OF WEEK; Therapy: 29CCL2756 to (Evaluate:64Zni6811)  Requested for: 82Nwj9306 Recorded   12  TraMADol HCl - 50 MG Oral Tablet; ONE PO Q 8 HRS  PRN; Therapy: 00HWD9991 to (Last Rx:12Jun2017) Ordered   13  Uloric 40 MG Oral Tablet; TAKE 1 TABLET DAILY; Therapy: (Recorded:50Dbf6556) to Recorded    Allergies    1  Acetaminophen-Codeine SOLN   2  Hydrocodone-Acetaminophen TABS   3  MetFORMIN HCl TABS   4  Codeine Derivatives   5  hydrocodone    Vitals  Signs   Recorded: 17ZEV3199 09:57AM   Heart Rate: 80  Systolic: 717  Diastolic: 63  Height: 5 ft 7 in  Weight: 216 lb 8 oz  BMI Calculated: 33 91  BSA Calculated: 2 09    Physical Exam      General: No acute distress, age-appropriate  Neck: Supple, trachea midline  HEENT: Normocephalic atraumatic, mucous membranes are moist, sclera are nonicteric  Cardiovascular: No discernable arrhythmia  Respiratory: Breathing is even and unlabored, no stridor or audible wheezing  Psychiatric: Awake alert and oriented x3, normal mood and affect  Abdomen: Without rebound or guarding  Left Basic: (Tender palpation over the greater tuberosity  Shoulder passive range of motion to 90 degrees  active range of motion to 30 degrees    40/5 strength external rotation and negative belly press negative bear hug)    Skin: was evaluated and demonstrated no masses, no abrasions, no erythema, no effusion, no edema, no fluctuance, no induration, no laceration, no ulceration, no lymphadenopathy  Left Upper Neurovascular: were evaluated and demonstrated: The patient has normal motor strength to the median, ulnar and radial nerve  Normal sensation to the median, ulnar, and radial nerve  Normal pulses in the radial and ulnar artery  Capillary refill is < 2 seconds  Results/Data  Diagnostic Studies Reviewed: I personally reviewed the films/images/results in the office today  My interpretation follows  X-ray Review X-rays of the left shoulder shows callus formation over the greater tuberosity fracture  Assessment    1  Closed nondisplaced fracture of greater tuberosity of left humerus, initial encounter   (812 03) (H97 405Y)    Plan  Closed nondisplaced fracture of greater tuberosity of left humerus, initial encounter    1  Follow-up visit in 1 month Evaluation and Treatment  Follow-up  Status: Hold For -   Scheduling  Requested for: 77IIA9793   2  Please refer to the patient information sheet that was provided at your office visit today for   information on  your current condition ; Status:Complete;   Done: 03TGO7262   3  Closed treatment of greater humeral tuberosity fx without manipulation - POC;   Status:Complete;   Done: 99RWC1533 12:51PM   4  *1 - [facility]  For left greater tuberosity fracture   May do passive and active assisted range of motion shoulder  No strengthening   No need for sling  Status: Active  Requested for: 83ZOV3527  Care Summary provided  : Yes  Closed traumatic minimally displaced fracture of greater tuberosity of left humerus with  routine healing    5  * XR SHOULDER 2+ VIEW LEFT; Status:Active - Retrospective By Protocol Authorization; Requested for:90Pwf8093;     Discussion/Summary  Discussion Summary:   82-year-old male with a healing left greater tuberosity fracture   We will start him in therapy for passive range of motion and active assisted range of motion of his shoulder Wadie Aver back in follow-up in another 4 weeks for repeat x-rays and examination  Future Appointments    Signatures   Electronically signed by :  LUKE Doran ; Nov 1 2017 10:48AM EST                       (Author)    Electronically signed by : LUKE Álvarez ; Nov 1 2017 12:52PM EST                       (Author)

## 2018-01-11 NOTE — RESULT NOTES
Verified Results  (1) URINALYSIS w URINE C/S REFLEX (will reflex a microscopy if leukocytes, occult blood, or nitrites are not within normal limits) 36CEH6690 78:87CR Jyoti Aiken    Order Number: [id number]  TW Order Number: [id number]  TW Order Number: [id number]     Test Name Result Flag Reference   COLOR Yellow     CLARITY Clear     PH UA 6 0  4 5-8 0   LEUKOCYTE ESTERASE UA Negative  Negative   NITRITE UA Negative  Negative   PROTEIN UA Negative mg/dl  Negative   GLUCOSE UA Negative mg/dl  Negative   KETONES UA Negative mg/dl  Negative   UROBILINOGEN UA 0 2 E U /dl  0 2, 1 0 E U /dl   BILIRUBIN UA Negative  Negative   BLOOD UA Negative  Negative   SPECIFIC GRAVITY UA 1 013  1 003-1 030       Discussion/Summary   Urinalysis test was within normal limits

## 2018-01-11 NOTE — MISCELLANEOUS
Message  wife called to inform us pt admitted to [facility] last evening, fluid overload  she had req we be consulted to see pt  informed her we have not been as of yet, she will f/u when she returns to hospital today to see that we are  emailed physicians to notify of possible consult  Active Problems    1  Aortic valve disorder (424 1) (I35 9)   2  Benign essential hypertension (401 1) (I10)   3  Carotid stenosis (433 10) (I65 29)   4  Chronic kidney disease, stage 3 (585 3) (N18 3)   5  Chronic kidney disease, stage IV (severe) (585 4) (N18 4)   6  Coronary atherosclerosis of native coronary vessel (414 01) (I25 10)   7  Degenerative joint disease (715 90) (M19 90)   8  Diabetes Mellitus (250 00)   9  Edema (782 3) (R60 9)   10  Esophageal reflux (530 81) (K21 9)   11  Extremity atherosclerosis with intermittent claudication (440 21) (I70 219)   12  Gout (274 9) (M10 9)   13  Hiccups (786 8) (R06 6)   14  Hyperlipidemia (272 4) (E78 5)   15  Hyperparathyroidism (252 00) (E21 3)   16  Mild mitral regurgitation (424 0) (I34 0)   17  Monoclonal gammopathy of undetermined significance (273 1) (D47 2)   18  Peripheral vascular disease (443 9) (I73 9)   19  S/P AVR (V43 3) (Z95 2)   20  Screening for diabetic retinopathy (V80 2) (Z13 5)   21  Secondary hyperparathyroidism, renal (588 81) (N25 81)   22  SOB (shortness of breath) on exertion (786 05) (R06 02)   23  Tachycardia (785 0) (R00 0)   24  URI, acute (465 9) (J06 9)   25  Visit for pre-operative examination (V72 84) (Z01 818)    Current Meds   1  Centrum Silver TABS; Take 1 tablet daily Recorded   2  D-1000 1000 UNIT Oral Tablet; TAKE 1 TABLET DAILY AFTER BREAKFAST  Requested   for: 63Eny4203; Last Rx:33Txk9847 Ordered   3  Metoprolol Tartrate 50 MG Oral Tablet; take 1 tablet by mouth twice daily; Therapy: 86CCN9706 to (Evaluate:28Ddq1375)  Requested for: 87LCE2906; Last   Rx:61Wlq4346 Ordered   4   NovoLOG Mix 70/30 FlexPen (70-30) 100 UNIT/ML SUSP; USE AS DIRECTED   Recorded   5  Omeprazole 20 MG Oral Capsule Delayed Release; take 1 capsule by mouth daily; Therapy: 50JEW7393 to (Yessica Hany)  Requested for: 51MMX6100; Last   Rx:88Mvn0118 Ordered   6  Paricalcitol 1 MCG Oral Capsule; take 1 capsule by mouth ON MONDAY, [facility]; Therapy: 33OFH3523 to (Evaluate:17Nov2016)  Requested for: 04SLI0896; Last   Rx:98Gkz2548 Ordered   7  Ramipril 5 MG Oral Capsule; TAKE ONE CAPSULE BY MOUTH EVERY DAY; Therapy: 27IKW4063 to (Evaluate:38Kvi1656)  Requested for: 93EWU5021; Last   Rx:52Tup5637 Ordered   8  Simvastatin 10 MG Oral Tablet; take 1 tablet by mouth daily; Therapy: 15TGB6077 to (Evaluate:75Fpz6341)  Requested for: 24SIM7750; Last   Rx:57Euj8916 Ordered   9  Torsemide 20 MG Oral Tablet; TAKE 1 TABLET BY MOUTH TWICE DAILY AS   DIRECTED; Therapy: 76WLJ3889 to (Evaluate:75Frk9395)  Requested for: 85ZTP9149; Last   Rx:17Nov2015 Ordered    Allergies    1  Acetaminophen-Codeine SOLN   2  Hydrocodone-Acetaminophen TABS   3  MetFORMIN HCl TABS   4   Codeine Derivatives   5  hydrocodone    Signatures   Electronically signed by : Dax Gan, ; Jun 23 2016  9:40AM EST                       (Author)

## 2018-01-11 NOTE — MISCELLANEOUS
History of Present Illness  A call was made to the patient/patient's family  Status Check: today's weight is 220   Patient is experiencing the following symptoms:  The patient is not experiencing signs of heart failure  Concerns expressed today consisted of: has gained about 7 pounds past month since out of the hospital, but feels is caloric weight  Counseling provided to the patient  Topics counseled included diet, medications, need for daily weights, importance of compliance with treatment and symptoms to report  HFCC Additional Notes: Good compliance  Future Appointments    Date/Time Provider Specialty Site   02/10/2017 02:00 PM LUKE Pagan  Hematology Oncology [facility] MEDICAL ONCOLOGY   02/13/2017 03:00 PM LUKE Christensen   Cardiology [facility]     Signatures   Electronically signed by : Patricia Brandon, ; Feb  3 2017  3:56PM EST                       (Author)

## 2018-01-12 NOTE — PROCEDURES
Procedures by Fadia Washington PA-C at 10/13/2017  3:21 PM      Author:  Fadia Washington PA-C Service:  Hospitalist Author Type:  Physician Assistant    Filed:  10/13/2017  3:25 PM Date of Service:  10/13/2017  3:21 PM Status:  Signed    :  Fadia Washington PA-C (Physician Assistant)  Cosigner:  Janell Wood MD at 10/13/2017  6:19 PM      Pre-procedure Diagnoses:       1  Laceration of nose [S01 21XA]       2  Laceration of eyebrow [S01 119A]                Post-procedure Diagnoses:       1  Laceration of nose [S01 21XA]       2  Laceration of eyebrow [S01 119A]                Procedures:       1  SUTURE REMOVAL [LSV38 (Custom)]                 Patient had sutures placed at outside facility on 10/7/17  Sutures were removed today using sterile technique and sterile equipment  Three sutures removed from right eyebrow and four  sutures removed from bridge of nose  Patient tolerated procedure well without complication  Lacerations were well-healed and showed no signs of infection             Received for:Provider  EPIC   Oct 13 2017  6:19PM [facility] Standard Time

## 2018-01-12 NOTE — CONSULTS
I had the pleasure of evaluating your patient, Taty Green  My full evaluation follows:      Reason For Visit  Patient seen in follow-up for chronic kidney disease        History of Present Illness  The patient denies any chest pressure or shortness of breath  He had a recent hospitalization from October 10th to October 20th secondary acute cephalopathy hypoglycemia  During that hospitalization he was also found have lower extremity cellulitis  He was seen by vascular surgery as an outpatient and it was recommended the patient have an angiogram done  The patient is currently on oral Keflex has been followed up with Podiatry  The patient was     Review of Systems    Constitutional: no anorexia and no fatigue  Gastrointestinal: no nausea  Respiratory: no shortness of breath  Cardiovascular: lower extremity edema, but no orthopnea, no PND, no chest pain and no palpitations  Current Meds   1  Aspirin EC 81 MG Oral Tablet Delayed Release; TAKE 1 TABLET DAILY; Therapy: 72BHN5011 to (Evaluate:06Jan2018); Last Rx:11Jan2017 Ordered   2  Centrum Silver TABS; Take 1 tablet daily; Therapy: (Recorded:01Aug2016) to Recorded   3  Metoprolol Tartrate 25 MG Oral Tablet; TAKE 1 TABLET TWICE DAILY ALONG WITH THE   50 MG TABLET; Therapy: (Recorded:11Sep2017) to  Requested for: 11Sep2017 Recorded   4  Metoprolol Tartrate 50 MG Oral Tablet; Take 1 tablet by mouth twice daily along with 1   tablet of 25 mg;   Therapy: 38PVI8258 to (Evaluate:10Dec2017)  Requested for: 11Sep2017 Recorded   5  NovoLOG Mix 70/30 FlexPen (70-30) 100 UNIT/ML SUSP; Use 38 units in the am and 32   units in the pm;   Therapy: (Recorded:11Sep2017) to Recorded   6  Omeprazole 20 MG Oral Capsule Delayed Release; take 1 capsule by mouth daily; Therapy: 15PAP2556 to (Evaluate:46Ill6187)  Requested for: 23Hkv1257; Last   Rx:25Aug2017 Ordered   7   Paricalcitol 1 MCG Oral Capsule; take 1 capsule by mouth ON MONDAY, [address] [facility]; Therapy: 56MAO7679 to (Jose Vasquez)  Requested for: 97VUD1482; Last   Rx:84Ovv7590 Ordered   8  Ramipril 2 5 MG Oral Capsule; take 1 capsule by mouth every morning; Therapy: 02AXJ2230 to (Evaluate:96Xto8980)  Requested for: 51XLF3869; Last   Rx:29Gst8439 Ordered   9  Simvastatin 10 MG Oral Tablet; TAKE 1 TABLET BY MOUTH DAILY; Therapy: 30LHA1499 to (Evaluate:68Bxs6226)  Requested for: 27Lwu7472; Last   Rx:75Ctn1647 Ordered   10  Torsemide 20 MG Oral Tablet; TAKE 2 TABLETS BY MOUTH DAILY ON MWF, 1 PO REST    OF WEEK; Therapy: 65WKS6985 to (Evaluate:16Yas5360)  Requested for: 07Vvm6532 Recorded   11  Uloric 40 MG Oral Tablet; TAKE 1 TABLET DAILY; Therapy: (Recorded:78Gkt7608) to Recorded    Allergies    1  Acetaminophen-Codeine SOLN   2  Hydrocodone-Acetaminophen TABS   3  MetFORMIN HCl TABS   4  Codeine Derivatives   5  hydrocodone    Vitals   Recorded: 87MMX0121 04:21PM Recorded: 05EUR3887 42:48TJ   Systolic 191, RUE, Sitting    Diastolic 80, RUE, Sitting    Height  5 ft 7 in   Weight  210 lb    BMI Calculated  32 89   BSA Calculated  2 06     Physical Exam    Constitutional: General appearance: No acute distress, well appearing and well nourished  Eyes: Anicteric sclerae  JVD:  No JVD present  Pulmonary:  Auscultation of lungs: Clear to auscultation  Cardiovascular: Auscultation of heart: Normal rate and rhythm, normal S1 and S2, without murmurs  Abdomen: Non-tender, no masses  Extremities: Extremities are abnormal   There is bilateral leg edema, the left foot was evaluated the erythema as per patient has dramatically decreased  There is edema of the left foot noted  Rash: No rash present   No new rash noted     Neurologic: Non Focal      Psychiatric: Orientation to person, place, and time: Normal        Results/Data  * XR SHOULDER 2+ VIEW LEFT 70ZVS6114 10:26AM Faye Mae    Order Number: [id number]   Performing Comments:  9     Test Name Result Flag Reference XR SHOULDER 2+ VW LEFT (Report)     LEFT SHOULDER     INDICATION: Injury 3 weeks ago, persistent left shoulder pain     COMPARISON: 10/9/2017     VIEWS: 3     IMAGES: 3     FINDINGS:     There is anatomic osseous alignment  Suspicion of greater tuberosity fracture with healing response in anatomic position  No significant joint space narrowing     No lytic or blastic lesions are seen  Soft tissues are unremarkable  IMPRESSION:     Unchanged anatomic alignment of the left shoulder       Workstation performed: [id number]     Signed by:   Gideon Ruiz MD   11/2/17       Assessment    1  Chronic kidney disease, stage 3 (585 3) (N18 3)   2  Edema (782 3) (R60 9)   3  Secondary hyperparathyroidism, renal (588 81) (N25 81)    Plan  Peripheral vascular disease    · *1 - [facility]  *  Status: Hold For - Scheduling   Requested for: 34XQB4212   Ordered; For: Peripheral vascular disease; Ordered By: Sheri Hollis Performed:  Due: 77PFG8468  Care Summary provided  : Yes  Secondary hyperparathyroidism, renal    · (1) CBC/PLT/DIFF; Status:Active; Requested for:13Nov2017;    Perform:[facility]; KIRK:38MZZ8725; Ordered; For:Secondary hyperparathyroidism, renal; Ordered By:Carl El;   · (1) COMPREHENSIVE METABOLIC PANEL; Status:Active; Requested for:13Nov2017;    Perform:[facility]; PTD:08WVC8019; Ordered; For:Secondary hyperparathyroidism, renal; Ordered By:Carl El;   · Follow-up visit in 1 month Evaluation and Treatment  Follow-up  Status: Hold For -  Scheduling  Requested for: 72RPP0930   Ordered; For: Secondary hyperparathyroidism, renal; Ordered By: Sheri Hollis Performed:  Due: 39PBB0414    Discussion/Summary  1  Stage 4 chronic kidney disease:  Baseline creatinine is 1 7-2 1  Certainly the patient is at moderate to severe risk of dye nephropathy given his history of advanced chronic kidney disease and diabetes    The patient certainly does need the angiogram   I think that the patient will need to have intravenous fluids done at the infusion center prior to the angiogram and have the angiogram done in the afternoon  His creatinine which was just done was 1 8 which is within his baseline  I also think to optimize kidney function prior to the procedure in addition to the IV fluids, would also need to hold the ramipril the day before the day of the procedure as well as holding the torsemide dose that morning and the evening  In addition, certainly minimizing the use of dye with utilizing CO2 would also be advantageous in this situation  2   Edema:  Maintain diuretic doses as above with caveats as noted above prior to the procedure  3  Secondary hyperparathyroidism:  Recheck intact PTH    I will call IR tomorrow as well as the infusion center to try and get this facilitated  The patient will need to be optimized prior to the IR angiogram   His kidney function is at his baseline  We can proceed with the angiogram with the above caveats  I did explain to the patient and his wife that the above measures optimize the kidney function and minimize the risk of dye nephropathy but it does not completely eliminate it  all questions answered  Health Management Medicare Annual Wellness Visit; every 1 year; Next Due: 84Gdu0450; Overdue    Thank you very much for allowing me to participate in the care of this patient  If you have any questions, please do not hesitate to contact me        Future Appointments    Signatures   Electronically signed by : Chai Diallo DO; Nov 8 2017  4:27PM EST                       (Author)

## 2018-01-12 NOTE — PROGRESS NOTES
Assessment  Assessed    1  Chronic diastolic congestive heart failure (428 32,428 0) (I50 32)   2  Paroxysmal atrial fibrillation (427 31) (I48 0)   3  Chronic kidney disease, stage 3 (585 3) (N18 3)    Plan  Chronic diastolic congestive heart failure, Chronic kidney disease, stage 3    · Limit your liquids to 6 glasses throughout the day ; Status:Complete;   Done: 44RFU8552   Ordered; For:Chronic diastolic congestive heart failure, Chronic kidney disease, stage 3; Ordered By:Twyla Dc;  Chronic kidney disease, stage 3    · Restrict the salt in your diet by avoiding highly salted foods ; Status:Complete;   Done:  89NMT6248   Ordered; For:Chronic kidney disease, stage 3; Ordered By:Juan Dc;   · Weigh yourself every day ; Status:Complete;   Done: 48MAJ8781   Ordered; For:Chronic kidney disease, stage 3; Ordered By:Twyla Dc;   · Follow-up visit in 2 weeks Evaluation and Treatment  Follow-up  Status: Complete  Done:  74TXQ1159   Ordered; For: Chronic kidney disease, stage 3; Ordered By: Esau Rothman Performed:  Due: 37NGS8070; Last Updated By: Mary Grace Varela; 7/7/2016 3:29:56 PM   · (1) BASIC METABOLIC PROFILE; Status:Resulted - Requires Verification;   Done:  79OOT5972 07:07AM   WWW:09UVS6852;LQCAIWJ; For:Chronic kidney disease, stage 3; Ordered By:Twyla Dc;   · (1) MAGNESIUM; Status:Resulted - Requires Verification;   Done: 97XXQ9584 07:07AM   CPU:73OTW9757;JYEPBIK; For:Chronic kidney disease, stage 3; Ordered By:Twyla Dc;  Coronary atherosclerosis of native coronary vessel    · EKG/ECG- POC; Status:Complete;   Done: 31UTP0130   Perform: In Office; YRZ:74PKI2107; Last Updated Mabel Hale; 7/7/2016 3:15:48 PM;Ordered;   For:Coronary atherosclerosis of native coronary vessel; Ordered By:Juan Dc;    Discussion/Summary  Cardiology Discussion Summary Free Text Note Form [facility]:   Mr Brisa Nj presents to our office today for a recent hospitalization follow up visit  He continues with LE edema which is improved from his baseline  A 12 lead EKG in the office today shows NSR with 1 degree AV block  His weight is 235 8 pounds  His blood pressure is 96/60 and HR is 84 BPM  Alesia Godfrey has not undergone recent lab studies  I have ordered a BMP and mag to be done tomorrow  I have asked Alesia Never to watch for signs and symptoms of bleeding He continues on Eliquis BID  I have made no changes in his medical regimen today  Alesia Godfrey will continue to follow a 2 gm sodium diet 1500 cc fluid restriction  He will continue to monitor his weight daily and call the heart failure office if he gains 3 pounds in one day, 5 pounds in one week, experiences worsening shortness of breath or increasing lower leg edema at [phone]  He will follow up in our office in 2 weeks  Chief Complaint  Chief Complaint Free Text Note Form: Patient is here for a hospital follow up  History of Present Illness  Cardiology HPI Free Text Note Form [facility]: Mr Alba Earl is a 80Year old male with a known past medical history of coronary artery disease status post CABG x4 and [place] AVR in 8838, chronic diastolic heart failure, hypertension, hyperlipidemia, chronic kidney disease stage III , baseline current 1 6-2 0  Also, Dr Kings Wallace, diabetes mellitus  Alesia Godfrey was recently admitted to [facility] on June 22 through June 25, 2016 with acute on chronic diastolic heart failure, new onset of each fibrillation, chronic kidney disease stage III, and hypertension  Priyanka and his wife recently Traveled to [place]  He experienced worsening dyspnea on the flight  Alesia Never held his diuretics in [place] due to concern of possibility of dehydration with the heat in [place]  Prior to his presentation Alesia Never experience an 11 day history of progressive dyspnea with LE edema  On admission, a proBNP was 3000  A CXR showed mild CHF with sm L pl effusion  His weight on admission was 246 pounds   Telemetry EKG revealed new onset atrial flutter  His ERPUU6Kyan =5  He was started on Eliquis 2 5mg BID  He was treated with IV diuretics and transitioned to po torsemide  2-D echocardiogram was done on June 23, 2016 which showed left ventricular systolic function was normal  Ejection fraction was estimated to be 55%  There was mild hypokinesis of the basilar inferior wall  Wall thickness is mildly to moderately increased  There was mild to moderate mitral valve regurgitation  Bioprosthetic aortic valve is present and exhibited normal function  There was mild to moderate tricuspid valve regurgitation  The plan was for [facility] to undergo a KRYSTLE CV as OP when stable from a heart failure standpoint  On the day of discharge his his BUN was 40 and creat 1 84  Today flight presents to our office for recent hospitalization follow-up visit  He is accompanied by his wife  He admits to continued lower extremity edema, which are improved from his baseline  Mat Sweet in the office today is 335 8 pounds  He denies dyspnea with rest or minimal exertion, orthopnea, PND, chest pain, palpitation or lightheadedness  [facility] admits occasional dizziness  His abiding by 2 g sodium diet, 1500 cc fluid restriction  Review of Systems  Cardiology Male ROS:     Cardiac: has swelling in the darwin LE, but no chest pain and no palpitations present  Skin: No complaints of nonhealing sores or skin rash  Genitourinary: kidney problems   Psychological: No complaints of feeling depressed, anxiety, panic attacks, or difficulty concentrating  General: No complaints of trouble sleeping, lack of energy, fatigue, appetite changes, weight changes, fever, frequent infections, or night sweats  Respiratory: No complaints of shortness of breath, cough with sputum, or wheezing  HEENT: No complaints of serious problems, hearing problems, nose problems, throat problems, or snoring     Gastrointestinal: No complaints of liver problems, nausea, vomiting, heartburn, constipation, bloody stools, diarrhea, problems swallowing, adbominal pain, or rectal bleeding  Hematologic: No complaints of bleeding disorders, anemia, blood clots, or excessive brusing  Neurological: No complaints of numbness, tingling, dizziness, weakness, seizures, headaches, syncope or fainting, AM fatigue, daytime sleepiness, no witnessed apnea episodes  Musculoskeletal: arthritis      Active Problems  Problems    1  Aortic valve disorder (424 1) (I35 9)   2  Benign essential hypertension (401 1) (I10)   3  Carotid stenosis (433 10) (I65 29)   4  Chronic kidney disease, stage 3 (585 3) (N18 3)   5  Chronic kidney disease, stage IV (severe) (585 4) (N18 4)   6  Coronary atherosclerosis of native coronary vessel (414 01) (I25 10)   7  Degenerative joint disease (715 90) (M19 90)   8  Diabetes Mellitus (250 00)   9  Edema (782 3) (R60 9)   10  Esophageal reflux (530 81) (K21 9)   11  Extremity atherosclerosis with intermittent claudication (440 21) (I70 219)   12  Gout (274 9) (M10 9)   13  Hiccups (786 8) (R06 6)   14  Hyperlipidemia (272 4) (E78 5)   15  Hyperparathyroidism (252 00) (E21 3)   16  Mild mitral regurgitation (424 0) (I34 0)   17  Monoclonal gammopathy of undetermined significance (273 1) (D47 2)   18  Peripheral vascular disease (443 9) (I73 9)   19  S/P AVR (V43 3) (Z95 2)   20  Screening for diabetic retinopathy (V80 2) (Z13 5)   21  Secondary hyperparathyroidism, renal (588 81) (N25 81)   22  SOB (shortness of breath) on exertion (786 05) (R06 02)   23  Tachycardia (785 0) (R00 0)   24  URI, acute (465 9) (J06 9)   25  Visit for pre-operative examination (V72 84) (X69 913)    Past Medical History  Problems    1  History of Dyspnea (786 09) (R06 00)   2  History of Embolism, arterial, leg, right (444 22) (I74 3)   3  History of Herniated disc (722 2)   4  History of myocardial infarction (412) (I25 2)   5  History of pneumonia (V12 61) (Z87 01)   6   History of Premature atrial contractions (427 61) (I49 1)  Active Problems And Past Medical History Reviewed: The active problems and past medical history were reviewed and updated today  Surgical History  Problems    1  History of Aortic Valve Replacement   2  History of Bypass Graft Using Vein: Femoral-popliteal   3  History of CABG   4  History of Cataract Surgery   5  History of Knee Replacement  Surgical History Reviewed: The surgical history was reviewed and updated today  Family History  Sibling    1  Family history of Diabetes  Family History Reviewed: The family history was reviewed and updated today  Social History  Problems    · Former smoker (F70 30) (M87 940)   · Former Smoker Cigarettes - Heavy (20-25 / Day)  Social History Reviewed: The social history was reviewed and updated today  The social history was reviewed and is unchanged  Current Meds   1  Aspirin 81 MG TABS; 1 tab alternating with 2 tabs everyother day; Therapy: (Recorded:08Xkv6328) to Recorded   2  Centrum Silver TABS; Take 1 tablet daily Recorded   3  D-1000 1000 UNIT Oral Tablet; TAKE 1 TABLET DAILY AFTER BREAKFAST  Requested   for: 15Kvg1171; Last Rx:59Ymm9970 Ordered   4  Eliquis 2 5 MG Oral Tablet; Take 1 tab twice daily; Therapy: (Recorded:92Olh4781) to Recorded   5  Metoprolol Tartrate 50 MG Oral Tablet; TAKE 1 1/2 TABLET BY MOUTH TWICE DAILY; Therapy: 04TRF6285 to (96 887011)  Requested for: 43SPL7594 Recorded   6  NovoLOG Mix 70/30 FlexPen (70-30) 100 UNIT/ML SUSP; USE AS DIRECTED Recorded   7  Omeprazole 20 MG Oral Capsule Delayed Release; take 1 capsule by mouth daily; Therapy: 75TRD7578 to (Lidia Metz)  Requested for: 10NQN1087; Last   Rx:27Awn6664 Ordered   8  Paricalcitol 1 MCG Oral Capsule; take 1 capsule by mouth ON MONDAY, [address]; Therapy: 42EKA3234 to (Evaluate:58Ect0081)  Requested for: 63WCU1397; Last   Rx:04Dfw4135 Ordered   9   Ramipril 5 MG Oral Capsule; TAKE ONE CAPSULE BY MOUTH EVERY DAY; Therapy: 28XNL2838 to (Evaluate:11Feb2017)  Requested for: 35NDW4297; Last   Rx:32Alj4850 Ordered   10  Simvastatin 10 MG Oral Tablet; take 1 tablet by mouth daily; Therapy: 45EUG9279 to (Evaluate:11Feb2017)  Requested for: 12CUI3665; Last    Rx:81Qqz1925 Ordered   11  Torsemide 20 MG Oral Tablet; take 1 tablet alternating with 2 tablets everyother day; Therapy: 12JIK1255 to (Evaluate:03Jan2017)  Requested for: 20QJA4422 Recorded   12  Uloric 40 MG Oral Tablet; Therapy: (Recorded:98Meb4899) to Recorded  Medication List Reviewed: The medication list was reviewed and updated today  Allergies  Medication    1  Acetaminophen-Codeine SOLN   2  Hydrocodone-Acetaminophen TABS   3  MetFORMIN HCl TABS   4  Codeine Derivatives   5  hydrocodone    Vitals  Vital Signs [Data Includes: Current Encounter]    Recorded: 31LUR9518 11:40AM Recorded: 62SZG6463 02:46PM   Heart Rate  84   Systolic 96, LUE, Sitting 112, LUE, Sitting   Diastolic 60, LUE, Sitting 66, LUE, Sitting   BP Cuff Size  Large   Height  5 ft 7 in   Weight  235 lb 8 oz   BMI Calculated  36 88   BSA Calculated  2 17     Physical Exam    Constitutional   General appearance: No acute distress, well appearing and well nourished  Neck   Neck and thyroid: Normal, supple, trachea midline, no thyromegaly  Pulmonary   Respiratory effort: No increased work of breathing or signs of respiratory distress  Auscultation of lungs: Clear to auscultation, no rales, no rhonchi, no wheezing, good air movement  Cardiovascular   Auscultation of heart: Normal rate and rhythm, normal S1 and S2, no murmurs  Examination of extremities for edema and/or varicosities: Abnormal   2+ -3+ darwin LE edema to knees  Abdomen   Abdomen: Abnormal   obese  Musculoskeletal Gait and station: Normal gait  Skin - Skin and subcutaneous tissue: Normal without rashes or lesions  Skin is warm and well perfused, normal turgor      Neurologic - Speech: Normal  Psychiatric - Orientation to person, place, and time: Normal  Mood and affect: Normal       Results/Data  Encounter Results   (1) BASIC METABOLIC PROFILE 40KJR6479 07:07AM [facility] Order Number: [id number]   Order Number: [id number] Order Number: [id number]     Test Name Result Flag Reference   GLUCOSE,RANDM 165 mg/dL H    If the patient is fasting, the ADA then defines impaired fasting glucose as > 100 mg/dL and diabetes as > or equal to 123 mg/dL  SODIUM 138 mmol/L  136-145   POTASSIUM 5 0 mmol/L  3 5-5 3   CHLORIDE 101 mmol/L  100-108   CARBON DIOXIDE 28 mmol/L  21-32   ANION GAP (CALC) 9 mmol/L  4-13   BLOOD UREA NITROGEN 41 mg/dL H 5-25   CREATININE 2 14 mg/dL H 0 60-1 30   Standardized to IDMS reference method   CALCIUM 9 7 mg/dL  8 3-10 1   eGFR Non-African American 30 0 ml/min/1 73sq [facility] Disease Education Program recommendations are as follows:  GFR calculation is accurate only with a steady state creatinine  Chronic Kidney disease less than 60 ml/min/1 73 sq  meters  Kidney failure less than 15 ml/min/1 73 sq  meters  (1) MAGNESIUM 93EQI0406 07:07AM [facility] Order Number: [id number]   Order Number: [id number] Order Number: [id number]     Test Name Result Flag Reference   MAGNESIUM 2 5 mg/dL  1 6-2 6     Diagnostic Studies Reviewed Cardio:   Echocardiogram/KRYSTLE: [facility] 23, 2016 which showed left ventricular systolic function was normal  Ejection fraction was estimated to be 55%  There was mild hypokinesis of the basilar inferior wall  Wall thickness is mildly to moderately increased  There was mild to moderate mitral valve regurgitation  Bioprosthetic aortic valve is present and exhibited normal function  There was mild to moderate tricuspid valve regurgitation  T    ECG Report:   Rhythm and rate:  ventricular rate is 84 beats per minute  normal sinus rhythm   OH Interval: 268 seconds, first degree heart block  QRS: QRS interval: 124 seconds left axis deviation      Future Appointments    Date/Time Provider Specialty Site   07/21/2016 02:20 PM Julia Martines Cardiology [facility] CARDIOLOGY [place]   02/10/2017 02:00 PM LUKE Kapoor  Hematology Oncology [facility] MEDICAL ONCOLOGY   08/02/2016 03:40 PM LUKE Cifuentes   Cardiology  [address] [place]     Signatures   Electronically signed by : Leo Baltazar, ; Jul 8 2016  3:54PM EST                       (Author)

## 2018-01-13 NOTE — MISCELLANEOUS
History of Present Illness    The patient is being contacted for follow-up after hospitalization  This was not a readmission  The date of discharge was 1/5/17  He has a high risk for readmission  Spoke with patient's family  wife, Navdeep Koenig  He was discharged to home  Patient is not home bound and still works  patient weighs himself in the evening  The patient's discharge weight was   The patient's weight today is 218 yesterday  The patient is currently asymptomatic  Knowledge Assessment/Teachback Questions: the patient is following diet, foods to limit/avoid, the patient is obtaining daily weights, he knows the name of his medications/water pill, the patient understands the activity/exercise plan and the patient knows when to report symptoms   wife does his medication  Patient does not know his medication  They eat out at a restaurant 2-3x/week, but state they cook only from scratch and food is low salt  Counseling was provided to patient's family  Topics counseled included diet, need for daily weights, importance of compliance with treatment, symptoms to report and reminded to get BMP next week  HFCC Additional Notes:   Water pill was decreased by half by family doctor  Current Meds   1  AmLODIPine Besylate 2 5 MG Oral Tablet; TAKE 1 TABLET DAILY; Therapy: 27Vgc6011 to (Evaluate:06Jan2018)  Requested for: 06LQS4745 Recorded   2  Aspirin EC 81 MG Oral Tablet Delayed Release; TAKE 1 TABLET DAILY; Therapy: 75MFH7851 to (Evaluate:06Jan2018); Last Rx:11Jan2017 Ordered   3  Centrum Silver TABS; Take 1 tablet daily; Therapy: (Recorded:77Pnr2896) to Recorded   4  D-1000 1000 UNIT Oral Tablet; TAKE 1 TABLET DAILY AFTER BREAKFAST  Requested   for: 02Dec2015; Last Rx:02Dec2015 Ordered   5  Eliquis 2 5 MG Oral Tablet; Take 1 tablet by mouth twice daily; Therapy: 98Ajj8421 to (Evaluate:06Aug2017)  Requested for: 11Aug2016; Last   Rx:11Aug2016 Ordered   6   Insulin Aspart 100 UNIT/ML SOLN; 35 units in the morning and 30 units at night; Therapy: (Recorded:11Jan2017) to Recorded   7  Metoprolol Tartrate 25 MG Oral Tablet; TAKE 1 TABLET TWICE DAILY  Requested for:   11Aug2016; Last Rx:11Aug2016 Ordered   8  Metoprolol Tartrate 50 MG Oral Tablet; TAKE 1 TABLET BY MOUTH TWICE DAILY (along   with 25 mg BID for a total of 75 mg BID); Therapy: 39UOM4020 to (Evaluate:53Jdz9721)  Requested for: 11Aug2016 Recorded   9  NovoLOG Mix 70/30 FlexPen (70-30) 100 UNIT/ML SUSP; Use 30 units in the am and 28   units in the pm;   Therapy: (Recorded:01Aug2016) to Recorded   10  Omeprazole 20 MG Oral Capsule Delayed Release; take 1 capsule by mouth daily; Therapy: 96PLZ6102 to (Bri Kidd)  Requested for: 77TTD1880; Last    Rx:52Sho2837 Ordered   11  Paricalcitol 1 MCG Oral Capsule; take 1 capsule by mouth ON MONDAY, [address] [facility]; Therapy: 26MVN7284 to (Evaluate:01Kmz2623)  Requested for: 27Gwe1839; Last    Rx:64Vbs4886 Ordered   12  Simvastatin 10 MG Oral Tablet; TAKE 1 TABLET BY MOUTH DAILY; Therapy: 98YFK8952 to (Darrick Gee)  Requested for: 13Jrg2236; Last    Rx:29Yme6605 Ordered   13  Torsemide 20 MG Oral Tablet; Take 1 tablet daily; Therapy: 46RNM0506 to (Evaluate:06Jan2018)  Requested for: 32RCT7601 Recorded   14  Uloric 40 MG Oral Tablet; TAKE 1 TABLET DAILY; Therapy: (Recorded:61Qzs6791) to Recorded    Future Appointments    Date/Time Provider Specialty Site   02/01/2017 03:00 PM Maame Rooney, [address] Cardiology [facility]   02/10/2017 02:00 PM LUKE Pagan  Hematology [facility]cology [facility]   02/13/2017 03:00 PM LUKE Christensen  Cardiology [facility] CARDIOLOGY [place]     Allergies    1  Acetaminophen-Codeine SOLN   2  Hydrocodone-Acetaminophen TABS   3  MetFORMIN HCl TABS   4   Codeine Derivatives   5  hydrocodone    Signatures   Electronically signed by : Patricia Brandon, ; Jan 12 2017  4:15PM EST (Author)

## 2018-01-15 NOTE — MISCELLANEOUS
Assessment    1  Congestive heart failure (428 0) (I50 9)    Plan  Congestive heart failure    · (1) BASIC METABOLIC PROFILE; Status:Active; Requested MSI:[id number];    Perform:[facility]; [place]; Ordered; For:Congestive heart failure; Ordered By:Mikal Suero;    Discussion/Summary  Discussion Summary:   Congestive heart failure  Patient appears to be back to baseline status  His pulse oximetry is stable and his weight is under control  He was given a prescription to obtain BMP blood test to evaluate kidney function  He'll follow up with cardiology office tomorrow  Diabetes  Patient had slight increase in insulin regimen during hospital stay  He will continue to monitor blood sugars follow-up with his cardiologist Dr Nehal Koch  Chief Complaint  Chief Complaint Free Text Note Form: JORDAN: Pt was admitted to [facility] from 01/02/2017 through 01/05/2017  Dx: Acute respiratory failure with hypoxia  Spoke with pt who reports he does have sob at times with exertion  Denies CP, dizziness ,palpitations, cough or wheezing  Follow up with pcp scheduled for 01/10/2017 @ 12 noon  History of Present Illness  TCM Communication [facility]: The patient is being contacted for follow-up after hospitalization and 01/05/2017  He was hospitalized at [facility]  The date of admission: 01/02/2017, date of discharge: 01/05/2017  Diagnosis: See note  He was discharged to home  Medications were not reviewed today  He scheduled a follow up appointment  Symptoms: shortness of breath  Counseling was provided to the patient  Topics counseled included importance of compliance with treatment  Communication performed and completed by [employer]       HPI: I reviewed JORDAN note and discharge summary from latest hospital stay  Patient overall feels much better and has lost 9 pounds since hospital stay due to aggressive diuresis  He continues on torsemide 20 mg twice a day   He scheduled see cardiology office tomorrow  Patient denies any fevers since leaving the hospital      Review of Systems  Complete-Male:   Constitutional: No fever or chills, feels well, no tiredness, no recent weight gain or weight loss  Eyes: No complaints of eye pain, no red eyes, no discharge from eyes, no itchy eyes  ENT: no complaints of earache, no hearing loss, no nosebleeds, no nasal discharge, no sore throat, no hoarseness  Cardiovascular: No complaints of slow heart rate, no fast heart rate, no chest pain, no palpitations, no leg claudication, no lower extremity  Respiratory: Minimal cough for which patient continues to take Mucinex  Gastrointestinal: No complaints of abdominal pain, no constipation, no nausea or vomiting, no diarrhea or bloody stools  Genitourinary: No complaints of dysuria, no incontinence, no hesitancy, no nocturia, no genital lesion, no testicular pain  Musculoskeletal: No complaints of arthralgia, no myalgias, no joint swelling or stiffness, no limb pain or swelling  Active Problems    1  Acute bronchitis (466 0) (J20 9)   2  Aortic valve disorder (424 1) (I35 9)   3  Benign essential hypertension (401 1) (I10)   4  Blood in urine (599 70) (R31 9)   5  Carotid stenosis (433 10) (I65 29)   6  Chronic diastolic congestive heart failure (428 32,428 0) (I50 32)   7  Chronic kidney disease, stage 3 (585 3) (N18 3)   8  Chronic kidney disease, stage IV (severe) (585 4) (N18 4)   9  Coronary atherosclerosis of native coronary vessel (414 01) (I25 10)   10  Cough (786 2) (R05)   11  Degenerative joint disease (715 90) (M19 90)   12  Diabetes Mellitus (250 00)   13  Dysfunction of both eustachian tubes (381 81) (H69 83)   14  Dyspnea (786 09) (R06 00)   15  Edema (782 3) (R60 9)   16  Esophageal reflux (530 81) (K21 9)   17  Extremity atherosclerosis with intermittent claudication (440 21) (I70 219)   18  Gout (274 9) (M10 9)   19  Hiccups (786 8) (R06 6)   20  Hyperlipidemia (272 4) (E78 5)   21  Hyperparathyroidism (252 00) (E21 3)   22  Hypoxia (799 02) (R09 02)   23  Mild mitral regurgitation (424 0) (I34 0)   24  Monoclonal gammopathy of undetermined significance (273 1) (D47 2)   25  Need for vaccination with 13-polyvalent pneumococcal conjugate vaccine (V03 82) (Z23)   26  Paroxysmal atrial fibrillation (427 31) (I48 0)   27  Peripheral vascular disease (443 9) (I73 9)   28  Productive cough (786 2) (R05)   29  S/P AVR (V43 3) (Z95 2)   30  Screening for diabetic retinopathy (V80 2) (Z13 5)   31  Secondary hyperparathyroidism, renal (588 81) (N25 81)   32  SOB (shortness of breath) on exertion (786 05) (R06 02)   33  Tachycardia (785 0) (R00 0)   34  URI, acute (465 9) (J06 9)   35  Visit for pre-operative examination (V72 84) (Z01 818)   36  Vitamin D deficiency (268 9) (E55 9)   37  Wheeze (786 07) (R06 2)    Past Medical History    1  History of Dyspnea (786 09) (R06 00)   2  History of Embolism, arterial, leg, right (444 22) (I74 3)   3  History of Herniated disc (722 2)   4  History of myocardial infarction (412) (I25 2)   5  History of pneumonia (V12 61) (Z87 01)   6  History of Premature atrial contractions (427 61) (I49 1)    Surgical History    1  History of Aortic Valve Replacement   2  History of Bypass Graft Using Vein: Femoral-popliteal   3  History of CABG   4  History of Cataract Surgery   5  History of Knee Replacement    Family History  Sibling    1  Family history of Diabetes    Social History    · Denies alcohol consumption (V49 89) (Z78 9)   · Denied: History of Drug use   · Former smoker (V15 82) (B80 579)   · Former Smoker Cigarettes - Heavy (20-25 / Day)  Social History Reviewed: The social history was reviewed and updated today  Current Meds   1  AmLODIPine Besylate 2 5 MG Oral Tablet; TAKE 1 TABLET DAILY; Therapy: 61Jcw5942 to (Evaluate:48Jlr2568)  Requested for: 07IWN2772; Last   Rx:03Oct2016 Ordered   2   Aspirin 81 MG TABS; 1 tab alternating with 2 tabs everyother day;   Therapy: (Recorded:01Aug2016) to Recorded   3  Centrum Silver TABS; Take 1 tablet daily; Therapy: (Recorded:01Aug2016) to Recorded   4  D-1000 1000 UNIT Oral Tablet; TAKE 1 TABLET DAILY AFTER BREAKFAST  Requested   for: 20Rwz4884; Last Rx:02Dec2015 Ordered   5  Eliquis 2 5 MG Oral Tablet; Take 1 tablet by mouth twice daily; Therapy: 69Fjb2087 to (Evaluate:97Psc8939)  Requested for: 11Aug2016; Last   Rx:11Aug2016 Ordered   6  Metoprolol Tartrate 25 MG Oral Tablet; TAKE 1 TABLET TWICE DAILY  Requested for:   11Aug2016; Last Rx:11Aug2016 Ordered   7  Metoprolol Tartrate 50 MG Oral Tablet; TAKE 1 TABLET BY MOUTH TWICE DAILY (along   with 25 mg BID for a total of 75 mg BID); Therapy: 96GWG0948 to (Evaluate:53Vbq4495)  Requested for: 11Aug2016 Recorded   8  NovoLOG Mix 70/30 FlexPen (70-30) 100 UNIT/ML SUSP; Use 30 units in the am and 28   units in the pm;   Therapy: (Recorded:01Aug2016) to Recorded   9  Omeprazole 20 MG Oral Capsule Delayed Release; take 1 capsule by mouth daily; Therapy: 27XFM9966 to (Linh Rooney)  Requested for: 36RRL2427; Last   Rx:80Lbo8962 Ordered   10  Paricalcitol 1 MCG Oral Capsule; take 1 capsule by mouth ON MONDAY, [address] [facility]; Therapy: 64PUU2027 to (Evaluate:70Xyv7389)  Requested for: 62Knr7880; Last    Rx:51Sxc9746 Ordered   11  Simvastatin 10 MG Oral Tablet; TAKE 1 TABLET BY MOUTH DAILY; Therapy: 68ULC8453 to (Augusta Goyal)  Requested for: 63Poh2467; Last    Rx:87Jei1726 Ordered   12  Torsemide 20 MG Oral Tablet; TAKE 1 TABLET BY MOUTH TWICE DAILY AS DIRECTED; Therapy: 25PMZ3782 to (Evaluate:37Ecs3309)  Requested for: 98Ubd0534; Last    Rx:09Rut3716 Ordered   13  Uloric 40 MG Oral Tablet; TAKE 1 TABLET DAILY; Therapy: (Recorded:67Bfe1985) to Recorded  Medication List Reviewed: The medication list was reviewed and updated today  Allergies    1  Acetaminophen-Codeine SOLN   2  Hydrocodone-Acetaminophen TABS   3  MetFORMIN HCl TABS   4  Codeine Derivatives   5  hydrocodone    Vitals  Signs   Recorded: 81BZH1444 12:07PM   Temperature: 97 5 F  Heart Rate: 76  Respiration: 16  Systolic: 247  Diastolic: 68  Height: 5 ft 7 in  Weight: 221 lb 10 08 oz  BMI Calculated: 34 71  BSA Calculated: 2 11  Recorded: 94AHE5241 12:03PM   Height: 5 ft 7 in  Weight: 221 lb 10 08 oz  BMI Calculated: 34 71  BSA Calculated: 2 11    Physical Exam    Constitutional   General appearance: No acute distress, well appearing and well nourished  Ears, Nose, Mouth, and Throat   External inspection of ears and nose: Normal     Oropharynx: Normal with no erythema, edema, exudate or lesions  Pulmonary   Respiratory effort: No increased work of breathing or signs of respiratory distress  Auscultation of lungs: Clear to auscultation, equal breath sounds bilaterally, no wheezes, no rales, no rhonci  Pulse oximetry 97% on room air  Cardiovascular   Auscultation of heart: Normal rate and rhythm, normal S1 and S2, without murmurs  Examination of extremities for edema and/or varicosities: Abnormal   Trace edema bilaterally for which the patient is using support stockings  Lymphatic   Palpation of lymph nodes in neck: No lymphadenopathy  Musculoskeletal   Gait and station: Normal          Health Management  Health Maintenance   Medicare Annual Wellness Visit; every 1 year; Next Due: 86Arb7195; Overdue    Future Appointments    Date/Time Provider Specialty Site   01/11/2017 03:40 PM Keya Pritchard, [address]  Cardiology [facility] CARDIOLOGY [facility]   02/10/2017 02:00 PM LUKE Alcocer  Hematology [facility]   02/13/2017 03:00 PM LUKE Aquino   Cardiology [facility]     Signatures   Electronically signed by : LUKE Patel ; Julio 10 2175 12:38PM EST                       (Author)

## 2018-01-15 NOTE — RESULT NOTES
Discussion/Summary   please call wife  xray of arm showed signs of fracture     Verified Results  * XR HUMERUS LEFT [id number] 93:36RH [email or web address] Order Number: [id number]     Test Name Result Flag Reference   XR HUMERUS LEFT (Report)     LEFT HUMERUS     INDICATION: Left humerus pain  COMPARISON: None     VIEWS: 2     IMAGES: 4     FINDINGS:     There is no acute fracture or dislocation  No degenerative changes  No lytic or blastic lesions are seen  Soft tissues are unremarkable  IMPRESSION:     No acute osseous abnormality  Workstation performed: [id number]     Signed by: Mary Ann Gonsales MD   10/11/17     * XR SHOULDER 2+ VIEW LEFT [id number] 51:96KP [email or web address] Order Number: [id number]     Test Name Result Flag Reference   XR SHOULDER 2+ VW LEFT (Report)     LEFT SHOULDER     INDICATION: Left shoulder pain  COMPARISON: None     VIEWS: 3     IMAGES: 3     FINDINGS:     There is no acute fracture or dislocation  No degenerative changes  No lytic or blastic lesions are seen  Soft tissues are unremarkable  IMPRESSION:     No acute osseous abnormality  Workstation performed: [id number]     Signed by:    Mary Ann Gonsales MD   10/11/17

## 2018-01-16 NOTE — MISCELLANEOUS
History of Present Illness  A call was made to   Status Check: today's weight is 229  Patient is experiencing the following symptoms:  The patient is not experiencing signs of heart failure  Counseling provided to the patient  Topics counseled included diet, activities of daily living, medications, need for daily weights and symptoms to report  HFCC Additional Notes: Patient doing very well and is maintaining self-management strategies with the support of his wife  He stated his insulin doses have been decreased also  Future Appointments    Date/Time Provider Specialty Site   07/21/2016 02:20 PM [facility] Cardiology [facility] CARDIOLOGY [facility]   02/10/2017 02:00 PM LUKE Seymour  Hematology Oncology [facility] MEDICAL ONCOLOGY   08/02/2016 03:40 PM LUKE Robertson   Cardiology [facility] [address]     Signatures   Electronically signed by : Soraida Lopez, ; Jul 12 2016  3:27PM EST                       (Author)

## 2018-01-16 NOTE — MISCELLANEOUS
Assessment    1  Cellulitis of foot (682 7) (L03 119)   2  Benign essential hypertension (401 1) (I10)   3  Stage 4 chronic kidney disease (585 4) (N18 4)   4  DM2 (diabetes mellitus, type 2) (250 00) (E11 9)    Plan  Cellulitis of foot    · Cephalexin 500 MG Oral Capsule; one po tid   Rx By: Anushka Saunders; Dispense: 0 Days ; #:42 Capsule; Refill: 0; For: Cellulitis of foot; DANIEL = N; Print Rx    Discussion/Summary  Discussion Summary:   Cellulitis of foot  Patient will continue with cephalexin 500 milligrams t i d  for at least another week  He will follow up with his various specialists  Hypertension  Patient blood pressure is stable at this time and he will continue with current regimen of medications  Chronic kidney disease  Patient is peripheral edema is much improved and he will continue with current regimen of medication and will follow up with his cardiologist    Diabetes  Patient will continue with current regimen of medications and will follow up with his endocrinologist       Chief Complaint  Chief Complaint Free Text Note Form: JORDAN: Pt was admitted to [facility] from 10/10/2017 through 10/20/2017  Dx: Acute Encephalopathy, s/P fall, Fx of left humerus  Spoke with pt's wife Tj Swift) who reports pt was D/C to [facility] for rehab  Pt's wife will call our office post D/C from rehab for continuity of care  11/06/2017: Pt was D/C from [facility] on Sun, 11/5th  spoke with pt's wife who reports pt was on his way out  Offers no complaints or concerns at this time  Pt has several appts scheduled with specialists (podiatrist, nephrologist, cardiologist)  Folllow up with PCP scheduled for 11/10/2017 @ 2pm       History of Present Illness  TCM Communication [facility]: The patient is being contacted for follow-up after hospitalization and 10/23/2017 & 11/06/2017  He was hospitalized at [place] [address]  The date of admission: 10/10/2017, date of discharge: 11/05/2017   Diagnosis: acute encephalopathy  He was discharged to home  Medications reviewed and updated today  He scheduled a follow up appointment  Follow-up appointments with other specialists: pt currently in [facility] for rehab  Counseling was provided to patient's family  spouse- Beni Balderas  Topics counseled included importance of compliance with treatment  Communication performed and completed by Parveen Negro   HPI: I reviewed chief complaint with the patient and his wife as well as JORDAN note from his latest hospital stay  Patient is still receiving cephalexin 500 milligrams t i d  for suspected hemorrhagic cellulitis of left foot  Patient has been making significant progress in the appearance of his left lower extremity  He did see vascular surgeon and is scheduled to have angiogram in the near future for further evaluation of his circulation  He is also continue physical therapy as an outpatient for left shoulder pain with mild fracture status post fall several weeks ago  Review of Systems  Complete-Male:   Constitutional: No fever or chills, feels well, no tiredness, no recent weight gain or weight loss  Eyes: No complaints of eye pain, no red eyes, no discharge from eyes, no itchy eyes  ENT: no complaints of earache, no hearing loss, no nosebleeds, no nasal discharge, no sore throat, no hoarseness  Cardiovascular: No complaints of slow heart rate, no fast heart rate, no chest pain, no palpitations, no leg claudication, no lower extremity  Respiratory: No complaints of shortness of breath, no wheezing, no cough, no SOB on exertion, no orthopnea or PND  Gastrointestinal: No complaints of abdominal pain, no constipation, no nausea or vomiting, no diarrhea or bloody stools  Genitourinary: No complaints of dysuria, no incontinence, no hesitancy, no nocturia, no genital lesion, no testicular pain  Musculoskeletal: as noted in HPI  Integumentary: as noted in HPI     Neurological: No compliants of headache, no confusion, no convulsions, no numbness or tingling, no dizziness or fainting, no limb weakness, no difficulty walking  Psychiatric: Is not suicidal, no sleep disturbances, no anxiety or depression, no change in personality, no emotional problems  Active Problems     1  Aortic valve disorder (424 1) (I35 9)   2  Arm pain, lateral, left (729 5) (M79 602)   3  Back pain (724 5) (M54 9)   4  Benign essential hypertension (401 1) (I10)   5  Chronic diastolic congestive heart failure (428 32,428 0) (I50 32)   6  Congestive heart failure (428 0) (I50 9)   7  Coronary atherosclerosis of native coronary vessel (414 01) (I25 10)   8  Cough with hemoptysis (786 39) (R04 2)   9  Degenerative joint disease (715 90) (M19 90)   10  DM2 (diabetes mellitus, type 2) (250 00) (E11 9)   11  Dyspnea (786 09) (R06 00)   12  Esophageal reflux (530 81) (K21 9)   13  Gout (274 9) (M10 9)   14  Hiccups (786 8) (R06 6)   15  Hyperlipidemia (272 4) (E78 5)   16  Hyperparathyroidism (252 00) (E21 3)   17  Hypoxia (799 02) (R09 02)   18  Left low back pain (724 2) (M54 5)   19  Mild mitral regurgitation (424 0) (I34 0)   20  Monoclonal gammopathy of undetermined significance (273 1) (D47 2)   21  Need for vaccination with 13-polyvalent pneumococcal conjugate vaccine (V03 82) (Z23)   22  Paroxysmal atrial fibrillation (427 31) (I48 0)   23  Peripheral vascular disease (443 9) (I73 9)   24  S/P AVR (V43 3) (Z95 2)   25  Screening for diabetic retinopathy (V80 2) (Z13 5)   26  SOB (shortness of breath) on exertion (786 05) (R06 02)   27  Stage 4 chronic kidney disease (585 4) (N18 4)   28  Tachycardia (785 0) (R00 0)   29  Visit for pre-operative examination (V72 84) (Z01 818)   30  Vitamin D deficiency (268 9) (E55 9)   31   Wheeze (786 07) (R06 2)    Chronic kidney disease, stage 3 (585 3) (N18 3)       Carotid stenosis (433 10) (I65 29)       Extremity atherosclerosis with intermittent claudication (440 21) (I73 9)        Secondary hyperparathyroidism, renal (588 81) (N25 81)       Edema (782 3) (R60 9)          Past Medical History    1  History of Dysfunction of both eustachian tubes (381 81) (H69 83)   2  History of Dyspnea (786 09) (R06 00)   3  History of Embolism, arterial, leg, right (444 22) (I74 3)   4  History of Herniated disc (722 2)   5  History of acute bronchitis (V12 69) (Z87 09)   6  History of cough   7  History of hematuria (V13 09) (Z87 448)   8  History of muscle pain (V13 59) (Z87 39)   9  History of myocardial infarction (412) (I25 2)   10  History of pneumonia (V12 61) (Z87 01)   11  History of Premature atrial contractions (427 61) (I49 1)   12  History of Productive cough (786 2) (R05)   13  History of URI, acute (465 9) (J06 9)    Surgical History    1  History of Aortic Valve Replacement   2  History of Bypass Graft Using Vein: Femoral-popliteal   3  History of CABG   4  History of Cataract Surgery   5  History of Knee Replacement    Family History  Sibling    1  Family history of Diabetes    Social History    · Denies alcohol consumption (V49 89) (Z78 9)   · Denied: History of Drug use   · Former smoker (V15 82) (X97 572)   · Former Smoker Cigarettes - Heavy (20-25 / Day)  Social History Reviewed: The social history was reviewed and updated today  Current Meds   1  AmLODIPine Besylate 2 5 MG Oral Tablet; TAKE ONE TABLET TUESDAY, THURSDAY,   SATURDAY; Therapy: 00Aez1025 to (Evaluate:07Jun2018)  Requested for: 12Jun2017 Recorded   2  Aspirin EC 81 MG Oral Tablet Delayed Release; TAKE 1 TABLET DAILY; Therapy: 45AIA4875 to (Evaluate:06Jan2018); Last Rx:11Jan2017 Ordered   3  Centrum Silver TABS; Take 1 tablet daily; Therapy: (Recorded:39Wix0390) to Recorded   4  Metoprolol Tartrate 25 MG Oral Tablet; TAKE 1 TABLET TWICE DAILY ALONG WITH THE   50 MG TABLET; Therapy: (Recorded:81Dqi5661) to  Requested for: 11Sep2017 Recorded   5  Metoprolol Tartrate 50 MG Oral Tablet;  Take 1 tablet by mouth twice daily along with 1   tablet of 25 mg;   Therapy: 49DUC5204 to (Evaluate:50Tik4456)  Requested for: 57Vwk3426 Recorded   6  NovoLOG Mix 70/30 FlexPen (70-30) 100 UNIT/ML SUSP; Use 24 units in the am and 24   units in the pm;   Therapy: (Recorded:10Nov2017) to Recorded   7  Omeprazole 20 MG Oral Capsule Delayed Release; take 1 capsule by mouth daily; Therapy: 79JIW3254 to (Evaluate:94Gvo0547)  Requested for: 97Lke2424; Last   Rx:65Pjh2975 Ordered   8  Paricalcitol 1 MCG Oral Capsule; take 1 capsule by mouth ON MONDAY, [facility]; Therapy: 40MLE0723 to (Jose Antonio Rivera)  Requested for: 38AUM1698; Last   Rx:60Yuf1422 Ordered   9  Ramipril 2 5 MG Oral Capsule; take 1 capsule by mouth every morning; Therapy: 20UPE8653 to (Evaluate:02Aek0150)  Requested for: 00QJJ6687; Last   Rx:89Stx7487 Ordered   10  Simvastatin 10 MG Oral Tablet; TAKE 1 TABLET BY MOUTH DAILY; Therapy: 77LLM0440 to (Evaluate:12Apr2018)  Requested for: 13Wjj7795; Last    Rx:18Elt4957 Ordered   11  Torsemide 20 MG Oral Tablet; TAKE 1 TABLET ONCE DAILY; Therapy: 06BDB8631 to  Requested for: 83FME4334 Recorded   12  TraMADol HCl - 50 MG Oral Tablet; ONE PO Q 8 HRS  PRN; Therapy: 31DXW3987 to (Last Rx:12Jun2017) Ordered   13  Uloric 40 MG Oral Tablet; TAKE 1 TABLET DAILY; Therapy: (Recorded:62Htn9370) to Recorded  Medication List Reviewed: The medication list was reviewed and updated today  Allergies    1  Acetaminophen-Codeine SOLN   2  Hydrocodone-Acetaminophen TABS   3  MetFORMIN HCl TABS   4  Codeine Derivatives   5  hydrocodone    Vitals  Signs   Recorded: 83OOK6756 02:20PM   Temperature: 96 9 F  Heart Rate: 84  Respiration: 16  Systolic: 523  Diastolic: 70  Height: 5 ft 7 in  Weight: 214 lb 8 oz  BMI Calculated: 33 6  BSA Calculated: 2 08    Physical Exam    Constitutional   General appearance: No acute distress, well appearing and well nourished      Pulmonary   Respiratory effort: No increased work of breathing or signs of respiratory distress  Auscultation of lungs: Clear to auscultation, equal breath sounds bilaterally, no wheezes, no rales, no rhonci  Cardiovascular   Auscultation of heart: Normal rate and rhythm, normal S1 and S2, without murmurs  Examination of extremities for edema and/or varicosities: Normal     Carotid pulses: Normal     Musculoskeletal   Gait and station: Abnormal   Patient ambulates with a walking cane  Skin   Skin and subcutaneous tissue: Abnormal   The patient's left foot show improving left great toe swelling and redness  Significant decrease and ecchymoses of 2nd toe on left foot  He is much less redness and ecchymosis compared to pictures taken the day prior to discharge  Health Management  Health Maintenance   Medicare Annual Wellness Visit; every 1 year; Next Due: 41Pox6541; Overdue    Future Appointments    Date/Time Provider Specialty Site   11/28/2017 01:45 PM LUKE Kim  Orthopedic Surgery [address]   02/28/2018 02:00 PM LUKE Mayorga  Hematology Oncology [facility]   11/14/2017 10:00 AM LUKE Gale   Cardiology [facility]   12/15/2017 01:40 PM Gilford Bally, DO Nephrology [facility] NEPHROLOGY [facility]     Signatures   Electronically signed by : LUKE Fowler ; Nov 10 9029  5:06PM EST                       (Author)

## 2018-01-16 NOTE — CONSULTS
Chief Complaint    1  Shoulder Pain  Left shoulder pain      History of Present Illness  HPI: 51-year-old male presenting for follow-up after a fall approximately 3 weeks ago  He initially sustained a chip fracture of his left greater tuberosity was placed in a sling  Since then he has had improvement in her shoulder pain but complains mainly of stiffness today  Pain is well located over the lateral aspect shoulder made worse with direct contact attempted motion is relieved by rest  Denies any numbness or tingling  Prior to the fall he was able to actively move his shoulder up to 90 degrees      The patient's medical history, surgical history, social history, family history, medications, allergies, and review of systems were reviewed and updated today     _______________________________________________________________________________  Review of Systems:   Constitutional: No fever or chills, feels well, no tiredness, no recent weight gain or loss  Eyes: No complaints of eyesight problems, no red eyes  ENT: No loss of hearing, no nosebleeds, no sore throat  Cardiovascular: No chest pain, palpitations, leg claudication, or lower extremity edema  Respiratory: No shortness of breath, wheezing, or cough  Gastrointestinal: No abdominal pain, constipation, nausea / vomiting, no diarrhea  Genitourinary: No dysruia or incontinence  Musculoskeletal: As noted in HPI  Integumentary: No rash or skin lesions, no itching or dry skin, no wounds  Neurological: No headache, confusion, numbness or tingling, or dizziness  Endocrine: No muscle weakness, frequent urination, or excessive thirst  Psychiatric: No suicidal thoughts, anxiety, or depression      Active Problems    1  Aortic valve disorder (424 1) (I35 9)   2  Arm pain, lateral, left (729 5) (M79 602)   3  Back pain (724 5) (M54 9)   4  Benign essential hypertension (401 1) (I10)   5  Carotid stenosis (433 10) (I65 29)   6   Chronic diastolic congestive heart failure (428 32,428 0) (I50 32)   7  Chronic kidney disease, stage 3 (585 3) (N18 3)   8  CKD (chronic kidney disease), stage 4 (severe) (585 4) (N18 4)   9  Closed traumatic minimally displaced fracture of greater tuberosity of left humerus with   routine healing (V54 11) (S42 252D)   10  Congestive heart failure (428 0) (I50 9)   11  Coronary atherosclerosis of native coronary vessel (414 01) (I25 10)   12  Cough with hemoptysis (786 39) (R04 2)   13  Degenerative joint disease (715 90) (M19 90)   14  Diabetes Mellitus (250 00)   15  Dyspnea (786 09) (R06 00)   16  Edema (782 3) (R60 9)   17  Esophageal reflux (530 81) (K21 9)   18  Extremity atherosclerosis with intermittent claudication (440 21) (I70 219)   19  Gout (274 9) (M10 9)   20  Hiccups (786 8) (R06 6)   21  Hyperlipidemia (272 4) (E78 5)   22  Hyperparathyroidism (252 00) (E21 3)   23  Hypoxia (799 02) (R09 02)   24  Left low back pain (724 2) (M54 5)   25  Mild mitral regurgitation (424 0) (I34 0)   26  Monoclonal gammopathy of undetermined significance (273 1) (D47 2)   27  Need for vaccination with 13-polyvalent pneumococcal conjugate vaccine (V03 82) (Z23)   28  Paroxysmal atrial fibrillation (427 31) (I48 0)   29  Peripheral vascular disease (443 9) (I73 9)   30  S/P AVR (V43 3) (Z95 2)   31  Screening for diabetic retinopathy (V80 2) (Z13 5)   32  Secondary hyperparathyroidism, renal (588 81) (N25 81)   33  SOB (shortness of breath) on exertion (786 05) (R06 02)   34  Tachycardia (785 0) (R00 0)   35  Visit for pre-operative examination (V72 84) (Z01 818)   36  Vitamin D deficiency (268 9) (E55 9)   37   Wheeze (786 07) (R06 2)    Past Medical History    · History of Dysfunction of both eustachian tubes (381 81) (H69 83)   · History of Dyspnea (786 09) (R06 00)   · History of Embolism, arterial, leg, right (444 22) (I74 3)   · History of Herniated disc (722 2)   · History of acute bronchitis (V12 69) (Z87 09)   · History of cough   · History of hematuria (V13 09) (Z87 448)   · History of muscle pain (V13 59) (Z87 39)   · History of myocardial infarction (412) (I25 2)   · History of pneumonia (V12 61) (Z87 01)   · History of Premature atrial contractions (427 61) (I49 1)   · History of Productive cough (786 2) (R05)   · History of URI, acute (465 9) (J06 9)    Surgical History    · History of Aortic Valve Replacement   · History of Bypass Graft Using Vein: Femoral-popliteal   · History of CABG   · History of Cataract Surgery   · History of Knee Replacement    Family History    · Family history of Diabetes    Social History    · Denies alcohol consumption (V49 89) (Z78 9)   · Denied: History of Drug use   · Former smoker (V15 82) (Z87 891)   · Former Smoker Cigarettes - Heavy (20-25 / Day)    Current Meds   1  AmLODIPine Besylate 2 5 MG Oral Tablet; TAKE ONE TABLET TUESDAY, THURSDAY,   SATURDAY; Therapy: 11Qsk4248 to (Evaluate:07Jun2018)  Requested for: 12Jun2017 Recorded   2  Aspirin EC 81 MG Oral Tablet Delayed Release; TAKE 1 TABLET DAILY; Therapy: 65IUC4755 to (Evaluate:06Jan2018); Last Rx:11Jan2017 Ordered   3  Centrum Silver TABS; Take 1 tablet daily; Therapy: (Recorded:23Idy8273) to Recorded   4  Metoprolol Tartrate 25 MG Oral Tablet; TAKE 1 TABLET TWICE DAILY ALONG WITH THE   50 MG TABLET; Therapy: (Recorded:76Mtl0186) to  Requested for: 11Sep2017 Recorded   5  Metoprolol Tartrate 50 MG Oral Tablet; Take 1 tablet by mouth twice daily along with 1   tablet of 25 mg;   Therapy: 50TYS9270 to (Evaluate:30Vif9631)  Requested for: 24Ktb2079 Recorded   6  NovoLOG Mix 70/30 FlexPen (70-30) 100 UNIT/ML SUSP; Use 38 units in the am and 32   units in the pm;   Therapy: (Recorded:95Mkv9259) to Recorded   7  Omeprazole 20 MG Oral Capsule Delayed Release; take 1 capsule by mouth daily; Therapy: 36MIQ3141 to (Evaluate:66Rcr6034)  Requested for: 28Pzg9050; Last   Rx:35Wyg5396 Ordered   8   Paricalcitol 1 MCG Oral Capsule; take 1 capsule by mouth ON Conner Spangler; Therapy: 05WKN0718 to ([facility])  Requested for: 02QES3599; Last   Rx:67Rqg6997 Ordered   9  Ramipril 2 5 MG Oral Capsule; take 1 capsule by mouth every morning; Therapy: 18IBB2467 to (Evaluate:62Eaa3116)  Requested for: 61BVJ3988; Last   Rx:06Xpe2974 Ordered   10  Simvastatin 10 MG Oral Tablet; TAKE 1 TABLET BY MOUTH DAILY; Therapy: 44UXI4536 to (Evaluate:65Xoq6122)  Requested for: 72Qwo5894; Last    Rx:82Ick3125 Ordered   11  Torsemide 20 MG Oral Tablet; TAKE 2 TABLETS BY MOUTH DAILY ON MWF, 1 PO REST    OF WEEK; Therapy: 86YFT9140 to (Evaluate:24Ftk7801)  Requested for: 14Vzx0904 Recorded   12  TraMADol HCl - 50 MG Oral Tablet; ONE PO Q 8 HRS  PRN; Therapy: 10FOE2371 to (Last Rx:12Jun2017) Ordered   13  Uloric 40 MG Oral Tablet; TAKE 1 TABLET DAILY; Therapy: (Recorded:33Tur0036) to Recorded    Allergies    1  Acetaminophen-Codeine SOLN   2  Hydrocodone-Acetaminophen TABS   3  MetFORMIN HCl TABS   4  Codeine Derivatives   5  hydrocodone    Vitals  Signs    Heart Rate: 39LUXNCKMA: 348EMAFQSEMT: 41NHGYQO: 5 ft 7 inWeight: 216 lb 8 ozBMI Calculated: 33 91BSA Calculated: 2 09    Physical Exam      General: No acute distress, age-appropriate  Neck: Supple, trachea midline  HEENT: Normocephalic atraumatic, mucous membranes are moist, sclera are nonicteric  Cardiovascular: No discernable arrhythmia  Respiratory: Breathing is even and unlabored, no stridor or audible wheezing  Psychiatric: Awake alert and oriented x3, normal mood and affect  Abdomen: Without rebound or guarding  Left Basic: (Tender palpation over the greater tuberosity  Shoulder passive range of motion to 90 degrees  active range of motion to 30 degrees    40/5 strength external rotation and negative belly press negative bear hug)    Skin: was evaluated and demonstrated no masses, no abrasions, no erythema, no effusion, no edema, no fluctuance, no induration, no laceration, no ulceration, no lymphadenopathy  Left Upper Neurovascular: were evaluated and demonstrated: The patient has normal motor strength to the median, ulnar and radial nerve  Normal sensation to the median, ulnar, and radial nerve  Normal pulses in the radial and ulnar artery  Capillary refill is < 2 seconds  Results/Data  I personally reviewed the films/images/results in the office today  My interpretation follows  X-ray Review X-rays of the left shoulder shows callus formation over the greater tuberosity fracture  Assessment    1  Closed nondisplaced fracture of greater tuberosity of left humerus, initial encounter   (812 03) (S42 255A)    Plan     Closed nondisplaced fracture of greater tuberosity of left humerus, initial encounter    · Follow-up visit in 1 month Evaluation and Treatment  Follow-up  Status: Hold For -  Scheduling  Requested for: 44ESK4907   · Please refer to the patient information sheet that was provided at your office visit today for  information on  your current condition ; Status:Complete;   Done: 50YAV4929   · Closed treatment of greater humeral tuberosity fx without manipulation - POC;  Status:Complete;   Done: 41KXY3231 12:51PM   · *1 - [facility]  For left greater tuberosity fracture  May do passive and active assisted range of motion shoulder  No strengthening  No need for sling  Status: Active  Requested for: 50GEI9016  Care Summary provided  : Yes     Closed traumatic minimally displaced fracture of greater tuberosity of left humerus with  routine healing    · * XR SHOULDER 2+ VIEW LEFT; Status:Active - Retrospective By Protocol Authorization; Requested for:47Txs0254;     Discussion/Summary    63-year-old male with a healing left greater tuberosity fracture  We will start him in therapy for passive range of motion and active assisted range of motion of his shoulder Joanne Whittaker back in follow-up in another 4 weeks for repeat x-rays and examination  Signatures   Electronically signed by :  LUKE Knox ; Nov 1 2017 10:48AM EST                       (Author)    Electronically signed by : LUKE Olmos ; Nov 1 2017 12:52PM EST                       (Author)

## 2018-01-16 NOTE — MISCELLANEOUS
History of Present Illness  This was not a readmission  The date of discharge was 6/25/16  He has a moderate risk for readmission  Spoke with the patient  He was discharged to home  Discharge instructions were reviewed and HF folder was given to patient  The patient is currently asymptomatic  Knowledge Assessment/Teachback Questions: the patient is obtaining daily weights and he knows the name of his medications/water pill  Topics counseled included diet, need for daily weights and activities of daily living  HFCC Additional Notes:   Patient without s/s HF; without complaints  Patient still works and stated he tries to follow a low sodium diet  HFCC will follow weekly  Current Meds   1  Centrum Silver TABS; Take 1 tablet daily Recorded   2  D-1000 1000 UNIT Oral Tablet; TAKE 1 TABLET DAILY AFTER BREAKFAST  Requested   for: 46Hmk2896; Last Rx:73Del9052 Ordered   3  Metoprolol Tartrate 50 MG Oral Tablet; take 1 tablet by mouth twice daily; Therapy: 77OTY9088 to (Evaluate:22Tbk3138)  Requested for: 18OJT5210; Last   Rx:70Bqu3077 Ordered   4  NovoLOG Mix 70/30 FlexPen (70-30) 100 UNIT/ML SUSP; USE AS DIRECTED Recorded   5  Omeprazole 20 MG Oral Capsule Delayed Release; take 1 capsule by mouth daily; Therapy: 72FBF1937 to (Jani Trinidad)  Requested for: 34RKH2688; Last   Rx:62Yfc8845 Ordered   6  Paricalcitol 1 MCG Oral Capsule; take 1 capsule by mouth ON MONDAY, [address]; Therapy: 90LEJ8937 to (Evaluate:17Nov2016)  Requested for: 37TNU8947; Last   Rx:98Qnk4148 Ordered   7  Ramipril 5 MG Oral Capsule; TAKE ONE CAPSULE BY MOUTH EVERY DAY; Therapy: 27IKR8543 to (Evaluate:02Ncv9488)  Requested for: 80RGI7854; Last   Rx:98Sje9513 Ordered   8  Simvastatin 10 MG Oral Tablet; take 1 tablet by mouth daily; Therapy: 51UPT0780 to (Evaluate:74Dob7468)  Requested for: 81AWF1117; Last   Rx:73Eyj9317 Ordered   9   Torsemide 20 MG Oral Tablet; TAKE 1 TABLET BY MOUTH TWICE DAILY AS DIRECTED; Therapy: 54ULQ1162 to (Evaluate:27Ulm9459)  Requested for: 95OWK8654; Last   Rx:55Swm0823 Ordered    Future Appointments    Date/Time Provider Specialty Site   07/07/2016 01:00 PM Nando Gross Cardiology [facility] CARDIOLOGY [facility]   02/10/2017 02:00 PM LUKE Yung  Hematology Oncology [facility]COL[facility]   08/02/2016 03:40 PM LUKE Diez  Cardiology [facility] CARDIOLOGY [facility]     Allergies    1  Acetaminophen-Codeine SOLN   2  Hydrocodone-Acetaminophen TABS   3  MetFORMIN HCl TABS   4   Codeine Derivatives   5  hydrocodone    Signatures   Electronically signed by : Cholo Alcocer, ; Jun 27 2016  3:22PM EST                       (Author)

## 2018-01-16 NOTE — MISCELLANEOUS
Message  Message Free Text Note Form: Patient's wife, Kannan Bravo called  States he has been taking Cephalexin three times daily at 0700, 1500, & 2300  He missed his 1500 dose today  Advised to take his 2300 dose and complete course as prescribed  Instructed to call with any questions or concerns        Signatures   Electronically signed by : JEWELL Cabrera; Nov 26 2017  8:49PM EST                       (Author)

## 2018-01-17 NOTE — MISCELLANEOUS
Chief Complaint  Chief Complaint Free Text Note Form: Pt was admitted to [facility] from 06/18/2016 through 06/23/2016  Dx: Sepsis due to UTI  Called pt and wife times 2  No call back  As per D/C report pt was d/c to [facility]  History of Present Illness  TCM Communication [facility]: The patient is being contacted for follow-up after hospitalization and 06/24/2016, 06/27/2016  He was hospitalized at [facility]  The date of admission: 06/18/2016, date of discharge: 06/23/2016  Diagnosis: see note  He was discharged [address]  Medications were not reviewed today  He did not schedule a follow up appointment  He refused a follow up appointment due to Pt residing at [facility]  Communication performed and completed by Monik Sorto      Active Problems     1  Aortic valve disorder (424 1) (I35 9)   2  Benign essential hypertension (401 1) (I10)   3  Carotid stenosis (433 10) (I65 29)   4  Chronic kidney disease, stage IV (severe) (585 4) (N18 4)   5  Coronary atherosclerosis of native coronary vessel (414 01) (I25 10)   6  Degenerative joint disease (715 90) (M19 90)   7  Diabetes Mellitus (250 00)   8  Edema (782 3) (R60 9)   9  Esophageal reflux (530 81) (K21 9)   10  Extremity atherosclerosis with intermittent claudication (440 21) (I70 219)   11  Gout (274 9) (M10 9)   12  Hiccups (786 8) (R06 6)   13  Hyperlipidemia (272 4) (E78 5)   14  Hyperparathyroidism (252 00) (E21 3)   15  Mild mitral regurgitation (424 0) (I34 0)   16  Monoclonal gammopathy of undetermined significance (273 1) (D47 2)   17  Peripheral vascular disease (443 9) (I73 9)   18  S/P AVR (V43 3) (Z95 2)   19  Screening for diabetic retinopathy (V80 2) (Z13 5)   20  Secondary hyperparathyroidism, renal (588 81) (N25 81)   21  SOB (shortness of breath) on exertion (786 05) (R06 02)   22  Tachycardia (785 0) (R00 0)   23  URI, acute (465 9) (J06 9)   24   Visit for pre-operative examination (V72 84) (S75 827)    Chronic kidney disease, stage 3 (585 3) (N18 3)          Past Medical History    1  History of Dyspnea (786 09) (R06 00)   2  History of Embolism, arterial, leg, right (444 22) (I74 3)   3  History of Herniated disc (722 2)   4  History of myocardial infarction (412) (I25 2)   5  History of pneumonia (V12 61) (Z87 01)   6  History of Premature atrial contractions (427 61) (I49 1)    Surgical History    1  History of Aortic Valve Replacement   2  History of Bypass Graft Using Vein: Femoral-popliteal   3  History of CABG   4  History of Cataract Surgery   5  History of Knee Replacement    Family History  Sibling    1  Family history of Diabetes    Social History    · Former smoker (L45 66) (Y82 881)   · Former Smoker Cigarettes - Heavy (20-25 / Day)    Current Meds   1  Centrum Silver TABS; Take 1 tablet daily Recorded   2  D-1000 1000 UNIT Oral Tablet; TAKE 1 TABLET DAILY AFTER BREAKFAST  Requested   for: 19Ydq8179; Last Rx:09Wik3617 Ordered   3  Metoprolol Tartrate 50 MG Oral Tablet; take 1 tablet by mouth twice daily; Therapy: 50DIW2025 to (Evaluate:67Gyz2783)  Requested for: 47ZMA2127; Last   Rx:04Pup7066 Ordered   4  NovoLOG Mix 70/30 FlexPen (70-30) 100 UNIT/ML SUSP; USE AS DIRECTED Recorded   5  Omeprazole 20 MG Oral Capsule Delayed Release; take 1 capsule by mouth daily; Therapy: 40TPT6094 to (Keyanna Davenport)  Requested for: 23BST6447; Last   Rx:76Ggj8621 Ordered   6  Paricalcitol 1 MCG Oral Capsule; take 1 capsule by mouth ON MONDAY, [address] [place] [address]; Therapy: 23QTI0850 to (Evaluate:17Nov2016)  Requested for: 47JBQ3355; Last   Rx:45Pdc1447 Ordered   7  Ramipril 5 MG Oral Capsule; TAKE ONE CAPSULE BY MOUTH EVERY DAY; Therapy: 24NBE1629 to (Evaluate:97Lrb0670)  Requested for: 78DOX5924; Last   Rx:93Dln0305 Ordered   8  Simvastatin 10 MG Oral Tablet; take 1 tablet by mouth daily; Therapy: 93MHY0609 to (Evaluate:50Gdu7561)  Requested for: 77EWL8420; Last   Rx:85Onf5131 Ordered   9   Torsemide 20 MG Oral Tablet; TAKE 1 TABLET BY MOUTH TWICE DAILY AS DIRECTED; Therapy: 54CUS7984 to (Evaluate:69Dnb1507)  Requested for: 60ZKO6686; Last   Rx:17Nov2015 Ordered    Allergies    1  Acetaminophen-Codeine SOLN   2  Hydrocodone-Acetaminophen TABS   3  MetFORMIN HCl TABS   4  Codeine Derivatives   5  hydrocodone    Future Appointments    Date/Time Provider Specialty Site   07/21/2016 02:20 PM Lucero Alcala [facility]   02/10/2017 02:00 PM LUKE Mccoy  Hematology Oncology [facility]   08/02/2016 03:40 PM LUKE Amador   Cardiology [facility]     Signatures   Electronically signed by : LUKE Sneed ; Jul 18 0362  6:56AM EST                       (Author)

## 2018-01-18 NOTE — MISCELLANEOUS
Message  I reviewed Mr Charity Vaughn Lab results from 7/08/16 which show BUN 41 and creat 2 14, K+ of 5 0  His blood pressure was 96/60 with his last office visit  I will stop the Ramipril 5mg daily  He will follow up in our office in 2 weeks  I have ordered a repeat BMP prior to this office visit  Active Problems    1  Aortic valve disorder (424 1) (I35 9)   2  Benign essential hypertension (401 1) (I10)   3  Carotid stenosis (433 10) (I65 29)   4  Chronic diastolic congestive heart failure (428 32,428 0) (I50 32)   5  Chronic kidney disease, stage 3 (585 3) (N18 3)   6  Chronic kidney disease, stage IV (severe) (585 4) (N18 4)   7  Coronary atherosclerosis of native coronary vessel (414 01) (I25 10)   8  Degenerative joint disease (715 90) (M19 90)   9  Diabetes Mellitus (250 00)   10  Edema (782 3) (R60 9)   11  Esophageal reflux (530 81) (K21 9)   12  Extremity atherosclerosis with intermittent claudication (440 21) (I70 219)   13  Gout (274 9) (M10 9)   14  Hiccups (786 8) (R06 6)   15  Hyperlipidemia (272 4) (E78 5)   16  Hyperparathyroidism (252 00) (E21 3)   17  Mild mitral regurgitation (424 0) (I34 0)   18  Monoclonal gammopathy of undetermined significance (273 1) (D47 2)   19  Paroxysmal atrial fibrillation (427 31) (I48 0)   20  Peripheral vascular disease (443 9) (I73 9)   21  S/P AVR (V43 3) (Z95 2)   22  Screening for diabetic retinopathy (V80 2) (Z13 5)   23  Secondary hyperparathyroidism, renal (588 81) (N25 81)   24  SOB (shortness of breath) on exertion (786 05) (R06 02)   25  Tachycardia (785 0) (R00 0)   26  URI, acute (465 9) (J06 9)   27  Visit for pre-operative examination (V72 84) (Z01 818)    Current Meds   1  Aspirin 81 MG TABS; 1 tab alternating with 2 tabs everyother day; Therapy: (Recorded:99Yku0702) to Recorded   2  Centrum Silver TABS; Take 1 tablet daily Recorded   3  D-1000 1000 UNIT Oral Tablet; TAKE 1 TABLET DAILY AFTER BREAKFAST  Requested   for: 02Dec2015;  Last Rx:02Dec2015 Ordered   4  Eliquis 2 5 MG Oral Tablet; Take 1 tab twice daily; Therapy: (Recorded:66Dgn1571) to Recorded   5  Metoprolol Tartrate 50 MG Oral Tablet; TAKE 1 1/2 TABLET BY MOUTH TWICE DAILY; Therapy: 83FJY0310 to (([phone])  Requested for: 60RMN6937 Recorded   6  NovoLOG Mix 70/30 FlexPen (70-30) 100 UNIT/ML SUSP; USE AS DIRECTED   Recorded   7  Omeprazole 20 MG Oral Capsule Delayed Release; take 1 capsule by mouth daily; Therapy: 27DNF1299 to (Lorena Fernando)  Requested for: 51LNB8543; Last   Rx:86Jjy6983 Ordered   8  Paricalcitol 1 MCG Oral Capsule; take 1 capsule by mouth ON MONDAY, [address] [place] [address]; Therapy: 63DJY9470 to (Evaluate:17Nov2016)  Requested for: 02YQK8554; Last   Rx:71Voj4404 Ordered   9  Simvastatin 10 MG Oral Tablet; take 1 tablet by mouth daily; Therapy: 20DMU8357 to (Evaluate:12Dcy9660)  Requested for: 07PGT8361; Last   Rx:02Izw5053 Ordered   10  Torsemide 20 MG Oral Tablet; take 1 tablet alternating with 2 tablets everyother day; Therapy: 60PZT0795 to (Evaluate:03Jan2017)  Requested for: 74HZH4715 Recorded   11  Uloric 40 MG Oral Tablet; Therapy: (Recorded:84Sxp2398) to Recorded    Allergies    1  Acetaminophen-Codeine SOLN   2  Hydrocodone-Acetaminophen TABS   3  MetFORMIN HCl TABS   4   Codeine Derivatives   5  hydrocodone    Signatures   Electronically signed by : Bayron Gonzales, ; Jul 8 2016  4:09PM EST                       (Author)

## 2018-01-19 NOTE — PROGRESS NOTES
Assessment   1  Chronic kidney disease, stage 3 (585 3) (N18 3)    Plan   Chronic kidney disease, stage 3    · VAS LOWER LIMB ARTERIAL DUPLEX, COMPLETE BILATERAL/GRAFTS; SIDE:Bilateral;    Status:Hold For - Scheduling; Requested for:18Apr2018; Perform:[address] [place]; Due:18Apr2019;Ordered; For:Chronic kidney disease, stage 3; Ordered By:Mitch Pereira; Discussion/Summary   Discussion Summary:    History of traumatic injury to his left foot with post traumatic pain, underwent arteriogram which shows extensive occlusive disease with 1 vessel runoff to his foot which is poorly perfused  In view of all this he does not appear to be having any symptoms at this time the pain has subsided he is not having rest pain or tissue loss there for a feel we can follow him expectantly at this point he will have a follow-up lower extremity arterial study in 3 months  Counseling Documentation With Imm: The patient, patient's family was counseled regarding diagnostic results,-- instructions for management,-- risk factor reductions,-- prognosis,-- patient and family education,-- risks and benefits of treatment options,-- importance of compliance with treatment  total time of encounter was 25 minutes-- and-- 20 minutes was spent counseling  Chief Complaint   Chief Complaint Free Text Note Form: To discuss the procedure I had  is here to review agram done on 12/15/2017  Rc Left foot  Pt states that the toes on the left foot have little feeling, they are cold, and red  Denies wounds on the foot  He is wearing his comp hose daily  History of Present Illness   HPI: History of traumatic injury left foot with post and traumatic pain      Review of Systems   Complete Male - Vasc:      Constitutional: No fever or chills, feels well, no tiredness, no recent weight gain or weight loss  Eyes: No sudden vision loss, no blurred vision, no double vision  ENT: no loss of hearing, no nosebleeds, no hoarseness  Cardiovascular: no chest pain, regular heart rate  Respiratory: No sob, no wheezing, no cough, no sob with exertion, no orthopnea  Gastrointestinal: No nausea, No vomiting, no diarrhea, no blood in stool  Genitourinary: no dysuria, no hematuria, No urinary incontinence, no erectile dysfunction  Musculoskeletal: no lumbar pain,-- myalgias-- and-- limb swelling, but-- no joint swelling,-- no limb pain-- and-- no joint stiffness  Integumentary: no rash, no lesions, no wounds, no ulcer  Neurological: no dementia,-- fainting-- and-- difficulty walking, but-- no headache,-- no numbness,-- no confusion,-- no dizziness,-- no limb weakness-- and-- no convulsions  Psychiatric: no depression, no mood disorders, no anxiety  Hematologic/Lymphatic: a tendency for easy bleeding-- and-- a tendency for easy bruising, but-- no swollen glands-- and-- no swollen glands in the neck  ROS Reviewed:    ROS reviewed  Active Problems   1  Acute renal failure (584 9) (N17 9)   2  Aortic valve disorder (424 1) (I35 9)   3  Arm pain, lateral, left (729 5) (M79 602)   4  Asymptomatic bilateral carotid artery stenosis (433 10,433 30) (I65 23)   5  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,L97 909)   6  Back pain (724 5) (M54 9)   7  Benign essential hypertension (401 1) (I10)   8  Cellulitis of foot (682 7) (L03 119)   9  Chronic diastolic congestive heart failure (428 32,428 0) (I50 32)   10  Chronic kidney disease, stage 3 (585 3) (N18 3)   11  Closed nondisplaced fracture of greater tuberosity of left humerus, initial encounter      (812 03) (S42 255A)   12  Closed traumatic minimally displaced fracture of greater tuberosity of left humerus with      routine healing (V54 11) (S42 252D)   13  Congestive heart failure (428 0) (I50 9)   14  Coronary atherosclerosis of native coronary vessel (414 01) (I25 10)   15  Cough with hemoptysis (786 39) (R04 2)   16  Degenerative joint disease (715 90) (M19 90)   17  DM2 (diabetes mellitus, type 2) (250 00) (E11 9)   18  Dyspnea (786 09) (R06 00)   19  Edema (782 3) (R60 9)   20  Esophageal reflux (530 81) (K21 9)   21  Gout (274 9) (M10 9)   22  Hiccups (786 8) (R06 6)   23  Hyperlipidemia (272 4) (E78 5)   24  Hyperparathyroidism (252 00) (E21 3)   25  Hypoglycemia (251 2) (E16 2)   26  Hypoxia (799 02) (R09 02)   27  Impingement syndrome of left shoulder (726 2) (M75 42)   28  Left low back pain (724 2) (M54 5)   29  Mild mitral regurgitation (424 0) (I34 0)   30  Monoclonal gammopathy of undetermined significance (273 1) (D47 2)   31  Need for vaccination with 13-polyvalent pneumococcal conjugate vaccine (V03 82) (Z23)   32  Paroxysmal atrial fibrillation (427 31) (I48 0)   33  Peripheral vascular disease (443 9) (I73 9)   34  S/P AVR (V43 3) (Z95 2)   35  Screening for diabetic retinopathy (V80 2) (Z13 5)   36  Secondary hyperparathyroidism, renal (588 81) (N25 81)   37  Shoulder pain, left (719 41) (M25 512)   38  SOB (shortness of breath) on exertion (786 05) (R06 02)   39  Stage 4 chronic kidney disease (585 4) (N18 4)   40  Tachycardia (785 0) (R00 0)   41  URI, acute (465 9) (J06 9)   42  Visit for pre-operative examination (V72 84) (Z01 818)   43  Vitamin D deficiency (268 9) (E55 9)   44  Wheeze (786 07) (R06 2)    Past Medical History   1  History of Dysfunction of both eustachian tubes (381 81) (H69 83)   2  History of Dyspnea (786 09) (R06 00)   3  History of Embolism, arterial, leg, right (444 22) (I74 3)   4  History of Herniated disc (722 2)   5  History of acute bronchitis (V12 69) (Z87 09)   6  History of cough   7  History of hematuria (V13 09) (Z87 448)   8  History of muscle pain (V13 59) (Z87 39)   9  History of myocardial infarction (412) (I25 2)   10  History of pneumonia (V12 61) (Z87 01)   11  History of Premature atrial contractions (427 61) (I49 1)   12   History of Productive cough (786 2) (R05)  Active Problems And Past Medical History Reviewed: The active problems and past medical history were reviewed and updated today  Surgical History   1  History of Aortic Valve Replacement   2  History of Bypass Graft Using Vein: Femoral-popliteal   3  History of CABG   4  History of Cataract Surgery   5  History of Knee Replacement  Surgical History Reviewed: The surgical history was reviewed and updated today  Family History   Sibling    1  Family history of Diabetes  Family History Reviewed: The family history was reviewed and updated today  Social History    · Denies alcohol consumption (V49 89) (Z78 9)   · Denied: History of Drug use   · Former smoker (V15 82) (H31 311)   · Former Smoker Cigarettes - Heavy (20-25 / Day)  Social History Reviewed: The social history was reviewed and updated today  Current Meds    1  Aspirin EC 81 MG Oral Tablet Delayed Release; TAKE 1 TABLET DAILY; Therapy: 70HVP4584 to (Evaluate:06Jan2018); Last Rx:11Jan2017 Ordered   2  Atorvastatin Calcium 40 MG Oral Tablet; Take 1 tablet daily; Therapy: 30VMM4458 to (Evaluate:24Nov2018) Recorded   3  Centrum Silver TABS; Take 1 tablet daily; Therapy: (Recorded:91Khh4545) to Recorded   4  Metoprolol Tartrate 25 MG Oral Tablet; TAKE 1 TABLET TWICE DAILY ALONG WITH THE     50 MG TABLET; Therapy: (Recorded:37Gzq8504) to  Requested for: 39Itn6085 Recorded   5  Metoprolol Tartrate 50 MG Oral Tablet; Take 1 tablet by mouth twice daily along with 1     tablet of 25 mg;     Therapy: 92PPY1730 to (Evaluate:10Dec2017)  Requested for: 82Zlr1543 Recorded   6  NovoLOG Mix 70/30 FlexPen (70-30) 100 UNIT/ML SUSP; Use 24 units in the am and 24     units in the pm;     Therapy: (Recorded:10Nov2017) to Recorded   7  Omeprazole 20 MG Oral Capsule Delayed Release; take 1 capsule by mouth daily; Therapy: 30KCD4190 to (Evaluate:20Aug2018)  Requested for: 52Qzf8066; Last     Rx:25Aug2017 Ordered   8  Paricalcitol 1 MCG Oral Capsule; take 1 capsule by mouth ON MONDAY, [address]; Therapy: 94IWG3536 to (Holly )  Requested for: 20LSS0135; Last     Rx:69Dsl0081 Ordered   9  Ramipril 2 5 MG Oral Capsule; take 1 capsule by mouth every morning; Therapy: 99IFO7014 to (Evaluate:52Rho7030)  Requested for: 81BWY0674; Last     Rx:55Zre8551 Ordered   10  Torsemide 20 MG Oral Tablet; TAKE 1 TABLET ONCE DAILY; Therapy: 69GWB0717 to  Requested for: 69VAH7767 Recorded   11  Uloric 40 MG Oral Tablet; TAKE 1 TABLET DAILY; Therapy: (Recorded:41Gbk9239) to Recorded    Allergies   1  Acetaminophen-Codeine SOLN   2  Hydrocodone-Acetaminophen TABS   3  MetFORMIN HCl TABS   4  Codeine Derivatives   5  hydrocodone    Vitals   Vital Signs    Recorded: 46MWJ3722 10:12AM   Temperature 98 F, Tympanic   Heart Rate 72, R Radial   Pulse Quality Regular, R Radial   Respiration Quality Normal   Respiration 14   Systolic 580, RUE, Sitting   Diastolic 70, RUE, Sitting   Height 5 ft 7 in   Weight 207 lb    BMI Calculated 32 42   BSA Calculated 2 05     Results/Data   Diagnostic Studies Reviewed Vasc: I personally reviewed the films/images/results in the office today  My interpretation follows  Vascular Study Review arteriogram reviewed results with patient and family  Future Appointments      Date/Time Provider Specialty Site   02/02/2018 09:00 AM LUKE Moe  Orthopedic Surgery [address]   02/28/2018 02:00 PM LUKE Escobar   H[facility]ology [facility] [address] [place] [address]     Signatures    Electronically signed by : Chantelle Brandon MD; Jan 18 2018 10:58AM EST                       (Author)

## 2018-01-23 NOTE — PROGRESS NOTES
Preliminary Nursing Report                Patient Information    Initial Encounter Entry Date:   2017 9:39 AM EST (Automated Transmission Automated Transmission)       Last Modified:   {Tracy Boykin}              Legal Name: Opal De La Cruz Number:        YOB: 1935        Age (years): [de-identified]        Gender: M        Body Mass Index (BMI): 34 kg/m2        Height: 67 in  Weight: 215 lbs (98 kgs)           Address:   [facility] [id number] [place]              Phone: [phone]   (consent to leave messages)        Email:        Ethnicity: Decline to State        Adventism:        Marital Status:        Preferred Language: English        Race: Other Race                    Patient Insurance Information        Primary Insurance Information Carrier Name: {Primary  CarrierName}           Carrier Address:   {Primary  CarrierAddress}              Carrier Phone: {Primary  CarrierPhone}          Group Number: {Primary  GroupNumber}          Policy Number: {Primary  PolicyNumber}          Insured Name: {Primary  InsuredName}          Insured : {Primary  InsuredDOB}          Relationship to Insured: {Primary  RelationshiptoInsured}           Secondary Insurance Information Carrier Name: {Secondary  CarrierName}           Carrier Address:   {Secondary  CarrierAddress}              Carrier Phone: {Secondary  CarrierPhone}          Group Number: {Secondary  GroupNumber}          Policy Number: {Secondary  PolicyNumber}          Insured Name: {Secondary  InsuredName}          Insured : {Secondary  InsuredDOB}          Relationship to Insured: {Secondary  RelationshiptoInsured}                       Health Profile   Booking #:   Korey Beyer #: [id number]               DOS: 2017    Surgery : Selective catheter placement, arterial system; initial third order or more selective abdominal, pelvic, or lower extremity artery branch, within a vascular family    Add'l Procedures/Notes:     Surgery Risk: Minor          Precautions     Aortic valve disorder       Chronic kidney disease, stage 3       Congestive heart failure       Coronary atherosclerosis of native coronary vessel       DM2 (diabetes mellitus, type 2)       Mild mitral regurgitation       Paroxysmal atrial fibrillation       Peripheral vascular disease       Tachycardia                   Allergies    Acetaminophen-Codeine SOLN       Clinical Comments: Reaction Type: , Reaction: , Severity:        Codeine Derivatives       Clinical Comments: Reaction Type: , Reaction: , Severity:        hydrocodone       Hydrocodone-Acetaminophen TABS       Clinical Comments: Reaction Type: , Reaction: , Severity:        MetFORMIN HCl TABS       Clinical Comments: Reaction Type: , Reaction: , Severity:              Medications    Aspirin EC 81 MG Oral Tablet Delayed Release       Atorvastatin Calcium 40 MG Oral Tablet       Centrum Silver TABS       Cephalexin 500 MG Oral Capsule       Metoprolol Tartrate 25 MG Oral Tablet       Metoprolol Tartrate 50 MG Oral Tablet       NovoLOG Mix 70/30 FlexPen (70-30) 100 UNIT/ML SUSP       Omeprazole 20 MG Oral Capsule Delayed Release       Paricalcitol 1 MCG Oral Capsule       Ramipril 2 5 MG Oral Capsule       Torsemide 20 MG Oral Tablet       Uloric 40 MG Oral Tablet               Conditions    Acute renal failure       Aortic valve disorder       Arm pain, lateral, left       Asymptomatic bilateral carotid artery stenosis       Atherosclerosis of artery of extremity with ulceration       Back pain       Benign essential hypertension       Cellulitis of foot       Chronic diastolic congestive heart failure       Chronic kidney disease, stage 3       Closed nondisplaced fracture of greater tuberosity of left humerus, initial encounter       Closed traumatic minimally displaced fracture of greater tuberosity of left humerus with routine healing       Congestive heart failure       Coronary atherosclerosis of native coronary vessel       Cough with hemoptysis       Degenerative joint disease       DM2 (diabetes mellitus, type 2)       Dyspnea       Edema       Esophageal reflux       Gout       Hiccups       Hyperlipidemia       Hyperparathyroidism       Hypoglycemia       Hypoxia       Impingement syndrome of left shoulder       Left low back pain       Mild mitral regurgitation       Monoclonal gammopathy of undetermined significance       Need for vaccination with 13-polyvalent pneumococcal conjugate vaccine       Paroxysmal atrial fibrillation       Peripheral vascular disease       S/P AVR       Screening for diabetic retinopathy       Secondary hyperparathyroidism, renal       Shoulder pain, left       SOB (shortness of breath) on exertion       Stage 4 chronic kidney disease       Tachycardia       Visit for pre-operative examination       Vitamin D deficiency       Wheeze               Family History    None             Surgical History    None             Social History    Denies alcohol consumption       Denies Drug use       Former smoker       Former Smoker Cigarettes - Heavy (20-25 / Day)                               Patient Instructions       Medical Procedure Risk  NPO Instructions   The day before surgery it is recommended to have a light dinner at your usual time and you are allowed a light snack early in the evening  Do not eat anything heavy or eat a big meal after 7pm  Do not eat or drink anything after midnight prior to your surgery  If you are supposed to take any of your medications, do so with a sip of water  Failure to follow these instructions can lead to an increased risk of lung complications and may result in a delay or cancellation of your procedure  If you have any questions, contact your institution for further instructions   No candy, no gum, no mints, no chewing tobacco          Ramipril 2 5 MG Oral Capsule  Medication Instruction (ACE/ARB - Blood Pressure Medication) 1  Please continue the following medications up to the evening before surgery, but do not take it on the day of surgery  Please restart your medications as soon as clinically feasible  Aspirin EC 81 MG Oral Tablet Delayed Release  Medication Instruction (Aspirin - Blood Thinners) 112, 104, 105, 114, 113, 103, 110, 107, 108, 109, 111, 106  Please continue to take this medication on your normal schedule  If this is an oral medication and you take in the morning, you may do so with a sip of water  However, your surgeon may have you stop aspirin up to a week early if you are having intracranial, middle ear, posterior eye, spine surgery or prostate surgery  Metoprolol Tartrate 50 MG Oral Tablet, , Metoprolol Tartrate 25 MG Oral Tablet  Medication Instruction (Beta Blocker) 3, 2, c, 4, 6, 5  Please continue to take this medication on your normal schedule  If this is an oral medication and you take in the morning, you may do so with a sip of water  NovoLOG Mix 70/30 FlexPen (70-30) 100 UNIT/ML SUSP  Medication Instruction (Diabetic Medication)   Please decrease your morning insulin dose to one-half of normal dose  If you are taking oral diabetes medications, the morning dose should be omitted  If taking metformin (Glucophage), discontinue the medication for 24 hours prior to surgery  If you have an insulin pump, continue at a basal rate only  Torsemide 20 MG Oral Tablet  Medication Instruction (Diuretics - Water Pills) 28, 29  Please continue the following medications up to the evening before surgery, but do not take it on the day of surgery  Esophageal reflux  Medication Instruction (Reflux Disease)   Please continue to take this medication on your normal schedule  If this is an oral medication and you take in the morning, you may do so with a sip of water           Stage 4 chronic kidney disease  Dialysis   If undergoing dialysis, please perform 24 to 48 before surgery and avoid interfering with dialysis schedule  Testing Considerations       ? Blood Glucose on Day of Surgery t  Please check the blood sugar on the morning of surgery  Triggered by: DM2 (diabetes mellitus, type 2), Hypoglycemia         ? Serum Potassium (K) on the morning of surgery t  Triggered by: Acute renal failure, Stage 4 chronic kidney disease               Consultations       ? Cardiac Consult (Major CHF)   If the patient is having increasing difficulty breathing or fluid retention then a cardiac consult is indicated  Otherwise, optimization of medical therapy is recommended under the direction of the PCP or Cardiologist   Triggered by: Congestive heart failure         ? Cardiac Consult (Major MI) c  If the patient has had a Myocardial Infarction within 30 days then a cardiac consult is indicated  Also, if the patient is having increasing frequency or intensity of chest pain then a cardiac consult is indicated  Otherwise, optimization of medical therapy is recommended under the direction of the PCP or cardiologist   Triggered by: Coronary atherosclerosis of native coronary vessel         ? Cardiac Consult (Major Rate) c  If the patient has a heart rate of greater than 100 bpm, or if the heart rhythm disturbance is new, then a cardiac consult is indicated  Otherwise, optimization of medical therapy is recommended under the direction of the PCP or cardiologist   Triggered by: Paroxysmal atrial fibrillation, Tachycardia         ? Cardiac Consult (Major Valve)   If the patient has moderate or severe valvular stenosis or regurgitation then a cardiac consult is indicated   It is recommended that the patient undergo a preoperative echocardiography if there has been either:  1) no prior echocardiography within 1 year or   2) a significant change in clinical status or physical examination since last evaluation   - For adults who meet standard indications for valvular intervention (replacement and repair) on the basis of symptoms and severity of stenosis or regurgitation, valvular intervention before elective non-cardiac surgery is effective in reducing perioperative risk  - appropriate intraoperative and postoperative hemodynamic monitoring is reasonable in adults with asymptomatic severe valvular disease   - Antibiotic prophylaxis will likely be required  Triggered by: Aortic valve disorder, Mild mitral regurgitation         ? Primary Care Physician Evaluation   Primary care physician may need to evaluate patient prior to surgery  This is likely NOT necessary if the listed conditions are chronic and stable  Triggered by: Wheeze               Miscellaneous Questions         Question: Are you able to walk up a flight of stairs, walk up a hill or do heavy housework WITHOUT having chest pain or shortness of breath? Answer: YES                   Allergies/Conditions/Medications Not Found        The following were not recognized by our system when generating the recommendations  Please consider if this would impact any preoperative protocols  ? Chronic diastolic congestive heart failure       ? Cough with hemoptysis       ? Denies alcohol consumption       ? Former Smoker Cigarettes - Heavy (20-25 / Day)       ? Atorvastatin Calcium 40 MG Oral Tablet       ? Centrum Silver TABS       ? Omeprazole 20 MG Oral Capsule Delayed Release       ? Paricalcitol 1 MCG Oral Capsule                  Appointment Information         Date:    12/18/2017        Location:    [place]        Address:           Directions:                      Footnotes revision 14      ?? Denotes a free-text entry  Legal Disclaimer: Any and all recommendations and services provided herein are designed to assist in the preoperative care of the patient  Nothing contained herein is designed to replace, eliminate or alleviate the responsibility of the attending physician to supervise and determine the patient?s preoperative care and course of treatment  Failure to provide complete, accurate information may negatively impact the system?s ability to recommend the proper preoperative protocol  THE ATTENDING PHYSICIAN IS RESPONSIBLE TO REVIEW THE SUGGESTED PREOPERATIVE PROTOCOLS/COURSE OF TREATMENT AND PRESCRIBE THE FINAL COURSE OF PREOPERATIVE TREATMENT IN CONSULTATION WITH THE PATIENT  THE ePREOP SYSTEM AND ITS MATERIALS ARE PROVIDED ? AS IS? WITHOUT WARRANTY OF ANY KIND, EXPRESS OR IMPLIED, INCLUDING, BUT NOT LIMITED TO, WARRANTIES OF PERFORMANCE OR MERCHANTABILITY OR FITNESS FOR A PARTICULAR PURPOSE  PATIENT AND PHYSICIANS HEREBY AGREE THAT THEIR USE OF THE MATERIALS AND RESOURCES ACT AS A CONSENT TO RELEASE AND WAIVE ePREOP FROM ANY AND ALL CLAIMS OF WARRANTY, TORT OR CONTRACT LAW OF ANY KIND             Electronically signed by:Jay Juanell Rinne MD  Dec  7 2017  6:16PM EST

## 2018-01-23 NOTE — PROGRESS NOTES
Preliminary Nursing Report                Patient Information    Initial Encounter Entry Date:   2017 11:50 AM EST (Automated Transmission Automated Transmission)       Last Modified:   {Tracy Boykin}              Legal Name: Opal De La Cruz Number:        YOB: 1935        Age (years): [de-identified]        Gender: M        Body Mass Index (BMI): 34 kg/m2        Height: 67 in  Weight: 215 lbs (98 kgs)           Address:   [facility] [id number]               Phone: [phone]   (consent to leave messages)        Email:        Ethnicity: Decline to State        Yazidi:        Marital Status:        Preferred Language: English        Race: Other Race                    Patient Insurance Information        Primary Insurance Information Carrier Name: {Primary  CarrierName}           Carrier Address:   {Primary  CarrierAddress}              Carrier Phone: {Primary  CarrierPhone}          Group Number: {Primary  GroupNumber}          Policy Number: {Primary  PolicyNumber}          Insured Name: {Primary  InsuredName}          Insured : {Primary  InsuredDOB}          Relationship to Insured: {Primary  RelationshiptoInsured}           Secondary Insurance Information Carrier Name: {Secondary  CarrierName}           Carrier Address:   {Secondary  CarrierAddress}              Carrier Phone: {Secondary  CarrierPhone}          Group Number: {Secondary  GroupNumber}          Policy Number: {Secondary  PolicyNumber}          Insured Name: {Secondary  InsuredName}          Insured : {Secondary  InsuredDOB}          Relationship to Insured: {Secondary  RelationshiptoInsured}                       Health Profile   Booking #:   [place] Code #: [id number]               DOS: 2017    Surgery : Selective catheter placement, arterial system; initial third order or more selective abdominal, pelvic, or lower extremity artery branch, within a vascular family    Add'l Procedures/Notes:     Surgery Risk: Minor          Precautions     Aortic valve disorder       Chronic kidney disease, stage 3       Congestive heart failure       Coronary atherosclerosis of native coronary vessel       DM2 (diabetes mellitus, type 2)       Mild mitral regurgitation       Paroxysmal atrial fibrillation       Peripheral vascular disease       Tachycardia                   Allergies    Acetaminophen-Codeine SOLN       Clinical Comments: Reaction Type: , Reaction: , Severity:        Codeine Derivatives       Clinical Comments: Reaction Type: , Reaction: , Severity:        hydrocodone       Hydrocodone-Acetaminophen TABS       Clinical Comments: Reaction Type: , Reaction: , Severity:        MetFORMIN HCl TABS       Clinical Comments: Reaction Type: , Reaction: , Severity:              Medications    Aspirin EC 81 MG Oral Tablet Delayed Release       Atorvastatin Calcium 40 MG Oral Tablet       Centrum Silver TABS       Cephalexin 500 MG Oral Capsule       Metoprolol Tartrate 25 MG Oral Tablet       Metoprolol Tartrate 50 MG Oral Tablet       NovoLOG Mix 70/30 FlexPen (70-30) 100 UNIT/ML SUSP       Omeprazole 20 MG Oral Capsule Delayed Release       Paricalcitol 1 MCG Oral Capsule       Ramipril 2 5 MG Oral Capsule       Torsemide 20 MG Oral Tablet       Uloric 40 MG Oral Tablet               Conditions    Acute renal failure       Aortic valve disorder       Arm pain, lateral, left       Asymptomatic bilateral carotid artery stenosis       Atherosclerosis of artery of extremity with ulceration       Back pain       Benign essential hypertension       Cellulitis of foot       Chronic diastolic congestive heart failure       Chronic kidney disease, stage 3       Closed nondisplaced fracture of greater tuberosity of left humerus, initial encounter       Closed traumatic minimally displaced fracture of greater tuberosity of left humerus with routine healing       Congestive heart failure       Coronary atherosclerosis of native coronary vessel       Cough with hemoptysis       Degenerative joint disease       DM2 (diabetes mellitus, type 2)       Dyspnea       Edema       Esophageal reflux       Gout       Hiccups       Hyperlipidemia       Hyperparathyroidism       Hypoglycemia       Hypoxia       Impingement syndrome of left shoulder       Left low back pain       Mild mitral regurgitation       Monoclonal gammopathy of undetermined significance       Need for vaccination with 13-polyvalent pneumococcal conjugate vaccine       Paroxysmal atrial fibrillation       Peripheral vascular disease       S/P AVR       Screening for diabetic retinopathy       Secondary hyperparathyroidism, renal       Shoulder pain, left       SOB (shortness of breath) on exertion       Stage 4 chronic kidney disease       Tachycardia       Visit for pre-operative examination       Vitamin D deficiency       Wheeze               Family History    None             Surgical History    None             Social History    Denies alcohol consumption       Denies Drug use       Former smoker       Former Smoker Cigarettes - Heavy (20-25 / Day)                               Patient Instructions       Medical Procedure Risk  NPO Instructions   The day before surgery it is recommended to have a light dinner at your usual time and you are allowed a light snack early in the evening  Do not eat anything heavy or eat a big meal after 7pm  Do not eat or drink anything after midnight prior to your surgery  If you are supposed to take any of your medications, do so with a sip of water  Failure to follow these instructions can lead to an increased risk of lung complications and may result in a delay or cancellation of your procedure  If you have any questions, contact your institution for further instructions   No candy, no gum, no mints, no chewing tobacco          Ramipril 2 5 MG Oral Capsule  Medication Instruction (ACE/ARB - Blood Pressure Medication) 1  Please continue the following medications up to the evening before surgery, but do not take it on the day of surgery  Please restart your medications as soon as clinically feasible  Aspirin EC 81 MG Oral Tablet Delayed Release  Medication Instruction (Aspirin - Blood Thinners) 112, 104, 105, 114, 113, 103, 110, 107, 108, 109, 111, 106  Please continue to take this medication on your normal schedule  If this is an oral medication and you take in the morning, you may do so with a sip of water  However, your surgeon may have you stop aspirin up to a week early if you are having intracranial, middle ear, posterior eye, spine surgery or prostate surgery  Metoprolol Tartrate 50 MG Oral Tablet, , Metoprolol Tartrate 25 MG Oral Tablet  Medication Instruction (Beta Blocker) 3, 2, c, 4, 6, 5  Please continue to take this medication on your normal schedule  If this is an oral medication and you take in the morning, you may do so with a sip of water  NovoLOG Mix 70/30 FlexPen (70-30) 100 UNIT/ML SUSP  Medication Instruction (Diabetic Medication)   Please decrease your morning insulin dose to one-half of normal dose  If you are taking oral diabetes medications, the morning dose should be omitted  If taking metformin (Glucophage), discontinue the medication for 24 hours prior to surgery  If you have an insulin pump, continue at a basal rate only  Torsemide 20 MG Oral Tablet  Medication Instruction (Diuretics - Water Pills) 28, 29  Please continue the following medications up to the evening before surgery, but do not take it on the day of surgery  Esophageal reflux  Medication Instruction (Reflux Disease)   Please continue to take this medication on your normal schedule  If this is an oral medication and you take in the morning, you may do so with a sip of water           Stage 4 chronic kidney disease  Dialysis   If undergoing dialysis, please perform 24 to 48 before surgery and avoid interfering with dialysis schedule  Testing Considerations       ? Blood Glucose on Day of Surgery t  Please check the blood sugar on the morning of surgery  Triggered by: DM2 (diabetes mellitus, type 2), Hypoglycemia         ? Serum Potassium (K) on the morning of surgery t  Triggered by: Acute renal failure, Stage 4 chronic kidney disease               Consultations       ? Cardiac Consult (Major CHF)   If the patient is having increasing difficulty breathing or fluid retention then a cardiac consult is indicated  Otherwise, optimization of medical therapy is recommended under the direction of the PCP or Cardiologist   Triggered by: Congestive heart failure         ? Cardiac Consult (Major MI) c  If the patient has had a Myocardial Infarction within 30 days then a cardiac consult is indicated  Also, if the patient is having increasing frequency or intensity of chest pain then a cardiac consult is indicated  Otherwise, optimization of medical therapy is recommended under the direction of the PCP or cardiologist   Triggered by: Coronary atherosclerosis of native coronary vessel         ? Cardiac Consult (Major Rate) c  If the patient has a heart rate of greater than 100 bpm, or if the heart rhythm disturbance is new, then a cardiac consult is indicated  Otherwise, optimization of medical therapy is recommended under the direction of the PCP or cardiologist   Triggered by: Paroxysmal atrial fibrillation, Tachycardia         ? Cardiac Consult (Major Valve)   If the patient has moderate or severe valvular stenosis or regurgitation then a cardiac consult is indicated   It is recommended that the patient undergo a preoperative echocardiography if there has been either:  1) no prior echocardiography within 1 year or   2) a significant change in clinical status or physical examination since last evaluation   - For adults who meet standard indications for valvular intervention (replacement and repair) on the basis of symptoms and severity of stenosis or regurgitation, valvular intervention before elective non-cardiac surgery is effective in reducing perioperative risk  - appropriate intraoperative and postoperative hemodynamic monitoring is reasonable in adults with asymptomatic severe valvular disease   - Antibiotic prophylaxis will likely be required  Triggered by: Aortic valve disorder, Mild mitral regurgitation         ? Primary Care Physician Evaluation   Primary care physician may need to evaluate patient prior to surgery  This is likely NOT necessary if the listed conditions are chronic and stable  Triggered by: Wheeze               Miscellaneous Questions         Question: Are you able to walk up a flight of stairs, walk up a hill or do heavy housework WITHOUT having chest pain or shortness of breath? Answer: YES                   Allergies/Conditions/Medications Not Found        The following were not recognized by our system when generating the recommendations  Please consider if this would impact any preoperative protocols  ? Chronic diastolic congestive heart failure       ? Cough with hemoptysis       ? Denies alcohol consumption       ? Former Smoker Cigarettes - Heavy (20-25 / Day)       ? Atorvastatin Calcium 40 MG Oral Tablet       ? Centrum Silver TABS       ? Omeprazole 20 MG Oral Capsule Delayed Release       ? Paricalcitol 1 MCG Oral Capsule                  Appointment Information         Date:    12/22/2017        Location:    [facility]        Address:           Directions:                      Footnotes revision 14      ?? Denotes a free-text entry  Legal Disclaimer: Any and all recommendations and services provided herein are designed to assist in the preoperative care of the patient  Nothing contained herein is designed to replace, eliminate or alleviate the responsibility of the attending physician to supervise and determine the patient?s preoperative care and course of treatment  Failure to provide complete, accurate information may negatively impact the system?s ability to recommend the proper preoperative protocol  THE ATTENDING PHYSICIAN IS RESPONSIBLE TO REVIEW THE SUGGESTED PREOPERATIVE PROTOCOLS/COURSE OF TREATMENT AND PRESCRIBE THE FINAL COURSE OF PREOPERATIVE TREATMENT IN CONSULTATION WITH THE PATIENT  THE ePREOP SYSTEM AND ITS MATERIALS ARE PROVIDED ? AS IS? WITHOUT WARRANTY OF ANY KIND, EXPRESS OR IMPLIED, INCLUDING, BUT NOT LIMITED TO, WARRANTIES OF PERFORMANCE OR MERCHANTABILITY OR FITNESS FOR A PARTICULAR PURPOSE  PATIENT AND PHYSICIANS HEREBY AGREE THAT THEIR USE OF THE MATERIALS AND RESOURCES ACT AS A CONSENT TO RELEASE AND WAIVE ePREOP FROM ANY AND ALL CLAIMS OF WARRANTY, TORT OR CONTRACT LAW OF ANY KIND             Electronically signed by:Jose Simpson MD  Dec 19 2017  9:45AM EST

## 2018-01-26 NOTE — PROGRESS NOTES
Daily Note     Today's date: 2018  Patient name: Bonnie Laureano  : 1935  MRN: [id number]  Referring provider: Mariam Rodriguez MD  Dx:   Encounter Diagnosis   Name Primary?  Closed nondisplaced fracture of greater tuberosity of left humerus with routine healing, subsequent encounter Yes                  Subjective: Pt reports no pain at rest, 8/10 pain with AROM ABD  Objective: See treatment diary below  AROM: flex: 110 deg, ABD: 70 deg  Assessment: Pt demonstrated improved AROM flexion but abnormal arthrokinematics is likely limiting pain-free ABD  Plan:  Cont POC      Daily Treatment Diary     Manual              L sh PROM 8 min            L GH post, inf Gr IV jnt mobs 1x30 ea                                                       Exercise Diary              Pulleys flex, ABD 3"x15            scap retract iso 5"x15            B TB ER Y/ 3x12            AROM flex to 90 2x8            AROM ABD to 90 deg 2x8            tableslides 60 deg: scap 2x10            Supine AAROM ER 10"x 10            Supine AAROM flex 5"x10 np                                                                                                                                                                            Modalities

## 2018-01-31 NOTE — PROGRESS NOTES
Daily Note     Today's date: 2018  Patient name: Leland Tracy  : 1935  MRN: [id number]  Referring provider: Aurelio Canada MD  Dx:   Encounter Diagnosis   Name Primary?  Closed nondisplaced fracture of greater tuberosity of left humerus with routine healing, subsequent encounter Yes                  Subjective: Pt reports intermittent left shoulder pain, difficulty with lateral reaching  Objective: See treatment diary below      Assessment: Pt demonstrated increased pain with shoulder abduction > flexion AROM and AAROM         Plan: Continue poc as per PT    Precautions: CHF, CAD, Dm2, hyperlipidemia, tachycardia, HTN    Daily Treatment Diary     Manual              L sh PROM 10 min            Gr III-IV inf, post joint mobs np                                                       Exercise Diary              scap retraction iso 5"  1x15            Pulleys flex, abd 5"  1x15            Standing AROM flex, ABD to 90 deg 2x8 ea            Supine AAROM flex 3"  1x10            Supine AAROM  ER 10"x3            Shoulder B TB ER YTB  3"x12            Table incline slides Flex 70 deg  2x8                                                                                                                                                                                         Modalities  CP             10 min

## 2018-02-02 NOTE — PROGRESS NOTES
Daily Note     Today's date: 2018  Patient name: Arsenio Limb  : 1935  MRN: [id number]  Referring provider: Darren Burden MD  Dx:   Encounter Diagnosis   Name Primary?  Closed nondisplaced fracture of greater tuberosity of left humerus with routine healing, subsequent encounter Yes                  Subjective: Pt reports 5/10 left shoulder pain pre tx  Objective: See treatment diary below      Assessment: Pt demonstrated increased tolerance to RC strengthening, moderate shoulder pain with wall slides         Plan: Continue poc as per PT    Precautions: CHF, CAD, Dm2, hyperlipidemia, tachycardia, HTN     Daily Treatment Diary      Manual  18                   L sh PROM 10 min  10 min                   Gr III-IV inf, post joint mobs np  np                                                                                                 Exercise Diary  18                   scap retraction iso 5"  1x15  5"  1x15                   Pulleys flex, abd 5"  1x15  5"  1x15                   Standing AROM flex, ABD to 90 deg 2x8 ea  2x8 ea                   Supine AAROM flex 3"  1x10  3"  1x10                   Supine AAROM  ER 10"x3  10"x3                   Shoulder B TB ER YTB  3"x12  YTB  3"x20                   Table incline slides Flex 70 deg  2x8  flex 70  Deg  2x10                                                                                                                                                                                                                                                                                                                                                 Modalities  CP  CP                     10 min  10 min

## 2018-02-06 NOTE — PROGRESS NOTES
Daily Note     Today's date: 2018  Patient name: Mile Martinez  : 1935  MRN: [id number]  Referring provider: Benjamin Lugo MD  Dx:   Encounter Diagnosis   Name Primary?  Closed nondisplaced fracture of greater tuberosity of left humerus with routine healing, subsequent encounter Yes       Start Time: 1200  Stop Time: 1250  Total time in clinic (min): 50 minutes    Subjective: The patient reports "I'm not bad" but would not rate his pain  Objective: See treatment diary below      Assessment: Patient tolerated the treatment well reporting no pain with exercises  Patient is still limited in ROM specifically shoulder internal rotation  Mild pain noted during IR PROM         Plan: Continue POC as per PPT     Precautions: CHF, CAD, Dm2, hyperlipidemia, tachycardia, HTN     Daily Treatment Diary      Manual  18  2                 L sh PROM 10 min  10 min  10'                 Gr III-IV inf, post joint mobs np  np                                                                                                 Exercise Diary  18  2/                 scap retraction iso 5"  1x15  5"  1x15  5"x15                 Pulleys flex, abd 5"  1x15  5"  1x15  5"x15                 Standing AROM flex, ABD to 90 deg 2x8 ea  2x8 ea  2x10ea                 Supine AAROM flex 3"  1x10  3"  1x10  3" 1x10                 Supine AAROM  ER 10"x3  10"x3  10"x3                 Shoulder B TB ER YTB  3"x12  YTB  3"x20  YTB 3"x20                 Table incline slides Flex 70 deg  2x8  flex 70  Deg  2x10  flex 70 deg 2x10                                                                                                                                                                                                                                                                                                                                               Modalities  CP  CP  2/6                  CP 10 min  10 min  NP

## 2018-02-08 PROBLEM — E55.9 VITAMIN D DEFICIENCY: Status: ACTIVE | Noted: 2018-01-01

## 2018-02-08 PROBLEM — N25.81 SECONDARY HYPERPARATHYROIDISM (HCC): Status: ACTIVE | Noted: 2018-01-01

## 2018-02-08 PROBLEM — R60.9 EDEMA: Status: ACTIVE | Noted: 2018-01-01

## 2018-02-08 NOTE — PROGRESS NOTES
Assessment/Plan:    Stage 3 chronic kidney disease  Misael Valdes has longstanding stage 3 chronic kidney disease  His baseline creatinine is approximately 1 7-2 1  His creatinine is 1 8  His kidney function has remained stable status post angiogram done from December  As we are going to increase the patient's diuretic dose will need to recheck BMP    Edema  Misael Valdes has increased leg edema but I also think he has longstanding lymphedema as well  He is scheduled to see vascular surgery in follow-up  I am going to increase his diuretic dose as he is going to be taking Demadex 20 mg daily the increase in the afternoon dose to Monday Wednesday Friday and rechecking a BMP approximately 1 week after  If vascular surgery agrees, I am wondering if he would be a candidate for compression boot regarding helping his lymphedema as well  Essential hypertension  His blood pressure seems to be running stable did not make any changes in his blood pressure medications outside of increasing and modifying his diuretic dose    Secondary hyperparathyroidism ([address] )  Will check intact PTH and phosphorus levels  His last intact PTH I believe was 80 which is stable for stage 4 chronic kidney disease    Vitamin D deficiency  Check vitamin-D level  There were no vitals filed for this visit  Subjective:      Patient ID: Mary Kapoor  is a [de-identified] y o  male and is seen in follow-up for kidney function and fluid status    HPI    He recently had an angiogram in December and is following up with vascular surgery with regards to that  The patient has had increased leg edema  He denies any chest pressure shortness of breath  Review of Systems   Constitutional: Negative for chills and fever  Respiratory: Negative for cough, chest tightness and shortness of breath  Cardiovascular: Positive for leg swelling  Genitourinary: Negative for hematuria  Neurological: Negative for dizziness and light-headedness  Objective:     Physical Exam   Constitutional: He appears well-developed  Eyes: Left eye exhibits no discharge  No scleral icterus  Neck: Neck supple  Cardiovascular: Normal rate and regular rhythm  Pulmonary/Chest: Breath sounds normal    Abdominal: Soft  He exhibits no distension  Musculoskeletal: He exhibits edema  Lymphadenopathy:     He has no cervical adenopathy  Skin: Skin is warm

## 2018-02-08 NOTE — ASSESSMENT & PLAN NOTE
Olivier Herndon has increased leg edema but I also think he has longstanding lymphedema as well  He is scheduled to see vascular surgery in follow-up  I am going to increase his diuretic dose as he is going to be taking Demadex 20 mg daily the increase in the afternoon dose to Monday Wednesday Friday and rechecking a BMP approximately 1 week after  If vascular surgery agrees, I am wondering if he would be a candidate for compression boot regarding helping his lymphedema as well

## 2018-02-08 NOTE — ASSESSMENT & PLAN NOTE
Will check intact PTH and phosphorus levels    His last intact PTH I believe was 80 which is stable for stage 4 chronic kidney disease

## 2018-02-08 NOTE — ASSESSMENT & PLAN NOTE
His blood pressure seems to be running stable did not make any changes in his blood pressure medications outside of increasing and modifying his diuretic dose

## 2018-02-08 NOTE — ASSESSMENT & PLAN NOTE
Abraham Pastor has longstanding stage 3 chronic kidney disease  His baseline creatinine is approximately 1 7-2 1  His creatinine is 1 8  His kidney function has remained stable status post angiogram done from December    As we are going to increase the patient's diuretic dose will need to recheck BMP

## 2018-02-12 NOTE — PROGRESS NOTES
Daily Note     Today's date: 2018  Patient name: Damien Lewis  : 1935  MRN: [id number]  Referring provider: Eliot Chris MD  Dx:   Encounter Diagnosis   Name Primary?  Closed nondisplaced fracture of greater tuberosity of left humerus with routine healing, subsequent encounter Yes                  Subjective: The patient reports 4/10 left shoulder pain at rest, 6/10 with lifting his arm  Objective: See treatment diary below; L shoulder PROM: flex: 120 deg, abd: 109 deg, Er: 49 deg      Assessment: Patient demonstrated increased tolerance to wall slides, moderate pain with ADLs         Plan: RE in 2 visits      Precautions: CHF, CAD, Dm2, hyperlipidemia, tachycardia, HTN     Daily Treatment Diary      Manual  18               L sh PROM 10 min  10 min  10'  10'               Gr III-IV inf, post joint mobs np  np    np                                                                                             Exercise Diary  18               scap retraction iso 5"  1x15  5"  1x15  5"x15  5"x15               Pulleys flex, abd 5"  1x15  5"  1x15  5"x15  5"x15               Standing AROM flex, ABD to 90 deg 2x8 ea  2x8 ea  2x10ea  2x10 ea               Supine AAROM flex 3"  1x10  3"  1x10  3" 1x10  3"  1x10               Supine AAROM  ER 10"x3  10"x3  10"x3  10"x3               Shoulder B TB ER YTB  3"x12  YTB  3"x20  YTB 3"x20  YTB  3"x20               Table incline slides Flex 70 deg  2x8  flex 70  Deg  2x10  flex 70 deg 2x10  flex  70 deg  2x12                                                                                                                                                                                                                                                                                                                                             Modalities  CP  CP                  CP 10 min  10 min  NP  10 min

## 2018-02-14 NOTE — PROGRESS NOTES
Daily Note     Today's date: 2018  Patient name: Arsenio Limb  : 1935  MRN: [id number]  Referring provider: Darren Burden MD  Dx:   Encounter Diagnosis   Name Primary?  Closed nondisplaced fracture of greater tuberosity of left humerus with routine healing, subsequent encounter Yes                  Subjective: Pt reports continued 4/10 shoulder pain with lifting his left arm  Objective: See treatment diary below; L shoulder AROM: flex: 100 deg, abd: 84      Assessment: Patient demonstrated no change in pain levels, increased shoulder active abduction ROM         Plan: RE nv      Precautions: CHF, CAD, Dm2, hyperlipidemia, tachycardia, HTN     Daily Treatment Diary      Manual  18             L sh PROM 10 min  10 min  10'  10'  10'             Gr III-IV inf, post joint mobs np  np    np  np                                                                                           Exercise Diary  18             scap retraction iso 5"  1x15  5"  1x15  5"x15  5"x15  5"x15             Pulleys flex, abd 5"  1x15  5"  1x15  5"x15  5"x15  5"x15             Standing AROM flex, ABD to 90 deg 2x8 ea  2x8 ea  2x10ea  2x10 ea  2x10 ea             Supine AAROM flex 3"  1x10  3"  1x10  3" 1x10  3"  1x10  3"   1x10             Supine AAROM  ER 10"x3  10"x3  10"x3  10"x3  10"x3             Shoulder B TB ER YTB  3"x12  YTB  3"x20  YTB 3"x20  YTB  3"x20  YTB  3"x20             Table incline slides Flex 70 deg  2x8  flex 70  Deg  2x10  flex 70 deg 2x10  flex  70 deg  2x12  flex  70 deg  2x12                                                                                                                                                                                                                                                                                                                                           Modalities  CP  CP                CP 10 min  10 min  NP  10 min  10 min

## 2018-02-16 PROBLEM — M25.512 PAIN IN LEFT SHOULDER: Status: ACTIVE | Noted: 2018-01-01

## 2018-02-16 NOTE — PROGRESS NOTES
Assessment/Plan:     [de-identified]y o  year old male  With left shoulder pain and impingement syndrome  The patient is not interested in surgical treatment of his left shoulder pain  He feels that at his age and with his symptoms current we being partially relieved by physical therapy that  Surgery would not be worth the potential risks to him at this time  It was explained to him that if he does  Not Desire surgical intervention, an MRI of his left shoulder,  Although diagnostic,  Would not be worthwhile as the information you would yield would only be useful to help in surgical treatment  He agrees with this and would not like to undergo MRI of his left shoulder at this time  He will continue with physical therapy for the time being and will welcome him back in the office if he changes his mind and wishes to consider surgical management of his left shoulder   Pain and likely underlying rotator cuff pathology  The assessment and plan was formulated by the attending physician and I assisted in carrying it out  Follow Up:  PRN      Subjective:     CHIEF COMPLAINT:  Chief Complaint   Patient presents with    Left Shoulder - Pain, Follow-up       HPI:  Ernesto Montalvo  is a [de-identified]y o  year old male   Presents office with left shoulder pain and weakness  He received an injection roughly 10 weeks ago here in the office, which she states only provided him with about week of relief the symptoms  He describes pain is still worse with activity, limiting his range of head motion  Since his last visit he denies any trauma his left shoulder, any neck pain or injury,or any numbness or tingling in his left upper extremity      PAST MEDICAL HISTORY:  Past Medical History:   Diagnosis Date    Cardiac disease     [facility]est[facility] [address] [facility]     [facility]     Hypertension     Renal disorder        PAST SURGICAL HISTORY:  Past Surgical History:   Procedure Laterality Date    AORTIC VALVE REPLACEMENT  CARDIAC VALVE REPLACEMENT      CATARACT EXTRACTION, BILATERAL      COLONOSCOPY      [place] 3RD+ ORD SLCTV ABDL PEL/LXTR [facility] Left 12/22/2017    Procedure: LEG ANGIOGRAM; RIGHT FEMORAL ACCESS; CO2-CONTRAST ;  Surgeon: Omid Mederos MD;  [facility];  Service: Vascular    REPLACEMENT TOTAL KNEE BILATERAL      VASCULAR SURGERY         FAMILY HISTORY:  Family History   Problem Relation Age of Onset    Cancer Mother     Heart disease Father     Diabetes Sister     Heart disease Sister     Diabetes Brother        SOCIAL HISTORY:  Social History   Substance Use Topics    Smoking status: Former Smoker     Quit date: 10/11/1987    Smokeless tobacco: Never Used    Alcohol use No       MEDICATIONS:    Current Outpatient Prescriptions:     aspirin (ECOTRIN LOW STRENGTH) 81 mg EC tablet, Take 81 mg by mouth daily  , Disp: , Rfl:     atorvastatin (LIPITOR) 40 mg tablet, Take 40 mg by mouth daily, Disp: , Rfl:     febuxostat (ULORIC) 40 mg tablet, Take 40 mg by mouth daily, Disp: , Rfl:     insulin aspart protamine-insulin aspart (NovoLOG 70/30) 100 units/mL injection, Inject 20 Units under the skin 2 (two) times a day before meals (Patient taking differently: Inject 24 Units under the skin 2 (two) times a day before meals  ), Disp: , Rfl: 0    Metoprolol-Hydrochlorothiazide (METOPROLOL-HCTZ ER PO), Take by mouth, Disp: , Rfl:     multivitamin-iron-minerals-folic acid (CENTRUM) chewable tablet, Chew 1 tablet daily  , Disp: , Rfl:     omeprazole (PriLOSEC) 20 mg delayed release capsule, Take 20 mg by mouth daily  , Disp: , Rfl:     paricalcitol (ZEMPLAR) 1 mcg capsule, Take 1 mcg by mouth daily, Disp: , Rfl:     ramipril (ALTACE) 2 5 mg capsule, Take 2 5 mg by mouth daily, Disp: , Rfl:     torsemide (DEMADEX) 20 mg tablet, Take 1 tablet (20 mg total) by mouth daily Take 1 tablet daily in the am, take one additional tablet of 20 mg po on Monday, Wednesday and Friday afternoon, Disp: 50 tablet, Rfl: 5    metoprolol tartrate 75 MG TABS, Take 1 tablet by mouth every 12 (twelve) hours for 30 days, Disp: 60 tablet, Rfl: 0    paricalcitol (ZEMPLAR) 1 mcg capsule, Take 1 capsule by mouth 3 (three) times a week for 30 days Monday , Wednesday, friday, Disp: 12 capsule, Rfl: 0    ALLERGIES:  Allergies   Allergen Reactions    Codeine     Hydrocodone      Other reaction(s): Other (See Comments)  Heart racing       REVIEW OF SYSTEMS:  Pertinent items are noted in HPI  A comprehensive review of systems was negative  LABS:  HgA1c:   Lab Results   Component Value Date    HGBA1C 6 7 (H) 10/11/2017     BMP:   Lab Results   Component Value Date    GLUCOSE 175 (H) 12/22/2017    CALCIUM 8 9 01/29/2018     01/29/2018    K 4 0 01/29/2018    CO2 29 01/29/2018     01/29/2018    BUN 32 (H) 01/29/2018    CREATININE 1 81 (H) 01/29/2018         Objective:     _____________________________________________________  PHYSICAL EXAMINATION:  General: well developed and well nourished, alert, oriented times 3 and appears comfortable  Psychiatric: Normal  HEENT: Trachea Midline, No torticollis  Cardiovascular: No discernable arrhythmia  Pulmonary: No wheezing or stridor  Skin: No masses, erthema, lacerations, fluctation, ulcerations  Neurovascular: Pulses Intact and   Crude sensory intact left upper extremity including axillary region  MUSCULOSKELETAL EXAMINATION:    Left shoulder:   Range of motion for flexion limited to about 120  Range of motion with abduction limited to about 120  Positive scratch test   Positive empty can test   Positive horn blower sign  Normal strength in internal rotation    Negative speed's test   Negative Yergason's test   No tenderness to palpation bicipital groove     _____________________________________________________  STUDIES REVIEWED:    None review today      PROCEDURES PERFORMED:  Procedures  No Procedures performed today

## 2018-02-22 PROBLEM — I73.9 PERIPHERAL ARTERIAL DISEASE (HCC): Status: ACTIVE | Noted: 2018-01-01

## 2018-02-22 NOTE — PROGRESS NOTES
Assessment/Plan:  [de-identified] yo M w/ HTN, HLD, paroxsymal afib (no longer on Eliquis), CAD s/p CABG, DM, bioprostehtic aortic valve replacement, CKD III, PAD s/p R fem-TP trunk bypass '02 by Dr Rachid bloom/p PTA for mid graft stenosis in '03 by Dr Nakia Cheema  He had diagnostic left lower extremity arteriogram December 2017 for left 2nd toe infection/wound after mechanical fall which ultimately resolved  He now presents for evaluation of left lower extremity discoloration and pea sized black discoloration of left 5th toe x 2 weeks    -Discussed at length local care with patient and wife  Recommended Betadine paint daily to left lateral 5th toe and left heel fissure (photographed)  -Minimal pain/discomfort    -He is due for routine surveillance lower extremity arterial/carotid Doppler  Will obtain LEAD and then office visit with Dr Liudmila Vasquez to review study/check healing    -If worsening wounds or pain he will notify the office sooner  Problem List Items Addressed This Visit        Cardiovascular and Mediastinum    Peripheral arterial disease ([facility] [address] ) - Primary       Genitourinary    Stage 3 chronic kidney disease       Other    Edema               Patient ID: Tommy Silva  is a [de-identified] y o  male  Chief Complaint: Pt is here to check left foot  Pt states he has black spot to 5th toe on left foot for the past two weeks  Pt denies pain when walking  Pt denies pain to 5th toe      HPI    [de-identified] yo M w/ HTN, HLD, paroxsymal afib (no longer on Eliquis), CAD s/p CABG, DM, bioprostehtic aortic valve replacement, CKD III, PAD s/p R fem-TP trunk bypass '02 by Dr Rachid Osborne s/p PTA for mid graft stenosis in '03 by Dr Nakia Cheema  He had diagnostic left lower extremity arteriogram December 2017 for left 2nd toe infection/wound after mechanical fall which ultimately resolved  He now presents for evaluation of left lower extremity discoloration and pea sized black discoloration of left 5th toe x 2 weeks    He denies any pain the left 5th toe   There is an additional area of the left lateral heel with a fissure near callous which patient and wife were not otherwise aware of  He follows with Dr Girish Irene every 10 weeks and has an upcoming appointment  He is ambulating minimally with a cane  He is participating in physical therapy though this is for shoulder  He has some occasional nocturnal pain of the left foot that relieves with dependency  The symptoms occur approximately once a week  His wife is very concerned about the color of the left foot since his fall in October  He has no complaints of the right lower extremity  There is redness of the 2nd toe on the right foot  He has bilateral ankle and calf edema which is significant  His torsemide has been titrated by Dr Olivia Franco  The following portions of the patient's history were reviewed and updated as appropriate: allergies, current medications, past family history, past medical history, past social history, past surgical history and problem list     Review of Systems   HENT: Negative  Respiratory:        Shortness of breath on exertion   Cardiovascular: Positive for leg swelling  Musculoskeletal: Positive for back pain and gait problem  Skin: Positive for color change  Neurological: Positive for weakness             Vitals:    02/22/18 1138   BP: 118/60   BP Location: Right arm   Patient Position: Sitting   Cuff Size: Adult   Pulse: 82   Resp: 16   Temp: (!) 97 3 °F (36 3 °C)   TempSrc: Tympanic   Weight: 99 8 kg (220 lb)   Height: 5' 7" (1 702 m)       Patient Active Problem List   Diagnosis    Paroxysmal atrial fibrillation (HCC)    Acute on chronic diastolic congestive heart failure (HCC)    Essential hypertension    Acute respiratory failure with hypoxia (HCC)    Type 2 diabetes mellitus (HCC)    M[place] (dyspnea on exertion)    Cough    S/P aortic valve replacement with bioprosthetic valve    Acute encephalopathy    Nasal laceration, sequela    Coronary artery disease involving native coronary artery of native heart    Stage 3 chronic kidney disease    Closed fracture of greater tuberosity of left humerus    Cellulitis    Edema    [facility]par[facility] [address] [facility]    Vitamin D deficiency    Pain in left shoulder    Peripheral arterial disease ([facility] [address] )       Past Surgical History:   Procedure Laterality Date    AORTIC VALVE REPLACEMENT      CARDIAC VALVE REPLACEMENT      CATARACT EXTRACTION, BILATERAL      COLONOSCOPY      [facility] 3RD+ ORD SLCTV ABDL PEL/LXTR [facility] Left 12/22/2017    Procedure: LEG ANGIOGRAM; RIGHT FEMORAL ACCESS; CO2-CONTRAST ;  Surgeon: Thiago Andrade MD;  [facility];  Service: Vascular    REPLACEMENT TOTAL KNEE BILATERAL      VASCULAR SURGERY         Family History   Problem Relation Age of Onset    Cancer Mother     Heart disease Father     Diabetes Sister     Heart disease Sister     Diabetes Brother        Social History     Social History    Marital status: /Civil Union     Spouse name: N/A    Number of children: N/A    Years of education: N/A     Occupational History    SELF EMPLOYED      Social History Main Topics    Smoking status: Former Smoker     Quit date: 10/11/1987    Smokeless tobacco: Never Used    Alcohol use No    Drug use: No    Sexual activity: No     Other Topics Concern    Not on file     Social History Narrative    No narrative on file       Allergies   Allergen Reactions    Codeine     Hydrocodone      Other reaction(s):  Other (See Comments)  Heart racing         Current Outpatient Prescriptions:     aspirin (ECOTRIN LOW STRENGTH) 81 mg EC tablet, Take 81 mg by mouth daily  , Disp: , Rfl:     atorvastatin (LIPITOR) 40 mg tablet, Take 40 mg by mouth daily, Disp: , Rfl:     febuxostat (ULORIC) 40 mg tablet, Take 40 mg by mouth daily, Disp: , Rfl:     insulin aspart protamine-insulin aspart (NovoLOG 70/30) 100 units/mL injection, Inject 20 Units under the skin 2 (two) times a day before meals (Patient taking differently: Inject 24 Units under the skin 2 (two) times a day before meals  ), Disp: , Rfl: 0    Metoprolol-Hydrochlorothiazide (METOPROLOL-HCTZ ER PO), Take by mouth, Disp: , Rfl:     multivitamin-iron-minerals-folic acid (CENTRUM) chewable tablet, Chew 1 tablet daily  , Disp: , Rfl:     NOVOLOG MIX 70/30 FLEXPEN (70-30) 100 UNIT/ML SUPN, INJECT 36 UNITS SUBCUTANEOUSLY QAM AND 32 UNITS IN THE AFTERNOON, Disp: , Rfl: 1    omeprazole (PriLOSEC) 20 mg delayed release capsule, Take 20 mg by mouth daily  , Disp: , Rfl:     paricalcitol (ZEMPLAR) 1 mcg capsule, Take 1 mcg by mouth daily, Disp: , Rfl:     ramipril (ALTACE) 2 5 mg capsule, Take 2 5 mg by mouth daily, Disp: , Rfl:     torsemide (DEMADEX) 20 mg tablet, Take 1 tablet (20 mg total) by mouth daily Take 1 tablet daily in the am, take one additional tablet of 20 mg po on Monday, Wednesday and Friday afternoon, Disp: 50 tablet, Rfl: 5    metoprolol tartrate 75 MG TABS, Take 1 tablet by mouth every 12 (twelve) hours for 30 days, Disp: 60 tablet, Rfl: 0    paricalcitol (ZEMPLAR) 1 mcg capsule, Take 1 capsule by mouth 3 (three) times a week for 30 days Monday , Wednesday, friday, Disp: 12 capsule, Rfl: 0         Physical Exam   Constitutional: He is oriented to person, place, and time  He appears well-developed  Cardiovascular:   +2-3 pitting edema pedal, ankle, calf  Dopplerable DP signal on the left  Left lateral 5th toe pea sized black discoloration appears to be a hemorraghic bullae, fissure of the left heel   Pulmonary/Chest: Breath sounds normal    Musculoskeletal: He exhibits edema  Neurological: He is alert and oriented to person, place, and time

## 2018-02-22 NOTE — PROGRESS NOTES
Daily Note     Today's date: 2018  Patient name: Mary Kapoor  : 1935  MRN: [id number]  Referring provider: Declan Grace MD  Dx:   Encounter Diagnosis   Name Primary?  Closed nondisplaced fracture of greater tuberosity of left humerus with routine healing, subsequent encounter Yes                  Subjective: Pt reports 0/10 at rest, 5/10 at worst with overhead reaching into ABD  Objective: See treatment diary below  Assessment: Patient demonstrated improved active and passive ROM, pain and functional reaching      Plan: Pt will benefit from continued PT to further improve ROM, reaching, lifting tolerance and pain      Precautions: CHF, CAD, Dm2, hyperlipidemia, tachycardia, HTN     Daily Treatment Diary      Manual  18           L sh PROM 10 min  10 min  10'  10'  10'  8 min           Gr III-IV inf, post joint mobs np  np    np  np  1x30 ea                                                                                         Exercise Diary  18           scap retraction iso 5"  1x15  5"  1x15  5"x15  5"x15  5"x15  5"x15           Pulleys flex, abd 5"  1x15  5"  1x15  5"x15  5"x15  5"x15  5"x15           Standing AROM flex, ABD to 90 deg 2x8 ea  2x8 ea  2x10ea  2x10 ea  2x10 ea  2x10 ea           Supine AAROM flex 3"  1x10  3"  1x10  3" 1x10  3"  1x10  3"   1x10 3" 1x10           Supine AAROM  ER 10"x3  10"x3  10"x3  10"x3  10"x3  10"x3           Shoulder B TB ER YTB  3"x12  YTB  3"x20  YTB 3"x20  YTB  3"x20  YTB  3"x20  YTB 3"x20           Table incline slides Flex 70 deg  2x8  flex 70  Deg  2x10  flex 70 deg 2x10  flex  70 deg  2x12  flex  70 deg  2x12  flex 70 deg 3x10             TB rows           R/ 2x10                                                                                                                                                                                                                                                                                                                 Modalities  CP  CP  2/6 2/12 2/14              CP 10 min  10 min  NP  10 min  10 min

## 2018-02-22 NOTE — PATIENT INSTRUCTIONS
Apply Betadine to black spots on your left foot daily  Check with Dr Josue Ibrahim that your shoes fit properly  You will have your arterial doppler in April and office visit with Dr Mary Redman to follow  Call the office sooner if you develop more black spots, the black spots get larger or you develop pain in the foot on a daily/nightly basis

## 2018-02-22 NOTE — PROGRESS NOTES
PT Re-Evaluation     Today's date: 2018  Patient name: Daily Beebe  : 1935  MRN: [id number]  Referring provider: Umesh Barragan MD  Dx:   Encounter Diagnosis     ICD-10-CM    1  Closed nondisplaced fracture of greater tuberosity of left humerus with routine healing, subsequent encounter [id number]                   Assessment    Assessment details: Pt demonstrated improved ROM, strength and pain  Pt will benefit from continued PT to further improve ABD AROM, functional reaching and pain  Plan  Planned therapy interventions: joint mobilization, manual therapy, postural training, strengthening, stretching, therapeutic activities, therapeutic exercise, flexibility, functional ROM exercises, therapeutic training, graded activity, graded exercise and home exercise program  Frequency: 2x week  Duration in weeks: 4        Subjective Evaluation    History of Present Illness  Mechanism of injury: Pt reports 5/10 L shoulder pain at worst with reaching into ABD  Pt reports no difficulty with donning a shirt or jacket, mod difficulty with putting dishes away    Pain  No pain reported  Current pain ratin  At best pain ratin  At worst pain ratin  Quality: dull ache          Objective     Active Range of Motion   Left Shoulder   Flexion: 95 degrees with pain  Abduction: 70 degrees with pain  External rotation BTH: C4   Internal rotation BTB: L5     Passive Range of Motion   Left Shoulder   Flexion: 110 degrees   Abduction: 95 degrees   External rotation 45°: 50 degrees   Internal rotation 45°: 45 degrees     Strength/Myotome Testing     Left Shoulder     Planes of Motion   Flexion: 3   Abduction: 3-   External rotation at 0°: 3+   Internal rotation at 0°: 3+

## 2018-02-23 NOTE — PROGRESS NOTES
I called the patient's spoke with his wife with regards to most recent labs  He is having increased leg edema and we will increase his Demadex twice daily over the weekend touch base on Monday she states he has gained about 10 lb  His vitamin-D level is 25, will increase to 2000 units daily is he is taking a 1000 units with the multi vitamin each day  I stated to take the vitamin-D with a food help maximize its absorption

## 2018-02-26 NOTE — PROGRESS NOTES
Daily Note     Today's date: 2018  Patient name: Hetal Grijalva  : 1935  MRN: [id number]  Referring provider: Pinkey Canavan, MD  Dx:   Encounter Diagnosis     ICD-10-CM    1  Closed nondisplaced fracture of greater tuberosity of left humerus with routine healing, subsequent encounter S42 903D                   Subjective: Pt reports high levels of pain waking up this morning that has now resolved  Pt could not report unusual activities or sleeping position  Objective: See treatment diary below    Assessment: Pt demonstrated improved PROM elevation and slowly improving AROM  Plan: Cont to progress elevation and scap stability TE as tolerated      Precautions: CHF, CAD, Dm2, hyperlipidemia, tachycardia, HTN     Daily Treatment Diary      Manual  18         L sh PROM 10 min  10 min  10'  10'  10'  8 min  8 min         Gr III-IV inf, post joint mobs np  np    np  np  1x30 ea  1x30 ea                                                                                       Exercise Diary  18       scap retraction iso 5"  1x15  5"  1x15  5"x15  5"x15  5"x15  5"x15  5"x15  5"x15       Pulleys flex, abd 5"  1x15  5"  1x15  5"x15  5"x15  5"x15  5"x15  5"x15  5"x15       Standing AROM flex, ABD to 90 deg 2x8 ea  2x8 ea  2x10ea  2x10 ea  2x10 ea  2x10 ea    2x10 ea       Supine AAROM flex 3"  1x10  3"  1x10  3" 1x10  3"  1x10  3"   1x10 3" 1x10    3" 1x10       Supine AAROM  ER 10"x3  10"x3  10"x3  10"x3  10"x3  10"x3    10"x3       Shoulder B TB ER YTB  3"x12  YTB  3"x20  YTB 3"x20  YTB  3"x20  YTB  3"x20  YTB 3"x20    R TB 3"x20       Table incline slides Flex 70 deg  2x8  flex 70  Deg  2x10  flex 70 deg 2x10  flex  70 deg  2x12  flex  70 deg  2x12  flex 70 deg 3x10     flex 70 deg 3x10        TB rows           R/ 2x10    r/ 3x10                                                                                                                                                                                                                                                                                                             Modalities  CP  CP  2/6 2/12 2/14              CP 10 min  10 min  NP  10 min  10 min

## 2018-02-28 NOTE — LETTER
February 28, 5490     [address] [place] [address]    Patient: Mary Kapoor  YOB: 1935   Date of Visit: 2/28/2018       Dear Dr Onelia Kwok: Thank you for referring Anthony Orellana to me for evaluation  Below are my notes for this consultation  If you have questions, please do not hesitate to call me  I look forward to following your patient along with you  Sincerely,        Freddie Lewis MD        CC: No Recipients  Freddie Lewis MD  2/28/2018  4:54 PM  Sign at close encounter  Hematology Outpatient Follow - Up Note  Mary Kapoor  [de-identified] y o  male MRN: @ Encounter: [id number]        Date:  2/28/2018        Assessment/ Plan: , 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, no evidence of anemia, he had chronic kidney disease grade 3, diabetes mellitus type 2, confirmed by bone marrow biopsy, current M protein of 0 38 grams/deciliter, continue watchful observation, this has been stable for the past 10 years, will follow up every other year with CBC, CMP, SPEP, free light chain           HPI:  Monoclonal gammopathy of undetermined significance IgM kappa and IgM lambda for many years with diabetes mellitus, chronic kidney disease grade 3, status post porcine aortic valve replacement and history of gout     Interval History:        Previous Treatment:         Test Results:    Imaging: No results found      Labs:   Lab Results   Component Value Date    WBC 8 99 02/21/2018    HGB 12 2 02/21/2018    HCT 38 0 02/21/2018    MCV 86 02/21/2018     02/21/2018     Lab Results   Component Value Date     02/21/2018    K 4 5 02/21/2018     02/21/2018    CO2 30 02/21/2018    ANIONGAP 5 02/21/2018    BUN 47 (H) 02/21/2018    CREATININE 2 00 (H) 02/21/2018    GLUCOSE 175 (H) 12/22/2017    GLUF 147 (H) 02/21/2018    CALCIUM 9 6 02/21/2018    AST 16 02/21/2018    ALT 18 02/21/2018    ALKPHOS 108 02/21/2018    PROT 7 4 02/21/2018 PROT 6 8 02/21/2018    BILITOT 0 75 02/21/2018    EGFR 31 02/21/2018       No results found for: IRON, TIBC, FERRITIN    No results found for: [id number]      ROS:   Review of Systems   Respiratory: Negative  Negative for apnea, choking and chest tightness  Cardiovascular: Positive for leg swelling  Negative for chest pain  Endocrine: Negative  Genitourinary: Negative  Neurological: Positive for numbness  Hematological: Negative  All other systems reviewed and are negative  Current Medications: Reviewed  Allergies: Reviewed  PMH/FH/SH:  Reviewed      Physical Exam:    Body surface area is 2 09 meters squared  Wt Readings from Last 3 Encounters:   02/28/18 98 kg (216 lb)   02/22/18 99 8 kg (220 lb)   02/16/18 99 8 kg (220 lb)        Temp Readings from Last 3 Encounters:   02/28/18 (!) 97 2 °F (36 2 °C) (Tympanic)   02/22/18 (!) 97 3 °F (36 3 °C) (Tympanic)   01/18/18 98 °F (36 7 °C) (Tympanic)        BP Readings from Last 3 Encounters:   02/28/18 120/70   02/22/18 118/60   02/16/18 96/58         Pulse Readings from Last 3 Encounters:   02/28/18 74   02/22/18 82   02/16/18 83        Physical Exam   Constitutional: He is oriented to person, place, and time  He appears well-developed and well-nourished  No distress  HENT:   Head: Normocephalic and atraumatic  Mouth/Throat: Oropharynx is clear and moist  No oropharyngeal exudate  Eyes: Conjunctivae and EOM are normal  Pupils are equal, round, and reactive to light  Neck: Normal range of motion  Neck supple  No tracheal deviation present  No thyromegaly present  Cardiovascular: Normal rate and regular rhythm  Exam reveals no gallop and no friction rub  Murmur heard  Systolic murmur is present with a grade of 3/6   Pulmonary/Chest: Effort normal and breath sounds normal  No respiratory distress  He has no wheezes  He has no rales  He exhibits no tenderness  Abdominal: Soft   Bowel sounds are normal  He exhibits no distension and no mass  There is no tenderness  There is no rebound and no guarding  Musculoskeletal: Normal range of motion  He exhibits edema ( +2 edema)  Lymphadenopathy:     He has no cervical adenopathy  Neurological: He is alert and oriented to person, place, and time  Skin: Skin is warm and dry  No rash noted  He is not diaphoretic  No erythema  No pallor  Psychiatric: He has a normal mood and affect  His behavior is normal  Judgment and thought content normal    Vitals reviewed  ECOG score of 2        Goals and Barriers:  Current Goal: Minimize effects of disease  Barriers: None  Patient's Capacity to Self Care:  Patient is able to self care      Code Status: @[facility]@

## 2018-02-28 NOTE — PROGRESS NOTES
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, no evidence of anemia, he had chronic kidney disease grade 3, diabetes mellitus type 2, confirmed by bone marrow biopsy, current M protein of 0 38 grams/deciliter, continue watchful observation, this has been stable for the past 10 years, will follow up every other year with CBC, CMP, SPEP, free light chain           HPI:  Monoclonal gammopathy of undetermined significance IgM kappa and IgM lambda for many years with diabetes mellitus, chronic kidney disease grade 3, status post porcine aortic valve replacement and history of gout     Interval History:        Previous Treatment:         Test Results:    Imaging: No results found  Labs:   Lab Results   Component Value Date    WBC 8 99 02/21/2018    HGB 12 2 02/21/2018    HCT 38 0 02/21/2018    MCV 86 02/21/2018     02/21/2018     Lab Results   Component Value Date     02/21/2018    K 4 5 02/21/2018     02/21/2018    CO2 30 02/21/2018    ANIONGAP 5 02/21/2018    BUN 47 (H) 02/21/2018    CREATININE 2 00 (H) 02/21/2018    GLUCOSE 175 (H) 12/22/2017    GLUF 147 (H) 02/21/2018    CALCIUM 9 6 02/21/2018    AST 16 02/21/2018    ALT 18 02/21/2018    ALKPHOS 108 02/21/2018    PROT 7 4 02/21/2018    PROT 6 8 02/21/2018    BILITOT 0 75 02/21/2018    EGFR 31 02/21/2018       No results found for: IRON, TIBC, FERRITIN    No results found for: [id number]      ROS:   Review of Systems   Respiratory: Negative  Negative for apnea, choking and chest tightness  Cardiovascular: Positive for leg swelling  Negative for chest pain  Endocrine: Negative  Genitourinary: Negative  Neurological: Positive for numbness  Hematological: Negative  All other systems reviewed and are negative          Current Medications: Reviewed  Allergies: Reviewed  PMH/FH/SH:  Reviewed      Physical Exam:    Body surface area is 2 09 meters squared  Wt Readings from Last 3 Encounters:   02/28/18 98 kg (216 lb)   02/22/18 99 8 kg (220 lb)   02/16/18 99 8 kg (220 lb)        Temp Readings from Last 3 Encounters:   02/28/18 (!) 97 2 °F (36 2 °C) (Tympanic)   02/22/18 (!) 97 3 °F (36 3 °C) (Tympanic)   01/18/18 98 °F (36 7 °C) (Tympanic)        BP Readings from Last 3 Encounters:   02/28/18 120/70   02/22/18 118/60   02/16/18 96/58         Pulse Readings from Last 3 Encounters:   02/28/18 74   02/22/18 82   02/16/18 83        Physical Exam   Constitutional: He is oriented to person, place, and time  He appears well-developed and well-nourished  No distress  HENT:   Head: Normocephalic and atraumatic  Mouth/Throat: Oropharynx is clear and moist  No oropharyngeal exudate  Eyes: Conjunctivae and EOM are normal  Pupils are equal, round, and reactive to light  Neck: Normal range of motion  Neck supple  No tracheal deviation present  No thyromegaly present  Cardiovascular: Normal rate and regular rhythm  Exam reveals no gallop and no friction rub  Murmur heard  Systolic murmur is present with a grade of 3/6   Pulmonary/Chest: Effort normal and breath sounds normal  No respiratory distress  He has no wheezes  He has no rales  He exhibits no tenderness  Abdominal: Soft  Bowel sounds are normal  He exhibits no distension and no mass  There is no tenderness  There is no rebound and no guarding  Musculoskeletal: Normal range of motion  He exhibits edema ( +2 edema)  Lymphadenopathy:     He has no cervical adenopathy  Neurological: He is alert and oriented to person, place, and time  Skin: Skin is warm and dry  No rash noted  He is not diaphoretic  No erythema  No pallor  Psychiatric: He has a normal mood and affect  His behavior is normal  Judgment and thought content normal    Vitals reviewed     ECOG score of 2        Goals and Barriers:  Current Goal: Minimize effects of disease  Barriers: None  Patient's Capacity to Self Care:  Patient is able to self care      Code Status: [unfilled]

## 2018-03-01 NOTE — PROGRESS NOTES
Daily Note     Today's date: 3/1/2018  Patient name: Ada Watkins  : 1935  MRN: [id number]  Referring provider: Michaela Valdes MD  Dx:   Encounter Diagnosis     ICD-10-CM    1  Closed nondisplaced fracture of greater tuberosity of left humerus with routine healing, subsequent encounter S42 435D                   Subjective: Pt reports left shoulder pain with lifting his arm out to the side  Objective: See treatment diary below    Assessment: Pt demonstrated increased pain-free shoulder elevation PROM and AROM  Plan: Cont to progress elevation and scap stability as per pt tolerance      Precautions: CHF, CAD, Dm2, hyperlipidemia, tachycardia, HTN     Daily Treatment Diary      Manual  1/31  2/2/18  2/6  2/12  2/14  2/22  2/26  3/1       L sh PROM 10 min  10 min  10'  10'  10'  8 min  8 min  10 min       Gr III-IV inf, post joint mobs np  np    np  np  1x30 ea  1x30 ea  np                                                                                     Exercise Diary  1/31  2/2/18  2/6  2/12  2/14  2/22  2/26  2/26  3/1     scap retraction iso 5"  1x15  5"  1x15  5"x15  5"x15  5"x15  5"x15  5"x15  5"x15  5"x15     Pulleys flex, abd 5"  1x15  5"  1x15  5"x15  5"x15  5"x15  5"x15  5"x15  5"x15  5"x15     Standing AROM flex, ABD to 90 deg 2x8 ea  2x8 ea  2x10ea  2x10 ea  2x10 ea  2x10 ea    2x10 ea  2x10 ea     Supine AAROM flex 3"  1x10  3"  1x10  3" 1x10  3"  1x10  3"   1x10 3" 1x10    3" 1x10  3"  1x10     Supine AAROM  ER 10"x3  10"x3  10"x3  10"x3  10"x3  10"x3    10"x3  10"x3     Shoulder B TB ER YTB  3"x12  YTB  3"x20  YTB 3"x20  YTB  3"x20  YTB  3"x20  YTB 3"x20    R TB 3"x20  R TB  3"x20     Table incline slides Flex 70 deg  2x8  flex 70  Deg  2x10  flex 70 deg 2x10  flex  70 deg  2x12  flex  70 deg  2x12  flex 70 deg 3x10     flex 70 deg 3x10  flex  70  Deg  3x10      TB rows           R/ 2x10    r/ 3x10  R  3x10                                                                                                                                                                                                                                                                                                           Modalities  CP  CP  2/6  2/12  2/14  3/1            CP 10 min  10 min  NP  10 min  10 min  10 min

## 2018-03-06 NOTE — PROGRESS NOTES
Daily Note     Today's date: 3/6/2018  Patient name: Aishwayra Schmitt  : 1935  MRN: [id number]  Referring provider: Yamel Jaquez MD  Dx:   Encounter Diagnosis     ICD-10-CM    1  Closed nondisplaced fracture of greater tuberosity of left humerus with routine healing, subsequent encounter S41 088D                   Subjective: Pt reports 6/10 L shoulder pain of unknown origin at rest last night  Objective: See treatment diary below    Assessment: Pt demonstrated improved ER > IR PROM  Plan: Cont  POC      Precautions: CHF, CAD, Dm2, hyperlipidemia, tachycardia, HTN     Daily Treatment Diary      Manual  1/31  2/2/18  2/6  2/12  2/14  2/22  2/26  3/1  3/6     L sh PROM 10 min  10 min  10'  10'  10'  8 min  8 min  10 min  10 min     Gr III-IV inf, post joint mobs np  np    np  np  1x30 ea  1x30 ea  np  1x30                                                                                   Exercise Diary  1/31  2/2/18  2/6  2/12  2/14  2/22  2/26  2/26  3/1  36   scap retraction iso 5"  1x15  5"  1x15  5"x15  5"x15  5"x15  5"x15  5"x15  5"x15  5"x15  5"x15   Pulleys flex, abd 5"  1x15  5"  1x15  5"x15  5"x15  5"x15  5"x15  5"x15  5"x15  5"x15  5"x15   Standing AROM flex, ABD to 90 deg 2x8 ea  2x8 ea  2x10ea  2x10 ea  2x10 ea  2x10 ea    2x10 ea  2x10 ea  2x10 ea   Supine AAROM flex 3"  1x10  3"  1x10  3" 1x10  3"  1x10  3"   1x10 3" 1x10    3" 1x10  3"  1x10  3"x10   Supine AAROM  ER 10"x3  10"x3  10"x3  10"x3  10"x3  10"x3    10"x3  10"x3  10"x3   Shoulder B TB ER YTB  3"x12  YTB  3"x20  YTB 3"x20  YTB  3"x20  YTB  3"x20  YTB 3"x20    R TB 3"x20  R TB  3"x20  RTB  3"x20   Table incline slides Flex 70 deg  2x8  flex 70  Deg  2x10  flex 70 deg 2x10  flex  70 deg  2x12  flex  70 deg  2x12  flex 70 deg 3x10     flex 70 deg 3x10  flex  70  Deg  3x10  flex  70 deg  3x10    TB rows           R/ 2x10    r/ 3x10  R  3x10  R/3x10                                                                                                                                                                                                                                                                                                         Modalities  CP  CP  2/6  2/12  2/14  3/1  3/6          CP 10 min  10 min  NP  10 min  10 min  10 min  10 min

## 2018-03-08 NOTE — PROGRESS NOTES
Daily Note     Today's date: 3/8/2018  Patient name: Rajendra Devine  : 1935  MRN: [id number]  Referring provider: Mercedes Chavis MD  Dx:   Encounter Diagnosis     ICD-10-CM    1  Closed nondisplaced fracture of greater tuberosity of left humerus with routine healing, subsequent encounter S42 048D                   Subjective: Pt reports moderate L shoulder pain after carrying hoses at work yesterday  Objective: See treatment diary below    Assessment: Pt demonstrated increased pain-free tolerance to shoulder elevation PROM, continued limitations with shoulder elevation AROM 2* pain  Plan: Cont   POC as per PT    Precautions: CHF, CAD, Dm2, hyperlipidemia, tachycardia, HTN     Daily Treatment Diary      Manual  3/8             L sh PROM 10 min             Gr III-IV inf, post joint mobs np                                                                                           Exercise Diary  3/8            scap retraction iso 5"  1x15            Pulleys flex, abd 5"  1x15            Standing AROM flex, ABD to 90 deg 2x10 ea            Supine AAROM flex 3"  1x10            Supine AAROM  ER 10"x3            Shoulder B TB ER RTB  3"x10            Table incline slides Flex 70 deg  3x10             TB rows  R/3x10                                                                                                                                                                                                                                                                                                                  Modalities  3/8                CP 10 min

## 2018-03-12 NOTE — PROGRESS NOTES
Daily Note     Today's date: 3/12/2018  Patient name: Celio Hernandez  : 1935  MRN: [id number]  Referring provider: Daniel Ny MD  Dx:   Encounter Diagnosis     ICD-10-CM    1  Closed nondisplaced fracture of greater tuberosity of left humerus with routine healing, subsequent encounter S42 478D                   Subjective: Pt reports decreased left shoulder pain for the last two days  Objective: See treatment diary below    Assessment: Pt demonstrated increased tolerance to scapular strengthening, continued pain with AROM shoulder elevation  Plan: Cont   POC as per PT    Precautions: CHF, CAD, Dm2, hyperlipidemia, tachycardia, HTN     Daily Treatment Diary      Manual  3/8 3/12            L sh PROM 10 min 10 min            Gr III-IV inf, post joint mobs np np                                                                                          Exercise Diary  3/8 3/12           scap retraction iso 5"  1x15 5"  1x15           Pulleys flex, abd 5"  1x15 5"  1x15           Standing AROM flex, ABD to 90 deg 2x10 ea 2x10 ea           Supine AAROM flex 3"  1x10 3"  1x10           Supine AAROM  ER 10"x3 10"x3           Shoulder B TB ER RTB  3"x10 RTB  3"x10           Table incline slides Flex 70 deg  3x10 Flex 70 deg  3x10            TB rows  R/3x10 G/3x10                                                                                                                                                                                                                                                                                                                 Modalities  3/8 3/12               CP 10 min 10 min

## 2018-03-19 NOTE — PATIENT INSTRUCTIONS
Continue betadine paint to black spots  Avoid using vaseline or lotion to any area around the heel  Keep your follow up appt with Dr Yuliet Rivera

## 2018-03-19 NOTE — TELEPHONE ENCOUNTER
Wife called concerned about wound L foot lat heel  She states Dr Petra Luna has been debriding it  It is now black and "oozing" red/pink/clear  Denies fever or chills  She states she has been putting vaseline on the callous and betadine on the wound  They are leaving for [place] Wednesday and is req someone eval prior to leaving  S/w PAUL CORCORAN, she will see pt today at [facility] at 1130  Wife and staff notified of same

## 2018-03-19 NOTE — PROGRESS NOTES
Assessment/Plan:  [de-identified] yo M w/ HTN, HLD, paroxsymal afib (no longer on Eliquis), CAD s/p CABG, DM, bioprostehtic aortic valve replacement, CKD III, PAD s/p R fem-TP trunk bypass '02 by Dr Batsheva Crabtree s/p PTA for mid graft stenosis in '03 by Dr Trisha Roy  He had a mechanical fall in September/October and developed a left 2nd toe wound which was non healing and had a  LLE arteriogram December 2017 which was unsuccessful  The left 2nd toe wound ultimately resolved  He was seen in the office 1 month ago for evaluation of new wound of the left 5th toe and lateral foot  He presents today for urgent evaluation of left lateral foot wound now worsened after debridement by podiatry    -Continue Betadine paint daily  -Repeat LEAD and will consider vein mapping    -He will follow up with Dr Maday Almonte to review studies, check wound healing and discuss treatment options  Problem List Items Addressed This Visit     None            Subjective:      Patient ID: Ernesto Montalvo  is a [de-identified] y o  male  HPI  [de-identified] yo M w/ HTN, HLD, paroxsymal afib (no longer on Eliquis), CAD s/p CABG, DM, bioprostehtic aortic valve replacement, CKD III, PAD s/p R fem-TP trunk bypass '02 by Dr Batsheva Crabtree s/p PTA for mid graft stenosis in '03 by Dr Trisha Roy  He had a mechanical fall in September/October and developed a left 2nd toe wound which was non healing and had a  LLE arteriogram December 2017 which was unsuccessful  The left 2nd toe wound ultimately resolved  He was seen in the office 1 month ago for evaluation of new wound of the left 5th toe and lateral foot  He was instructed to apply betadine paint daily which his wife was doing  %th toe pea sized area of necrosis remains to the same  Lateral foot wound was suspicious for abscess and was debrided by Dr Payam Cox 3 weeks ago  Area is now necrotic  Wife called the office for urgent evaluation of the left lateral heel  She is concerned as they are traveling to [place]      The following portions of the patient's history were reviewed and updated as appropriate: allergies, current medications, past family history, past medical history, past social history, past surgical history and problem list     Review of Systems   Constitutional: Negative  Eyes: Negative  Respiratory: Positive for shortness of breath and stridor  Cardiovascular: Positive for leg swelling  Gastrointestinal: Negative  Endocrine: Positive for polyuria  Genitourinary: Negative  Musculoskeletal: Positive for back pain and gait problem  Skin: Positive for wound  Allergic/Immunologic: Negative  Neurological: Negative for dizziness, tremors, seizures, syncope, facial asymmetry, speech difficulty, weakness, light-headedness, numbness and headaches  Hematological: Negative  Psychiatric/Behavioral: Negative            Objective:  Vitals:    03/19/18 1144   BP: 128/82   BP Location: Left arm   Patient Position: Sitting   Cuff Size: Standard   Pulse: 82   Resp: 19   Height: 5' 7" (1 702 m)       Patient Active Problem List   Diagnosis    Paroxysmal atrial fibrillation (HCC)    Acute on chronic diastolic congestive heart failure (HCC)    Essential hypertension    Acute respiratory failure with hypoxia (HCC)    Type 2 diabetes mellitus (HCC)    MADISON (dyspnea on exertion)    Cough    S/P aortic valve replacement with bioprosthetic valve    Acute encephalopathy    Nasal laceration, sequela    Coronary artery disease involving native coronary artery of native heart    Stage 3 chronic kidney disease    Closed fracture of greater tuberosity of left humerus    Cellulitis    Edema    Secondary hyperparathyroidism ([address]    Vitamin D deficiency    Pain in left shoulder    Peripheral arterial disease ([address] )    Atherosclerosis of artery of extremity with ulceration ([address] )       Past Surgical History:   Procedure Laterality Date    AORTIC VALVE REPLACEMENT      CARDIAC VALVE REPLACEMENT      CATARACT EXTRACTION, BILATERAL      COLONOSCOPY      [place] Anton Robbins 3RD+ ORD SLCTV ABDL PEL/LXTR [address] Left 12/22/2017    Procedure: LEG ANGIOGRAM; RIGHT FEMORAL ACCESS; CO2-CONTRAST ;  Surgeon: Oneida Kim MD;  [facility];  Service: Vascular    REPLACEMENT TOTAL KNEE BILATERAL      VASCULAR SURGERY         Family History   Problem Relation Age of Onset    Cancer Mother     Heart disease Father     Diabetes Sister     Heart disease Sister     Diabetes Brother        Social History     Social History    Marital status: /Civil Union     Spouse name: N/A    Number of children: N/A    Years of education: N/A     Occupational History    SELF EMPLOYED      Social History Main Topics    Smoking status: Former Smoker     Quit date: 10/11/1987    Smokeless tobacco: Never Used    Alcohol use No    Drug use: No    Sexual activity: No     Other Topics Concern    Not on file     Social History Narrative    No narrative on file       Allergies   Allergen Reactions    Codeine     Hydrocodone      Other reaction(s): Other (See Comments)  Heart racing         Current Outpatient Prescriptions:     aspirin (ECOTRIN LOW STRENGTH) 81 mg EC tablet, Take 81 mg by mouth daily  , Disp: , Rfl:     atorvastatin (LIPITOR) 40 mg tablet, Take 40 mg by mouth daily, Disp: , Rfl:     febuxostat (ULORIC) 40 mg tablet, Take 40 mg by mouth daily, Disp: , Rfl:     insulin aspart protamine-insulin aspart (NovoLOG 70/30) 100 units/mL injection, Inject 20 Units under the skin 2 (two) times a day before meals (Patient taking differently: Inject 24 Units under the skin 2 (two) times a day before meals  ), Disp: , Rfl: 0    Metoprolol-Hydrochlorothiazide (METOPROLOL-HCTZ ER PO), Take by mouth, Disp: , Rfl:     multivitamin-iron-minerals-folic acid (CENTRUM) chewable tablet, Chew 1 tablet daily  , Disp: , Rfl:     NOVOLOG MIX 70/30 FLEXPEN (70-30) 100 UNIT/ML SUPN, INJECT 36 UNITS SUBCUTANEOUSLY QAM AND 32 UNITS IN THE AFTERNOON, Disp: , Rfl: 1    omeprazole (PriLOSEC) 20 mg delayed release capsule, Take 20 mg by mouth daily  , Disp: , Rfl:     paricalcitol (ZEMPLAR) 1 mcg capsule, Take 1 mcg by mouth daily, Disp: , Rfl:     ramipril (ALTACE) 2 5 mg capsule, Take 2 5 mg by mouth daily, Disp: , Rfl:     torsemide (DEMADEX) 20 mg tablet, Take 1 tablet (20 mg total) by mouth daily Take 1 tablet daily in the am, take one additional tablet of 20 mg po on Monday, Wednesday and Friday afternoon, Disp: 50 tablet, Rfl: 5    metoprolol tartrate 75 MG TABS, Take 1 tablet by mouth every 12 (twelve) hours for 30 days, Disp: 60 tablet, Rfl: 0    paricalcitol (ZEMPLAR) 1 mcg capsule, Take 1 capsule by mouth 3 (three) times a week for 30 days Monday , Wednesday, friday, Disp: 12 capsule, Rfl: 0       Physical Exam   Constitutional: He is oriented to person, place, and time  He appears well-developed and well-nourished  HENT:   Head: Normocephalic and atraumatic  Eyes: EOM are normal    Cardiovascular:   Pulses:       Dorsalis pedis pulses are 0 on the right side, and 0 on the left side  Posterior tibial pulses are 0 on the right side, and 0 on the left side  2+ bilateral ankle edema   Musculoskeletal: He exhibits edema  Neurological: He is alert and oriented to person, place, and time  Skin:   Left lateral heel ulceration ~1cm  Dry stable gangrene   See photo

## 2018-03-20 PROBLEM — L97.909 ATHEROSCLEROSIS OF ARTERY OF EXTREMITY WITH ULCERATION (HCC): Status: ACTIVE | Noted: 2018-01-01

## 2018-03-20 PROBLEM — I70.299 ATHEROSCLEROSIS OF ARTERY OF EXTREMITY WITH ULCERATION (HCC): Status: ACTIVE | Noted: 2018-01-01

## 2018-03-22 NOTE — PROGRESS NOTES
Daily Note     Today's date: 3/22/2018  Patient name: Asmita Piedra  : 1935  MRN: [id number]  Referring provider: Saritha Pederson MD  Dx:   Encounter Diagnosis     ICD-10-CM    1  Closed nondisplaced fracture of greater tuberosity of left humerus with routine healing, subsequent encounter S42 071D                   Subjective: Pt reports pain with elevation  Pt reports missing last week secondary to having a vascular appointment b/c a open foot wound  Objective: See treatment diary below    Assessment: Pt demonstrated limited PROM IR and active elevation  Plan: Cont  POC      Precautions: CHF, CAD, Dm2, hyperlipidemia, tachycardia, HTN     Daily Treatment Diary      Manual  3/8 3/12 3/22           L sh PROM 10 min 10 min 8 min           Gr III-IV inf, post joint mobs np np 1x30 ea                                                                                         Exercise Diary  3/8 3/12 3/22          scap retraction iso 5"  1x15 5"  1x15 5" 1x15          Pulleys flex, abd 5"  1x15 5"  1x15 5" 1x15          Standing AROM flex, ABD to 90 deg 2x10 ea 2x10 ea 2x10 ea          Supine AAROM flex 3"  1x10 3"  1x10 3" 1x10          Supine AAROM  ER 10"x3 10"x3 10"x3          Shoulder B TB ER RTB  3"x10 RTB  3"x10 R/ 3x10          Table incline slides Flex 70 deg  3x10 Flex 70 deg  3x10 Flex 70 deg 3x10           TB rows  R/3x10 G/3x10 G/ 3x10                                                                                                                                                                                                                                                                                                                Modalities  3/8 3/12               CP 10 min 10 min

## 2018-03-26 NOTE — PROGRESS NOTES
Daily Note     Today's date: 3/26/2018  Patient name: Arsenio Limb  : 1935  MRN: [id number]  Referring provider: Darren Burden MD  Dx:   Encounter Diagnosis     ICD-10-CM    1  Closed nondisplaced fracture of greater tuberosity of left humerus with routine healing, subsequent encounter S42 126N                   Subjective: Pt reports decreased intensity of left shoulder pain with daily activities  Objective: See treatment diary below    Assessment: Pt demonstrated increased shoulder elevation active and PROM  Pt demonstrated min-mod restriction with IR PROM  Plan: Cont   POC as per PT    Precautions: CHF, CAD, Dm2, hyperlipidemia, tachycardia, HTN     Daily Treatment Diary      Manual  3/8 3/12 3/22 3/26          L sh PROM 10 min 10 min 8 min 10 min          Gr III-IV inf, post joint mobs np np 1x30 ea np                                                                                        Exercise Diary  3/8 3/12 3/22 3/26         scap retraction iso 5"  1x15 5"  1x15 5" 1x15 5" 1x15         Pulleys flex, abd 5"  1x15 5"  1x15 5" 1x15 5" 1x15         Standing AROM flex, ABD to 90 deg 2x10 ea 2x10 ea 2x10 ea 2x10 ea         Supine AAROM flex 3"  1x10 3"  1x10 3" 1x10 3" 1x10         Supine AAROM  ER 10"x3 10"x3 10"x3 10"x3         Shoulder B TB ER RTB  3"x10 RTB  3"x10 R/ 3x10 RTB  3x10         Table incline slides Flex 70 deg  3x10 Flex 70 deg  3x10 Flex 70 deg 3x10 Flex 70 deg 3x10          TB rows  R/3x10 G/3x10 G/ 3x10 G  3x10                                                                                                                                                                                                                                                                                                               Modalities  3/8 3/12 3/26              CP 10 min 10 min 10 min

## 2018-04-02 PROBLEM — E21.3 HYPERPARATHYROIDISM (HCC): Status: ACTIVE | Noted: 2018-01-01

## 2018-04-02 PROBLEM — M75.42 IMPINGEMENT SYNDROME OF LEFT SHOULDER: Status: ACTIVE | Noted: 2017-01-01

## 2018-04-02 PROBLEM — S42.255A CLOSED NONDISPLACED FRACTURE OF GREATER TUBEROSITY OF LEFT HUMERUS: Status: ACTIVE | Noted: 2017-01-01

## 2018-04-02 PROBLEM — I50.9 CONGESTIVE HEART FAILURE (HCC): Status: ACTIVE | Noted: 2017-01-10

## 2018-04-02 NOTE — ASSESSMENT & PLAN NOTE
Congestive heart failure  Patient with rales on exam however did not appear in acute distress  He will take 2 tablets of his torsemide for total of 40 mg daily until he sees his nephrologist on April 5th

## 2018-04-02 NOTE — PROGRESS NOTES
FAMILY PRACTICE OFFICE VISIT       NAME: Fadia Turcios  AGE: [de-identified] y o  SEX: male       : 1935        MRN: [id number]    DATE: 2018  TIME: 7:49 PM    Assessment and Plan     Problem List Items Addressed This Visit     Chronic diastolic congestive heart failure (HCC) (Chronic)     Congestive heart failure  Patient with rales on exam however did not appear in acute distress  He will take 2 tablets of his torsemide for total of 40 mg daily until he sees his nephrologist on   Relevant Medications    metoprolol tartrate (LOPRESSOR) 50 mg tablet    metoprolol tartrate (LOPRESSOR) 25 mg tablet    Benign essential hypertension (Chronic)     Hypertension  Patient blood pressure is stable at this time  Relevant Medications    metoprolol tartrate (LOPRESSOR) 50 mg tablet    metoprolol tartrate (LOPRESSOR) 25 mg tablet    Peripheral arterial disease (HCC)     Peripheral arterial disease  Patient will try to reduce the inflammation of his lower extremities by taking increase torsemide and continue with leg elevation  He will follow up with his vascular doctors regarding ongoing care of lesions on his feet as well as following up with his podiatrist for ongoing treatment of left foot ulceration  The dressings on his feet were changed in nonstick dressing was applied to the area  His wife was instructed on on not using regular gauze as this attached to the scabs on his feet           Other Visit Diagnoses     Peripheral edema    -  Primary    Relevant Orders    Basic metabolic panel            There are no Patient Instructions on file for this visit  Chief Complaint     Chief Complaint   Patient presents with    Shortness of Breath     Patient is here c/o shortness of breath which is worse with exertion x's 3+ days   Leg Swelling     Patient c/o lower leg swelling x's 1+ wks         History of Present Illness     Patient is accompanied by his wife reports they had recently returned from a 5 day trip [place]  Due to activities in [place] the patient forgot to take his torsemide medication for few days  He did restart taking 2 torsemide today  She also related ongoing complicated condition between his podiatrist Jalyn Solomon who has been trying to treat a wound on the left lateral side of the patient's sole  He also has an ulcerated lesion on the bottom of his left 5th toe  He has also seen vascular surgeons for ongoing treatment of peripheral vascular disease  He is scheduled to see his nephrologist and cardiologist this week  I reviewed his most recent blood test results which showed a creatinine of 2 2  Review of Systems   Review of Systems   Constitutional: Positive for fatigue  Negative for fever  HENT: Positive for rhinorrhea  Negative for sinus pain and sore throat  Respiratory: Positive for cough and shortness of breath  Negative for wheezing  Cardiovascular: Negative  Gastrointestinal: Negative  Musculoskeletal:        Chronic intermittent arthralgias from degenerative joint disease   Skin: Negative for color change          As per HPI       Active Problem List     Patient Active Problem List   Diagnosis    Paroxysmal atrial fibrillation (HCC)    Chronic diastolic congestive heart failure (HCC)    Benign essential hypertension    Acute respiratory failure with hypoxia (HCC)    Type 2 diabetes mellitus ([address] )    MADISON (dyspnea on exertion)    Cough    S/P aortic valve replacement with bioprosthetic valve    Acute encephalopathy    Nasal laceration, sequela    Coronary artery disease involving native coronary artery of native heart    Chronic kidney disease, stage 3    Closed nondisplaced fracture of greater tuberosity of left humerus    Cellulitis    Edema    Hyperparathyroidism ([address]    Vitamin D deficiency    Pain in left shoulder    Peripheral arterial disease ([address]    Atherosclerosis of artery of extremity with ulceration ([address] )    Aortic valve disorder    Asymptomatic bilateral carotid artery stenosis    Congestive heart failure (HCC)    Esophageal reflux    Impingement syndrome of left shoulder    Mild mitral regurgitation    Monoclonal gammopathy of undetermined significance    Non-proliferative diabetic retinopathy [facility]    Peripheral vascular disease (HCC)       Past Medical History:  Past Medical History:   Diagnosis Date    Cardiac disease     CHF (congestive heart failure) ([facility]     Diabetes mellitus ([facility] )     Hypertension     Renal disorder        Past Surgical History:  Past Surgical History:   Procedure Laterality Date    AORTIC VALVE REPLACEMENT      CARDIAC VALVE REPLACEMENT      CATARACT EXTRACTION, BILATERAL      COLONOSCOPY      [place] [facility] 3RD+ ORD SLCTV ABDL PEL/LXTR [facility] Left 12/22/2017    Procedure: LEG ANGIOGRAM; RIGHT FEMORAL ACCESS; CO2-CONTRAST ;  Surgeon: Kendal Harris MD;  [facility];  Service: Vascular    REPLACEMENT TOTAL KNEE BILATERAL      VASCULAR SURGERY         Family History:  Family History   Problem Relation Age of Onset    Cancer Mother     Heart disease Father     Diabetes Sister     Heart disease Sister     Diabetes Brother        Social History:  Social History     Social History    Marital status: /Civil Union     Spouse name: N/A    Number of children: N/A    Years of education: N/A     Occupational History    SELF EMPLOYED      Social History Main Topics    Smoking status: Former Smoker     Quit date: 10/11/1987    Smokeless tobacco: Never Used    Alcohol use No    Drug use: No    Sexual activity: No     Other Topics Concern    Not on file     Social History Narrative    No narrative on file       Objective     Vitals:    04/02/18 1516   BP: 98/62   Pulse: 84   Resp: 22   Temp: (!) 95 7 °F (35 4 °C)   SpO2: 100%     Wt Readings from Last 3 Encounters:   04/02/18 106 kg (233 lb 3 2 oz)   02/28/18 98 kg (216 lb)   02/22/18 99 8 kg (220 lb) Physical Exam   Constitutional: No distress  Cardiovascular:   Regular rate and rhythm with no murmurs   Pulmonary/Chest:   Patient with fine rales at bases on the right and 1/3 of the way up lung fields on the left side  No wheezes or rhonchi pulse oximetry was 98% on room air   Musculoskeletal:   Patient with +2 peripheral edema bilateral lower extremities  Upon examination of left foot he had a scabbed ulceration along the lateral surface of his left sole which was bandaged with gauze  He also had an ulceration the size of a keven on this base of his left 5th toe  He also had a 2 mm scabbed ulceration on the tip of his 3rd toe  All toes of were very swollen and reddish in color  All toes were tender to the movement         Pertinent Laboratory/Diagnostic Studies:  Lab Results   Component Value Date    GLUCOSE 175 (H) 12/22/2017    BUN 49 (H) 03/06/2018    CREATININE 2 22 (H) 03/06/2018    CALCIUM 9 3 03/06/2018     03/06/2018    K 4 3 03/06/2018    CO2 30 03/06/2018     03/06/2018     Lab Results   Component Value Date    ALT 18 02/21/2018    AST 16 02/21/2018    ALKPHOS 108 02/21/2018    BILITOT 0 75 02/21/2018       Lab Results   Component Value Date    WBC 8 99 02/21/2018    HGB 12 2 02/21/2018    HCT 38 0 02/21/2018    MCV 86 02/21/2018     02/21/2018       No results found for: TSH    Lab Results   Component Value Date    CHOL 110 03/06/2018     Lab Results   Component Value Date    TRIG 65 03/06/2018     Lab Results   Component Value Date    HDL 31 (L) 03/06/2018     Lab Results   Component Value Date    LDLCALC 66 03/06/2018     Lab Results   Component Value Date    HGBA1C 7 6 (H) 03/06/2018       Results for orders placed or performed in visit on 80/81/80   Basic metabolic panel   Result Value Ref Range    Sodium 138 136 - 145 mmol/L    Potassium 4 3 3 5 - 5 3 mmol/L    Chloride 100 100 - 108 mmol/L    CO2 30 21 - 32 mmol/L    Anion Gap 8 4 - 13 mmol/L    BUN 49 (H) 5 - 25 mg/dL    Creatinine 2 22 (H) 0 60 - 1 30 mg/dL    Glucose, Fasting 155 (H) 65 - 99 mg/dL    Calcium 9 3 8 3 - 10 1 mg/dL    eGFR 27 ml/min/1 73sq m   Lipid panel   Result Value Ref Range    Cholesterol 110 50 - 200 mg/dL    Triglycerides 65 <=150 mg/dL    HDL, Direct 31 (L) 40 - 60 mg/dL    LDL Calculated 66 0 - 100 mg/dL       Orders Placed This Encounter   Procedures    Basic metabolic panel       ALLERGIES:  Allergies   Allergen Reactions    Codeine     Hydrocodone      Other reaction(s): Other (See Comments)  Heart racing       Current Medications     Current Outpatient Prescriptions   Medication Sig Dispense Refill    aspirin (ECOTRIN LOW STRENGTH) 81 mg EC tablet Take 81 mg by mouth daily        atorvastatin (LIPITOR) 40 mg tablet Take 40 mg by mouth daily      febuxostat (ULORIC) 40 mg tablet Take 40 mg by mouth daily      metoprolol tartrate (LOPRESSOR) 25 mg tablet Take 25 mg by mouth every 12 (twelve) hours      metoprolol tartrate (LOPRESSOR) 50 mg tablet Take 1 tablet twice daily  3    multivitamin-iron-minerals-folic acid (CENTRUM) chewable tablet Chew 1 tablet daily   NOVOLOG MIX 70/30 FLEXPEN (70-30) 100 UNIT/ML SUPN INJECT 26 UNITS SUBCUTANEOUSLY AM WITH BREAKFAST AND 24 UNITS IN THE AFTERNOON WITH DINNER  1    omeprazole (PriLOSEC) 20 mg delayed release capsule Take 20 mg by mouth daily   ramipril (ALTACE) 2 5 mg capsule Take 2 5 mg by mouth daily      torsemide (DEMADEX) 20 mg tablet Take 1 tablet (20 mg total) by mouth daily Take 1 tablet daily in the am, take one additional tablet of 20 mg po on Monday, Wednesday and Friday afternoon 50 tablet 5    paricalcitol (ZEMPLAR) 1 mcg capsule Take 1 capsule by mouth 3 (three) times a week for 30 days Monday , Wednesday, friday 12 capsule 0     No current facility-administered medications for this visit            Health Maintenance     Health Maintenance   Topic Date Due    SLP PLAN OF CARE  1935    Diabetic Foot Exam 07/06/1947    Depression Screening PHQ-9  07/06/1949    DTaP,Tdap,and Td Vaccines (1 - Tdap) 07/06/1958    Fall Risk  07/06/2002    GLAUCOMA SCREENING 67+ YR  07/06/2004    OPHTHALMOLOGY EXAM  05/26/2016    PT PLAN OF CARE  03/24/2018    URINE MICROALBUMIN  05/10/2018    INFLUENZA VACCINE  Completed    PNEUMOCOCCAL POLYSACCHARIDE VACCINE AGE 72 AND OVER  Completed     Immunization History   Administered Date(s) Administered    Influenza 12/16/2013, 12/16/2014, 12/02/2015, 12/09/2015, 12/19/2016, 10/25/2017    Influenza TIV (IM) 12/16/2013    Pneumococcal Conjugate 13-Valent 08/01/2016    Pneumococcal Polysaccharide PPV23 80/75/3170       Althea Wallace MD

## 2018-04-02 NOTE — ASSESSMENT & PLAN NOTE
Peripheral arterial disease  Patient will try to reduce the inflammation of his lower extremities by taking increase torsemide and continue with leg elevation  He will follow up with his vascular doctors regarding ongoing care of lesions on his feet as well as following up with his podiatrist for ongoing treatment of left foot ulceration  The dressings on his feet were changed in nonstick dressing was applied to the area    His wife was instructed on on not using regular gauze as this attached to the scabs on his feet

## 2018-04-04 PROBLEM — N18.9 ACUTE-ON-CHRONIC KIDNEY INJURY (HCC): Status: ACTIVE | Noted: 2018-01-01

## 2018-04-04 PROBLEM — N17.9 ACUTE-ON-CHRONIC KIDNEY INJURY (HCC): Status: ACTIVE | Noted: 2018-01-01

## 2018-04-04 PROBLEM — L03.116 CELLULITIS OF FOOT, LEFT: Status: ACTIVE | Noted: 2018-01-01

## 2018-04-04 PROBLEM — I21.4 NON-ST ELEVATION MYOCARDIAL INFARCTION (NSTEMI) (HCC): Status: ACTIVE | Noted: 2018-01-01

## 2018-04-04 NOTE — CONSULTS
Consultation - Nephrology   Ozzie Ly  [de-identified] y o  male MRN: [id number]  Unit/Bed#: -01 Encounter: [id number]      Assessment/Plan     Assessment:  1  Acute kidney injury/acute renal failure on stage 4 chronic kidney disease:  Baseline creatinine is 1 8-2 2 likely secondary to hypertensive nephrosclerosis plus or minus renal vascular disease  Acute kidney injury likely secondary to fluid overload  Creatinine is 2 7  Continue with the intravenous diuretics but increase the dose to 80 IV q 8    2  Fluid overload:  As above also check urinalysis fluid restrict to check daily weights    3  Anemia secondary to chronic kidney disease:  Hemoglobin is 11 1  Plan:  As above      History of Present Illness   Physician Requesting Consult: Isabelle Casas MD  Reason for Consult / Principal Problem:  Acute renal failure on chronic kidney disease and worsening shortness of breath  Hx and PE limited by:   HPI: Ozzie Ly  is a [de-identified]y o  year old male who presents with worsening shortness of breath and increased leg edema  Patient has a history stage 4 chronic kidney disease baseline creatinine 1 8-2 2 I had the opportunity see in the office for the last almost a decade  The patient has a history of lymphedema as well  He was recently on vacation in [place] when he stopped taking his torsemide  He also had increased heel pain  Denies any fevers or chills no trouble urinating  The patient's daughters at bedside noted he has only voided approximately 900 cc since this morning  Patient is resting comfortably in no distress at the time of the initial interview      Consults    General:  Shortness of breath prior to admission no chest pressure  Cardiovascular:  No dizziness no lightheadedness  Respiratory:  No cough no hemoptysis no epistaxis reported  Gastrointestinal:  No nausea vomiting diarrhea constipation  Genitourinary:  No urgency frequency hematuria  Outside of the above review of systems, all others are essentially negative    Historical Information   Past Medical History:   Diagnosis Date    Cardiac disease     [facility] [address] [facility]     Diabetes mellitus [facility] [address] [facility]     Hypertension     Renal disorder      Past Surgical History:   Procedure Laterality Date    AORTIC VALVE REPLACEMENT      CARDIAC VALVE REPLACEMENT      CATARACT EXTRACTION, BILATERAL      COLONOSCOPY      [place] SLCTV CATHJ 3RD+ ORD SLCTV ABDL PEL/LXTR [facility] Left 12/22/2017    Procedure: LEG ANGIOGRAM; RIGHT FEMORAL ACCESS; CO2-CONTRAST ;  Surgeon: Amadeo Petit MD;  [facility]: [facility];  Service: Vascular    REPLACEMENT TOTAL KNEE BILATERAL      VASCULAR SURGERY       Social History   History   Alcohol Use No     History   Drug Use No     History   Smoking Status    Former Smoker    Quit date: 10/11/1987   Smokeless Tobacco    Never Used     Family History   Problem Relation Age of Onset    Cancer Mother     Heart disease Father     Diabetes Sister     Heart disease Sister     Diabetes Brother        Meds/Allergies   all current active meds have been reviewed, current meds: Current Facility-Administered Medications   Medication Dose Route Frequency    acetaminophen (TYLENOL) tablet 650 mg  650 mg Oral Q6H PRN    aspirin (ECOTRIN LOW STRENGTH) EC tablet 81 mg  81 mg Oral Daily    atorvastatin (LIPITOR) tablet 40 mg  40 mg Oral Daily    calcium carbonate (TUMS) chewable tablet 1,000 mg  1,000 mg Oral Daily PRN    ceFAZolin (ANCEF) IVPB (premix) 1,000 mg  1,000 mg Intravenous Q12H    docusate sodium (COLACE) capsule 100 mg  100 mg Oral BID PRN    furosemide (LASIX) injection 40 mg  40 mg Intravenous TID (diuretic)    heparin (porcine) subcutaneous injection 5,000 Units  5,000 Units Subcutaneous Q8H [address]    insulin glargine (LANTUS) subcutaneous injection 20 Units  20 Units Subcutaneous HS    insulin lispro (HumaLOG) 100 units/mL subcutaneous injection 1-5 Units  1-5 Units Subcutaneous TID AC    insulin lispro (HumaLOG) 100 units/mL subcutaneous injection 1-5 Units  1-5 Units Subcutaneous HS    insulin lispro (HumaLOG) 100 units/mL subcutaneous injection 10 Units  10 Units Subcutaneous TID With Meals    metoprolol tartrate (LOPRESSOR) tablet 75 mg  75 mg Oral BID    ondansetron (ZOFRAN) injection 4 mg  4 mg Intravenous Q6H PRN    [START ON 4/5/2018] pantoprazole (PROTONIX) EC tablet 40 mg  40 mg Oral Early Morning    ramipril (ALTACE) capsule 2 5 mg  2 5 mg Oral Daily    and PTA meds:  Prescriptions Prior to Admission   Medication    aspirin (ECOTRIN LOW STRENGTH) 81 mg EC tablet    atorvastatin (LIPITOR) 40 mg tablet    febuxostat (ULORIC) 40 mg tablet    metoprolol tartrate (LOPRESSOR) 25 mg tablet    metoprolol tartrate (LOPRESSOR) 50 mg tablet    multivitamin-iron-minerals-folic acid (CENTRUM) chewable tablet    NOVOLOG MIX 70/30 FLEXPEN (70-30) 100 UNIT/ML SUPN    omeprazole (PriLOSEC) 20 mg delayed release capsule    paricalcitol (ZEMPLAR) 1 mcg capsule    ramipril (ALTACE) 2 5 mg capsule    torsemide (DEMADEX) 20 mg tablet       Allergies   Allergen Reactions    Codeine      "swelling shaky"    Hydrocodone      Other reaction(s): Other (See Comments)  Heart racing       Objective     Intake/Output Summary (Last 24 hours) at 04/04/18 1701  Last data filed at 04/04/18 1230   Gross per 24 hour   Intake              950 ml   Output                0 ml   Net              950 ml       Invasive Devices:        General: patient in NAD  Skin:  No new rash  Eyes:  No scleral icterus  Neck:  Supple no adenopathy  Chest:  Coarse breath sounds  CVS:  S1-S2 variable  Abdomen:  Positive bowel sounds  Extremities:  Patient with edema pitting and nonpitting 2 to 3+ up to the thigh  Neuro:  Nonfocal      Current Weight: Weight - Scale: 106 kg (233 lb 0 4 oz)  First Weight: Weight - Scale: 106 kg (233 lb 0 4 oz)    Lab Results:  I have personally reviewed pertinent labs    CBC: Lab Results   Component Value Date    WBC 8 31 04/04/2018    WBC 9 45 11/24/2015    RBC 4 15 04/04/2018    RBC 4 78 11/24/2015     CMP: Lab Results   Component Value Date     04/04/2018     12/16/2015     04/04/2018     12/16/2015    CO2 25 04/04/2018    CO2 30 12/16/2015    ANIONGAP 11 04/04/2018    ANIONGAP 5 12/16/2015    BUN 75 (H) 04/04/2018    BUN 32 (H) 12/16/2015    CREATININE 2 71 (H) 04/04/2018    CREATININE 1 98 (H) 12/16/2015    GLUCOSE 250 (H) 04/04/2018    GLUCOSE 167 (H) 12/16/2015    CALCIUM 9 0 04/04/2018    CALCIUM 9 2 12/16/2015    AST 16 04/04/2018    AST 15 11/24/2015    ALT 38 04/04/2018    ALT 19 11/24/2015    ALKPHOS 123 (H) 04/04/2018    ALKPHOS 88 11/24/2015    PROT 6 8 04/04/2018    PROT 7 2 11/24/2015    BILITOT 0 50 04/04/2018    BILITOT 0 47 11/24/2015    EGFR 21 04/04/2018     Phosphorus:   Lab Results   Component Value Date    PHOS 3 4 02/21/2018    PHOS 3 6 11/24/2015     Magnesium:   Lab Results   Component Value Date    MG 2 7 (H) 02/21/2018    MG 2 2 06/04/2015     Urinalysis: Lab Results   Component Value Date    COLORU Yellow 02/21/2018    COLORU Yellow 12/12/2017    CLARITYU Clear 02/21/2018    CLARITYU Transparent 12/12/2017    SPECGRAV 1 015 02/21/2018    SPECGRAV 1 005 12/12/2017    PHUR 6 0 02/21/2018    PHUR 5 0 12/12/2017    LEUKOCYTESUR Negative 02/21/2018    LEUKOCYTESUR - 12/12/2017    NITRITE Negative 02/21/2018    NITRITE - 12/12/2017    PROTEINUA Negative 02/21/2018    PROTEINUA - 12/12/2017    GLUCOSEU Negative 02/21/2018    GLUCOSEU Negative 11/24/2015    KETONESU Negative 02/21/2018    KETONESU - 12/12/2017    BILIRUBINUR Negative 02/21/2018    BILIRUBINUR - 12/12/2017    BLOODU Negative 02/21/2018    BLOODU - 12/12/2017     BMP: Lab Results   Component Value Date    GLUCOSE 250 (H) 04/04/2018    GLUCOSE 167 (H) 12/16/2015    CO2 25 04/04/2018    CO2 30 12/16/2015    BUN 75 (H) 04/04/2018    BUN 32 (H) 12/16/2015    CREATININE 2 71 (H) 04/04/2018    CREATININE 1 98 (H) 12/16/2015    CALCIUM 9 0 04/04/2018    CALCIUM 9 2 12/16/2015

## 2018-04-04 NOTE — TELEPHONE ENCOUNTER
Pt's spouse called to let you know Queenie Carvajal is currently @ [facility] w/ CHF  O/v cx'd for tomorrow

## 2018-04-04 NOTE — PROGRESS NOTES
Provider on call notified that patient's systolic blood pressures are soft  Patient is written for 80 mg of IV Lasix every 8 hours with hold parameters of systolic blood pressure less than 110  We discussed with Renal who recommends decreasing Lasix to 60 mg q 8 hours with hold parameters less than 100  Orders changed to reflect this  Monitor BP closely

## 2018-04-04 NOTE — H&P
History and Physical - [facility] Internal Medicine    Patient Information: Royal Fowler  [de-identified] y o  male MRN: [id number]  Unit/Bed#: [facility] Encounter: [id number]  Admitting Physician: Jessie Solano MD  PCP: Clayton Martin MD  Date of Admission:  04/04/18    Assessment/Plan:    Hospital Problem List:     Principal Problem:    Acute on chronic diastolic congestive heart failure ([facility] )  Active Problems:    Paroxysmal atrial fibrillation (HCC)    Benign essential hypertension    Acute respiratory failure with hypoxia ([place] [address] )    Type 2 diabetes mellitus ([place] [address] )    S/P aortic valve replacement with bioprosthetic valve    Acute-on-chronic kidney injury ([place] [address] )    Cellulitis of foot, left    Non-ST elevation myocardial infarction (NSTEMI) ([place] [address] )      Plan for the Primary Problem(s):  · Acute on chronic diastolic congestive heart failure possibly precipitated by noncompliance with meds and diet/AFib  · Start IV Lasix 40 mg q 8 hours hourly  · Monitor I&Os  · Daily weights  · Cardiology evaluation    Plan for Additional Problems:   · Acute on chronic kidney disease:  Most likely due to fluid overload  Monitor with diuretics  Will consult Nephrology, being followed up in outpatient setting by Dr Lamine Bernstein, he has appointment this week  · Paroxysmal atrial fibrillation:  Seems to be in AFib currently on monitor  Will continue to monitor on telemetry  Not on anticoagulation as it was stopped by his cardiologist due to falls  Continue rate control measures  Cardiology evaluation  · Cellulitis of left lower extremity and left heel ulcer: Ancef intravenously  Follow blood cultures   , consult podiatry for heel wound  · Type 2 diabetes mellitus: Will put him on Lantus and mealtime insulin  Hold 70 30 for now  Monitor blood sugars closely  · Status bioprosthetic AVR:  Cardiology follow-up  · Hypertension:  Resume outpatient medications and monitor  · non ST-elevation myocardial infarction:  Likely type 2 due to CHF  Continue to monitor  Currently no chest pain    VTE Prophylaxis: Heparin  / sequential compression device and foot pump applied   Code Status:   Ful code per discussion with patient and daughter at bedside  POLST: There is no POLST form on file for this patient (pre-hospital)    Anticipated Length of Stay:  Patient will be admitted on an Inpatient basis with an anticipated length of stay of  > 2 midnights  Justification for Hospital Stay:  Acute CHF    Total Time for Visit, including Counseling / Coordination of Care: 70 minutes  Greater than 50% of this total time spent on direct patient counseling and coordination of care  Chief Complaint:   Shortness of breath    History of Present Illness:    Ted Shaikh  is a [de-identified] y o  male who presents with shortness of breath  Also reports increasing leg swelling and weight gain  Patient was on holiday in [place] for 5 days and stopped taking his torsemide  When he came back he felt increasingly short of breath with leg swelling  He came to the emergency department for further evaluation  Denies any chest pain  Also reports left leg pain and heel ulcer erythema of the lower extremity  Denies any fever chills or rigors  He is being followed up by Podiatry in outpatient setting      Review of Systems:    Review of Systems  Twelve point review systems negative except noted above  Past Medical and Surgical History:     Past Medical History:   Diagnosis Date    Cardiac disease     [facility]est[facility]) [facility] [address] [facility]     Diabetes mellitus ([facility] [address] [facility]     Hypertension     Renal disorder        Past Surgical History:   Procedure Laterality Date    AORTIC VALVE REPLACEMENT      CARDIAC VALVE REPLACEMENT      CATARACT EXTRACTION, BILATERAL      COLONOSCOPY      [facility] 3RD+ ORD SLCTV ABDL PEL/TR [facility] Left 12/22/2017    Procedure: LEG ANGIOGRAM; RIGHT FEMORAL ACCESS; CO2-CONTRAST ;  Surgeon: Roel Khalil MD;  Location: [facility];  Service: Vascular    REPLACEMENT TOTAL KNEE BILATERAL      VASCULAR SURGERY         Meds/Allergies:    Prior to Admission medications    Medication Sig Start Date End Date Taking? Authorizing Provider   aspirin (ECOTRIN LOW STRENGTH) 81 mg EC tablet Take 81 mg by mouth daily     Yes Historical Provider, MD   atorvastatin (LIPITOR) 40 mg tablet Take 40 mg by mouth daily   Yes Historical Provider, MD   febuxostat (ULORIC) 40 mg tablet Take 40 mg by mouth daily   Yes Historical Provider, MD   metoprolol tartrate (LOPRESSOR) 25 mg tablet Take 25 mg by mouth every 12 (twelve) hours   Yes Historical Provider, MD   metoprolol tartrate (LOPRESSOR) 50 mg tablet Take 1 tablet twice daily 3/19/18  Yes Historical Provider, MD   multivitamin-iron-minerals-folic acid (CENTRUM) chewable tablet Chew 1 tablet daily  Yes Historical Provider, MD   NOVOLOG MIX 70/30 FLEXPEN (70-30) 100 UNIT/ML SUPN INJECT 24 UNITS SUBCUTANEOUSLY AM WITH BREAKFAST AND 24 UNITS IN THE AFTERNOON WITH DINNER 2/16/18  Yes Historical Provider, MD   omeprazole (PriLOSEC) 20 mg delayed release capsule Take 20 mg by mouth daily  Yes Historical Provider, MD   paricalcitol [facility] 1 mcg capsule Take 1 capsule by mouth 3 (three) times a week for 30 days Monday , Wednesday, friday 1/6/17 4/4/18 Yes Dean Alonso MD   ramipril (ALTACE) 2 5 mg capsule Take 2 5 mg by mouth daily   Yes Historical Provider, MD   torsemide (DEMADEX) 20 mg tablet Take 1 tablet (20 mg total) by mouth daily Take 1 tablet daily in the am, take one additional tablet of 20 mg po on Monday, Wednesday and Friday afternoon  Patient taking differently: Take 20 mg by mouth daily Take 1 tablet daily in the am, take one additional tablet of 40 mg po on Monday, Wednesday and Friday afternoon  2/8/18  Yes Saira Garrett, DO     I have reviewed home medications with patient personally  Allergies: Allergies   Allergen Reactions    Codeine      "swelling shaky"    Hydrocodone      Other reaction(s):  Other (See Comments)  Heart racing       Social History:     Marital Status: /Civil Union   Occupation:  Works intermittently  Patient Pre-hospital Living Situation:  Lives with wife  Patient Pre-hospital Level of Mobility:  Walks with cane  Patient Pre-hospital Diet Restrictions:  None  Substance Use History:   History   Alcohol Use No     History   Smoking Status    Former Smoker    Quit date: 10/11/1987   Smokeless Tobacco    Never Used     History   Drug Use No       Family History:    non-contributory    Physical Exam:     Vitals:   Blood Pressure: 117/55 (04/04/18 1514)  Pulse: (!) 113 (04/04/18 1514)  Temperature: 97 8 °F (36 6 °C) (04/04/18 0647)  Temp Source: Oral (04/04/18 0647)  Respirations: 20 (04/04/18 1514)  SpO2: 100 % (04/04/18 1514)    Physical Exam     Gen -Patient tachypneic  Neck- Supple  No thyromegaly or lymphadenopathy, JVD elevated  Lungs-bilateral crackles heard at the bases  Heart S1-S2, irregular rate and rhythm, soft systolic murmur  Abdomen-soft nontender, no organomegaly  Bowel sounds present  Extremities-no cyanosi,  Clubbing, bilateral 3+ pitting edema  Skin- erythem to the skin left leg , large heel wound  Neuro-awake alert and oriented         Additional Data:     Lab Results: I have personally reviewed pertinent reports          Results from last 7 days  Lab Units 04/04/18  0655   WBC Thousand/uL 8 31   HEMOGLOBIN g/dL 11 1*   HEMATOCRIT % 35 5*   PLATELETS Thousands/uL 248   NEUTROS PCT % 76*   LYMPHS PCT % 14   MONOS PCT % 8   EOS PCT % 2       Results from last 7 days  Lab Units 04/04/18  0655   SODIUM mmol/L 137   POTASSIUM mmol/L 5 3   CHLORIDE mmol/L 101   CO2 mmol/L 25   BUN mg/dL 75*   CREATININE mg/dL 2 71*   CALCIUM mg/dL 9 0   TOTAL PROTEIN g/dL 6 8   BILIRUBIN TOTAL mg/dL 0 50   ALK PHOS U/L 123*   ALT U/L 38   AST U/L 16   GLUCOSE RANDOM mg/dL 250*       Results from last 7 days  Lab Units 04/04/18  0655   INR  1 04       Imaging: I have personally reviewed pertinent reports  Xr Chest Portable    Result Date: 4/4/2018  Narrative: CHEST INDICATION:   Shortness of breath  COMPARISON:  10/11/2017, 1/2/2017  EXAM PERFORMED/VIEWS:  XR CHEST PORTABLE FINDINGS: Heart shadow is enlarged but unchanged from prior exam  There is vascular congestion  Possible small bilateral pleural effusions  Stable elevated left hemidiaphragm  Sternal wires are present  Visualized osseous structures appear otherwise unremarkable for the patient's age  Impression: Vascular congestion, with possible small bilateral pleural effusions  Workstation performed: [id number]     Xr Foot 3+ Views Left    Result Date: 4/4/2018  Narrative: LEFT FOOT INDICATION:   Open wound lateral aspect left foot  COMPARISON:  10/13/2017 VIEWS:  XR FOOT 3+ VW LEFT FINDINGS: There is no acute fracture or dislocation  Degenerative changes of the 1st MTP joint  No lytic or blastic lesions seen  Soft tissues are unremarkable  No evidence of soft tissue gas or radiopaque foreign body  Impression: No acute osseous abnormality  Workstation performed: [id number]     Vas Lower Limb Venous Duplex Study, Complete Bilateral    Result Date: 4/4/2018  Narrative:  [facility] REPORT CLINICAL: Indications: Bilateral Swelling of Limb [R22 4]  The patient is experiencing shortness of breath and bilateral leg edema for several days  He was recently on a plane trip to [place]  Operative History: 2003-07-18 Right PTA of mid and distal bypass graft stenosis  2002-11-14 Right femoral to tibioperoneal bypass with ligation of the distal popliteal artery  2002-06-20 Aortic valve replacement 2002-06-20 CABG Risk Factors: The patient has history of obesity  He has no history of DVT, limb trauma or malignancy  Clinical:Left Lower Limb There is edema  Right Lower Limb There is edema     FINDINGS:  Segment    Right            Left                  Impression       Impression       CFV        Normal (Patent)  Normal (Patent)  Peroneal Not Visualized   AntTibial  Not Visualized                       CONCLUSION: RIGHT LOWER LIMB: No evidence of acute or chronic deep vein thrombosis  No evidence of superficial thrombophlebitis noted  Doppler evaluation shows a normal response to augmentation maneuvers  The femoral to tibioperoneal bypass graft is patent  LEFT LOWER LIMB: No evidence of acute or chronic deep vein thrombosis  No evidence of superficial thrombophlebitis noted  Doppler evaluation shows a normal response to augmentation maneuvers  Popliteal, posterior tibial and anterior tibial arterial Doppler waveforms are monophasic  Note: Limited calf veins due to edema  EKG, Pathology, and Other Studies Reviewed on Admission:   · EKG: afib    Allscripts / Epic Records Reviewed: Yes     ** Please Note: This note has been constructed using a voice recognition system   **

## 2018-04-04 NOTE — ED NOTES
BP 85/54 HR 96, RA O2 Sat 93%  Nitro paste removed/discontinued  2L NC O2 applied  MD notified of pt status and trop 0 24  No further orders received at present time        Beatriz Garland RN  04/04/18 4162

## 2018-04-04 NOTE — ED PROVIDER NOTES
History  Chief Complaint   Patient presents with    Shortness of Breath     pt reports being short of breath over the past week  Pt reports when he gets up and walks distances he has trouble breathing and chest pain  Daughter reports recent trip to [facility] where pt did not take lasix to avoid voiding  Patient is a [de-identified]year old male with increased difficulty breathing for past 3 weeks  No chest pain  No cough  No N/V  No fever  (+) leg swelling  Got back from [place] by plane this past Sunday  Did not take his lasix while in [place]  Has a h/o CHF  Was last seen at [facility] ED on 10/10/17 for encephalopathy  (+) redness of left foot and has had prior fx toes in the past  No known recent trauma  Td UTD  [facility] -[facility] website checked on this patient and last Rx filled was on 10/9/17 for tramadol for 10 day supply  Daughter gave patient two torsemide this AM          History provided by:  Patient and relative (daughter)   used: No    Shortness of Breath   Associated symptoms: no chest pain, no cough, no fever and no vomiting        Prior to Admission Medications   Prescriptions Last Dose Informant Patient Reported? Taking?    NOVOLOG MIX 70/30 FLEXPEN (70-30) 100 UNIT/ML SUPN  Spouse/Significant Other Yes Yes   Sig: INJECT 24 UNITS SUBCUTANEOUSLY AM WITH BREAKFAST AND 24 UNITS IN THE AFTERNOON WITH DINNER   aspirin (ECOTRIN LOW STRENGTH) 81 mg EC tablet 4/4/2018 at Unknown time Spouse/Significant Other Yes Yes   Sig: Take 81 mg by mouth daily     atorvastatin (LIPITOR) 40 mg tablet  Spouse/Significant Other Yes Yes   Sig: Take 40 mg by mouth daily   febuxostat (ULORIC) 40 mg tablet  Spouse/Significant Other Yes Yes   Sig: Take 40 mg by mouth daily   metoprolol tartrate (LOPRESSOR) 25 mg tablet  Self Yes Yes   Sig: Take 25 mg by mouth every 12 (twelve) hours   metoprolol tartrate (LOPRESSOR) 50 mg tablet  Self Yes Yes   Sig: Take 1 tablet twice daily   multivitamin-iron-minerals-folic acid (CENTRUM) chewable tablet  Spouse/Significant Other Yes Yes   Sig: Chew 1 tablet daily  omeprazole (PriLOSEC) 20 mg delayed release capsule  Spouse/Significant Other Yes Yes   Sig: Take 20 mg by mouth daily  paricalcitol (ZEMPLAR) 1 mcg capsule  Spouse/Significant Other No Yes   Sig: Take 1 capsule by mouth 3 (three) times a week for 30 days Monday , Wednesday, friday   ramipril (ALTACE) 2 5 mg capsule  Spouse/Significant Other Yes Yes   Sig: Take 2 5 mg by mouth daily   torsemide (DEMADEX) 20 mg tablet  Spouse/Significant Other No Yes   Sig: Take 1 tablet (20 mg total) by mouth daily Take 1 tablet daily in the am, take one additional tablet of 20 mg po on Monday, Wednesday and Friday afternoon   Patient taking differently: Take 20 mg by mouth daily Take 1 tablet daily in the am, take one additional tablet of 40 mg po on Monday, Wednesday and Friday afternoon       Facility-Administered Medications: None       Past Medical History:   Diagnosis Date    Cardiac disease     [facility]     [facility]     Hypertension     Renal disorder        Past Surgical History:   Procedure Laterality Date    AORTIC VALVE REPLACEMENT      CARDIAC VALVE REPLACEMENT      CATARACT EXTRACTION, BILATERAL      COLONOSCOPY      DE Kaitlin Dion 3RD+ ORD SLCTV ABDL PEL/LXTR [facility] Left 12/22/2017    Procedure: LEG ANGIOGRAM; RIGHT FEMORAL ACCESS; CO2-CONTRAST ;  Surgeon: Michelle Oates MD;  Location: [facility];  Service: Vascular    REPLACEMENT TOTAL KNEE BILATERAL      VASCULAR SURGERY         Family History   Problem Relation Age of Onset    Cancer Mother     Heart disease Father     Diabetes Sister     Heart disease Sister     Diabetes Brother      I have reviewed and agree with the history as documented      Social History   Substance Use Topics    Smoking status: Former Smoker     Quit date: 10/11/1987    Smokeless tobacco: Never Used    Alcohol use No        Review of Systems Constitutional: Negative for fever  Respiratory: Positive for shortness of breath  Negative for cough  Cardiovascular: Positive for leg swelling  Negative for chest pain  Gastrointestinal: Negative for nausea and vomiting  Skin: Positive for color change (left foot)  All other systems reviewed and are negative  Physical Exam  ED Triage Vitals   Temperature Pulse Respirations Blood Pressure SpO2   04/04/18 0647 04/04/18 0646 04/04/18 0646 04/04/18 0646 04/04/18 0646   97 8 °F (36 6 °C) (!) 111 (!) 24 120/60 98 %      Temp Source Heart Rate Source Patient Position - Orthostatic VS BP Location FiO2 (%)   04/04/18 0647 04/04/18 0646 04/04/18 0646 04/04/18 0646 --   Oral Monitor Lying Right arm       Pain Score       04/04/18 0647       8           Orthostatic Vital Signs  Vitals:    04/04/18 0646 04/04/18 0745 04/04/18 0800 04/04/18 0830   BP: 120/60 (!) 85/54 101/62 110/61   Pulse: (!) 111 96 84 88   Patient Position - Orthostatic VS: Lying Lying Lying Lying       Physical Exam   Constitutional: He is oriented to person, place, and time  He appears well-developed and well-nourished  He appears distressed (moderate)  HENT:   Head: Normocephalic and atraumatic  Mouth/Throat: Oropharynx is clear and moist    Eyes: No scleral icterus  Neck: Normal range of motion  Neck supple  No JVD present  Cardiovascular: Regular rhythm and normal heart sounds  No murmur heard  Tachycardia  Pulmonary/Chest: No stridor  He is in respiratory distress  He has no wheezes  He has rales  Abdominal: Soft  Bowel sounds are normal  There is no tenderness  Musculoskeletal: He exhibits edema (bilateral LE edema)  He exhibits no tenderness or deformity  Neurological: He is alert and oriented to person, place, and time  Skin: Skin is warm and dry  There is erythema (left foot with ulceration laterally by 5th toe and mild erythema of distal anterior lower leg on left side as well  )     Psychiatric: He has a normal mood and affect  Nursing note and vitals reviewed  ED Medications  Medications   ampicillin-sulbactam (UNASYN) 3 g in sodium chloride 0 9 % 100 mL IVPB (0 g Intravenous Stopped 4/4/18 0810)   nitroglycerin (NITRO-BID) 2 % TD ointment 0 5 inch (0 inches Topical Hold 4/4/18 0745)   furosemide (LASIX) injection 20 mg (20 mg Intravenous Given 4/4/18 0713)       Diagnostic Studies  Results Reviewed     Procedure Component Value Units Date/Time    CK Total with Reflex CKMB [[id number]]  (Normal) Collected:  04/04/18 0655    Lab Status:  Final result Specimen:  Blood from Arm, Right Updated:  04/04/18 0806     Total CK 65 U/L     High sensitivity CRP [[id number]]  (Normal) Collected:  04/04/18 0655    Lab Status:  Final result Specimen:  Blood from Arm, Right Updated:  04/04/18 0806     CRP, High Sensitivity 50 62 mg/L     Narrative:               HsCRP Level       Relative Risk           <1 0 mg/L          Low           1 0 to 3 0 mg/L    Average           >3 0 mg/L          High      B-type natriuretic peptide [[id number]]  (Abnormal) Collected:  04/04/18 0655    Lab Status:  Final result Specimen:  Blood from Arm, Right Updated:  04/04/18 0806     NT-proBNP 5,207 (H) pg/mL     TSH [[id number]]  (Normal) Collected:  04/04/18 0655    Lab Status:  Final result Specimen:  Blood from Arm, Right Updated:  04/04/18 0806     TSH 3RD GENERATON 3 272 uIU/mL     Narrative:         Patients undergoing fluorescein dye angiography may retain small amounts of fluorescein in the body for 48-72 hours post procedure  Samples containing fluorescein can produce falsely depressed TSH values  If the patient had this procedure,a specimen should be resubmitted post fluorescein clearance      D-Dimer [[id number]]  (Abnormal) Collected:  04/04/18 0655    Lab Status:  Final result Specimen:  Blood from Arm, Right Updated:  04/04/18 0753     D-Dimer, Quant 1,436 (H) ng/ml (FEU)     Protime-INR [[id number]]  (Normal) Collected:  04/04/18 53 Lab Status:  Final result Specimen:  Blood from Arm, Right Updated:  04/04/18 0742     Protime 13 9 seconds      INR 1 04    APTT [[id number]]  (Normal) Collected:  04/04/18 0655    Lab Status:  Final result Specimen:  Blood from Arm, Right Updated:  04/04/18 0742     PTT 29 seconds     Narrative: Therapeutic Heparin Range = 60-90 seconds    Comprehensive metabolic panel [[id number]]  (Abnormal) Collected:  04/04/18 0655    Lab Status:  Final result Specimen:  Blood from Arm, Right Updated:  04/04/18 0737     Sodium 137 mmol/L      Potassium 5 3 mmol/L      Chloride 101 mmol/L      CO2 25 mmol/L      Anion Gap 11 mmol/L      BUN 75 (H) mg/dL      Creatinine 2 71 (H) mg/dL      Glucose 250 (H) mg/dL      Calcium 9 0 mg/dL      AST 16 U/L      ALT 38 U/L      Alkaline Phosphatase 123 (H) U/L      Total Protein 6 8 g/dL      Albumin 3 3 (L) g/dL      Total Bilirubin 0 50 mg/dL      eGFR 21 ml/min/1 73sq m     Narrative:         National Kidney Disease Education Program recommendations are as follows:  GFR calculation is accurate only with a steady state creatinine  Chronic Kidney disease less than 60 ml/min/1 73 sq  meters  Kidney failure less than 15 ml/min/1 73 sq  meters  Lactic acid, plasma [[id number]]  (Normal) Collected:  04/04/18 0655    Lab Status:  Final result Specimen:  Blood from Arm, Right Updated:  04/04/18 0736     LACTIC ACID 1 8 mmol/L     Narrative:         Result may be elevated if tourniquet was used during collection      Troponin I [[id number]]  (Abnormal) Collected:  04/04/18 0655    Lab Status:  Final result Specimen:  Blood from Arm, Right Updated:  04/04/18 0736     Troponin I 0 24 (H) ng/mL     Narrative:         [employer] Chemistry analyzer 99% cutoff is > 0 04 ng/mL in network labs    o cTnI 99% cutoff is useful only when applied to patients in the clinical setting of myocardial ischemia  o cTnI 99% cutoff should be interpreted in the context of clinical history, ECG findings and possibly cardiac imaging to establish correct diagnosis  o cTnI 99% cutoff may be suggestive but clearly not indicative of a coronary event without the clinical setting of myocardial ischemia  Blood gas, venous [[id number]]  (Abnormal) Collected:  04/04/18 0715    Lab Status:  Final result Specimen:  Blood from Arm, Left Updated:  04/04/18 0727     pH, Prem 7 341     pCO2, Prem 47 3 mm Hg      pO2, Prem 28 1 (L) mm Hg      HCO3, Prem 25 0 mmol/L      Base Excess, Rpem -1 1 mmol/L      O2 Content, Prem 8 1 ml/dL      O2 HGB, VENOUS 47 9 (L) %     Blood culture #2 [[id number]] Collected:  04/04/18 0715    Lab Status: In process Specimen:  Blood from Arm, Left Updated:  04/04/18 0725    CBC and differential [[id number]]  (Abnormal) Collected:  04/04/18 0655    Lab Status:  Final result Specimen:  Blood from Arm, Right Updated:  04/04/18 0717     WBC 8 31 Thousand/uL      RBC 4 15 Million/uL      Hemoglobin 11 1 (L) g/dL      Hematocrit 35 5 (L) %      MCV 86 fL      MCH 26 7 (L) pg      MCHC 31 3 (L) g/dL      RDW 15 0 %      MPV 9 9 fL      Platelets 015 Thousands/uL      Neutrophils Relative 76 (H) %      Lymphocytes Relative 14 %      Monocytes Relative 8 %      Eosinophils Relative 2 %      Basophils Relative 0 %      Neutrophils Absolute 6 29 Thousands/µL      Lymphocytes Absolute 1 17 Thousands/µL      Monocytes Absolute 0 66 Thousand/µL      Eosinophils Absolute 0 18 Thousand/µL      Basophils Absolute 0 01 Thousands/µL     Blood culture #1 [[id number]] Collected:  04/04/18 0655    Lab Status: In process Specimen:  Blood from Arm, Right Updated:  04/04/18 0713                 VAS lower limb venous duplex study, complete bilateral   ED Interpretation by Abdon Olivera MD (04/04 4656)   CONCLUSION:   RIGHT LOWER LIMB:   No evidence of acute or chronic deep vein thrombosis  No evidence of superficial thrombophlebitis noted  Doppler evaluation shows a normal response to augmentation maneuvers     The femoral to tibioperoneal bypass graft is patent  LEFT LOWER LIMB:   No evidence of acute or chronic deep vein thrombosis  No evidence of superficial thrombophlebitis noted  Doppler evaluation shows a normal response to augmentation maneuvers  Popliteal, posterior tibial and anterior tibial arterial Doppler waveforms are   monophasic  Note: Limited calf veins due to edema  XR chest portable   ED Interpretation by Love Ruiz MD (04/04 0086)   Cardiomegaly, CHF, left effusion read by me  by Marek Calle (04/04 9258)      XR foot 3+ views LEFT   ED Interpretation by Love Ruiz MD (04/04 5432)   No acute fx or evidence of osteomyelitis read by me  Procedures  ECG 12 Lead Documentation  Date/Time: 4/4/2018 7:03 AM  Performed by: Flora Ortiz  Authorized by: Flora Ortiz     Indications / Diagnosis:  Sob  ECG reviewed by me, the ED Provider: yes    Patient location:  ED  Previous ECG:     Previous ECG:  Compared to current    Comparison ECG info:  10/11/17    Similarity:  Changes noted (tachycardia now)  Rate:     ECG rate:  103    ECG rate assessment: tachycardic    Rhythm:     Rhythm comment:  Possible ectopic atrial tachycardia  Ectopy:     Ectopy: PAC    QRS:     QRS axis:  Left  Conduction:     Conduction: abnormal      Abnormal conduction: non-specific intraventricular conduction delay    ST segments:     ST segments:  Normal  T waves:     T waves: normal             Phone Contacts  ED Phone Contact    ED Course  ED Course as of Apr 04 0918 Wed Apr 04, 2018   0746 SBP decreased to 88 so NTG removed  0746 Pulse ox decreased to 92-93% on RA indicating somewhat inadequate oxygenation so nasal cannula oxygen ordered  6677 Cannot perform CT of chest with IV contrast for elevated D-dimer due to elevated creatinine level/low GFR  VQ scan and/or doppler US of legs can be performed as inpatient  0807 BP improved                             Initial Sepsis Screening     Row Name 04/04/18 6993                Is the patient's history suggestive of a new or worsening infection? (!)  Yes (Proceed)  -AO        Suspected source of infection soft tissue  -AO        Are two or more of the following signs & symptoms of infection both present and new to the patient? (!)  Yes (Proceed)  -AO        Indicate SIRS criteria Tachypnea > 20 resp per min; Tachycardia > 90 bpm  -AO        If the answer is yes to both questions, suspicion of sepsis is present          If severe sepsis is present AND tissue hypoperfusion perists in the hour after fluid resuscitation or lactate > 4, the patient meets criteria for SEPTIC SHOCK          Are any of the following organ dysfunction criteria present within 6 hours of suspected infection and SIRS criteria that are NOT considered to be chronic conditions? No  -AO        Organ dysfunction          Date of presentation of severe sepsis          Time of presentation of severe sepsis          Tissue hypoperfusion persists in the hour after crystalloid fluid administration, evidenced, by either:          Was hypotension present within one hour of the conclusion of crystalloid fluid administration?         Date of presentation of septic shock          Time of presentation of septic shock            User Key  (r) = Recorded By, (t) = Taken By, (c) = Cosigned By    [address] Name Provider El Hilton MD Physician                  MDM  Number of Diagnoses or Management Options  Diagnosis management comments: DDX including but not limited to: pneumonia, pleural effusion, CHF, PE, PTX, ACS, MI, asthma exacerbation, COPD exacerbation, anemia, metabolic abnormality, renal failure  DDX including but not limited to: cellulitis, osteomyelitis, folliculitis, carbuncle, DVT; doubt abscess, rhabdomyolysis, necrotizing fasciitis, allergic reaction, angioedema          Amount and/or Complexity of Data Reviewed  Clinical lab tests: ordered and reviewed  Tests in the radiology section of CPT®: ordered and reviewed  Decide to obtain previous medical records or to obtain history from someone other than the patient: yes  Obtain history from someone other than the patient: yes  Review and summarize past medical records: yes  Independent visualization of images, tracings, or specimens: yes      CritCare Time    Disposition  Final diagnoses:   CHF (congestive heart failure) ([facility] )   Acute-on-chronic kidney injury ([facility] )   Cellulitis of foot, left   Elevated troponin     Time reflects when diagnosis was documented in both MDM as applicable and the Disposition within this note     Time User Action Codes Description Comment    4/4/2018  8:40 AM Nancye Enzo Add [I50 9] CHF (congestive heart failure) ([facility] )     4/4/2018  8:40 AM Nancye Enzo Add [N17 9,  N18 9] Acute-on-chronic kidney injury ([facility] )     4/4/2018  8:40 AM Nancye Enzo Add [L03 [id number]] Cellulitis of foot, left     4/4/2018  8:40 AM Nancye Enzo Add [R74 8] Elevated troponin       ED Disposition     ED Disposition Condition Comment    Admit  Case was discussed with Dr Kadie Mark and the patient's admission status was agreed to be Admission Status: inpatient status to the service of Dr Kadie Mark   Follow-up Information    None       Patient's Medications   Discharge Prescriptions    No medications on file     No discharge procedures on file      ED Provider  Electronically Signed by           Princess Tiffany MD  04/04/18 Santos Corona MD  04/04/18 2265

## 2018-04-05 PROBLEM — E78.49 OTHER HYPERLIPIDEMIA: Status: ACTIVE | Noted: 2018-01-01

## 2018-04-05 NOTE — ASSESSMENT & PLAN NOTE
-HgbA1C 7 6  -continue current medical regimen  -optimize BS control for optimization of wound healing

## 2018-04-05 NOTE — ASSESSMENT & PLAN NOTE
SABINE on CKD III  -creatinine down to 2 67 today  -s/p straight catheterization this afternoon  -baseline creatinine 1 8-2 2  -nephrology following  -continue diuresis

## 2018-04-05 NOTE — CONSULTS
Consult- Annamarie [place]  1935, [de-identified] y o  male MRN: [id number]    Unit/Bed#: -01 Encounter: [id number]    Primary Care Provider: Pal Mckeon MD   Date and time admitted to hospital: 4/4/2018  6:39 AM      Inpatient consult to Vascular Surgery  Consult performed by: Manjinder Oliveira ordered by: Иван Mera          Peripheral arterial disease ([facility] [address] )   Assessment & Plan    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)  -s/p diagnostic LLE angiogram (JBF) 12/20/2017:  Long segment  mid SFA, below knee popliteal and tibioperoneal trunk with single-vessel peroneal runoff   -s/p R fem-TP trunk bypass w/in situ GSV, popliteal ligation '02 by Dr Mariama Miner    -s/p PTA for mid graft stenosis in '03 by Dr Lindsay Vargas    -progression of nonhealing L lateral heel and L 5th toe wounds  -prior diagnostic LLE angiogram 12/20/2017 with severe arterial occlusive disease with long segment occlusion of the mid SFA, below knee popliteal and tibioperoneal trunk    Consideration for future surgical revascularization due to progression of tissue loss  -outpatient ADIEL (4/19/18) and f/u appt w/Dr Andreia Shin (4/24/18) previously scheduled  -will obtain ADIEL now  -consider LLE vein mapping  -optimization of management of acute medical issues of acute CHF exacerbation and SABINE on CKD III takes precedence at the present time  -continue ASA and statin therapy  -podiatry consult pending  -continue to paint left heel and left 5th and 3rd toe with Betadine  -will discuss with Dr Lisa Connolly and formal recommendations to follow            * Acute on chronic diastolic congestive heart failure ([facility] [address] )   Assessment & Plan    -continue diuresis per primary service and nephrology  -continue supportive care and supplemental O2        Acute-on-chronic kidney injury [facility])   Assessment & Plan    SABINE on CKD III  -creatinine down to 2 67 today  -s/p straight catheterization this afternoon  -baseline creatinine 1 8-2 2  -nephrology following  -continue diuresis        Rapid atrial fibrillation (HCC)   Assessment & Plan    Hx PAF, off Eliquis  -AF w/RVR on admission, possible now Atrial flutter  -continue current medical management  -cardiology consult appreciated  -check TTE        Other hyperlipidemia   Assessment & Plan    -continue statin therapy        Coronary artery disease involving native coronary artery of native heart   Assessment & Plan    S/p CABG/AVR (bioprosthetic) 6/20/O2  -continue current medical regimen  -cardiology following        S/P aortic valve replacement with bioprosthetic valve   Assessment & Plan    S/p CABG/AVR (bioprosthetic) 6/20/2002  -stable  -check TTE  -continue medical regimen        Type 2 diabetes mellitus ([address] )   Assessment & Plan    -HgbA1C 7 6  -continue current medical regimen  -optimize BS control for optimization of wound healing        Benign essential hypertension   Assessment & Plan    -continue current medical regimen                Consulting Service: SLIM    Chief Complaint:  Progressive shortness of breath and BLE edema x 1 week    HPI: J Luis Connell  is a [de-identified] y o  male known to the vascular surgery service with a history of HTN, HLD, type II DM with neuropathy, chronic diastolic heart failure, PAF off Eliquis, CAD s/p CABG 6/20/2002, aortic stenosis s/p bioprosthetic AVR 6/20/2002, asymptomatic carotid artery stenosis, PAD, s/p R fem-tibioperoneal trunk bypass with in situ vein by Dr Daniel Jeter 2002 with subsequent PTA of mid graft stenosis by IR 2003 and nonhealing left foot wounds s/p diagnostic LLE angiogram 12/22/17 by Dr Francia Aschoff who presents with progressive shortness of breath and bilateral lower extremity edema x1 week  The patient is now admitted with acute exacerbation of CHF with SABINE on CKD III after stopping his Torsemide on a recent trip to [place]    The patient has been followed in our office and had and ADIEL scheduled for 4/18/18 and a follow-up appointment Dr Tello Claire on 4/24/2018 related to his nonhealing left heel and 5th toe wounds  Patient sustained a mechanical fall in September with resultant nonhealing left toe wound  He underwent a diagnostic left lower extremity angiogram by Dr Carolann Hammans on 12/20/2017 without attempted endovascular intervention secondary to severe occlusive disease with occlusion of the mid SFA, below knee popliteal and tibioperoneal trunk with single-vessel peroneal runoff  The patient's left 2nd toe wound subsequently resolved however he developed new left 5th toe and lateral heel wounds in February which worsened after outpatient debridement by Podiatry  The patient was last seen in our office on 3/19/2018 at which time repeat ADIEL was ordered and follow-up appointment was scheduled with Dr Carolann Hammans  The patient continues to complain of persistent left heel pain at the site of his wound but denies left lower extremity rest pain or claudication  He is in mild respiratory distress at the time of my bedside evaluation  Diuresis is ongoing with Lasix 80 mg IV t i d   vascular surgery is consulted secondary to the patient's known nonhealing left foot wounds  The patient reports no new symptoms but believes that his wounds are progressing  The patient and wife are primarily concerned about being discharged from the hospital by Saturday in order to see their nephew in the [place] in [place]      Review of Systems:  General:  Generalized fatigue and dyspnea at rest  Cardiovascular:  Dyspnea at rest and orthopnea  Respiratory:  As noted above  Gastrointestinal: no abdominal pain, change in bowel habits, or black or bloody stools  Genitourinary ROS:  Urinary retention now status post straight cath x2  Musculoskeletal ROS:  As per the above HPI  Neurological ROS: no TIA or stroke symptoms  Hematological and Lymphatic ROS: negative  Dermatological ROS: As per the above HPI with left foot wound  Psychological ROS: negative  Ophthalmic ROS: negative  ENT ROS: negative    Past Medical History:  Past Medical History:   Diagnosis Date    Cardiac disease     CH[facility]congestive heart failure) ([facility] [address] )     Diabetes mellitus ([facility] [address] )     Hypertension     Renal disorder        Past Surgical History:  Past Surgical History:   Procedure Laterality Date    AORTIC VALVE REPLACEMENT      CARDIAC VALVE REPLACEMENT      CATARACT EXTRACTION, BILATERAL      COLONOSCOPY      LA Chong Ricardo 3RD+ ORD SLCTV ABDL PEL/LXTR [address] Left 12/22/2017    Procedure: LEG ANGIOGRAM; RIGHT FEMORAL ACCESS; CO2-CONTRAST ;  Surgeon: Courtney Sheehan MD;  Location: [facility];  Service: Vascular    REPLACEMENT TOTAL KNEE BILATERAL      VASCULAR SURGERY         Social History:  History   Alcohol Use No     History   Drug Use No     History   Smoking Status    Former Smoker    Quit date: 10/11/1987   Smokeless Tobacco    Never Used       Family History:  Family History   Problem Relation Age of Onset    Cancer Mother     Heart disease Father     Diabetes Sister     Heart disease Sister     Diabetes Brother        Allergies: Allergies   Allergen Reactions    Codeine      "swelling shaky"    Hydrocodone      Other reaction(s):  Other (See Comments)  Heart racing       Medications:  Current Facility-Administered Medications   Medication Dose Route Frequency    acetaminophen (TYLENOL) tablet 650 mg  650 mg Oral Q6H PRN    aspirin (ECOTRIN LOW STRENGTH) EC tablet 81 mg  81 mg Oral Daily    atorvastatin (LIPITOR) tablet 40 mg  40 mg Oral Daily With Dinner    calcium carbonate (TUMS) chewable tablet 1,000 mg  1,000 mg Oral Daily PRN    ceFAZolin (ANCEF) IVPB (premix) 1,000 mg  1,000 mg Intravenous Q12H    docusate sodium (COLACE) capsule 100 mg  100 mg Oral BID PRN    furosemide (LASIX) injection 80 mg  80 mg Intravenous TID (diuretic)    heparin (porcine) subcutaneous injection 5,000 Units  5,000 Units Subcutaneous Q8H [address]    insulin glargine (LANTUS) subcutaneous injection 20 Units  20 Units Subcutaneous HS    insulin lispro (HumaLOG) 100 units/mL subcutaneous injection 1-5 Units  1-5 Units Subcutaneous TID AC    insulin lispro (HumaLOG) 100 units/mL subcutaneous injection 1-5 Units  1-5 Units Subcutaneous HS    insulin lispro (HumaLOG) 100 units/mL subcutaneous injection 10 Units  10 Units Subcutaneous TID With Meals    metoprolol tartrate (LOPRESSOR) tablet 75 mg  75 mg Oral BID    ondansetron (ZOFRAN) injection 4 mg  4 mg Intravenous Q6H PRN    pantoprazole (PROTONIX) EC tablet 40 mg  40 mg Oral Early Morning    tamsulosin (FLOMAX) capsule 0 4 mg  0 4 mg Oral Daily With Dinner       Vitals:  /72   Pulse (!) 113   Temp 97 8 °F (36 6 °C) (Oral)   Resp 20   Ht 5' 7" (1 702 m)   Wt 100 kg (220 lb 7 4 oz)   SpO2 100%   BMI 34 53 kg/m²     I/Os:  I/O last 24 hours:   In: 950 [IV Piggyback:950]  Out: 2325 [Urine:2325]    Lab Results and Cultures:   Lab Results   Component Value Date    WBC 9 64 04/05/2018    HGB 10 8 (L) 04/05/2018    HCT 34 8 (L) 04/05/2018    MCV 86 04/05/2018     04/05/2018     Lab Results   Component Value Date    GLUCOSE 141 (H) 04/05/2018    CALCIUM 9 8 04/05/2018     04/05/2018    K 5 0 04/05/2018    CO2 26 04/05/2018     04/05/2018    BUN 76 (H) 04/05/2018    CREATININE 2 69 (H) 04/05/2018     Lab Results   Component Value Date    INR 1 04 04/04/2018    INR 1 08 12/13/2017    PROTIME 13 9 04/04/2018    PROTIME 14 0 12/13/2017       Lipid Panel:   Lab Results   Component Value Date    CHOL 110 03/06/2018    CHOL 127 11/17/2015   ,     Blood Culture:   Lab Results   Component Value Date    BLOODCX No Growth at 24 hrs  04/04/2018   ,   Urinalysis:   Lab Results   Component Value Date    COLORU Yellow 04/05/2018    COLORU Yellow 12/12/2017    CLARITYU Clear 04/05/2018    CLARITYU Transparent 12/12/2017    SPECGRAV 1 015 04/05/2018    SPECGRAV 1 005 12/12/2017    PHUR 5 0 04/05/2018    PHUR 5 0 12/12/2017    LEUKOCYTESUR Negative 04/05/2018    LEUKOCYTESUR - 12/12/2017    NITRITE Negative 04/05/2018    NITRITE - 12/12/2017    PROTEINUA Negative 04/05/2018    PROTEINUA - 12/12/2017    GLUCOSEU Negative 04/05/2018    GLUCOSEU Negative 11/24/2015    KETONESU Negative 04/05/2018    KETONESU - 12/12/2017    BILIRUBINUR Negative 04/05/2018    BILIRUBINUR - 12/12/2017    BLOODU Negative 04/05/2018    BLOODU - 12/12/2017   ,   Urine Culture: No results found for: URINECX,   Wound Culure: No results found for: WOUNDCULT    Imaging:  LEVD 4/4/2018:  No evidence of acute or chronic DVT or superficial thrombophlebitis    Abdominal aortogram with left lower extremity runoff (JBF) 12/22/2017:  Imaging study and images reviewed and as described above  Full report as noted below:  "Findings: Arteriogram aorta widely patent infrarenal aorta bilateral common iliac external iliac arteries  Left lower extremity runoff showed widely patent common femoral and profunda femoris artery the SFA was heavily diseased at its origin with   diminutive atherosclerotic plaque, the SFA reconstituted in the mid to distal thigh with stenosis and heavy calcifications along its course the above-the-knee popliteal occludes at the knee joint with reconstitution of the peroneal artery in the proximal   calf with runoff to the foot  The tibioperoneal trunk and the entire anterior tibial and posterior tibial arteries were occluded  There was no named vessel in the foot      Intervention:In view of the extensive occlusions of the proximal SFA as well as below-the-knee popliteal and tibioperoneal trunk no intervention was offered  His limb is not threatened at this time  He will undergo close observation long discussion   with the patient's wife and daughter the be followed in the office in the next week or 2      Impressions:Extensive occlusions of the mid SFA and below-the-knee popliteal/tibioperoneal trunk therefore no intervention was attempted    He would be a candidate for a profunda to mid peroneal bypass should he go on  to limb threatening ischemia"    ADIEL 8/23/2017:  Imaging study reviewed in full report as noted below:  "FINDINGS:     Segment                Rig  Left                                            PSV  Impression  PSV    Inflow Anastomosis      99                     High Thigh (Graft)     111                     Mid Thigh (Graft)       79                     Low Thigh (Graft)      107                     Near Knee (Graft)      114                     High Calf (Graft)      132                     Outflow Anastomosis    126                     Common Femoral Artery  121              117    Prox Profunda          109              130    Prox SFA                    >75%        376    Mid SFA                                  40    Dist SFA                                 46    Proximal Pop                             17    Distal Pop                               22    Tibioperoneal           79               10    Dist Post Tibial        89  Occluded           Dist  Ant  Tibial       85               25             CONCLUSION:  Impression:  RIGHT LOWER LIMB:  Widely patent common femoral to tib/peroneal trunk bypass graft  Ankle/Brachial index: 0 93, Prior 0 92  PVR tracings are  dampened  Metatarsal pressure of 57mmHg  Great toe pressure of 36mmHg, is below the healing range     Prior 67mmHg  LEFT LOWER LIMB:  Diffuse femoral popliteal artery occlusive disease with a >75% stenosis of the  proximal/mid superficial femoral artery  Ankle/Brachial index: 0 54, Prior 0 71  PVR tracings are  dampened  Metatarsal pressure of 42mmHg  Great toe pressure of 9mmHg, is below the healing range   Prior 41mmHg"      Physical Exam:    General appearance: alert and Oriented x3  In mild respiratory distress on 3L NCO2    Skin: Skin color, texture, turgor normal  No rashes or lesions  Neurologic: Grossly normal  Head: Normocephalic, without obvious abnormality, atraumatic  Eyes: PERRL, EOMI, sclerae nonicteric  Throat: lips, mucosa, and tongue normal; teeth and gums normal  Neck: no adenopathy, no carotid bruit, no JVD, supple, symmetrical, trachea midline and thyroid not enlarged, symmetric, no tenderness/mass/nodules  Back: symmetric, no curvature  ROM normal  No CVA tenderness  Lungs: Bibasilar crackles  No wheezes or rhonchi  Chest wall: no tenderness, Midline sternotomy scar well healed  Sternum stable  Heart: regular rate and rhythm, S1, S2 normal, no murmur, click, rub or gallop  Abdomen: soft, non-tender; bowel sounds normal; no masses,  no organomegaly and Nondistended  No abdominal bruits  Extremities: 3+ edema bilateral anterior tibial areas  Mild nonblanchable erythema left foot  Left lateral heel wound with dry eschar/gangrene  Photo as noted below  Left 5th toe dry gangrene with eschar tip of left 3rd toe  Please see Clinical images below  No crepitus  Bilateral lower extremities motor and sensory intact  well-healed left knee incision from total knee replacement  Well-healed right medial thigh and proximal calf incision from prior distal bypass    Wound/Incision:    Left lateral heel    Left foot    Left lateral foot      Pulse exam:  Radial: Right: 2+ Left[de-identified] 2+  Femoral: Right: 2+ Left: 2+  Popliteal: Right: non-palpable Left: non-palpable  DP: Right: non-palpable Left: non-palpable  PT: Right: non-palpable Left: non-palpable  Doppler signals:  Left:  Monophasic DP, AT  No dopplerable peroneal or PT    Right:  Biphasic PT, DP, AT  + graft pulse right knee    Braxton Melchor PA-C  4/5/2018  [facility], [phone]

## 2018-04-05 NOTE — PHYSICIAN ADVISOR
This patient is a [de-identified] y o  y/o male who is admitted to the hospital for Shortness of Breath (pt reports being short of breath over the past week  Pt reports when he gets up and walks distances he has trouble breathing and chest pain  Daughter reports recent trip to [facility] where pt did not take lasix to avoid voiding  )       The patient presented to the ED on 4/4/18 at [phone] and was admitted to the hospital on 4/4/2018 0639  History of Present Illness includes: Lorene Chisholm  is a [de-identified] y o  male who presents with shortness of breath  Also reports increasing leg swelling and weight gain  Patient was on holiday in [place] for 5 days and stopped taking his torsemide  When he came back he felt increasingly short of breath with leg swelling  He came to the emergency department for further evaluation  Denies any chest pain  Also reports left leg pain and heel ulcer erythema of the lower extremity  Denies any fever chills or rigors  He is being followed up by Podiatry in outpatient setting  Vital signs in the ER are as follows ED Triage Vitals   Temperature Pulse Respirations Blood Pressure SpO2   04/04/18 0647 04/04/18 0646 04/04/18 0646 04/04/18 0646 04/04/18 0646   97 8 °F (36 6 °C) (!) 111 (!) 24 120/60 98 %      Temp Source Heart Rate Source Patient Position - Orthostatic VS BP Location FiO2 (%)   04/04/18 0647 04/04/18 0646 04/04/18 0646 04/04/18 0646 --   Oral Monitor Lying Right arm       Pain Score       04/04/18 0647       8             Treatment in the ER included: basic labs, imaging, IV lasix and IV antibiotics  NT-proBNP 5207    The patient is admitted as INPATIENT  and has remained hospitalized for 1 day(s)  Last vital signs were Blood pressure 144/72, pulse (!) 113, temperature 97 8 °F (36 6 °C), temperature source Oral, resp  rate 20, height 5' 7" (1 702 m), weight 100 kg (220 lb 7 4 oz), SpO2 100 %       Treatment includes: IV lasix TID, supplemental O2, cardiology and nephrology consult, tele, cardiac enzymes, podiatry consult  Results include:       CXR: Vascular congestion, possible small b/l pleural effusions  0  Lab Value Date/Time   TROPONINI 0 24 (H) 04/04/2018 1200   TROPONINI 0 24 (H) 04/04/2018 0655   TROPONINI <0 02 10/11/2017 0448   TROPONINI <0 02 10/11/2017 0206   TROPONINI 0 07 (H) 01/03/2017 0844   TROPONINI 0 09 (H) 01/03/2017 0539   TROPONINI <0 02 06/22/2016 1902           Results from last 7 days  Lab Units 04/04/18  0655   LACTIC ACID mmol/L 1 8         Results from last 7 days  Lab Units 04/05/18  0642 04/04/18  0655   WBC Thousand/uL 9 64 8 31   HEMOGLOBIN g/dL 10 8* 11 1*   HEMATOCRIT % 34 8* 35 5*   PLATELETS Thousands/uL 226 248         Results from last 7 days  Lab Units 04/05/18  0642 04/04/18  0655   SODIUM mmol/L 140 137   POTASSIUM mmol/L 5 0 5 3   CHLORIDE mmol/L 101 101   CO2 mmol/L 26 25   BUN mg/dL 76* 75*   CREATININE mg/dL 2 69* 2 71*   GLUCOSE RANDOM mg/dL 141* 250*   CALCIUM mg/dL 9 8 9 0       Recent Cultures:      Results from last 7 days  Lab Units 04/04/18  0715 04/04/18  0655   BLOOD CULTURE  No Growth at 24 hrs  No Growth at 24 hrs  The patient is appropriate for  Inpatient Admission  The rationale is as follows: The patient is a [de-identified] y/o male with increased SOB and LE edema admitted with acute CHF, SABINE and foot cellulitis  He required IV lasix TID, supplemental O2, cardiology and nephrology consult, tele, cardiac enzymes, podiatry consult  There is documentation by the admitting physician that the patient would require greater than two midnight stay based on medical necessity  Hospitalization is necessary to prevent further deterioration of his clinical condition  After review of the medical chart, labs, imaging, and notes - I agree that the patient meets criteria for INPATIENT ADMISSION

## 2018-04-05 NOTE — ASSESSMENT & PLAN NOTE
Hx PAF, off Eliquis  -AF w/RVR on admission, possible now Atrial flutter  -continue current medical management  -cardiology consult appreciated  -check TTE

## 2018-04-05 NOTE — ASSESSMENT & PLAN NOTE
· Patient with longstanding wound on his left heel which has been addressed by his podiatrist Dr Aysha Pugh and by vascular surgery as an outpatient  · Upon arrival to the hospital there was some concern for surrounding cellulitis so we initiated IV Ancef  Exam today suggests against any significant cellulitis but will await his podiatry consultation today  · As explained to the patient's wife, we will not consult vascular surgery during this inpatient hospitalization unless it is deemed to be necessary according to Podiatry that he requires treatment and testing to be done as an inpatient for this issue    I also explained to the wife that they are asking us to expedite his discharge so that he can go to the [facility] Saturday night and we would not want to add additional testing and things during the hospitalization which could be done as an outpatient

## 2018-04-05 NOTE — CONSULTS
Consultation - Cardiology   Jennifer Long  [de-identified] y o  male MRN: [id number]  Unit/Bed#: -01 Encounter: [id number]    Assessment/Plan     Principal Problem:    Acute on chronic diastolic congestive heart failure ([facility] )  Active Problems:    Rapid atrial fibrillation (HCC)    Type 2 diabetes mellitus ([facility] )    Acute-on-chronic kidney injury ([place] [address] )    Cellulitis of foot, left    Non-ST elevation myocardial infarction (NSTEMI) ([place] [address] )      Assessment/Plan    1  Acute on chronic diastolic heart failure  Secondary to at least medication and dietary noncompliance  Currently on Lasix 60 mg t i d  Not diuresing well  Will increase to 80 mg TID starting now  BP appears acceptable  He has been given albumin to assist  with diuresing last PM    He has been a bit hypotensive intermittently but not consistently     Echocardiogram September 2017 revealed normal LV function with severe concentric LVH  He had moved very mild MS and mild MR  Aortic valve bioprosthesis with normal function with valve mean gradient 11 mm Hg  Will repeat echocardiogram with such change in volume status, chest discomfort and elevated troponin  This decompensation may however just be result of non compliance  2   Atrial fibrillation-with moderate ventricular response- paroxysmal by history although mainly sinus rhythm  He is on his usual beta-blocker dose which is metoprolol tartrate 75 mg p o  b i d  He may be in atrial flutter at this time, will check an EKG  Anticoagulation had previously been stopped due to a traumatic fall  Would reconsider now that patient in afib  History of paroxysmal atrial fibrillation  Holter last September he was in sinus rhythm  3   Coronary artery disease status post CABG  On aspirin, statin, beta-blocker  Troponin 0 24 x 2 in the setting of heart failure, acute kidney injury  This have some chest heaviness this morning but I suspect it is due to his volume overload    Re-evaluate chest discomfort after 4   Bioprosthetic aortic valve replacement- echo 9/2017 normal valve function    5  Hypertension-not elevated  On beta-blockade  Agree with holding his lisinopril  His blood pressure is actually on the low side at times  6   Chronic kidney disease stage 4/acute kidney injury  Likely due to volume overload  His ACE-inhibitor however is on hold  He has been hypotensive  History of Present Illness   Physician Requesting Consult: Mabel Peters MD  Reason for Consult / Principal Problem:  Heart failure    HPI: Neisha Maldonado  is a [de-identified]y o  year old male with chronic diastolic heart failure, paroxysmal atrial fibrillation ( anticoagulation had been stopped due to a traumatic fall), bioprosthetic AVR, hypertension, coronary disease status post CABG x4 in 2002 along with his bioprosthetic valve, CKD 3 who presents with 1 month of progressive SOB  He has not been taking his torsemide as prescribed  He missed at least 5 doses  He is followed by Dr Yamile Das  He has increased LE edema but does have some chronic lymphedema  His chest feels heavy this morning  He has had recent PND and orthopnea  He has had a diet high in sodium recently  He thinks his weight is probably up to 10-15 lb  ProBNP 5000, troponin 0 24 x 2  Creatinine 2 7 with a potassium of 5 3  Creatinine appears to run mid 1- low 2  Chest x-ray-vascular congestion with possible small bilateral pleural effusions  Renal it as well has been consulted  Initially recommended 80 mg of IV Lasix every 8 hours but due to hypotension is was decreased to 60 mg every 8 hours  24 Holter monitor September of 2017 revealed sinus rhythm  Transthoracic echocardiogram September of 2017 revealed normal LV function with grade 1 diastolic dysfunction  RV size and function normal  Moderate left atrial enlargement  Mild right atrial enlargement  Very mild MS and mild MR  Aortic valve bioprosthesis normal function   Valve mean gradient 11 mm of mercury  Inpatient consult to Cardiology  Consult performed by: Jose Antonio Llamas ordered by: Joaquin Pierson          Review of Systems   Constitutional: Positive for activity change, appetite change, fatigue, fever and unexpected weight change  HENT: Negative  Eyes: Negative  Respiratory: Positive for shortness of breath  Cardiovascular: Positive for chest pain, palpitations and leg swelling  Gastrointestinal: Positive for abdominal distention  Genitourinary: Negative  Musculoskeletal: Positive for gait problem  Skin: Positive for wound  Neurological: Positive for weakness  Negative for syncope  Hematological: Bruises/bleeds easily  Psychiatric/Behavioral: Negative          Historical Information   Past Medical History:   Diagnosis Date    Cardiac disease     [facility]congestive [facility] failure) [facility] )     Diabetes mellitus ([facility] )     Hypertension     Renal disorder      Past Surgical History:   Procedure Laterality Date    AORTIC VALVE REPLACEMENT      CARDIAC VALVE REPLACEMENT      CATARACT EXTRACTION, BILATERAL      COLONOSCOPY      [facility] 3RD+ ORD SLCTV ABDL PEL/LXTR [facility] Left 12/22/2017    Procedure: LEG ANGIOGRAM; RIGHT FEMORAL ACCESS; CO2-CONTRAST ;  Surgeon: Thong Alvarez MD;  Location: [facility];  Service: Vascular    REPLACEMENT TOTAL KNEE BILATERAL      VASCULAR SURGERY       History   Alcohol Use No     History   Drug Use No     History   Smoking Status    Former Smoker    Quit date: 10/11/1987   Smokeless Tobacco    Never Used     Family History:   Family History   Problem Relation Age of Onset    Cancer Mother     Heart disease Father     Diabetes Sister     Heart disease Sister     Diabetes Brother        Meds/Allergies   current meds:   Current Facility-Administered Medications   Medication Dose Route Frequency    acetaminophen (TYLENOL) tablet 650 mg  650 mg Oral Q6H PRN    aspirin (ECOTRIN LOW STRENGTH) EC tablet 81 mg  81 mg Oral Daily    atorvastatin (LIPITOR) tablet 40 mg  40 mg Oral Daily With Dinner    calcium carbonate (TUMS) chewable tablet 1,000 mg  1,000 mg Oral Daily PRN    ceFAZolin (ANCEF) IVPB (premix) 1,000 mg  1,000 mg Intravenous Q12H    docusate sodium (COLACE) capsule 100 mg  100 mg Oral BID PRN    furosemide (LASIX) injection 60 mg  60 mg Intravenous TID (diuretic)    heparin (porcine) subcutaneous injection 5,000 Units  5,000 Units Subcutaneous Q8H [address]    insulin glargine (LANTUS) subcutaneous injection 20 Units  20 Units Subcutaneous HS    insulin lispro (HumaLOG) 100 units/mL subcutaneous injection 1-5 Units  1-5 Units Subcutaneous TID AC    insulin lispro (HumaLOG) 100 units/mL subcutaneous injection 1-5 Units  1-5 Units Subcutaneous HS    insulin lispro (HumaLOG) 100 units/mL subcutaneous injection 10 Units  10 Units Subcutaneous TID With Meals    metoprolol tartrate (LOPRESSOR) tablet 75 mg  75 mg Oral BID    ondansetron (ZOFRAN) injection 4 mg  4 mg Intravenous Q6H PRN    pantoprazole (PROTONIX) EC tablet 40 mg  40 mg Oral Early Morning    and PTA meds:  Prescriptions Prior to Admission   Medication    aspirin (ECOTRIN LOW STRENGTH) 81 mg EC tablet    atorvastatin (LIPITOR) 40 mg tablet    febuxostat (ULORIC) 40 mg tablet    metoprolol tartrate (LOPRESSOR) 25 mg tablet    metoprolol tartrate (LOPRESSOR) 50 mg tablet    multivitamin-iron-minerals-folic acid (CENTRUM) chewable tablet    NOVOLOG MIX 70/30 FLEXPEN (70-30) 100 UNIT/ML SUPN    omeprazole (PriLOSEC) 20 mg delayed release capsule    paricalcitol (ZEMPLAR) 1 mcg capsule    ramipril (ALTACE) 2 5 mg capsule    torsemide (DEMADEX) 20 mg tablet     Allergies   Allergen Reactions    Codeine      "swelling shaky"    Hydrocodone      Other reaction(s): Other (See Comments)  Heart racing       Objective   Vitals: Blood pressure 115/64, pulse (!) 113, temperature 98 1 °F (36 7 °C), temperature source Oral, resp   rate 18, height 5' 7" (1 702 m), weight 100 kg (220 lb 7 4 oz), SpO2 100 %  Orthostatic Blood Pressures    Flowsheet Row Most Recent Value   Blood Pressure  115/64 filed at 04/05/2018 0901   Patient Position - Orthostatic VS  Sitting filed at 04/05/2018 0901            Intake/Output Summary (Last 24 hours) at 04/05/18 1144  Last data filed at 04/05/18 1302   Gross per 24 hour   Intake              250 ml   Output             1775 ml   Net            -1525 ml       Invasive Devices     Peripheral Intravenous Line            Peripheral IV 04/04/18 Left Antecubital 1 day    Peripheral IV 04/04/18 Right Antecubital 1 day                Physical Exam: /64 (BP Location: Left arm)   Pulse (!) 113   Temp 98 1 °F (36 7 °C) (Oral)   Resp 18   Ht 5' 7" (1 702 m)   Wt 100 kg (220 lb 7 4 oz)   SpO2 100%   BMI 34 53 kg/m²      General Appearance:    Alert, cooperative, mild distress, appears stated age   Head:    Normocephalic, no scleral icterus   Eyes:    PERRL   Nose:   Nares normal, septum midline, mucosa normal, no drainage    Throat:   Lips, mucosa, and tongue normal   Neck:   Supple, symmetrical, trachea midline     Elevated JVP       Lungs:     Decreased bases with crackles to auscultation bilaterally, respirations labored   Chest Wall:    No tenderness or deformity    Heart:    Iregular rate and rhythm, S1 and S2 normal, no murmur, rub   or gallop   Abdomen:     Obese    Extremities:   Extremities normal, atraumatic, no cyanosis ,2+ edema       Skin:   Skin warm   Neurologic:   Alert and oriented to person place and time            Lab Results:   Recent Results (from the past 72 hour(s))   CBC and differential    Collection Time: 04/04/18  6:55 AM   Result Value Ref Range    WBC 8 31 4 31 - 10 16 Thousand/uL    RBC 4 15 3 88 - 5 62 Million/uL    Hemoglobin 11 1 (L) 12 0 - 17 0 g/dL    Hematocrit 35 5 (L) 36 5 - 49 3 %    MCV 86 82 - 98 fL    MCH 26 7 (L) 26 8 - 34 3 pg    MCHC 31 3 (L) 31 4 - 37 4 g/dL    RDW 15 0 11 6 - 15 1 %    MPV 9 9 8 9 - 12 7 fL    Platelets 279 533 - 884 Thousands/uL    Neutrophils Relative 76 (H) 43 - 75 %    Lymphocytes Relative 14 14 - 44 %    Monocytes Relative 8 4 - 12 %    Eosinophils Relative 2 0 - 6 %    Basophils Relative 0 0 - 1 %    Neutrophils Absolute 6 29 1 85 - 7 62 Thousands/µL    Lymphocytes Absolute 1 17 0 60 - 4 47 Thousands/µL    Monocytes Absolute 0 66 0 17 - 1 22 Thousand/µL    Eosinophils Absolute 0 18 0 00 - 0 61 Thousand/µL    Basophils Absolute 0 01 0 00 - 0 10 Thousands/µL   Protime-INR    Collection Time: 04/04/18  6:55 AM   Result Value Ref Range    Protime 13 9 12 1 - 14 4 seconds    INR 1 04 0 86 - 1 16   APTT    Collection Time: 04/04/18  6:55 AM   Result Value Ref Range    PTT 29 23 - 35 seconds   Blood culture #1    Collection Time: 04/04/18  6:55 AM   Result Value Ref Range    Blood Culture No Growth at 24 hrs      Comprehensive metabolic panel    Collection Time: 04/04/18  6:55 AM   Result Value Ref Range    Sodium 137 136 - 145 mmol/L    Potassium 5 3 3 5 - 5 3 mmol/L    Chloride 101 100 - 108 mmol/L    CO2 25 21 - 32 mmol/L    Anion Gap 11 4 - 13 mmol/L    BUN 75 (H) 5 - 25 mg/dL    Creatinine 2 71 (H) 0 60 - 1 30 mg/dL    Glucose 250 (H) 65 - 140 mg/dL    Calcium 9 0 8 3 - 10 1 mg/dL    AST 16 5 - 45 U/L    ALT 38 12 - 78 U/L    Alkaline Phosphatase 123 (H) 46 - 116 U/L    Total Protein 6 8 6 4 - 8 2 g/dL    Albumin 3 3 (L) 3 5 - 5 0 g/dL    Total Bilirubin 0 50 0 20 - 1 00 mg/dL    eGFR 21 ml/min/1 73sq m   CK Total with Reflex CKMB    Collection Time: 04/04/18  6:55 AM   Result Value Ref Range    Total CK 65 39 - 308 U/L   Lactic acid, plasma    Collection Time: 04/04/18  6:55 AM   Result Value Ref Range    LACTIC ACID 1 8 0 5 - 2 0 mmol/L   High sensitivity CRP    Collection Time: 04/04/18  6:55 AM   Result Value Ref Range    CRP, High Sensitivity 50 62 mg/L   B-type natriuretic peptide    Collection Time: 04/04/18  6:55 AM   Result Value Ref Range    NT-proBNP 5,207 (H) <450 pg/mL Troponin I    Collection Time: 04/04/18  6:55 AM   Result Value Ref Range    Troponin I 0 24 (H) <=0 04 ng/mL   D-Dimer    Collection Time: 04/04/18  6:55 AM   Result Value Ref Range    D-Dimer, Quant 1,436 (H) 0 - 424 ng/ml (FEU)   TSH    Collection Time: 04/04/18  6:55 AM   Result Value Ref Range    TSH 3RD GENERATON 3 272 0 358 - 3 740 uIU/mL   Blood culture #2    Collection Time: 04/04/18  7:15 AM   Result Value Ref Range    Blood Culture No Growth at 24 hrs      Blood gas, venous    Collection Time: 04/04/18  7:15 AM   Result Value Ref Range    pH, Prem 7 341 7 300 - 7 400    pCO2, Prem 47 3 42 0 - 50 0 mm Hg    pO2, Prem 28 1 (L) 35 0 - 45 0 mm Hg    HCO3, Prem 25 0 24 - 30 mmol/L    Base Excess, Prem -1 1 mmol/L    O2 Content, Prem 8 1 ml/dL    O2 HGB, VENOUS 47 9 (L) 60 0 - 80 0 %   Troponin I    Collection Time: 04/04/18 12:00 PM   Result Value Ref Range    Troponin I 0 24 (H) <=0 04 ng/mL   Fingerstick Glucose (POCT)    Collection Time: 04/04/18  1:34 PM   Result Value Ref Range    POC Glucose 122 65 - 140 mg/dl   Fingerstick Glucose (POCT)    Collection Time: 04/04/18  3:46 PM   Result Value Ref Range    POC Glucose 169 (H) 65 - 140 mg/dl   Fingerstick Glucose (POCT)    Collection Time: 04/04/18  9:08 PM   Result Value Ref Range    POC Glucose 184 (H) 65 - 140 mg/dl   ED Urine Macroscopic    Collection Time: 04/05/18  1:51 AM   Result Value Ref Range    Color, UA Keshia     Clarity, UA Cloudy     pH, UA 7 0 4 5 - 8 0    Leukocytes, UA Trace (A) Negative    Nitrite, UA Negative Negative    Protein, UA 30 (1+) (A) Negative mg/dl    Glucose, UA Negative Negative mg/dl    Ketones, UA Trace (A) Negative mg/dl    Urobilinogen, UA >=8 0 (A) 0 2, 1 0 E U /dl E U /dl    Bilirubin, UA Interference- unable to analyze (A) Negative    Blood, UA Moderate (A) Negative    Specific Gravity, UA 1 025 1 003 - 1 030   Urinalysis with microscopic    Collection Time: 04/05/18  3:22 AM   Result Value Ref Range    Clarity, UA Clear Color, UA Yellow     Specific [place], UA 1 015 1 003 - 1 030    pH, UA 5 0 4 5 - 8 0    Glucose, UA Negative Negative mg/dl    Ketones, UA Negative Negative mg/dl    Blood, UA Negative Negative    Protein, UA Negative Negative mg/dl    Nitrite, UA Negative Negative    Bilirubin, UA Negative Negative    Urobilinogen, UA 0 2 0 2, 1 0 E U /dl E U /dl    Leukocytes, UA Negative Negative    WBC, UA 0-1 (A) None Seen, 0-5, 5-55, 5-65 /hpf    RBC, UA None Seen None Seen, 0-5 /hpf    Bacteria, UA None Seen None Seen, Occasional /hpf    Epithelial Cells None Seen None Seen, Occasional /hpf   Basic metabolic panel    Collection Time: 04/05/18  6:42 AM   Result Value Ref Range    Sodium 140 136 - 145 mmol/L    Potassium 5 0 3 5 - 5 3 mmol/L    Chloride 101 100 - 108 mmol/L    CO2 26 21 - 32 mmol/L    Anion Gap 13 4 - 13 mmol/L    BUN 76 (H) 5 - 25 mg/dL    Creatinine 2 69 (H) 0 60 - 1 30 mg/dL    Glucose 141 (H) 65 - 140 mg/dL    Calcium 9 8 8 3 - 10 1 mg/dL    eGFR 21 ml/min/1 73sq m   Magnesium    Collection Time: 04/05/18  6:42 AM   Result Value Ref Range    Magnesium 2 5 1 6 - 2 6 mg/dL   CBC (With Platelets)    Collection Time: 04/05/18  6:42 AM   Result Value Ref Range    WBC 9 64 4 31 - 10 16 Thousand/uL    RBC 4 04 3 88 - 5 62 Million/uL    Hemoglobin 10 8 (L) 12 0 - 17 0 g/dL    Hematocrit 34 8 (L) 36 5 - 49 3 %    MCV 86 82 - 98 fL    MCH 26 7 (L) 26 8 - 34 3 pg    MCHC 31 0 (L) 31 4 - 37 4 g/dL    RDW 14 9 11 6 - 15 1 %    Platelets 104 393 - 786 Thousands/uL    MPV 9 7 8 9 - 12 7 fL   Fingerstick Glucose (POCT)    Collection Time: 04/05/18  7:15 AM   Result Value Ref Range    POC Glucose 149 (H) 65 - 140 mg/dl   Fingerstick Glucose (POCT)    Collection Time: 04/05/18 11:11 AM   Result Value Ref Range    POC Glucose 370 (H) 65 - 140 mg/dl     [facility]  [facility], [address] [place] [address]  [phone]     Transthoracic Echocardiogram  2D, M-mode, Doppler, and Color Doppler     Study date:  25-Sep-2017     Patient: Micaela Olivarez  MR number: [id number]  Account number: [de-identified]  : 1935  Age: [de-identified] years  Gender: Male  Status: Outpatient  Location: [address] [place] [address] [facility] [place]  Height: 67 in  Weight: 223 lb  BP: 120/ 50 mmHg     Indications: Evaluate prosthetic aortic valve      Diagnoses: I35 9 - Nonrheumatic aortic valve disorder, unspecified     Sonographer:  MASON Rios  Primary Physician:  Rosemarie Aguilar MD  Referring Physician:  Edyta Motta MD  Group:  [facility]  Cardiology Fellow:  Meera Steward MD  Interpreting Physician:  Rowdy Gee MD     SUMMARY     LEFT VENTRICLE:  Systolic function was normal  Ejection fraction was estimated to be 65 %  There was severe concentric hypertrophy  Doppler parameters were consistent with abnormal left ventricular relaxation (grade 1 diastolic dysfunction)      RIGHT VENTRICLE:  The size was normal   Systolic function was normal      LEFT ATRIUM:  The atrium was moderately dilated      RIGHT ATRIUM:  The atrium was mildly dilated      MITRAL VALVE:  There was very mild stenosis  There was mild regurgitation      AORTIC VALVE:  A bioprosthesis was present  It exhibited normal function  Valve mean gradient was 11 mmHg      TRICUSPID VALVE:  There was mild regurgitation      COMPARISONS:  There has been no significant interval change  Comparison was made with the previous study of 2017      HISTORY: PRIOR HISTORY: CAD; CABG; AVR; Atrial fibrillation; Edema; Diabetes; CKD; Hypertension; Hyperlipidemia     PROCEDURE: The study was performed in the [address] [facility] [place]  This was a routine study  The transthoracic approach was used  The study included complete 2D imaging, M-mode, complete spectral Doppler, and color Doppler  The  heart rate was 67 bpm, at the start of the study   Images were obtained from the parasternal, apical, subcostal, and suprasternal notch acoustic windows  Echocardiographic views were limited due to poor acoustic window availability  Image  quality was adequate      LEFT VENTRICLE: Size was normal  Systolic function was normal  Ejection fraction was estimated to be 65 %  There were no regional wall motion abnormalities  Wall thickness was increased  There was severe concentric hypertrophy  DOPPLER:  Doppler parameters were consistent with abnormal left ventricular relaxation (grade 1 diastolic dysfunction)      RIGHT VENTRICLE: The size was normal  Systolic function was normal  Wall thickness was normal      LEFT ATRIUM: The atrium was moderately dilated      RIGHT ATRIUM: The atrium was mildly dilated      MITRAL VALVE: Valve structure was normal  There was normal leaflet separation  DOPPLER: The transmitral velocity was within the normal range  There was very mild stenosis  There was mild regurgitation      AORTIC VALVE: A bioprosthesis was present  It exhibited normal function  DOPPLER: Transaortic velocity was within the normal range  There was no evidence for stenosis  There was no regurgitation      TRICUSPID VALVE: The valve structure was normal  There was normal leaflet separation  DOPPLER: The transtricuspid velocity was within the normal range  There was no evidence for stenosis  There was mild regurgitation  Pulmonary artery  systolic pressure was at the upper limits of normal  Estimated peak PA pressure was 35 mmHg      PULMONIC VALVE: Leaflets exhibited normal thickness, no calcification, and normal cuspal separation  DOPPLER: The transpulmonic velocity was within the normal range   There was trace regurgitation      PERICARDIUM: There was no pericardial effusion      AORTA: The root exhibited normal size      SYSTEMIC VEINS: IVC: The inferior vena cava was normal in size and course      MEASUREMENT TABLES     DOPPLER MEASUREMENTS  Aortic valve   (Reference normals)  Mean gradient   11 mmHg   (--)     SYSTEM MEASUREMENT TABLES     2D  %FS: 19 23 %  Ao Diam: 3 27 cm  EDV(Teich): 75 4 ml  EF(Cube): 47 3 %  EF(Teich): 40 01 %  ESV(Cube): 37 05 ml  ESV(Teich): 45 24 ml  IVSd: 1 62 cm  LA Area: 28 6 cm2  LA Diam: 5 02 cm  LVEDV MOD A4C: 142 35 ml  LVEF MOD A4C: 58 06 %  LVESV MOD A4C: 59 71 ml  LVIDd: 4 13 cm  LVIDs: 3 33 cm  LVLd A4C: 8 34 cm  LVLs A4C: 7 18 cm  LVPWd: 1 56 cm  RA Area: 17 11 cm2  RV Diam : 4 38 cm  SV MOD A4C: 82 64 ml  SV(Cube): 33 26 ml  SV(Teich): 30 17 ml     CW  AV Env  Ti: 294 27 ms  AV VTI: 48 75 cm  AV Vmax: 2 3 m/s  AV Vmean: 1 66 m/s  AV maxP 24 mmHg  AV meanP 41 mmHg  HR: 78 79 BPM  MR VTI: 175 4 cm  MR Vmax: 5 54 m/s  MR Vmean: 4 24 m/s  MR maxP 69 mmHg  MR meanP 89 mmHg  MV VTI: 45 31 cm  MV Vmax: 1 62 m/s  MV Vmean: 0 99 m/s  MV maxPG: 10 53 mmHg  MV meanP 77 mmHg  TR Vmax: 2 59 m/s  TR maxP 93 mmHg       Imaging: I have personally reviewed pertinent reports  EKG: atrial fibrillation      Code Status: Level 1 - Full Code  Advance Directive and Living Will:      Power of :    POLST:      Counseling / Coordination of Care  Total floor / unit time spent today 45 minutes  Greater than 50% of total time was spent with the patient and / or family counseling and / or coordination of care

## 2018-04-05 NOTE — ASSESSMENT & PLAN NOTE
-continue diuresis per primary service and nephrology  -continue supportive care and supplemental O2

## 2018-04-05 NOTE — PROGRESS NOTES
Progress Note - Angelito Turner  1935, [de-identified] y o  male MRN: [id number]    Unit/Bed#: -01 Encounter: [id number]    Primary Care Provider: Abdias Delgado MD   Date and time admitted to hospital: 4/4/2018  6:39 AM  * Acute on chronic diastolic congestive heart failure ([address] )   Assessment & Plan    · Likely due to patient's noncompliance with his torsemide while on vacation  · Continue IV lasix 60 mg IV q8 hours, cannot "rush" to diurese him faster despite their plans to go to the [facility] Saturday night as he has been hypotensive  · Monitor weights and I/Os, Na restricted diet  · Monitor O2 needs  · [facility] consult pending  · Continue beta-blocker with hold parameters, however we will need to discontinue his ACE-inhibitor for the time being given his hypotension        Rapid atrial fibrillation (HCC)   Assessment & Plan    · Continue metoprolol but with hold parameters to avoid hypotension  · [facility] consult pending        Acute-on-chronic kidney injury [facility])   Assessment & Plan    · Renal input appreciated  · NO NSAIDS  · HOLD ACE-I, avoid hypotension  · Monitor BMP  · Creatinine remains above baseline  · Nursing also reports to me that patient required 1 clean intermittent catheterization for retention  Will continue to assess output and PVR        Type 2 diabetes mellitus (HCC)   Assessment & Plan    · Monitor on AccuChecks/SSI  · A1c 7 6  · Continue lantus and novolog        Cellulitis of foot, left   Assessment & Plan    · Patient with longstanding wound on his left heel which has been addressed by his podiatrist Dr Yamileth Lai and by vascular surgery as an outpatient  · Upon arrival to the hospital there was some concern for surrounding cellulitis so we initiated IV Ancef    Exam today suggests against any significant cellulitis but will await his podiatry consultation today  · As explained to the patient's wife, we will not consult vascular surgery during this inpatient hospitalization unless it is deemed to be necessary according to Podiatry that he requires treatment and testing to be done as an inpatient for this issue  I also explained to the wife that they are asking us to expedite his discharge so that he can go to the [facility] Saturday night and we would not want to add additional testing and things during the hospitalization which could be done as an outpatient        Non-ST elevation myocardial infarction (NSTEMI) ([address] )   Assessment & Plan    · Mild troponin elevation likely the result of type 2 non ST elevation MI from heart failure and renal failure  · Cardiology consult that          VTE Pharmacologic Prophylaxis:   Pharmacologic: Heparin  Mechanical VTE Prophylaxis in Place: Yes    Patient Centered Rounds: I have performed bedside rounds with nursing staff today  Discussions with Specialists or Other Care Team Provider: case mgmt, cardiology, podiatry    Education and Discussions with Family / Patient: wife at bedside, several questions and concerns addressed    Time Spent for Care: 30 minutes  More than 50% of total time spent on counseling and coordination of care as described above  Current Length of Stay: 1 day(s)    Current Patient Status: Inpatient   Certification Statement: The patient will continue to require additional inpatient hospital stay due to CHF and SABINE    Discharge Plan: home when medically stable    Code Status: Level 1 - Full Code    Subjective:   Patient reports he feels the "same", no better yet  Has pain in left heel and wife is concerned about it potentially getting infected  Patient required CIC x 1  Having some urine output now  Feels heart racing  Objective:     Vitals:   Temp (24hrs), Av °F (36 7 °C), Min:98 °F (36 7 °C), Max:98 1 °F (36 7 °C)    HR:  [] 113  Resp:  [18-20] 18  BP: ()/(54-84) 115/64  SpO2:  [98 %-100 %] 100 %  Body mass index is 34 53 kg/m²  Input and Output Summary (last 24 hours):        Intake/Output Summary (Last 24 hours) at 04/05/18 1102  Last data filed at 04/05/18 0835   Gross per 24 hour   Intake              250 ml   Output             1775 ml   Net            -1525 ml       Physical Exam:     Physical Exam   Constitutional: No distress  Up in chair, appear a bit dyspneic   HENT:   Head: Normocephalic and atraumatic  Eyes: Conjunctivae are normal  Right eye exhibits no discharge  Left eye exhibits no discharge  No scleral icterus  Neck: Neck supple  Cardiovascular:   No murmur heard  Tachycardia, does sound regular currently   Pulmonary/Chest: Effort normal  No stridor  No respiratory distress  He has no wheezes  He has rales (bibasilar crackles)  Abdominal: Soft  He exhibits no distension  obese   Musculoskeletal: He exhibits edema (BLE edema)  Neurological: He is alert  Awake alert and interactive   Skin: Skin is warm and dry  No rash noted  He is not diaphoretic  No erythema  No pallor  Right 2nd toe with some erythema which wife reports is chronic  Left heel large ulceration without obvious active drainage or cellulitis at this time   Nursing note and vitals reviewed  Additional Data:     Labs:      Results from last 7 days  Lab Units 04/05/18  0642 04/04/18  0655   WBC Thousand/uL 9 64 8 31   HEMOGLOBIN g/dL 10 8* 11 1*   HEMATOCRIT % 34 8* 35 5*   PLATELETS Thousands/uL 226 248   NEUTROS PCT %  --  76*   LYMPHS PCT %  --  14   MONOS PCT %  --  8   EOS PCT %  --  2       Results from last 7 days  Lab Units 04/05/18  0642 04/04/18  0655   SODIUM mmol/L 140 137   POTASSIUM mmol/L 5 0 5 3   CHLORIDE mmol/L 101 101   CO2 mmol/L 26 25   BUN mg/dL 76* 75*   CREATININE mg/dL 2 69* 2 71*   CALCIUM mg/dL 9 8 9 0   TOTAL PROTEIN g/dL  --  6 8   BILIRUBIN TOTAL mg/dL  --  0 50   ALK PHOS U/L  --  123*   ALT U/L  --  38   AST U/L  --  16   GLUCOSE RANDOM mg/dL 141* 250*       Results from last 7 days  Lab Units 04/04/18  0655   INR  1 04       * I Have Reviewed All Lab Data Listed Above    * Additional Pertinent Lab Tests Reviewed: [address] Admission Reviewed    Imaging:    Imaging Reports Reviewed Today Include:   Imaging Personally Reviewed by Myself Includes:      Recent Cultures (last 7 days):       Results from last 7 days  Lab Units 04/04/18  0715 04/04/18  0655   BLOOD CULTURE  No Growth at 24 hrs  No Growth at 24 hrs  Last 24 Hours Medication List:     Current Facility-Administered Medications:  acetaminophen 650 mg Oral Q6H PRN Chase Alberto MD    aspirin 81 mg Oral Daily Chase Alberto MD    atorvastatin 40 mg Oral Daily With Dinner Chase Alberto MD    calcium carbonate 1,000 mg Oral Daily PRN Chase Alberto MD    ceFAZolin 1,000 mg Intravenous Q12H Chase Alberto MD Last Rate: 1,000 mg (04/05/18 0453)   docusate sodium 100 mg Oral BID PRN Chase Alberto MD    furosemide 60 mg Intravenous TID (diuretic) Placido Wayne PA-C    heparin (porcine) 5,000 Units Subcutaneous Q8H [address] Chase Alberto MD    insulin glargine 20 Units Subcutaneous HS Chase Alberto MD    insulin lispro 1-5 Units Subcutaneous TID AC Chase Alberto MD    insulin lispro 1-5 Units Subcutaneous HS Chase Alberto MD    insulin lispro 10 Units Subcutaneous TID With Meals Chase Alberto MD    metoprolol tartrate 75 mg Oral BID Lisseth Seals PA-C    ondansetron 4 mg Intravenous Q6H PRN Chase Alberto MD    pantoprazole 40 mg Oral Early Morning Chase Alberto MD         Today, Patient Was Seen By: June Michael PA-C    ** Please Note: Dictation voice to text software may have been used in the creation of this document   **

## 2018-04-05 NOTE — ASSESSMENT & PLAN NOTE
· Renal input appreciated  · NO NSAIDS  · HOLD ACE-I, avoid hypotension  · Monitor BMP  · Creatinine remains above baseline  · Nursing also reports to me that patient required 1 clean intermittent catheterization for retention    Will continue to assess output and PVR

## 2018-04-05 NOTE — ASSESSMENT & PLAN NOTE
· Mild troponin elevation likely the result of type 2 non ST elevation MI from heart failure and renal failure  · Cardiology consult that

## 2018-04-05 NOTE — ASSESSMENT & PLAN NOTE
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)  -s/p diagnostic LLE angiogram ([facility]) 12/20/2017:  Long segment  mid SFA, below knee popliteal and tibioperoneal trunk with single-vessel peroneal runoff   -s/p R fem-TP trunk bypass w/in situ GSV, popliteal ligation '02 by Dr Daniel Jeter    -s/p PTA for mid graft stenosis in '03 by Dr Renata Raya    -progression of nonhealing L lateral heel and L 5th toe wounds  -prior diagnostic LLE angiogram 12/20/2017 with severe arterial occlusive disease with long segment occlusion of the mid SFA, below knee popliteal and tibioperoneal trunk    Consideration for future surgical revascularization due to progression of tissue loss  -outpatient ADIEL (4/19/18) and f/u appt w/Dr Francia Aschoff (4/24/18) previously scheduled  -will obtain ADIEL now  -consider LLE vein mapping  -optimization of management of acute medical issues of acute CHF exacerbation and SABINE on CKD III takes precedence at the present time  -continue ASA and statin therapy  -podiatry consult pending  -continue to paint left heel and left 5th and 3rd toe with Betadine  -will discuss with Dr Lillie Samuels and formal recommendations to follow

## 2018-04-05 NOTE — PROGRESS NOTES
NEPHROLOGY PROGRESS NOTE   Lorene Chisholm  [de-identified] y o  male MRN: [id number]  Unit/Bed#: -01 Encounter: [id number]  Reason for Consult: SABINE/CKD    ASSESSMENT/PLAN:  1  Acute kidney injury:  Suspect secondary to volume overload, and relative hypotension, along with concerns for urinary retention now with Geronimo catheter placed  -now on IV Lasix 80 mg every 8 hours  -creatinine appears to have plateaued, now 7 11(4 90)  -AceI on hold  -avoid nephrotoxic  -Avoid hypotension  -follow I/O, labs, volume status closely    2  Chronic kidney disease IV:  Suspected secondary to hypertensive nephrosclerosis plus or minus renal vascular disease  -baseline creatinine 1 8-2 2 and follows Dr Justin Dunbar in the outpatient clinic  -will need hospital follow-up once discharged    3  Hypertension:  BP on the lower side  -on Lopressor 75 mg daily  -avoid hypotension    4  Urinary retention:  Now with Geronimo catheter    5  Acute on chronic diastolic heart failure:  Currently on IV diuretics 80 mg  -cardiology is following  -may need to consider Lasix drip if not responsive    6  Anemia of chronic kidney disease:  Hemoglobin stable current  -will trend    7  Diabetes type 2:  Per primary team  -need adequate blood sugar control    8  Cellulitis of left foot:  Vascular now following      SUBJECTIVE:  Patient seen and examined  States this morning his breathing was much worse, feels better now but not completely improved  Denies chest pain, dizziness or lightheadedness    Wife at bedside and concerned about him using his abdominal muscles to breathe    OBJECTIVE:  Current Weight: Weight - Scale: 100 kg (220 lb 7 4 oz)  Vitals:    04/05/18 0712 04/05/18 0901 04/05/18 1313 04/05/18 1407   BP: 160/84 115/64 112/63    BP Location: Left arm Left arm Left arm    Pulse: (!) 116 (!) 113     Resp: 18      Temp: 98 1 °F (36 7 °C)      TempSrc: Oral      SpO2: 100%      Weight:       Height:    5' 7" (1 702 m)       Intake/Output Summary (Last 24 hours) at 04/05/18 1539  Last data filed at 04/05/18 1533   Gross per 24 hour   Intake                0 ml   Output             2325 ml   Net            -2325 ml     General: NAD, cooperative  Skin: warm and dry, no rash   HEENT: MMM, sclera aniterus  Neck: supple, NO JVD  Chest: scattered bibasilar crackles, no wheeze, accessory abdominal muscles noted  Heart: RRR  Abdomen: soft, NT, + BS  Extremities: +2 pitting edema, left > right (has known lymphedema)  Neuro: AAO  Psych: appropriate affect    Medications:    Current Facility-Administered Medications:     acetaminophen (TYLENOL) tablet 650 mg, 650 mg, Oral, Q6H PRN, Chase Alberto MD    aspirin (ECOTRIN LOW STRENGTH) EC tablet 81 mg, 81 mg, Oral, Daily, Chase Alberto MD, 81 mg at 04/05/18 0903    atorvastatin (LIPITOR) tablet 40 mg, 40 mg, Oral, Daily With Dinner, Chase Alberto MD    calcium carbonate (TUMS) chewable tablet 1,000 mg, 1,000 mg, Oral, Daily PRN, Chase Alberto MD    ceFAZolin (ANCEF) IVPB (premix) 1,000 mg, 1,000 mg, Intravenous, Q12H, Chase Alberto MD, Last Rate: 100 mL/hr at 04/05/18 0453, 1,000 mg at 04/05/18 0453    docusate sodium (COLACE) capsule 100 mg, 100 mg, Oral, BID PRN, Chase Alberto MD    furosemide (LASIX) injection 80 mg, 80 mg, Intravenous, TID (diuretic), JEWELL Ortega, 80 mg at 04/05/18 1317    heparin (porcine) subcutaneous injection 5,000 Units, 5,000 Units, Subcutaneous, Q8H [address] **AND** Platelet count, , , Once, Chase Alberto MD    insulin glargine (LANTUS) subcutaneous injection 20 Units, 20 Units, Subcutaneous, HS, Chase Alberto MD, 20 Units at 04/04/18 2153    insulin lispro (HumaLOG) 100 units/mL subcutaneous injection 1-5 Units, 1-5 Units, Subcutaneous, TID AC, 4 Units at 04/05/18 1128 **AND** Fingerstick Glucose (POCT), , , TID CHAU, Chase Alberto MD    insulin lispro (HumaLOG) 100 units/mL subcutaneous injection 1-5 Units, 1-5 Units, Subcutaneous, HS, Chase Alberto MD, 1 Units at 04/04/18 2154    insulin lispro (HumaLOG) 100 units/mL subcutaneous injection 10 Units, 10 Units, Subcutaneous, TID With Meals, Chase Alberto MD, 10 Units at 04/05/18 1127    metoprolol tartrate (LOPRESSOR) tablet 75 mg, 75 mg, Oral, BID, Lisseth Seals PA-C    ondansetron (ZOFRAN) injection 4 mg, 4 mg, Intravenous, Q6H PRN, Chase Alberto MD    pantoprazole (PROTONIX) EC tablet 40 mg, 40 mg, Oral, Early Morning, Chase Alberto MD, 40 mg at 04/05/18 0557    tamsulosin (FLOMAX) capsule 0 4 mg, 0 4 mg, Oral, Daily With Elaina Godfrey PA-C    Laboratory Results:    Results from last 7 days  Lab Units 04/05/18  0642 04/04/18  0655   WBC Thousand/uL 9 64 8 31   HEMOGLOBIN g/dL 10 8* 11 1*   HEMATOCRIT % 34 8* 35 5*   PLATELETS Thousands/uL 226 248   SODIUM mmol/L 140 137   POTASSIUM mmol/L 5 0 5 3   CHLORIDE mmol/L 101 101   CO2 mmol/L 26 25   BUN mg/dL 76* 75*   CREATININE mg/dL 2 69* 2 71*   CALCIUM mg/dL 9 8 9 0   MAGNESIUM mg/dL 2 5  --    TOTAL PROTEIN g/dL  --  6 8   GLUCOSE RANDOM mg/dL 141* 250*

## 2018-04-05 NOTE — CASE MANAGEMENT
Initial Clinical Review    Admission: Date/Time/Statement: 4/4/18 @ 0841     Orders Placed This Encounter   Procedures    Inpatient Admission (expected length of stay for this patient is greater than two midnights)     Telemetry     Standing Status:   Standing     Number of Occurrences:   1     Order Specific Question:   Admitting Physician     Answer:   Pedro Gutierrze     Order Specific Question:   Level of Care     Answer:   Med Surg [16]     Order Specific Question:   Bed request comments     Answer:   telemetry     Order Specific Question:   Estimated length of stay     Answer:   More than 2 Midnights     Order Specific Question:   Certification     Answer:   I certify that inpatient services are medically necessary for this patient for a duration of greater than two midnights  See H&P and MD Progress Notes for additional information about the patient's course of treatment  ED: Date/Time/Mode of Arrival:   ED Arrival Information     Expected Arrival Acuity Means of Arrival Escorted By Service Admission Type    - 4/4/2018 06:35 Emergent Wheelchair Family Member General Medicine Emergency    Arrival Complaint    shortness of breath          Chief Complaint:   Chief Complaint   Patient presents with    Shortness of Breath     pt reports being short of breath over the past week  Pt reports when he gets up and walks distances he has trouble breathing and chest pain  Daughter reports recent trip [facility] where pt did not take lasix to avoid voiding  History of Illness:   Magda Shirley  is a [de-identified] y o  male who presents with shortness of breath  Also reports increasing leg swelling and weight gain  Patient was on holiday in [place] for 5 days and stopped taking his torsemide  When he came back he felt increasingly short of breath with leg swelling  He came to the emergency department for further evaluation  Also reports left leg pain and heel ulcer erythema of the lower extremity  He is being followed up by Podiatry in outpatient setting    ED Vital Signs:   ED Triage Vitals   Temperature Pulse Respirations Blood Pressure SpO2   04/04/18 0647 04/04/18 0646 04/04/18 0646 04/04/18 0646 04/04/18 0646   97 8 °F (36 6 °C) (!) 111 (!) 24 120/60 98 %      Temp Source Heart Rate Source Patient Position - Orthostatic VS BP Location FiO2 (%)   04/04/18 0647 04/04/18 0646 04/04/18 0646 04/04/18 0646 --   Oral Monitor Lying Right arm       Pain Score       04/04/18 0647       8        Wt Readings from Last 1 Encounters:   04/05/18 100 kg (220 lb 7 4 oz)       Vital Signs (abnormal):   04/04/18 0745  --  96  20   85/54  93 %  Nasal cannula  Lying   04/04/18 0647  97 8 °F (36 6 °C)  --  --  --  --  --  --   04/04/18 0646  --   111   24  120/60  98 %  None (Room air)  Lying   04/04/18 0645  --  --  --  --  --  None (Room air)  --        04/05/18 0712  98 1 °F (36 7 °C)   116  18  160/84  100 %  Nasal cannula  Sitting   04/05/18 0045  --   110  --  102/58  --  --  Lying   04/04/18 2334  98 °F (36 7 °C)   116  18   86/54  98 %  Nasal cannula  Lying   04/04/18 1800  --   112  --  106/58  --  --  Lying   04/04/18 1543  98 °F (36 7 °C)   107  20  105/61  100 %  None (Room air)  Lying   04/04/18 1514  --   113  20  117/55  100 %  None (Room air)  Sitting   04/04/18 1400  --  99  20  115/77  99 %  Nasal cannula  Lying   04/04/18 1330  --  98  20  129/63  99 %  Nasal cannula  Lying   04/04/18 1300  --  100  20  129/64  100 %  Nasal cannula  --   04/04/18 1230  --  100  18  125/74  100 %  Nasal cannula  Lying   04/04/18 1130  --  98  18  123/75  99 %  Nasal cannula  Lying   04/04/18 1100  --  97  18  116/62  98 %  Nasal cannula           Abnormal Labs/Diagnostic Test Results:   Lab Units 04/04/18  0655   WBC Thousand/uL 8 31   HEMOGLOBIN g/dL 11 1*   HEMATOCRIT % 35 5*   PLATELETS Thousands/uL 248   NEUTROS PCT % 76*   LYMPHS PCT % 14   MONOS PCT % 8   EOS PCT % 2         Results from last 7 days  Lab Units 04/04/18  0655   SODIUM mmol/L 137   POTASSIUM mmol/L 5 3   CHLORIDE mmol/L 101   CO2 mmol/L 25   BUN mg/dL 75*   CREATININE mg/dL 2 71*   CALCIUM mg/dL 9 0   TOTAL PROTEIN g/dL 6 8   BILIRUBIN TOTAL mg/dL 0 50   ALK PHOS U/L 123*   ALT U/L 38   AST U/L 16   GLUCOSE RANDOM mg/dL 250*         Results from last 7 days  Lab Units 04/04/18  0655   INR   1 04         Imaging: I have personally reviewed pertinent reports        Xr Chest Portable     Result Date: 4/4/2018  Narrative: CHEST INDICATION:   Shortness of breath  COMPARISON:  10/11/2017, 1/2/2017  EXAM PERFORMED/VIEWS:  XR CHEST PORTABLE FINDINGS: Heart shadow is enlarged but unchanged from prior exam  There is vascular congestion  Possible small bilateral pleural effusions  Stable elevated left hemidiaphragm  Sternal wires are present  Visualized osseous structures appear otherwise unremarkable for the patient's age       Impression: Vascular congestion, with possible small bilateral pleural effusions  Workstation performed: [id number]      Xr Foot 3+ Views Left     Result Date: 4/4/2018  Narrative: LEFT FOOT INDICATION:   Open wound lateral aspect left foot  COMPARISON:  10/13/2017 VIEWS:  XR FOOT 3+ VW LEFT FINDINGS: There is no acute fracture or dislocation  Degenerative changes of the 1st MTP joint  No lytic or blastic lesions seen  Soft tissues are unremarkable  No evidence of soft tissue gas or radiopaque foreign body       Impression: No acute osseous abnormality  Workstation performed: [id number]      Vas Lower Limb Venous Duplex Study, Complete Bilateral     Result Date: 4/4/2018               CONCLUSION: RIGHT LOWER LIMB: No evidence of acute or chronic deep vein thrombosis  No evidence of superficial thrombophlebitis noted  Doppler evaluation shows a normal response to augmentation maneuvers  The femoral to tibioperoneal bypass graft is patent  LEFT LOWER LIMB: No evidence of acute or chronic deep vein thrombosis   No evidence of superficial thrombophlebitis noted  Doppler evaluation shows a normal response to augmentation maneuvers  Popliteal, posterior tibial and anterior tibial arterial Doppler waveforms are monophasic  Note: Limited calf veins due to edema         EKG, Pathology, and Other Studies Reviewed on Admission:   · EKG: afib       ED Treatment:   Medication Administration from 04/04/2018 0635 to 04/04/2018 1522       Date/Time Order Dose Route Action Action by Comments     04/04/2018 0810 ampicillin-sulbactam (UNASYN) 3 g in sodium chloride 0 9 % 100 mL IVPB 0 g Intravenous Stopped Malini Huerta RN      04/04/2018 0720 ampicillin-sulbactam (UNASYN) 3 g in sodium chloride 0 9 % 100 mL IVPB 3 g Intravenous New Bag Malini Huerta RN      04/04/2018 0745 nitroglycerin (NITRO-BID) 2 % TD ointment 0 5 inch 0 inch Topical Hold Malini Huerta RN pt became hypotensive  BP 88/59  MD notified  04/04/2018 0705 nitroglycerin (NITRO-BID) 2 % TD ointment 0 5 inch 0 5 inch Topical Given Kristie Looney RN      04/04/2018 7441 furosemide (LASIX) injection 20 mg 20 mg Intravenous Given Kristie Looney RN      04/04/2018 1245 ibuprofen (MOTRIN) tablet 600 mg 600 mg Oral Given Anne Huerta RN      04/04/2018 1330 furosemide (LASIX) injection 40 mg 40 mg Intravenous Given Kristie Looney RN           Past Medical/Surgical History:    Active Ambulatory Problems     Diagnosis Date Noted    Rapid atrial fibrillation ([facility] ) 06/22/2016    Acute on chronic diastolic congestive heart failure ([facility] ) 06/22/2016    Benign essential hypertension 06/22/2016    Acute respiratory failure with hypoxia (HCC) 01/03/2017    Type 2 diabetes mellitus ([address] ) 01/03/2017    MADISON (dyspnea on exertion) 01/03/2017    Cough 01/03/2017    S/P aortic valve replacement with bioprosthetic valve 10/11/2017    Acute encephalopathy 10/11/2017    Nasal laceration, sequela 10/11/2017    Coronary artery disease involving native coronary artery of native heart 10/11/2017    Chronic kidney disease, stage 3 10/12/2017    Closed nondisplaced fracture of greater tuberosity of left humerus 10/12/2017    Cellulitis 10/13/2017    Edema 02/08/2018    Hyperparathyroidism ([facility] ) 02/08/2018    Vitamin D deficiency 02/08/2018    Pain in left shoulder 02/16/2018    Peripheral arterial disease ([place] [address] ) 02/22/2018    Atherosclerosis of artery of extremity with ulceration ([place] [address] ) 03/20/2018    Aortic valve disorder 07/10/2015    Asymptomatic bilateral carotid artery stenosis 07/10/2014    Congestive heart failure ([place] [address] ) 01/10/2017    Esophageal reflux 09/07/2012    Impingement syndrome of left shoulder 11/28/2017    Mild mitral regurgitation 07/10/2015    Monoclonal gammopathy of undetermined significance 09/16/2013    Non-proliferative diabetic retinopathy ([place] [address] ) 05/13/2015    Peripheral vascular disease ([place] [address] ) 01/14/2015     Resolved Ambulatory Problems     Diagnosis Date Noted    Hypoxia 01/03/2017    Bronchitis 01/03/2017     Past Medical History:   Diagnosis Date    Cardiac disease     CHF (congestive heart failure) ([place] [address] )     Diabetes mellitus ([place] [address] )     Hypertension     Renal disorder        Admitting Diagnosis: CHF (congestive heart failure) ([facility]) [I50 9]  SOB (shortness of breath) [R06 02]  Elevated troponin [R74 8]  Acute on chronic diastolic congestive heart failure (HCC) [I50 33]  Cellulitis of foot, left [L03 116]  Acute-on-chronic kidney injury ([facility] ) [N17 9, N18 9]    Age/Sex: [de-identified] y o  male    Assessment/Plan:   Hospital Problem List:      Principal Problem:    Acute on chronic diastolic congestive heart failure (HCC)  Active Problems:    Paroxysmal atrial fibrillation (HCC)    Benign essential hypertension    Acute respiratory failure with hypoxia ([facility])    Type 2 diabetes mellitus ([facility])    S/P aortic valve replacement with bioprosthetic valve    Acute-on-chronic kidney injury ([facility] )    Cellulitis of foot, left    Non-ST elevation myocardial infarction (NSTEMI) ([facility] )        Plan for the Primary Problem(s):  · Acute on chronic diastolic congestive heart failure possibly precipitated by noncompliance with meds and diet/AFib  ? Start IV Lasix 40 mg q 8 hours hourly  ? Monitor I&Os  ? Daily weights  ? Cardiology evaluation     Plan for Additional Problems:   · Acute on chronic kidney disease:  Most likely due to fluid overload  Monitor with diuretics  Will consult Nephrology, being followed up in outpatient setting by Dr Charlie Garcia, he has appointment this week  · Paroxysmal atrial fibrillation:  Seems to be in AFib currently on monitor  Will continue to monitor on telemetry  Not on anticoagulation as it was stopped by his cardiologist due to falls  Continue rate control measures  Cardiology evaluation  · Cellulitis of left lower extremity and left heel ulcer: Ancef intravenously  Follow blood cultures   , consult podiatry for heel wound  · Type 2 diabetes mellitus: Will put him on Lantus and mealtime insulin  Hold 70 30 for now  Monitor blood sugars closely  · Status bioprosthetic AVR:  Cardiology follow-up  · Hypertension:  Resume outpatient medications and monitor  · non ST-elevation myocardial infarction:  Likely type 2 due to CHF  Continue to monitor  Currently no chest pain     VTE Prophylaxis: Heparin  / sequential compression device and foot pump applied   Code Status:   Ful code per discussion with patient and daughter at bedside  POLST: There is no POLST form on file for this patient (pre-hospital)     Anticipated Length of Stay:  Patient will be admitted on an Inpatient basis with an anticipated length of stay of  > 2 midnights  Justification for Hospital Stay:  Acute CHF     Consultation - Nephrology   Bertha Springer  [de-identified] y o  male MRN: [id number]  Unit/Bed#: -01 Encounter: [id number]           Assessment/Plan         Assessment:  1    Acute kidney injury/acute renal failure on stage 4 chronic kidney disease:  Baseline creatinine is 1 8-2 2 likely secondary to hypertensive nephrosclerosis plus or minus renal vascular disease  Acute kidney injury likely secondary to fluid overload  Creatinine is 2 7  Continue with the intravenous diuretics but increase the dose to 80 IV q 8     2  Fluid overload:  As above also check urinalysis fluid restrict to check daily weights     3    Anemia secondary to chronic kidney disease:  Hemoglobin is 11 1  Plan:  As above       Admission Orders:  YOLANDA [email or web address]  O2  REG DIET  OOB  PT  TELE  DAILY WEIGHTS  SEQ COMP DEVICE  CONSULT CARDIOLOGY  CONSULT NEUROLOGY  CONSULT NEPHROLOGY  CONSULT PODIATRY  BLADDER SCAN  ORTHO BP   AMBULATE    Scheduled Meds:   Current Facility-Administered Medications:  acetaminophen 650 mg Oral Q6H PRN Chase Alberto MD    aspirin 81 mg Oral Daily Chase Alberto MD    atorvastatin 40 mg Oral Daily With Dinner Chase Alberto MD    calcium carbonate 1,000 mg Oral Daily PRN Chase Alberto MD    ceFAZolin 1,000 mg Intravenous Q12H Chase Alberto MD Last Rate: 1,000 mg (04/05/18 9239)   docusate sodium 100 mg Oral BID PRN Chase Alberto MD    furosemide 60 mg Intravenous TID (diuretic) Placido Wayne PA-C    heparin (porcine) 5,000 Units Subcutaneous Q8H [facility] Chase Alberto MD    insulin glargine 20 Units Subcutaneous HS Chase Alberto MD    insulin lispro 1-5 Units Subcutaneous TID AC Chase Alberto MD    insulin lispro 1-5 Units Subcutaneous HS Chase Alberto MD    insulin lispro 10 Units Subcutaneous TID With Meals Chase Alberto MD    metoprolol tartrate 75 mg Oral BID Lisseth Seals PA-C    ondansetron 4 mg Intravenous Q6H PRN Chase Alberto MD    pantoprazole 40 mg Oral Early Morning Chase Alberto MD      Continuous Infusions:    PRN Meds:   acetaminophen    calcium carbonate    docusate sodium    ondansetron

## 2018-04-06 NOTE — PROGRESS NOTES
Progress Note - Vanessa Mancilla  1935, [de-identified] y o  male MRN: [id number]    Unit/Bed#: -01 Encounter: [id number]    Primary Care Provider: Janes Degroot MD   Date and time admitted to hospital: 4/4/2018  6:39 AM  * Acute on chronic diastolic congestive heart failure ([facility] )   Assessment & Plan    · Likely due to patient's noncompliance with his torsemide while on vacation  · Continue Lasix infusion as ordered by Cardiology and Nephrology--appreciate their consultations  · Monitor weights and I/Os, Na restricted diet  · Monitor O2 needs  · Continue beta-blocker with hold parameters, however we need to keep him off his ACE-inhibitor for the time being given his hypotension  · He continues to have severe leg edema and does not report much improvement overnight overall        Rapid atrial fibrillation (HCC)   Assessment & Plan    · Continue metoprolol with doses to be given 4 times a day 25 mg but with hold parameters to avoid hypotension  · Not on anticoagulation  · [facility] consult noted  · Continue telemetry        Acute-on-chronic kidney injury ([facility] )   Assessment & Plan    · Renal input appreciated  · NO NSAIDS  · HOLD ACE-I, avoid hypotension  · Monitor BMP  · Creatinine remains above baseline  · Nursing also reports to me that patient required 2 clean intermittent catheterizations for retention  Will continue to assess output and PVR--I added Flomax        Type 2 diabetes mellitus (HCC)   Assessment & Plan    · Monitor on AccuChecks/SSI  · A1c 7 6  · Continue lantus and novolog        Cellulitis of foot, left   Assessment & Plan    · Patient with longstanding wound on his left heel which has been addressed by his podiatrist Dr Angel Choe and by vascular surgery as an outpatient  · Upon arrival to the hospital there was some concern for surrounding cellulitis so we initiated IV Ancef    Exam today suggests against any significant cellulitis but will await his podiatry consultation today--likely could be converted to oral antibiotics at any point  · I spoke with patient's podiatrist Tayler Soto yesterday and he reported to me that patient has nonhealing left wound should be evaluated by vascular  I put in the consultation to them and noted that a lower extremity arterial Doppler was ordered which did demonstrate poor NOEL reading  Defer to decision from vascular surgery today regarding what additional workup should be done at this time during his inpatient stay and await formal consultation which is expected to be done by Podiatry today as well  · Continue local care  · Patient reports lots of pain but does not want to take any medications except for Tylenol which we have agreed upon        Non-ST elevation myocardial infarction (NSTEMI) ([facility])   Assessment & Plan    · Mild troponin elevation likely the result of type 2 non ST elevation MI from heart failure and renal failure  · Cardiology consult appreciated        Peripheral arterial disease ([facility] )   Assessment & Plan    · Vascular consulted, see note above regarding left wound on heel          VTE Pharmacologic Prophylaxis:   Pharmacologic: Heparin  Mechanical VTE Prophylaxis in Place: No    Patient Centered Rounds: Spoke with nursing    Discussions with Specialists or Other Care Team Provider:  Case management, vascular surgery    Education and Discussions with Family / Patient:     Time Spent for Care: 25 minutes  More than 50% of total time spent on counseling and coordination of care as described above      Current Length of Stay: 2 day(s)    Current Patient Status: Inpatient   Certification Statement: The patient will continue to require additional inpatient hospital stay due to Lasix drip    Discharge Plan:  Not medically stable for discharge as he still needs vascular input and has ongoing IV Lasix ordered    Code Status: Level 1 - Full Code      Subjective:   Patient complains of a lot of pain in his foot but also says he will not take anything stronger for and he will just deal with it  He reports his breathing is bad if he lays down and is better if he sits up  He does not report feeling much better than yesterday overall  Objective:     Vitals:   Temp (24hrs), Av °F (36 7 °C), Min:97 8 °F (36 6 °C), Max:98 2 °F (36 8 °C)    HR:  [101-116] 115  Resp:  [20] 20  BP: ()/(50-72) 129/68  SpO2:  [97 %-100 %] 100 %  Body mass index is 33 8 kg/m²  Input and Output Summary (last 24 hours): Intake/Output Summary (Last 24 hours) at 18 1208  Last data filed at 18 0900   Gross per 24 hour   Intake              240 ml   Output             1550 ml   Net            -1310 ml       Physical Exam:     Physical Exam   Constitutional: He appears well-developed and well-nourished  No distress  HENT:   Head: Normocephalic and atraumatic  Eyes: Conjunctivae are normal  Right eye exhibits no discharge  Left eye exhibits no discharge  No scleral icterus  Neck: Neck supple  Cardiovascular: Normal rate and regular rhythm  No murmur heard  Pulmonary/Chest: No respiratory distress  He has no wheezes  He has rales (Mild bibasilar crackles noted)  Sitting up in the chair appears to be okay   Abdominal: Soft  He exhibits no distension  There is no tenderness  Musculoskeletal: He exhibits edema (Ongoing severe bilateral lower extremity edema)  Neurological: He is alert  Awake alert and interactive   Skin: Skin is warm and dry  He is not diaphoretic  There is erythema  No obvious erythema noted although his wound is currently dressed   Psychiatric: He has a normal mood and affect  His behavior is normal  Judgment and thought content normal    Nursing note and vitals reviewed        Additional Data:     Labs:      Results from last 7 days  Lab Units 18  0548  18  0655   WBC Thousand/uL 8 73  < > 8 31   HEMOGLOBIN g/dL 9 6*  < > 11 1*   HEMATOCRIT % 31 9*  < > 35 5*   PLATELETS Thousands/uL 175  < > 248   NEUTROS PCT %  --   --  76*   LYMPHS PCT %  --   --  14   MONOS PCT %  --   --  8   EOS PCT %  --   --  2   < > = values in this interval not displayed  Results from last 7 days  Lab Units 04/06/18  0548  04/04/18  0655   SODIUM mmol/L 141  < > 137   POTASSIUM mmol/L 5 2  < > 5 3   CHLORIDE mmol/L 102  < > 101   CO2 mmol/L 28  < > 25   BUN mg/dL 78*  < > 75*   CREATININE mg/dL 2 63*  < > 2 71*   CALCIUM mg/dL 9 5  < > 9 0   TOTAL PROTEIN g/dL  --   --  6 8   BILIRUBIN TOTAL mg/dL  --   --  0 50   ALK PHOS U/L  --   --  123*   ALT U/L  --   --  38   AST U/L  --   --  16   GLUCOSE RANDOM mg/dL 153*  < > 250*   < > = values in this interval not displayed  Results from last 7 days  Lab Units 04/04/18  0655   INR  1 04       * I Have Reviewed All Lab Data Listed Above  * Additional Pertinent Lab Tests Reviewed: All Labs Within Last 24 Hours Reviewed    Imaging:    Imaging Reports Reviewed Today Include:   Imaging Personally Reviewed by Myself Includes:      Recent Cultures (last 7 days):       Results from last 7 days  Lab Units 04/04/18  0715 04/04/18  0655   BLOOD CULTURE  No Growth at 48 hrs  No Growth at 48 hrs         Last 24 Hours Medication List:     Current Facility-Administered Medications:  acetaminophen 975 mg Oral Q8H [facility] Lisseth Seals PA-C    albumin human 25 g Intravenous Q8H Tasha Mejias MD    albuterol 2 5 mg Nebulization Q6H PRN Chase Alberto MD    aspirin 81 mg Oral Daily Chase Alberto MD    atorvastatin 40 mg Oral Daily With Dinner Chase Alberto MD    calcium carbonate 1,000 mg Oral Daily PRN Chase Alberto MD    ceFAZolin 1,000 mg Intravenous Q12H Chase Alberto MD Last Rate: 1,000 mg (04/06/18 6815)   docusate sodium 100 mg Oral BID PRN Chase Alberto MD    furosemide 20 mg/hr Intravenous Continuous Tasha Mejias MD Last Rate: 20 mg/hr (04/06/18 7092)   heparin (porcine) 5,000 Units Subcutaneous Q8H [facility] Chase Alberto MD    insulin glargine 20 Units Subcutaneous  Chase MD Reym    insulin lispro 1-5 Units Subcutaneous TID AC Chase Alberto MD    insulin lispro 1-5 Units Subcutaneous HS Chase Alberto MD    insulin lispro 10 Units Subcutaneous TID With Meals Chase Alberto MD    metoprolol tartrate 25 mg Oral 4x Daily Ardaja Kaurh, DO    ondansetron 4 mg Intravenous Q6H PRN Chase Alberto MD    pantoprazole 40 mg Oral Early Morning Chase Alberto MD    tamsulosin 0 4 mg Oral Daily With Dinner Mike Camarillo PA-C         Today, Patient Was Seen By: Mike Camarillo PA-C    ** Please Note: Dictation voice to text software may have been used in the creation of this document   **

## 2018-04-06 NOTE — ASSESSMENT & PLAN NOTE
SABINE on CKD III   -creatinine down to 2 63  (baseline about 1 8 - 2 2)    -nephrology following   - monitor on diuretics

## 2018-04-06 NOTE — CONSULTS
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, hallux and lateral heel  2  DM neuropathy  3  Severe PAD  4  CHF with LE edema  5  Ischemic changes to right 1st and 2nd digit without gangrene    Plan:  1  Patient has dry gangrene to the left foot  There is erythema however are reviewed numerous pictures the family had dating back to October  There is a baseline level of erythema that he consistently has in his foot  I do not feel this is cellulitic in nature at this time and would recommend holding IV antibiotics and monitoring closely  2   Betadine and gauze to all areas of ischemia or gangrene  Offloading bed with Prevalon boots  3   I reviewed the patient's recent angiogram   He has severe disease of his left lower extremity  Unfortunately due to his kidney injury and acute exacerbation of heart failure he is not a current candidate for any vascular intervention  I agree with the vascular consult of taking care of his fluid overload and kidneys for the moment with peripheral monitoring  If he were to stabilize I would recommend then proceeding with revascularization of the lower extremity  The x-rays do not show any sign of gas or osteomyelitis in the foot that would warrant any acute surgical intervention by myself  We will continue to monitor while in-house  4   I discussed this patient with his regular podiatrist, Travis Purvis, who was out of town this weekend  He asked that I would maintain care of the patient which I am happy to provide  Patient can follow up with Dr Jose Bhandari upon discharge  History of Present Illness     HPI:  Hugh Mail  is a [de-identified] y o  male who presents with acute on chronic congestive heart failure with acute on chronic kidney injury  I was asked to evaluate his lower extremities    Patient has extensive lower extremity history which I will try to summarize here  Approximately 15 years ago he had a right lower extremity bypass by Dr Kaushal Mueller for ischemic foot  Since that time the right lower extremity has been somewhat without complications  He has had known left lower extremity arterial disease for quite some time as well  Last October he began to develop small black areas on his toes  He frequently has redness to his foot the tends to fluctuate  He also gets chronic swelling of his feet  On December 22, 2017 the patient underwent a left lower extremity angiogram   The report states there is extensive occlusions of the mid SFA and below-knee popliteal tibial peroneal trunk therefore no intervention was attempted  He would be a candidate for a profundus to mid peroneal bypass should he go on to limb threatening ischemia  He has been monitored at the vascular Center for these ischemic changes  He has been treating the gangrene with Betadine and offloading  Patient also sees his podiatrist, Anamaria Quintana regularly for toenail trimming and monitoring of the dry gangrene  One month ago there was an area on the lateral left ankle which had concern for a growing abscess  This was explored at the podiatry office  After the exploration that area turned black  It has not been draining or having of foul odor  They have been applying Betadine to this area as well  Patient has a very difficult time elevating his legs due to his CHF  He feels like he is drowning when he lies down  He does not wear compression stockings  He broke a toe last fall and is very unbalanced on his feet  He does try to shuffle which his family states he is successful at performing  He has not followed within the last few weeks  Over the last month however the 5th toe began to turn black  As did the side of his left heel  Patient had stopped taking his torsemide for 5 days while on vacation    We came back he notices legs were incredibly swollen he was short of breath  He came to the emergency department approximately 48 hours ago  He is resting at bedside currently with his feet on the floor  He is still short of breath on exertion and notes the extreme swelling in his legs  He does not have any acute pain in his feet but does have a history of neuropathy  Consults  Review of Systems   Constitutional:  Tired  HENT: Negative  Eyes: Negative  Respiratory:  Short of breath  Cardiovascular: Negative  Gastrointestinal: Negative  Musculoskeletal:  Swelling   Skin:  Black changes to left foot   Neurological: Negative      Psych: negative      Historical Information   Past Medical History:   Diagnosis Date    Cardiac disease     [facility] [address] [facility]     Diabetes mellitus [facility] [address] [facility]     Hypertension     Renal disorder      Past Surgical History:   Procedure Laterality Date    AORTIC VALVE REPLACEMENT      CARDIAC VALVE REPLACEMENT      CATARACT EXTRACTION, BILATERAL      COLONOSCOPY      IL SLCTV CATHJ 3RD+ ORD SLCTV ABDL PEL/LXTR [facility] Left 12/22/2017    Procedure: LEG ANGIOGRAM; RIGHT FEMORAL ACCESS; CO2-CONTRAST ;  Surgeon: Julia Vazquez MD;  Location: [facility];  Service: Vascular    REPLACEMENT TOTAL KNEE BILATERAL      VASCULAR SURGERY       Social History   History   Alcohol Use No     History   Drug Use No     History   Smoking Status    Former Smoker    Quit date: 10/11/1987   Smokeless Tobacco    Never Used     Family History:   Family History   Problem Relation Age of Onset    Cancer Mother     Heart disease Father     Diabetes Sister     Heart disease Sister     Diabetes Brother        Meds/Allergies   Prescriptions Prior to Admission   Medication    aspirin (ECOTRIN LOW STRENGTH) 81 mg EC tablet    atorvastatin (LIPITOR) 40 mg tablet    febuxostat (ULORIC) 40 mg tablet    metoprolol tartrate (LOPRESSOR) 25 mg tablet    metoprolol tartrate (LOPRESSOR) 50 mg tablet    multivitamin-iron-minerals-folic acid (CENTRUM) chewable tablet    NOVOLOG MIX 70/30 FLEXPEN (70-30) 100 UNIT/ML SUPN    omeprazole (PriLOSEC) 20 mg delayed release capsule    paricalcitol (ZEMPLAR) 1 mcg capsule    ramipril (ALTACE) 2 5 mg capsule    torsemide (DEMADEX) 20 mg tablet     Allergies   Allergen Reactions    Codeine      "swelling shaky"    Hydrocodone      Other reaction(s): Other (See Comments)  Heart racing       Objective   First Vitals:   Blood Pressure: 120/60 (04/04/18 0646)  Pulse: (!) 111 (04/04/18 0646)  Temperature: 97 8 °F (36 6 °C) (04/04/18 0647)  Temp Source: Oral (04/04/18 0647)  Respirations: (!) 24 (04/04/18 0646)  Height: 5' 7" (170 2 cm) (04/04/18 1543)  Weight - Scale: 106 kg (233 lb 0 4 oz) (04/04/18 1543)  SpO2: 98 % (04/04/18 0646)    Current Vitals:   Blood Pressure: 126/68 (04/06/18 1519)  Pulse: (!) 110 (04/06/18 1519)  Temperature: 98 °F (36 7 °C) (04/06/18 1519)  Temp Source: Oral (04/06/18 1519)  Respirations: 22 (04/06/18 1519)  Height: 5' 7" (170 2 cm) (04/05/18 1407)  Weight - Scale: 97 9 kg (215 lb 12 8 oz) (04/06/18 0618)  SpO2: 100 % (04/06/18 1519)        /68 (BP Location: Right arm)   Pulse (!) 110   Temp 98 °F (36 7 °C) (Oral)   Resp 22   Ht 5' 7" (1 702 m)   Wt 97 9 kg (215 lb 12 8 oz)   SpO2 100%   BMI 33 80 kg/m²   Physical Exam:  General:    Alert, cooperative, no distress    Obese   Head:    Normocephalic, without obvious abnormality, atraumatic   Eyes:    PERRL, conjunctiva/corneas clear, EOM's intact            Nose:   Moist mucous membranes, no drainage or sinus tenderness   Throat:   No tenderness, no exudates   Neck:   Supple, symmetrical, trachea midline, no JVD   Back:     Symmetric, no CVA tenderness   Lungs:     respirations shallow and labored   Chest wall:    No tenderness or deformity   Heart:    Regular rate and rhythm, S1 and S2 normal   Abdomen:     Soft, non-tender, bowel sounds active all four quadrants           Extremities:   Severe pitting edema bilateral lower extremity  Nonpalpable pedal pulses  Capillary refill to the digits is less than 5 seconds  Regarding the right foot there are some duskiness changes to the hallux and 2nd digit  There is no overt gangrene or sign of infection  Regarding the left foot:  There is dry gangrene to the 5th digit great toe and lateral aspect of the heel  The area on the heel measures approximately 5 x 2 cm  There is no probe to deeper tissue  There is no drainage or malodor  There is redness to the foot however this appears to be more dependent rubor than cellulitic in nature  No calf pain with compression  Diminished protective sensation bilateral lower extremity               Neurologic:   CNII-XII intact  Lab Results:   Admission on 04/04/2018   Component Date Value    WBC 04/04/2018 8 31     RBC 04/04/2018 4 15     Hemoglobin 04/04/2018 11 1*    Hematocrit 04/04/2018 35 5*    MCV 04/04/2018 86     MCH 04/04/2018 26 7*    MCHC 04/04/2018 31 3*    RDW 04/04/2018 15 0     MPV 04/04/2018 9 9     Platelets 29/85/2991 248     Neutrophils Relative 04/04/2018 76*    Lymphocytes Relative 04/04/2018 14     Monocytes Relative 04/04/2018 8     Eosinophils Relative 04/04/2018 2     Basophils Relative 04/04/2018 0     Neutrophils Absolute 04/04/2018 6 29     Lymphocytes Absolute 04/04/2018 1 17     Monocytes Absolute 04/04/2018 0 66     Eosinophils Absolute 04/04/2018 0 18     Basophils Absolute 04/04/2018 0 01     Protime 04/04/2018 13 9     INR 04/04/2018 1 04     PTT 04/04/2018 29     Blood Culture 04/04/2018 No Growth at 48 hrs   Blood Culture 04/04/2018 No Growth at 48 hrs       Sodium 04/04/2018 137     Potassium 04/04/2018 5 3     Chloride 04/04/2018 101     CO2 04/04/2018 25     Anion Gap 04/04/2018 11     BUN 04/04/2018 75*    Creatinine 04/04/2018 2 71*    Glucose 04/04/2018 250*    Calcium 04/04/2018 9 0     AST 04/04/2018 16     ALT 04/04/2018 38     Alkaline Phosphatase 04/04/2018 123*    Total Protein 04/04/2018 6 8     Albumin 04/04/2018 3 3*    Total Bilirubin 04/04/2018 0 50     eGFR 04/04/2018 21     Total CK 04/04/2018 65     LACTIC ACID 04/04/2018 1 8     CRP, High Sensitivity 04/04/2018 50 62     NT-proBNP 04/04/2018 5207*    Troponin I 04/04/2018 0 24*    D-Dimer, Quant 04/04/2018 1436*    Ventricular Rate 04/04/2018 103     Atrial Rate 04/04/2018 121     QRSD Interval 04/04/2018 138     QT Interval 04/04/2018 346     QTC Interval 04/04/2018 453     P Axis 04/04/2018 143     QRS Axis 04/04/2018 -40     T Wave Axis 04/04/2018 126     pH, Prem 04/04/2018 7  341     pCO2, Prem 04/04/2018 47 3     pO2, Prem 04/04/2018 28 1*    HCO3, Prem 04/04/2018 25 0     Base Excess, Prem 04/04/2018 -1 1     O2 Content, Prem 04/04/2018 8 1     O2 HGB, VENOUS 04/04/2018 47 9*    TSH 3RD GENERATON 04/04/2018 3 272     Troponin I 04/04/2018 0 24*    POC Glucose 04/04/2018 122     POC Glucose 04/04/2018 169*    POC Glucose 04/04/2018 184*    Color, UA 04/05/2018 Keshia     Clarity, UA 04/05/2018 Cloudy     pH, UA 04/05/2018 7 0     Leukocytes, UA 04/05/2018 Trace*    Nitrite, UA 04/05/2018 Negative     Protein, UA 04/05/2018 30 (1+)*    Glucose, UA 04/05/2018 Negative     Ketones, UA 04/05/2018 Trace*    Urobilinogen, UA 04/05/2018 >=8 0*    Bilirubin, UA 04/05/2018 Interference- unable to analyze*    Blood, UA 04/05/2018 Moderate*    Specific Gravity, UA 04/05/2018 1 025     Clarity, UA 04/05/2018 Clear     Color, UA 04/05/2018 Yellow     Specific Gravity, UA 04/05/2018 1 015     pH, UA 04/05/2018 5 0     Glucose, UA 04/05/2018 Negative     Ketones, UA 04/05/2018 Negative     Blood, UA 04/05/2018 Negative     Protein, UA 04/05/2018 Negative     Nitrite, UA 04/05/2018 Negative     Bilirubin, UA 04/05/2018 Negative     Urobilinogen, UA 04/05/2018 0 2     Leukocytes, UA 04/05/2018 Negative     WBC, UA 04/05/2018 0-1*    RBC, UA 04/05/2018 None Seen  Bacteria, UA 04/05/2018 None Seen     Epithelial Cells 04/05/2018 None Seen     Sodium 04/05/2018 140     Potassium 04/05/2018 5 0     Chloride 04/05/2018 101     CO2 04/05/2018 26     Anion Gap 04/05/2018 13     BUN 04/05/2018 76*    Creatinine 04/05/2018 2 69*    Glucose 04/05/2018 141*    Calcium 04/05/2018 9 8     eGFR 04/05/2018 21     Magnesium 04/05/2018 2 5     WBC 04/05/2018 9 64     RBC 04/05/2018 4 04     Hemoglobin 04/05/2018 10 8*    Hematocrit 04/05/2018 34 8*    MCV 04/05/2018 86     MCH 04/05/2018 26 7*    MCHC 04/05/2018 31 0*    RDW 04/05/2018 14 9     Platelets 01/33/8803 226     MPV 04/05/2018 9 7     POC Glucose 04/05/2018 149*    POC Glucose 04/05/2018 370*    POC Glucose 04/05/2018 128     POC Glucose 04/05/2018 101     Sodium 04/06/2018 141     Potassium 04/06/2018 5 2     Chloride 04/06/2018 102     CO2 04/06/2018 28     Anion Gap 04/06/2018 11     BUN 04/06/2018 78*    Creatinine 04/06/2018 2 63*    Glucose 04/06/2018 153*    Calcium 04/06/2018 9 5     eGFR 04/06/2018 22     Magnesium 04/06/2018 2 5     WBC 04/06/2018 8 73     RBC 04/06/2018 3 67*    Hemoglobin 04/06/2018 9 6*    Hematocrit 04/06/2018 31 9*    MCV 04/06/2018 87     MCH 04/06/2018 26 2*    MCHC 04/06/2018 30 1*    RDW 04/06/2018 14 9     Platelets 56/06/0926 175     MPV 04/06/2018 9 7     POC Glucose 04/06/2018 181*    POC Glucose 04/06/2018 199*    POC Glucose 04/06/2018 255*     Imaging: I have personally reviewed pertinent films in PACS  XRAY left foot INDICATION:   Open wound lateral aspect left foot      COMPARISON:  10/13/2017     VIEWS:  XR FOOT 3+ VW LEFT         FINDINGS:     There is no acute fracture or dislocation      Degenerative changes of the 1st MTP joint      No lytic or blastic lesions seen      Soft tissues are unremarkable  No evidence of soft tissue gas or radiopaque foreign body      IMPRESSION:     No acute osseous abnormality      EKG, Pathology, and Other Studies: I have personally reviewed pertinent reports  Code Status: Level 1 - Full Code  Advance Directive and Living Will:      Power of :    POLST:        Portions of the record may have been created with voice recognition software  Occasional wrong word or "sound a like" substitutions may have occurred due to the inherent limitations of voice recognition software  Read the chart carefully and recognize, using context, where substitutions have occurred

## 2018-04-06 NOTE — ASSESSMENT & PLAN NOTE
S/p CABG/AVR (bioprosthetic) 6/20/02     - mildly elevated Troponin in setting of acute CHF   -continue current medical regimen   -cardiology following

## 2018-04-06 NOTE — ASSESSMENT & PLAN NOTE
Currently in decompensated CHF with SABINE/CKD; remains SOB/ orthopneic   900/1450; wt reported down     - Diuretics as per primary team; currently on insulin infusion   - monitor renal function/ Is/Os/wts

## 2018-04-06 NOTE — ASSESSMENT & PLAN NOTE
· Renal input appreciated  · NO NSAIDS  · HOLD ACE-I, avoid hypotension  · Monitor BMP  · Creatinine remains above baseline  · Nursing also reports to me that patient required 2 clean intermittent catheterizations for retention    Will continue to assess output and PVR--I added Flomax

## 2018-04-06 NOTE — TREATMENT PLAN
Daughter at bedside  Nursing team called that patient wanted to discuss  Came to see patient and daughter  Daughter had concerns regarding poor UOP which I stated is my same concern also  Daughter asked for the plan  I reviewed that we are increasing furosemide gtt, adding metolazone  Daughter asked regarding checking renal u/s  I stated this is ok to check, but may not change current management  Will check     Daughter asked about next steps if this does not work/help patient    I stated next step is bumex gtt, but if becomes oliguric and no etiology found on u/s then need to consider dialysis/UF    Daughter asked about thirst since we are fluid restricting  I stated we are monitoring Na levels  All questions were answered

## 2018-04-06 NOTE — PROGRESS NOTES
Cardiology Progress Note - Marni Praish  [de-identified] y o  male MRN: [id number]    Unit/Bed#: -01 Encounter: [id number]      Assessment:  1  Acute on chronic diastolic CHF  2  Paroxysmal atrial fibrillation/flutter  3  SABINE with CKD III  4  CAD s/p remote CABG  5  Aortic valve disease s/p bioprosthetic AVR  6  Hypertension  7  Dyslipidemia    Plan:  1  On Lasix gtt with negative fluid balance, weight down  2  Creatinine slightly improved  3  Remains with narrow complex tachycardia - suspect flutter, ECG this AM  4  Only received one dose of metoprolol yesterday AM and this AM, continue as blood pressure allows - will change hold parameters   5  Limited options for rate control - creatinine precludes digoxin, no amiodarone given not on AC and refuses due to falls in past  6  AM  BMP     Subjective:   Patient seen and examined  No significant events overnight  Objective:     Vitals: Blood pressure 129/68, pulse (!) 115, temperature 98 2 °F (36 8 °C), temperature source Oral, resp  rate 20, height 5' 7" (1 702 m), weight 97 9 kg (215 lb 12 8 oz), SpO2 100 %  , Body mass index is 33 8 kg/m² , Orthostatic Blood Pressures    Flowsheet Row Most Recent Value   Blood Pressure  129/68 filed at 04/06/2018 0700   Patient Position - Orthostatic VS  Sitting filed at 04/06/2018 0700            Intake/Output Summary (Last 24 hours) at 04/06/18 0904  Last data filed at 04/06/18 0600   Gross per 24 hour   Intake              240 ml   Output             1250 ml   Net            -1010 ml         Physical Exam:    GEN: Marni Parish   appears well, alert and oriented x 3, pleasant and cooperative   HEENT: pupils equal, round, and reactive to light; extraocular muscles intact  NECK: supple, no carotid bruits, elevated JVP  HEART: regular and tachycardic, normal S1 and S2, no murmur   LUNGS: bibasilar rales   ABDOMEN: normal bowel sounds, soft, no tenderness, no distention  EXTREMITIES: peripheral pulses normal; no clubbing, cyanosis, 2+ edema  NEURO: no focal findings   SKIN: normal without suspicious lesions on exposed skin    Medications:      Current Facility-Administered Medications:     acetaminophen (TYLENOL) tablet 650 mg, 650 mg, Oral, Q6H PRN, Chase Alberto MD    albumin human (FLEXBUMIN) 25 % injection 25 g, 25 g, Intravenous, Q8H, Lana Padilla MD, 25 g at 04/06/18 0209    albuterol inhalation solution 2 5 mg, 2 5 mg, Nebulization, Q6H PRN, Chase Alberto MD    aspirin (ECOTRIN LOW STRENGTH) EC tablet 81 mg, 81 mg, Oral, Daily, Chase Alberto MD, 81 mg at 04/06/18 0826    atorvastatin (LIPITOR) tablet 40 mg, 40 mg, Oral, Daily With Raya Alberto MD, 40 mg at 04/05/18 1636    calcium carbonate (TUMS) chewable tablet 1,000 mg, 1,000 mg, Oral, Daily PRN, Chase Alberto MD    ceFAZolin (ANCEF) IVPB (premix) 1,000 mg, 1,000 mg, Intravenous, Q12H, Chase Alberto MD, Last Rate: 100 mL/hr at 04/06/18 0556, 1,000 mg at 04/06/18 0556    docusate sodium (COLACE) capsule 100 mg, 100 mg, Oral, BID PRN, Chase Alberto MD    furosemide (LASIX) 500 mg infusion 50 mL, 20 mg/hr, Intravenous, Continuous, Lana Padilla MD, Last Rate: 2 mL/hr at 04/06/18 0249, 20 mg/hr at 04/06/18 0249    heparin (porcine) subcutaneous injection 5,000 Units, 5,000 Units, Subcutaneous, Q8H [facility] **AND** Platelet count, , , Once, Chase Alberto MD    insulin glargine (LANTUS) subcutaneous injection 20 Units, 20 Units, Subcutaneous, HS, Chase Alberto MD, 10 Units at 04/05/18 2222    insulin lispro (HumaLOG) 100 units/mL subcutaneous injection 1-5 Units, 1-5 Units, Subcutaneous, TID AC, 1 Units at 04/06/18 0827 **AND** Fingerstick Glucose (POCT), , , TID Chase RITCHIE MD    insulin lispro (HumaLOG) 100 units/mL subcutaneous injection 1-5 Units, 1-5 Units, Subcutaneous, HS, Chase Alberto MD, 1 Units at 04/04/18 2154    insulin lispro (HumaLOG) 100 units/mL subcutaneous injection 10 Units, 10 Units, Subcutaneous, TID With Meals, Chase Alberto MD, 10 Units at 04/06/18 0827    metoprolol tartrate (LOPRESSOR) tablet 25 mg, 25 mg, Oral, 4x Daily, Stefan Bolanos DO, 25 mg at 04/06/18 0826    ondansetron (ZOFRAN) injection 4 mg, 4 mg, Intravenous, Q6H PRN, Chase Alberto MD    pantoprazole (PROTONIX) EC tablet 40 mg, 40 mg, Oral, Early Morning, Chase Alberto MD, 40 mg at 04/06/18 0556    tamsulosin (FLOMAX) capsule 0 4 mg, 0 4 mg, Oral, Daily With Jose Seals PA-C, 0 4 mg at 04/05/18 1636     Labs & Results:      Results from last 7 days  Lab Units 04/04/18  1200 04/04/18  0655   CK TOTAL U/L  --  65   TROPONIN I ng/mL 0 24* 0 24*       Results from last 7 days  Lab Units 04/06/18  0548 04/05/18  0642 04/04/18  0655   WBC Thousand/uL 8 73 9 64 8 31   HEMOGLOBIN g/dL 9 6* 10 8* 11 1*   HEMATOCRIT % 31 9* 34 8* 35 5*   PLATELETS Thousands/uL 175 226 248           Results from last 7 days  Lab Units 04/06/18  0548 04/05/18  0642 04/04/18  0655   SODIUM mmol/L 141 140 137   POTASSIUM mmol/L 5 2 5 0 5 3   CHLORIDE mmol/L 102 101 101   CO2 mmol/L 28 26 25   BUN mg/dL 78* 76* 75*   CREATININE mg/dL 2 63* 2 69* 2 71*   CALCIUM mg/dL 9 5 9 8 9 0   TOTAL PROTEIN g/dL  --   --  6 8   BILIRUBIN TOTAL mg/dL  --   --  0 50   ALK PHOS U/L  --   --  123*   ALT U/L  --   --  38   AST U/L  --   --  16   GLUCOSE RANDOM mg/dL 153* 141* 250*       Results from last 7 days  Lab Units 04/04/18  0655   INR  1 04   PTT seconds 29       Results from last 7 days  Lab Units 04/06/18  0548 04/05/18  0642   MAGNESIUM mg/dL 2 5 2 5     Counseling / Coordination of Care  Total floor / unit time spent today 20 minutes  Greater than 50% of total time was spent with the patient and / or family counseling and / or coordination of care

## 2018-04-06 NOTE — ASSESSMENT & PLAN NOTE
· Patient with longstanding wound on his left heel which has been addressed by his podiatrist Dr Jose Bhandari and by vascular surgery as an outpatient  · Upon arrival to the hospital there was some concern for surrounding cellulitis so we initiated IV Ancef  Exam today suggests against any significant cellulitis but will await his podiatry consultation today--likely could be converted to oral antibiotics at any point  · I spoke with patient's podiatrist Travis Purvis yesterday and he reported to me that patient has nonhealing left wound should be evaluated by vascular  I put in the consultation to them and noted that a lower extremity arterial Doppler was ordered which did demonstrate poor NOEL reading  Defer to decision from vascular surgery today regarding what additional workup should be done at this time during his inpatient stay and await formal consultation which is expected to be done by Podiatry today as well    · Continue local care  · Patient reports lots of pain but does not want to take any medications except for Tylenol which we have agreed upon

## 2018-04-06 NOTE — ASSESSMENT & PLAN NOTE
CAD S/P CABG/AVR (bioprosthetic) 6/20/2002   -normally functioning prosthesis by echo 9/2017   -continue optimal medical management as per cardiology

## 2018-04-06 NOTE — ASSESSMENT & PLAN NOTE
· Mild troponin elevation likely the result of type 2 non ST elevation MI from heart failure and renal failure  · Cardiology consult appreciated

## 2018-04-06 NOTE — PROGRESS NOTES
NEPHROLOGY PROGRESS NOTE   Shawn Beltran  [de-identified] y o  male MRN: [id number]  Unit/Bed#: -[place] Encounter: [id number]  Reason for Consult: SABINE on CKD    ASSESSMENT and PLAN:    72-year-old male who presents with shortness of breath on 04/04  Patient had gone to [place] for 5 days and did not take his torsemide  Nephrology is on board for acute on chronic kidney injury     1) SABINE on CKD - baseline Cr 1 8-2 2 mg/dL and today is 2 7 mg/dL  Possible prerenal in setting of cardiorenal vs nephrosarca vs obstructive     - there is concern for urinary retention - pt has required straight cath today  Cont PVR check  - pt did not tolerate high dose furosemide? Today as now hypotensive  But, this appears less likely as pt appears grossly vol overloaded currently on exam  Other possibility is rapid afib leading to hypotension but rate is not sig elevated on tele  Monitor for sepsis  - UA bland  - Cr unchanged on 4/6  Bicarb slightly higher  K slightly higher  - continue holding ACE  - please avoid NSAIDs   - continue albumin but lower slightly in case UOP is decreasing  - furosemide gtt started on 4/5 - increase to 30 mg/hr  If need, may add metolazone  - low K diet  - follow PVR  If retention present - please place viera  - check renal u/s     2) volume overload - see above     3) anemia     4) electrolytes     - low K diet     5) PVD     - vascular consult/studies in progress     6) urinary retention - place viera catheter if needed  PVR pending this evening    SUBJECTIVE / INTERVAL HISTORY:    Patient seen and examined at bedside  Wife is present  Patient states that shortness of breath is slightly better  PVR was 197  Urine output thus far this morning was 400 cc  Blood pressure is 90 to 191 systolic      OBJECTIVE:  Current Weight: Weight - Scale: 97 9 kg (215 lb 12 8 oz)  Vitals:    04/06/18 0317 04/06/18 0618 04/06/18 0700 04/06/18 1300   BP: 106/59  129/68 105/59   BP Location: Right arm  Left arm Right arm Pulse: (!) 116  (!) 115 (!) 112   Resp: 20  20 (!) 24   Temp:   98 2 °F (36 8 °C)    TempSrc:   Oral    SpO2: 97%  100% 100%   Weight:  97 9 kg (215 lb 12 8 oz)     Height:           Intake/Output Summary (Last 24 hours) at 04/06/18 1443  Last data filed at 04/06/18 1353   Gross per 24 hour   Intake              240 ml   Output             1400 ml   Net            -1160 ml       General: NAD  Skin: no rash  Eyes: anicteric sclera  ENT: moist mucous membrane  Neck: supple  Chest: rales to mid lung  CVS: s1s2  Abdomen: soft, nontender  Extremities: 2+ LE edema  : no viera  Neuro: AAOX3  Psych: normal affect    Medications:    Current Facility-Administered Medications:     acetaminophen (TYLENOL) tablet 975 mg, 975 mg, Oral, Q8H KENDRICK, Lisseth Seals PA-C, 975 mg at 04/06/18 1113    albumin human (FLEXBUMIN) 25 % injection 12 5 g, 12 5 g, Intravenous, Q8H, Les Diggs MD    albuterol inhalation solution 2 5 mg, 2 5 mg, Nebulization, Q6H PRN, Chase Alberto MD    aspirin (ECOTRIN LOW STRENGTH) EC tablet 81 mg, 81 mg, Oral, Daily, Chase Alberto MD, 81 mg at 04/06/18 0826    atorvastatin (LIPITOR) tablet 40 mg, 40 mg, Oral, Daily With Dinner, Chase Alberto MD, 40 mg at 04/05/18 1636    calcium carbonate (TUMS) chewable tablet 1,000 mg, 1,000 mg, Oral, Daily PRN, Chase Alberto MD    ceFAZolin (ANCEF) IVPB (premix) 1,000 mg, 1,000 mg, Intravenous, Q12H, Chase Alberto MD, Last Rate: 100 mL/hr at 04/06/18 0556, 1,000 mg at 04/06/18 0556    docusate sodium (COLACE) capsule 100 mg, 100 mg, Oral, BID PRN, Chase Alberto MD    furosemide (LASIX) 500 mg infusion 50 mL, 30 mg/hr, Intravenous, Continuous, Les Diggs MD, Last Rate: 2 mL/hr at 04/06/18 0249, 20 mg/hr at 04/06/18 0249    heparin (porcine) subcutaneous injection 5,000 Units, 5,000 Units, Subcutaneous, Q8H [address] **AND** Platelet count, , , Once, Chase Alberto MD    insulin glargine (LANTUS) subcutaneous injection 20 Units, 20 Units, Subcutaneous, HS, Chase Alberto MD, 10 Units at 04/05/18 2222    insulin lispro (HumaLOG) 100 units/mL subcutaneous injection 1-5 Units, 1-5 Units, Subcutaneous, TID AC, 1 Units at 04/06/18 1356 **AND** Fingerstick Glucose (POCT), , , TID AC, Chase Alberto MD    insulin lispro (HumaLOG) 100 units/mL subcutaneous injection 1-5 Units, 1-5 Units, Subcutaneous, HS, Chase Alberto MD, 1 Units at 04/04/18 2154    insulin lispro (HumaLOG) 100 units/mL subcutaneous injection 10 Units, 10 Units, Subcutaneous, TID With Meals, Chase Alberto MD, 10 Units at 04/06/18 1357    metoprolol tartrate (LOPRESSOR) tablet 25 mg, 25 mg, Oral, 4x Daily, Mulu Jin DO    ondansetron (ZOFRAN) injection 4 mg, 4 mg, Intravenous, Q6H PRN, Chase Alberto MD    pantoprazole (PROTONIX) EC tablet 40 mg, 40 mg, Oral, Early Morning, Chase Alberto MD, 40 mg at 04/06/18 0556    tamsulosin (FLOMAX) capsule 0 4 mg, 0 4 mg, Oral, Daily With Vida Seals PA-C, 0 4 mg at 04/05/18 1636    Laboratory Results:    Results from last 7 days  Lab Units 04/06/18  0548 04/05/18  0642 04/04/18  0655   WBC Thousand/uL 8 73 9 64 8 31   HEMOGLOBIN g/dL 9 6* 10 8* 11 1*   HEMATOCRIT % 31 9* 34 8* 35 5*   PLATELETS Thousands/uL 175 226 248   SODIUM mmol/L 141 140 137   POTASSIUM mmol/L 5 2 5 0 5 3   CHLORIDE mmol/L 102 101 101   CO2 mmol/L 28 26 25   BUN mg/dL 78* 76* 75*   CREATININE mg/dL 2 63* 2 69* 2 71*   CALCIUM mg/dL 9 5 9 8 9 0   MAGNESIUM mg/dL 2 5 2 5  --    TOTAL PROTEIN g/dL  --   --  6 8   GLUCOSE RANDOM mg/dL 153* 141* 250*

## 2018-04-06 NOTE — ASSESSMENT & PLAN NOTE
Paroxysmal AF/FL     - Cardiology on consult   - currently tachycardic; AF with rapid conduction on admission, now possible AFL; optimize HR control per cardiology, currently on metoprolol 25 x4 times daily   - Off apixaban after a fall   - anticoagulation issues (risk / benefit ratio per cardiology)   - consider echo - defer to cardiology

## 2018-04-06 NOTE — ASSESSMENT & PLAN NOTE
· Continue metoprolol with doses to be given 4 times a day 25 mg but with hold parameters to avoid hypotension  · Not on anticoagulation  · [facility] consult noted  · Continue telemetry

## 2018-04-06 NOTE — PROGRESS NOTES
Progress Note - J Luis Connell  1935, [de-identified] y o  male MRN: [id number]    Unit/Bed#: -01 Encounter: [id number]    Primary Care Provider: Hernando Harvey MD   Date and time admitted to hospital: 4/4/2018  6:39 AM        Peripheral arterial disease ([address] )   Assessment & Plan    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)   -s/p diagnostic LLE angiogram ([facility]) 12/20/2017:  Long segment  mid SFA, below knee popliteal and tibioperoneal trunk with single-vessel peroneal runoff   -s/p RIGHT fem-TP trunk bypass w/in situ GSV, popliteal ligation '02 by Dr Daniel Jeter    -s/p RIGHT PTA for mid graft stenosis in '03 by Dr Renata Raya    Assessment/Recommendations:   Overall, 82M with multiple co-morbidities and active medical conditions now hospitalized for decompensated CHF, AF and SABINE/ CKD  Treatment of CHF and renal failure takes precedence at this time  From a vascular standpoint, there is progressively worsened LEFT LE tissue loss involving the left heel and left 5th toe which are gangrenous  Recent angiogram as above shows significant multilevel disease which was not amenable to intervention  Lower extremity arterial duplex shows undetected metatarsal pressures and GTP undetected / flat-lined bilaterally  ADIEL 04/05/2018: Right NOEL 1 42/met undetected/ GTP flat  LEFT NOEL 064 high grade stenosis v occlusion of SFA/ met undetected/ GTP flat     - Acute medical - cardiac and renal - conditions to be treated   - Continue LWC with Betadine for gangrenous wounds   - Suggest Podiatry consult, if they have not seen him   - Vascular will continue to monitor clinically on an intermittent basis   - Once he is compensated from cardiac and renal standpoints, we can re-assess his vascular status  It seems his only potential vascular intervention would be to consider eventual LE bypass should he be a candidate for it from medical and technical standpoints      - F/U Dr Francia Aschoff is scheduled 04/24/2018   - Will discuss with Dr Destiny Recinos          Acute-on-chronic kidney injury [facility])   Assessment & Plan    SABINE on CKD III   -creatinine down to 2 63  (baseline about 1 8 - 2 2)    -nephrology following   - monitor on diuretics        Rapid atrial fibrillation [facility])   Assessment & Plan     Paroxysmal AF/FL     - Cardiology on consult   - currently tachycardic; AF with rapid conduction on admission, now possible AFL; optimize HR control per cardiology, currently on metoprolol 25 x4 times daily   - Off apixaban after a fall   - anticoagulation issues (risk / benefit ratio per cardiology)   - consider echo - defer to cardiology        * Acute on chronic diastolic congestive heart failure ([facility] [address] )   Assessment & Plan     Currently in decompensated CHF with SABINE/CKD; remains SOB/ orthopneic   900/1450; wt reported down     - Diuretics as per primary team; currently on insulin infusion   - monitor renal function/ Is/Os/wts        Other hyperlipidemia   Assessment & Plan     -continue moderate to high intensity statin therapy        Coronary artery disease involving native coronary artery of native heart   Assessment & Plan     S/p CABG/AVR (bioprosthetic) 6/20/02     - mildly elevated Troponin in setting of acute CHF   -continue current medical regimen   -cardiology following        S/P aortic valve replacement with bioprosthetic valve   Assessment & Plan    CAD S/P CABG/AVR (bioprosthetic) 6/20/2002   -normally functioning prosthesis by echo 9/2017   -continue optimal medical management as per cardiology        Benign essential hypertension   Assessment & Plan     -continue current medical regimen          Subjective:    Patient reports that he had a rough night due to SOB  He slept 3 hours citing that he was having trouble sleeping in the bed   He is examined in the chair and reports that he feels better in the chair where he is tired and falls asleep during the exam  He is tachycardic, but denies Cp, palps or cardiac awareness  He reports that the foot wounds have worsened during the past months  Pain controlled at rest  No fevers  Vitals:  /68 (BP Location: Left arm)   Pulse (!) 115   Temp 98 2 °F (36 8 °C) (Oral)   Resp 20   Ht 5' 7" (1 702 m)   Wt 97 9 kg (215 lb 12 8 oz)   SpO2 100%   BMI 33 80 kg/m²     I/Os:  I/O last 3 completed shifts:   In: 240 [P O :240]  Out: 2425 [Urine:2425]  I/O this shift:  In: -   Out: 300 [Urine:300]    Lab Results and Cultures:   Lab Results   Component Value Date    WBC 8 73 04/06/2018    HGB 9 6 (L) 04/06/2018    HCT 31 9 (L) 04/06/2018    MCV 87 04/06/2018     04/06/2018     Lab Results   Component Value Date    GLUCOSE 153 (H) 04/06/2018    CALCIUM 9 5 04/06/2018     04/06/2018    K 5 2 04/06/2018    CO2 28 04/06/2018     04/06/2018    BUN 78 (H) 04/06/2018    CREATININE 2 63 (H) 04/06/2018     Lab Results   Component Value Date    INR 1 04 04/04/2018    INR 1 08 12/13/2017    PROTIME 13 9 04/04/2018    PROTIME 14 0 12/13/2017        Blood Culture:   Lab Results   Component Value Date    BLOODCX No Growth at 48 hrs  04/04/2018   ,   Urinalysis:   Lab Results   Component Value Date    COLORU Yellow 04/05/2018    COLORU Yellow 12/12/2017    CLARITYU Clear 04/05/2018    CLARITYU Transparent 12/12/2017    SPECGRAV 1 015 04/05/2018    SPECGRAV 1 005 12/12/2017    PHUR 5 0 04/05/2018    PHUR 5 0 12/12/2017    LEUKOCYTESUR Negative 04/05/2018    LEUKOCYTESUR - 12/12/2017    NITRITE Negative 04/05/2018    NITRITE - 12/12/2017    PROTEINUA Negative 04/05/2018    PROTEINUA - 12/12/2017    GLUCOSEU Negative 04/05/2018    GLUCOSEU Negative 11/24/2015    KETONESU Negative 04/05/2018    KETONESU - 12/12/2017    BILIRUBINUR Negative 04/05/2018    BILIRUBINUR - 12/12/2017    BLOODU Negative 04/05/2018    BLOODU - 12/12/2017   ,   Urine Culture: No results found for: URINECX,   Wound Culure: No results found for: WOUNDCULT    Medications:  Current Facility-Administered Medications   Medication Dose Route Frequency    acetaminophen (TYLENOL) tablet 975 mg  975 mg Oral Q8H [address]    albumin human (FLEXBUMIN) 25 % injection 25 g  25 g Intravenous Q8H    albuterol inhalation solution 2 5 mg  2 5 mg Nebulization Q6H PRN    aspirin (ECOTRIN LOW STRENGTH) EC tablet 81 mg  81 mg Oral Daily    atorvastatin (LIPITOR) tablet 40 mg  40 mg Oral Daily With Dinner    calcium carbonate (TUMS) chewable tablet 1,000 mg  1,000 mg Oral Daily PRN    ceFAZolin (ANCEF) IVPB (premix) 1,000 mg  1,000 mg Intravenous Q12H    docusate sodium (COLACE) capsule 100 mg  100 mg Oral BID PRN    furosemide (LASIX) 500 mg infusion 50 mL  20 mg/hr Intravenous Continuous    heparin (porcine) subcutaneous injection 5,000 Units  5,000 Units Subcutaneous Q8H [address]    insulin glargine (LANTUS) subcutaneous injection 20 Units  20 Units Subcutaneous HS    insulin lispro (HumaLOG) 100 units/mL subcutaneous injection 1-5 Units  1-5 Units Subcutaneous TID AC    insulin lispro (HumaLOG) 100 units/mL subcutaneous injection 1-5 Units  1-5 Units Subcutaneous HS    insulin lispro (HumaLOG) 100 units/mL subcutaneous injection 10 Units  10 Units Subcutaneous TID With Meals    metoprolol tartrate (LOPRESSOR) tablet 25 mg  25 mg Oral 4x Daily    ondansetron (ZOFRAN) injection 4 mg  4 mg Intravenous Q6H PRN    pantoprazole (PROTONIX) EC tablet 40 mg  40 mg Oral Early Morning    tamsulosin (FLOMAX) capsule 0 4 mg  0 4 mg Oral Daily With Dinner             Physical Exam:    General appearance: AA+O; sitting upright, weak, fatigued, falls asleep during exam; on )2  Skin: Skin color, texture, turgor normal  No rashes or lesions  Neurologic: Grossly normal  Head: Normocephalic, without obvious abnormality, atraumatic   Neck: no adenopathy, no carotid bruit, no JVD, supple, symmetrical, trachea midline and thyroid not enlarged, symmetric, no tenderness/mass/nodules  Lungs: decreased BS 1/3 lung fields  Heart: Tachycardic; sl irregular S1S2; no murmur or gallop apprec at fast rate  Abdomen: obese, soft non-tender  Extremities: 3 +bilateral LE edema - significant pedal and ankle edema with pre-tibial edema  L foot / toes mildly erythematous   LEFT lateral heel and 5th toe gangrene  Overall LEFT motor - sensory intact, though the toe/foot is tender on exam   RIGHT surgical scar well healed after remote bypass    Wound/Incision:  Clinical images taken one day ago    Pulse exam:  Radial: Right: 2+ Left[de-identified] 2+  Popliteal: Right: non-palpable Left: non-palpable  DP: Right: non-palpable Left: non-palpable  PT: Right: non-palpable Left: non-palpable      Doppler sigals: RIGHT- good biphasic  PT/AT/DP; + graft pulse     LEFT  -  AT/DP only    Imaging:  VAS LEADS 04/05/2018 - report and images reviewed  [facility] REPORT  CLINICAL:  Indications:  Left Atherosclerosis with Ulceration [I70 25]  The patient has a  would on his lateral left ankle for several days  He has a known patent right  graft  Risk Factors: The patient has history of Hypertension, Diabetes (Yes), Carotid  Artery Disease and Renal Disease  Operative History  7/18/2003 Right PTA of mid and distal bypass graft stenosis  11/14/2002 Right femoral to tibioperoneal bypass with ligation of the distal  popliteal artery  6/20/2002 Aortic valve replacement  6/20/2002 CABG  Right Brachial Pressure:  104/ mmHg        FINDINGS:     Left                   PSV  EDV    Common Femoral Artery   77    7    Prox Profunda           97    0    Prox SFA                42    0    Mid SFA                 51    1    Dist SFA                18    6    Proximal Pop            11    5    Distal Pop              11    5    Dist  Ant  Tibial       22   13    Dist Peroneal           14    6             CONCLUSION:  RIGHT LOWER LIMB: Limited  Ankle/Brachial Index:   1 42  which is in the Unreliable range  (Prior 0 93)  PVR/ PPG tracings are dampened    Metatarsal pressure is undetected  Great toe pressure is undetected and flat-line     LEFT LOWER LIMB: Abnormal  This resting evaluation shows evidence of diffuse atherosclerosis with areas of  high grade stenosis vs occlusion in the superficial femoral artery  Ankle/Brachial Index: 0 64 which is in the Severe Claudication range  (Prior  0 54)  PVR/ PPG tracings are dampened  Metatarsal pressure is undetected  Great toe pressure is undetected and flat-line     In comparison to the study of 08/23/2017, there is no significant interval  change in the left lower extremity NOEL  There is now diminished flow in the  bilateral great toe pressures  Recommend repeat testing in 6 months as per protocol unless otherwise  indicated  IR MARKUS angio 12/22/2018 -report/ images reviewed  Findings: Arteriogram aorta widely patent infrarenal aorta bilateral common iliac external iliac arteries  Left lower extremity runoff showed widely patent common femoral and profunda femoris artery the SFA was heavily diseased at its origin with   diminutive atherosclerotic plaque, the SFA reconstituted in the mid to distal thigh with stenosis and heavy calcifications along its course the above-the-knee popliteal occludes at the knee joint with reconstitution of the peroneal artery in the proximal   calf with runoff to the foot  The tibioperoneal trunk and the entire anterior tibial and posterior tibial arteries were occluded  There was no named vessel in the foot      Intervention:In view of the extensive occlusions of the proximal SFA as well as below-the-knee popliteal and tibioperoneal trunk no intervention was offered  His limb is not threatened at this time  He will undergo close observation long discussion   with the patient's wife and daughter the be followed in the office in the next week or 2      Impressions:Extensive occlusions of the mid SFA and below-the-knee popliteal/tibioperoneal trunk therefore no intervention was attempted  He would be a candidate for a profunda to mid peroneal bypass should he go on  to limb threatening ischemia          Jarret Morocho PA-C  4/6/2018  [facility]

## 2018-04-06 NOTE — ASSESSMENT & PLAN NOTE
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)  -s/p diagnostic LLE angiogram ([facility]) 12/20/2017:  Long segment  mid SFA, below knee popliteal and tibioperoneal trunk with single-vessel peroneal runoff  -s/p RIGHT fem-TP trunk bypass w/in situ GSV, popliteal ligation '02 by Dr Elieser Darby   -s/p RIGHT PTA for mid graft stenosis in '03 by Dr Mi Delarosa    Assessment/Recommendations:  Overall, 82M with multiple co-morbidities and active medical conditions now hospitalized for decompensated CHF, AF and SABINE/ CKD  Continue medical management at this time and optimize cards and renal status  Per wife, patient to go to Rehab this week  Will set up follow-up with Dr Cruz Kay as outpatient for revascularization options  From a vascular standpoint, there is progressively worsened LEFT LE tissue loss involving the left heel and left 3rd and 5th toe which are gangrenous  Recent angiogram as above shows significant multilevel disease which was not amenable to intervention  Lower extremity arterial duplex shows undetected metatarsal pressures and GTP undetected / flat-lined bilaterally  ADIEL 04/05/2018: Right NOEL 1 42/met undetected/ GTP flat  LEFT NOEL 0 64 high grade stenosis v occlusion of SFA/ met undetected/ GTP flat   - Continue LWC with Betadine for gangrenous wounds  - Podiatry following, appreciate wound care and input  - Once he is compensated from cardiac and renal standpoints, we can re-assess his vascular status  To consider eventual LE bypass should he be a candidate for it from medical and technical standpoints     - Will obtain office visit for f/u  - Will discuss with Dr Yaz Guillen

## 2018-04-06 NOTE — PHYSICAL THERAPY NOTE
Physical Therapy Cancellation Note      Referral received for PT eval and tx  Chart check performed  Pt is pending podiatry consult  Will await consult completion and perform eval as appropriate      Cyndi Aceves, PT

## 2018-04-06 NOTE — ASSESSMENT & PLAN NOTE
· Likely due to patient's noncompliance with his torsemide while on vacation  · Continue Lasix infusion as ordered by Cardiology and Nephrology--appreciate their consultations  · Monitor weights and I/Os, Na restricted diet  · Monitor O2 needs  · Continue beta-blocker with hold parameters, however we need to keep him off his ACE-inhibitor for the time being given his hypotension  · He continues to have severe leg edema and does not report much improvement overnight overall

## 2018-04-06 NOTE — ASSESSMENT & PLAN NOTE
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)   -s/p diagnostic LLE angiogram ([facility]) 12/20/2017:  Long segment  mid SFA, below knee popliteal and tibioperoneal trunk with single-vessel peroneal runoff   -s/p RIGHT fem-TP trunk bypass w/in situ GSV, popliteal ligation '02 by Dr Desiree Lozano    -s/p RIGHT PTA for mid graft stenosis in '03 by Dr Ann Reyes    Assessment/Recommendations:   Overall, 82M with multiple co-morbidities and active medical conditions now hospitalized for decompensated CHF, AF and SABINE/ CKD  Treatment of CHF and renal failure takes precedence at this time  From a vascular standpoint, there is progressively worsened LEFT LE tissue loss involving the left heel and left 5th toe which are gangrenous  Recent angiogram as above shows significant multilevel disease which was not amenable to intervention  Lower extremity arterial duplex shows undetected metatarsal pressures and GTP undetected / flat-lined bilaterally  ADIEL 04/05/2018: Right NOEL 1 42/met undetected/ GTP flat  LEFT NOEL 064 high grade stenosis v occlusion of SFA/ met undetected/ GTP flat     - Acute medical - cardiac and renal - conditions to be treated   - Continue LWC with Betadine for gangrenous wounds   - Suggest Podiatry consult, if they have not seen him   - Vascular will continue to monitor clinically on an intermittent basis   - Once he is compensated from cardiac and renal standpoints, we can re-assess his vascular status  It seems his only potential vascular intervention would be to consider eventual LE bypass should he be a candidate for it from medical and technical standpoints      - F/U Dr Leeroy Hassan is scheduled 04/24/2018   - Will discuss with Dr Shirley Carvajal

## 2018-04-06 NOTE — TELEPHONE ENCOUNTER
----- Message from Cindy Villanueva RN sent at 4/2/2018  9:44 AM EDT -----  Please arrange vein mapping and OV with dr Todd Garcia  Thank you!  ----- Message -----  From: JEWELL Parekh  Sent: 3/30/2018   4:27 PM  To: Jacob Edgar MA, Shawn Estrella, RN, #    Can you please have this patient set up for vein mapping prior to appt [facility] Dr Saab Fails     Thanks   ----- Message -----  From: Madhuri Gallego MD  Sent: 3/21/2018   5:40 AM  To: Alexa Ibanez    Yes for vein mapping, thanks  ----- Message -----  From: JEWELL Parekh  Sent: 3/20/2018  12:08 PM  To: Madhuri Gallego MD    Hi Dr Saab Fails - I saw Elliott Fu in the office  We had emailed a few weeks ago about this particular patient  Podiatry debrided left heel wound and is now necrotic  Can you look at my last note  I took a picture  He is coming in with you in 1 month due to travel and other doctor appts  I think he is going to need a bypass  He is having a LEAD and was thinking to also get vein mapping  What do you think about getting vein mapping?   Thanks   Jaison Nava

## 2018-04-07 NOTE — ASSESSMENT & PLAN NOTE
· Renal input appreciated  · NO NSAIDS  · HOLD ACE-I, avoid hypotension  · Monitor BMP  · Creatinine remains above baseline but slightly improved today to 2 86  · Geronimo placed for retention

## 2018-04-07 NOTE — PROGRESS NOTES
Pt's daughter and wife refusing Geronimo Catheter insertion for pt  Education offered  Nephrologist at bedside to discuss situation

## 2018-04-07 NOTE — PHYSICAL THERAPY NOTE
PHYSICAL THERAPY EVALUATION NOTE    Patient Name: Walker Pretty  Today's Date: 4/7/2018  AGE:   [de-identified] y o  Mrn:   [id number]  ADMIT DX:  CHF (congestive heart failure) ([facility]) [I50 9]  SOB (shortness of breath) [R06 02]  Elevated troponin [R74 8]  Acute on chronic diastolic congestive heart failure ([facility]) [I50 33]  Cellulitis of foot, left [L03 116]  Acute-on-chronic kidney injury ([facility] ) [N17 9, N18 9]    Past Medical History:   Diagnosis Date    Cardiac disease     CHF (congestive heart failure) ([facility] )     Diabetes mellitus ([facility] )     Hypertension     Renal disorder      Length Of Stay: 3  PHYSICAL THERAPY EVALUATION :    04/07/18 0921   Pain Assessment   Pain Assessment 0-10   Pain Score 4   Pain Location Foot   Pain Orientation Bilateral   Home Living   Type of [address] [place] [address] Two level;1/2 bath on main level;Bed/bath upstairs; Other (Comment)  (2 NUNO)   Additional Comments lives w/ wife  independent w/ ADLs  ambulates w/ cane  owns walker  1 fall in last 6 months  no history of supplemental oxygen use  Prior Function   Comments pt seen sitting in chair  agreed to PT eval  c/o bilateral feet pain  Restrictions/Precautions   Other Precautions Multiple lines;Telemetry;O2;Fall Risk;Pain   General   Additional Pertinent History 4/6/18 at 16:29, glucose was 255  4/7/18 at 7:00, heart reate was 134 BPM    Family/Caregiver Present No   Cognition   Overall Cognitive Status WFL   Arousal/Participation Alert   Orientation Level Oriented X4;Other (Comment)  (pt was identified w/ full name and birth date)   Following Commands Follows one step commands without difficulty   Comments 3L oxgyen via nasal cannula   resting pulse ox 95% and 101 BPM, active 94% and 124 BPM  moderate edema B LEs    RUE Assessment   RUE Assessment WFL  (4-/5, shoulder 3/5)   LUE Assessment   LUE Assessment WFL  (4-/5, shoulder 3/5)   RLE Assessment   RLE Assessment X  (3+/5, ankle 3-/5)   LLE Assessment   LLE Assessment X  (3+/5, ankle 3-/5)   Coordination   Movements are Fluid and Coordinated 0   Coordination and Movement Description impaired coordination all extremities   Sensation X  (light touch impaired bilateral feet and ankles)   Transfers   Sit to Stand 3  Moderate assistance   Additional items Assist x 1; Increased time required;Verbal cues  (for LE positioning)   Stand to Sit 4  Minimal assistance   Additional items Assist x 1;Verbal cues  (for body positioning, controlled descent)   Ambulation/Elevation   Gait pattern Decreased foot clearance;Shuffling; Short stride; Excessively slow; Foward flexed   Gait Assistance 3  Moderate assist  (w/ chair follow)   Additional items Assist x 1;Verbal cues; Tactile cues  (for cane placement, breathing technique)   Assistive Device SPC   Distance 2 feet  (additional not possible due to fatigue)   Stair Management Assistance Not tested  (due to poor ambulation tolerance)   Balance   Static Sitting Fair +   Dynamic Sitting Fair -   Static Standing Poor +   Dynamic Standing Poor   Ambulatory Poor  (w/ cane )   Activity Tolerance   Activity Tolerance Patient limited by fatigue;Treatment limited secondary to medical complications (Comment)   Nurse Made Aware spoke to [facility] NSG   Assessment   Prognosis Fair   Problem List Decreased strength;Decreased range of motion;Decreased endurance; Impaired balance;Decreased mobility; Decreased coordination;Decreased safety awareness; Obesity; Decreased skin integrity; Impaired sensation  (LE edema)   Assessment Pt presents with shortness of breath  Also reports increasing leg swelling and weight gain  Dx: acute on CKD, PAF, CHF w/ LE edema, severe PAD, DM, neuropathy, 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, and ischemic changes to right 1st and 2nd toes   order placed for PT eval and tx, w/ activity order of up w/ A  pt presents w/ comorbidities of cardiac disease, CHF, DM, HTN, AVR, cataract extraction, and bilateral TKR and personal factors of lives in 2 story house, ambulating w/ assistive device, stairs to enter home, positive fall history and advanced age  pt presents w/ pain, weakness, decreased ROM, decreased endurance, impaired balance, gait deviations, altered sensation, impaired coordination, decreased safety awareness, fall risk, LE edema and impaired skin integrity  these impairments are evident in findings from physical examination (weakness, decreased ROM, altered sensation, impaired coordination, impaired skin integrity, and LE edema), mobility assessment (need for min to mod assist w/ all phases of mobility when usually mobilizing independently, tolerance to only 2 feet of ambulation, need for cuing for mobility and breathing technique), and Barthel Index: 45/100  pt needed input for mobility and breathing technique  pt is at risk for falls due to physical and safety awareness deficits  pt's clinical presentation is unstable/unpredictable (evident in poor blood sugar control, tachycardia, need for assist w/ all phases of mobility when usually mobilizing independently, tolerance to only 2 feet of ambulation, pain impacting overall mobility status, need for supplemental oxygen in order to maintain oxygen saturation, need for input for mobility technique/safety)  pt needs inpatient PT tx to improve mobility deficits  discharge recommendation is for IP rehab to reduce fall risk and maximize level of functional independence  Goals   Patient Goals feel better   [facility] Expiration Date 04/17/18   Short Term Goal #1 pt will:  Increase bilateral LE strength 1 grade to facilitate independent mobility, Perform all bed mobility tasks w/ supervision to decrease fall risk factors, Perform all transfers w/ supervision to improve independence, Ambulate 120 ft  with least restrictive assistive device w/ supervision w/o LOB, Increase ambulatory balance 1 grade to decrease risk for falls, Complete exercise program independently, Tolerate 3 hr OOB to faciliate upright tolerance and Improve Barthel Index score to 70 or greater to facilitate independence  PT to see when stair training is appropriate  Plan   Treatment/Interventions Functional transfer training;LE strengthening/ROM; Therapeutic exercise; Endurance training;Patient/family training;Equipment eval/education; Bed mobility;Gait training  (PT to see when stair training is appropriate )   PT Frequency 5x/wk   Recommendation   Recommendation Post acute IP rehab   Barthel Index   Feeding 10   Bathing 0   Grooming Score 5   Dressing Score 5   Bladder Score 0   Bowels Score 10   Toilet Use Score 5   Transfers (Bed/Chair) Score 10   Mobility (Level Surface) Score 0   Stairs Score 0   Barthel Index Score 45     Skilled PT recommended while in hospital and upon DC to progress pt toward treatment goals       Nohemy Olivera, PT

## 2018-04-07 NOTE — SOCIAL WORK
LOS 3   Bundle; Not a readmission  CM reviewed discharge planning process including the following: identifying caregivers at home, preference for d/c planning needs, [facility] Meds to Bed program, availability of treatment team to discuss questions or concerns patient and/or family may have regarding diagnosis, plan of care, old or new medications and discharge planning   CM will continue to follow for care coordination and update assessment as necessary  CM reviewed the bundle program with patient and the following: "As part of your Medicare benefit, you are automatically enrolled in the bundle payment program  The program is designed to assist in coordinating your care after you leave the hospital  A nurse from [facility] will be following you for 90 days  Please anticipate a phone call from the nurse within 48hrs of your discharge  As your Care Manager, I will be providing a handoff to your next level of care to ensure a safe transition "    Pt lives with wife and is independent and is still able to drive  [facility] is [facility]  He is agreeable to rehab and would like to return to [facility] where he has been in the past   Potential DC for early to mid week  Referral has been made to [facility]  Pt has not had VNA in the past   No hx of MH/A&D          04/07/18 1026   Referral Data   Referral Reason Rehab   Patient Information   Mental Status Alert   Primary Caregiver Self   Support System Immediate family   Legal Information   Advance Directives Living will;Power of  for health care; Power of  for finance   Advance Directives Status Copy on chart   Health Care Proxy Appointed Yes - [address] Proxy section   Activities of Daily Living Prior to Admission   Functional Status Independent   [address] House;Lives with someone   Ambulation Independent   Income Information   Income Source Pension/assisted   Means of Transportation   Means of Transport to Appts: Drive Self      41/36/65 1027   Discharge Planning   Living Arrangements Spouse/significant other   Support Systems Spouse/significant other;Children;Family members   Type of Current Residence Private residence   [address] No   Other Referral/Resources/Interventions Provided:   Post Acute Placement to: SNF Subacute   Discharge Communications   Discharge planning discussed with: pt   Freedom of Choice Yes   CM Handoff Comments 4/7: Ref out to [facility]

## 2018-04-07 NOTE — ASSESSMENT & PLAN NOTE
· Patient with longstanding wound on his left heel which has been addressed by his podiatrist Dr Yassine Wilcox and by vascular surgery as an outpatient  · Podiatry recommended d/c IV abxs   · Vascular following:   · "Once he is compensated from cardiac and renal standpoints, we can re-assess his vascular status   It seems his only potential vascular intervention would be to consider eventual LE bypass should he be a candidate for it from medical and technical standpoints "   · F/U Dr Bunny Baeza is scheduled 04/24/2018  · Continue local care

## 2018-04-07 NOTE — PLAN OF CARE
Problem: PHYSICAL THERAPY ADULT  Goal: Performs mobility at highest level of function for planned discharge setting  See evaluation for individualized goals  Treatment/Interventions: Functional transfer training, LE strengthening/ROM, Therapeutic exercise, Endurance training, Patient/family training, Equipment eval/education, Bed mobility, Gait training (PT to see when stair training is appropriate )          See flowsheet documentation for full assessment, interventions and recommendations  Prognosis: Fair  Problem List: Decreased strength, Decreased range of motion, Decreased endurance, Impaired balance, Decreased mobility, Decreased coordination, Decreased safety awareness, Obesity, Decreased skin integrity, Impaired sensation (LE edema)  Assessment: Pt presents with shortness of breath  Also reports increasing leg swelling and weight gain  Dx: acute on CKD, PAF, CHF w/ LE edema, severe PAD, DM, neuropathy, 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, and ischemic changes to right 1st and 2nd toes  order placed for PT eval and tx, w/ activity order of up w/ A  pt presents w/ comorbidities of cardiac disease, CHF, DM, HTN, AVR, cataract extraction, and bilateral TKR and personal factors of lives in 2 story house, ambulating w/ assistive device, stairs to enter home, positive fall history and advanced age  pt presents w/ pain, weakness, decreased ROM, decreased endurance, impaired balance, gait deviations, altered sensation, impaired coordination, decreased safety awareness, fall risk, LE edema and impaired skin integrity   these impairments are evident in findings from physical examination (weakness, decreased ROM, altered sensation, impaired coordination, impaired skin integrity, and LE edema), mobility assessment (need for min to mod assist w/ all phases of mobility when usually mobilizing independently, tolerance to only 2 feet of ambulation, need for cuing for mobility and breathing technique), and Barthel Index: 45/100  pt needed input for mobility and breathing technique  pt is at risk for falls due to physical and safety awareness deficits  pt's clinical presentation is unstable/unpredictable (evident in poor blood sugar control, tachycardia, need for assist w/ all phases of mobility when usually mobilizing independently, tolerance to only 2 feet of ambulation, pain impacting overall mobility status, need for supplemental oxygen in order to maintain oxygen saturation, need for input for mobility technique/safety)  pt needs inpatient PT tx to improve mobility deficits  discharge recommendation is for IP rehab to reduce fall risk and maximize level of functional independence  Recommendation: Post acute IP rehab          See flowsheet documentation for full assessment

## 2018-04-07 NOTE — PROGRESS NOTES
NEPHROLOGY PROGRESS NOTE   Ozzie Ly  [de-identified] y o  male MRN: [id number]  Unit/Bed#: -01 Encounter: [id number]  Reason for Consult: SABINE on CKD    ASSESSMENT and PLAN:      70-year-old male who presents with shortness of breath on 04/04  Escobar Plaster had gone to [place] for 5 days and did not take his torsemide   Nephrology is on board for acute on chronic kidney injury  Course has included AFib  Hypotension  Patient was subsequently started on Lasix drip with albumin because did not tolerate intermittent Lasix  Patient also had urinary retention and a Viera was placed on 04/06      1) SABINE on CKD - baseline Cr 1 8-2 2 mg/dL and today is 2 96 mg/dL  Possible prerenal in setting of cardiorenal vs nephrosarca vs obstructive     - there is concern for urinary retention - viera placed overnight  Daughter yesterday was concerned about viera and I met with daughter yest evening around 8 pm  Daughter asked to have renal u/s which was explained to daughter that likely will not   was completed which showed distended bladder  Renal findings with b/l medical renal disease  - pt gave permission for viera placement  - patient medically required a viera and patient agreed to viera  - I called wife this morning to review - no answer  - UA bland  - Cr slightly higher today  Not over diuresed  Monitor for now  May need higher Cr to maintain euvolemic  - continue holding ACE  - please avoid NSAIDs   - continue albumin   - furosemide gtt started on 4/5 - now increased to 40 mg/hr   If need, may add metolazone if does not remain net negative fluid balance of 500 cc to 1 L tonight  - K improved  - viera placed 3 am on 4/7  - BMP in AM     2) volume overload - see above     3) anemia     4) electrolytes     - low K diet     5) PVD     - vascular consult/studies in progress     6) urinary retention - cont viera    7) bladder wall irregularity - f/u repeat u/s once more stable    SUBJECTIVE / INTERVAL HISTORY:    Overnight, patient required three straight cath  U/s completed with distended bladder   Viera placed and 450 cc returned at 3 am      OBJECTIVE:  Current Weight: Weight - Scale: 97 kg (213 lb 13 5 oz)  Vitals:    04/06/18 2119 04/06/18 2243 04/07/18 0600 04/07/18 0700   BP: 106/59 110/59  105/69   BP Location:  Right arm  Right arm   Pulse: (!) 113 99  (!) 134   Resp:  18  20   Temp:  (!) 97 4 °F (36 3 °C)  97 5 °F (36 4 °C)   TempSrc:  Oral  Oral   SpO2:  98%  97%   Weight:   97 kg (213 lb 13 5 oz)    Height:           Intake/Output Summary (Last 24 hours) at 04/07/18 1012  Last data filed at 04/07/18 0504   Gross per 24 hour   Intake              200 ml   Output             1020 ml   Net             -820 ml       General: NAD  Skin: no rash  Eyes: anicteric sclera  ENT: dry mucous membrane  Neck: supple  Chest: rales to mid lung  CVS: s1s2  Abdomen: soft, nontender  Extremities: 2+ LE edema  : + viera  Neuro: AAOX3  Psych: normal affect    Medications:    Current Facility-Administered Medications:     acetaminophen (TYLENOL) tablet 975 mg, 975 mg, Oral, Q8H [facility], Lisseth Seals PA-C, 975 mg at 04/07/18 0543    albumin human (FLEXBUMIN) 25 % injection 12 5 g, 12 5 g, Intravenous, Q8H, Tasha Mejias MD, 12 5 g at 04/07/18 0124    albuterol inhalation solution 2 5 mg, 2 5 mg, Nebulization, Q6H PRN, Chase Alberto MD    aspirin (ECOTRIN LOW STRENGTH) EC tablet 81 mg, 81 mg, Oral, Daily, Chase Alberto MD, 81 mg at 04/07/18 0752    atorvastatin (LIPITOR) tablet 40 mg, 40 mg, Oral, Daily With Dinner, Chase Alberto MD, 40 mg at 04/06/18 1647    calcium carbonate (TUMS) chewable tablet 1,000 mg, 1,000 mg, Oral, Daily PRN, Chase Alberto MD    ceFAZolin (ANCEF) IVPB (premix) 1,000 mg, 1,000 mg, Intravenous, Q12H, Chase Alberto MD, Last Rate: 100 mL/hr at 04/07/18 0541, 1,000 mg at 04/07/18 0541    docusate sodium (COLACE) capsule 100 mg, 100 mg, Oral, BID PRN, Chase Alberto MD, 100 mg at 04/07/18 0758    furosemide (LASIX) 500 mg infusion 50 mL, 40 mg/hr, Intravenous, Continuous, Yeyo Aragon MD, Last Rate: 4 mL/hr at 04/06/18 2253, 40 mg/hr at 04/06/18 2253    heparin (porcine) subcutaneous injection 5,000 Units, 5,000 Units, Subcutaneous, Q8H [address], 5,000 Units at 04/07/18 0541 **AND** Platelet count, , , Once, Chase Alberto MD    insulin glargine (LANTUS) subcutaneous injection 20 Units, 20 Units, Subcutaneous, HS, Chase Alberto MD, 20 Units at 04/06/18 2207    insulin lispro (HumaLOG) 100 units/mL subcutaneous injection 1-5 Units, 1-5 Units, Subcutaneous, TID AC, 1 Units at 04/07/18 0753 **AND** Fingerstick Glucose (POCT), , , TID AC, Chase Alberto MD    insulin lispro (HumaLOG) 100 units/mL subcutaneous injection 1-5 Units, 1-5 Units, Subcutaneous, HS, Chase Alberto MD, 1 Units at 04/04/18 2154    insulin lispro (HumaLOG) 100 units/mL subcutaneous injection 10 Units, 10 Units, Subcutaneous, TID With Meals, Chase Alberto MD, 10 Units at 04/07/18 0752    metoprolol tartrate (LOPRESSOR) tablet 25 mg, 25 mg, Oral, 4x Daily, Carl Lacey DO, 25 mg at 04/07/18 0752    ondansetron (ZOFRAN) injection 4 mg, 4 mg, Intravenous, Q6H PRN, Chase Alberto MD    pantoprazole (PROTONIX) EC tablet 40 mg, 40 mg, Oral, Early Morning, Chase Alberto MD, 40 mg at 04/07/18 0541    tamsulosin (FLOMAX) capsule 0 4 mg, 0 4 mg, Oral, Daily With Olive Seals, PA-C, 0 4 mg at 04/06/18 1647    Laboratory Results:    Results from last 7 days  Lab Units 04/07/18  0457 04/06/18  0548 04/05/18  0642 04/04/18  0655   WBC Thousand/uL 8 06 8 73 9 64 8 31   HEMOGLOBIN g/dL 9 5* 9 6* 10 8* 11 1*   HEMATOCRIT % 31 0* 31 9* 34 8* 35 5*   PLATELETS Thousands/uL 171 175 226 248   SODIUM mmol/L 137 141 140 137   POTASSIUM mmol/L 4 8 5 2 5 0 5 3   CHLORIDE mmol/L 98* 102 101 101   CO2 mmol/L 25 28 26 25   BUN mg/dL 83* 78* 76* 75* CREATININE mg/dL 2 96* 2 63* 2 69* 2 71*   CALCIUM mg/dL 9 5 9 5 9 8 9 0   MAGNESIUM mg/dL  --  2 5 2 5  --    TOTAL PROTEIN g/dL  --   --   --  6 8   GLUCOSE RANDOM mg/dL 169* 153* 141* 250*

## 2018-04-07 NOTE — PLAN OF CARE
Problem: PHYSICAL THERAPY ADULT  Goal: Performs mobility at highest level of function for planned discharge setting  See evaluation for individualized goals  Treatment/Interventions: Functional transfer training, LE strengthening/ROM, Therapeutic exercise, Endurance training, Patient/family training, Equipment eval/education, Bed mobility, Gait training (PT to see when stair training is appropriate )          See flowsheet documentation for full assessment, interventions and recommendations  Outcome: Progressing  Prognosis: Fair  Problem List: Decreased strength, Decreased range of motion, Decreased endurance, Impaired balance, Decreased mobility, Decreased coordination, Decreased safety awareness, Obesity, Decreased skin integrity, Impaired sensation (LE edema)  Assessment: Therapist introduced roller walker use w/ ambulation to address mobility and physical impairments noted during evaluation  Pt was noted to have improvement w/ use of RW w/ decreased level of assist needed to maintain safety and increased ambulation distance  Pt was also noted to have improvement in quality of gait  Pt continues to be at risk for falling  continued inpatient PT tx is indicated to reduce fall risk factors  Recommendation: Post acute IP rehab          See flowsheet documentation for full assessment

## 2018-04-07 NOTE — PLAN OF CARE
Problem: Potential for Falls  Goal: Patient will remain free of falls  INTERVENTIONS:  - Assess patient frequently for physical needs  -  Identify cognitive and physical deficits and behaviors that affect risk of falls    -  [place] fall precautions as indicated by assessment   - Educate patient/family on patient safety including physical limitations  - Instruct patient to call for assistance with activity based on assessment  - Modify environment to reduce risk of injury  - Consider OT/PT consult to assist with strengthening/mobility   Outcome: Progressing      Problem: PAIN - ADULT  Goal: Verbalizes/displays adequate comfort level or baseline comfort level  Interventions:  - Encourage patient to monitor pain and request assistance  - Assess pain using appropriate pain scale  - Administer analgesics based on type and severity of pain and evaluate response  - Implement non-pharmacological measures as appropriate and evaluate response  - Consider cultural and social influences on pain and pain management  - Notify physician/advanced practitioner if interventions unsuccessful or patient reports new pain   Outcome: Progressing      Problem: INFECTION - ADULT  Goal: Absence or prevention of progression during hospitalization  INTERVENTIONS:  - Assess and monitor for signs and symptoms of infection  - Monitor lab/diagnostic results  - Monitor all insertion sites, i e  indwelling lines, tubes, and drains  - Monitor endotracheal (as able) and nasal secretions for changes in amount and color  - [place] appropriate cooling/warming therapies per order  - Administer medications as ordered  - Instruct and encourage patient and family to use good hand hygiene technique  - Identify and instruct in appropriate isolation precautions for identified infection/condition   Outcome: Progressing    Goal: Absence of fever/infection during neutropenic period  INTERVENTIONS:  - Monitor WBC  - Implement neutropenic guidelines   Outcome: Progressing      Problem: SAFETY ADULT  Goal: Maintain or return to baseline ADL function  INTERVENTIONS:  -  Assess patient's ability to carry out ADLs; assess patient's baseline for ADL function and identify physical deficits which impact ability to perform ADLs (bathing, care of mouth/teeth, toileting, grooming, dressing, etc )  - Assess/evaluate cause of self-care deficits   - Assess range of motion  - Assess patient's mobility; develop plan if impaired  - Assess patient's need for assistive devices and provide as appropriate  - Encourage maximum independence but intervene and supervise when necessary  ¯ Involve family in performance of ADLs  ¯ Assess for home care needs following discharge   ¯ Request OT consult to assist with ADL evaluation and planning for discharge  ¯ Provide patient education as appropriate   Outcome: Progressing    Goal: Maintain or return mobility status to optimal level  INTERVENTIONS:  - Assess patient's baseline mobility status (ambulation, transfers, stairs, etc )    - Identify cognitive and physical deficits and behaviors that affect mobility  - Identify mobility aids required to assist with transfers and/or ambulation (gait belt, sit-to-stand, lift, walker, cane, etc )  - [place] fall precautions as indicated by assessment  - Record patient progress and toleration of activity level on Mobility SBAR; progress patient to next Phase/Stage  - Instruct patient to call for assistance with activity based on assessment  - Request Rehabilitation consult to assist with strengthening/weightbearing, etc    Outcome: Progressing      Problem: DISCHARGE PLANNING  Goal: Discharge to home or other facility with appropriate resources  INTERVENTIONS:  - Identify barriers to discharge w/patient and caregiver  - Arrange for needed discharge resources and transportation as appropriate  - Identify discharge learning needs (meds, wound care, etc )  - Arrange for interpretive services to assist at discharge as needed  - Refer to Case Management Department for coordinating discharge planning if the patient needs post-hospital services based on physician/advanced practitioner order or complex needs related to functional status, cognitive ability, or social support system   Outcome: Progressing      Problem: Knowledge Deficit  Goal: Patient/family/caregiver demonstrates understanding of disease process, treatment plan, medications, and discharge instructions  Complete learning assessment and assess knowledge base  Interventions:  - Provide teaching at level of understanding  - Provide teaching via preferred learning methods   Outcome: Progressing      Problem: Nutrition/Hydration-ADULT  Goal: Nutrient/Hydration intake appropriate for improving, restoring or maintaining nutritional needs  Monitor and assess patient's nutrition/hydration status for malnutrition (ex- brittle hair, bruises, dry skin, pale skin and conjunctiva, muscle wasting, smooth red tongue, and disorientation)  Collaborate with interdisciplinary team and initiate plan and interventions as ordered  Monitor patient's weight and dietary intake as ordered or per policy  Utilize nutrition screening tool and intervene per policy  Determine patient's food preferences and provide high-protein, high-caloric foods as appropriate       INTERVENTIONS:  - Monitor oral intake, urinary output, labs, and treatment plans  - Assess nutrition and hydration status and recommend course of action  - Evaluate amount of meals eaten  - Assist patient with eating if necessary   - Allow adequate time for meals  - Recommend/ encourage appropriate diets, oral nutritional supplements, and vitamin/mineral supplements  - Order, calculate, and assess calorie counts as needed  - Recommend, monitor, and adjust tube feedings and TPN/PPN based on assessed needs  - Assess need for intravenous fluids  - Provide specific nutrition/hydration education as appropriate  - Include patient/family/caregiver in decisions related to nutrition   Outcome: Progressing      Problem: Prexisting or High Potential for Compromised Skin Integrity  Goal: Skin integrity is maintained or improved  INTERVENTIONS:  - Identify patients at risk for skin breakdown  - Assess and monitor skin integrity  - Assess and monitor nutrition and hydration status  - Monitor labs (i e  albumin)  - Assess for incontinence   - Turn and reposition patient  - Assist with mobility/ambulation  - Relieve pressure over bony prominences  - Avoid friction and shearing  - Provide appropriate hygiene as needed including keeping skin clean and dry  - Evaluate need for skin moisturizer/barrier cream  - Collaborate with interdisciplinary team (i e  Nutrition, Rehabilitation, etc )   - Patient/family teaching   Outcome: Progressing      Problem: DISCHARGE PLANNING - CARE MANAGEMENT  Goal: Discharge to post-acute care or home with appropriate resources  INTERVENTIONS:  - Conduct assessment to determine patient/family and health care team treatment goals, and need for post-acute services based on payer coverage, community resources, and patient preferences, and barriers to discharge  - Address psychosocial, clinical, and financial barriers to discharge as identified in assessment in conjunction with the patient/family and health care team  - Arrange appropriate level of post-acute services according to patient's   needs and preference and payer coverage in collaboration with the physician and health care team  - Communicate with and update the patient/family, physician, and health care team regarding progress on the discharge plan  - Arrange appropriate transportation to post-acute venues   Outcome: Progressing

## 2018-04-07 NOTE — ASSESSMENT & PLAN NOTE
· Renal input appreciated  · NO NSAIDS  · HOLD ACE-I, avoid hypotension  · Monitor BMP  · Creatinine remains above baseline  · Geronimo placed for retention

## 2018-04-07 NOTE — ASSESSMENT & PLAN NOTE
· Likely due to patient's noncompliance with his torsemide while on vacation  · Continue Lasix infusion as ordered by Cardiology and Nephrology--appreciate their consultations  · Monitor weights and I/Os, Na restricted diet  · Monitor O2 needs  · Continue beta-blocker with hold parameters, however we need to keep him off his ACE-inhibitor for the time being given his hypotension  · He continues to have severe leg edema

## 2018-04-07 NOTE — PROGRESS NOTES
Progress Note - Sunshine Colon  1935, [de-identified] y o  male MRN: [id number]    Unit/Bed#: -01 Encounter: [id number]    Primary Care Provider: Isabel Philip MD   Date and time admitted to hospital: 4/4/2018  6:39 AM        Acute-on-chronic kidney injury [facility])   Assessment & Plan    · Renal input appreciated  · NO NSAIDS  · HOLD ACE-I, avoid hypotension  · Monitor BMP  · Creatinine remains above baseline  · Geronimo placed for retention        * Acute on chronic diastolic congestive heart failure ([facility] )   Assessment & Plan    · Likely due to patient's noncompliance with his torsemide while on vacation  · Continue Lasix infusion as ordered by Cardiology and Nephrology--appreciate their consultations  · Monitor weights and I/Os, Na restricted diet  · Monitor O2 needs  · Continue beta-blocker with hold parameters, however we need to keep him off his ACE-inhibitor for the time being given his hypotension  · He continues to have severe leg edema        Cellulitis of foot, left   Assessment & Plan    · Patient with longstanding wound on his left heel which has been addressed by his podiatrist Dr Jerry Silva and by vascular surgery as an outpatient  · Podiatry recommended d/c IV abxs   · Vascular following:   · "Once he is compensated from cardiac and renal standpoints, we can re-assess his vascular status   It seems his only potential vascular intervention would be to consider eventual LE bypass should he be a candidate for it from medical and technical standpoints "   · F/U Dr Abby Pérez is scheduled 04/24/2018  · Continue local care        Non-ST elevation myocardial infarction (NSTEMI) [facility])   Assessment & Plan    · Mild troponin elevation likely the result of type 2 non ST elevation MI from heart failure and renal failure  · Cardiology consult appreciated        Peripheral arterial disease ([facility] )   Assessment & Plan    · Vascular consulted, see note above regarding left wound on heel        Type 2 diabetes mellitus ([facility] ) Assessment & Plan    · Monitor on AccuChecks/SSI  · A1c 7 6  · Continue lantus and novolog        Rapid atrial fibrillation ([facility] )   Assessment & Plan    · Cardiology increased metoprolol   · Not on anticoagulation  · Continue telemetry            VTE Pharmacologic Prophylaxis:   Pharmacologic: Heparin  Mechanical VTE Prophylaxis in Place: Yes    Patient Centered Rounds: I have performed bedside rounds with nursing staff today  Discussions with Specialists or Other Care Team Provider:  Discussed with RN    Education and Discussions with Family / Patient:  Discussed with patient, a fused called family    Time Spent for Care: 30 minutes  More than 50% of total time spent on counseling and coordination of care as described above  Current Length of Stay: 3 day(s)    Current Patient Status: Inpatient   Certification Statement: The patient will continue to require additional inpatient hospital stay due to Lasix drip    Discharge Plan:  Not stable for discharge    Code Status: Level 1 - Full Code      Subjective: The patient reports that he is feeling okay  He denies any specific complaints  Does admit to some shortness of breath at times  Objective:     Vitals:   Temp (24hrs), Av 6 °F (36 4 °C), Min:97 4 °F (36 3 °C), Max:98 °F (36 7 °C)    HR:  [] 134  Resp:  [18-22] 20  BP: (105-126)/(59-69) 105/69  SpO2:  [97 %-100 %] 97 %  Body mass index is 33 49 kg/m²  Input and Output Summary (last 24 hours): Intake/Output Summary (Last 24 hours) at 18 1333  Last data filed at 18 0931   Gross per 24 hour   Intake              440 ml   Output             1020 ml   Net             -580 ml       Physical Exam:     Physical Exam   Constitutional: He is oriented to person, place, and time  No distress  Cardiovascular: Normal rate, normal heart sounds and intact distal pulses  An irregular rhythm present  No murmur heard  Pulmonary/Chest: Effort normal  No respiratory distress   He has decreased breath sounds  He has no wheezes  He has no rales  2 5liters of o2  SOB with conversation   Abdominal: Soft  Bowel sounds are normal  He exhibits no distension  There is no tenderness  There is no rebound  Musculoskeletal: He exhibits edema (severe b/l lower extremity)  He exhibits no tenderness  Neurological: He is alert and oriented to person, place, and time  No cranial nerve deficit  Skin: He is not diaphoretic  See picture of left foot    Psychiatric: He has a normal mood and affect  Additional Data:     Labs:      Results from last 7 days  Lab Units 04/07/18  0457   WBC Thousand/uL 8 06   HEMOGLOBIN g/dL 9 5*   HEMATOCRIT % 31 0*   PLATELETS Thousands/uL 171   NEUTROS PCT % 72   LYMPHS PCT % 18   MONOS PCT % 8   EOS PCT % 2       Results from last 7 days  Lab Units 04/07/18  0457  04/04/18  0655   SODIUM mmol/L 137  < > 137   POTASSIUM mmol/L 4 8  < > 5 3   CHLORIDE mmol/L 98*  < > 101   CO2 mmol/L 25  < > 25   BUN mg/dL 83*  < > 75*   CREATININE mg/dL 2 96*  < > 2 71*   CALCIUM mg/dL 9 5  < > 9 0   TOTAL PROTEIN g/dL  --   --  6 8   BILIRUBIN TOTAL mg/dL  --   --  0 50   ALK PHOS U/L  --   --  123*   ALT U/L  --   --  38   AST U/L  --   --  16   GLUCOSE RANDOM mg/dL 169*  < > 250*   < > = values in this interval not displayed  Results from last 7 days  Lab Units 04/04/18  0655   INR  1 04       * I Have Reviewed All Lab Data Listed Above  * Additional Pertinent Lab Tests Reviewed: All Labs Within Last 24 Hours Reviewed    Imaging:    Imaging Reports Reviewed Today Include:  Renal ultrasound, left foot x-ray, chest x-ray    Recent Cultures (last 7 days):       Results from last 7 days  Lab Units 04/04/18  0715 04/04/18  0655   BLOOD CULTURE  No Growth at 72 hrs  No Growth at 72 hrs         Last 24 Hours Medication List:     Current Facility-Administered Medications:  acetaminophen 975 mg Oral Q8H [facility] & custodial Lisseth Seals PA-C    albumin human 12 5 g Intravenous Q8H Phuc JOSEPH Kirill Monroe MD    albuterol 2 5 mg Nebulization Q6H PRN Chase Alberto MD    aspirin 81 mg Oral Daily Chase Alberto MD    atorvastatin 40 mg Oral Daily With Dinner Chase Alberto MD    calcium carbonate 1,000 mg Oral Daily PRN Chase Alberto MD    ceFAZolin 1,000 mg Intravenous Q12H Chase Alberto MD Last Rate: 1,000 mg (04/07/18 0541)   docusate sodium 100 mg Oral BID PRN Chase Alberto MD    furosemide 40 mg/hr Intravenous Continuous Susana Uriostegui MD Last Rate: 40 mg/hr (04/07/18 1048)   heparin (porcine) 5,000 Units Subcutaneous Q8H [facility] Chase Alberto MD    insulin glargine 20 Units Subcutaneous HS Chase Alberto MD    insulin lispro 1-5 Units Subcutaneous TID AC Chase Alberto MD    insulin lispro 1-5 Units Subcutaneous HS Chase Alberto MD    insulin lispro 10 Units Subcutaneous TID With Meals Chase Alberto MD    metoprolol tartrate 50 mg Oral TID Yolanda Coleman MD    ondansetron 4 mg Intravenous Q6H PRN Chase Alberto MD    pantoprazole 40 mg Oral Early Morning Chase Alberto MD    tamsulosin 0 4 mg Oral Daily With Aruna Isaac PA-C         Today, Patient Was Seen By: JEWELL Collier    ** Please Note: Dictation voice to text software may have been used in the creation of this document   **

## 2018-04-07 NOTE — PROGRESS NOTES
Progress Note - Cardiology   Barbara Nix  [de-identified] y o  male MRN: [id number]  Unit/Bed#: -01 Encounter: [id number]    Assessment:  1  Acute on chronic diastolic heart failure  2  PAF  3  CKD  4  CAD with prior CABG  5  AS with prior bioprosthetic AVR  6  HTN    Plan:  BUN and creatinine higher  Some diuresis, not significant  Hemodynamics are stable  Continue same cardiac medications  Subjective/Objective   Chief Complaint: SOB is a little better  Denies CP        Negative 1120 fluid balance  BUN 83  Creatinine 2 96  K+ 4 8      Vitals: /59 (BP Location: Right arm)   Pulse 99   Temp (!) 97 4 °F (36 3 °C) (Oral)   Resp 18   Ht 5' 7" (1 702 m)   Wt 97 kg (213 lb 13 5 oz)   SpO2 98%   BMI 33 49 kg/m²   Vitals:    04/06/18 0618 04/07/18 0600   Weight: 97 9 kg (215 lb 12 8 oz) 97 kg (213 lb 13 5 oz)     Orthostatic Blood Pressures    Flowsheet Row Most Recent Value   Blood Pressure  110/59 filed at 04/06/2018 2243   Patient Position - Orthostatic VS  Sitting filed at 04/06/2018 2243            Intake/Output Summary (Last 24 hours) at 04/07/18 1440  Last data filed at 04/07/18 9097   Gross per 24 hour   Intake              200 ml   Output             1320 ml   Net            -1120 ml       Invasive Devices     Peripheral Intravenous Line            Peripheral IV 04/06/18 Left Forearm 1 day    Peripheral IV 04/06/18 Right Antecubital less than 1 day          Drain            Urethral Catheter 18 Fr  less than 1 day                Review of Systems: Negative except SOB    Physical Exam: /69 (BP Location: Right arm)   Pulse (!) 134   Temp 97 5 °F (36 4 °C) (Oral)   Resp 20   Ht 5' 7" (1 702 m)   Wt 97 kg (213 lb 13 5 oz)   SpO2 97%   BMI 33 49 kg/m²     General Appearance:    Alert, cooperative, no distress, appears stated age   Head:    Normocephalic, without obvious abnormality, atraumatic   Eyes:    PERRL, conjunctiva/corneas clear, EOM's intact, fundi     benign, both eyes        Ears: Normal TM's and external ear canals, both ears   Nose:   Nares normal, septum midline, mucosa normal, no drainage    or sinus tenderness   Throat:   Lips, mucosa, and tongue normal; teeth and gums normal   Neck:   Supple, symmetrical, trachea midline, no adenopathy;        thyroid:  No enlargement/tenderness/nodules; no carotid    bruit or JVD   Back:     Symmetric, no curvature, ROM normal, no CVA tenderness   Lungs:     Clear to auscultation bilaterally, respirations unlabored   Chest wall:    No tenderness or deformity   Heart:    Regular rate and rhythm, S1 and S2 normal, no murmur, rub   or gallop   Abdomen:     Soft, non-tender, bowel sounds active all four quadrants,     no masses, no organomegaly   EXT: 3+ b/l LE edema            Pulses:   2+ and symmetric all extremities   Skin:   Skin color, texture, turgor normal, no rashes or lesions   Lymph nodes:   Cervical, supraclavicular, and axillary nodes normal   Neurologic:   CNII-XII intact  Normal strength, sensation and reflexes       throughout       Lab Results: I have personally reviewed pertinent lab results  Imaging: I have personally reviewed pertinent reports

## 2018-04-07 NOTE — PLAN OF CARE
Problem: DISCHARGE PLANNING - CARE MANAGEMENT  Goal: Discharge to post-acute care or home with appropriate resources  INTERVENTIONS:  - Conduct assessment to determine patient/family and health care team treatment goals, and need for post-acute services based on payer coverage, community resources, and patient preferences, and barriers to discharge  - Address psychosocial, clinical, and financial barriers to discharge as identified in assessment in conjunction with the patient/family and health care team  - Arrange appropriate level of post-acute services according to patient's   needs and preference and payer coverage in collaboration with the physician and health care team  - Communicate with and update the patient/family, physician, and health care team regarding progress on the discharge plan  - Arrange appropriate transportation to post-acute venues  Outcome: Progressing  LOS 3  Bundle; Not a readmission  CM reviewed discharge planning process including the following: identifying caregivers at home, preference for d/c planning needs, [facility] Meds to Bed program, availability of treatment team to discuss questions or concerns patient and/or family may have regarding diagnosis, plan of care, old or new medications and discharge planning   CM will continue to follow for care coordination and update assessment as necessary  CM reviewed the bundle program with patient and the following: "As part of your Medicare benefit, you are automatically enrolled in the bundle payment program  The program is designed to assist in coordinating your care after you leave the hospital  A nurse from [facility] will be following you for 90 days  Please anticipate a phone call from the nurse within 48hrs of your discharge  As your Care Manager, I will be providing a handoff to your next level of care to ensure a safe transition "    Pt lives with wife and is independent and is still able to drive  Pharmacy is [facility]  He is agreeable to rehab and would like to return to [facility] where he has been in the past   Potential DC for early to mid week  Referral has been made to [facility]  Pt has not had VNA in the past   No hx of MH/A&D          04/07/18 1026   Referral Data   Referral Reason Rehab   Patient Information   Mental Status Alert   Primary Caregiver Self   Support System Immediate family   Legal Information   Advance Directives Living will;Power of  for health care; Power of  for finance   Advance Directives Status Copy on chart   Health Care Proxy Appointed Yes - [address] Proxy section   Activities of Daily Living Prior to Admission   Functional Status Independent   [address] Ar[address] House;Lives with someone   Ambulation Independent   Income Information   Income Source Pension/custodial   Means of Transportation   Means of Transport to App: Drive Self      03/03/11 1027   Discharge Planning   Living Arrangements Spouse/significant other   Support Systems Spouse/significant other;Children;Family members   Type of Current Residence Private residence   [address] No   Other Referral/Resources/Interventions Provided:   Post Acute Placement to: SNF Subacute   Discharge Communications   Discharge planning discussed with: pt   Freedom of Choice Yes   CM Handoff Comments 4/7: Ref out to [facility]

## 2018-04-07 NOTE — ASSESSMENT & PLAN NOTE
· Patient with longstanding wound on his left heel which has been addressed by his podiatrist Dr Ayala Lewis and by vascular surgery as an outpatient  · Podiatry recommended d/c IV abxs- held for the past 24 hours   · Vascular following:   · "Once he is compensated from cardiac and renal standpoints, we can re-assess his vascular status   It seems his only potential vascular intervention would be to consider eventual LE bypass should he be a candidate for it from medical and technical standpoints "   · F/U Dr Deloris Lin is scheduled 04/24/2018  · Continue local care

## 2018-04-07 NOTE — ASSESSMENT & PLAN NOTE
· Likely due to patient's noncompliance with his torsemide while on vacation  · Continue Lasix infusion as ordered by Cardiology and Nephrology--appreciate their consultations  · Monitor weights and I/Os, Na restricted diet  · Monitor O2 needs- currently 2 5 liters NC   · Continue beta-blocker with hold parameters  · He continues to have severe leg edema

## 2018-04-07 NOTE — PHYSICAL THERAPY NOTE
PHYSICAL THERAPY TREATMENT NOTE    Patient Name: Rossana Stephens  Today's Date: 4/7/2018 04/07/18 0931   Pain Assessment   Pain Assessment 0-10   Pain Score 4   Pain Location Foot   Pain Orientation Bilateral   Restrictions/Precautions   Other Precautions Multiple lines;Telemetry;O2;Fall Risk;Pain   General   Chart Reviewed Yes   Family/Caregiver Present No   Cognition   Overall Cognitive Status WFL   Orientation Level Oriented X4;Other (Comment)  (pt was identified w/ full name and birth date)   Following Commands Follows one step commands without difficulty   Subjective   Subjective pt agreed to PT intervention  Transfers   Sit to Stand 4  Minimal assistance   Additional items Assist x 1; Increased time required;Verbal cues  (for hand placement, LE positionign)   Stand to Sit 4  Minimal assistance   Additional items Assist x 1;Verbal cues  (for body positioning, hand placement, controlled descent)   Ambulation/Elevation   Gait pattern Decreased foot clearance;Shuffling;Excessively slow   Gait Assistance 4  Minimal assist  (w/ chair follow)   Additional items Assist x 1;Verbal cues; Tactile cues  (for walker positioning, breathing technique)   Assistive Device Rolling walker   Distance 5 feet  (additional not possible due to fatigue)   Stair Management Assistance Not tested   Balance   Static Sitting Fair +   Dynamic Sitting Fair -   Static Standing Poor +   Dynamic Standing Poor +   Ambulatory Poor +  (w/ roller walker)   Activity Tolerance   Activity Tolerance Patient limited by fatigue;Treatment limited secondary to medical complications (Comment)   Nurse Made Aware spoke to [facility]   Assessment   Prognosis Fair   Problem List Decreased strength;Decreased range of motion;Decreased endurance; Impaired balance;Decreased mobility; Decreased coordination;Decreased safety awareness; Obesity; Decreased skin integrity; Impaired sensation  (LE edema)   Assessment Therapist introduced roller walker use w/ ambulation to address mobility and physical impairments noted during evaluation  Pt was noted to have improvement w/ use of RW w/ decreased level of assist needed to maintain safety and increased ambulation distance  Pt was also noted to have improvement in quality of gait  Pt continues to be at risk for falling  continued inpatient PT tx is indicated to reduce fall risk factors  Goals   Patient Goals feel better   STG Expiration Date 04/17/18   Treatment Day 1   Plan   Treatment/Interventions Functional transfer training;LE strengthening/ROM; Therapeutic exercise; Endurance training;Patient/family training;Equipment eval/education; Bed mobility;Gait training  (PT to see when stair training is appropriate)   Progress Progressing toward goals   PT Frequency 5x/wk   Recommendation   Recommendation Post acute IP rehab   Equipment Recommended Other (Comment)  (roller walker)     Skilled inpatient PT recommended while in hospital to progress pt toward treatment goals      Kurt Fischer, PT

## 2018-04-08 NOTE — PROGRESS NOTES
NEPHROLOGY PROGRESS NOTE   Gabriel Vazquez  [de-identified] y o  male MRN: [id number]  Unit/Bed#: -01 Encounter: [id number]  Reason for Consult: SABINE on CKD    ASSESSMENT and PLAN:      62-year-old male who presents with shortness of breath on 04/04  Shea Webster had gone to [place] for 5 days and did not take his torsemide   Nephrology is on board for acute on chronic kidney injury  Course has included AFib  Hypotension  Patient was subsequently started on Lasix drip with albumin because did not tolerate intermittent Lasix  Patient also had urinary retention and a Viera was placed on 04/06      1) SABINE on CKD - baseline Cr 1 8-2 2 mg/dL and today is 2 96 mg/dL  Possible prerenal in setting of cardiorenal vs nephrosarca vs obstructive     - there is concern for urinary retention - viera placed overnight  Daughter yesterday was concerned about viera and I met with daughter yest evening around 8 pm  Daughter asked to have renal u/s which was explained to daughter that likely will not   was completed which showed distended bladder  Renal findings with b/l medical renal disease  - pt gave permission for viera placement  - patient medically required a viera and patient agreed to viera  - I called wife this morning to review - no answer  - UA bland  - Cr slightly improved today  - UOP improved yest  SOB improving  - continue holding ACE  - please avoid NSAIDs   - furosemide gtt started on 4/5 - now increased to 40 mg/hr   If need, may add metolazone if does not remain net negative fluid balance of 500 cc to 1 L tonight  - will increase albumin to 25 gm for today from 12 5 gm - to allow for diuresis given marginal BP  - give kdur one time 40 meq - ordered  - viera placed 3 am on 4/7  - BMP in AM     2) volume overload - see above     3) anemia     4) electrolytes     - low K diet     5) PVD     - vascular consult/studies in progress     6) urinary retention - cont viera     7) bladder wall irregularity - f/u repeat u/s once more stable    SUBJECTIVE / INTERVAL HISTORY:  Pt states SOB has improved slightly  UOP 2 2 L, intake 1 L   SBP     OBJECTIVE:  Current Weight: Weight - Scale: 98 6 kg (217 lb 6 oz)  Vitals:    04/07/18 1500 04/07/18 2241 04/08/18 0600 04/08/18 0700   BP: 123/71 93/68  93/55   BP Location: Left arm Left arm  Left arm   Pulse: 87 97  (!) 113   Resp: 20 19 18   Temp: 97 5 °F (36 4 °C) 97 8 °F (36 6 °C)  97 6 °F (36 4 °C)   TempSrc: Oral Oral  Oral   SpO2: 97% 100%  94%   Weight:   98 6 kg (217 lb 6 oz)    Height:           Intake/Output Summary (Last 24 hours) at 04/08/18 6192  Last data filed at 04/08/18 9898   Gross per 24 hour   Intake             1080 ml   Output             2225 ml   Net            -1145 ml     General: NAD  Skin: no rash  Eyes: anicteric sclera  ENT: dry mucous membrane  Neck: supple  Chest: rales to mid lung but improved  CVS: s1s2  Abdomen: soft, nontender  Extremities: 2+ LE edema  : + viera  Neuro: AAOX3  Psych: normal affect    Medications:    Current Facility-Administered Medications:     acetaminophen (TYLENOL) tablet 975 mg, 975 mg, Oral, Q8H KENDRICK, Lisseth Seals PA-C, 975 mg at 04/08/18 0554    albumin human (FLEXBUMIN) 25 % injection 12 5 g, 12 5 g, Intravenous, Q8H, Toni Dickerson MD, 12 5 g at 04/08/18 0246    aspirin (ECOTRIN LOW STRENGTH) EC tablet 81 mg, 81 mg, Oral, Daily, Chase Alberto MD, 81 mg at 04/07/18 0752    atorvastatin (LIPITOR) tablet 40 mg, 40 mg, Oral, Daily With Dinner, Chase Alberto MD, 40 mg at 04/07/18 1731    calcium carbonate (TUMS) chewable tablet 1,000 mg, 1,000 mg, Oral, Daily PRN, Chase Alberto MD    docusate sodium (COLACE) capsule 100 mg, 100 mg, Oral, BID PRN, Chase Alberto MD, 100 mg at 04/07/18 0758    furosemide (LASIX) 500 mg infusion 50 mL, 40 mg/hr, Intravenous, Continuous, Toni Dickerson MD, Last Rate: 4 mL/hr at 04/07/18 2347, 40 mg/hr at 04/07/18 2347    heparin (porcine) subcutaneous injection 5,000 Units, 5,000 Units, Subcutaneous, Q8H [address], 5,000 Units at 04/08/18 0554 **AND** Platelet count, , , Once, Chase Alebrto MD    insulin glargine (LANTUS) subcutaneous injection 20 Units, 20 Units, Subcutaneous, HS, Chase Alberto MD, 20 Units at 04/07/18 2113    insulin lispro (HumaLOG) 100 units/mL subcutaneous injection 1-5 Units, 1-5 Units, Subcutaneous, TID AC, 1 Units at 04/07/18 1732 **AND** Fingerstick Glucose (POCT), , , TID AC, Chase Alberto MD    insulin lispro (HumaLOG) 100 units/mL subcutaneous injection 1-5 Units, 1-5 Units, Subcutaneous, HS, Chase Alberto MD, 1 Units at 04/07/18 2113    insulin lispro (HumaLOG) 100 units/mL subcutaneous injection 10 Units, 10 Units, Subcutaneous, TID With Meals, Chase Alberto MD, 10 Units at 04/07/18 1732    metoprolol tartrate (LOPRESSOR) tablet 50 mg, 50 mg, Oral, TID, Yassine Wilcox MD, 50 mg at 04/07/18 2112    ondansetron (ZOFRAN) injection 4 mg, 4 mg, Intravenous, Q6H PRN, Chase Alberto MD    pantoprazole (PROTONIX) EC tablet 40 mg, 40 mg, Oral, Early Morning, Chase Alberto MD, 40 mg at 04/08/18 0554    tamsulosin (FLOMAX) capsule 0 4 mg, 0 4 mg, Oral, Daily With Liss Seals PA-C, 0 4 mg at 04/07/18 1731    Laboratory Results:    Results from last 7 days  Lab Units 04/08/18  0442 04/07/18  0457 04/06/18  0548 04/05/18  0642 04/04/18  0655   WBC Thousand/uL 7 71 8 06 8 73 9 64 8 31   HEMOGLOBIN g/dL 9 4* 9 5* 9 6* 10 8* 11 1*   HEMATOCRIT % 30 7* 31 0* 31 9* 34 8* 35 5*   PLATELETS Thousands/uL 168 171 175 226 248   SODIUM mmol/L 140 137 141 140 137   POTASSIUM mmol/L 3 7 4 8 5 2 5 0 5 3   CHLORIDE mmol/L 99* 98* 102 101 101   CO2 mmol/L 30 25 28 26 25   BUN mg/dL 92* 83* 78* 76* 75*   CREATININE mg/dL 2 89* 2 96* 2 63* 2 69* 2 71*   CALCIUM mg/dL 9 4 9 5 9 5 9 8 9 0   MAGNESIUM mg/dL  --   --  2 5 2 5  --    TOTAL PROTEIN g/dL  --   --   --   --  6 8   GLUCOSE RANDOM mg/dL 113 169* 153* 141* 250*

## 2018-04-08 NOTE — PROGRESS NOTES
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, hallux and lateral heel  2  DM neuropathy  3  Severe PAD  4  CHF with LE edema  5  Ischemic changes to right 1st and 2nd digit without gangrene     Plan: All wounds remain stable and dry  Continue dressing care for protection  Possible vascular intervention in the future depending on his overall health status  Discussed proper footwear  Recommended surgical shoe due to swelling and wounds on his left foot  His wife refused it because she was afraid that he would fall due to poor balance            Subjective/Objective   Chief Complaint:   Chief Complaint   Patient presents with    Shortness of Breath     pt reports being short of breath over the past week  Pt reports when he gets up and walks distances he has trouble breathing and chest pain  Daughter reports recent trip to [facility] where pt did not take lasix to avoid voiding  Subjective: [de-identified] y o  y/o male was seen and evaluated at bedside  No acute event overnight  No foot pain  SOB is much better  No CP/F/chills/N/V  Blood pressure 93/55, pulse (!) 107, temperature 97 6 °F (36 4 °C), temperature source Oral, resp  rate 18, height 5' 7" (1 702 m), weight 98 6 kg (217 lb 6 oz), SpO2 94 %  ,Body mass index is 34 05 kg/m²  Invasive Devices     Peripheral Intravenous Line            Peripheral IV 04/06/18 Left Forearm 2 days    Peripheral IV 04/06/18 Right Antecubital 2 days          Drain            Urethral Catheter 18 Fr  1 day                Physical Exam:   General: Alert, cooperative and no distress  Lungs: Non labored breathing  Heart: Positive S1, S2  Abdomen: Soft, non-tender  Extremity: Severe pitting edema bilateral lower extremity  Nonpalpable pedal pulses  Some duskiness to the hallux and 2nd digit  There is no overt gangrene or sign of infection    There are eschars to the 5th digit great toe and lateral aspect of the heel  They are dry without any drainage, redness, or signs of infection  Lab, Imaging and other studies:   I have personally reviewed pertinent lab results  Imaging: I have personally reviewed pertinent films in PACS  EKG, Pathology, and Other Studies: I have personally reviewed pertinent reports

## 2018-04-08 NOTE — PROGRESS NOTES
Progress Note - Marni Parish  1935, [de-identified] y o  male MRN: [id number]    Unit/Bed#: -01 Encounter: [id number]    Primary Care Provider: La Martinez MD   Date and time admitted to hospital: 4/4/2018  6:39 AM        Acute-on-chronic kidney injury [facility])   Assessment & Plan    · Renal input appreciated  · NO NSAIDS  · HOLD ACE-I, avoid hypotension  · Monitor BMP  · Creatinine remains above baseline but slightly improved today to 2 86  · Geronimo placed for retention        * Acute on chronic diastolic congestive heart failure (HCC)   Assessment & Plan    · Likely due to patient's noncompliance with his torsemide while on vacation  · Continue Lasix infusion as ordered by Cardiology and Nephrology--appreciate their consultations  · Monitor weights and I/Os, Na restricted diet  · Monitor O2 needs- currently 2 5 liters NC   · Continue beta-blocker with hold parameters  · He continues to have severe leg edema        Cellulitis of foot, left   Assessment & Plan    · Patient with longstanding wound on his left heel which has been addressed by his podiatrist Dr Lolis Martinez and by vascular surgery as an outpatient  · Podiatry recommended d/c IV abxs- held for the past 24 hours   · Vascular following:   · "Once he is compensated from cardiac and renal standpoints, we can re-assess his vascular status   It seems his only potential vascular intervention would be to consider eventual LE bypass should he be a candidate for it from medical and technical standpoints "   · F/U Dr Asmita Man is scheduled 04/24/2018  · Continue local care        Non-ST elevation myocardial infarction (NSTEMI) [facility])   Assessment & Plan    · Mild troponin elevation likely the result of type 2 non ST elevation MI from heart failure and renal failure  · Cardiology consult appreciated        Peripheral arterial disease ([facility] [address] )   Assessment & Plan    · Vascular consulted, see note above regarding left wound on heel        Type 2 diabetes mellitus ([facility] [address] ) Assessment & Plan    · Monitor on AccuChecks/SSI  · A1c 7 6  · Continue lantus and novolog        Rapid atrial fibrillation ([facility] [address] )   Assessment & Plan    · Cardiology increased metoprolol yesterday  · Not on anticoagulation  · Continue telemetry            VTE Pharmacologic Prophylaxis:   Pharmacologic: Heparin  Mechanical VTE Prophylaxis in Place: Yes on right leg, not on left d/t wound     Patient Centered Rounds: I have performed bedside rounds with nursing staff today  Discussions with Specialists or Other Care Team Provider: d/w RN     Education and Discussions with Family / Patient: d/w patient  Refused call to family     Time Spent for Care: 30 minutes  More than 50% of total time spent on counseling and coordination of care as described above  Current Length of Stay: 4 day(s)    Current Patient Status: Inpatient   Certification Statement: The patient will continue to require additional inpatient hospital stay due to lasix gtt    Discharge Plan: not stable for discharge     Code Status: Level 1 - Full Code      Subjective:   Patient denies any current complaints  He reports overall he is feeling better than yesterday with his breathing  Currently patient is using 2 5 L of oxygen    Objective:     Vitals:   Temp (24hrs), Av 6 °F (36 4 °C), Min:97 5 °F (36 4 °C), Max:97 8 °F (36 6 °C)    HR:  [] 113  Resp:  [18-20] 18  BP: ()/(55-71) 93/55  SpO2:  [94 %-100 %] 94 %  Body mass index is 34 05 kg/m²  Input and Output Summary (last 24 hours): Intake/Output Summary (Last 24 hours) at 18 1115  Last data filed at 18 0855   Gross per 24 hour   Intake             1020 ml   Output             2225 ml   Net            -1205 ml       Physical Exam:     Physical Exam   Constitutional: He is oriented to person, place, and time  No distress  Cardiovascular: Normal heart sounds and intact distal pulses  An irregularly irregular rhythm present  Tachycardia present      No murmur heard   Pulmonary/Chest: Effort normal  No respiratory distress  He has decreased breath sounds  He has no wheezes  Abdominal: Soft  Bowel sounds are normal  He exhibits no distension  There is no tenderness  There is no rebound  Musculoskeletal: He exhibits edema (Severe bilateral lower extremity)  He exhibits no tenderness  Neurological: He is alert and oriented to person, place, and time  Skin: Skin is warm and dry  He is not diaphoretic  See picture below of left foot   Psychiatric: He has a normal mood and affect  Additional Data:     Labs:      Results from last 7 days  Lab Units 04/08/18  0442 04/07/18  0457   WBC Thousand/uL 7 71 8 06   HEMOGLOBIN g/dL 9 4* 9 5*   HEMATOCRIT % 30 7* 31 0*   PLATELETS Thousands/uL 168 171   NEUTROS PCT %  --  72   LYMPHS PCT %  --  18   MONOS PCT %  --  8   EOS PCT %  --  2       Results from last 7 days  Lab Units 04/08/18  0442  04/04/18  0655   SODIUM mmol/L 140  < > 137   POTASSIUM mmol/L 3 7  < > 5 3   CHLORIDE mmol/L 99*  < > 101   CO2 mmol/L 30  < > 25   BUN mg/dL 92*  < > 75*   CREATININE mg/dL 2 89*  < > 2 71*   CALCIUM mg/dL 9 4  < > 9 0   TOTAL PROTEIN g/dL  --   --  6 8   BILIRUBIN TOTAL mg/dL  --   --  0 50   ALK PHOS U/L  --   --  123*   ALT U/L  --   --  38   AST U/L  --   --  16   GLUCOSE RANDOM mg/dL 113  < > 250*   < > = values in this interval not displayed  Results from last 7 days  Lab Units 04/04/18  0655   INR  1 04       * I Have Reviewed All Lab Data Listed Above  * Additional Pertinent Lab Tests Reviewed: All Labs Within Last 24 Hours Reviewed      Recent Cultures (last 7 days):       Results from last 7 days  Lab Units 04/04/18  0715 04/04/18  0655   BLOOD CULTURE  No Growth After 4 Days  No Growth After 4 Days         Last 24 Hours Medication List:     Current Facility-Administered Medications:  acetaminophen 975 mg Oral Q8H [facility] & alf Lisseth Seals PA-C    albumin human 25 g Intravenous Q8H Tasha Mejias MD    aspirin 81 mg Oral Daily Chase Alberto MD    atorvastatin 40 mg Oral Daily With Dinner Chase Alberto MD    calcium carbonate 1,000 mg Oral Daily PRN Chase Alberto MD    docusate sodium 100 mg Oral BID PRN Chase Alberto MD    furosemide 40 mg/hr Intravenous Continuous Lana Padilla MD Last Rate: 40 mg/hr (04/07/18 6269)   heparin (porcine) 5,000 Units Subcutaneous Q8H [facility] Chase Alberto MD    insulin glargine 20 Units Subcutaneous HS Chase Alberto MD    insulin lispro 1-5 Units Subcutaneous TID AC Chase Alberto MD    insulin lispro 1-5 Units Subcutaneous HS Chase Alberto MD    insulin lispro 10 Units Subcutaneous TID With Meals Chase Alberto MD    metoprolol tartrate 50 mg Oral TID Lizett Contreras MD    ondansetron 4 mg Intravenous Q6H PRN Chase Alberto MD    pantoprazole 40 mg Oral Early Morning Chase Alberto MD    tamsulosin 0 4 mg Oral Daily With Chao Seals PA-C         Today, Patient Was Seen By: JEWELL Adair    ** Please Note: Dictation voice to text software may have been used in the creation of this document   **

## 2018-04-08 NOTE — CASE MANAGEMENT
Continued Stay Review    Date: 04/08/2018    Vital Signs: BP 93/55 (BP Location: Left arm)   Pulse (!) 107   Temp 97 6 °F (36 4 °C) (Oral)   Resp 18   Ht 5' 7" (1 702 m)   Wt 98 6 kg (217 lb 6 oz)   SpO2 94%   BMI 34 05 kg/m²     Medications:   Scheduled Meds:   Current Facility-Administered Medications:  acetaminophen 975 mg Oral Q8H [address]   albumin human 25 g Intravenous Q8H   aspirin 81 mg Oral Daily   atorvastatin 40 mg Oral Daily With Dinner   insulin glargine 20 Units Subcutaneous HS   insulin lispro 1-5 Units Subcutaneous TID AC   insulin lispro 1-5 Units Subcutaneous HS   insulin lispro 10 Units Subcutaneous TID With Meals   metoprolol tartrate 50 mg Oral TID   pantoprazole 40 mg Oral Early Morning   tamsulosin 0 4 mg Oral Daily With Dinner     Continuous Infusions:   furosemide 40 mg/hr Last Rate: 40 mg/hr (04/08/18 1124)     Abnormal Labs/Diagnostic Results: H/H 9 4/30 7, Cl 99, Bun 92, Creat 2 89    Age/Sex: [de-identified] y o  male     Assessment/Plan:            Acute-on-chronic kidney injury ([facility] [address] )   Assessment & Plan     · Renal input appreciated  · NO NSAIDS  · HOLD ACE-I, avoid hypotension  · Monitor BMP  · Creatinine remains above baseline but slightly improved today to 2 86  · Geronimo placed for retention          * Acute on chronic diastolic congestive heart failure (HCC)   Assessment & Plan     · Likely due to patient's noncompliance with his torsemide while on vacation  · Continue Lasix infusion as ordered by Cardiology and Nephrology--appreciate their consultations  · Monitor weights and I/Os, Na restricted diet  · Monitor O2 needs- currently 2 5 liters NC   · Continue beta-blocker with hold parameters  · He continues to have severe leg edema          Cellulitis of foot, left   Assessment & Plan     · Patient with longstanding wound on his left heel which has been addressed by his podiatrist Dr Aysha Pugh and by vascular surgery as an outpatient    · Podiatry recommended d/c IV abxs- held for the past 24 hours   · Vascular following:   § "Once he is compensated from cardiac and renal standpoints, we can re-assess his vascular status   It seems his only potential vascular intervention would be to consider eventual LE bypass should he be a candidate for it from medical and technical standpoints "   § F/U Dr Abby Pérez is scheduled 04/24/2018  · Continue local care          Non-ST elevation myocardial infarction (NSTEMI) [facility])   Assessment & Plan     · Mild troponin elevation likely the result of type 2 non ST elevation MI from heart failure and renal failure  · Cardiology consult appreciated          Peripheral arterial disease ([facility] [address] )   Assessment & Plan     · Vascular consulted, see note above regarding left wound on heel          Type 2 diabetes mellitus (HCC)   Assessment & Plan     · Monitor on AccuChecks/SSI  · A1c 7 6  · Continue lantus and novolog          Rapid atrial fibrillation ([facility] [address] )   Assessment & Plan     · Cardiology increased metoprolol yesterday  · Not on anticoagulation  · Continue telemetry                VTE Pharmacologic Prophylaxis:   Pharmacologic: Heparin  Mechanical VTE Prophylaxis in Place: Yes on right leg, not on left d/t wound       Current Length of Stay: 4 day(s)     Current Patient Status: Inpatient   Certification Statement: The patient will continue to require additional inpatient hospital stay due to lasix gtt     Discharge Plan: not stable for discharge

## 2018-04-08 NOTE — PLAN OF CARE
CARDIOVASCULAR - ADULT     Maintains optimal cardiac output and hemodynamic stability Progressing     Absence of cardiac dysrhythmias or at baseline rhythm Progressing        DISCHARGE PLANNING     Discharge to home or other facility with appropriate resources Progressing        DISCHARGE PLANNING - CARE MANAGEMENT     Discharge to post-acute care or home with appropriate resources Progressing        INFECTION - ADULT     Absence or prevention of progression during hospitalization Progressing     Absence of fever/infection during neutropenic period Progressing        Knowledge Deficit     Patient/family/caregiver demonstrates understanding of disease process, treatment plan, medications, and discharge instructions Progressing        Nutrition/Hydration-ADULT     Nutrient/Hydration intake appropriate for improving, restoring or maintaining nutritional needs Progressing        PAIN - ADULT     Verbalizes/displays adequate comfort level or baseline comfort level Progressing        Potential for Falls     Patient will remain free of falls Progressing        Prexisting or High Potential for Compromised Skin Integrity     Skin integrity is maintained or improved Progressing        SAFETY ADULT     Maintain or return to baseline ADL function Progressing     Maintain or return mobility status to optimal level Progressing

## 2018-04-09 NOTE — PROGRESS NOTES
Cardiology Progress Note - Angelito Turner  [de-identified] y o  male MRN: [id number]    Unit/Bed#: -01 Encounter: [id number]      Assessment:  1  Acute on chronic diastolic CHF  2  Paroxysmal atrial fibrillation/flutter - heart rate suboptimal at times, not on anticoagulation due to frequent falls, thus limits options to rate control only  No rhythm control option  Elevated creatinine makes digoxin and safe  Low blood pressure at times has caused withholding of rate control therapy which is suboptimal for overall rate control in 24 hours  3   SABINE with CKD III  4  CAD s/p remote CABG  5  Aortic valve disease s/p bioprosthetic AVR  6  Hypertension  7  Dyslipidemia  8  Non-ST elevation myocardial infarction (NSTEMI) ([facility] ) - due to CHF      Plan:    Diuresing slowly on a Lasix drip, metolazone added per Nephrology  We will continue to follow  Family/daughter would prefer no anticoagulation  I did discuss with him the risks of being on and off anticoagulation, they are quite aware, and it appears a probably favor no future anticoagulation    Subjective:   Patient seen and examined  No significant changes by symptoms  No new complaints for me today  Daughter is against anticoagulation, he had a significant fall with significant bleeding last year  Dr Clif Bowser had previously stopped his anticoagulation, and he had been AFib free for quite some time  Meds/Allergies   Allergies   Allergen Reactions    Codeine      "swelling shaky"    Hydrocodone      Other reaction(s):  Other (See Comments)  Heart racing       Current Facility-Administered Medications:     acetaminophen (TYLENOL) tablet 975 mg, 975 mg, Oral, Q8H [facility], Lisseth Seals PA-C, 975 mg at 04/09/18 1318    albumin human (FLEXBUMIN) 25 % injection 25 g, 25 g, Intravenous, Q8H, Torrie Garber MD, 25 g at 04/09/18 1103    aspirin (ECOTRIN LOW STRENGTH) EC tablet 81 mg, 81 mg, Oral, Daily, Chase Alberto MD, 81 mg at 04/09/18 0603    atorvastatin (LIPITOR) tablet 40 mg, 40 mg, Oral, Daily With Carola Alberto MD, 40 mg at 04/09/18 1634    calcium carbonate (TUMS) chewable tablet 1,000 mg, 1,000 mg, Oral, Daily PRN, Chase Alberto MD    docusate sodium (COLACE) capsule 100 mg, 100 mg, Oral, BID PRN, Chase Alberto MD, 100 mg at 04/07/18 0758    furosemide (LASIX) 500 mg infusion 50 mL, 40 mg/hr, Intravenous, Continuous, Tasha Mejias MD, Last Rate: 4 mL/hr at 04/09/18 0118, 40 mg/hr at 04/09/18 0118    insulin glargine (LANTUS) subcutaneous injection 20 Units, 20 Units, Subcutaneous, HS, Chase Alberto MD, 20 Units at 04/08/18 2145    insulin lispro (HumaLOG) 100 units/mL subcutaneous injection 1-5 Units, 1-5 Units, Subcutaneous, TID AC, 1 Units at 04/09/18 1637 **AND** Fingerstick Glucose (POCT), , , TID AC, Chase Alberto MD    insulin lispro (HumaLOG) 100 units/mL subcutaneous injection 1-5 Units, 1-5 Units, Subcutaneous, HS, Chase Alberto MD, 1 Units at 04/08/18 2145    insulin lispro (HumaLOG) 100 units/mL subcutaneous injection 10 Units, 10 Units, Subcutaneous, TID With Meals, Chase Alberto MD, Stopped at 04/09/18 0843    melatonin tablet 3 mg, 3 mg, Oral, HS, Lisseth Seals PA-C    metoprolol tartrate (LOPRESSOR) tablet 50 mg, 50 mg, Oral, TID, Deann Evans MD, 50 mg at 04/09/18 1635    ondansetron (ZOFRAN) injection 4 mg, 4 mg, Intravenous, Q6H PRN, Chase Alberto MD    pantoprazole (PROTONIX) EC tablet 40 mg, 40 mg, Oral, Early Morning, Chase Alberto MD, 40 mg at 04/09/18 0514    tamsulosin (FLOMAX) capsule 0 4 mg, 0 4 mg, Oral, Daily With Dinner, Lisseth Seals PA-C, 0 4 mg at 04/09/18 1634    Objective   Vitals: Blood pressure 110/59, pulse 86, temperature 98 6 °F (37 °C), temperature source Oral, resp  rate 18, height 5' 7" (1 702 m), weight 98 1 kg (216 lb 4 3 oz), SpO2 98 %  , Body mass index is 33 87 kg/m² , Orthostatic Blood Pressures Flowsheet Row Most Recent Value   Blood Pressure  110/59 filed at 04/09/2018 1500   Patient Position - Orthostatic VS  Sitting filed at 04/09/2018 2376        Systolic (07MJC), SKM:21 , Min:88 , EZT:436     Diastolic (22HOQ), QYA:00, Min:52, Max:67      Intake/Output Summary (Last 24 hours) at 04/09/18 1710  Last data filed at 04/09/18 1643   Gross per 24 hour   Intake              660 ml   Output             1875 ml   Net            -1215 ml     Weight (last 2 days)     Date/Time   Weight    04/09/18 1318  98 1 (216 27)    04/09/18 0600  98 9 (218 04)    04/08/18 0600  98 6 (217 37)    04/07/18 0600  97 (213 85)              Tele reveals atrial fibrillation with heart rates in the 90s to low 100s    Physical Exam:    GEN: Ozzie Ly   appears well, alert and oriented x 3, pleasant and cooperative   NECK: supple, no carotid bruits, positive JVD to the angle of the mandible at 90° sitting upright  HEART: normal rate, irregular rhythm, normal S1 and S2, no murmurs  LUNGS:  Bibasilar crackles, no wheezing  ABDOMEN: normal bowel sounds, soft, no tenderness, no distention  EXTREMITIES: peripheral pulses normal; no clubbing, cyanosis, 3+ pitting edema with wounds of the lower extremities      Laboratory Results:    Results from last 7 days  Lab Units 04/04/18  1200 04/04/18  0655   CK TOTAL U/L  --  65   TROPONIN I ng/mL 0 24* 0 24*       CBC with diff:   Results from last 7 days  Lab Units 04/09/18  0522 04/08/18  0442 04/07/18  0457 04/06/18  0548 04/05/18  0642 04/04/18  0655   WBC Thousand/uL 7 04 7 71 8 06 8 73 9 64 8 31   HEMOGLOBIN g/dL 9 2* 9 4* 9 5* 9 6* 10 8* 11 1*   HEMATOCRIT % 29 8* 30 7* 31 0* 31 9* 34 8* 35 5*   MCV fL 86 86 87 87 86 86   PLATELETS Thousands/uL 176 168 171 175 226 248   MCH pg 26 7* 26 3* 26 7* 26 2* 26 7* 26 7*   MCHC g/dL 30 9* 30 6* 30 6* 30 1* 31 0* 31 3*   RDW % 14 5 14 6 14 8 14 9 14 9 15 0   MPV fL 10 4 10 4 10 0 9 7 9 7 9 9         CMP:  Results from last 7 days  Lab Units 04/09/18  0522 04/08/18  5916 04/07/18  0457 04/06/18  0548 04/05/18  0642 04/04/18  0655   SODIUM mmol/L 139 140 137 141 140 137   POTASSIUM mmol/L 3 8 3 7 4 8 5 2 5 0 5 3   CHLORIDE mmol/L 97* 99* 98* 102 101 101   CO2 mmol/L 31 30 25 28 26 25   ANION GAP mmol/L 11 11 14* 11 13 11   BUN mg/dL 95* 92* 83* 78* 76* 75*   CREATININE mg/dL 2 81* 2 89* 2 96* 2 63* 2 69* 2 71*   GLUCOSE RANDOM mg/dL 134 113 169* 153* 141* 250*   CALCIUM mg/dL 9 5 9 4 9 5 9 5 9 8 9 0   AST U/L  --   --   --   --   --  16   ALT U/L  --   --   --   --   --  38   ALK PHOS U/L  --   --   --   --   --  123*   TOTAL PROTEIN g/dL  --   --   --   --   --  6 8   BILIRUBIN TOTAL mg/dL  --   --   --   --   --  0 50   EGFR ml/min/1 73sq m 20 20 19 22 21 21         BMP:  Results from last 7 days  Lab Units 04/09/18  0522 04/08/18  0442 04/07/18  0457 04/06/18  0548 04/05/18  0642 04/04/18  0655   SODIUM mmol/L 139 140 137 141 140 137   POTASSIUM mmol/L 3 8 3 7 4 8 5 2 5 0 5 3   CHLORIDE mmol/L 97* 99* 98* 102 101 101   CO2 mmol/L 31 30 25 28 26 25   BUN mg/dL 95* 92* 83* 78* 76* 75*   CREATININE mg/dL 2 81* 2 89* 2 96* 2 63* 2 69* 2 71*   GLUCOSE RANDOM mg/dL 134 113 169* 153* 141* 250*   CALCIUM mg/dL 9 5 9 4 9 5 9 5 9 8 9 0       BNP:  Invalid input(s): NTPROBNP    Magnesium:   Results from last 7 days  Lab Units 04/06/18  0548 04/05/18  0642   MAGNESIUM mg/dL 2 5 2 5       Coags:   Results from last 7 days  Lab Units 04/04/18  0655   PTT seconds 29   INR  1 04       TSH: @LABRCNTIP(TSH:1)    Hemoglobin A1C )      Lipid Profile:       Cardiac testing:   Results for orders placed during the hospital encounter of 09/25/17   Echo complete with contrast if indicated    Narrative [address]  [facility] [place] [address] [facility]  [phone]    Transthoracic Echocardiogram  2D, M-mode, Doppler, and Color Doppler    Study date:  25-Sep-2017    Patient: Larry Gerber  MR number: [id number]  Account number: [id number]  : 1935  Age: [de-identified] years  Gender: Male  Status: Outpatient  Location: [address] [place] [address] and [facility] [place]  Height: 67 in  Weight: 223 lb  BP: 120/ 50 mmHg    Indications: Evaluate prosthetic aortic valve  Diagnoses: I35 9 - Nonrheumatic aortic valve disorder, unspecified    Sonographer:  MASON Montano  Primary Physician:  Maday Hernandez MD  Referring Physician:  Devante Reyes MD  Group:  [facility]  Cardiology Fellow:  Marylene Infield, MD  Interpreting Physician:  Hermilo Murphy MD    SUMMARY    LEFT VENTRICLE:  Systolic function was normal  Ejection fraction was estimated to be 65 %  There was severe concentric hypertrophy  Doppler parameters were consistent with abnormal left ventricular relaxation (grade 1 diastolic dysfunction)  RIGHT VENTRICLE:  The size was normal   Systolic function was normal     LEFT ATRIUM:  The atrium was moderately dilated  RIGHT ATRIUM:  The atrium was mildly dilated  MITRAL VALVE:  There was very mild stenosis  There was mild regurgitation  AORTIC VALVE:  A bioprosthesis was present  It exhibited normal function  Valve mean gradient was 11 mmHg  TRICUSPID VALVE:  There was mild regurgitation  COMPARISONS:  There has been no significant interval change  Comparison was made with the previous study of 2017  HISTORY: PRIOR HISTORY: CAD; CABG; AVR; Atrial fibrillation; Edema; Diabetes; CKD; Hypertension; Hyperlipidemia    PROCEDURE: The study was performed in the [address] [facility] [place]  This was a routine study  The transthoracic approach was used  The study included complete 2D imaging, M-mode, complete spectral Doppler, and color Doppler  The  heart rate was 67 bpm, at the start of the study  Images were obtained from the parasternal, apical, subcostal, and suprasternal notch acoustic windows  Echocardiographic views were limited due to poor acoustic window availability   Image  quality was adequate  LEFT VENTRICLE: Size was normal  Systolic function was normal  Ejection fraction was estimated to be 65 %  There were no regional wall motion abnormalities  Wall thickness was increased  There was severe concentric hypertrophy  DOPPLER:  Doppler parameters were consistent with abnormal left ventricular relaxation (grade 1 diastolic dysfunction)  RIGHT VENTRICLE: The size was normal  Systolic function was normal  Wall thickness was normal     LEFT ATRIUM: The atrium was moderately dilated  RIGHT ATRIUM: The atrium was mildly dilated  MITRAL VALVE: Valve structure was normal  There was normal leaflet separation  DOPPLER: The transmitral velocity was within the normal range  There was very mild stenosis  There was mild regurgitation  AORTIC VALVE: A bioprosthesis was present  It exhibited normal function  DOPPLER: Transaortic velocity was within the normal range  There was no evidence for stenosis  There was no regurgitation  TRICUSPID VALVE: The valve structure was normal  There was normal leaflet separation  DOPPLER: The transtricuspid velocity was within the normal range  There was no evidence for stenosis  There was mild regurgitation  Pulmonary artery  systolic pressure was at the upper limits of normal  Estimated peak PA pressure was 35 mmHg  PULMONIC VALVE: Leaflets exhibited normal thickness, no calcification, and normal cuspal separation  DOPPLER: The transpulmonic velocity was within the normal range  There was trace regurgitation  PERICARDIUM: There was no pericardial effusion  AORTA: The root exhibited normal size  SYSTEMIC VEINS: IVC: The inferior vena cava was normal in size and course      MEASUREMENT TABLES    DOPPLER MEASUREMENTS  Aortic valve   (Reference normals)  Mean gradient   11 mmHg   (--)    SYSTEM MEASUREMENT TABLES    2D  %FS: 19 23 %  Ao Diam: 3 27 cm  EDV(Teich): 75 4 ml  EF(Cube): 47 3 %  EF(Teich): 40 01 %  ESV(Cube): 37 05 ml  ESV(Teich): 45 24 ml  IVSd: 1 62 cm  LA Area: 28 6 cm2  LA Diam: 5 02 cm  LVEDV MOD A4C: 142 35 ml  LVEF MOD A4C: 58 06 %  LVESV MOD A4C: 59 71 ml  LVIDd: 4 13 cm  LVIDs: 3 33 cm  LVLd A4C: 8 34 cm  LVLs A4C: 7 18 cm  LVPWd: 1 56 cm  RA Area: 17 11 cm2  RV Diam : 4 38 cm  SV MOD A4C: 82 64 ml  SV(Cube): 33 26 ml  SV(Teich): 30 17 ml    CW  AV Env  Ti: 294 27 ms  AV VTI: 48 75 cm  AV Vmax: 2 3 m/s  AV Vmean: 1 66 m/s  AV maxP 24 mmHg  AV meanP 41 mmHg  HR: 78 79 BPM  MR VTI: 175 4 cm  MR Vmax: 5 54 m/s  MR Vmean: 4 24 m/s  MR maxP 69 mmHg  MR meanP 89 mmHg  MV VTI: 45 31 cm  MV Vmax: 1 62 m/s  MV Vmean: 0 99 m/s  MV maxPG: 10 53 mmHg  MV meanP 77 mmHg  TR Vmax: 2 59 m/s  TR maxP 93 mmHg    MM  TAPSE: 1 77 cm    PW  E': 0 04 m/s  E/E': 36 43  HR: 71 04 BPM  LVOT Env  Ti: 317 93 ms  LVOT VTI: 22 63 cm  LVOT Vmax: 0 96 m/s  LVOT Vmean: 0 72 m/s  LVOT maxPG: 3 7 mmHg  LVOT meanP 31 mmHg  MV A Simone: 1 45 m/s  MV Dec [place]: 5 49 m/s2  MV DecT: 240 17 ms  MV E Simone: 1 32 m/s  MV E/A Ratio: 0 91    [employer] Accredited Echocardiography Laboratory    Prepared and electronically signed by    Olivia Mason MD  Signed 25-Sep-2017 16:53:02           Olivia Mason MD    Portions of the record may have been created with voice recognition software   Occasional wrong word or "sound a like" substitutions may have occurred due to the inherent limitations of voice recognition software   Read the chart carefully and recognize, using context, where substitutions have occurred

## 2018-04-09 NOTE — PROGRESS NOTES
Progress Note - Lorene Chisholm  1935, [de-identified] y o  male MRN: [id number]    Unit/Bed#: -01 Encounter: [id number]    Primary Care Provider: Audrey Vincent MD   Date and time admitted to hospital: 4/4/2018  6:39 AM  * Acute on chronic diastolic congestive heart failure ([facility] )   Assessment & Plan    · Likely due to patient's noncompliance with his torsemide while on vacation  · Hospital day #6  · Continue Lasix infusion as ordered by Cardiology and Nephrology--appreciate their consultations--await their input today and guidance  · Monitor weights and I/Os, Na restricted diet  · Currently a negative at 5 6 L  · Monitor O2 needs- currently 2 5 liters NC   · Continue beta-blocker with hold parameters  · He continues to have severe leg edema        Rapid atrial fibrillation ([facility] )   Assessment & Plan    · Cardiology managing metoprolol given the AFib in the setting of hypotension  · Also receiving albumin for hypotension  · Not on anticoagulation  · Continue telemetry        Acute-on-chronic kidney injury ([facility] )   Assessment & Plan    · Renal input appreciated  · Baseline creatinine 1 8-2 2   · NO NSAIDS  · HOLD ACE-I, avoid hypotension  · Monitor BMP daily  · Creatinine remains above baseline but slightly improved today to 2 81  · Geronimo placed for retention, flomax added as well        Peripheral arterial disease ([place] [address] )   Assessment & Plan    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 which is being managed by Podiatry with local care (Dr Jean-Claude Westbrook is the patient's usual podiatrist but was out of town over the weekend and [address] [place] [address] was covering for him)  · Per my conversation with vascular surgery today, they are not planning to do any intervention during this hospitalization especially in light of his unstable cardiovascular and renal issues at this time  · Tylenol around the clock for pain, patient declines anything stronger        Type 2 diabetes mellitus [facility])   Assessment & Plan    · Monitor on AccuChecks/SSI  · A1c 7 6  · Continue lantus and novolog        Non-ST elevation myocardial infarction (NSTEMI) ([facility])   Assessment & Plan    · Mild troponin elevation likely the result of type 2 non ST elevation MI from heart failure and renal failure  · Cardiology consult appreciated          VTE Pharmacologic Prophylaxis:   Pharmacologic:  Patient refusing heparin  Mechanical VTE Prophylaxis in Place: No    Patient Centered Rounds: I have performed bedside rounds with nursing staff today  Discussions with Specialists or Other Care Team Provider: renal, cardiology, case mgmt, vascular surgery    Education and Discussions with Family / Patient: 1:37 pm left message on wife's phone, no answer    Time Spent for Care: 30 minutes  More than 50% of total time spent on counseling and coordination of care as described above  Current Length of Stay: 5 day(s)    Current Patient Status: Inpatient   Certification Statement: The patient will continue to require additional inpatient hospital stay due to ongoing lasix gtt    Discharge Plan:  Physical therapy is recommending short-term rehab post discharge    Code Status: Level 1 - Full Code      Subjective:   Nursing reports patient did not get a good night's sleep last night and is very sleepy right now  He required placement of a Geronimo for urinary retention  Per chart review he has been refusing subcu heparin  Offers no specific complaints but again provide limited history at this time  Objective:     Vitals:   Temp (24hrs), Av 9 °F (36 6 °C), Min:97 5 °F (36 4 °C), Max:98 4 °F (36 9 °C)    HR:  [] 110  Resp:  [16-20] 18  BP: ()/(52-67) 95/63  SpO2:  [91 %-99 %] 91 %  Body mass index is 34 15 kg/m²  Input and Output Summary (last 24 hours):        Intake/Output Summary (Last 24 hours) at 18 1110  Last data filed at 18 0600   Gross per 24 hour   Intake              480 ml   Output 2175 ml   Net            -1695 ml       Physical Exam:     Physical Exam   Constitutional: No distress  Very fatigued, laying in bed, not very interactive   HENT:   Head: Normocephalic and atraumatic  Eyes: Conjunctivae are normal  Right eye exhibits no discharge  Left eye exhibits no discharge  Neck: Neck supple  Cardiovascular:   No murmur heard  Rate stable   Pulmonary/Chest: No stridor  No respiratory distress  He has no wheezes  Limited exam, he is no wheezing, cough, or dyspnea noted  Nasal cannula oxygen in place  Abdominal: Soft  He exhibits no distension  There is no tenderness  Musculoskeletal: He exhibits edema (Ongoing severe unchanged bilateral lower extremity edema)  Skin: Skin is warm and dry  He is not diaphoretic  Dry dressing in place on left foot   Nursing note and vitals reviewed  Additional Data:     Labs:      Results from last 7 days  Lab Units 04/09/18  0522 04/07/18  0457   WBC Thousand/uL 7 04  < > 8 06   HEMOGLOBIN g/dL 9 2*  < > 9 5*   HEMATOCRIT % 29 8*  < > 31 0*   PLATELETS Thousands/uL 176  < > 171   NEUTROS PCT %  --   --  72   LYMPHS PCT %  --   --  18   MONOS PCT %  --   --  8   EOS PCT %  --   --  2   < > = values in this interval not displayed  Results from last 7 days  Lab Units 04/09/18  0522  04/04/18  0655   SODIUM mmol/L 139  < > 137   POTASSIUM mmol/L 3 8  < > 5 3   CHLORIDE mmol/L 97*  < > 101   CO2 mmol/L 31  < > 25   BUN mg/dL 95*  < > 75*   CREATININE mg/dL 2 81*  < > 2 71*   CALCIUM mg/dL 9 5  < > 9 0   TOTAL PROTEIN g/dL  --   --  6 8   BILIRUBIN TOTAL mg/dL  --   --  0 50   ALK PHOS U/L  --   --  123*   ALT U/L  --   --  38   AST U/L  --   --  16   GLUCOSE RANDOM mg/dL 134  < > 250*   < > = values in this interval not displayed  Results from last 7 days  Lab Units 04/04/18  0655   INR  1 04       * I Have Reviewed All Lab Data Listed Above  * Additional Pertinent Lab Tests Reviewed:  All Labs Within Last 24 Hours Reviewed    Imaging:    Imaging Reports Reviewed Today Include:   Imaging Personally Reviewed by Myself Includes:      Recent Cultures (last 7 days):       Results from last 7 days  Lab Units 04/04/18  0715 04/04/18  0655   BLOOD CULTURE  No Growth After 5 Days  No Growth After 5 Days  Last 24 Hours Medication List:     Current Facility-Administered Medications:  acetaminophen 975 mg Oral Q8H [address] Lisseth Seals PA-C    albumin human 25 g Intravenous Q8H Lamonte Guerra MD    aspirin 81 mg Oral Daily Chase Alberto MD    atorvastatin 40 mg Oral Daily With Dinner Chase Alberto MD    calcium carbonate 1,000 mg Oral Daily PRN Chase Alberto MD    docusate sodium 100 mg Oral BID PRN Chase Alberto MD    furosemide 40 mg/hr Intravenous Continuous Lamonte Guerra MD Last Rate: 40 mg/hr (04/09/18 0118)   insulin glargine 20 Units Subcutaneous HS Chase Alberto MD    insulin lispro 1-5 Units Subcutaneous TID AC Chase Alberto MD    insulin lispro 1-5 Units Subcutaneous HS Chase Alberto MD    insulin lispro 10 Units Subcutaneous TID With Meals Chase Alberto MD    melatonin 3 mg Oral HS Lisseth Seals PA-C    metoprolol tartrate 50 mg Oral TID Lora Brown MD    ondansetron 4 mg Intravenous Q6H PRN Chase Alberto MD    pantoprazole 40 mg Oral Early Morning Chase Alberto MD    tamsulosin 0 4 mg Oral Daily With Dinner Madelin Martin PA-C         Today, Patient Was Seen By: Madelin Martin PA-C    ** Please Note: Dictation voice to text software may have been used in the creation of this document   **

## 2018-04-09 NOTE — PROGRESS NOTES
NEPHROLOGY PROGRESS NOTE   Ted Shaikh  [de-identified] y o  male MRN: [id number]  Unit/Bed#: -01 Encounter: [id number]  Reason for Consult:  Acute kidney injury on CKD    ASSESSMENT and PLAN:  The patient is an 82-year-old gentleman who presented with shortness of breath on April 4th  He went on vacation for 5 days and did not take his diuretic, along with dietary indiscretion  Clinical course complicated by atrial fibrillation and hypotension  He was not tolerating intermittent IV diuretic therapy was placed on a Lasix infusion with albumin  1  Acute kidney injury on chronic kidney disease:  Baseline creatinine 1 8-2 2  Etiology possibly prerenal, decreased effective circulating volume in the setting of volume overload, cardiorenal also with concern for urinary retention  Geronimo catheter placed on 04/08/2018  · Creatinine 2 8 (peak 2 96)  · Renal function slowly improving  Continue to monitor  · Urinalysis:  Negative protein, no RBCs, no bacteria, 0-1 WBCs  2  Acute on chronic diastolic CHF, Volume overload: On Lasix infusion with albumin  · Good urine output- 2175 ml  Surprisingly no improvement in weight- will have patient Re weighed this afternoon with a standing scale  · Continue Lasix infusion, albumin  · Patient still requires the use of supplemental oxygen  3  PAF:  Rate variable  4  Anemia:  Hemoglobin 9 2  5  Electrolytes:  Potassium level acceptable  6  PVD:  Podiatry following  7  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 Xi Barker  8  Other:  CAD, prior CABG, AS status post AVR-bioprosthetic    SUMMARY OF RECOMMENDATIONS:  · Continue Lasix/albumin today  · Continue Geronimo catheter  · For strict I&O  · Daily weight  Standing only  · Check labs in the a m  SUBJECTIVE / INTERVAL HISTORY:  Wife concerned about fluctuating weight  Patient still requires the use of oxygen  Feeling a little better since admission      OBJECTIVE:  Current Weight: Weight - Scale: 98 9 kg (218 lb 0 6 oz)  Vitals: 04/08/18 2150 04/09/18 0600 04/09/18 0726 04/09/18 1102   BP: 90/58  107/67 95/63   BP Location: Right arm  Right arm Right arm   Pulse: 95  (!) 107 (!) 110   Resp:   20 18   Temp:   97 5 °F (36 4 °C)    TempSrc:   Oral    SpO2:   91%    Weight:  98 9 kg (218 lb 0 6 oz)     Height:           Intake/Output Summary (Last 24 hours) at 04/09/18 1140  Last data filed at 04/09/18 0600   Gross per 24 hour   Intake              240 ml   Output             2175 ml   Net            -1935 ml     General: NAD  Skin: Warm and dry  Eyes:  Sclera clear, anicteric  ENT:  Oropharynx moist  Neck:  Supple, jugular veins full  Chest:  Crackles right lower lobe, decreased breath sounds left lower lobe  CVS:  Irregularly irregular    S1, S2  Abdomen:  Soft, nontender, bowel sounds present  Extremities:  +2 lower extremity edema left slightly greater than right  :  Geronimo catheter in place draining clear yellow urine  Neuro:  Alert and oriented x3  Psych: appropriate  Medications:    Current Facility-Administered Medications:     acetaminophen (TYLENOL) tablet 975 mg, 975 mg, Oral, Q8H KENDRICK, Lisseth Seals PA-C, 975 mg at 04/09/18 0514    albumin human (FLEXBUMIN) 25 % injection 25 g, 25 g, Intravenous, Q8H, Linda Hastings MD, 25 g at 04/09/18 1103    aspirin (ECOTRIN LOW STRENGTH) EC tablet 81 mg, 81 mg, Oral, Daily, Chase Alberto MD, 81 mg at 04/09/18 0842    atorvastatin (LIPITOR) tablet 40 mg, 40 mg, Oral, Daily With ROCKETHOME MD Remy, 40 mg at 04/08/18 1647    calcium carbonate (TUMS) chewable tablet 1,000 mg, 1,000 mg, Oral, Daily PRN, Chase Alberto MD    docusate sodium (COLACE) capsule 100 mg, 100 mg, Oral, BID PRN, Chase Alberto MD, 100 mg at 04/07/18 0758    furosemide (LASIX) 500 mg infusion 50 mL, 40 mg/hr, Intravenous, Continuous, Linda Hastings MD, Last Rate: 4 mL/hr at 04/09/18 0118, 40 mg/hr at 04/09/18 0118    insulin glargine (LANTUS) subcutaneous injection 20 Units, 20 Units, Subcutaneous, HS, Chase Alberto MD, 20 Units at 04/08/18 2145    insulin lispro (HumaLOG) 100 units/mL subcutaneous injection 1-5 Units, 1-5 Units, Subcutaneous, TID AC, Stopped at 04/09/18 0842 **AND** Fingerstick Glucose (POCT), , , TID AC, Chase Alberto MD    insulin lispro (HumaLOG) 100 units/mL subcutaneous injection 1-5 Units, 1-5 Units, Subcutaneous, HS, Chase Alberto MD, 1 Units at 04/08/18 2145    insulin lispro (HumaLOG) 100 units/mL subcutaneous injection 10 Units, 10 Units, Subcutaneous, TID With Meals, Chase Alberto MD, Stopped at 04/09/18 0843    melatonin tablet 3 mg, 3 mg, Oral, HS, Lisseth Seals PA-C    metoprolol tartrate (LOPRESSOR) tablet 50 mg, 50 mg, Oral, TID, Suki Hollis MD, 50 mg at 04/09/18 0842    ondansetron (ZOFRAN) injection 4 mg, 4 mg, Intravenous, Q6H PRN, Chase Alberto MD    pantoprazole (PROTONIX) EC tablet 40 mg, 40 mg, Oral, Early Morning, Chase Alberto MD, 40 mg at 04/09/18 0514    tamsulosin (FLOMAX) capsule 0 4 mg, 0 4 mg, Oral, Daily With Yvonne Seals PA-C, 0 4 mg at 04/08/18 1647    Laboratory Results:    Results from last 7 days  Lab Units 04/09/18  0522 04/08/18  0442 04/07/18  0457 04/06/18  0548 04/05/18  0642 04/04/18  0655   WBC Thousand/uL 7 04 7 71 8 06 8 73 9 64 8 31   HEMOGLOBIN g/dL 9 2* 9 4* 9 5* 9 6* 10 8* 11 1*   HEMATOCRIT % 29 8* 30 7* 31 0* 31 9* 34 8* 35 5*   PLATELETS Thousands/uL 176 168 171 175 226 248   SODIUM mmol/L 139 140 137 141 140 137   POTASSIUM mmol/L 3 8 3 7 4 8 5 2 5 0 5 3   CHLORIDE mmol/L 97* 99* 98* 102 101 101   CO2 mmol/L 31 30 25 28 26 25   BUN mg/dL 95* 92* 83* 78* 76* 75*   CREATININE mg/dL 2 81* 2 89* 2 96* 2 63* 2 69* 2 71*   CALCIUM mg/dL 9 5 9 4 9 5 9 5 9 8 9 0   MAGNESIUM mg/dL  --   --   --  2 5 2 5  --    TOTAL PROTEIN g/dL  --   --   --   --   --  6 8   GLUCOSE RANDOM mg/dL 134 113 169* 153* 141* 250*     Previous work up:

## 2018-04-09 NOTE — ASSESSMENT & PLAN NOTE
· Likely due to patient's noncompliance with his torsemide while on vacation  · Hospital day #6  · Continue Lasix infusion as ordered by Cardiology and Nephrology--appreciate their consultations--await their input today and guidance  · Monitor weights and I/Os, Na restricted diet  · Currently a negative at 5 6 L  · Monitor O2 needs- currently 2 5 liters NC   · Continue beta-blocker with hold parameters  · He continues to have severe leg edema

## 2018-04-09 NOTE — PHYSICAL THERAPY NOTE
Physical Therapy Progress Note       04/09/18 1440   Pain Assessment   Pain Assessment No/denies pain   Pain Score No Pain   Restrictions/Precautions   Other Precautions Multiple lines;Telemetry;O2;Fall Risk;Pain   General   Chart Reviewed Yes   Family/Caregiver Present Yes  (wife)   Cognition   Comments 3L O2 via nasal cannula throughout session  Subjective   Subjective Patient is seated in bedside chair with B LE elevated on 3L O2 via nasal cannula  Denies any pain and is agreeable to therapy session  Bed Mobility   Additional Comments Pt OOb pre and post session   Transfers   Sit to Stand 4  Minimal assistance   Additional items Assist x 1; Increased time required;Armrests; Verbal cues  (for LE positioning )   Stand to Sit 5  Supervision   Additional items Assist x 1;Verbal cues  (hand placement )   Ambulation/Elevation   Gait pattern Decreased foot clearance;Shuffling; Short stride; Excessively slow; Forward Flexion   Gait Assistance 4  Minimal assist   Additional items Assist x 1;Verbal cues   Assistive Device Rolling walker   Distance 15 feet  (limited by fatigue)   Balance   Static Sitting Fair +   Dynamic Sitting Fair -   Static Standing Poor +   Dynamic Standing Poor +   Ambulatory Poor +   Activity Tolerance   Activity Tolerance Patient limited by fatigue;Treatment limited secondary to medical complications (Comment)   Nurse Made Aware spoke with ADDY Murillo   Exercises   Glute Sets Sitting;10 reps;AROM; Bilateral   Hip Adduction Sitting;10 reps;AROM; Bilateral   Knee AROM Long Arc Quad Sitting;10 reps;AROM; Bilateral   Ankle Pumps Sitting;10 reps;AROM; Bilateral  (only heels up on L side secondary to ulcer)   Marching Sitting;10 reps;AROM; Bilateral   Assessment   Prognosis Fair   Problem List Decreased strength;Decreased range of motion;Decreased endurance;Decreased mobility; Impaired balance;Decreased coordination;Decreased safety awareness; Obesity; Decreased skin integrity; Impaired sensation   Assessment Patient demonstrated ability to ambulate short distance of 15 feet with RW however was unable to ambulate more secondary to fatigue & SOB  SpO2 on 3L O2 fluctuated between 94-97% throughout session  Patient motivated throughout session but remains signifcantly limited by fatigue that impacts safe functional mobility  Goals   Patient Goals to go homt   STG Expiration Date 04/17/18   Treatment Day 2   Plan   Treatment/Interventions Functional transfer training;LE strengthening/ROM; Therapeutic exercise; Endurance training;Patient/family training;Equipment eval/education; Bed mobility;Gait training   Progress Progressing toward goals   PT Frequency 5x/wk   Recommendation   Recommendation Post acute IP rehab   Equipment Recommended Other (Comment)  (roller walker)     Wilmon Factor, PTA

## 2018-04-09 NOTE — ASSESSMENT & PLAN NOTE
· Renal input appreciated  · Baseline creatinine 1 8-2 2   · NO NSAIDS  · HOLD ACE-I, avoid hypotension  · Monitor BMP daily  · Creatinine remains above baseline but slightly improved today to 2 81  · Geronimo placed for retention, flomax added as well

## 2018-04-09 NOTE — ASSESSMENT & PLAN NOTE
· Cardiology managing metoprolol given the AFib in the setting of hypotension  · Also receiving albumin for hypotension  · Not on anticoagulation  · Continue telemetry

## 2018-04-09 NOTE — ASSESSMENT & PLAN NOTE
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

## 2018-04-09 NOTE — PLAN OF CARE
Problem: PHYSICAL THERAPY ADULT  Goal: Performs mobility at highest level of function for planned discharge setting  See evaluation for individualized goals  Treatment/Interventions: Functional transfer training, LE strengthening/ROM, Therapeutic exercise, Endurance training, Patient/family training, Equipment eval/education, Bed mobility, Gait training (PT to see when stair training is appropriate )          See flowsheet documentation for full assessment, interventions and recommendations  Outcome: Progressing  Prognosis: Fair  Problem List: Decreased strength, Decreased range of motion, Decreased endurance, Decreased mobility, Impaired balance, Decreased coordination, Decreased safety awareness, Obesity, Decreased skin integrity, Impaired sensation  Assessment: Patient demonstrated ability to ambulate short distance of 15 feet with RW however was unable to ambulate more secondary to fatigue & SOB  SpO2 on 3L O2 fluctuated between 94-97% throughout session  Patient motivated throughout session but remains signifcantly limited by fatigue that impacts safe functional mobility  Recommendation: Post acute IP rehab          See flowsheet documentation for full assessment

## 2018-04-10 NOTE — ASSESSMENT & PLAN NOTE
· Monitor on AccuChecks/SSI--blood sugars have been stable  · A1c 7 6  · Continue lantus and novolog

## 2018-04-10 NOTE — ASSESSMENT & PLAN NOTE
· Cardiology managing metoprolol given the AFib (which still remains uncontrolled at times) in the setting of hypotension  · Also receiving albumin for hypotension  · Not a candidate for digoxin given SABINE  · Not on anticoagulation (refused)  · Continue telemetry

## 2018-04-10 NOTE — PROGRESS NOTES
Progress Note - Hugh Mail  1935, [de-identified] y o  male MRN: [id number]    Unit/Bed#: -[place] Encounter: [id number]    Primary Care Provider: Molly Hill MD   Date and time admitted to hospital: 4/4/2018  6:39 AM    * Acute on chronic diastolic congestive heart failure ([facility] )   Assessment & Plan    · Likely triggered by patient's noncompliance with his torsemide while on vacation  · Hospital day #7  · Continue Lasix gtt as ordered by Cardiology and Nephrology--appreciate their consultations  · Also got metolazone 4/9/18 x 1 and 4/6/18 x 1  · Monitor weights and I/Os, Na restricted diet  · Currently a negative at 6 1 L, weight down from 106--97 8 kg  · Monitor O2 needs- currently 2 5 liters NC   · Continue beta-blocker with hold parameters  · He continues to have severe leg edema which does not appear to have improved at all despite diuresis-- I question whether some of his leg edema could possibly be DVT but he did have a negative Doppler as of April 4th  That being said he has continued to refuse basic DVT prophylaxis during the hospitalization therefore he remains at high risk for developing a clot at any point  Certainly a PE could also explain ongoing dyspnea despite diuresis  Will need to monitor closely and may consider repeating Doppler of the lower extremity  A CTA of the chest cannot be done due to renal failure          Rapid atrial fibrillation [facility])   Assessment & Plan    · Cardiology managing metoprolol given the AFib (which still remains uncontrolled at times) in the setting of hypotension  · Also receiving albumin for hypotension  · Not a candidate for digoxin given SABINE  · Not on anticoagulation (refused)  · Continue telemetry        Acute-on-chronic kidney injury ([facility] )   Assessment & Plan    · Renal input and ongoing assistance much appreciated  · Baseline creatinine 1 8-2 2   · NO NSAIDS  · HOLD ACE-I, avoid hypotension  · Monitor BMP daily  · Creatinine worsened today to 3 26  · Grazyna placed for retention, flomax added as well this admit        Peripheral arterial disease ([facility] )   Assessment & Plan    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 which is being managed by Podiatry with local care (Dr Soumya Steward is the patient's usual podiatrist but was out of town over the weekend and [facility] group was covering for him)  · Per my conversation with vascular surgery, they are not planning to do any intervention during this hospitalization especially in light of his unstable cardiovascular and renal issues at this time  · Tylenol around the clock for pain, patient declines anything stronger  · Initially we thought there may be an element of mild surrounding cellulitis on admission but after evaluation by Podiatry it was felt that no additional antibiotics were needed and the IV Ancef was discontinued        Type 2 diabetes mellitus ([facility] )   Assessment & Plan    · Monitor on AccuChecks/SSI--blood sugars have been stable  · A1c 7 6  · Continue lantus and novolog        Non-ST elevation myocardial infarction (NSTEMI) ([facility] )   Assessment & Plan    · Mild troponin elevation likely the result of type 2 non ST elevation MI from heart failure and renal failure  · Cardiology consult appreciated          VTE Pharmacologic Prophylaxis:   Pharmacologic:  PATIENT CONTINUES TO REFUSE  Mechanical VTE Prophylaxis in Place: No    Patient Centered Rounds: I have performed bedside rounds with nursing staff today  Discussions with Specialists or Other Care Team Provider:  Cardiology, case management    Education and Discussions with Family / Patient:  Called wife again and left a message, have not received a call back from yesterday    Time Spent for Care: 30 minutes  More than 50% of total time spent on counseling and coordination of care as described above  Current Length of Stay: 6 day(s)    Current Patient Status: Inpatient   Certification Statement:  The patient will continue to require additional inpatient hospital stay due to Ongoing renal failure and heart failure    Discharge Plan:  Recommended for short-term rehab her PT, not medically stable for discharge at this point    Code Status: Level 1 - Full Code    Subjective:   Patient reports he does not feel any better  Objective:     Vitals:   Temp (24hrs), Av 1 °F (36 7 °C), Min:97 5 °F (36 4 °C), Max:98 6 °F (37 °C)    HR:  [] 107  Resp:  [18-20] 20  BP: ()/(51-63) 111/58  SpO2:  [93 %-98 %] 93 %  Body mass index is 33 77 kg/m²  Input and Output Summary (last 24 hours): Intake/Output Summary (Last 24 hours) at 04/10/18 1015  Last data filed at 04/10/18 0100   Gross per 24 hour   Intake              780 ml   Output             1350 ml   Net             -570 ml       Physical Exam:     Physical Exam   Constitutional: No distress  HENT:   Head: Normocephalic and atraumatic  Eyes: Conjunctivae are normal  Right eye exhibits no discharge  Left eye exhibits no discharge  No scleral icterus  Neck: Neck supple  Cardiovascular:   No murmur heard  Irregular rhythm   Pulmonary/Chest: No stridor  He seems dyspneic while talking to someone on the phone during my evaluation  No cough or wheezing noted  Nasal cannula oxygen in place  Lungs sound clearer overall   Abdominal: Soft  He exhibits no distension  There is no tenderness  Obese   Musculoskeletal: He exhibits edema (Severe bilateral lower extremity pitting edema which remains unchanged)  Neurological: He is alert  Much more awake alert and interactive today   Skin: He is not diaphoretic  Left heel wound is dressed  No worsening of erythema noted  Psychiatric: He has a normal mood and affect  His behavior is normal  Thought content normal    Extremely pleasant and cooperative   Nursing note and vitals reviewed        Additional Data:     Labs:      Results from last 7 days  Lab Units 18  0522  18  0623 WBC Thousand/uL 7 04  < > 8 06   HEMOGLOBIN g/dL 9 2*  < > 9 5*   HEMATOCRIT % 29 8*  < > 31 0*   PLATELETS Thousands/uL 176  < > 171   NEUTROS PCT %  --   --  72   LYMPHS PCT %  --   --  18   MONOS PCT %  --   --  8   EOS PCT %  --   --  2   < > = values in this interval not displayed  Results from last 7 days  Lab Units 04/10/18  0523  04/04/18  0655   SODIUM mmol/L 143  < > 137   POTASSIUM mmol/L 4 2  < > 5 3   CHLORIDE mmol/L 98*  < > 101   CO2 mmol/L 31  < > 25   BUN mg/dL 107*  < > 75*   CREATININE mg/dL 3 26*  < > 2 71*   CALCIUM mg/dL 9 8  < > 9 0   TOTAL PROTEIN g/dL  --   --  6 8   BILIRUBIN TOTAL mg/dL  --   --  0 50   ALK PHOS U/L  --   --  123*   ALT U/L  --   --  38   AST U/L  --   --  16   GLUCOSE RANDOM mg/dL 141*  < > 250*   < > = values in this interval not displayed  Results from last 7 days  Lab Units 04/04/18  0655   INR  1 04       * I Have Reviewed All Lab Data Listed Above  * Additional Pertinent Lab Tests Reviewed: All Labs Within Last 24 Hours Reviewed    Imaging:    Imaging Reports Reviewed Today Include:   Imaging Personally Reviewed by Myself Includes:      Recent Cultures (last 7 days):       Results from last 7 days  Lab Units 04/04/18  0715 04/04/18  0655   BLOOD CULTURE  No Growth After 5 Days  No Growth After 5 Days         Last 24 Hours Medication List:     Current Facility-Administered Medications:  acetaminophen 975 mg Oral Q8H [facility] Lisseth Seals PA-C    albumin human 25 g Intravenous Rik Bernheim, MD    aspirin 81 mg Oral Daily Chase Alberto MD    atorvastatin 40 mg Oral Daily With Dinner Chase Alberto MD    calcium carbonate 1,000 mg Oral Daily PRN Chase Alberto MD    docusate sodium 100 mg Oral BID PRN Chase Alberto MD    furosemide 40 mg/hr Intravenous Continuous Casandra Galeano MD Last Rate: 40 mg/hr (04/10/18 0555)   insulin glargine 20 Units Subcutaneous HS Chase Alberto MD    insulin lispro 1-5 Units Subcutaneous TID AC Chase Alberto MD    insulin lispro 1-5 Units Subcutaneous HS Chase Alberto MD    insulin lispro 10 Units Subcutaneous TID With Meals Chase Alberto MD    melatonin 3 mg Oral HS Lisseth Seals PA-C    metoprolol tartrate 50 mg Oral TID Yolanda Coleman MD    ondansetron 4 mg Intravenous Q6H PRN Chase Alberto MD    pantoprazole 40 mg Oral Early Morning Chase Alberto MD    tamsulosin 0 4 mg Oral Daily With Dinner Gualberto Escoto PA-C         Today, Patient Was Seen By: Gualberto Escoto PA-C    ** Please Note: Dictation voice to text software may have been used in the creation of this document   **

## 2018-04-10 NOTE — ASSESSMENT & PLAN NOTE
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 which is being managed by Podiatry with local care (Dr Cleopatra Lepe is the patient's usual podiatrist but was out of town over the weekend and [facility] group was covering for him)  · Per my conversation with vascular surgery, they are not planning to do any intervention during this hospitalization especially in light of his unstable cardiovascular and renal issues at this time  · Tylenol around the clock for pain, patient declines anything stronger  · Initially we thought there may be an element of mild surrounding cellulitis on admission but after evaluation by Podiatry it was felt that no additional antibiotics were needed and the IV Ancef was discontinued

## 2018-04-10 NOTE — PROGRESS NOTES
Cardiology Progress Note - Bertha Springer  [de-identified] y o  male MRN: [id number]    Unit/Bed#: -01 Encounter: [id number]      Assessment:  1  Acute on chronic diastolic CHF  2  Paroxysmal atrial fibrillation/flutter - heart rate suboptimal at times, not on anticoagulation due to frequent falls, thus limits options to rate control only  No rhythm control option  Elevated creatinine makes digoxin and safe  Low blood pressure at times has caused withholding of rate control therapy which is suboptimal for overall rate control in 24 hours  3   SABINE with CKD III  4  CAD s/p remote CABG  5  Aortic valve disease s/p bioprosthetic AVR  6  Hypertension  7  Dyslipidemia  8  Non-ST elevation myocardial infarction (NSTEMI) ([facility] [place] [address] ) - due to CHF      Plan:    Patient and daughter favor no AC, they understand the risks of no AC but also undertsand first hand the risk of taking it with traumatic fall with significant bleeding last year  Continues to diurese but slowly  Got metolazone yesterday, on lasix gtt now, Creatinine up slightly  Still volume overloaded so needs ongoing diuresis  Subjective:   Patient seen and examined  No new complaints to me    Meds/Allergies   Allergies   Allergen Reactions    Codeine      "swelling shaky"    Hydrocodone      Other reaction(s):  Other (See Comments)  Heart racing       Current Facility-Administered Medications:     acetaminophen (TYLENOL) tablet 975 mg, 975 mg, Oral, Q8H [facility], Lisseth Seals PA-C, 975 mg at 04/10/18 0545    albumin human (FLEXBUMIN) 25 % injection 25 g, 25 g, Intravenous, Q8H, Isai Glover MD, 25 g at 04/10/18 0329    aspirin (ECOTRIN LOW STRENGTH) EC tablet 81 mg, 81 mg, Oral, Daily, Chase Alberto MD, 81 mg at 04/09/18 0842    atorvastatin (LIPITOR) tablet 40 mg, 40 mg, Oral, Daily With Dinner, Chase Alberto MD, 40 mg at 04/09/18 1634    calcium carbonate (TUMS) chewable tablet 1,000 mg, 1,000 mg, Oral, Daily PRN, Chase MD Remy    docusate sodium (COLACE) capsule 100 mg, 100 mg, Oral, BID PRN, Chase Alberto MD, 100 mg at 04/07/18 0758    furosemide (LASIX) 500 mg infusion 50 mL, 40 mg/hr, Intravenous, Continuous, Aiden Woodard MD, Last Rate: 4 mL/hr at 04/10/18 0555, 40 mg/hr at 04/10/18 0555    insulin glargine (LANTUS) subcutaneous injection 20 Units, 20 Units, Subcutaneous, HS, Chase Alberto MD, 20 Units at 04/09/18 2147    insulin lispro (HumaLOG) 100 units/mL subcutaneous injection 1-5 Units, 1-5 Units, Subcutaneous, TID AC, 1 Units at 04/09/18 1637 **AND** Fingerstick Glucose (POCT), , , TID AC, Chase Alberto MD    insulin lispro (HumaLOG) 100 units/mL subcutaneous injection 1-5 Units, 1-5 Units, Subcutaneous, HS, Chase Alberto MD, 1 Units at 04/09/18 2148    insulin lispro (HumaLOG) 100 units/mL subcutaneous injection 10 Units, 10 Units, Subcutaneous, TID With Meals, Chase Alberto MD, Stopped at 04/09/18 0843    melatonin tablet 3 mg, 3 mg, Oral, HS, Lisseth Seals PA-C, 3 mg at 04/09/18 2146    metoprolol tartrate (LOPRESSOR) tablet 50 mg, 50 mg, Oral, TID, Aries Claire MD, 50 mg at 04/09/18 1635    ondansetron (ZOFRAN) injection 4 mg, 4 mg, Intravenous, Q6H PRN, Chase Alberto MD    pantoprazole (PROTONIX) EC tablet 40 mg, 40 mg, Oral, Early Morning, Chase Alberto MD, 40 mg at 04/10/18 0545    tamsulosin (FLOMAX) capsule 0 4 mg, 0 4 mg, Oral, Daily With Dinner, Lisseth Seals PA-C, 0 4 mg at 04/09/18 1634    Objective   Vitals: Blood pressure 90/51, pulse 91, temperature 98 3 °F (36 8 °C), temperature source Oral, resp  rate 18, height 5' 7" (1 702 m), weight 97 8 kg (215 lb 9 8 oz), SpO2 96 %  , Body mass index is 33 77 kg/m² ,   Orthostatic Blood Pressures    Flowsheet Row Most Recent Value   Blood Pressure  90/51 filed at 04/09/2018 2229   Patient Position - Orthostatic VS  Lying filed at 04/09/2018 9574        Systolic (83JML), LPU:45 , Min:90 , ZVC:607     Diastolic (55ZTK), PCA:58, Min:51, Max:63      Intake/Output Summary (Last 24 hours) at 04/10/18 0823  Last data filed at 04/10/18 0100   Gross per 24 hour   Intake              780 ml   Output             1350 ml   Net             -570 ml     Weight (last 2 days)     Date/Time   Weight    04/10/18 0600  97 8 (215 61)    04/09/18 1318  98 1 (216 27)    04/09/18 0600  98 9 (218 04)    04/08/18 0600  98 6 (217 37)              Tele reveals atrial fibrillation with reasonable HR control    Physical Exam:    GEN: aKden Kennedy   appears well, alert and oriented x 3, pleasant and cooperative   NECK: + JVD  HEART: normal rate, irregular, no murmur  LUNGS:  Bibasilar rales, no wheezing  ABDOMEN: soft, nt  EXTREMITIES: wwp, 2+ edema      Laboratory Results:    Results from last 7 days  Lab Units 04/04/18  1200 04/04/18  0655   CK TOTAL U/L  --  65   TROPONIN I ng/mL 0 24* 0 24*       CBC with diff:     Results from last 7 days  Lab Units 04/09/18  0522 04/08/18  0442 04/07/18  0457 04/06/18  0548 04/05/18  0642 04/04/18  0655   WBC Thousand/uL 7 04 7 71 8 06 8 73 9 64 8 31   HEMOGLOBIN g/dL 9 2* 9 4* 9 5* 9 6* 10 8* 11 1*   HEMATOCRIT % 29 8* 30 7* 31 0* 31 9* 34 8* 35 5*   MCV fL 86 86 87 87 86 86   PLATELETS Thousands/uL 176 168 171 175 226 248   MCH pg 26 7* 26 3* 26 7* 26 2* 26 7* 26 7*   MCHC g/dL 30 9* 30 6* 30 6* 30 1* 31 0* 31 3*   RDW % 14 5 14 6 14 8 14 9 14 9 15 0   MPV fL 10 4 10 4 10 0 9 7 9 7 9 9         CMP:    Results from last 7 days  Lab Units 04/10/18  0523 04/09/18  0522 04/08/18  0442 04/07/18  0457 04/06/18  0548 04/05/18  0642 04/04/18  0655   SODIUM mmol/L 143 139 140 137 141 140 137   POTASSIUM mmol/L 4 2 3 8 3 7 4 8 5 2 5 0 5 3   CHLORIDE mmol/L 98* 97* 99* 98* 102 101 101   CO2 mmol/L 31 31 30 25 28 26 25   ANION GAP mmol/L 14* 11 11 14* 11 13 11   BUN mg/dL 107* 95* 92* 83* 78* 76* 75*   CREATININE mg/dL 3 26* 2 81* 2 89* 2 96* 2 63* 2 69* 2 71*   GLUCOSE RANDOM mg/dL 141* 134 113 169* 153* 141* 250*   CALCIUM mg/dL 9 8 9 5 9 4 9 5 9 5 9 8 9 0   AST U/L  --   --   --   --   --   --  16   ALT U/L  --   --   --   --   --   --  38   ALK PHOS U/L  --   --   --   --   --   --  123*   TOTAL PROTEIN g/dL  --   --   --   --   --   --  6 8   BILIRUBIN TOTAL mg/dL  --   --   --   --   --   --  0 50   EGFR ml/min/1 73sq m 17 20 20 19 22 21 21         BMP:    Results from last 7 days  Lab Units 04/10/18  0523 18  0522 18  0442 18  0457 18  0548 18  0642 18  0655   SODIUM mmol/L 143 139 140 137 141 140 137   POTASSIUM mmol/L 4 2 3 8 3 7 4 8 5 2 5 0 5 3   CHLORIDE mmol/L 98* 97* 99* 98* 102 101 101   CO2 mmol/L 31 31 30 25 28 26 25   BUN mg/dL 107* 95* 92* 83* 78* 76* 75*   CREATININE mg/dL 3 26* 2 81* 2 89* 2 96* 2 63* 2 69* 2 71*   GLUCOSE RANDOM mg/dL 141* 134 113 169* 153* 141* 250*   CALCIUM mg/dL 9 8 9 5 9 4 9 5 9 5 9 8 9 0       Magnesium:     Results from last 7 days  Lab Units 18  0548 18  0642   MAGNESIUM mg/dL 2 5 2 5       Coags:     Results from last 7 days  Lab Units 18  0655   PTT seconds 29   INR  1 04       Cardiac testing:   Results for orders placed during the hospital encounter of 17   Echo complete with contrast if indicated    Narrative [facility] [address]  [facility], [address] [facility]  [phone]    Transthoracic Echocardiogram  2D, M-mode, Doppler, and Color Doppler    Study date:  25-Sep-2017    Patient: Kelley Norton  MR number: [id number]  Account number: [de-identified]  : 1935  Age: [de-identified] years  Gender: Male  Status: Outpatient  Location: [address] [place] [address] [facility]  Height: 67 in  Weight: 223 lb  BP: 120/ 50 mmHg    Indications: Evaluate prosthetic aortic valve      Diagnoses: I35 9 - Nonrheumatic aortic valve disorder, unspecified    Sonographer:  MASON Blue  Primary Physician:  Danyell Bateman MD  Referring Physician:  Lucian Mata MD  Group:  [facility]  Cardiology Fellow:  Karsten Ang MD  Interpreting Physician:  Trude Fothergill, MD    SUMMARY    LEFT VENTRICLE:  Systolic function was normal  Ejection fraction was estimated to be 65 %  There was severe concentric hypertrophy  Doppler parameters were consistent with abnormal left ventricular relaxation (grade 1 diastolic dysfunction)  RIGHT VENTRICLE:  The size was normal   Systolic function was normal     LEFT ATRIUM:  The atrium was moderately dilated  RIGHT ATRIUM:  The atrium was mildly dilated  MITRAL VALVE:  There was very mild stenosis  There was mild regurgitation  AORTIC VALVE:  A bioprosthesis was present  It exhibited normal function  Valve mean gradient was 11 mmHg  TRICUSPID VALVE:  There was mild regurgitation  COMPARISONS:  There has been no significant interval change  Comparison was made with the previous study of 03-Jan-2017  HISTORY: PRIOR HISTORY: CAD; CABG; AVR; Atrial fibrillation; Edema; Diabetes; CKD; Hypertension; Hyperlipidemia    PROCEDURE: The study was performed in the [address] [facility]  This was a routine study  The transthoracic approach was used  The study included complete 2D imaging, M-mode, complete spectral Doppler, and color Doppler  The  heart rate was 67 bpm, at the start of the study  Images were obtained from the parasternal, apical, subcostal, and suprasternal notch acoustic windows  Echocardiographic views were limited due to poor acoustic window availability  Image  quality was adequate  LEFT VENTRICLE: Size was normal  Systolic function was normal  Ejection fraction was estimated to be 65 %  There were no regional wall motion abnormalities  Wall thickness was increased  There was severe concentric hypertrophy  DOPPLER:  Doppler parameters were consistent with abnormal left ventricular relaxation (grade 1 diastolic dysfunction)      RIGHT VENTRICLE: The size was normal  Systolic function was normal  Wall thickness was normal     LEFT ATRIUM: The atrium was moderately dilated  RIGHT ATRIUM: The atrium was mildly dilated  MITRAL VALVE: Valve structure was normal  There was normal leaflet separation  DOPPLER: The transmitral velocity was within the normal range  There was very mild stenosis  There was mild regurgitation  AORTIC VALVE: A bioprosthesis was present  It exhibited normal function  DOPPLER: Transaortic velocity was within the normal range  There was no evidence for stenosis  There was no regurgitation  TRICUSPID VALVE: The valve structure was normal  There was normal leaflet separation  DOPPLER: The transtricuspid velocity was within the normal range  There was no evidence for stenosis  There was mild regurgitation  Pulmonary artery  systolic pressure was at the upper limits of normal  Estimated peak PA pressure was 35 mmHg  PULMONIC VALVE: Leaflets exhibited normal thickness, no calcification, and normal cuspal separation  DOPPLER: The transpulmonic velocity was within the normal range  There was trace regurgitation  PERICARDIUM: There was no pericardial effusion  AORTA: The root exhibited normal size  SYSTEMIC VEINS: IVC: The inferior vena cava was normal in size and course  MEASUREMENT TABLES    DOPPLER MEASUREMENTS  Aortic valve   (Reference normals)  Mean gradient   11 mmHg   (--)    SYSTEM MEASUREMENT TABLES    2D  %FS: 19 23 %  Ao Diam: 3 27 cm  EDV(Teich): 75 4 ml  EF(Cube): 47 3 %  EF(Teich): 40 01 %  ESV(Cube): 37 05 ml  ESV(Teich): 45 24 ml  IVSd: 1 62 cm  LA Area: 28 6 cm2  LA Diam: 5 02 cm  LVEDV MOD A4C: 142 35 ml  LVEF MOD A4C: 58 06 %  LVESV MOD A4C: 59 71 ml  LVIDd: 4 13 cm  LVIDs: 3 33 cm  LVLd A4C: 8 34 cm  LVLs A4C: 7 18 cm  LVPWd: 1 56 cm  RA Area: 17 11 cm2  RV Diam : 4 38 cm  SV MOD A4C: 82 64 ml  SV(Cube): 33 26 ml  SV(Teich): 30 17 ml    CW  AV Env  Ti: 294 27 ms  AV VTI: 48 75 cm  AV Vmax: 2 3 m/s  AV Vmean: 1 66 m/s  AV maxP 24 mmHg  AV meanP 41 mmHg  HR: 78 79 BPM  MR VTI: 175 4 cm  MR Vmax: 5 54 m/s  MR Vmean: 4 24 m/s  MR maxP 69 mmHg  MR meanP 89 mmHg  MV VTI: 45 31 cm  MV Vmax: 1 62 m/s  MV Vmean: 0 99 m/s  MV maxPG: 10 53 mmHg  MV meanP 77 mmHg  TR Vmax: 2 59 m/s  TR maxP 93 mmHg    MM  TAPSE: 1 77 cm    PW  E': 0 04 m/s  E/E': 36 43  HR: 71 04 BPM  LVOT Env  Ti: 317 93 ms  LVOT VTI: 22 63 cm  LVOT Vmax: 0 96 m/s  LVOT Vmean: 0 72 m/s  LVOT maxPG: 3 7 mmHg  LVOT meanP 31 mmHg  MV A Simone: 1 45 m/s  MV Dec [place]: 5 49 m/s2  MV DecT: 240 17 ms  MV E [place]: 1 32 m/s  MV E/A Ratio: 0 91    [employer] Accredited Echocardiography Laboratory    Prepared and electronically signed by    Job MD Lala  Signed 25-Sep-2017 16:53:02           Job OfficeMD harsh    Portions of the record may have been created with voice recognition software   Occasional wrong word or "sound a like" substitutions may have occurred due to the inherent limitations of voice recognition software   Read the chart carefully and recognize, using context, where substitutions have occurred

## 2018-04-10 NOTE — ASSESSMENT & PLAN NOTE
· Likely triggered by patient's noncompliance with his torsemide while on vacation  · Hospital day #7  · Continue Lasix gtt as ordered by Cardiology and Nephrology--appreciate their consultations  · Also got metolazone 4/9/18 x 1 and 4/6/18 x 1  · Monitor weights and I/Os, Na restricted diet  · Currently a negative at 6 1 L, weight down from 106--97 8 kg  · Monitor O2 needs- currently 2 5 liters NC   · Continue beta-blocker with hold parameters  · He continues to have severe leg edema which does not appear to have improved at all despite diuresis-- I question whether some of his leg edema could possibly be DVT but he did have a negative Doppler as of April 4th  That being said he has continued to refuse basic DVT prophylaxis during the hospitalization therefore he remains at high risk for developing a clot at any point  Certainly a PE could also explain ongoing dyspnea despite diuresis  Will need to monitor closely and may consider repeating Doppler of the lower extremity  A CTA of the chest cannot be done due to renal failure

## 2018-04-10 NOTE — PROGRESS NOTES
NEPHROLOGY PROGRESS NOTE   Dylan Israel  [de-identified] y o  male MRN: [id number]  Unit/Bed#: -01 Encounter: [id number]  Reason for Consult: SABINE, CKD    ASSESSMENT and PLAN:  The patient is an 62-year-old gentleman who presented with shortness of breath on April 4th  He went on vacation for 5 days and did not take his diuretic, along with dietary indiscretion  Clinical course complicated by atrial fibrillation and hypotension  He was not tolerating intermittent IV diuretic therapy was placed on a Lasix infusion with albumin  1  Acute kidney injury on chronic kidney disease:  Baseline creatinine 1 8-2 2  Etiology possibly prerenal, decreased effective circulating volume in the setting of volume overload, cardiorenal also with concern for urinary retention  Geronimo catheter placed on 04/08/2018  · Creatinine was down to 2 8 yesterday and today is up to 3 26 (previous peak 2 96)  · We have appear to reach the somewhat of a christine with diuresis  His weight is very near to baseline currently 215 lb with a dry weight of 212 lb  I have asked to have the weight repeated standing  According to his wife his lower extremity edema is no worse than usual at this time  Will hold on Lasix infusion and likely restart oral diuretic in the next 24 hours  · Patient received approximately 6 days of albumin infusions will discontinue at this time  · Urinalysis:  Negative protein, no RBCs, no bacteria, 0-1 WBCs  · Renal ultrasound:  Kidneys diffusely echogenic reflecting chronic medical renal disease  2  Acute on chronic diastolic CHF, Volume overload:    · Urine output slowing despite Lasix infusion/metolazone/albumin  · Will hold on Lasix and check chest x-ray  3  PAF:  Rate variable  Cardiology following  4  Anemia:  Hemoglobin 9 2  5  Electrolytes:  Potassium level acceptable  6  PVD:  Podiatry following  7  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 Raegan Beckford  8   Other:  CAD, prior CABG, AS status post AVR-bioprosthetic       SUMMARY OF RECOMMENDATIONS:  · Check chest x-ray  · Hold Lasix infusion and albumin  · Likely restart oral torsemide tomorrow  · Standing weights only  · Continue fluid restriction    SUBJECTIVE / INTERVAL HISTORY:  No specific complaints  Having normal bowel movements  No diarrhea, nausea, vomiting  No Chest pain  No overnight events  Wife states that her 's edema is about the same as usual    OBJECTIVE:  Current Weight: Weight - Scale: 97 8 kg (215 lb 9 8 oz)  Vitals:    04/09/18 2229 04/10/18 0600 04/10/18 0901 04/10/18 1007   BP: 90/51  113/57 111/58   BP Location: Right arm  Left arm    Pulse: 91  (!) 107    Resp: 18  20    Temp: 98 3 °F (36 8 °C)  97 5 °F (36 4 °C)    TempSrc: Oral  Oral    SpO2: 96%  93%    Weight:  97 8 kg (215 lb 9 8 oz)     Height:           Intake/Output Summary (Last 24 hours) at 04/10/18 1139  Last data filed at 04/10/18 1032   Gross per 24 hour   Intake              660 ml   Output             2350 ml   Net            -1690 ml   General: NAD  Skin: Warm and dry  Eyes:  Sclera clear, anicteric  ENT:  Oropharynx moist  Neck:  Supple, jugular veins full  Chest:   crackles lower lobes right greater than left  Decreased breath sounds left lower lobe  CVS:  Irregularly irregular    S1, S2  Abdomen:  Soft, nontender, bowel sounds present  Extremities:  +2 lower extremity edema left slightly greater than right  :  Geronimo catheter in place draining clear yellow urine  Neuro:  Alert and oriented x3  Psych: appropriate    Medications:    Current Facility-Administered Medications:     acetaminophen (TYLENOL) tablet 975 mg, 975 mg, Oral, Q8H [facility], Lisseth Seals PA-C, 975 mg at 04/10/18 0545    albumin human (FLEXBUMIN) 25 % injection 25 g, 25 g, Intravenous, Q8H, J Luis Castañeda MD, 25 g at 04/10/18 1011    aspirin (ECOTRIN LOW STRENGTH) EC tablet 81 mg, 81 mg, Oral, Daily, Chase Alberto MD, 81 mg at 04/10/18 1008    atorvastatin (LIPITOR) tablet 40 mg, 40 mg, Oral, Daily With Kandy Deon Alberto MD, 40 mg at 04/09/18 1634    calcium carbonate (TUMS) chewable tablet 1,000 mg, 1,000 mg, Oral, Daily PRN, Chase Alberto MD    docusate sodium (COLACE) capsule 100 mg, 100 mg, Oral, BID PRN, Chase Alberto MD, 100 mg at 04/10/18 1005    furosemide (LASIX) 500 mg infusion 50 mL, 40 mg/hr, Intravenous, Continuous, Katrina Soliman MD, Last Rate: 4 mL/hr at 04/10/18 0555, 40 mg/hr at 04/10/18 0555    insulin glargine (LANTUS) subcutaneous injection 20 Units, 20 Units, Subcutaneous, HS, Chase Alberto MD, 20 Units at 04/09/18 2147    insulin lispro (HumaLOG) 100 units/mL subcutaneous injection 1-5 Units, 1-5 Units, Subcutaneous, TID AC, Stopped at 04/10/18 1010 **AND** Fingerstick Glucose (POCT), , , TID AC, Chase Alberto MD    insulin lispro (HumaLOG) 100 units/mL subcutaneous injection 1-5 Units, 1-5 Units, Subcutaneous, HS, Chase Alberto MD, 1 Units at 04/09/18 2148    insulin lispro (HumaLOG) 100 units/mL subcutaneous injection 10 Units, 10 Units, Subcutaneous, TID With Meals, Chase Alberto MD, Stopped at 04/09/18 0843    melatonin tablet 3 mg, 3 mg, Oral, HS, Lisseth Seals PA-C, 3 mg at 04/09/18 2146    metoprolol tartrate (LOPRESSOR) tablet 50 mg, 50 mg, Oral, TID, Candance Netter, MD, Stopped at 04/10/18 1007    ondansetron (ZOFRAN) injection 4 mg, 4 mg, Intravenous, Q6H PRN, Chase Alberto MD    pantoprazole (PROTONIX) EC tablet 40 mg, 40 mg, Oral, Early Morning, Chase Alberto MD, 40 mg at 04/10/18 0545    tamsulosin (FLOMAX) capsule 0 4 mg, 0 4 mg, Oral, Daily With Michelet Seals PA-C, 0 4 mg at 04/09/18 1634    Laboratory Results:    Results from last 7 days  Lab Units 04/10/18  0523 04/09/18  0522 04/08/18  0442 04/07/18  0457 04/06/18  0548 04/05/18  0642 04/04/18  0655   WBC Thousand/uL  --  7 04 7 71 8 06 8 73 9 64 8 31   HEMOGLOBIN g/dL  --  9 2* 9 4* 9 5* 9 6* 10 8* 11 1*   HEMATOCRIT %  --  29 8* 30 7* 31 0* 31 9* 34 8* 35 5*   PLATELETS Thousands/uL  --  176 168 171 175 226 248   SODIUM mmol/L 143 139 140 137 141 140 137   POTASSIUM mmol/L 4 2 3 8 3 7 4 8 5 2 5 0 5 3   CHLORIDE mmol/L 98* 97* 99* 98* 102 101 101   CO2 mmol/L 31 31 30 25 28 26 25   BUN mg/dL 107* 95* 92* 83* 78* 76* 75*   CREATININE mg/dL 3 26* 2 81* 2 89* 2 96* 2 63* 2 69* 2 71*   CALCIUM mg/dL 9 8 9 5 9 4 9 5 9 5 9 8 9 0   MAGNESIUM mg/dL  --   --   --   --  2 5 2 5  --    TOTAL PROTEIN g/dL  --   --   --   --   --   --  6 8   GLUCOSE RANDOM mg/dL 141* 134 113 169* 153* 141* 250*     Previous work up:

## 2018-04-10 NOTE — ASSESSMENT & PLAN NOTE
· Renal input and ongoing assistance much appreciated  · Baseline creatinine 1 8-2 2   · NO NSAIDS  · HOLD ACE-I, avoid hypotension  · Monitor BMP daily  · Creatinine worsened today to 3 26  · Geronimo placed for retention, flomax added as well this admit

## 2018-04-11 NOTE — ASSESSMENT & PLAN NOTE
· Likely triggered by patient's noncompliance with his torsemide while on vacation  · Hospital day #8  · Due to worsening SABINE, Lasix gtt was held but it seems today he is to resume IV lasix  · Appreciate ongoing close cardiology and nephrology input given complexity of this care  · Also got metolazone 4/9/18 x 1 and 4/6/18 x 1  · Monitor weights and I/Os, Na restricted diet  · Currently a negative at 8 4 L, weight down from 106--97 4 kg; despite diuresis patient reports feeling only approximately 40% better over the course of this past 1 week and still reports significant shortness of breath with lying flat  · Monitor O2 needs- currently 2 5 liters NC with sats as high as 100%, start to wean down oxygen as able  · Continue beta-blocker with hold parameters  · CXR 4/10/18 ="There is worsening pulmonary edema  Unchanged small left pleural effusion "  · He continues to have severe leg edema which does not appear to have improved at all despite diuresis-- I question whether some of his leg edema could possibly be DVT but he did have a negative Doppler as of April 4th  That being said he refused DVT prophylaxis for the majority of his hospitalization therefore he remains at high risk for developing a clot at any point  At this point he is agreeable to start subcu heparin per our conversation today  Certainly a PE could also explain ongoing dyspnea despite diuresis  Will need to monitor closely and may consider repeating Doppler of the lower extremity  A CTA of the chest cannot be done due to renal failure

## 2018-04-11 NOTE — PROGRESS NOTES
Heart Failure Consult Note - Denisse Cates  [de-identified] y o  male MRN: [id number]    Unit/Bed#: -01 Encounter: [id number]      Assessment:    Principal Problem:    Acute on chronic diastolic congestive heart failure (HCC)  Active Problems:    Rapid atrial fibrillation (HCC)    Type 2 diabetes mellitus ([facility] )    Coronary artery disease involving native coronary artery of native heart    Peripheral arterial disease (HCC)    Acute-on-chronic kidney injury ([facility] )    Non-ST elevation myocardial infarction (NSTEMI) ([facility] )    # Acute on chronic diastolic CHF, NYHA III, ACC/AHA Stage C: Diuresis has been complicated by intermittent hypotension and SABINE on CKD  Diuretics were held in the past 24 hours with improved renal function and ongoing autodiuresis  His albumin was 3 3 on admission and TP was 6 8 which is only borderline to mildly reduced  Review of studies:  9/25/2017 24 hour Holter: no AFib noted  9/25/2017 TTE: LVEF>55%, mild to mod cLVH; Abnormal RVOT signal with possible mid to late systolic notching in some of the doppler waveforms w/ abnormal AcT, borderline RV size, normal RV function  Mild to mod LAE, mild MAULIK  Mild to mod MR (may be underestimated)  Mild TR  Normal functioning AV    4/11/2018 TTE: LVEF ~45%, cLVH, progression of RVOT notch to mid systolic notching notching;mild RV dilation with reduced RVSF  mod to sev MR  Severe TR  LVOT VTI decreased from prior in the setting of AFib  Likely borderline to low output       In the setting of volume overload, he has worsened MR, worsening AFib w/ RVR, mild reduction in LVEF and evidence of progression of pulmonary vascular disease now with RV dysfunction with severe TR  LVOT VTI is currently lower, but this may be 2/2 to loss of atrial kick and mild reduction in LVEF (? Ischemia -> given renal dysfunction not a candidate for cath currently, can consider nonischemic evaluation when can lay close to flat +/- tachy mediated from AFib of unclear duration)  --Attempting to establish NSR would likely improve hemodynamics; however, hx of falls make him a poor candidate for anticoagulation  Patient and family are resistant due to copious bleeding after fall on Eliquis in the past  As a result DCCV and/or antiarrhythmics are high risk to cause cardioembolic event  --Progression of MR likely has resulted in worsening of the above findings on TTE    --May need to accept higher Cr to maintain euvolemia  --Significant azotemia with chloride depletion and rising CO2 suggestive of intravascular volume depletion; agree with holding diuretics to allow for re-equilibration between intra-extra vascular volume    ----Would consider decreased rate of diuresis-> lasix 80 IV BID  --Once volume status is improved, repeat TTE vs KRYSTLE to evaluate MV -> likely not a candidate for MitraClip given calcifications, but could consider for valve surgery  However, patient would need to be on anticoagulation post valve replacement (if he needs, and/or is a candidate)  --Can consider initiation of sildenafil 20 mg PO q8hrs for 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 given noninvasive assessment by TTE suggestive of pulmonary vascular disease; although if MV is the issue, then definitive treatment of MV could result in positive remodeling  Would not start until intracardiac filling pressures are decreased  Can consider RHC to evaluate further  # Paroxysmal AFib/Flutter w/ improved rate control not on anticoagulation currently 2/2 to a fall  Now does not want to be on anticoagulation  Discussed at length re: risk of stroke and this may preclude any definitive treatment     --Stop metoprolol tartrate and transition to metoprolol succinate 50 mg PO BID    # SABINE on CKD III: Likely cardiorenal w/ possible ATN  # CAD s/p remote CABG  # Hx of bioprosthetic AVR  # HTN  # HLD  # NSTEMI type II 2/2 to CHF exacerbation    HPI: Very pleasant [de-identified] y/o gentleman w/ PMHx as noted above p/w volume overload  He states on admission that he had been noncompliant with his salt/fluid restriction and diuretic regiment as he was on vacation  He has been diuresed while inpatient with intermittent hypotension  Baseline Cr appears to be ~1 6-2 2  Currently has increased to >3 in the past 48 hours, and now is downtrending with holding diuretics  States he felt better yesterday and the day prior, feels worse today  It correlates to cessation of diuretics  Past Medical History:   Diagnosis Date    Cardiac disease     [facility]congestive [facility] failure) [facility]     Diabetes mellitus ([facility] )     Hypertension     Renal disorder        Review of Systems - 12 point ROS was done and is negative, except as noted above  Allergies   Allergen Reactions    Codeine      "swelling shaky"    Hydrocodone      Other reaction(s):  Other (See Comments)  Heart racing       Current Facility-Administered Medications:     acetaminophen (TYLENOL) tablet 975 mg, 975 mg, Oral, Q8H KENDRICK, Lisseth Seals PA-C, 975 mg at 04/11/18 2620    aspirin (ECOTRIN LOW STRENGTH) EC tablet 81 mg, 81 mg, Oral, Daily, Chase Alberto MD, 81 mg at 04/11/18 0752    atorvastatin (LIPITOR) tablet 40 mg, 40 mg, Oral, Daily With Render Jyothi Alberto MD, 40 mg at 04/10/18 1709    calcium carbonate (TUMS) chewable tablet 1,000 mg, 1,000 mg, Oral, Daily PRN, Chase Alberto MD    docusate sodium (COLACE) capsule 100 mg, 100 mg, Oral, BID PRN, Chase Alberto MD, 100 mg at 04/10/18 1005    heparin (porcine) subcutaneous injection 5,000 Units, 5,000 Units, Subcutaneous, Q8H [address], Lisseth Seals PA-C, 5,000 Units at 04/11/18 1000    insulin glargine (LANTUS) subcutaneous injection 20 Units, 20 Units, Subcutaneous, HS, Chase Alberto MD, 20 Units at 04/10/18 2243    insulin lispro (HumaLOG) 100 units/mL subcutaneous injection 1-5 Units, 1-5 Units, Subcutaneous, TID AC, 4 Units at 04/11/18 1208 **AND** Fingerstick Glucose (POCT), , , TID AC, Chase Alberto MD    insulin lispro (HumaLOG) 100 units/mL subcutaneous injection 1-5 Units, 1-5 Units, Subcutaneous, HS, Chase Alberto MD, 1 Units at 04/10/18 2243    insulin lispro (HumaLOG) 100 units/mL subcutaneous injection 10 Units, 10 Units, Subcutaneous, TID With Meals, Chase Alberto MD, 10 Units at 04/11/18 1209    melatonin tablet 3 mg, 3 mg, Oral, HS, Lisseth Seals PA-C, 3 mg at 04/10/18 2243    metoprolol tartrate (LOPRESSOR) tablet 50 mg, 50 mg, Oral, TID, Derek Rankin MD, Stopped at 04/11/18 0752    ondansetron (ZOFRAN) injection 4 mg, 4 mg, Intravenous, Q6H PRN, Chase Alberto MD    pantoprazole (PROTONIX) EC tablet 40 mg, 40 mg, Oral, Early Morning, Chase Alberto MD, 40 mg at 04/11/18 1333    tamsulosin (FLOMAX) capsule 0 4 mg, 0 4 mg, Oral, Daily With Dinner, Lisseth Seals PA-C, 0 4 mg at 04/10/18 1709    Social History     Social History    Marital status: /Civil Union     Spouse name: N/A    Number of children: N/A    Years of education: N/A     Occupational History    SELF EMPLOYED      Social History Main Topics    Smoking status: Former Smoker     Quit date: 10/11/1987    Smokeless tobacco: Never Used    Alcohol use No    Drug use: No    Sexual activity: No     Other Topics Concern    Not on file     Social History Narrative    No narrative on file       Family History   Problem Relation Age of Onset    Cancer Mother     Heart disease Father     Diabetes Sister     Heart disease Sister     Diabetes Brother        Physical Exam:  [employer] (day, reason): Geronimo catheter (day, reason):    Vitals: Blood pressure 108/58, pulse 83, temperature 98 °F (36 7 °C), temperature source Oral, resp  rate 20, height 5' 7" (1 702 m), weight 97 4 kg (214 lb 11 7 oz), SpO2 100 %  , Body mass index is 33 63 kg/m² , I/O last 3 completed shifts:   In: 1100 [P O :1100]  Out: 3760 [Urine:3760]  No intake/output data recorded  Wt Readings from Last 3 Encounters:   04/11/18 97 4 kg (214 lb 11 7 oz)   04/02/18 106 kg (233 lb 3 2 oz)   02/28/18 98 kg (216 lb)       Intake/Output Summary (Last 24 hours) at 04/11/18 1431  Last data filed at 04/11/18 0451   Gross per 24 hour   Intake              700 ml   Output             2010 ml   Net            -1310 ml     I/O last 3 completed shifts: In: 1100 [P O :1100]  Out: 3760 [Urine:3760]    No significant arrhythmias seen on telemetry review  AFib w/ RVR    Vitals:    04/10/18 2200 04/10/18 2300 04/11/18 0600 04/11/18 0752   BP: 100/54 118/55  108/58   BP Location:  Right arm  Left arm   Pulse: 94 101  83   Resp:  18  20   Temp:  97 8 °F (36 6 °C)  98 °F (36 7 °C)   TempSrc:  Oral  Oral   SpO2:  97%  100%   Weight:   97 4 kg (214 lb 11 7 oz)    Height:           GEN: Walker Pretty   appears well, alert and oriented x 3, pleasant and cooperative   HEENT: pupils equal, round, and reactive to light; extraocular muscles intact  NECK: supple, no carotid bruits   HEART: regular rhythm, normal S1 and S2, no murmurs, clicks, gallops or rubs, JVD w/ V waves   LUNGS: clear to auscultation bilaterally; no wheezes, rales, or rhonchi   ABDOMEN: normal bowel sounds, soft, no tenderness, no distention  EXTREMITIES: peripheral pulses normal; no clubbing, cyanosis; L>R LE edema (LLE to upper shins, 2+), RLE to low shin (2+)  NEURO: no focal findings   SKIN: normal without suspicious lesions on exposed skin    Labs & Results:        Results from last 7 days  Lab Units 04/09/18  0522 04/08/18  0442 04/07/18  0457   WBC Thousand/uL 7 04 7 71 8 06   HEMOGLOBIN g/dL 9 2* 9 4* 9 5*   HEMATOCRIT % 29 8* 30 7* 31 0*   PLATELETS Thousands/uL 176 168 171           Results from last 7 days  Lab Units 04/11/18  0446 04/10/18  0523 04/09/18  0522   SODIUM mmol/L 139 143 139   POTASSIUM mmol/L 3 6 4 2 3 8   CHLORIDE mmol/L 96* 98* 97*   CO2 mmol/L 33* 31 31   BUN mg/dL 120* 107* 95*   CREATININE mg/dL 3 07* 3 26* 2 81*   CALCIUM mg/dL 9 5 9 8 9 5   GLUCOSE RANDOM mg/dL 118 141* 134         EKG personally reviewed by Betsy Barrientos MD      Counseling / Coordination of Care  Total floor / unit time spent today 40 minutes  Greater than 50% of total time was spent with the patient and / or family counseling and / or coordination of care  A description of the counseling / coordination of care: 20 min  Thank you for the opportunity to participate in the care of this patient      Betsy Barrientos MD, PhD   Michaela Herrera

## 2018-04-11 NOTE — TELEPHONE ENCOUNTER
He has been in the hospital for fluid in lungs, [facility], & I'm very uncomfortable with some of the things they are talking about  He was doing better but they stopped giving him that particular medication yesterday & now his breathing is off again  I trust you & your opinion  Please give me a call on my cell so I can ask you a few questions  I just don't think some things are being done correctly

## 2018-04-11 NOTE — ASSESSMENT & PLAN NOTE
· Renal input and ongoing assistance much appreciated  · Baseline creatinine 1 8-2 2   · NO NSAIDS  · HOLD ACE-I, avoid hypotension  · Monitor BMP daily  · Creatinine worsened on 4/10/18 to to 3 26, now has come down to 3 07 but noted rise in his BUN with unclear explanation  Asked for heme check stool  · Reviewed notes yesterday from Nephrology, they are starting to discuss the possibility of dialysis  When I inquired with the patient about his feelings on this, he initially stated "NO "  But then he said he would do it if he absolutely had to    · Geronimo placed for retention, flomax added as well this admit--not sure when Cardiology and Nephrology would feel comfortable doing a voiding trial based on need for very close measurements of his urine output and generalized failure to progress

## 2018-04-11 NOTE — PROGRESS NOTES
Progress Note - Modesta Gaviria  1935, [de-identified] y o  male MRN: [id number]    Unit/Bed#: -01 Encounter: [id number]    Primary Care Provider: Kyung Belle MD   Date and time admitted to hospital: 4/4/2018  6:39 AM  * Acute on chronic diastolic congestive heart failure ([address] )   Assessment & Plan    · Likely triggered by patient's noncompliance with his torsemide while on vacation  · Hospital day #8  · Due to worsening SABINE, Lasix gtt was held but it seems today he is to resume IV lasix  · Appreciate ongoing close cardiology and nephrology input given complexity of this care  · Also got metolazone 4/9/18 x 1 and 4/6/18 x 1  · Monitor weights and I/Os, Na restricted diet  · Currently a negative at 8 4 L, weight down from 106--97 4 kg; despite diuresis patient reports feeling only approximately 40% better over the course of this past 1 week and still reports significant shortness of breath with lying flat  · Monitor O2 needs- currently 2 5 liters NC with sats as high as 100%, start to wean down oxygen as able  · Continue beta-blocker with hold parameters  · CXR 4/10/18 ="There is worsening pulmonary edema  Unchanged small left pleural effusion "  · He continues to have severe leg edema which does not appear to have improved at all despite diuresis-- I question whether some of his leg edema could possibly be DVT but he did have a negative Doppler as of April 4th  That being said he refused DVT prophylaxis for the majority of his hospitalization therefore he remains at high risk for developing a clot at any point  At this point he is agreeable to start subcu heparin per our conversation today  Certainly a PE could also explain ongoing dyspnea despite diuresis  Will need to monitor closely and may consider repeating Doppler of the lower extremity  A CTA of the chest cannot be done due to renal failure          Acute-on-chronic kidney injury [facility])   Assessment & Plan    · Renal input and ongoing assistance much appreciated  · Baseline creatinine 1 8-2 2   · NO NSAIDS  · HOLD ACE-I, avoid hypotension  · Monitor BMP daily  · Creatinine worsened on 4/10/18 to to 3 26, now has come down to 3 07 but noted rise in his BUN with unclear explanation  Asked for heme check stool  · Reviewed notes yesterday from Nephrology, they are starting to discuss the possibility of dialysis  When I inquired with the patient about his feelings on this, he initially stated "NO "  But then he said he would do it if he absolutely had to  · Geronimo placed for retention, flomax added as well this admit--not sure when Cardiology and Nephrology would feel comfortable doing a voiding trial based on need for very close measurements of his urine output and generalized failure to progress        Rapid atrial fibrillation [facility])   Assessment & Plan    · Cardiology managing metoprolol given the AFib in the setting of hypotension; rates seem to be slightly better overall  · Also receiving albumin for hypotension  · Not a candidate for digoxin given SABINE  · Not on anticoagulation (refused)  · Defer to Cardiology whether they would like telemetry reordered at this point        Peripheral arterial disease ([facility] )   Assessment & Plan    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 which is being managed by Podiatry with local care (Dr Yassine Wilcox is the patient's usual podiatrist but was out of town over the weekend and [facility] group was covering for him)  · Per my conversation with vascular surgery, they are not planning to do any intervention during this hospitalization especially in light of his unstable cardiovascular and renal issues at this time  · Wife called the slim office requesting vascular involvement    Again they had already been consulted but I spoke with their advanced practitioner today regarding writing a progress note for today although again I do not suspect the plan will likely have changed  · Tylenol around the clock for pain, patient declines anything stronger  · Initially we thought there may be an element of mild surrounding cellulitis on admission but after evaluation by Podiatry it was felt that no additional antibiotics were needed and the IV Ancef was discontinued        Type 2 diabetes mellitus ([facility] [address] )   Assessment & Plan    · Monitor on AccuChecks/SSI--blood sugars have been stable  · A1c 7 6  · Continue lantus and novolog        Non-ST elevation myocardial infarction (NSTEMI) ([facility])   Assessment & Plan    · Mild troponin elevation likely the result of type 2 non ST elevation MI from heart failure and renal failure  · Cardiology consult appreciated          VTE Pharmacologic Prophylaxis:   Pharmacologic:  Spoke with patient, he is now agreeable to subcu heparin  Mechanical VTE Prophylaxis in Place: No    Patient Centered Rounds: I have performed bedside rounds with nursing staff today  Discussions with Specialists or Other Care Team Provider: vascular, case mgmt    Education and Discussions with Family / Patient:  Wife called our office asking for me last night at 8:00 p m  and left a message  I called her this morning at 9:00 a m  and again left her a message  2:43 p m  Asked to see patient's wife at bedside  Spoke with her for 20 minutes  She had multiple questions, concerns and complaints which I addressed to the best of my ability  She states that patient would not want hemodialysis  She continues to reiterate that she feels all of the issues he is experiencing now are due to medication changes made back in 2016 and the fact that he was on eliquis back then  She states he had been quite stable for many years until recently and can't figure out take what is going wrong now  She clarified that while they were in [place] he did take torsemide 20 mg daily but that he did not take the 40 mg doses when he was due to take them on Monday Wednesday and Friday    She feels his leg swelling is drastically improved and maybe even better than his usual at this point  She continues to reiterate that she will not have him be on blood thinners after the hospital admission for any reason but that she is okay with him being on heparin during the hospitalization  Time Spent for Care: 30 minutes  More than 50% of total time spent on counseling and coordination of care as described above  Current Length of Stay: 7 day(s)    Current Patient Status: Inpatient   Certification Statement: The patient will continue to require additional inpatient hospital stay due to Failure to improve with heart failure and kidney failure    Discharge Plan:  Short-term rehab recommended however patient has failed to improve over the course of his hospitalization    Code Status: Level 3 - DNAR and DNI--spoke with patient regarding his overall goals of care and general failure to improve over the course of his hospitalization  He states that he understands if his heart and kidney failure worsen he will die but feels satisfied that he lived [de-identified] good years  We discussed his code status and he would like to be DNR DNI, please see this change reflected in his orders    Subjective:   Patient reports feeling about "40%" better since time of admission  He continues to state that he feels better if he sits up but feels short of breath with lying flat  Nursing reports his sats are 100% on 2 5 L  He has not had much improvement in his leg edema  He feels that Tylenol helps enough with his foot pain  He is not otherwise reporting any other new complaints or concerns  Objective:     Vitals:   Temp (24hrs), Av 9 °F (36 6 °C), Min:97 8 °F (36 6 °C), Max:98 °F (36 7 °C)    HR:  [] 83  Resp:  [18-20] 20  BP: (100-131)/(54-82) 108/58  SpO2:  [96 %-100 %] 100 %  Body mass index is 33 63 kg/m²  Input and Output Summary (last 24 hours):        Intake/Output Summary (Last 24 hours) at 18 [id number]  Last data filed at 04/11/18 0451   Gross per 24 hour   Intake             1100 ml   Output             3360 ml   Net            -2260 ml       Physical Exam:     Physical Exam   Constitutional: No distress  Sitting up in chair   HENT:   Head: Normocephalic and atraumatic  Eyes: Conjunctivae are normal  Right eye exhibits no discharge  Left eye exhibits no discharge  No scleral icterus  Neck: Neck supple  Cardiovascular: Normal rate and normal heart sounds  No murmur heard  irregular   Pulmonary/Chest: Effort normal  No respiratory distress  He has no wheezes  Decreased breath sounds at bilateral bases   Abdominal:   obese   Musculoskeletal: He exhibits edema  Neurological: He is alert  Skin: Skin is warm and dry  No rash noted  He is not diaphoretic  No erythema  No pallor  Psychiatric: He has a normal mood and affect  His behavior is normal  Thought content normal    Very pleasant and cooperative, good historian   Nursing note and vitals reviewed  Additional Data:     Labs:      Results from last 7 days  Lab Units 04/09/18  0522  04/07/18  0457   WBC Thousand/uL 7 04  < > 8 06   HEMOGLOBIN g/dL 9 2*  < > 9 5*   HEMATOCRIT % 29 8*  < > 31 0*   PLATELETS Thousands/uL 176  < > 171   NEUTROS PCT %  --   --  72   LYMPHS PCT %  --   --  18   MONOS PCT %  --   --  8   EOS PCT %  --   --  2   < > = values in this interval not displayed  Results from last 7 days  Lab Units 04/11/18  0446   SODIUM mmol/L 139   POTASSIUM mmol/L 3 6   CHLORIDE mmol/L 96*   CO2 mmol/L 33*   BUN mg/dL 120*   CREATININE mg/dL 3 07*   CALCIUM mg/dL 9 5   GLUCOSE RANDOM mg/dL 118           * I Have Reviewed All Lab Data Listed Above  * Additional Pertinent Lab Tests Reviewed:  All Labs Within Last 24 Hours Reviewed    Imaging:    Imaging Reports Reviewed Today Include:   Imaging Personally Reviewed by Myself Includes:      Recent Cultures (last 7 days):           Last 24 Hours Medication List:     Current Facility-Administered Medications:  acetaminophen 975 mg Oral Q8H [address] Lisseth Seals PA-C   aspirin 81 mg Oral Daily Chase Alberto MD   atorvastatin 40 mg Oral Daily With Dinner Chase Alberto MD   calcium carbonate 1,000 mg Oral Daily PRN Chase Alberto MD   docusate sodium 100 mg Oral BID PRN Chase Alberto MD   heparin (porcine) 5,000 Units Subcutaneous Q8H [address] Lisseth Seals PA-C   insulin glargine 20 Units Subcutaneous HS Chase Alberto MD   insulin lispro 1-5 Units Subcutaneous TID AC Chase Alberto MD   insulin lispro 1-5 Units Subcutaneous HS Chase Alberto MD   insulin lispro 10 Units Subcutaneous TID With Meals Chase Alberto MD   melatonin 3 mg Oral HS Lisseth Seals PA-C   metoprolol tartrate 50 mg Oral TID Marge Casillas MD   ondansetron 4 mg Intravenous Q6H PRN Chase Alberto MD   pantoprazole 40 mg Oral Early Morning Chase Alberto MD   tamsulosin 0 4 mg Oral Daily With Dinner Yulisa Avila PA-C      tele reviewed      Today, Patient Was Seen By: Yulisa Avila PA-C    ** Please Note: Dictation voice to text software may have been used in the creation of this document   **

## 2018-04-11 NOTE — PHYSICAL THERAPY NOTE
PHYSICAL THERAPY NOTE    Patient Name: Devante Barcenas  Today's Date: 18 6345   Pain Assessment   Pain Assessment No/denies pain   Pain Score No Pain   Restrictions/Precautions   Other Precautions Telemetry;O2;Fall Risk;Pain   General   Chart Reviewed Yes   Family/Caregiver Present Yes  (wife)   Subjective   Subjective Patient supine in bed on 3L O2 via nasal cannula and is agreeable to therapy session  Personal identifiers obtained from name &   Bed Mobility   Supine to Sit 3  Moderate assistance   Additional items Assist x 1;HOB elevated; Bedrails; Increased time required;LE management;Verbal cues  (for hand placement)   Additional Comments Patient OOB in bedside chair with B LE elevated post session with wife present and call bell in reach  Transfers   Sit to Stand 4  Minimal assistance   Additional items Assist x 1;Bedrails; Increased time required;Verbal cues  (for hand placement & body positioning )   Stand to Sit 5  Supervision   Additional items Assist x 1; Armrests; Increased time required;Verbal cues   Ambulation/Elevation   Gait pattern Decreased foot clearance;Shuffling; Short stride; Excessively slow; Foward flexed   Gait Assistance 4  Minimal assist   Additional items Assist x 1;Verbal cues   Assistive Device Rolling walker   Distance 20 feet   Balance   Static Sitting Fair +   Dynamic Sitting Fair -   Static Standing Poor +   Dynamic Standing Poor +   Ambulatory Poor +   Activity Tolerance   Activity Tolerance Patient limited by fatigue;Treatment limited secondary to medical complications (Comment)   Nurse Made Aware Spoke with Roselia Cowan, NSG   Exercises   Quad Sets Sitting;10 reps;AROM; Bilateral  (with B LE elevated )   Hip Adduction Sitting;10 reps;AROM; Bilateral   Knee AROM Long Arc Quad Sitting;10 reps;AROM; Bilateral   Ankle Pumps Sitting;10 reps;AROM; Bilateral  (only heels up on L side secondary to ulcer)   Marching Sitting;10 reps;AROM; Bilateral   Assessment   Prognosis Fair   Problem List Decreased strength;Decreased range of motion;Decreased endurance; Impaired balance;Decreased mobility; Decreased coordination;Decreased safety awareness; Obesity; Decreased skin integrity; Impaired sensation  (LE edema)   Assessment Patient presents with shortness of breath throughout session with SpO2 ranging 96-99% on 3L O2 via nasal cannula  Patient able to recover with 2-3 minutes of rest and deep breathing technique  Patient continues to be limited for household distance ambulation with 15-20 feet with RW and min A  Provided patient with verbal instruction for hand placement and body postioning for sit to stand transfers  Patient participated in seated LE exercise program with good form and small rest breaks between sets  Patient continues to be limited from SOB and fatigue that greatly impacts functional mobility  Goals   Patient Goals to go home   STG Expiration Date 04/17/18   Treatment Day 3   Plan   Treatment/Interventions Functional transfer training;LE strengthening/ROM; Therapeutic exercise; Endurance training;Patient/family training;Equipment eval/education; Bed mobility;Gait training  ( (PT to see when stair training is appropriate ))   Progress Progressing toward goals   PT Frequency 5x/wk   Recommendation   Recommendation Post acute IP rehab   Equipment Recommended Other (Comment)  (roller walker)       Sally Kilgore, PTA

## 2018-04-11 NOTE — ASSESSMENT & PLAN NOTE
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 which is being managed by Podiatry with local care (Dr Suki Hollis is the patient's usual podiatrist but was out of town over the weekend and [facility] group was covering for him)  · Per my conversation with vascular surgery, they are not planning to do any intervention during this hospitalization especially in light of his unstable cardiovascular and renal issues at this time  · Wife called the [facility] office requesting vascular involvement    Again they had already been consulted but I spoke with their advanced practitioner today regarding writing a progress note for today although again I do not suspect the plan will likely have changed  · Tylenol around the clock for pain, patient declines anything stronger  · Initially we thought there may be an element of mild surrounding cellulitis on admission but after evaluation by Podiatry it was felt that no additional antibiotics were needed and the IV Ancef was discontinued

## 2018-04-11 NOTE — TELEPHONE ENCOUNTER
Anything related to congestive heart failure or his leg swelling should be determined by his cardiology office    I have not received all of the information from his latest hospitalization and would not be able to answer questions she may have regarding his medication

## 2018-04-11 NOTE — PROGRESS NOTES
NEPHROLOGY PROGRESS NOTE   Shawn Beltran  [de-identified] y o  male MRN: [id number]  Unit/Bed#: -01 Encounter: [id number]  Reason for Consult:  SABINE, CKD    ASSESSMENT and PLAN:  The patient is an 57-year-old gentleman who presented with shortness of breath on April 4th   He went on vacation for 5 days and did not take his diuretic, along with dietary indiscretion  Santiago Crane course complicated by atrial fibrillation and hypotension   He was not tolerating intermittent IV diuretic therapy was placed on a Lasix infusion with albumin  Lasix and albumin discontinued on 04/10/2018 due to worsening renal function/azotemia  1  Acute kidney injury on chronic kidney disease:  Baseline creatinine 1 8-2 2   Etiology possibly prerenal, decreased effective circulating volume in the setting of volume overload, cardiorenal also with concern for urinary retention   Geronimo catheter placed on 04/08/2018  · Creatinine fluctuating  Yesterday up to 3 26 and currently down to 3 07 after Lasix infusion discontinued  Renal function improving off diuretic  · Excellent urine output despite discontinuing diuretic  Patient had over 3300 mL output  · Urinalysis:  Negative protein, no RBCs, no bacteria, 0-1 WBCs  · Renal ultrasound:  Kidneys diffusely echogenic reflecting chronic medical renal disease  2  Azotemia:  BUN up to 120  Stool checks have been ordered to assess for occult blood  3  Acute on chronic diastolic CHF, Volume overload:    · Weight declining  Down to 214 lb  Dry weight 212 lb  · Chest x-ray worsening despite diuresis- pulmonary edema noted yesterday  · Will need to resume diuretic in the next 24-48 hours  · Venous Dopplers lower extremities pending  · Outpatient diuretic regime torsemide 40 mg every Monday, Wednesday, Friday the remaining days patient was taking 20 mg  3  PAF:  Rate variable  Cardiology following  4  Anemia:  Hemoglobin 9 2  5  Electrolytes:  Potassium level acceptable  6   PVD:  Podiatry following  7  MGUS:  IgM kappa and IgM lambda followed by Dr Tessa Fernandez  8  Elevated bicarbonate:  Likely due to aggressive diuresis  Keep potassium level ~4  9  Other:  CAD, prior CABG, AS status post AVR-bioprosthetic     SUMMARY OF RECOMMENDATIONS:  · Will resume diuretic today or tomorrow  Will discuss with Dr Snider Standard  · Continue strict I&O  · Check labs in the a m  · Stool checks due to worsening azotemia    SUBJECTIVE / INTERVAL HISTORY:  The patient believes that his lower extremity edema has improved  No shortness of breath  Wife in the room discussed plan of care with her      OBJECTIVE:  Current Weight: Weight - Scale: 97 4 kg (214 lb 11 7 oz)  Vitals:    04/10/18 2200 04/10/18 2300 04/11/18 0600 04/11/18 0752   BP: 100/54 118/55  108/58   BP Location:  Right arm  Left arm   Pulse: 94 101  83   Resp:  18  20   Temp:  97 8 °F (36 6 °C)  98 °F (36 7 °C)   TempSrc:  Oral  Oral   SpO2:  97%  100%   Weight:   97 4 kg (214 lb 11 7 oz)    Height:           Intake/Output Summary (Last 24 hours) at 04/11/18 1240  Last data filed at 04/11/18 0451   Gross per 24 hour   Intake              980 ml   Output             2360 ml   Net            -1380 ml     General: NAD  Skin: Warm and dry  Eyes:  Sclera clear, anicteric  ENT:  Oropharynx moist  Neck:  Supple, no JVD  Chest:    crackles right lower lobe, decreased breath sounds left lower lobe  CVS:  Irregularly irregular   S1, S2  Abdomen:  Soft, nontender, bowel sounds present  Extremities:  1-+2 lower extremity edema left slightly greater than right  :  Geronimo catheter in place draining clear yellow urine  Neuro:  Alert and oriented x3  Psych: appropriate    Medications:    Current Facility-Administered Medications:     acetaminophen (TYLENOL) tablet 975 mg, 975 mg, Oral, Q8H [facility], Lisseth Seals PA-C, 975 mg at 04/11/18 1884    aspirin (ECOTRIN LOW STRENGTH) EC tablet 81 mg, 81 mg, Oral, Daily, Chase Alberto MD, 81 mg at 04/11/18 0752    atorvastatin (LIPITOR) tablet 40 mg, 40 mg, Oral, Daily With Jose Alfredo Alberto MD, 40 mg at 04/10/18 1709    calcium carbonate (TUMS) chewable tablet 1,000 mg, 1,000 mg, Oral, Daily PRN, Chase Alberto MD    docusate sodium (COLACE) capsule 100 mg, 100 mg, Oral, BID PRN, Chase Alberto MD, 100 mg at 04/10/18 1005    heparin (porcine) subcutaneous injection 5,000 Units, 5,000 Units, Subcutaneous, Q8H [address], Lisseth Seals PA-C, 5,000 Units at 04/11/18 1000    insulin glargine (LANTUS) subcutaneous injection 20 Units, 20 Units, Subcutaneous, HS, Chase Alberto MD, 20 Units at 04/10/18 2243    insulin lispro (HumaLOG) 100 units/mL subcutaneous injection 1-5 Units, 1-5 Units, Subcutaneous, TID AC, 4 Units at 04/11/18 1208 **AND** Fingerstick Glucose (POCT), , , TID AC, Chase Alberto MD    insulin lispro (HumaLOG) 100 units/mL subcutaneous injection 1-5 Units, 1-5 Units, Subcutaneous, HS, Chase Alberto MD, 1 Units at 04/10/18 2243    insulin lispro (HumaLOG) 100 units/mL subcutaneous injection 10 Units, 10 Units, Subcutaneous, TID With Meals, Chase Alberto MD, 10 Units at 04/11/18 1209    melatonin tablet 3 mg, 3 mg, Oral, HS, Lisseth Seals PA-C, 3 mg at 04/10/18 2243    metoprolol tartrate (LOPRESSOR) tablet 50 mg, 50 mg, Oral, TID, [place] MD, Stopped at 04/11/18 0752    ondansetron (ZOFRAN) injection 4 mg, 4 mg, Intravenous, Q6H PRN, Chase Alberto MD    pantoprazole (PROTONIX) EC tablet 40 mg, 40 mg, Oral, Early Morning, Chase Alberto MD, 40 mg at 04/11/18 0637    tamsulosin (FLOMAX) capsule 0 4 mg, 0 4 mg, Oral, Daily With Leana Seals PA-C, 0 4 mg at 04/10/18 1709    Laboratory Results:    Results from last 7 days  Lab Units 04/11/18  0446 04/10/18  0523 04/09/18  0522 04/08/18  0442 04/07/18  0457 04/06/18  0548 04/05/18  0642   WBC Thousand/uL  --   --  7 04 7 71 8 06 8 73 9 64   HEMOGLOBIN g/dL  --   --  9 2* 9 4* 9 5* 9 6* 10 8* HEMATOCRIT %  --   --  29 8* 30 7* 31 0* 31 9* 34 8*   PLATELETS Thousands/uL  --   --  176 168 171 175 226   SODIUM mmol/L 139 143 139 140 137 141 140   POTASSIUM mmol/L 3 6 4 2 3 8 3 7 4 8 5 2 5 0   CHLORIDE mmol/L 96* 98* 97* 99* 98* 102 101   CO2 mmol/L 33* 31 31 30 25 28 26   BUN mg/dL 120* 107* 95* 92* 83* 78* 76*   CREATININE mg/dL 3 07* 3 26* 2 81* 2 89* 2 96* 2 63* 2 69*   CALCIUM mg/dL 9 5 9 8 9 5 9 4 9 5 9 5 9 8   MAGNESIUM mg/dL  --   --   --   --   --  2 5 2 5   GLUCOSE RANDOM mg/dL 118 141* 134 113 169* 153* 141*     Previous work up:

## 2018-04-11 NOTE — PLAN OF CARE
CARDIOVASCULAR - ADULT     Maintains optimal cardiac output and hemodynamic stability Not Progressing     Absence of cardiac dysrhythmias or at baseline rhythm Not Progressing        DISCHARGE PLANNING     Discharge to home or other facility with appropriate resources Not Progressing        DISCHARGE PLANNING - CARE MANAGEMENT     Discharge to post-acute care or home with appropriate resources Not Progressing        INFECTION - ADULT     Absence or prevention of progression during hospitalization Not Progressing     Absence of fever/infection during neutropenic period Not Progressing        Knowledge Deficit     Patient/family/caregiver demonstrates understanding of disease process, treatment plan, medications, and discharge instructions Not Progressing        Nutrition/Hydration-ADULT     Nutrient/Hydration intake appropriate for improving, restoring or maintaining nutritional needs Not Progressing        PAIN - ADULT     Verbalizes/displays adequate comfort level or baseline comfort level Not Progressing        Potential for Falls     Patient will remain free of falls Not Progressing        Prexisting or High Potential for Compromised Skin Integrity     Skin integrity is maintained or improved Not Progressing        SAFETY ADULT     Maintain or return to baseline ADL function Not Progressing     Maintain or return mobility status to optimal level Not Progressing

## 2018-04-11 NOTE — PLAN OF CARE
Problem: PHYSICAL THERAPY ADULT  Goal: Performs mobility at highest level of function for planned discharge setting  See evaluation for individualized goals  Treatment/Interventions: Functional transfer training, LE strengthening/ROM, Therapeutic exercise, Endurance training, Patient/family training, Equipment eval/education, Bed mobility, Gait training (PT to see when stair training is appropriate )          See flowsheet documentation for full assessment, interventions and recommendations  Outcome: Progressing  Prognosis: Fair  Problem List: Decreased strength, Decreased range of motion, Decreased endurance, Impaired balance, Decreased mobility, Decreased coordination, Decreased safety awareness, Obesity, Decreased skin integrity, Impaired sensation (LE edema)  Assessment: Patient presents with shortness of breath throughout session with SpO2 ranging 96-99% on 3L O2 via nasal cannula  Patient able to recover with 2-3 minutes of rest and deep breathing technique  Patient continues to be limited for household distance ambulation with 15-20 feet with RW and min A  Provided patient with verbal instruction for hand placement and body postioning for sit to stand transfers  Patient participated in seated LE exercise program with good form and small rest breaks between sets  Patient continues to be limited from SOB and fatigue that greatly impacts functional mobility  Recommendation: Post acute IP rehab          See flowsheet documentation for full assessment

## 2018-04-11 NOTE — SOCIAL WORK
LOS 7   Bundle pt; Not a readmission  Cm provided update to [facility] as they are willing to accept pt  Cm will continue to follow

## 2018-04-11 NOTE — PROGRESS NOTES
Progress Note - Vascular Surgery       Assessment:  PAD with left foot tissue loss  Acute on chronic diastolic CHF  SABINE on CKD  DM II  Afib  CAD s/p CABG/AVR    Plan:  -Left heel wound and 5th toe gangrene, s/p diagnotic arteriogram 12/2017 with long segment mid SFA and BK pop/TPT trunk occlusion, single vessel peroneal runoff  -Severe PAD not amenable to endovascular revascularization  Could potentially consider LLE bypass though he is higher risk due to advanced age and multi medical comorbities  -I had a lengthy discussion with patient, wife and daughter today in regards to possible options  Currently with acute CHF exacerbation and tenuous renal function  He would need to be optimized from a cardiac and medical standpoint prior to proceeding with any further intervention  -Outpt followup scheduled with Dr Joselyn Blair on 4/24/18 for further discussion of possible revascularization options  -Continue local wound care  -Maintain on aspirin and statin therapy  -Medical management per SLIM  ______________________________________________________________________    Subjective:  Patient seen in bed  Wife and daughter at bedside  Patient reports SOB when laying flat  He is seated with head partially up in bed  He has pain to left foot with dressing change  Complains of worsening lower extremity edema  Hemodynamically stable  Vitals:  /62 (BP Location: Left arm)   Pulse 56   Temp 98 1 °F (36 7 °C) (Oral)   Resp 20   Ht 5' 7" (1 702 m)   Wt 97 4 kg (214 lb 11 7 oz)   SpO2 99%   BMI 33 63 kg/m²     I/Os:  I/O last 3 completed shifts: In: 1100 [P O :1100]  Out: 3760 [Urine:3760]  No intake/output data recorded      Lab Results and Cultures:   CBC with diff:   Lab Results   Component Value Date    WBC 7 04 04/09/2018    HGB 9 2 (L) 04/09/2018    HCT 29 8 (L) 04/09/2018    MCV 86 04/09/2018     04/09/2018    MCH 26 7 (L) 04/09/2018    MCHC 30 9 (L) 04/09/2018    RDW 14 5 04/09/2018    MPV 10 4 04/09/2018    NRBC 0 02/21/2018   ,   BMP/CMP:  Lab Results   Component Value Date     04/11/2018     12/16/2015    K 3 6 04/11/2018    K 4 5 12/16/2015    CL 96 (L) 04/11/2018     12/16/2015    CO2 33 (H) 04/11/2018    CO2 30 12/16/2015    ANIONGAP 10 04/11/2018    ANIONGAP 5 12/16/2015     (H) 04/11/2018    BUN 32 (H) 12/16/2015    CREATININE 3 07 (H) 04/11/2018    CREATININE 1 98 (H) 12/16/2015    GLUCOSE 118 04/11/2018    GLUCOSE 167 (H) 12/16/2015    CALCIUM 9 5 04/11/2018    CALCIUM 9 2 12/16/2015    AST 16 04/04/2018    AST 15 11/24/2015    ALT 38 04/04/2018    ALT 19 11/24/2015    ALKPHOS 123 (H) 04/04/2018    ALKPHOS 88 11/24/2015    PROT 6 8 04/04/2018    PROT 7 2 11/24/2015    BILITOT 0 50 04/04/2018    BILITOT 0 47 11/24/2015    EGFR 18 04/11/2018   ,   Lipid Panel:   Lab Results   Component Value Date    CHOL 110 03/06/2018    CHOL 127 11/17/2015   ,   Coags:   Lab Results   Component Value Date    PTT 29 04/04/2018    INR 1 04 04/04/2018   ,     Blood Culture:   Lab Results   Component Value Date    BLOODCX No Growth After 5 Days   04/04/2018   ,   Urinalysis:   Lab Results   Component Value Date    COLORU Yellow 04/05/2018    COLORU Yellow 12/12/2017    CLARITYU Clear 04/05/2018    CLARITYU Transparent 12/12/2017    SPECGRAV 1 015 04/05/2018    SPECGRAV 1 005 12/12/2017    PHUR 5 0 04/05/2018    PHUR 5 0 12/12/2017    LEUKOCYTESUR Negative 04/05/2018    LEUKOCYTESUR - 12/12/2017    NITRITE Negative 04/05/2018    NITRITE - 12/12/2017    PROTEINUA Negative 04/05/2018    PROTEINUA - 12/12/2017    GLUCOSEU Negative 04/05/2018    GLUCOSEU Negative 11/24/2015    KETONESU Negative 04/05/2018    KETONESU - 12/12/2017    BILIRUBINUR Negative 04/05/2018    BILIRUBINUR - 12/12/2017    BLOODU Negative 04/05/2018    BLOODU - 12/12/2017   ,   Urine Culture: No results found for: URINECX,   Wound Culure: No results found for: WOUNDCULT    Medications:  Current Facility-Administered Medications   Medication Dose Route Frequency    acetaminophen (TYLENOL) tablet 975 mg  975 mg Oral Q8H [facility]    aspirin (ECOTRIN LOW STRENGTH) EC tablet 81 mg  81 mg Oral Daily    atorvastatin (LIPITOR) tablet 40 mg  40 mg Oral Daily With Dinner    calcium carbonate (TUMS) chewable tablet 1,000 mg  1,000 mg Oral Daily PRN    docusate sodium (COLACE) capsule 100 mg  100 mg Oral BID PRN    heparin (porcine) subcutaneous injection 5,000 Units  5,000 Units Subcutaneous Q8H [facility]    insulin glargine (LANTUS) subcutaneous injection 20 Units  20 Units Subcutaneous HS    insulin lispro (HumaLOG) 100 units/mL subcutaneous injection 1-5 Units  1-5 Units Subcutaneous TID AC    insulin lispro (HumaLOG) 100 units/mL subcutaneous injection 1-5 Units  1-5 Units Subcutaneous HS    insulin lispro (HumaLOG) 100 units/mL subcutaneous injection 10 Units  10 Units Subcutaneous TID With Meals    melatonin tablet 3 mg  3 mg Oral HS    metoprolol tartrate (LOPRESSOR) tablet 50 mg  50 mg Oral TID    ondansetron (ZOFRAN) injection 4 mg  4 mg Intravenous Q6H PRN    pantoprazole (PROTONIX) EC tablet 40 mg  40 mg Oral Early Morning    tamsulosin (FLOMAX) capsule 0 4 mg  0 4 mg Oral Daily With Dinner       Imagin18 [facility]  Impression:  RIGHT LOWER LIMB:  No evidence of acute or chronic deep vein thrombosis  No evidence of superficial thrombophlebitis noted  Doppler evaluation shows a normal response to augmentation maneuvers  LEFT LOWER LIMB:  No evidence of acute or chronic deep vein thrombosis  No evidence of superficial thrombophlebitis noted  Doppler evaluation shows a normal response to augmentation maneuvers  18 ADIEL  CONCLUSION:  Impression:  RIGHT LOWER LIMB: Limited  Ankle/Brachial Index:   1 42  which is in the Unreliable range  (Prior 0 93)  PVR/ PPG tracings are dampened    Metatarsal pressure is undetected  Great toe pressure is undetected and flat-line     LEFT LOWER LIMB: Abnormal  This resting evaluation shows evidence of diffuse atherosclerosis with areas of  high grade stenosis vs occlusion in the superficial femoral artery  Ankle/Brachial Index: 0 64 which is in the Severe Claudication range  (Prior  0 54)  PVR/ PPG tracings are dampened    Metatarsal pressure is undetected  Great toe pressure is undetected and flat-line    Physical Exam:    General: alert, oriented, cooperative with exam, no acute distress  CV: Irregular, bradycardiac, no murmur appreciated  Respiratory: Clear and decreased at bases, respirations even and unlabored  Abdominal: Obese, soft, nontender  Extremities: Significant bilateral lower extremity edema +3, dry gangrene left 5th toe, heel dry in place, clean/dry no gross drainage, motor/sensory intact  Neurologic: Grossly normal    Wound/Incision:  As above    Pulse exam:  Femoral: Right: 2+ Left: 2+  Popliteal: Right: non-palpable Left: non-palpable  DP: Right: non-palpable Left: non-palpable  PT: Right: non-palpable Left: non-palpable    Erenest Bone, CRNP  4/11/2018  [facility]: [phone]

## 2018-04-11 NOTE — ASSESSMENT & PLAN NOTE
· Cardiology managing metoprolol given the AFib in the setting of hypotension; rates seem to be slightly better overall  · Also receiving albumin for hypotension  · Not a candidate for digoxin given SABINE  · Not on anticoagulation (refused)  · Defer to Cardiology whether they would like telemetry reordered at this point

## 2018-04-12 NOTE — ASSESSMENT & PLAN NOTE
· Renal input and ongoing assistance much appreciated  · Baseline creatinine 1 8-2 2   · NO NSAIDS  · HOLD ACE-I, avoid hypotension  · Monitor BMP daily  · Creatinine worsened on 4/10/18 to to 3 26, now has come down to 2 88 but continued noted rise in his BUN with unclear explanation    Asked for heme check stool-- awaiting sample to be taken  · Reviewed notes yesterday from Nephrology, they are starting to discuss the possibility of dialysis with the family  · Geronimo placed for retention, flomax added as well this admit--not sure when Cardiology and Nephrology would feel comfortable doing a voiding trial based on need for very close measurements of his urine output and generalized failure to progress

## 2018-04-12 NOTE — PROGRESS NOTES
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, hallux and lateral heel  2  DM neuropathy  3  Severe PAD  4  CHF with LE edema  5   Ischemic changes to right 1st and 2nd digit without gangrene  6  Left 3rd toe onycholysis without complication    Plan:  1  Left dry gangrene is stable with no sign of any infection  Agree with monitoring off antibiotics at least for the feet  Continue Betadine wet-to-dry to all blackened areas  2   The left 3rd toenail was 99% detached from the toe  This was new since the initial evaluation over the weekend  I am unsure if he kicked a piece of furniture or the nail just fell off  The nail was removed in entirety without any pain bleeding or complication  Betadine was applied to the nail bed  There was no sign of any acute concern to this area  The foot must be protected  I again reiterated wearing a surgical shoe both he and his wife feel it would make him fall and refused to wear it  3   Will continue to monitor on the periphery  Per vascular disease the patient at minimum would need a bypass but obviously is not in the current state of health to be able to handle that type of invasive surgery  Continue to monitor closely by keeping the gangrene to his feet dry and as clean as possible  Refrain from any trauma to the foot  The patient will be seen on an outpatient basis for possible intervention in the future if his heart were able to stabilize any real found to be cleared for that type of invasive surgery  Subjective/Objective   Chief Complaint:   Chief Complaint   Patient presents with    Shortness of Breath     pt reports being short of breath over the past week  Pt reports when he gets up and walks distances he has trouble breathing and chest pain  Daughter reports recent trip to [facility] where pt did not take lasix to avoid voiding  Subjective: [de-identified] y o  y/o male seen and evaluated at bedside  No acute events overnight  Patient states he is tired and just wants to go home  Patient also admits he is feeling a little depressed with the fear over his failing health  Blood pressure 118/80, pulse (!) 106, temperature 97 6 °F (36 4 °C), temperature source Oral, resp  rate 20, height 5' 7" (1 702 m), weight 98 kg (216 lb 0 8 oz), SpO2 97 %  ,Body mass index is 33 84 kg/m²  Lab Results   Component Value Date    WBC 7 13 04/12/2018    HGB 9 9 (L) 04/12/2018    HCT 32 3 (L) 04/12/2018    MCV 87 04/12/2018     04/12/2018     Lab Results   Component Value Date    GLUCOSE 106 04/12/2018    CALCIUM 9 4 04/12/2018     04/12/2018    K 3 7 04/12/2018    CO2 33 (H) 04/12/2018    CL 95 (L) 04/12/2018     (H) 04/12/2018    CREATININE 2 88 (H) 04/12/2018         Invasive Devices     Peripheral Intravenous Line            Peripheral IV 04/09/18 Right Forearm 2 days          Drain            Urethral Catheter 18 Fr  5 days                Physical Exam:   General: alert, cooperative and no distress  Lungs: Normal respiratory effort, LCTABL  Heart: regular rate and rhythm   Abdomen: soft, non-tender  Extremity:  Dry eschar to left 5th and lateral heel  No cellulitis  No drainage or malodor  Duskiness to the left great toe  Duskiness to the right 1st and 2nd toes  These findings are all stable from initial evaluation approximately 1 week ago  The left 3rd toenail is loose and 99% detached from the toe  There is no bleeding or sign of infection  There is no erythema to the toe  The nail was pinched off in entirety  The nail bed was swabbed with Betadine but there is no drainage or bleeding or cause for acute concern  Plus two pitting edema to the lower extremities continues  The left 5th toe does cause pain when touched          Lab, Imaging and other studies:     Imaging: I have personally reviewed pertinent films in PACS  EKG, Pathology, and Other Studies: I have personally reviewed pertinent reports  Portions of the record may have been created with voice recognition software  Occasional wrong word or "sound a like" substitutions may have occurred due to the inherent limitations of voice recognition software  Read the chart carefully and recognize, using context, where substitutions have occurred

## 2018-04-12 NOTE — ASSESSMENT & PLAN NOTE
· Possibly triggered by patient's noncompliance with correct torsemide dosing while on vacation? · Hospital day #9  · Due to worsening SABINE, Lasix gtt was held; IV lasix 80 BID  · Appreciate ongoing close cardiology and nephrology input given complexity of this care  · Also got metolazone 4/9/18 x 1 and 4/6/18 x 1  · Monitor weights and I/Os, Na restricted diet  · Currently a negative at 8 2 L, weight down from 106--97 4 kg;   · Monitor O2 needs- wean off as able; spoke with RN  · Continue beta-blocker with hold parameters  · CXR 4/10/18 ="There is worsening pulmonary edema  Unchanged small left pleural effusion "  · Repeated LE doppler to ensure no DVT given severe ongoing edema  · 2d echo 2/85/61 =Systolic function was mildly reduced  Ejection fraction was estimated to be 45 %  There was mild diffuse hypokinesis  There was moderate hypokinesis of the apical septal wall(s)  MITRAL VALVE: There was marked annular calcification  There was moderate to severe regurgitation  AORTIC VALVE:A bioprosthesis was present  It exhibited normal function

## 2018-04-12 NOTE — PROGRESS NOTES
NEPHROLOGY PROGRESS NOTE   Hipolito Gerard  [de-identified] y o  male MRN: [id number]  Unit/Bed#: -01 Encounter: [id number]  Reason for Consult:  Acute kidney injury on chronic kidney disease    ASSESSMENT and PLAN:  The patient is an 72-year-old gentleman who presented with shortness of breath on April 4th   He went on vacation for 5 days and did not take his diuretic, along with dietary indiscretion  Kendra Martyr course complicated by atrial fibrillation and hypotension   He was not tolerating intermittent IV diuretic therapy was placed on a Lasix infusion with albumin  Lasix and albumin discontinued on 04/10/2018 due to worsening renal function/azotemia  1  Acute kidney injury on chronic kidney disease:  Baseline creatinine 1 8-2 2   Etiology possibly prerenal, decreased effective circulating volume in the setting of volume overload, cardiorenal also with concern for urinary retention   Geronimo catheter placed on 04/08/2018  · Creatinine  peaked at 3 26 with aggressive diuresis on Lasix infusion after which Lasix was discontinued on 04/10/2018  Creatinine slowly improving currently down to 2 88    ·  IV intermittent diureticresumed yesterday patient currently on 80 mg of Lasix b i d  Jasper Kumar Showing a small weight gain  Significant diuretic resistance which is cardiorenal in nature  · Urinalysis:  Negative protein, no RBCs, no bacteria, 0-1 WBCs  · Renal ultrasound:  Kidneys diffusely echogenic reflecting chronic medical renal disease  2  Azotemia:   continues to worsen  · FOBT pending  Still needs to be collect  3  Acute on chronic diastolic CHF, Volume overload:    · Last chest x-ray did not show any improvement despite diuresis  · Cardiology following  May be related to worsening cardiac function/RV dysfunction/severe TR in the setting of atrial fibrillation, worsened MR and reduction in LVEF  · Continue Lasix 80 mg IV b i d  · Venous Dopplers lower extremities completed    No DVT  · Outpatient diuretic regime torsemide 40 mg every Monday, Wednesday, Friday the remaining days patient was taking 20 mg  3  PAF:  Rate variable   Cardiology following  4  Anemia:  Hemoglobin 9 2  5  Electrolytes:  Potassium level 3 7  Will give 1 dose of K-Dur or and monitor  6  PVD:  Podiatry following  7  MGUS:  IgM kappa and IgM lambda followed by Dr Franck Tracy  8  Elevated bicarbonate:  Likely due to aggressive diuresis  Keep potassium level ~4  9  Other:  CAD, prior CABG, AS status post AVR-bioprosthetic     SUMMARY OF RECOMMENDATIONS:  · Geronimo catheter, strict I& O  · Daily weight  · Continue current dose of Lasix  · Give 1 dose of K-Dur 40 mEq    SUBJECTIVE / INTERVAL HISTORY:  Patient appears mildly tachypneic  He denies shortness of breath  He is concerned since he feels that he has gained weight instead of lost weight  OBJECTIVE:  Current Weight: Weight - Scale: 97 4 kg (214 lb 11 7 oz)  Vitals:    04/11/18 0752 04/11/18 1559 04/11/18 2157 04/12/18 0700   BP: 108/58 134/62 90/53 115/56   BP Location: Left arm Left arm Left arm Right arm   Pulse: 83 56 93 (!) 106   Resp: 20 20 20 20   Temp: 98 °F (36 7 °C) 98 1 °F (36 7 °C) 97 9 °F (36 6 °C) 97 6 °F (36 4 °C)   TempSrc: Oral Oral Oral Oral   SpO2: 100% 99% 96% 97%   Weight:       Height:           Intake/Output Summary (Last 24 hours) at 04/12/18 0829  Last data filed at 04/11/18 1900   Gross per 24 hour   Intake              220 ml   Output                0 ml   Net              220 ml     General:  No acute distress  Quietly lying in bed    Skin:  Warm and dry, no rash  Eyes:  Sclera clear, anicteric  ENT:  Oropharynx appears moist  Neck:  Supple, JVD noted  Chest:  Crackles bilaterally  CVS:, slightly tachycardic  Irregular rhythm  Abdomen:  Rounded, soft, nontender, bowel sounds present  Extremities:  +2 to +3 edema primarily in the calf to pedal area bilaterally  :  Geronimo catheter  Neuro:  Alert oriented  Psych:  Depressive affect  Medications:    Current Facility-Administered Medications:     acetaminophen (TYLENOL) tablet 975 mg, 975 mg, Oral, Q8H [facility], Lisseth Seals PA-C, 975 mg at 04/12/18 0507    aspirin (ECOTRIN LOW STRENGTH) EC tablet 81 mg, 81 mg, Oral, Daily, Chase Alberto MD, 81 mg at 04/11/18 0752    atorvastatin (LIPITOR) tablet 40 mg, 40 mg, Oral, Daily With Miryam Alberto MD, 40 mg at 04/11/18 1709    calcium carbonate (TUMS) chewable tablet 1,000 mg, 1,000 mg, Oral, Daily PRN, Chase Alberto MD    docusate sodium (COLACE) capsule 100 mg, 100 mg, Oral, BID PRN, Chase Alberto MD, 100 mg at 04/10/18 1005    furosemide (LASIX) injection 80 mg, 80 mg, Intravenous, BID (diuretic), Lenora Recinos MD    heparin (porcine) subcutaneous injection 5,000 Units, 5,000 Units, Subcutaneous, Q8H [facility], Lisseth Seals PA-C, 5,000 Units at 04/12/18 0507    insulin glargine (LANTUS) subcutaneous injection 20 Units, 20 Units, Subcutaneous, HS, Chase Alberto MD, 20 Units at 04/11/18 2139    insulin lispro (HumaLOG) 100 units/mL subcutaneous injection 1-5 Units, 1-5 Units, Subcutaneous, TID AC, 2 Units at 04/11/18 1747 **AND** Fingerstick Glucose (POCT), , , TID AC, Chase Alberto MD    insulin lispro (HumaLOG) 100 units/mL subcutaneous injection 1-5 Units, 1-5 Units, Subcutaneous, HS, Chase Alberto MD, 1 Units at 04/11/18 2142    insulin lispro (HumaLOG) 100 units/mL subcutaneous injection 10 Units, 10 Units, Subcutaneous, TID With Meals, Chase Alberto MD, 10 Units at 04/11/18 1746    melatonin tablet 3 mg, 3 mg, Oral, HS, Lisseth Seals PA-C, 3 mg at 04/11/18 2140    metoprolol succinate (TOPROL-XL) 24 hr tablet 50 mg, 50 mg, Oral, Q12H, Lenora Recinos MD    ondansetron (ZOFRAN) injection 4 mg, 4 mg, Intravenous, Q6H PRN, Chase Alberto MD    pantoprazole (PROTONIX) EC tablet 40 mg, 40 mg, Oral, Early Morning, Chase Alberto MD, 40 mg at 04/12/18 8483    potassium chloride (K-DUR,KLOR-CON) CR tablet 40 mEq, 40 mEq, Oral, Once, Ethlyn Fails, CRNP    tamsulosin [facility]) capsule 0 4 mg, 0 4 mg, Oral, Daily With Maddison Seals PA-C, 0 4 mg at 04/11/18 1708    Laboratory Results:    Results from last 7 days  Lab Units 04/12/18  0505 04/11/18  0446 04/10/18  0523 04/09/18  0522 04/08/18  0442 04/07/18  0457 04/06/18  0548   WBC Thousand/uL 7 13  --   --  7 04 7 71 8 06 8 73   HEMOGLOBIN g/dL 9 9*  --   --  9 2* 9 4* 9 5* 9 6*   HEMATOCRIT % 32 3*  --   --  29 8* 30 7* 31 0* 31 9*   PLATELETS Thousands/uL 173  --   --  176 168 171 175   SODIUM mmol/L 140 139 143 139 140 137 141   POTASSIUM mmol/L 3 7 3 6 4 2 3 8 3 7 4 8 5 2   CHLORIDE mmol/L 95* 96* 98* 97* 99* 98* 102   CO2 mmol/L 33* 33* 31 31 30 25 28   BUN mg/dL 126* 120* 107* 95* 92* 83* 78*   CREATININE mg/dL 2 88* 3 07* 3 26* 2 81* 2 89* 2 96* 2 63*   CALCIUM mg/dL 9 4 9 5 9 8 9 5 9 4 9 5 9 5   MAGNESIUM mg/dL  --   --   --   --   --   --  2 5   GLUCOSE RANDOM mg/dL 106 118 141* 134 113 169* 153*     Previous work up:

## 2018-04-12 NOTE — CASE MANAGEMENT
Continued Stay Review    Date:  4/12/18    Vital Signs: /71 (BP Location: Right arm)   Pulse 96   Temp 98 3 °F (36 8 °C) (Oral)   Resp 16   Ht 5' 7" (1 702 m)   Wt 98 kg (216 lb 0 8 oz)   SpO2 99% on 1 5L O2 NC   BMI 33 84 kg/m²     Medications:   Scheduled Meds:   Current Facility-Administered Medications:  acetaminophen 975 mg Oral Q8H [address] Lisseth Seals PA-C   aspirin 81 mg Oral Daily Chase Alberto MD   atorvastatin 40 mg Oral Daily With Dinner Chase Alberto MD   calcium carbonate 1,000 mg Oral Daily PRN Chase Alberto MD   docusate sodium 100 mg Oral BID PRN Chase Alberto MD   furosemide 80 mg Intravenous BID (diuretic) Cecile Valverde MD   heparin (porcine) 5,000 Units Subcutaneous Q8H [address] Lisseth Seals PA-C   insulin glargine 20 Units Subcutaneous HS Chase Alberto MD   insulin lispro 1-5 Units Subcutaneous TID AC Chase Alberto MD   insulin lispro 1-5 Units Subcutaneous HS Chase Alberto MD   insulin lispro 10 Units Subcutaneous TID With Meals Chase Alberto MD   melatonin 3 mg Oral HS Lisseth Seals PA-C   metoprolol succinate 50 mg Oral Q12H Cecile Valverde MD   ondansetron 4 mg Intravenous Q6H PRN Chase Alberto MD   pantoprazole 40 mg Oral Early Morning Chase Alberto MD   tamsulosin 0 4 mg Oral Daily With Dinner Lisseth Seals PA-C     Continuous Infusions:    PRN Meds: calcium carbonate    docusate sodium    ondansetron    Abnormal Labs/Diagnostic Results:     Bun/Creatinine: 126/2 88  H&H 9 9/32 3    4/10 Chest x-ray: worsening pulmonary edema  4/11 Venous duplex B/L LE: No evidence of acute or chronic DVT  4/11 ECHO     LEFT VENTRICLE:  Systolic function was mildly reduced  Ejection fraction was estimated to be 45 %  There was mild diffuse hypokinesis  There was moderate hypokinesis of the apical septal wall(s)  Wall thickness was mildly increased    There was mild concentric hypertrophy      RIGHT VENTRICLE:  The ventricle was mildly to moderately dilated  Systolic function was reduced      LEFT ATRIUM:  The atrium was moderately dilated      RIGHT ATRIUM:  The atrium was moderately dilated      MITRAL VALVE:  There was marked annular calcification  There was moderate to severe regurgitation      AORTIC VALVE:  A bioprosthesis was present  It exhibited normal function  Doppler cardiac output was 3 5 L/min, using LVOT flow data  Doppler cardiac index was 1 68 L/min/m squared, using LVOT flow data      TRICUSPID VALVE:  There was severe regurgitation  Pulmonary artery systolic pressure was markedly increased  Estimated peak PA pressure was 65 mmHg      PULMONIC VALVE:  Notching of the systolic pulse wave is noted, suggesting elevated pulmonary artery pressures  There was mild regurgitation  Age/Sex: [de-identified] y o  male     Assessment/Plan:     * Acute on chronic diastolic congestive heart failure (HCC)   Assessment & Plan     · Possibly triggered by patient's noncompliance with correct torsemide dosing while on vacation? · Hospital day #9  · Due to worsening SABINE, Lasix gtt was held; IV lasix 80 BID  · Appreciate ongoing close cardiology and nephrology input given complexity of this care  · Also got metolazone 4/9/18 x 1 and 4/6/18 x 1  · Monitor weights and I/Os, Na restricted diet  · Currently a negative at 8 2 L, weight down from 106--97 4 kg;   · Monitor O2 needs- wean off as able; spoke with RN  · Continue beta-blocker with hold parameters  · CXR 4/10/18 ="There is worsening pulmonary edema  Unchanged small left pleural effusion "  · Repeated LE doppler to ensure no DVT given severe ongoing edema  · 2d echo 2/84/05 =Systolic function was mildly reduced  Ejection fraction was estimated to be 45 %  There was mild diffuse hypokinesis  There was moderate hypokinesis of the apical septal wall(s)  MITRAL VALVE: There was marked annular calcification  There was moderate to severe regurgitation   AORTIC VALVE:A bioprosthesis was present  It exhibited normal function              Acute-on-chronic kidney injury [facility])   Assessment & Plan     · Renal input and ongoing assistance much appreciated  · Baseline creatinine 1 8-2 2   · NO NSAIDS  · HOLD ACE-I, avoid hypotension  · Monitor BMP daily  · Creatinine worsened on 4/10/18 to to 3 26, now has come down to 2 88 but continued noted rise in his BUN with unclear explanation  Asked for heme check stool-- awaiting sample to be taken  · Reviewed notes yesterday from Nephrology, they are starting to discuss the possibility of dialysis with the family  · Geronimo placed for retention, flomax added as well this admit--not sure when Cardiology and Nephrology would feel comfortable doing a voiding trial based on need for very close measurements of his urine output and generalized failure to progress          Rapid atrial fibrillation ([facility] [address] )   Assessment & Plan     · Cardiology managing metoprolol given the AFib in the setting of hypotension; rates seem to be slightly better overall  · Also receiving albumin for hypotension  · Not a candidate for digoxin given SABINE  · Not on anticoagulation (refused due to prior bleeding on Eliquis)  · Defer to Cardiology whether they would like telemetry reordered at this point          Peripheral arterial disease ([facility] [address] )   Assessment & Plan     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 which is being managed by Podiatry with local care (Dr Magaly Chadwick is the patient's usual podiatrist but was out of town over the weekend and [facility] group was covering for him)  · Per my conversation with vascular surgery, they are not planning to do any intervention during this hospitalization especially in light of his unstable cardiovascular and renal issues at this time  · Wife called the slim office requesting vascular involvement    Again they had already been consulted but I spoke with their advanced practitioner and they came back and re-eval'd patient on 4/11/18  · Tylenol around the clock for pain, patient declines anything stronger  · Initially we thought there may be an element of mild surrounding cellulitis on admission but after evaluation by Podiatry it was felt that no additional antibiotics were needed and the IV Ancef was discontinued          Type 2 diabetes mellitus ([facility])   Assessment & Plan     · Monitor on AccuChecks/SSI--blood sugars have been stable  · A1c 7 6  · Continue lantus and novolog          Non-ST elevation myocardial infarction (NSTEMI) ([facility])   Assessment & Plan     · Mild troponin elevation likely the result of type 2 non ST elevation MI from heart failure and renal failure  · Cardiology consult appreciated             VTE Pharmacologic Prophylaxis:   Pharmacologic: Heparin  Mechanical VTE Prophylaxis in Place: No    Current Length of Stay: 8 day(s)     Current Patient Status: Inpatient   Certification Statement: The patient will continue to require additional inpatient hospital stay due to Ongoing heart failure and renal failure       Discharge Plan: TBD    ==============================================================  4/12 Cardiology Progress Note:    Assessment:  1   Acute on chronic diastolic CHF  2   Paroxysmal atrial fibrillation/flutter - persistent AFib, heart rates borderline  3   SABINE with CKD III - slight improvement today  4   CAD s/p remote CABG - no ischemic symptoms  5   Aortic valve disease s/p bioprosthetic AVR  6   Hypertension  7   Dyslipidemia  8  Non-ST elevation myocardial infarction (NSTEMI) ([address] ) type 2 - due to CHF  9  Severe pulmonary hypertension  10  Severe TR  11  Dilated right ventricle with RV dysfunction        Plan:     No AC per family wishes  ECHo reviewed yesterday - EF mildy reduced, moderate to severe MR, severe pulmonary hypertension with pulmonary arterial systolic notching  This likely represents progression/aging disease    BUN continues to rise, despite the complete absence of IV diuresis yesterday  Creatinine slightly improved to 2 88  Appreciate Dr Lillian Mitchell failure opinion  Might benefit from sildenafil once euvolemic  Continue slow diuresis aiming for 500-1 L negative every 24 hours  80 mg IV furosemide b i d  started this morning    ============================================================================  4/12 Podiatry Progress Note:    Assessment  1  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, hallux and lateral heel  2  DM neuropathy  3  Severe PAD  4  CHF with LE edema  5   Ischemic changes to right 1st and 2nd digit without gangrene  6  Left 3rd toe onycholysis without complication     Plan:  1  Left dry gangrene is stable with no sign of any infection  Agree with monitoring off antibiotics at least for the feet  Continue Betadine wet-to-dry to all blackened areas  2   The left 3rd toenail was 99% detached from the toe  This was new since the initial evaluation over the weekend  I am unsure if he kicked a piece of furniture or the nail just fell off  The nail was removed in entirety without any pain bleeding or complication  Betadine was applied to the nail bed  There was no sign of any acute concern to this area  The foot must be protected  I again reiterated wearing a surgical shoe both he and his wife feel it would make him fall and refused to wear it  3   Will continue to monitor on the periphery  Per vascular disease the patient at minimum would need a bypass but obviously is not in the current state of health to be able to handle that type of invasive surgery  Continue to monitor closely by keeping the gangrene to his feet dry and as clean as possible  Refrain from any trauma to the foot    The patient will be seen on an outpatient basis for possible intervention in the future if his heart were able to stabilize any real found to be cleared for that type of invasive surgery

## 2018-04-12 NOTE — PHYSICAL THERAPY NOTE
PHYSICAL THERAPY NOTE    Patient Name: Mihir Márquez  Today's Date: 4/12/2018 04/12/18 6287   Pain Assessment   Pain Assessment No/denies pain   Pain Score No Pain   Restrictions/Precautions   Other Precautions Multiple lines;Telemetry;O2;Fall Risk;Pain   General   Chart Reviewed Yes   Family/Caregiver Present Yes  (wife)   Subjective   Subjective Patient seated in bedside chair on 3L O2 via nasal cannula and is agreeable to therapy session  Bed Mobility   Additional Comments Patient OOB pre and post session with wife present   Transfers   Sit to Stand 4  Minimal assistance   Additional items Assist x 1; Armrests; Increased time required;Verbal cues  (for hand placement)   Stand to Sit 4  Minimal assistance   Additional items Assist x 1; Armrests; Increased time required;Verbal cues   Ambulation/Elevation   Gait pattern Decreased foot clearance;Shuffling; Short stride; Excessively slow; Forward Flexion   Gait Assistance 4  Minimal assist   Additional items Assist x 1;Verbal cues; Tactile cues   Assistive Device Rolling walker   Distance 20 feet  (Additional not possible secondary to fatigue)   Balance   Static Sitting Fair +   Dynamic Sitting Fair -   Static Standing Poor +   Dynamic Standing Poor +   Ambulatory Poor +   Activity Tolerance   Activity Tolerance Patient limited by fatigue;Treatment limited secondary to medical complications (Comment)   Nurse Made Aware Spoke with [facility] [place]   Assessment   Prognosis Fair   Problem List Decreased strength;Decreased range of motion;Decreased endurance; Impaired balance;Decreased mobility; Decreased coordination;Decreased safety awareness; Impaired sensation;Decreased skin integrity  (B LE Edema)   Assessment Patient motivated to participate in therapy session however remains significantly limited from fatigue & SOB   Patient requires min A to contact guard for all transfers with verbal instruction for safe hand placement  LOB x 1 when turning and attempted to lift RW needing min A to maintain upright balance  Demonstarted and educated patient on safe turning techniques with use of RW  Gait distance remains limited being less than standard household distances  Patient with good compliance to HEP in room throughout day  Continue to progress ambulation towards established goals to patient tolernace with focus on stability and RW management  Goals   Patient Goals to go home   STG Expiration Date 04/17/18   Treatment Day 4   Plan   Treatment/Interventions Functional transfer training;LE strengthening/ROM; Therapeutic exercise; Endurance training;Patient/family training;Equipment eval/education; Bed mobility;Gait training   Progress Slow progress, medical status limitations   PT Frequency 5x/wk   Recommendation   Recommendation Post acute IP rehab   Equipment Recommended Other (Comment)  (roller walker)       Yessi Blackmon, PTA

## 2018-04-12 NOTE — ASSESSMENT & PLAN NOTE
· Cardiology managing metoprolol given the AFib in the setting of hypotension; rates seem to be slightly better overall  · Also receiving albumin for hypotension  · Not a candidate for digoxin given SABINE  · Not on anticoagulation (refused due to prior bleeding on Eliquis)  · Defer to Cardiology whether they would like telemetry reordered at this point

## 2018-04-12 NOTE — PROGRESS NOTES
Cardiology Progress Note - Bertha Springer  [de-identified] y o  male MRN: [id number]    Unit/Bed#: -01 Encounter: [id number]      Assessment:  1  Acute on chronic diastolic CHF  2  Paroxysmal atrial fibrillation/flutter - persistent AFib, heart rates borderline  3  SABINE with CKD III - slight improvement today  4  CAD s/p remote CABG - no ischemic symptoms  5  Aortic valve disease s/p bioprosthetic AVR  6  Hypertension  7  Dyslipidemia  8  Non-ST elevation myocardial infarction (NSTEMI) ([address] ) type 2 - due to CHF  9  Severe pulmonary hypertension  10  Severe TR  11  Dilated right ventricle with RV dysfunction      Plan:    No AC per family wishes  ECHo reviewed yesterday - EF mildy reduced, moderate to severe MR, severe pulmonary hypertension with pulmonary arterial systolic notching  This likely represents progression/aging disease  BUN continues to rise, despite the complete absence of IV diuresis yesterday  Creatinine slightly improved to 2 88  Appreciate Dr Maico Rothman failure opinion  Might benefit from sildenafil once euvolemic  Continue slow diuresis aiming for 500-1 L negative every 24 hours  80 mg IV furosemide b i d  started this morning    Subjective:   Patient seen and examined  Still some SOB, edema same, no significant UOP yesterday without IV diuretic  Meds/Allergies   Allergies   Allergen Reactions    Codeine      "swelling shaky"    Hydrocodone      Other reaction(s):  Other (See Comments)  Heart racing       Current Facility-Administered Medications:     acetaminophen (TYLENOL) tablet 975 mg, 975 mg, Oral, [facility], 975 mg at 04/12/18 0507    aspirin (ECOTRIN LOW STRENGTH) EC tablet 81 mg, 81 mg, Oral, Daily, Chase Alberto MD, 81 mg at 04/12/18 0932    atorvastatin (LIPITOR) tablet 40 mg, 40 mg, Oral, Daily With Dinner, Chase Alberto MD, 40 mg at 04/11/18 1709    calcium carbonate (TUMS) chewable tablet 1,000 mg, 1,000 mg, Oral, Daily PRN, Chase Alberto MD    docusate sodium (COLACE) capsule 100 mg, 100 mg, Oral, BID PRN, Chase Alberto MD, 100 mg at 04/10/18 1005    furosemide (LASIX) injection 80 mg, 80 mg, Intravenous, BID (diuretic), Kelsi Hernandez MD, 80 mg at 04/12/18 0941    heparin (porcine) subcutaneous injection 5,000 Units, 5,000 Units, Subcutaneous, Q8H [facility], Lisseth Seals PA-C, 5,000 Units at 04/12/18 0507    insulin glargine (LANTUS) subcutaneous injection 20 Units, 20 Units, Subcutaneous, HS, Chase Alberto MD, 20 Units at 04/11/18 2139    insulin lispro (HumaLOG) 100 units/mL subcutaneous injection 1-5 Units, 1-5 Units, Subcutaneous, TID AC, Stopped at 04/12/18 0938 **AND** Fingerstick Glucose (POCT), , , TID AC, Chase Alberto MD    insulin lispro (HumaLOG) 100 units/mL subcutaneous injection 1-5 Units, 1-5 Units, Subcutaneous, HS, Chase Alberto MD, 1 Units at 04/11/18 2142    insulin lispro (HumaLOG) 100 units/mL subcutaneous injection 10 Units, 10 Units, Subcutaneous, TID With Meals, Chase Alberto MD, Stopped at 04/12/18 0939    melatonin tablet 3 mg, 3 mg, Oral, HS, Lisseth Seals PA-C, 3 mg at 04/11/18 2140    metoprolol succinate (TOPROL-XL) 24 hr tablet 50 mg, 50 mg, Oral, Q12H, Kelsi Hernandez MD, 50 mg at 04/12/18 0932    ondansetron (ZOFRAN) injection 4 mg, 4 mg, Intravenous, Q6H PRN, Chase Alberto MD    pantoprazole (PROTONIX) EC tablet 40 mg, 40 mg, Oral, Early Morning, Chase Alberto MD, 40 mg at 04/12/18 0507    tamsulosin (FLOMAX) capsule 0 4 mg, 0 4 mg, Oral, Daily With Dinner, Lisseth Seals PA-C, 0 4 mg at 04/11/18 1708    Objective   Vitals: Blood pressure 118/80, pulse (!) 106, temperature 97 6 °F (36 4 °C), temperature source Oral, resp  rate 20, height 5' 7" (1 702 m), weight 98 kg (216 lb 0 8 oz), SpO2 97 %  , Body mass index is 33 84 kg/m² ,   Orthostatic Blood Pressures    Flowsheet Row Most Recent Value   Blood Pressure  118/80 filed at 04/12/2018 0932   Patient Position - Orthostatic VS  Lying filed at 04/12/2018 6233        Systolic (51VEU), YCH:384 , Min:90 , EAD:551     Diastolic (47VMM), YOH:99, Min:53, Max:80      Intake/Output Summary (Last 24 hours) at 04/12/18 1320  Last data filed at 04/12/18 0500   Gross per 24 hour   Intake              220 ml   Output              300 ml   Net              -80 ml     Weight (last 2 days)     Date/Time   Weight    04/12/18 0600  98 (216 05)    04/11/18 0600  97 4 (214 73)    04/10/18 0600  97 8 (215 61)              Tele reveals afib, controlled rates    Physical Exam:    GEN: Mag Pereira   appears well, oriented, NAD  NECK: + JVD  HEART: normal rate, irregular  LUNGS:  Bibasilar rales, no wheeze  ABDOMEN: soft, nt  EXTREMITIES: wwp, 2+ edema      Laboratory Results:        CBC with diff:     Results from last 7 days  Lab Units 04/12/18  0505 04/09/18  0522 04/08/18  0442 04/07/18  0457 04/06/18  0548   WBC Thousand/uL 7 13 7 04 7 71 8 06 8 73   HEMOGLOBIN g/dL 9 9* 9 2* 9 4* 9 5* 9 6*   HEMATOCRIT % 32 3* 29 8* 30 7* 31 0* 31 9*   MCV fL 87 86 86 87 87   PLATELETS Thousands/uL 173 176 168 171 175   MCH pg 26 5* 26 7* 26 3* 26 7* 26 2*   MCHC g/dL 30 7* 30 9* 30 6* 30 6* 30 1*   RDW % 14 5 14 5 14 6 14 8 14 9   MPV fL 10 3 10 4 10 4 10 0 9 7         CMP:    Results from last 7 days  Lab Units 04/12/18  0505 04/11/18  0446 04/10/18  0523 04/09/18  0522 04/08/18  0442 04/07/18  0457 04/06/18  0548   SODIUM mmol/L 140 139 143 139 140 137 141   POTASSIUM mmol/L 3 7 3 6 4 2 3 8 3 7 4 8 5 2   CHLORIDE mmol/L 95* 96* 98* 97* 99* 98* 102   CO2 mmol/L 33* 33* 31 31 30 25 28   ANION GAP mmol/L 12 10 14* 11 11 14* 11   BUN mg/dL 126* 120* 107* 95* 92* 83* 78*   CREATININE mg/dL 2 88* 3 07* 3 26* 2 81* 2 89* 2 96* 2 63*   GLUCOSE RANDOM mg/dL 106 118 141* 134 113 169* 153*   CALCIUM mg/dL 9 4 9 5 9 8 9 5 9 4 9 5 9 5   EGFR ml/min/1 73sq m 20 18 17 20 20 19 22         BMP:    Results from last 7 days  Lab Units 18  0505 18  9433 04/10/18  4922 18  0522 18  0442 18  0457 18  0548   SODIUM mmol/L 140 139 143 139 140 137 141   POTASSIUM mmol/L 3 7 3 6 4 2 3 8 3 7 4 8 5 2   CHLORIDE mmol/L 95* 96* 98* 97* 99* 98* 102   CO2 mmol/L 33* 33* 31 31 30 25 28   BUN mg/dL 126* 120* 107* 95* 92* 83* 78*   CREATININE mg/dL 2 88* 3 07* 3 26* 2 81* 2 89* 2 96* 2 63*   GLUCOSE RANDOM mg/dL 106 118 141* 134 113 169* 153*   CALCIUM mg/dL 9 4 9 5 9 8 9 5 9 4 9 5 9 5       Magnesium:     Results from last 7 days  Lab Units 18  0548   MAGNESIUM mg/dL 2 5     Cardiac testing:   Results for orders placed during the hospital encounter of 17   Echo complete with contrast if indicated    [facility] [address]  [facility] [address] [facility]  [phone]    Transthoracic Echocardiogram  2D, M-mode, Doppler, and Color Doppler    Study date:  25-Sep-2017    Patient: Charol Bosworth  MR number: [id number]  Account number: [de-identified]  : 1935  Age: [de-identified] years  Gender: Male  Status: Outpatient  Location: [address] [place] [address] [facility] [place]  Height: 67 in  Weight: 223 lb  BP: 120/ 50 mmHg    Indications: Evaluate prosthetic aortic valve  Diagnoses: I35 9 - Nonrheumatic aortic valve disorder, unspecified    Sonographer:  MASON Alexander  Primary Physician:  Audrey Vincent MD  Referring Physician:  Mmee Chapman MD  Group:  [facility]  Cardiology Fellow:  Presley Cordon MD  Interpreting Physician:  Clover Sauceda MD    SUMMARY    LEFT VENTRICLE:  Systolic function was normal  Ejection fraction was estimated to be 65 %  There was severe concentric hypertrophy  Doppler parameters were consistent with abnormal left ventricular relaxation (grade 1 diastolic dysfunction)  RIGHT VENTRICLE:  The size was normal   Systolic function was normal     LEFT ATRIUM:  The atrium was moderately dilated      RIGHT ATRIUM:  The atrium was mildly dilated  MITRAL VALVE:  There was very mild stenosis  There was mild regurgitation  AORTIC VALVE:  A bioprosthesis was present  It exhibited normal function  Valve mean gradient was 11 mmHg  TRICUSPID VALVE:  There was mild regurgitation  COMPARISONS:  There has been no significant interval change  Comparison was made with the previous study of 03-Jan-2017  HISTORY: PRIOR HISTORY: CAD; CABG; AVR; Atrial fibrillation; Edema; Diabetes; CKD; Hypertension; Hyperlipidemia    PROCEDURE: The study was performed in the [address]  This was a routine study  The transthoracic approach was used  The study included complete 2D imaging, M-mode, complete spectral Doppler, and color Doppler  The  heart rate was 67 bpm, at the start of the study  Images were obtained from the parasternal, apical, subcostal, and suprasternal notch acoustic windows  Echocardiographic views were limited due to poor acoustic window availability  Image  quality was adequate  LEFT VENTRICLE: Size was normal  Systolic function was normal  Ejection fraction was estimated to be 65 %  There were no regional wall motion abnormalities  Wall thickness was increased  There was severe concentric hypertrophy  DOPPLER:  Doppler parameters were consistent with abnormal left ventricular relaxation (grade 1 diastolic dysfunction)  RIGHT VENTRICLE: The size was normal  Systolic function was normal  Wall thickness was normal     LEFT ATRIUM: The atrium was moderately dilated  RIGHT ATRIUM: The atrium was mildly dilated  MITRAL VALVE: Valve structure was normal  There was normal leaflet separation  DOPPLER: The transmitral velocity was within the normal range  There was very mild stenosis  There was mild regurgitation  AORTIC VALVE: A bioprosthesis was present  It exhibited normal function  DOPPLER: Transaortic velocity was within the normal range  There was no evidence for stenosis   There was no regurgitation  TRICUSPID VALVE: The valve structure was normal  There was normal leaflet separation  DOPPLER: The transtricuspid velocity was within the normal range  There was no evidence for stenosis  There was mild regurgitation  Pulmonary artery  systolic pressure was at the upper limits of normal  Estimated peak PA pressure was 35 mmHg  PULMONIC VALVE: Leaflets exhibited normal thickness, no calcification, and normal cuspal separation  DOPPLER: The transpulmonic velocity was within the normal range  There was trace regurgitation  PERICARDIUM: There was no pericardial effusion  AORTA: The root exhibited normal size  SYSTEMIC VEINS: IVC: The inferior vena cava was normal in size and course  MEASUREMENT TABLES    DOPPLER MEASUREMENTS  Aortic valve   (Reference normals)  Mean gradient   11 mmHg   (--)    SYSTEM MEASUREMENT TABLES    2D  %FS: 19 23 %  Ao Diam: 3 27 cm  EDV(Teich): 75 4 ml  EF(Cube): 47 3 %  EF(Teich): 40 01 %  ESV(Cube): 37 05 ml  ESV(Teich): 45 24 ml  IVSd: 1 62 cm  LA Area: 28 6 cm2  LA Diam: 5 02 cm  LVEDV MOD A4C: 142 35 ml  LVEF MOD A4C: 58 06 %  LVESV MOD A4C: 59 71 ml  LVIDd: 4 13 cm  LVIDs: 3 33 cm  LVLd A4C: 8 34 cm  LVLs A4C: 7 18 cm  LVPWd: 1 56 cm  RA Area: 17 11 cm2  RV Diam : 4 38 cm  SV MOD A4C: 82 64 ml  SV(Cube): 33 26 ml  SV(Teich): 30 17 ml    CW  AV Env  Ti: 294 27 ms  AV VTI: 48 75 cm  AV Vmax: 2 3 m/s  AV Vmean: 1 66 m/s  AV maxP 24 mmHg  AV meanP 41 mmHg  HR: 78 79 BPM  MR VTI: 175 4 cm  MR Vmax: 5 54 m/s  MR Vmean: 4 24 m/s  MR maxP 69 mmHg  MR meanP 89 mmHg  MV VTI: 45 31 cm  MV Vmax: 1 62 m/s  MV Vmean: 0 99 m/s  MV maxPG: 10 53 mmHg  MV meanP 77 mmHg  TR Vmax: 2 59 m/s  TR maxP 93 mmHg    MM  TAPSE: 1 77 cm    PW  E': 0 04 m/s  E/E': 36 43  HR: 71 04 BPM  LVOT Env  Ti: 317 93 ms  LVOT VTI: 22 63 cm  LVOT Vmax: 0 96 m/s  LVOT Vmean: 0 72 m/s  LVOT maxPG: 3 7 mmHg  LVOT meanP 31 mmHg  MV A Simone: 1 45 m/s  MV Dec Barren: 5 49 m/s2  MV DecT: 240 17 ms  MV E Simone: 1 32 m/s  MV E/A Ratio: 0 91    [employer] Accredited Echocardiography [facility]    Prepared and electronically signed by    Rafiq Simpson MD  Signed 25-Sep-2017 16:53:02           Rafiq Simpson MD    Portions of the record may have been created with voice recognition software   Occasional wrong word or "sound a like" substitutions may have occurred due to the inherent limitations of voice recognition software   Read the chart carefully and recognize, using context, where substitutions have occurred

## 2018-04-12 NOTE — PLAN OF CARE
Problem: PHYSICAL THERAPY ADULT  Goal: Performs mobility at highest level of function for planned discharge setting  See evaluation for individualized goals  Treatment/Interventions: Functional transfer training, LE strengthening/ROM, Therapeutic exercise, Endurance training, Patient/family training, Equipment eval/education, Bed mobility, Gait training (PT to see when stair training is appropriate )          See flowsheet documentation for full assessment, interventions and recommendations  Outcome: Progressing  Prognosis: Fair  Problem List: Decreased strength, Decreased range of motion, Decreased endurance, Impaired balance, Decreased mobility, Decreased coordination, Decreased safety awareness, Impaired sensation, Decreased skin integrity (B LE Edema)  Assessment: Patient motivated to participate in therapy session however remains significantly limited from fatigue & SOB  Patient requires min A to contact guard for all transfers with verbal instruction for safe hand placement  LOB x 1 when turning and attempted to lift RW needing min A to maintain upright balance  Demonstarted and educated patient on safe turning techniques with use of RW  Gait distance remains limited being less than standard household distances  Patient with good compliance to HEP in room throughout day  Continue to progress ambulation towards established goals to patient tolernace with focus on stability and RW management  Recommendation: Post acute IP rehab          See flowsheet documentation for full assessment

## 2018-04-12 NOTE — PROGRESS NOTES
Progress Note - Harjinder Seymour  1935, [de-identified] y o  male MRN: [id number]    Unit/Bed#: -01 Encounter: [id number]    Primary Care Provider: Dolly Tran MD   Date and time admitted to hospital: 4/4/2018  6:39 AM  * Acute on chronic diastolic congestive heart failure ([address] )   Assessment & Plan    · Possibly triggered by patient's noncompliance with correct torsemide dosing while on vacation? · Hospital day #9  · Due to worsening SABINE, Lasix gtt was held; IV lasix 80 BID  · Appreciate ongoing close cardiology and nephrology input given complexity of this care  · Also got metolazone 4/9/18 x 1 and 4/6/18 x 1  · Monitor weights and I/Os, Na restricted diet  · Currently a negative at 8 2 L, weight down from 106--97 4 kg;   · Monitor O2 needs- wean off as able; spoke with RN  · Continue beta-blocker with hold parameters  · CXR 4/10/18 ="There is worsening pulmonary edema  Unchanged small left pleural effusion "  · Repeated LE doppler to ensure no DVT given severe ongoing edema  · 2d echo 5/99/53 =Systolic function was mildly reduced  Ejection fraction was estimated to be 45 %  There was mild diffuse hypokinesis  There was moderate hypokinesis of the apical septal wall(s)  MITRAL VALVE: There was marked annular calcification  There was moderate to severe regurgitation  AORTIC VALVE:A bioprosthesis was present  It exhibited normal function  Acute-on-chronic kidney injury [facility])   Assessment & Plan    · Renal input and ongoing assistance much appreciated  · Baseline creatinine 1 8-2 2   · NO NSAIDS  · HOLD ACE-I, avoid hypotension  · Monitor BMP daily  · Creatinine worsened on 4/10/18 to to 3 26, now has come down to 2 88 but continued noted rise in his BUN with unclear explanation    Asked for heme check stool-- awaiting sample to be taken  · Reviewed notes yesterday from Nephrology, they are starting to discuss the possibility of dialysis with the family  · Geronimo placed for retention, flomax added as well this admit--not sure when Cardiology and Nephrology would feel comfortable doing a voiding trial based on need for very close measurements of his urine output and generalized failure to progress        Rapid atrial fibrillation [facility])   Assessment & Plan    · Cardiology managing metoprolol given the AFib in the setting of hypotension; rates seem to be slightly better overall  · Also receiving albumin for hypotension  · Not a candidate for digoxin given SABINE  · Not on anticoagulation (refused due to prior bleeding on Eliquis)  · Defer to Cardiology whether they would like telemetry reordered at this point        Peripheral arterial disease ([place] [address] )   Assessment & Plan    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 which is being managed by Podiatry with local care (Dr Charles Herrera is the patient's usual podiatrist but was out of town over the weekend and [address] [place] [address] group was covering for him)  · Per my conversation with vascular surgery, they are not planning to do any intervention during this hospitalization especially in light of his unstable cardiovascular and renal issues at this time  · Wife called the slim office requesting vascular involvement    Again they had already been consulted but I spoke with their advanced practitioner and they came back and re-eval'd patient on 4/11/18  · Tylenol around the clock for pain, patient declines anything stronger  · Initially we thought there may be an element of mild surrounding cellulitis on admission but after evaluation by Podiatry it was felt that no additional antibiotics were needed and the IV Ancef was discontinued        Type 2 diabetes mellitus ([place] [address] )   Assessment & Plan    · Monitor on AccuChecks/SSI--blood sugars have been stable  · A1c 7 6  · Continue lantus and novolog        Non-ST elevation myocardial infarction (NSTEMI) ([facility] )   Assessment & Plan    · Mild troponin elevation likely the result of type 2 non ST elevation MI from heart failure and renal failure  · Cardiology consult appreciated          VTE Pharmacologic Prophylaxis:   Pharmacologic: Heparin  Mechanical VTE Prophylaxis in Place: No    Patient Centered Rounds: I have performed bedside rounds with nursing staff today  Discussions with Specialists or Other Care Team Provider:  Case management, renal    Education and Discussions with Family / Patient:     Time Spent for Care: 30 min  More than 50% of total time spent on counseling and coordination of care as described above  Current Length of Stay: 8 day(s)    Current Patient Status: Inpatient   Certification Statement: The patient will continue to require additional inpatient hospital stay due to Ongoing heart failure and renal failure    Discharge Plan: Will again confer with Cardiology and Nephrology today regarding the general plan given patient's prolonged hospitalization with slow improvement; physical therapy is recommending short-term rehab at the time of discharge    Addendum spoke with Nephrology and we decided we would attempt a voiding trial at this point and remove the Geronimo  Will replace it if he fails voiding trial and consult Urology at that time    Code Status: Level 3 - DNAR and DNI      Subjective:   Patient tells me he is pissed off" because yesterday his weight was 214 lb and now today it is 216 lb and he is starting to feel depressed about this whole thing  He says it is not like he is even eating that much  He is not sure his legs look much improved at all  He does not offer complaints of pain  His last bowel movement was about a day and half ago  A stool sample has not yet been obtained to test for blood although there has been no reports of bleeding  He does not report feeling constipated  He does not report shortness of breath with sitting up    Nursing reports they have been weaning down his oxygen needs to approximately 1 5-2 L    Objective:     Vitals:   Temp (24hrs), Av 9 °F (36 6 °C), Min:97 6 °F (36 4 °C), Max:98 1 °F (36 7 °C)    HR:  [] 106  Resp:  [20] 20  BP: ()/(53-80) 118/80  SpO2:  [96 %-99 %] 97 %  Body mass index is 33 63 kg/m²  Input and Output Summary (last 24 hours): Intake/Output Summary (Last 24 hours) at 04/12/18 1132  Last data filed at 04/11/18 1900   Gross per 24 hour   Intake              220 ml   Output                0 ml   Net              220 ml       Physical Exam:     Physical Exam   Constitutional: No distress  HENT:   Head: Normocephalic and atraumatic  Eyes: Conjunctivae are normal  Right eye exhibits no discharge  Left eye exhibits no discharge  No scleral icterus  Neck: Neck supple  Cardiovascular:   No murmur heard  AFib  Rate controlled  Pulmonary/Chest: No respiratory distress  He has no wheezes  2 L nasal cannula oxygen in place  Patient sitting up in the chair and does not appear to be in any distress  Decreased breath sounds at bilateral bases noted  No cough  Abdominal: Soft  Obese, nontender   Musculoskeletal: He exhibits edema (Ongoing severe bilateral lower extremity edema noted)  No evidence of cellulitis   Neurological: He is alert  Awake alert and interactive he is extremely pleasant and cooperative and appears to be cognitively intact with no apparent deficits noted  Skin: Skin is warm and dry  No rash noted  He is not diaphoretic  No erythema  No pallor  Psychiatric:   Very pleasant and cooperative, appears to be a bit "down" today    No hallucinations or delusions       Additional Data:     Labs:      Results from last 7 days  Lab Units 04/12/18  0505   WBC Thousand/uL 7 13   HEMOGLOBIN g/dL 9 9*   HEMATOCRIT % 32 3*   PLATELETS Thousands/uL 173   NEUTROS PCT % 71   LYMPHS PCT % 18   MONOS PCT % 7   EOS PCT % 4       Results from last 7 days  Lab Units 04/12/18  0505   SODIUM mmol/L 140   POTASSIUM mmol/L 3 7   CHLORIDE mmol/L 95*   CO2 mmol/L 33*   BUN mg/dL 126*   CREATININE mg/dL 2 88* CALCIUM mg/dL 9 4   GLUCOSE RANDOM mg/dL 106           * I Have Reviewed All Lab Data Listed Above  * Additional Pertinent Lab Tests Reviewed: All Labs Within Last 24 Hours Reviewed    Imaging:    Imaging Reports Reviewed Today Include:   Imaging Personally Reviewed by Myself Includes:      Recent Cultures (last 7 days):           Last 24 Hours Medication List:     Current Facility-Administered Medications:  acetaminophen 975 mg Oral Q8H [facility] Lisseth Seals PA-C   aspirin 81 mg Oral Daily Chase Alberto MD   atorvastatin 40 mg Oral Daily With Dinner Chase Alberto MD   calcium carbonate 1,000 mg Oral Daily PRN Chase Alberto MD   docusate sodium 100 mg Oral BID PRN Chase Alberto MD   furosemide 80 mg Intravenous BID (diuretic) Lenora Recinos MD   heparin (porcine) 5,000 Units Subcutaneous Q8H [facility] Lisseth Seals PA-C   insulin glargine 20 Units Subcutaneous HS Chase Alberto MD   insulin lispro 1-5 Units Subcutaneous TID AC Chase Alberto MD   insulin lispro 1-5 Units Subcutaneous HS Chase Alberto MD   insulin lispro 10 Units Subcutaneous TID With Meals Chase Alberto MD   melatonin 3 mg Oral HS Lisseth Seals PA-C   metoprolol succinate 50 mg Oral Q12H Lenora Recinos MD   ondansetron 4 mg Intravenous Q6H PRN Chase Alberto MD   pantoprazole 40 mg Oral Early Morning Chase Alberto MD   tamsulosin 0 4 mg Oral Daily With Dinner Francine Valverde PA-C        Today, Patient Was Seen By: Francine Valverde PA-C    ** Please Note: Dictation voice to text software may have been used in the creation of this document   **

## 2018-04-12 NOTE — ASSESSMENT & PLAN NOTE
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 which is being managed by Podiatry with local care (Dr Marge Casillas is the patient's usual podiatrist but was out of town over the weekend and [facility] group was covering for him)  · Per my conversation with vascular surgery, they are not planning to do any intervention during this hospitalization especially in light of his unstable cardiovascular and renal issues at this time  · Wife called the slim office requesting vascular involvement    Again they had already been consulted but I spoke with their advanced practitioner and they came back and re-eval'd patient on 4/11/18  · Tylenol around the clock for pain, patient declines anything stronger  · Initially we thought there may be an element of mild surrounding cellulitis on admission but after evaluation by Podiatry it was felt that no additional antibiotics were needed and the IV Ancef was discontinued

## 2018-04-13 NOTE — ASSESSMENT & PLAN NOTE
· Renal input and ongoing assistance much appreciated--spoke with them again today  · Baseline creatinine 1 8-2 2   · NO NSAIDS  · HOLD ACE-I, avoid hypotension  · Monitor BMP daily  · Creatinine worsened on 4/10/18 to to 3 26, now has come down to 2 74 but continued noted rise in his BUN today to 127 with unclear explanation  Stool is heme-negative and he is not on any steroids  · Reviewed notes yesterday from Nephrology, they are starting to discuss the possibility of dialysis with the family; I spoke with the patient again at about this today  He is reluctant but seems like he would do it if needed  · Geronimo placed for retention during this admission, flomax added as well this admit; Geronimo removed on April 12th to do a voiding trial which so far has been successful    Continue bladder scan with urinary retention protocol and consult Urology if Geronimo needs to be replaced

## 2018-04-13 NOTE — ASSESSMENT & PLAN NOTE
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 which is being managed by Podiatry with local care (Dr Angel Choe is the patient's usual podiatrist but [facility] group is covering for him)  · Per my conversation with vascular surgery, they are not planning to do any intervention during this hospitalization especially in light of his unstable cardiovascular and renal issues at this time  · Wife called the slim office requesting vascular re-involvement  Again they had already been consulted but I spoke with their advanced practitioner and they came back and re-eval'd patient on 4/11/18  · Tylenol around the clock for pain, patient declines anything stronger  · Initially we thought there may be an element of mild surrounding cellulitis on admission but after evaluation by Podiatry it was felt that no additional antibiotics were needed and the IV Ancef was discontinued  · Appreciate Podiatry Re evaluation done on April 12    Patient had a toenail which needed to be removed as it was nearly fully pulled off  · Would also recommend better elevation and Ace wrap compression to reduce edema

## 2018-04-13 NOTE — SOCIAL WORK
LOS 9   Bundle; Not a readmission  Update provided to [facility] as pt will be going to [facility] upon DC  CM will continue to follow

## 2018-04-13 NOTE — PLAN OF CARE
Problem: DISCHARGE PLANNING - CARE MANAGEMENT  Goal: Discharge to post-acute care or home with appropriate resources  INTERVENTIONS:  - Conduct assessment to determine patient/family and health care team treatment goals, and need for post-acute services based on payer coverage, community resources, and patient preferences, and barriers to discharge  - Address psychosocial, clinical, and financial barriers to discharge as identified in assessment in conjunction with the patient/family and health care team  - Arrange appropriate level of post-acute services according to patient's   needs and preference and payer coverage in collaboration with the physician and health care team  - Communicate with and update the patient/family, physician, and health care team regarding progress on the discharge plan  - Arrange appropriate transportation to post-acute venues   Outcome: Progressing  LOS 9  Bundle; Not a readmission  Update provided to [facility] as pt will be going to [facility] upon DC  CM will continue to follow

## 2018-04-13 NOTE — PROGRESS NOTES
Cardiology Progress Note - Mihir Márquez  [de-identified] y o  male MRN: [id number]    Unit/Bed#: -[place] Encounter: [id number]      Assessment/Recommendations:  1  Acute on chronic diastolic heart failure:  Continues on high-dose IV diuretic-although response is less than adequate  Continue this dose another day to evaluate response  Creatinine is responding to diuresis  May benefit from sildenafil once euvolemic  2   Paroxysmal atrial fibrillation/flutter:  Currently maintaining AFib  Heart rates are well controlled  3   AK I would CKD 3:  Creatinine continues to improve  4   CAD status post remote CABG:  Currently stable, continue on maintenance medications  5   Aortic valve disease:  Status post bioprosthetic AVR  Stable on echocardiogram   6   Hypertension:  Well controlled, continue current regimen  7   Hyperlipidemia:  Continued on statin  8   Non ST-elevation myocardial infarction, type 2:  Due to acute exacerbation of heart failure  9   Severe pulmonary hypertension:  Continue on diuresis  As above may recommend sildenafil once euvolemic  10   Severe TR and dilated right ventricle with RV dysfunction  Subjective:   Patient seen and examined  No significant events overnight   stable dyspnea on exertion, ; pertinent negatives - chest pain, chest pressure/discomfort, palpitations and syncope  Objective:     Vitals: Blood pressure 106/71, pulse 83, temperature 97 6 °F (36 4 °C), temperature source Oral, resp  rate 22, height 5' 7" (1 702 m), weight 96 5 kg (212 lb 11 9 oz), SpO2 95 %  , Body mass index is 33 32 kg/m² , Orthostatic Blood Pressures    Flowsheet Row Most Recent Value   Blood Pressure  106/71 filed at 04/13/2018 3679   Patient Position - Orthostatic VS  Sitting filed at 04/13/2018 0721            Intake/Output Summary (Last 24 hours) at 04/13/18 0909  Last data filed at 04/13/18 0754   Gross per 24 hour   Intake             1220 ml   Output              700 ml   Net              520 ml       TELE: Afib rates controlled  Physical Exam:    GEN: Geraldine Divers   appears well, alert and oriented x 3, pleasant and cooperative   HEENT: pupils equal, round, and reactive to light; extraocular muscles intact  NECK: supple, no carotid bruits, +JVD   HEART: irregular rhythm, normal S1, +holosystolic murmur, no clicks, gallops or rubs   LUNGS: clear to auscultation bilaterally; no wheezes, rales, or rhonchi   ABDOMEN: normal bowel sounds, soft, no tenderness, no distention  EXTREMITIES: peripheral pulses normal; no clubbing, cyanosis, 2+ edema  NEURO: no focal findings   SKIN: normal without suspicious lesions on exposed skin    Medications:      Current Facility-Administered Medications:     acetaminophen (TYLENOL) tablet 975 mg, 975 mg, Oral, Q8H [facility], Lisseth Seals PA-C, 975 mg at 04/13/18 0508    aspirin (ECOTRIN LOW STRENGTH) EC tablet 81 mg, 81 mg, Oral, Daily, Chase Alberto MD, 81 mg at 04/13/18 0828    atorvastatin (LIPITOR) tablet 40 mg, 40 mg, Oral, Daily With Dinner, Chase Alberto MD, 40 mg at 04/12/18 1627    calcium carbonate (TUMS) chewable tablet 1,000 mg, 1,000 mg, Oral, Daily PRN, Chase Alberto MD    docusate sodium (COLACE) capsule 100 mg, 100 mg, Oral, BID PRN, Chase Alberto MD, 100 mg at 04/10/18 1005    furosemide (LASIX) injection 80 mg, 80 mg, Intravenous, BID (diuretic), Krysten Clements MD, 80 mg at 04/13/18 0828    heparin (porcine) subcutaneous injection 5,000 Units, 5,000 Units, Subcutaneous, Q8H [facility], Lisseth Seals PA-C, 5,000 Units at 04/13/18 0508    insulin glargine (LANTUS) subcutaneous injection 20 Units, 20 Units, Subcutaneous, HS, Chase Alberto MD, 20 Units at 04/12/18 2106    insulin lispro (HumaLOG) 100 units/mL subcutaneous injection 1-5 Units, 1-5 Units, Subcutaneous, TID AC, 1 Units at 04/13/18 0836 **AND** [CANCELED] Fingerstick Glucose (POCT), , , EDIE RITCHIE, Chase Alberto MD    insulin lispro (HumaLOG) 100 units/mL subcutaneous injection 1-5 Units, 1-5 Units, Subcutaneous, HS, Chase Alberto MD, 1 Units at 04/12/18 2107    insulin lispro (HumaLOG) 100 units/mL subcutaneous injection 10 Units, 10 Units, Subcutaneous, TID With Meals, Chase Alberto MD, 10 Units at 04/13/18 0835    melatonin tablet 3 mg, 3 mg, Oral, HS, Lisseth Seals PA-C, 3 mg at 04/12/18 2106    metoprolol succinate (TOPROL-XL) 24 hr tablet 50 mg, 50 mg, Oral, Q12H, Sonali Maldonado MD, 50 mg at 04/13/18 0829    ondansetron (ZOFRAN) injection 4 mg, 4 mg, Intravenous, Q6H PRN, Chase Alberto MD    pantoprazole (PROTONIX) EC tablet 40 mg, 40 mg, Oral, Early Morning, Chase Alberto MD, 40 mg at 04/13/18 0508    tamsulosin (FLOMAX) capsule 0 4 mg, 0 4 mg, Oral, Daily With Sandy Gutiérrez JOSÉ LUIS Seals, 0 4 mg at 04/12/18 1627     Labs & Results:          Results from last 7 days  Lab Units 04/12/18  0505 04/09/18  0522 04/08/18  0442   WBC Thousand/uL 7 13 7 04 7 71   HEMOGLOBIN g/dL 9 9* 9 2* 9 4*   HEMATOCRIT % 32 3* 29 8* 30 7*   PLATELETS Thousands/uL 173 176 168           Results from last 7 days  Lab Units 04/13/18  0828 04/12/18  0505 04/11/18  0446   SODIUM mmol/L 141 140 139   POTASSIUM mmol/L 3 9 3 7 3 6   CHLORIDE mmol/L 98* 95* 96*   CO2 mmol/L 34* 33* 33*   BUN mg/dL 127* 126* 120*   CREATININE mg/dL 2 74* 2 88* 3 07*   CALCIUM mg/dL 9 8 9 4 9 5   GLUCOSE RANDOM mg/dL 204* 106 118               Echo:personally reviewed - EF 45%, diffuse HK with mod HK of apical septum, mild to mod RV dilation and reduced function, mod to sev MR, bio AVR    EKG personally reviewed by Awais Greene MD

## 2018-04-13 NOTE — PHYSICAL THERAPY NOTE
PHYSICAL THERAPY NOTE      Patient Name: Mihir Márquez  Today's Date: 4/13/2018 04/13/18 1105   Pain Assessment   Pain Assessment No/denies pain   Pain Score No Pain   Restrictions/Precautions   Other Precautions Fall Risk;Pain;Telemetry;Multiple lines   General   Family/Caregiver Present No   Subjective   Subjective Patient seated in bedside chair on 3L o2 via nasal cannula with PCA present  Patient is agreeable to therapy session  Bed Mobility   Additional Comments Pt OOb pre and post session   Transfers   Sit to Stand 4  Minimal assistance   Additional items Assist x 1; Increased time required;Verbal cues  (for LE positioning )   Stand to Sit 4  Minimal assistance   Additional items Assist x 1;Verbal cues  (to fully turn and hand placement)   Ambulation/Elevation   Gait pattern Decreased foot clearance;Shuffling; Short stride; Excessively slow; Foward flexed   Gait Assistance 4  Minimal assist   Additional items Assist x 1;Verbal cues  (for RW management and breathing technique)   Assistive Device Rolling walker   Distance 50 feet   (additional not possible secondary to fatigue)   Balance   Static Sitting Fair +   Dynamic Sitting Fair -   Static Standing Poor +   Dynamic Standing Poor +   Ambulatory Poor +   Activity Tolerance   Activity Tolerance Patient limited by fatigue;Treatment limited secondary to medical complications (Comment)   Nurse Made Aware Spoke with ADDY Li   Exercises   Quad Sets Sitting;10 reps;AROM; Bilateral   Glute Sets Sitting;10 reps;AROM; Bilateral   Hip Adduction Sitting;10 reps;AROM; Bilateral   Knee AROM Long Arc Quad Sitting;10 reps;AROM; Bilateral   Ankle Pumps Sitting;10 reps;AROM; Bilateral  (nly heels up on L side secondary to ulcer))   Marching Sitting;5 reps;AROM; Bilateral   Assessment   Prognosis Fair   Problem List Decreased strength;Decreased range of motion;Decreased endurance; Impaired balance;Decreased mobility; Decreased coordination;Decreased safety awareness; Obesity; Decreased skin integrity; Impaired sensation  (LE edema)   Assessment Patient demonstrated slight progress with gait distance however fatigue continues to limit any further ambulation  Patient remains high fall risk secondary to gait quality and limited functional mobility  Patient requires verbal insturction when performing stand to sit transfer for safety and to fully turn with RW and hand placement  Educated and performed breathing techniques with verbal cues for posture  Continue to progress ambulation towards established goals to patient tolernace with focus on stability and RW management  Goals   Patient Goals to go home   STG Expiration Date 04/17/18   Treatment Day 5   Plan   Treatment/Interventions Functional transfer training;LE strengthening/ROM; Therapeutic exercise; Endurance training;Patient/family training;Equipment eval/education; Bed mobility;Gait training   Progress Progressing toward goals   PT Frequency 5x/wk   Recommendation   Recommendation Post acute IP rehab   Equipment Recommended Other (Comment)  (roller walker)       Alejandro Richter, PTA

## 2018-04-13 NOTE — PROGRESS NOTES
NEPHROLOGY PROGRESS NOTE   Angélica Wang  [de-identified] y o  male MRN: [id number]  Unit/Bed#: -01 Encounter: [id number]  Reason for Consult: SABINE, CKD    ASSESSMENT and PLAN:  The patient is an 72-year-old gentleman who presented with shortness of breath on April 4th   He went on vacation for 5 days and did not take his diuretic, along with dietary indiscretion  Corene Soila course complicated by atrial fibrillation and hypotension   He was not tolerating intermittent IV diuretic therapy was placed on a Lasix infusion with albumin   Lasix and albumin discontinued on 04/10/2018 due to worsening renal function/azotemia  1  Acute kidney injury on chronic kidney disease:  Baseline creatinine 1 8-2 2   Etiology possibly prerenal, decreased effective circulating volume in the setting of volume overload, cardiorenal also with concern for urinary retention   Geronimo catheter placed on 04/08/2018-discontinued on 04/12/2018  · Creatinine  peaked at 3 26 with aggressive diuresis on Lasix infusion after which Lasix was discontinued on 04/10/2018  Creatinine slowly improving currently down to 2 7   · Intermittent IV Lasix resumed yesterday  Output has declined but patient reports no exacerbations in symptoms  He currently on 1 L of oxygen which is an improvement  · Urinalysis:  Negative protein, no RBCs, no bacteria, 0-1 WBCs  · Renal ultrasound:  Kidneys diffusely echogenic reflecting chronic medical renal disease  · Monitor for urinary retention  2  Azotemia:    persists  · FOBT  negative  3  Acute on chronic diastolic CHF, Volume overload:    · Last chest x-ray did not show any improvement despite diuresis  · Cardiology following  May be related to worsening cardiac function/RV dysfunction/severe TR in the setting of atrial fibrillation, worsened MR and reduction in LVEF  · Continue Lasix 80 mg IV b i d  · Venous Dopplers lower extremities completed  No DVT    Persistent lower extremity edema which may be in part due to chronic dependent position  · Outpatient diuretic regime torsemide 40 mg every Monday, Wednesday, Friday the remaining days patient was taking 20 mg  3  PAF:  Rate controlled   Cardiology following  4  Anemia:  Hemoglobin 9 9-stable  5  Electrolytes:  Potassium level 3 9   6  PVD:  Podiatry following  7  MGUS:  IgM kappa and IgM lambda followed by Dr Nayan Rock  8  Elevated bicarbonate:  Likely due to aggressive diuresis   Keep potassium level ~4  9  Other:  CAD, prior CABG, AS status post AVR-bioprosthetic       SUMMARY OF RECOMMENDATIONS:  · Continue current dose of Lasix  · Monitor I&O  · PVR Q shift  · Patient encouraged to elevate legs    SUBJECTIVE / INTERVAL HISTORY:  No complaints  States that his breathing is somewhat better  Spoke with his wife at length regarding plan of care  She is not in favor of hemodialysis but chooses to try to keep him off as long as possible  OBJECTIVE:  Current Weight: Weight - Scale: 96 5 kg (212 lb 11 9 oz)  Vitals:    04/12/18 2103 04/13/18 0600 04/13/18 0721 04/13/18 1537   BP: 113/70  106/71 123/63   BP Location: Right arm  Right arm Right arm   Pulse: 89  83 86   Resp: 18  22 20   Temp: 97 5 °F (36 4 °C)  97 6 °F (36 4 °C) 97 8 °F (36 6 °C)   TempSrc: Oral  Oral Oral   SpO2: 96%  95% 93%   Weight:  96 5 kg (212 lb 11 9 oz)     Height:           Intake/Output Summary (Last 24 hours) at 04/13/18 1546  Last data filed at 04/13/18 1537   Gross per 24 hour   Intake             1290 ml   Output             1050 ml   Net              240 ml     General:  No acute distress  Sitting out of bed  Skin:  Warm and dry, no rash  Eyes:  Sclera clear, anicteric  ENT:  Oropharynx moist, external exam normal  Neck:  Supple, JVD noted  Chest:  Crackles left lower lobe, decreased breath sounds right lower lobe  CVS:  Irregular rhythm, normal rate, no murmur or gallop noted    No rub  Abdomen:  Rounded, soft, nontender, bowel sounds present  Extremities:  Significant pedal, ankle edema  : No Geronimo  Neuro:  Alert and oriented  Psych:  Appropriate and pleasant  Medications:    Current Facility-Administered Medications:     acetaminophen (TYLENOL) tablet 975 mg, 975 mg, Oral, [facility], Lisseth Seals PA-C, 975 mg at 04/13/18 1358    aspirin (ECOTRIN LOW STRENGTH) EC tablet 81 mg, 81 mg, Oral, Daily, Chase Alberto MD, 81 mg at 04/13/18 0828    atorvastatin (LIPITOR) tablet 40 mg, 40 mg, Oral, Daily With Allen Alberto MD, 40 mg at 04/12/18 1627    calcium carbonate (TUMS) chewable tablet 1,000 mg, 1,000 mg, Oral, Daily PRN, Chase Alberto MD    docusate sodium (COLACE) capsule 100 mg, 100 mg, Oral, BID PRN, Chase Alberto MD, 100 mg at 04/10/18 1005    furosemide (LASIX) injection 80 mg, 80 mg, Intravenous, BID (diuretic), Evgeny Coleman MD, 80 mg at 04/13/18 0828    heparin (porcine) subcutaneous injection 5,000 Units, 5,000 Units, Subcutaneous, Q8H [facility], Lisseth Seals PA-C, 5,000 Units at 04/13/18 1358    insulin glargine (LANTUS) subcutaneous injection 20 Units, 20 Units, Subcutaneous, HS, Chase Alberto MD, 20 Units at 04/12/18 2106    insulin lispro (HumaLOG) 100 units/mL subcutaneous injection 1-5 Units, 1-5 Units, Subcutaneous, TID AC, 1 Units at 04/13/18 1258 **AND** [CANCELED] Fingerstick Glucose (POCT), , , TID AC, Chase Alberto MD    insulin lispro (HumaLOG) 100 units/mL subcutaneous injection 1-5 Units, 1-5 Units, Subcutaneous, HS, Chase Alberto MD, 1 Units at 04/12/18 2107    insulin lispro (HumaLOG) 100 units/mL subcutaneous injection 10 Units, 10 Units, Subcutaneous, TID With Meals, Chase Alberto MD, 10 Units at 04/13/18 1257    melatonin tablet 3 mg, 3 mg, Oral, HS, Lisseth Seals PA-C, 3 mg at 04/12/18 2106    metoprolol succinate (TOPROL-XL) 24 hr tablet 50 mg, 50 mg, Oral, Q12H, Evgeny Coleman MD, 50 mg at 04/13/18 0829    ondansetron (ZOFRAN) injection 4 mg, 4 mg, Intravenous, Q6H PRN, Chase Alberto, MD    pantoprazole (PROTONIX) EC tablet 40 mg, 40 mg, Oral, Early Morning, Chase Alberto MD, 40 mg at 04/13/18 0508    tamsulosin (FLOMAX) capsule 0 4 mg, 0 4 mg, Oral, Daily With Torrie Seals PA-C, 0 4 mg at 04/12/18 1627    Laboratory Results:    Results from last 7 days  Lab Units 04/13/18  0828 04/12/18  0505 04/11/18  0446 04/10/18  0523 04/09/18  0522 04/08/18  0442 04/07/18  0457   WBC Thousand/uL  --  7 13  --   --  7 04 7 71 8 06   HEMOGLOBIN g/dL  --  9 9*  --   --  9 2* 9 4* 9 5*   HEMATOCRIT %  --  32 3*  --   --  29 8* 30 7* 31 0*   PLATELETS Thousands/uL  --  173  --   --  176 168 171   SODIUM mmol/L 141 140 139 143 139 140 137   POTASSIUM mmol/L 3 9 3 7 3 6 4 2 3 8 3 7 4 8   CHLORIDE mmol/L 98* 95* 96* 98* 97* 99* 98*   CO2 mmol/L 34* 33* 33* 31 31 30 25   BUN mg/dL 127* 126* 120* 107* 95* 92* 83*   CREATININE mg/dL 2 74* 2 88* 3 07* 3 26* 2 81* 2 89* 2 96*   CALCIUM mg/dL 9 8 9 4 9 5 9 8 9 5 9 4 9 5   GLUCOSE RANDOM mg/dL 204* 106 118 141* 134 113 169*     Previous work up:

## 2018-04-13 NOTE — ASSESSMENT & PLAN NOTE
· Possibly triggered by patient's noncompliance with diet and correct torsemide dosing while on vacation? ?  · Hospital day #10--finally seems to be improving more  · Due to worsening SABINE, Lasix gtt was held; IV lasix 80 BID now ordered  · Appreciate ongoing close cardiology and nephrology input given complexity of this care  · Also got metolazone 4/9/18 x 1 and 4/6/18 x 1  · Monitor weights and I/Os, Na restricted diet  · Currently a negative at 7 6 L, weight down from 106--96 5kg;   · Monitor O2 needs- wean off as able; spoke with RN--currently down to 1 L  · Continue beta-blocker with hold parameters  · CXR 4/10/18 ="There is worsening pulmonary edema  Unchanged small left pleural effusion "  · Repeated LE doppler to ensure no DVT given severe ongoing edema  · 2d echo 5/92/51 =Systolic function was mildly reduced  Ejection fraction was estimated to be 45 %  There was mild diffuse hypokinesis  There was moderate hypokinesis of the apical septal wall(s)  MITRAL VALVE: There was marked annular calcification  There was moderate to severe regurgitation  AORTIC VALVE:A bioprosthesis was present  It exhibited normal function

## 2018-04-13 NOTE — PROGRESS NOTES
Progress Note - Marni Parish  1935, [de-identified] y o  male MRN: [id number]    Unit/Bed#: -01 Encounter: [id number]    Primary Care Provider: La Martinez MD   Date and time admitted to hospital: 4/4/2018  6:39 AM  * Acute on chronic diastolic congestive heart failure ([address] )   Assessment & Plan    · Possibly triggered by patient's noncompliance with diet and correct torsemide dosing while on vacation? ?  · Hospital day #10--finally seems to be improving more  · Due to worsening SABINE, Lasix gtt was held; IV lasix 80 BID now ordered  · Appreciate ongoing close cardiology and nephrology input given complexity of this care  · Also got metolazone 4/9/18 x 1 and 4/6/18 x 1  · Monitor weights and I/Os, Na restricted diet  · Currently a negative at 7 6 L, weight down from 106--96 5kg;   · Monitor O2 needs- wean off as able; spoke with RN--currently down to 1 L  · Continue beta-blocker with hold parameters  · CXR 4/10/18 ="There is worsening pulmonary edema  Unchanged small left pleural effusion "  · Repeated LE doppler to ensure no DVT given severe ongoing edema  · 2d echo 6/95/07 =Systolic function was mildly reduced  Ejection fraction was estimated to be 45 %  There was mild diffuse hypokinesis  There was moderate hypokinesis of the apical septal wall(s)  MITRAL VALVE: There was marked annular calcification  There was moderate to severe regurgitation  AORTIC VALVE:A bioprosthesis was present  It exhibited normal function  Acute-on-chronic kidney injury [facility])   Assessment & Plan    · Renal input and ongoing assistance much appreciated--spoke with them again today  · Baseline creatinine 1 8-2 2   · NO NSAIDS  · HOLD ACE-I, avoid hypotension  · Monitor BMP daily  · Creatinine worsened on 4/10/18 to to 3 26, now has come down to 2 74 but continued noted rise in his BUN today to 127 with unclear explanation    Stool is heme-negative and he is not on any steroids  · Reviewed notes yesterday from Nephrology, they are starting to discuss the possibility of dialysis with the family; I spoke with the patient again at about this today  He is reluctant but seems like he would do it if needed  · Geronimo placed for retention during this admission, flomax added as well this admit; Geronimo removed on April 12th to do a voiding trial which so far has been successful  Continue bladder scan with urinary retention protocol and consult Urology if Geronimo needs to be replaced        Rapid atrial fibrillation [facility])   Assessment & Plan    · Cardiology managing metoprolol given the AFib in the setting of hypotension; rates seem to be slightly better overall; Toprol dose being increased by Cardiology  · Not a candidate for digoxin given SABINE  · Not on anticoagulation (refused due to prior bleeding on Eliquis)  · Defer to Cardiology whether they would like telemetry reordered at this point        Peripheral arterial disease ([facility] [address] )   Assessment & Plan    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 which is being managed by Podiatry with local care (Dr Joanne Staton is the patient's usual podiatrist but [facility] group is covering for him)  · Per my conversation with vascular surgery, they are not planning to do any intervention during this hospitalization especially in light of his unstable cardiovascular and renal issues at this time  · Wife called the slim office requesting vascular re-involvement  Again they had already been consulted but I spoke with their advanced practitioner and they came back and re-eval'd patient on 4/11/18  · Tylenol around the clock for pain, patient declines anything stronger  · Initially we thought there may be an element of mild surrounding cellulitis on admission but after evaluation by Podiatry it was felt that no additional antibiotics were needed and the IV Ancef was discontinued  · Appreciate Podiatry Re evaluation done on April 12    Patient had a toenail which needed to be removed as it was nearly fully pulled off  · Would also recommend better elevation and Ace wrap compression to reduce edema        Type 2 diabetes mellitus (HCC)   Assessment & Plan    · Monitor on AccuChecks/SSI--blood sugars have been stable  · A1c 7 6  · Continue lantus and novolog        Non-ST elevation myocardial infarction (NSTEMI) ([facility])   Assessment & Plan    · Mild troponin elevation likely the result of type 2 non ST elevation MI from heart failure and renal failure  · Cardiology consult appreciated          VTE Pharmacologic Prophylaxis:   Pharmacologic: Heparin  Mechanical VTE Prophylaxis in Place: No    Patient Centered Rounds: I have performed bedside rounds with nursing staff today  Discussions with Specialists or Other Care Team Provider: renal, case management    Education and Discussions with Family / Patient:     Time Spent for Care: 30 minutes  More than 50% of total time spent on counseling and coordination of care as described above  Current Length of Stay: 9 day(s)    Current Patient Status: Inpatient   Certification Statement: The patient will continue to require additional inpatient hospital stay due to IV lasix    Discharge Plan:  Patient requires short-term rehab  Will speak with all specialists involved to determine if we could start discussing discharge over the weekend or possibly early next week    Code Status: Level 3 - DNAR and DNI; goals of care discussed with the patient  He states he really wants to live another 4 years so he can see his grandson graduate because he loves him so much  He understands it might not be possible and feels he has lived a good life and would be ready to die any time and does not feel afraid of dying  However he would be willing to do would ever is needed to live another 4 years if possible including dialysis if it comes down to it  Subjective:   Patient finally reporting that he is feeling significantly better    He reports at least 75% improvement since the time of his admission  His legs continue to be swollen and he still has some difficulty with breathing but is very happy that his weight was down to 212 lb today  The Geronimo was removed and he has successfully been urinating at this point  His appetite has been so-so  Objective:     Vitals:   Temp (24hrs), Av 8 °F (36 6 °C), Min:97 5 °F (36 4 °C), Max:98 3 °F (36 8 °C)    HR:  [83-96] 83  Resp:  [16-22] 22  BP: (106-129)/(70-71) 106/71  SpO2:  [95 %-99 %] 95 %  Body mass index is 33 32 kg/m²  Input and Output Summary (last 24 hours): Intake/Output Summary (Last 24 hours) at 18 1120  Last data filed at 18 1031   Gross per 24 hour   Intake             1340 ml   Output             1000 ml   Net              340 ml       Physical Exam:     Physical Exam   Constitutional: No distress  Sitting up in bed appears to be in good spirits today and clinically improved, just finishing his breakfast   HENT:   Head: Normocephalic and atraumatic  Eyes: Conjunctivae are normal  Right eye exhibits no discharge  Left eye exhibits no discharge  No scleral icterus  Neck: Neck supple  Cardiovascular: Normal heart sounds  No murmur heard  AFib, rate stable   Pulmonary/Chest: Effort normal  No stridor  No respiratory distress  He has no wheezes  Continued crackles and decreased breath sounds at bilateral bases  No respiratory distress or dyspnea noted today at all   1 L of nasal cannula oxygen in place  Abdominal: Soft  He exhibits no distension  There is no tenderness  Obese   Musculoskeletal: He exhibits edema (Ongoing bilateral lower extremity dependent significant edema)  Neurological: He is alert  No focal deficits noted   Skin: Skin is warm and dry  No rash noted  He is not diaphoretic  No erythema  Psychiatric: He has a normal mood and affect  His behavior is normal  Judgment and thought content normal    He is awake alert interactive and appropriate  Nursing note and vitals reviewed  Additional Data:     Labs:      Results from last 7 days  Lab Units 04/12/18  0505   WBC Thousand/uL 7 13   HEMOGLOBIN g/dL 9 9*   HEMATOCRIT % 32 3*   PLATELETS Thousands/uL 173   NEUTROS PCT % 71   LYMPHS PCT % 18   MONOS PCT % 7   EOS PCT % 4       Results from last 7 days  Lab Units 04/13/18  0828   SODIUM mmol/L 141   POTASSIUM mmol/L 3 9   CHLORIDE mmol/L 98*   CO2 mmol/L 34*   BUN mg/dL 127*   CREATININE mg/dL 2 74*   CALCIUM mg/dL 9 8   GLUCOSE RANDOM mg/dL 204*           * I Have Reviewed All Lab Data Listed Above  * Additional Pertinent Lab Tests Reviewed:  All Labs Within Last 24 Hours Reviewed    Imaging:    Imaging Reports Reviewed Today Include:   Imaging Personally Reviewed by Myself Includes:      Recent Cultures (last 7 days):           Last 24 Hours Medication List:     Current Facility-Administered Medications:  acetaminophen 975 mg Oral Q8H [facility] Lisseth Seals PA-C   aspirin 81 mg Oral Daily Chase Alberto MD   atorvastatin 40 mg Oral Daily With Dinner Chase Alberto MD   calcium carbonate 1,000 mg Oral Daily PRN Chase Alberto MD   docusate sodium 100 mg Oral BID PRN Chase Alberto MD   furosemide 80 mg Intravenous BID (diuretic) Betsy Barrientos MD   heparin (porcine) 5,000 Units Subcutaneous Q8H [facility] Lisseth Seals PA-C   insulin glargine 20 Units Subcutaneous HS Chase Alberto MD   insulin lispro 1-5 Units Subcutaneous TID AC Chase Alberto MD   insulin lispro 1-5 Units Subcutaneous HS Chase Alberto MD   insulin lispro 10 Units Subcutaneous TID With Meals Chase Alberto MD   melatonin 3 mg Oral HS Lisseth Seals PA-C   metoprolol succinate 50 mg Oral Q12H Betsy Barrientos MD   ondansetron 4 mg Intravenous Q6H PRN Chase Alberto MD   pantoprazole 40 mg Oral Early Morning Chase Alberto MD   tamsulosin 0 4 mg Oral Daily With Dinner Sandra eLe PA-C        Today, Patient Was Seen By: Katerina Gerard JOSÉ LUIS Seals    ** Please Note: Dictation voice to text software may have been used in the creation of this document   **

## 2018-04-13 NOTE — PLAN OF CARE
Problem: PHYSICAL THERAPY ADULT  Goal: Performs mobility at highest level of function for planned discharge setting  See evaluation for individualized goals  Treatment/Interventions: Functional transfer training, LE strengthening/ROM, Therapeutic exercise, Endurance training, Patient/family training, Equipment eval/education, Bed mobility, Gait training (PT to see when stair training is appropriate )          See flowsheet documentation for full assessment, interventions and recommendations  Outcome: Progressing  Prognosis: Fair  Problem List: Decreased strength, Decreased range of motion, Decreased endurance, Impaired balance, Decreased mobility, Decreased coordination, Decreased safety awareness, Obesity, Decreased skin integrity, Impaired sensation (LE edema)  Assessment: Patient demonstrated slight progress with gait distance however fatigue continues to limit any further ambulation  Patient remains high fall risk secondary to gait quality and limited functional mobility  Patient requires verbal insturction when performing stand to sit transfer for safety and to fully turn with RW and hand placement  Educated and performed breathing techniques with verbal cues for posture  Continue to progress ambulation towards established goals to patient tolernace with focus on stability and RW management  Recommendation: Post acute IP rehab          See flowsheet documentation for full assessment

## 2018-04-13 NOTE — ASSESSMENT & PLAN NOTE
· Cardiology managing metoprolol given the AFib in the setting of hypotension; rates seem to be slightly better overall; Toprol dose being increased by Cardiology  · Not a candidate for digoxin given SABINE  · Not on anticoagulation (refused due to prior bleeding on Eliquis)  · Defer to Cardiology whether they would like telemetry reordered at this point

## 2018-04-14 PROBLEM — I27.20 PULMONARY HYPERTENSION (HCC): Status: ACTIVE | Noted: 2018-01-01

## 2018-04-14 NOTE — ASSESSMENT & PLAN NOTE
· Patient with severe PAD with 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 managed by Podiatry with local wound care  · He is status post vascular evaluation and no plans for any intervention during this hospitalization in view of his unstable cardiovascular/renal status  · Continue scheduled Tylenol for pain control  · Continue Ace wrap for edema control, elevated extremity

## 2018-04-14 NOTE — ASSESSMENT & PLAN NOTE
· Baseline creatinine 1 8-2 2  This worsened with initial diuresis and now plateaued at 0 46 today  · Likely secondary to cardiorenal, +/-obstructive uropathy  · Continue to monitor closely while on diuretics and avoid nephrotoxins, hypotension, NSAIDS  · He is status post viera placed for retention during this admission and flomax added  Viera removed on 4/12/18 for voiding trial which so far has been successful    Continue bladder scan with urinary retention protocol and consult Urology if Viera needs to be replaced  · Appreciate Nephrology input

## 2018-04-14 NOTE — ASSESSMENT & PLAN NOTE
· Severe pulmonary hypertension with estimated peak PA pressure at 65 mmHg  · Continue diuresis, being considered for RHC and sildenafil once euvolemic per Cardiology  · Management per Cardiology

## 2018-04-14 NOTE — PROGRESS NOTES
Progress Note - Cardiology   Barbara Nix  [de-identified] y o  male MRN: [id number]  Unit/Bed#: -01 Encounter: [id number]        Principal Problem:    Acute on chronic diastolic congestive heart failure (HCC)  Active Problems:    Rapid atrial fibrillation (HCC)    Type 2 diabetes mellitus ([facility] [address] )    Coronary artery disease involving native coronary artery of native heart    Peripheral arterial disease ([facility] )    Acute-on-chronic kidney injury ([facility] )    Non-ST elevation myocardial infarction (NSTEMI) ([facility] )        Assessment/Recommendations:  1  Acute on chronic diastolic heart failure:  Continues on high-dose IV diuretic  Diuresis has been challenging with SABINE and hypotension  On IV lasix 80mg TID  Albumin per nephrology  Overall negative 8 liters , still volume overloaded  Still with PND, MADISON and LE edema above baseline  May benefit from sildenafil once euvolemic   EF 45% RWMA    2  Paroxysmal atrial fibrillation/flutter:  Currently maintaining AFib  Heart rates are well controlled  Pt and family has refused AC despite stroke risk  3   SABINE/ CKD 3:  Creatinine continues to improve  4   CAD status post remote CABG: Stable, continue asa, statin, BB    5  Aortic valve disease:  Status post bioprosthetic AVR  Stable on echocardiogram     6   Hypertension:  Well controlled, continue current regimen  7   Hyperlipidemia:  Continued on statin  8   Non ST-elevation myocardial infarction, type 2:  Due to acute exacerbation of heart failure  9   Severe pulmonary hypertension:  Continue on diuresis  As above may recommend sildenafil once euvolemic  Also consider RHC per Dr Gertrude Pete    10    Severe TR and dilated right ventricle with RV dysfunction      Subjective/Objective   Chief Complaint/Subjective  Still PND, MADISON  No CP  Leg edema improved    Vitals: /56 (BP Location: Right arm)   Pulse 98   Temp 98 5 °F (36 9 °C) (Oral)   Resp 16   Ht 5' 7" (1 702 m)   Wt 96 5 kg (212 lb 11 9 oz)   SpO2 96%   BMI 33 32 kg/m²     Vitals:    04/12/18 0600 04/13/18 0600   Weight: 98 kg (216 lb 0 8 oz) 96 5 kg (212 lb 11 9 oz)     Orthostatic Blood Pressures    Flowsheet Row Most Recent Value   Blood Pressure  130/56 filed at 04/14/2018 5503   Patient Position - Orthostatic VS  Lying filed at 04/14/2018 0717            Intake/Output Summary (Last 24 hours) at 04/14/18 1210  Last data filed at 04/14/18 0101   Gross per 24 hour   Intake              780 ml   Output              838 ml   Net              -58 ml       Invasive Devices     Peripheral Intravenous Line            Long-Dwell Peripheral IV (Midline) 81/35/91 Left Basilic 1 day                Current Facility-Administered Medications   Medication Dose Route Frequency    acetaminophen (TYLENOL) tablet 975 mg  975 mg Oral Q8H [address]    albumin human (FLEXBUMIN) 25 % injection 25 g  25 g Intravenous BID (diuretic)    aspirin (ECOTRIN LOW STRENGTH) EC tablet 81 mg  81 mg Oral Daily    atorvastatin (LIPITOR) tablet 40 mg  40 mg Oral Daily With Dinner    calcium carbonate (TUMS) chewable tablet 1,000 mg  1,000 mg Oral Daily PRN    docusate sodium (COLACE) capsule 100 mg  100 mg Oral BID PRN    furosemide (LASIX) injection 80 mg  80 mg Intravenous BID (diuretic)    heparin (porcine) subcutaneous injection 5,000 Units  5,000 Units Subcutaneous Q8H [address]    insulin glargine (LANTUS) subcutaneous injection 20 Units  20 Units Subcutaneous HS    insulin lispro (HumaLOG) 100 units/mL subcutaneous injection 1-5 Units  1-5 Units Subcutaneous TID AC    insulin lispro (HumaLOG) 100 units/mL subcutaneous injection 1-5 Units  1-5 Units Subcutaneous HS    insulin lispro (HumaLOG) 100 units/mL subcutaneous injection 10 Units  10 Units Subcutaneous TID With Meals    melatonin tablet 3 mg  3 mg Oral HS    metoprolol succinate (TOPROL-XL) 24 hr tablet 50 mg  50 mg Oral Q12H    ondansetron (ZOFRAN) injection 4 mg  4 mg Intravenous Q6H PRN    pantoprazole (PROTONIX) EC tablet 40 mg  40 mg Oral Early Morning    tamsulosin (FLOMAX) capsule 0 4 mg  0 4 mg Oral Daily With Dinner         Physical Exam: /56 (BP Location: Right arm)   Pulse 98   Temp 98 5 °F (36 9 °C) (Oral)   Resp 16   Ht 5' 7" (1 702 m)   Wt 96 5 kg (212 lb 11 9 oz)   SpO2 96%   BMI 33 32 kg/m²     General Appearance:    Alert, cooperative, no distress, appears stated age   Head:    Normocephalic, no scleral icterus   Eyes:    PERRL   Nose:   Nares normal, septum midline, no drainage    Throat:   Lips, mucosa, and tongue normal   Neck:   Supple, symmetrical, trachea midline,       JVD       Lungs:     Few crackles to auscultation bilaterally, respirations unlabored   Chest Wall:    No tenderness or deformity    Heart:    Iregular rate and rhythm, S1 and S2 normal, systolic murmur   Abdomen:     Soft, non-tender, bowel sounds active all four quadrants   Extremities:   Extremities normal, atraumatic, no cyanosis , 2++edema       Skin:   Skin warm   Neurologic:   Alert and oriented to person place and time, no focal deficits                 Lab Results:   Recent Results (from the past 72 hour(s))   Fingerstick Glucose (POCT)    Collection Time: 04/11/18  3:37 PM   Result Value Ref Range    POC Glucose 254 (H) 65 - 140 mg/dl   Fingerstick Glucose (POCT)    Collection Time: 04/11/18  9:00 PM   Result Value Ref Range    POC Glucose 153 (H) 65 - 140 mg/dl   CBC and differential    Collection Time: 04/12/18  5:05 AM   Result Value Ref Range    WBC 7 13 4 31 - 10 16 Thousand/uL    RBC 3 73 (L) 3 88 - 5 62 Million/uL    Hemoglobin 9 9 (L) 12 0 - 17 0 g/dL    Hematocrit 32 3 (L) 36 5 - 49 3 %    MCV 87 82 - 98 fL    MCH 26 5 (L) 26 8 - 34 3 pg    MCHC 30 7 (L) 31 4 - 37 4 g/dL    RDW 14 5 11 6 - 15 1 %    MPV 10 3 8 9 - 12 7 fL    Platelets 167 617 - 931 Thousands/uL    Neutrophils Relative 71 43 - 75 %    Lymphocytes Relative 18 14 - 44 %    Monocytes Relative 7 4 - 12 %    Eosinophils Relative 4 0 - 6 %    Basophils Relative 0 0 - 1 %    Neutrophils Absolute 4 98 1 85 - 7 62 Thousands/µL    Lymphocytes Absolute 1 30 0 60 - 4 47 Thousands/µL    Monocytes Absolute 0 53 0 17 - 1 22 Thousand/µL    Eosinophils Absolute 0 31 0 00 - 0 61 Thousand/µL    Basophils Absolute 0 01 0 00 - 0 10 Thousands/µL   Basic metabolic panel    Collection Time: 04/12/18  5:05 AM   Result Value Ref Range    Sodium 140 136 - 145 mmol/L    Potassium 3 7 3 5 - 5 3 mmol/L    Chloride 95 (L) 100 - 108 mmol/L    CO2 33 (H) 21 - 32 mmol/L    Anion Gap 12 4 - 13 mmol/L     (H) 5 - 25 mg/dL    Creatinine 2 88 (H) 0 60 - 1 30 mg/dL    Glucose 106 65 - 140 mg/dL    Calcium 9 4 mg/dL    eGFR 20 ml/min/1 73sq m   Fingerstick Glucose (POCT)    Collection Time: 04/12/18  8:27 AM   Result Value Ref Range    POC Glucose 128 65 - 140 mg/dl   Occult blood x 3, stool    Collection Time: 04/12/18 11:15 AM   Result Value Ref Range    Occult Blood, Stool #1 Negative Negative    Occult Blood, Stool #2  Negative    Occult Blood, Stool #3  Negative    DATE SPECIMEN #1      DATE SPECIMEN #2      DATE SPECIMEN #3     Fingerstick Glucose (POCT)    Collection Time: 04/12/18 11:24 AM   Result Value Ref Range    POC Glucose 271 (H) 65 - 140 mg/dl   Fingerstick Glucose (POCT)    Collection Time: 04/12/18  4:00 PM   Result Value Ref Range    POC Glucose 254 (H) 65 - 140 mg/dl   Fingerstick Glucose (POCT)    Collection Time: 04/12/18  8:50 PM   Result Value Ref Range    POC Glucose 204 (H) 65 - 140 mg/dl   Fingerstick Glucose (POCT)    Collection Time: 04/13/18  7:23 AM   Result Value Ref Range    POC Glucose 161 (H) 65 - 140 mg/dl   Basic metabolic panel    Collection Time: 04/13/18  8:28 AM   Result Value Ref Range    Sodium 141 136 - 145 mmol/L    Potassium 3 9 3 5 - 5 3 mmol/L    Chloride 98 (L) 100 - 108 mmol/L    CO2 34 (H) 21 - 32 mmol/L    Anion Gap 9 4 - 13 mmol/L     (H) 5 - 25 mg/dL    Creatinine 2 74 (H) 0 60 - 1 30 mg/dL    Glucose 204 (H) 65 - 140 mg/dL    Calcium 9 8 mg/dL eGFR 21 ml/min/1 73sq m   Fingerstick Glucose (POCT)    Collection Time: 18 11:04 AM   Result Value Ref Range    POC Glucose 196 (H) 65 - 140 mg/dl   Fingerstick Glucose (POCT)    Collection Time: 18  3:40 PM   Result Value Ref Range    POC Glucose 168 (H) 65 - 140 mg/dl   Fingerstick Glucose (POCT)    Collection Time: 18  9:21 PM   Result Value Ref Range    POC Glucose 211 (H) 65 - 140 mg/dl   Basic metabolic panel    Collection Time: 18  4:41 AM   Result Value Ref Range    Sodium 140 136 - 145 mmol/L    Potassium 3 7 3 5 - 5 3 mmol/L    Chloride 96 (L) 100 - 108 mmol/L    CO2 35 (H) 21 - 32 mmol/L    Anion Gap 9 4 - 13 mmol/L     (H) 5 - 25 mg/dL    Creatinine 2 79 (H) 0 60 - 1 30 mg/dL    Glucose 94 65 - 140 mg/dL    Calcium 9 3 mg/dL    eGFR 20 ml/min/1 73sq m   Fingerstick Glucose (POCT)    Collection Time: 18  8:24 AM   Result Value Ref Range    POC Glucose 215 (H) 65 - 140 mg/dl   Fingerstick Glucose (POCT)    Collection Time: 18 11:27 AM   Result Value Ref Range    POC Glucose 259 (H) 65 - 140 mg/dl     [place] [address], [address] [place] [address]  [phone]     Transthoracic Echocardiogram  2D, M-mode, Doppler, and Color Doppler     Study date:  2018     Patient: Markus Lehman  MR number: [id number]  Account number: [de-identified]  : 1935  Age: [de-identified] years  Gender: Male  Status: Inpatient  Location: Bedside  Height: 67 in  Weight: 213 6 lb  BP: 108/ 58 mmHg     Indications: Assess left ventricular function      Diagnoses: I50 9 - Heart failure, unspecified     Sonographer:  MASON Riggins  Primary Physician:  Sheila Gutierrez MD  Referring Physician:  Smita Agudelo MD  Group:  [facility]  Interpreting Physician:  Smita Agudelo MD     SUMMARY     LEFT VENTRICLE:  Systolic function was mildly reduced  Ejection fraction was estimated to be 45 %  There was mild diffuse hypokinesis    There was moderate hypokinesis of the apical septal wall(s)  Wall thickness was mildly increased  There was mild concentric hypertrophy      RIGHT VENTRICLE:  The ventricle was mildly to moderately dilated  Systolic function was reduced      LEFT ATRIUM:  The atrium was moderately dilated      RIGHT ATRIUM:  The atrium was moderately dilated      MITRAL VALVE:  There was marked annular calcification  There was moderate to severe regurgitation      AORTIC VALVE:  A bioprosthesis was present  It exhibited normal function  Doppler cardiac output was 3 5 L/min, using LVOT flow data  Doppler cardiac index was 1 68 L/min/m squared, using LVOT flow data      TRICUSPID VALVE:  There was severe regurgitation  Pulmonary artery systolic pressure was markedly increased  Estimated peak PA pressure was 65 mmHg      PULMONIC VALVE:  Notching of the systolic pulse wave is noted, suggesting elevated pulmonary artery pressures  There was mild regurgitation      HISTORY: PRIOR HISTORY: CAD s/p CABG, s/p AVR, Afib, Hypertension, Hyperlipidemia, Heart failure     PROCEDURE: The procedure was performed at the bedside  This was a routine study  The transthoracic approach was used  The study included complete 2D imaging, M-mode, complete spectral Doppler, and color Doppler  The heart rate was 89 bpm,  at the start of the study  Images were obtained from the parasternal, apical, subcostal, and suprasternal notch acoustic windows  Echocardiographic views were limited due to decreased penetration and lung interference  This was a  technically difficult study      LEFT VENTRICLE: Size was normal  Systolic function was mildly reduced  Ejection fraction was estimated to be 45 %  There was mild diffuse hypokinesis  There was moderate hypokinesis of the apical septal wall(s)  Wall thickness was mildly  increased  There was mild concentric hypertrophy   DOPPLER: Transmitral flow pattern: atrial fibrillation      RIGHT VENTRICLE: The ventricle was mildly to moderately dilated  Systolic function was reduced      LEFT ATRIUM: The atrium was moderately dilated      RIGHT ATRIUM: The atrium was moderately dilated      MITRAL VALVE: There was marked annular calcification  Valve structure was normal  There was normal leaflet separation  DOPPLER: The transmitral velocity was within the normal range  There was no evidence for stenosis  There was moderate to  severe regurgitation      AORTIC VALVE: A bioprosthesis was present  It exhibited normal function      TRICUSPID VALVE: The annulus was dilated  There was normal leaflet separation  DOPPLER: The transtricuspid velocity was within the normal range  There was no evidence for stenosis  There was severe regurgitation  Pulmonary artery systolic  pressure was markedly increased  Estimated peak PA pressure was 65 mmHg      PULMONIC VALVE: Notching of the systolic pulse wave is noted, suggesting elevated pulmonary artery pressures  Leaflets exhibited normal thickness, no calcification, and normal cuspal separation  DOPPLER: The transpulmonic velocity was  within the normal range   There was mild regurgitation      PERICARDIUM: There was no pericardial effusion      AORTA: The root exhibited normal size      MEASUREMENT TABLES     2D MEASUREMENTS  LVOT   (Reference normals)  Diam   20 5 mm   (--)     DOPPLER MEASUREMENTS  LVOT   (Reference normals)  VTI   13 6 cm   (--)  R-R interval   769 ms   (--)  HR   78 bpm   (--)  Stroke vol   44 89 ml   (--)  Cardiac output   3 5 L/min   (--)  Cardiac index   1 68 L/min/m squared   (--)     SYSTEM MEASUREMENT TABLES     2D  %FS: 21 04 %  AV Diam: 3 14 cm  EDV(Teich): 91 37 ml  EF(Cube): 50 77 %  EF(Teich): 42 96 %  ESV(Cube): 44 19 ml  ESV(Teich): 52 12 ml  IVSd: 1 25 cm  LA Area: 32 18 cm2  LA Diam: 4 78 cm  LVEDV MOD A4C: 143 38 ml  LVEF MOD A4C: 30 68 %  LVESV MOD A4C: 99 4 ml  LVIDd: 4 48 cm  LVIDs: 3 54 cm  LVLd A4C: 8 59 cm  LVLs A4C: 8 06 cm  LVPWd: 1 17 cm  RA Area: 26 08 cm2  RV Diam: 4 04 cm  SI(Cube): 21 92 ml/m2  SI(Teich): 18 87 ml/m2  SV MOD A4C: 43 98 ml  SV(Cube): 45 58 ml  SV(Teich): 39 25 ml     CW  TR MaxP 39 mmHg  TR Vmax: 3 85 m/s     MM  TAPSE: 1 9 cm     [facility] Accredited Echocardiography Laboratory     Prepared and electronically signed by  Maci Rodriguez MD  Signed 2018 18:07:57       Imaging: I have personally reviewed pertinent reports  Tele- controlled atrial fibrillation  Counseling / Coordination of Care  Total time spent today 30minutes  Greater than 50% of total time was spent with the patient and / or family counseling and / or coordination of care

## 2018-04-14 NOTE — PROGRESS NOTES
Progress Note - Angelito Turner  1935, [de-identified] y o  male MRN: [id number]    Unit/Bed#: -01 Encounter: [id number]    Primary Care Provider: Abdias Delgado MD   Date and time admitted to hospital: 4/4/2018  6:39 AM    * Acute on chronic diastolic congestive heart failure ([facility] )   Assessment & Plan    · Had been on Lasix infusion which was discontinued secondary to SABINE  Now on IV Lasix which was increased to 80 mg b i d  Echo and chest x-ray results reviewed  · Still clinically volume overloaded and will be receiving 2 doses IV albumin with Lasix today per Nephrology   · With his down approximately 20 lb since presentation and he has been negative 8 L overall  · Still with significant lower extremity edema and Ace wraps applied today  · Continue diuresis, daily weights, I's and O's        Acute-on-chronic kidney injury ([place] [address] )   Assessment & Plan    · Baseline creatinine 1 8-2 2  This worsened with initial diuresis and now plateaued at 6 49 today  · Likely secondary to cardiorenal, +/-obstructive uropathy  · Continue to monitor closely while on diuretics and avoid nephrotoxins, hypotension, NSAIDS  · He is status post viera placed for retention during this admission and flomax added  Viera removed on 4/12/18 for voiding trial which so far has been successful  Continue bladder scan with urinary retention protocol and consult Urology if Viera needs to be replaced  · Appreciate Nephrology input      Non-ST elevation myocardial infarction (NSTEMI) ([place] [address] )   Assessment & Plan    · Mild troponin elevation likely the result of type 2 NSTEMI in the setting of heart failure and renal failure  · No further workup from the cardiac standpoint at this time      Rapid atrial fibrillation [facility])   Assessment & Plan    · Improved rate control    Continue metoprolol  · Patient declined anticoagulation due to prior bleeding while on Eliquis  · Not a candidate for digoxin due to SABINE      Type 2 diabetes mellitus ([place] [address] )   Assessment & Plan    · Most recent hemoglobin A1c 7 6 1 month ago  · Continue Lantus at current dose, increase Humalog 12 units with meals      Pulmonary hypertension (HCC)   Assessment & Plan    · Severe pulmonary hypertension with estimated peak PA pressure at 65 mmHg  · Continue diuresis, being considered for RHC and sildenafil once euvolemic per Cardiology  · Management per Cardiology      Peripheral arterial disease ([facility] [address] )   Assessment & Plan    · Patient with severe PAD with 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 managed by Podiatry with local wound care  · He is status post vascular evaluation and no plans for any intervention during this hospitalization in view of his unstable cardiovascular/renal status  · Continue scheduled Tylenol for pain control  · Continue Ace wrap for edema control, elevated extremity        VTE Pharmacologic Prophylaxis:   Pharmacologic: Heparin  Mechanical VTE Prophylaxis in Place: Yes    Patient Centered Rounds: I have performed bedside rounds with nursing staff today  Discussions with Specialists or Other Care Team Provider: Nursing    Education and Discussions with Family / Patient:  Patient and daughter updated at the bedside  All questions answered  Current Length of Stay: 10 day(s)    Current Patient Status: Inpatient   Certification Statement: The patient will continue to require additional inpatient hospital stay due to Above diagnosis and care plan    Discharge Plan:  Discharge planning for short-term rehab likely early next week    Code Status: Level 3 - DNAR and DNI      Subjective:   No new complaints or acute overnight events  Feels a little better this morning  Still with significant lower extremity swelling though improved    Denies chest pain, but still with PND and exertional dyspnea    Objective:     Vitals:   Temp (24hrs), Av 1 °F (36 7 °C), Min:97 8 °F (36 6 °C), Max:98 5 °F (36 9 °C)    HR:  [84-98] 98  Resp:  [16-20] 16  BP: (120-130)/(56-66) 130/56  SpO2: [93 %-96 %] 96 %  Body mass index is 33 32 kg/m²  Input and Output Summary (last 24 hours): Intake/Output Summary (Last 24 hours) at 04/14/18 1438  Last data filed at 04/14/18 0101   Gross per 24 hour   Intake              480 ml   Output              838 ml   Net             -358 ml       Physical Exam:  General Appearance:    Alert, cooperative, no distress, appropriately responsive    Head:    Normocephalic, without obvious abnormality, atraumatic, mucous membranes moist    Eyes:    Conjunctiva/corneas clear, EOM's intact   Neck:   Supple   Lungs:     Decreased bilaterally with bibasilar crackles, no wheezes    Heart:    Irregularly irregular, S1 and S2, +SM    Abdomen:     Soft, obese, non-tender, nondistended   Extremities:   +2-3 BLE edema   Neurologic:  nonfocal         Additional Data:     Labs:      Results from last 7 days  Lab Units 04/12/18  0505   WBC Thousand/uL 7 13   HEMOGLOBIN g/dL 9 9*   HEMATOCRIT % 32 3*   PLATELETS Thousands/uL 173   NEUTROS PCT % 71   LYMPHS PCT % 18   MONOS PCT % 7   EOS PCT % 4       Results from last 7 days  Lab Units 04/14/18  0441   SODIUM mmol/L 140   POTASSIUM mmol/L 3 7   CHLORIDE mmol/L 96*   CO2 mmol/L 35*   BUN mg/dL 131*   CREATININE mg/dL 2 79*   CALCIUM mg/dL 9 3   GLUCOSE RANDOM mg/dL 94           * I Have Reviewed All Lab Data Listed Above  * Additional Pertinent Lab Tests Reviewed: [facility]    Cultures:   Blood Culture:   Lab Results   Component Value Date    BLOODCX No Growth After 5 Days  04/04/2018    BLOODCX No Growth After 5 Days   04/04/2018     Urine Culture: No results found for: URINECX  Sputum Culture: No components found for: SPUTUMCX  Wound Culture: No results found for: WOUNDCULT    Last 24 Hours Medication List:     Current Facility-Administered Medications:  acetaminophen 975 mg Oral Q8H [address] Lisseth Seals PA-C   albumin human 25 g Intravenous BID (diuretic) Carlotta Henriquez MD   aspirin 81 mg Oral Daily Chase Alberto MD   atorvastatin 40 mg Oral Daily With Dinner Chase Alberto MD   calcium carbonate 1,000 mg Oral Daily PRN Chase Alberto MD   docusate sodium 100 mg Oral BID PRN Chase Alberto MD   furosemide 80 mg Intravenous BID (diuretic) Leonidas Ingram MD   heparin (porcine) 5,000 Units Subcutaneous Q8H [address] Lisseth Seals PA-C   insulin glargine 20 Units Subcutaneous HS Chase Alberto MD   insulin lispro 1-5 Units Subcutaneous TID AC Chase Alberto MD   insulin lispro 1-5 Units Subcutaneous HS Chase Alberto MD   insulin lispro 12 Units Subcutaneous TID With Meals Sean Aparicio MD   melatonin 3 mg Oral HS Lisseth Seals PA-C   metoprolol succinate 50 mg Oral Q12H Jennifer Snyder MD   ondansetron 4 mg Intravenous Q6H PRN Chase Alberto MD   pantoprazole 40 mg Oral Early Morning Chase Alberto MD   tamsulosin 0 4 mg Oral Daily With Oris Zachariah Seals PA-C        Today, Patient Was Seen By: Sean Aparicio MD    ** Please Note: Dragon 360 Dictation voice to text software may have been used in the creation of this document   **

## 2018-04-14 NOTE — ASSESSMENT & PLAN NOTE
· Mild troponin elevation likely the result of type 2 NSTEMI in the setting of heart failure and renal failure  · No further workup from the cardiac standpoint at this time

## 2018-04-14 NOTE — ASSESSMENT & PLAN NOTE
· Had been on Lasix infusion which was discontinued secondary to SABINE    Now on IV Lasix which was increased to 80 mg b i d  Echo and chest x-ray results reviewed  · Still clinically volume overloaded and will be receiving 2 doses IV albumin with Lasix today per Nephrology   · With his down approximately 20 lb since presentation and he has been negative 8 L overall  · Still with significant lower extremity edema and Ace wraps applied today  · Continue diuresis, daily weights, I's and O's

## 2018-04-14 NOTE — ASSESSMENT & PLAN NOTE
· Improved rate control    Continue metoprolol  · Patient declined anticoagulation due to prior bleeding while on Eliquis  · Not a candidate for digoxin due to SABINE

## 2018-04-14 NOTE — ASSESSMENT & PLAN NOTE
· Most recent hemoglobin A1c 7 6 1 month ago  · Continue Lantus at current dose, increase Humalog 12 units with meals

## 2018-04-14 NOTE — PROGRESS NOTES
NEPHROLOGY PROGRESS NOTE   Fadia Turcios  [de-identified] y o  male MRN: [id number]  Unit/Bed#: -[place] Encounter: [id number]  Reason for Consult: NEPTALI/CKd    ASSESSMENT AND PLAN:  Nonoliguric Neptali on CKD stage IIIB, baseline creatinine 1 8 to 2 2  - creatinine plateaued at 2 7 today       - NEPTALI could be secondary to urinary retention/cardiorenal in the setting of volume overload  - Still  volume overloaded clinically  - may need to accept higher creatinine to keep him euvolemic   - continue monitor accurate intake and output   Goal to keep negative balance daily  - urinalysis bland without hematuria or proteinuria  - renal ultrasound shows right kidney 9 8 cm, left kidney 10 5 cm, diffusely echogenic renal parenchyma in both kidneys suggestive of CKD   No hydronephrosis  - non significant bladder scan for PVR      Severe azotemia, worsening   - FOBT -ve    - this could be secondary to aggressive diuresis  - will give IV albumin 25 g two doses today along with diuretics  - monitor closely       Volume overload, CHF  - Echo shows EF 45%, moderate to severe MR, severe TR, pulmonary hypertension   No pericardial effusion   - clinically patient still seems volume overloaded   Weight is fluctuating  Goal to keep negative balance daily     - remains on lasix 80 mg IV BID  - repeat chest x-ray on 4/10/18 shows pulmonary congestion       Increasing bicarb likely secondary to aggressive diuresis   Closely monitor for now       Urine retention, status post Geronimo catheter removal  Bladder scan as above        Discussed with primary team    SUBJECTIVE:  Patient seen and examined at bedside  No chest pain, off and on shortness of breath, no nausea, vomiting, abdominal pain or diarrhea  No urinary complaints       OBJECTIVE:  Current Weight: Weight - Scale: 96 5 kg (212 lb 11 9 oz)  Vitals:    04/14/18 0717   BP: 130/56   Pulse: 98   Resp: 16   Temp: 98 5 °F (36 9 °C)   SpO2: 96%       Intake/Output Summary (Last 24 hours) at 04/14/18 1029  Last data filed at 04/14/18 0101   Gross per 24 hour   Intake              780 ml   Output             1338 ml   Net             -558 ml       Physical Examination:  General:  Sitting in chair, no acute distress   Eyes:  Mild conjunctival pallor present  ENT:  External examination of ears and nose unremarkable  Neck:  Supple  Respiratory:  Bilateral coarse breath sounds present  CVS:  S1, S2 present  GI:  Soft, nontender, nondistended  CNS:  Active alert oriented x3  Extremities:  1+ edema in lower extremities  Skin:  No new rash in legs    Medications:    Current Facility-Administered Medications:     acetaminophen (TYLENOL) tablet 975 mg, 975 mg, Oral, Q8H Lisseth MARKS PA-C, 975 mg at 04/14/18 0549    albumin human (FLEXBUMIN) 25 % injection 25 g, 25 g, Intravenous, BID (diuretic), Sapphire Dasilva MD, 25 g at 04/14/18 1007    aspirin (ECOTRIN LOW STRENGTH) EC tablet 81 mg, 81 mg, Oral, Daily, Chase Alberto MD, 81 mg at 04/14/18 1000    atorvastatin (LIPITOR) tablet 40 mg, 40 mg, Oral, Daily With Kg Alberto MD, 40 mg at 04/13/18 1653    calcium carbonate (TUMS) chewable tablet 1,000 mg, 1,000 mg, Oral, Daily PRN, Chase Alberto MD    docusate sodium (COLACE) capsule 100 mg, 100 mg, Oral, BID PRN, Chase Alberto MD, 100 mg at 04/10/18 1005    furosemide (LASIX) injection 80 mg, 80 mg, Intravenous, BID (diuretic), Sapphire Dasilva MD, 80 mg at 04/14/18 1009    heparin (porcine) subcutaneous injection 5,000 Units, 5,000 Units, Subcutaneous, [facility], 5,000 Units at 04/14/18 0549    insulin glargine (LANTUS) subcutaneous injection 20 Units, 20 Units, Subcutaneous, HS, Chase Alberto MD, 20 Units at 04/13/18 2238    insulin lispro (HumaLOG) 100 units/mL subcutaneous injection 1-5 Units, 1-5 Units, Subcutaneous, TID AC, 2 Units at 04/14/18 1014 **AND** [CANCELED] Fingerstick Glucose (POCT), , , TID AC, MD Laura Gruber Silence insulin lispro (HumaLOG) 100 units/mL subcutaneous injection 1-5 Units, 1-5 Units, Subcutaneous, HS, Chase Alberto MD, 2 Units at 04/13/18 2238    insulin lispro (HumaLOG) 100 units/mL subcutaneous injection 10 Units, 10 Units, Subcutaneous, TID With Meals, Chase Alberto MD, 10 Units at 04/13/18 1928    melatonin tablet 3 mg, 3 mg, Oral, HS, Lisseth Seals PA-C, 3 mg at 04/13/18 2238    metoprolol succinate (TOPROL-XL) 24 hr tablet 50 mg, 50 mg, Oral, Q12H, Acacia Sierra MD, 50 mg at 04/14/18 1013    ondansetron (ZOFRAN) injection 4 mg, 4 mg, Intravenous, Q6H PRN, Chase Alberto MD    pantoprazole (PROTONIX) EC tablet 40 mg, 40 mg, Oral, Early Morning, Chase Alberto MD, 40 mg at 04/14/18 0549    tamsulosin (FLOMAX) capsule 0 4 mg, 0 4 mg, Oral, Daily With Vida Asim Seals PA-C, 0 4 mg at 04/13/18 1653    Laboratory Results:    Results from last 7 days  Lab Units 04/14/18  0441 04/13/18  0828 04/12/18  0505 04/11/18  0446 04/10/18  0523 04/09/18  0522 04/08/18  0442   WBC Thousand/uL  --   --  7 13  --   --  7 04 7 71   HEMOGLOBIN g/dL  --   --  9 9*  --   --  9 2* 9 4*   HEMATOCRIT %  --   --  32 3*  --   --  29 8* 30 7*   PLATELETS Thousands/uL  --   --  173  --   --  176 168   SODIUM mmol/L 140 141 140 139 143 139 140   POTASSIUM mmol/L 3 7 3 9 3 7 3 6 4 2 3 8 3 7   CHLORIDE mmol/L 96* 98* 95* 96* 98* 97* 99*   CO2 mmol/L 35* 34* 33* 33* 31 31 30   BUN mg/dL 131* 127* 126* 120* 107* 95* 92*   CREATININE mg/dL 2 79* 2 74* 2 88* 3 07* 3 26* 2 81* 2 89*   CALCIUM mg/dL 9 3 9 8 9 4 9 5 9 8 9 5 9 4   GLUCOSE RANDOM mg/dL 94 204* 106 118 141* 134 113       Results for orders placed during the hospital encounter of 04/04/18   XR chest portable    Narrative CHEST     INDICATION:   Persistent shortness of breath, ? Volume  COMPARISON:  Chest x-ray from 4/4/2018      EXAM PERFORMED/VIEWS:  XR CHEST PORTABLE      FINDINGS:  Again seen are median sternotomy wires  Cardiomediastinal silhouette appears unremarkable  There is worsening pulmonary edema  Unchanged small left pleural effusion  No pneumothorax  Osseous structures appear within normal limits for patient age  Impression There is worsening pulmonary edema  Unchanged small left pleural effusion  Workstation performed: [id number]       Results for orders placed during the hospital encounter of 10/10/17   XR chest 2 views    Narrative CHEST     INDICATION:  Weakness, falling    COMPARISON:  January 2, 2017    VIEWS:  Frontal and lateral projections    IMAGES:  2    FINDINGS:  There are median sternotomy wires indicating prior cardiac surgery  Heart shadow is mildly enlarged but unchanged from prior exam     Pulmonary vascularity is pronounced  Interstitial markings are also prominent  No significant effusion  No pneumothorax  Impression Findings suggest pulmonary vascular congestion  Mild enlargement of cardiac silhouette  Findings concur with the referring clinician's preliminary interpretation already in the patient's electronic health record  Workstation performed: [id number]       No results found for this or any previous visit  No results found for this or any previous visit  No results found for this or any previous visit  Results for orders placed in visit on 12/11/15   US retroperitoneal complete    Narrative EXAM ROOM = US ROOM 2   EXAM START = [id number]   EXAM STOP = [id number]   FILMS USED = N/A      RENAL ULTRASOUND      INDICATION- Acute renal failure      COMPARISON- CT abdomen and pelvis 11/21/2013  TECHNIQUE-   Ultrasound of the retroperitoneum was performed with a   curvilinear transducer utilizing volumetric sweeps and still imaging   techniques  FINDINGS-      KIDNEYS-   Symmetric and normal size  Right kidney-  10 2 x 5 0 cm  Normal echogenicity and contour  No suspicious masses detected    Tiny 1 cm cortical cyst is seen within   the midpole  No hydronephrosis  No shadowing calculi  No perinephric fluid collections  Left kidney-  10 6 x 5 7 cm  Normal echogenicity and contour  No suspicious masses detected  No hydronephrosis  No shadowing calculi  No perinephric fluid collections  URETERS-   Nonvisualized  BLADDER-    Normally distended  No focal thickening or mass lesions  Bilateral ureteral jets detected  Marked post void residual urine volume  Estimated volume is 331 5 mL   (starting volume 391 1 mL)  IMPRESSION-          1  Tiny right cortical cyst   Otherwise normal kidneys  2   Marked post void residual urine volume  Transcribed on- STEFANY Vo MD        Reading Radiologist- STEFANY Dillon MD        Electronically Signed- STEFANY Dillon MD        Released Date Time- 12/15/15 1157    ------------------------------------------------------------------------------   READ BY = [id number]^GUY RAMOS   RELEASED BY = [id number]^GUY BUTLER [address] =  ROOM 2   EXAM START = [id number]   EXAM STOP = [id number]   FILMS USED = N/A      RENAL ULTRASOUND      INDICATION- Acute renal failure      COMPARISON- CT abdomen and pelvis 11/21/2013  TECHNIQUE-   Ultrasound of the retroperitoneum was performed with a   curvilinear transducer utilizing volumetric sweeps and still imaging   techniques  FINDINGS-      KIDNEYS-   Symmetric and normal size  Right kidney-  10 2 x 5 0 cm  Normal echogenicity and contour  No suspicious masses detected  Tiny 1 cm cortical cyst is seen within   the midpole  No hydronephrosis  No shadowing calculi  No perinephric fluid collections  Left kidney-  10 6 x 5 7 cm  Normal echogenicity and contour  No suspicious masses detected  No hydronephrosis  No shadowing calculi  No perinephric fluid collections        URETERS- Nonvisualized  BLADDER-    Normally distended  No focal thickening or mass lesions  Bilateral ureteral jets detected  Marked post void residual urine volume  Estimated volume is 331 5 mL   (starting volume 391 1 mL)  IMPRESSION-          1  Tiny right cortical cyst   Otherwise normal kidneys  2   Marked post void residual urine volume  Transcribed on- STEFANY Vo MD        Reading Radiologist- STEFANY Dillon MD        Electronically Signed- STEFANY Dillon MD        Released Date Time- 12/15/15 1157    ------------------------------------------------------------------------------   READ BY = [id number]^GUY MCCALL MD   RELEASED BY = [id number]^GUY MCCALL MD        Portions of the record may have been created with voice recognition software  Occasional wrong word or "sound a like" substitutions may have occurred due to the inherent limitations of voice recognition software  Read the chart carefully and recognize, using context, where substitutions have occurred

## 2018-04-15 NOTE — ASSESSMENT & PLAN NOTE
· Had been on Lasix infusion which was discontinued secondary to SABINE  In view of worsening symptoms, he will be restarted on Bumex infusion today  Discussed with Dr Olivia Moore  · Remains clinically volume overloaded and symptomatic   · Weight down approximately 21 lb since presentation and he has been negative at less 8 L overall    His urine output however declined in the past 24 hr while off Lasix infusion  · Still with significant lower extremity edema and Ace wraps in place  · Continue diuresis, daily weights, I's and O's  · Overall poor prognosis

## 2018-04-15 NOTE — ASSESSMENT & PLAN NOTE
· Baseline creatinine 1 8-2 2  Creatinine worse today at 3  · Likely secondary to cardiorenal, +/-obstructive uropathy  · Continue to monitor closely while on diuretics and avoid nephrotoxins, hypotension, NSAIDS  · He is status post viera placed for retention during this admission and flomax added  Viera removed on 4/12/18 for voiding trial which so far has been successful    Continue bladder scan with urinary retention protocol and consult Urology if Viera needs to be replaced  · Management per Nephrology

## 2018-04-15 NOTE — ASSESSMENT & PLAN NOTE
· Most recent hemoglobin A1c 7 6 1 month ago  · Increase Lantus 25 units q h s  and continue pre meal Humalog at current dose with correctional insulin

## 2018-04-15 NOTE — PROGRESS NOTES
Pt c/o being sob pt O2 sat fluctuating between 78-92%  O2 increased to 6L now maintaining 90%  Lasix given early  Pt has had low output over night (325 ml for 12 hour shift)  Pt lungs coarse, SLIM aware

## 2018-04-15 NOTE — PROGRESS NOTES
NEPHROLOGY PROGRESS NOTE   Annamarie Oriskany  [de-identified] y o  male MRN: [id number]  Unit/Bed#: -01 Encounter: [id number]  Reason for Consult: NEPTALI/CKd    ASSESSMENT AND PLAN:  Nonoliguric Neptali on CKD stage IIIB, baseline creatinine 1 8 to 2 2  - creatinine worsening to 3 0 today       - NEPTALI could be secondary to cardiorenal in the setting of volume overload +/- urinary retention  - Diuretics as below  - Still  volume overloaded clinically  - may need to accept higher creatinine to keep him euvolemic   - continue monitor accurate intake and output   Goal to keep negative balance daily  - urinalysis bland without hematuria or proteinuria  - renal ultrasound shows right kidney 9 8 cm, left kidney 10 5 cm, diffusely echogenic renal parenchyma in both kidneys suggestive of CKD   No hydronephrosis  - non significant bladder scan for PVR      Severe azotemia, worsening   - FOBT -ve    - this could be secondary to aggressive diuresis  - monitor closely       Volume overload, CHF  - Echo shows EF 45%, moderate to severe MR, severe TR, pulmonary hypertension   No pericardial effusion   - clinically patient still seems volume overloaded   Weight is fluctuating  Goal to keep negative balance daily     - repeat chest x-ray to follow  Patient had an episode of respiratory distress overnight  JVD present  I had detailed discussion with Cardiology and will start patient on Bumex drip after Bumex bolus  - will need realistic discussion and family meeting early next week to discuss the goals of care and requirement for dialysis      Urine retention, status post Geronimo catheter removal  Bladder scan to monitor       Discussed with Dr Fleet Jeans  SUBJECTIVE:  Patient seen and examined at bedside  Patient had an episode of respiratory distress last night requiring high amount of oxygen   He still feels short of breath at the time of my encounter  No chest pain, nausea, vomiting, abdominal pain or diarrhea  No urinary complaints  OBJECTIVE:  Current Weight: Weight - Scale: 96 5 kg (212 lb 11 9 oz)  Vitals:    04/15/18 0653   BP: 131/64   Pulse: 94   Resp: 18   Temp: 98 °F (36 7 °C)   SpO2: 90%       Intake/Output Summary (Last 24 hours) at 04/15/18 0831  Last data filed at 04/15/18 0601   Gross per 24 hour   Intake              460 ml   Output              325 ml   Net              135 ml       Physical Examination:  General:  Lying in bed, mild acute distress   Eyes:  Mild conjunctival pallor present  ENT:  External examination of ears and nose unremarkable  Neck:  Supple, mild JVD present  Respiratory:  Bilateral occasional basilar crackles, decreased breath sound at base  CVS:  S1, S2 present  GI:  Soft, nontender, nondistended  CNS:  Active alert oriented x3  Extremities:  1+ edema in lower extremities  Skin:  No new rash in legs    Medications:    Current Facility-Administered Medications:     acetaminophen (TYLENOL) tablet 975 mg, 975 mg, Oral, Q8H [facility], 975 mg at 04/15/18 0631    aspirin (ECOTRIN LOW STRENGTH) EC tablet 81 mg, 81 mg, Oral, Daily, Chase Alberto MD, 81 mg at 04/15/18 0829    atorvastatin (LIPITOR) tablet 40 mg, 40 mg, Oral, Daily With Dinner, Chase Alberto MD, 40 mg at 04/14/18 1650    calcium carbonate (TUMS) chewable tablet 1,000 mg, 1,000 mg, Oral, Daily PRN, Chase Alberto MD    docusate sodium (COLACE) capsule 100 mg, 100 mg, Oral, BID PRN, Chase Alberto MD, 100 mg at 04/10/18 1005    furosemide (LASIX) injection 80 mg, 80 mg, Intravenous, BID (diuretic), Philomena Brunner MD, 80 mg at 04/15/18 0630    heparin (porcine) subcutaneous injection 5,000 Units, 5,000 Units, Subcutaneous, Q8H [facility] PA-[facility], 5,000 Units at 04/15/18 0631    insulin glargine (LANTUS) subcutaneous injection 20 Units, 20 Units, Subcutaneous, HS, Chase Alberto MD, 20 Units at 04/14/18 8107    insulin lispro (HumaLOG) 100 units/mL subcutaneous injection 1-5 Units, 1-5 Units, Subcutaneous, TID AC, 1 Units at 04/15/18 0829 **AND** [CANCELED] Fingerstick Glucose (POCT), , , TID AC, Chase Alberto MD    insulin lispro (HumaLOG) 100 units/mL subcutaneous injection 1-5 Units, 1-5 Units, Subcutaneous, HS, Chase Alberot MD, 1 Units at 04/14/18 2144    insulin lispro (HumaLOG) 100 units/mL subcutaneous injection 12 Units, 12 Units, Subcutaneous, TID With Meals, Ap Larson MD, 12 Units at 04/15/18 0830    melatonin tablet 3 mg, 3 mg, Oral, HS, Lisseth Seals PA-C, 3 mg at 04/14/18 2143    metoprolol succinate (TOPROL-XL) 24 hr tablet 50 mg, 50 mg, Oral, Q12H, Preeti Retana MD, 50 mg at 04/15/18 0829    ondansetron (ZOFRAN) injection 4 mg, 4 mg, Intravenous, Q6H PRN, Chase Alberto MD    pantoprazole (PROTONIX) EC tablet 40 mg, 40 mg, Oral, Early Morning, Chase Alberto MD, 40 mg at 04/15/18 0631    tamsulosin (FLOMAX) capsule 0 4 mg, 0 4 mg, Oral, Daily With Nicky Seals PA-C, 0 4 mg at 04/14/18 1651    Laboratory Results:    Results from last 7 days  Lab Units 04/15/18  0432 04/14/18  0441 04/13/18  0828 04/12/18  0505 04/11/18  0446 04/10/18  0523 04/09/18  0522   WBC Thousand/uL  --   --   --  7 13  --   --  7 04   HEMOGLOBIN g/dL  --   --   --  9 9*  --   --  9 2*   HEMATOCRIT %  --   --   --  32 3*  --   --  29 8*   PLATELETS Thousands/uL  --   --   --  173  --   --  176   SODIUM mmol/L 137 140 141 140 139 143 139   POTASSIUM mmol/L 3 9 3 7 3 9 3 7 3 6 4 2 3 8   CHLORIDE mmol/L 94* 96* 98* 95* 96* 98* 97*   CO2 mmol/L 31 35* 34* 33* 33* 31 31   BUN mg/dL 130* 131* 127* 126* 120* 107* 95*   CREATININE mg/dL 3 02* 2 79* 2 74* 2 88* 3 07* 3 26* 2 81*   CALCIUM mg/dL 9 6 9 3 9 8 9 4 9 5 9 8 9 5   GLUCOSE RANDOM mg/dL 180* 94 204* 106 118 141* 134       Results for orders placed during the hospital encounter of 04/04/18   XR chest portable    Narrative CHEST     INDICATION:   Persistent shortness of breath, ? Volume      COMPARISON:  Chest x-ray from 4/4/2018  EXAM PERFORMED/VIEWS:  XR CHEST PORTABLE      FINDINGS:  Again seen are median sternotomy wires  Cardiomediastinal silhouette appears unremarkable  There is worsening pulmonary edema  Unchanged small left pleural effusion  No pneumothorax  Osseous structures appear within normal limits for patient age  Impression There is worsening pulmonary edema  Unchanged small left pleural effusion  Workstation performed: [id number]       Results for orders placed during the hospital encounter of 10/10/17   XR chest 2 views    Narrative CHEST     INDICATION:  Weakness, falling    COMPARISON:  January 2, 2017    VIEWS:  Frontal and lateral projections    IMAGES:  2    FINDINGS:  There are median sternotomy wires indicating prior cardiac surgery  Heart shadow is mildly enlarged but unchanged from prior exam     Pulmonary vascularity is pronounced  Interstitial markings are also prominent  No significant effusion  No pneumothorax  Impression Findings suggest pulmonary vascular congestion  Mild enlargement of cardiac silhouette  Findings concur with the referring clinician's preliminary interpretation already in the patient's electronic health record  Workstation performed: [id number]       No results found for this or any previous visit  No results found for this or any previous visit  No results found for this or any previous visit  Results for orders placed in visit on 12/11/15   US retroperitoneal complete    Narrative EXAM ROOM = US ROOM 2   EXAM START = [id number]   EXAM STOP = [id number]   FILMS USED = N/A      RENAL ULTRASOUND      INDICATION- Acute renal failure      COMPARISON- CT abdomen and pelvis 11/21/2013  TECHNIQUE-   Ultrasound of the retroperitoneum was performed with a   curvilinear transducer utilizing volumetric sweeps and still imaging   techniques  FINDINGS-      KIDNEYS-   Symmetric and normal size        Right kidney-  10 2 x 5 0 cm  Normal echogenicity and contour  No suspicious masses detected  Tiny 1 cm cortical cyst is seen within   the midpole  No hydronephrosis  No shadowing calculi  No perinephric fluid collections  Left kidney-  10 6 x 5 7 cm  Normal echogenicity and contour  No suspicious masses detected  No hydronephrosis  No shadowing calculi  No perinephric fluid collections  URETERS-   Nonvisualized  BLADDER-    Normally distended  No focal thickening or mass lesions  Bilateral ureteral jets detected  Marked post void residual urine volume  Estimated volume is 331 5 mL   (starting volume 391 1 mL)  IMPRESSION-          1  Tiny right cortical cyst   Otherwise normal kidneys  2   Marked post void residual urine volume  Transcribed on- STEFANY Worthy MD        [place] Radiologist- STEFANY Chhaal MD        Electronically Signed- STEFANY Chahal MD        Released Date Time- 12/15/15 1157    ------------------------------------------------------------------------------   READ BY = [id number]^GUY RAMOS   RELEASED BY = [id number]^GUY BUTLER [address] [place] [address] Avenue =  ROOM 2   EXAM START = [id number]   EXAM STOP = [id number]   FILMS USED = N/A      RENAL ULTRASOUND      INDICATION- Acute renal failure      COMPARISON- CT abdomen and pelvis 11/21/2013  TECHNIQUE-   Ultrasound of the retroperitoneum was performed with a   curvilinear transducer utilizing volumetric sweeps and still imaging   techniques  FINDINGS-      KIDNEYS-   Symmetric and normal size  Right kidney-  10 2 x 5 0 cm  Normal echogenicity and contour  No suspicious masses detected  Tiny 1 cm cortical cyst is seen within   the midpole  No hydronephrosis  No shadowing calculi  No perinephric fluid collections  Left kidney-  10 6 x 5 7 cm  Normal echogenicity and contour      No suspicious masses detected  No hydronephrosis  No shadowing calculi  No perinephric fluid collections  URETERS-   Nonvisualized  BLADDER-    Normally distended  No focal thickening or mass lesions  Bilateral ureteral jets detected  Marked post void residual urine volume  Estimated volume is 331 5 mL   (starting volume 391 1 mL)  IMPRESSION-          1  Tiny right cortical cyst   Otherwise normal kidneys  2   Marked post void residual urine volume  Transcribed on- STEFANY Moore MD        [place] Radiologist- STEFANY Rabago MD        Electronically Signed- STEFANY Rabago MD        Released Date Time- 12/15/15 1157    ------------------------------------------------------------------------------   READ BY = [id number]^GUY MCCALL MD   RELEASED BY = [id number]^GUY MCCALL MD        Portions of the record may have been created with voice recognition software  Occasional wrong word or "sound a like" substitutions may have occurred due to the inherent limitations of voice recognition software  Read the chart carefully and recognize, using context, where substitutions have occurred

## 2018-04-15 NOTE — PROGRESS NOTES
Progress Note - Hugh Mail  1935, [de-identified] y o  male MRN: [id number]    Unit/Bed#: -01 Encounter: [id number]    Primary Care Provider: Molly Hill MD   Date and time admitted to hospital: 4/4/2018  6:39 AM    * Acute on chronic diastolic congestive heart failure ([facility] )   Assessment & Plan    · Had been on Lasix infusion which was discontinued secondary to SABINE  In view of worsening symptoms, he will be restarted on Bumex infusion today  Discussed with Dr Roberta Bolden  · Remains clinically volume overloaded and symptomatic   · Weight down approximately 21 lb since presentation and he has been negative approximately 8 L overall  His urine output however declined in the past 24 hr while off Lasix infusion  · Still with significant lower extremity edema and Ace wraps in place  · Continue diuresis, daily weights, I's and O's  · Overall poor prognosis      Acute-on-chronic kidney injury ([facility] 75 )   Assessment & Plan    · Baseline creatinine 1 8-2 2  Creatinine worse today at 3  · Likely secondary to cardiorenal, +/-obstructive uropathy  · Continue to monitor closely while on diuretics and avoid nephrotoxins, hypotension, NSAIDS  · He is status post viera placed for retention during this admission and flomax added  Viera removed on 4/12/18 for voiding trial which so far has been successful  Continue bladder scan with urinary retention protocol and consult Urology if Viera needs to be replaced  · Management per Nephrology      Non-ST elevation myocardial infarction (NSTEMI) [facility])   Assessment & Plan    · Mild troponin elevation likely the result of type 2 NSTEMI in the setting of heart failure and renal failure  · No further workup from the cardiac standpoint at this time      Rapid atrial fibrillation [facility])   Assessment & Plan    · Improved rate control    Continue metoprolol  · Patient declined anticoagulation due to prior bleeding while on Eliquis  · Not a candidate for digoxin due to SABINE      Type 2 diabetes mellitus ([email or web address] 75 )   Assessment & Plan    · Most recent hemoglobin A1c 7 6 1 month ago  · Increase Lantus 25 units q h s  and continue pre meal Humalog at current dose with correctional insulin      Pulmonary hypertension ([email or web address] 75 )   Assessment & Plan    · Severe pulmonary hypertension with estimated peak PA pressure at 65 mmHg  · Continue diuresis, being considered for RHC and sildenafil once euvolemic per Cardiology  · May need transfer to [facility] for continued management if dialysis becomes absolutely needed and patient/family is agreeable to this      Peripheral arterial disease ([email or web address] 75 )   Assessment & Plan    · Patient with severe PAD with 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 managed by Podiatry with local wound care  · He is status post vascular evaluation and no plans for any intervention during this hospitalization in view of his unstable cardiovascular/renal status  · Continue scheduled Tylenol for pain control  · Continue Ace wrap for edema control, elevated extremity        VTE Pharmacologic Prophylaxis:   Pharmacologic: Heparin  Mechanical VTE Prophylaxis in Place: Yes    Patient Centered Rounds: I have performed bedside rounds with nursing staff today  Discussions with Specialists or Other Care Team Provider:  Nursing/Nephrology/cardiology    Education and Discussions with Family / Patient:  Patient    Current Length of Stay: 6 day(s)    Current Patient Status: Inpatient   Certification Statement: The patient will continue to require additional inpatient hospital stay due to Above diagnosis and care plan    Discharge Plan:  Not medically stable for discharge    Code Status: Level 3 - DNAR and DNI      Subjective:   Complains of continued shortness of breath  Reports he had a rough night secondary to shortness of breath and has been unable to sleep    No chest pain    Objective:     Vitals:   Temp (24hrs), Av 2 °F (36 8 °C), Min:98 °F (36 7 °C), Max:98 6 °F (37 °C)    HR:  [86-94] 94  Resp: [16-18] 18  BP: (102-131)/(53-66) 131/64  SpO2:  [82 %-98 %] 90 %  Body mass index is 33 32 kg/m²  Input and Output Summary (last 24 hours): Intake/Output Summary (Last 24 hours) at 04/15/18 1442  Last data filed at 04/15/18 1423   Gross per 24 hour   Intake              783 ml   Output              425 ml   Net              358 ml       Physical Exam:  General Appearance:    Alert, cooperative, conversational dyspnea   Head:    Normocephalic, without obvious abnormality, atraumatic, mucous membranes moist    Eyes:    Conjunctiva/corneas clear, EOM's intact   Neck:   Supple   Lungs:      Bibasilar crackles, decreased, no wheezes    Heart:    Regularly irregular, S1 and S2, +SM    Abdomen:     Soft, obese, non-tender, bowel sounds active all four quadrants,     no masses, no organomegaly   Extremities:   +2 BLE edema, Ace wraps in place   Neurologic:  Alert and oriented x3, moves all extremities         Additional Data:     Labs:      Results from last 7 days  Lab Units 04/12/18  0505   WBC Thousand/uL 7 13   HEMOGLOBIN g/dL 9 9*   HEMATOCRIT % 32 3*   PLATELETS Thousands/uL 173   NEUTROS PCT % 71   LYMPHS PCT % 18   MONOS PCT % 7   EOS PCT % 4       Results from last 7 days  Lab Units 04/15/18  0432   SODIUM mmol/L 137   POTASSIUM mmol/L 3 9   CHLORIDE mmol/L 94*   CO2 mmol/L 31   BUN mg/dL 130*   CREATININE mg/dL 3 02*   CALCIUM mg/dL 9 6   GLUCOSE RANDOM mg/dL 180*           * I Have Reviewed All Lab Data Listed Above  * Additional Pertinent Lab Tests Reviewed: [facility] 66 Admission Reviewed    Cultures:   Blood Culture:   Lab Results   Component Value Date    BLOODCX No Growth After 5 Days  04/04/2018    BLOODCX No Growth After 5 Days   04/04/2018     Urine Culture: No results found for: URINECX  Sputum Culture: No components found for: SPUTUMCX  Wound Culture: No results found for: WOUNDCULT    Last 24 Hours Medication List:     Current Facility-Administered Medications:  acetaminophen 975 mg Oral Q8H [address] Lisseth Seals PA-C    aspirin 81 mg Oral Daily Chase Alberto MD    atorvastatin 40 mg Oral Daily With Dinner Chase Alberto MD    bumetanide 1 mg/hr Intravenous Continuous Marleny Clifford MD Last Rate: 1 mg/hr (04/15/18 1423)   calcium carbonate 1,000 mg Oral Daily PRN Chase Alberto MD    docusate sodium 100 mg Oral BID PRN Chase Alberto MD    heparin (porcine) 5,000 Units Subcutaneous Q8H [address] Lisseth Seals PA-C    insulin glargine 25 Units Subcutaneous HS Sonali Valdez MD    insulin lispro 1-5 Units Subcutaneous TID AC Chase Alberto MD    insulin lispro 1-5 Units Subcutaneous HS Chase Alberto MD    insulin lispro 12 Units Subcutaneous TID With Meals Sonali Valdez MD    melatonin 3 mg Oral HS Lisseth Seals PA-C    metoprolol succinate 50 mg Oral Q12H Krysten Clements MD    ondansetron 4 mg Intravenous Q6H PRN Chase Alberto MD    pantoprazole 40 mg Oral Early Morning Chase Alberto MD    tamsulosin 0 4 mg Oral Daily With Juan Seals PA-C         Today, Patient Was Seen By: Sonali Valdez MD    ** Please Note: Dragon 360 Dictation voice to text software may have been used in the creation of this document   **

## 2018-04-15 NOTE — PROGRESS NOTES
Progress Note - Cardiology   Modesta Gaviria  [de-identified] y o  male MRN: [id number]  Unit/Bed#: -01 Encounter: [id number]        Principal Problem:    Acute on chronic diastolic congestive heart failure (HCC)  Active Problems:    Rapid atrial fibrillation (HCC)    Type 2 diabetes mellitus ([facility] )    Peripheral arterial disease (HCC)    Acute-on-chronic kidney injury ([facility] )    Non-ST elevation myocardial infarction (NSTEMI) ([facility] )    Pulmonary hypertension ([facility] )        Assessment/Recommendations:  1  Acute on chronic diastolic heart failure:  Continues on high-dose IV diuretic  Diuresis has been challenging with SABINE and hypotension  Pt to start on Bumex gtt  per nephrology  Overall negative 7 7liters , still volume overloaded  Still with PND, MADISON and LE edema above baseline  May benefit from sildenafil once euvolemic per HF  Pt was evaluated  by Dr Samy Calvin - see note 4/11  EF 45% RWMA  For f/u CXR this am    2  Paroxysmal atrial fibrillation/flutter:  Currently maintaining AFib  Heart rates are well controlled  Pt and family has refused AC despite stroke risk  3   SABINE/ CKD 3:  Worsened  Renal following, approaching dialysis  4   CAD status post remote CABG: Stable, continue asa, statin, BB    5  Aortic valve disease:  Status post bioprosthetic AVR  Stable on echocardiogram     6   Hypertension:  Well controlled, continue current regimen  7   Hyperlipidemia:  Continued on statin  8   Non ST-elevation myocardial infarction, type 2:  Due to acute exacerbation of heart failure  9   Severe pulmonary hypertension:  Continue on diuresis  As above may recommend sildenafil once euvolemic  Also consider RHC per Dr Leatha Cleaning    10    Severe TR and dilated right ventricle with RV dysfunction      Subjective/Objective   Chief Complaint/Subjective  Still PND, MADISON  No CP  Leg edema improved    Vitals: /64 (BP Location: Right arm)   Pulse 94   Temp 98 °F (36 7 °C) (Oral)   Resp 18   Ht 5' 7" (1 702 m)   Wt 96 5 kg (212 lb 11 9 oz)   SpO2 90%   BMI 33 32 kg/m²     Vitals:    04/12/18 0600 04/13/18 0600   Weight: 98 kg (216 lb 0 8 oz) 96 5 kg (212 lb 11 9 oz)     Orthostatic Blood Pressures    Flowsheet Row Most Recent Value   Blood Pressure  131/64 filed at 04/15/2018 8611   Patient Position - Orthostatic VS  Sitting filed at 04/15/2018 0653            Intake/Output Summary (Last 24 hours) at 04/15/18 0858  Last data filed at 04/15/18 0839   Gross per 24 hour   Intake              665 ml   Output              425 ml   Net              240 ml       Invasive Devices     Peripheral Intravenous Line            Long-Dwell Peripheral IV (Midline) 78/08/36 Left Basilic 2 days                Current Facility-Administered Medications   Medication Dose Route Frequency    acetaminophen (TYLENOL) tablet 975 mg  975 mg Oral Q8H [facility]    aspirin (ECOTRIN LOW STRENGTH) EC tablet 81 mg  81 mg Oral Daily    atorvastatin (LIPITOR) tablet 40 mg  40 mg Oral Daily With Dinner    bumetanide (BUMEX) 12 5 mg infusion 50 mL  1 mg/hr Intravenous Continuous    bumetanide (BUMEX) injection 3 mg  3 mg Intravenous Once    calcium carbonate (TUMS) chewable tablet 1,000 mg  1,000 mg Oral Daily PRN    docusate sodium (COLACE) capsule 100 mg  100 mg Oral BID PRN    heparin (porcine) subcutaneous injection 5,000 Units  5,000 Units Subcutaneous Q8H [facility]    insulin glargine (LANTUS) subcutaneous injection 20 Units  20 Units Subcutaneous HS    insulin lispro (HumaLOG) 100 units/mL subcutaneous injection 1-5 Units  1-5 Units Subcutaneous TID AC    insulin lispro (HumaLOG) 100 units/mL subcutaneous injection 1-5 Units  1-5 Units Subcutaneous HS    insulin lispro (HumaLOG) 100 units/mL subcutaneous injection 12 Units  12 Units Subcutaneous TID With Meals    melatonin tablet 3 mg  3 mg Oral HS    metoprolol succinate (TOPROL-XL) 24 hr tablet 50 mg  50 mg Oral Q12H    ondansetron (ZOFRAN) injection 4 mg  4 mg Intravenous Q6H PRN    pantoprazole (PROTONIX) EC tablet 40 mg  40 mg Oral Early Morning    tamsulosin (FLOMAX) capsule 0 4 mg  0 4 mg Oral Daily With Dinner         Physical Exam: /64 (BP Location: Right arm)   Pulse 94   Temp 98 °F (36 7 °C) (Oral)   Resp 18   Ht 5' 7" (1 702 m)   Wt 96 5 kg (212 lb 11 9 oz)   SpO2 90%   BMI 33 32 kg/m²     General Appearance:    Alert, cooperative, no distress, appears stated age   Head:    Normocephalic, no scleral icterus   Eyes:    PERRL   Nose:   Nares normal, septum midline, no drainage    Throat:   Lips, mucosa, and tongue normal   Neck:   Supple, symmetrical, trachea midline,       JVD       Lungs:     Few crackles to auscultation bilaterally, respirations unlabored   Chest Wall:    No tenderness or deformity    Heart:    Iregular rate and rhythm, S1 and S2 normal, systolic murmur   Abdomen:     Soft, non-tender, bowel sounds active all four quadrants   Extremities:   Extremities normal, atraumatic, no cyanosis , 2++edema       Skin:   Skin warm   Neurologic:   Alert and oriented to person place and time, no focal deficits                 Lab Results:   Recent Results (from the past 72 hour(s))   Occult blood x 3, stool    Collection Time: 04/12/18 11:15 AM   Result Value Ref Range    Occult Blood, Stool #1 Negative Negative    Occult Blood, Stool #2  Negative    Occult Blood, Stool #3  Negative    DATE SPECIMEN #1      DATE SPECIMEN #2      DATE SPECIMEN #3     Fingerstick Glucose (POCT)    Collection Time: 04/12/18 11:24 AM   Result Value Ref Range    POC Glucose 271 (H) 65 - 140 mg/dl   Fingerstick Glucose (POCT)    Collection Time: 04/12/18  4:00 PM   Result Value Ref Range    POC Glucose 254 (H) 65 - 140 mg/dl   Fingerstick Glucose (POCT)    Collection Time: 04/12/18  8:50 PM   Result Value Ref Range    POC Glucose 204 (H) 65 - 140 mg/dl   Fingerstick Glucose (POCT)    Collection Time: 04/13/18  7:23 AM   Result Value Ref Range    POC Glucose 161 (H) 65 - 140 mg/dl   Basic metabolic panel Collection Time: 04/13/18  8:28 AM   Result Value Ref Range    Sodium 141 136 - 145 mmol/L    Potassium 3 9 3 5 - 5 3 mmol/L    Chloride 98 (L) 100 - 108 mmol/L    CO2 34 (H) 21 - 32 mmol/L    Anion Gap 9 4 - 13 mmol/L     (H) 5 - 25 mg/dL    Creatinine 2 74 (H) 0 60 - 1 30 mg/dL    Glucose 204 (H) 65 - 140 mg/dL    Calcium 9 8 mg/dL    eGFR 21 ml/min/1 73sq m   Fingerstick Glucose (POCT)    Collection Time: 04/13/18 11:04 AM   Result Value Ref Range    POC Glucose 196 (H) 65 - 140 mg/dl   Fingerstick Glucose (POCT)    Collection Time: 04/13/18  3:40 PM   Result Value Ref Range    POC Glucose 168 (H) 65 - 140 mg/dl   Fingerstick Glucose (POCT)    Collection Time: 04/13/18  9:21 PM   Result Value Ref Range    POC Glucose 211 (H) 65 - 140 mg/dl   Basic metabolic panel    Collection Time: 04/14/18  4:41 AM   Result Value Ref Range    Sodium 140 136 - 145 mmol/L    Potassium 3 7 3 5 - 5 3 mmol/L    Chloride 96 (L) 100 - 108 mmol/L    CO2 35 (H) 21 - 32 mmol/L    Anion Gap 9 4 - 13 mmol/L     (H) 5 - 25 mg/dL    Creatinine 2 79 (H) 0 60 - 1 30 mg/dL    Glucose 94 65 - 140 mg/dL    Calcium 9 3 mg/dL    eGFR 20 ml/min/1 73sq m   Fingerstick Glucose (POCT)    Collection Time: 04/14/18  8:24 AM   Result Value Ref Range    POC Glucose 215 (H) 65 - 140 mg/dl   Fingerstick Glucose (POCT)    Collection Time: 04/14/18 11:27 AM   Result Value Ref Range    POC Glucose 259 (H) 65 - 140 mg/dl   Fingerstick Glucose (POCT)    Collection Time: 04/14/18  3:14 PM   Result Value Ref Range    POC Glucose 159 (H) 65 - 140 mg/dl   Fingerstick Glucose (POCT)    Collection Time: 04/14/18  8:35 PM   Result Value Ref Range    POC Glucose 206 (H) 65 - 140 mg/dl   Basic metabolic panel    Collection Time: 04/15/18  4:32 AM   Result Value Ref Range    Sodium 137 136 - 145 mmol/L    Potassium 3 9 3 5 - 5 3 mmol/L    Chloride 94 (L) 100 - 108 mmol/L    CO2 31 21 - 32 mmol/L    Anion Gap 12 4 - 13 mmol/L     (H) 5 - 25 mg/dL Creatinine 3 02 (H) 0 60 - 1 30 mg/dL    Glucose 180 (H) 65 - 140 mg/dL    Calcium 9 6 mg/dL    eGFR 19 ml/min/1 73sq m   Fingerstick Glucose (POCT)    Collection Time: 04/15/18  7:58 AM   Result Value Ref Range    POC Glucose 180 (H) 65 - 140 mg/dl     [address] [facility]  [phone]     Transthoracic Echocardiogram  2D, M-mode, Doppler, and Color Doppler     Study date:  2018     Patient: Viji Newell  MR number: [id number]  Account number: [de-identified]  : 1935  Age: [de-identified] years  Gender: Male  Status: Inpatient  Location: Bedside  Height: 67 in  Weight: 213 6 lb  BP: 108/ 58 mmHg     Indications: Assess left ventricular function      Diagnoses: I50 9 - Heart failure, unspecified     Sonographer:  MASON Foley  Primary Physician:  Tello Sheffield MD  Referring Physician:  Mesfin Law MD  Group:  [facility]  Interpreting Physician:  Mesfin Law MD     SUMMARY     LEFT VENTRICLE:  Systolic function was mildly reduced  Ejection fraction was estimated to be 45 %  There was mild diffuse hypokinesis  There was moderate hypokinesis of the apical septal wall(s)  Wall thickness was mildly increased  There was mild concentric hypertrophy      RIGHT VENTRICLE:  The ventricle was mildly to moderately dilated  Systolic function was reduced      LEFT ATRIUM:  The atrium was moderately dilated      RIGHT ATRIUM:  The atrium was moderately dilated      MITRAL VALVE:  There was marked annular calcification  There was moderate to severe regurgitation      AORTIC VALVE:  A bioprosthesis was present  It exhibited normal function  Doppler cardiac output was 3 5 L/min, using LVOT flow data  Doppler cardiac index was 1 68 L/min/m squared, using LVOT flow data      TRICUSPID VALVE:  There was severe regurgitation  Pulmonary artery systolic pressure was markedly increased    Estimated peak PA pressure was 65 mmHg      PULMONIC VALVE:  Notching of the systolic pulse wave is noted, suggesting elevated pulmonary artery pressures  There was mild regurgitation      HISTORY: PRIOR HISTORY: CAD s/p CABG, s/p AVR, Afib, Hypertension, Hyperlipidemia, Heart failure     PROCEDURE: The procedure was performed at the bedside  This was a routine study  The transthoracic approach was used  The study included complete 2D imaging, M-mode, complete spectral Doppler, and color Doppler  The heart rate was 89 bpm,  at the start of the study  Images were obtained from the parasternal, apical, subcostal, and suprasternal notch acoustic windows  Echocardiographic views were limited due to decreased penetration and lung interference  This was a  technically difficult study      LEFT VENTRICLE: Size was normal  Systolic function was mildly reduced  Ejection fraction was estimated to be 45 %  There was mild diffuse hypokinesis  There was moderate hypokinesis of the apical septal wall(s)  Wall thickness was mildly  increased  There was mild concentric hypertrophy  DOPPLER: Transmitral flow pattern: atrial fibrillation      RIGHT VENTRICLE: The ventricle was mildly to moderately dilated  Systolic function was reduced      LEFT ATRIUM: The atrium was moderately dilated      RIGHT ATRIUM: The atrium was moderately dilated      MITRAL VALVE: There was marked annular calcification  Valve structure was normal  There was normal leaflet separation  DOPPLER: The transmitral velocity was within the normal range  There was no evidence for stenosis  There was moderate to  severe regurgitation      AORTIC VALVE: A bioprosthesis was present  It exhibited normal function      TRICUSPID VALVE: The annulus was dilated  There was normal leaflet separation  DOPPLER: The transtricuspid velocity was within the normal range  There was no evidence for stenosis  There was severe regurgitation  Pulmonary artery systolic  pressure was markedly increased   Estimated peak PA pressure was 65 mmHg      PULMONIC VALVE: Notching of the systolic pulse wave is noted, suggesting elevated pulmonary artery pressures  Leaflets exhibited normal thickness, no calcification, and normal cuspal separation  DOPPLER: The transpulmonic velocity was  within the normal range  There was mild regurgitation      PERICARDIUM: There was no pericardial effusion      AORTA: The root exhibited normal size      MEASUREMENT TABLES     2D MEASUREMENTS  LVOT   (Reference normals)  Diam   20 5 mm   (--)     DOPPLER MEASUREMENTS  LVOT   (Reference normals)  VTI   13 6 cm   (--)  R-R interval   769 ms   (--)  HR   78 bpm   (--)  Stroke vol   44 89 ml   (--)  Cardiac output   3 5 L/min   (--)  Cardiac index   1 68 L/min/m squared   (--)     SYSTEM MEASUREMENT TABLES     2D  %FS: 21 04 %  AV Diam: 3 14 cm  EDV(Teich): 91 37 ml  EF(Cube): 50 77 %  EF(Teich): 42 96 %  ESV(Cube): 44 19 ml  ESV(Teich): 52 12 ml  IVSd: 1 25 cm  LA Area: 32 18 cm2  LA Diam: 4 78 cm  LVEDV MOD A4C: 143 38 ml  LVEF MOD A4C: 30 68 %  LVESV MOD A4C: 99 4 ml  LVIDd: 4 48 cm  LVIDs: 3 54 cm  LVLd A4C: 8 59 cm  LVLs A4C: 8 06 cm  LVPWd: 1 17 cm  RA Area: 26 08 cm2  RV Diam: 4 04 cm  SI(Cube): 21 92 ml/m2  SI(Teich): 18 87 ml/m2  SV MOD A4C: 43 98 ml  SV(Cube): 45 58 ml  SV(Teich): 39 25 ml     CW  TR MaxP 39 mmHg  TR Vmax: 3 85 m/s     MM  TAPSE: 1 9 cm     [employer] Accredited Echocardiography Laboratory     Prepared and electronically signed by  Cayetano Carias MD  Signed 2018 18:07:57       Imaging: I have personally reviewed pertinent reports  Tele- controlled atrial fibrillation  Counseling / Coordination of Care  Total time spent today 30minutes  Greater than 50% of total time was spent with the patient and / or family counseling and / or coordination of care

## 2018-04-15 NOTE — ASSESSMENT & PLAN NOTE
· Severe pulmonary hypertension with estimated peak PA pressure at 65 mmHg  · Continue diuresis, being considered for RHC and sildenafil once euvolemic per Cardiology  · May need transfer to [facility] for continued management if dialysis becomes absolutely needed and patient/family is agreeable to this

## 2018-04-15 NOTE — ASSESSMENT & PLAN NOTE
· Continue Bumex infusion at this time, managed per Nephrology  · Remains clinically volume overloaded and symptomatic although reports improvement this morning  · Still with significant lower extremity edema and Ace wraps in place  · Continue diuresis, daily weights, I's and O's  · Overall poor prognosis

## 2018-04-16 PROBLEM — R79.89 AZOTEMIA: Status: ACTIVE | Noted: 2018-01-01

## 2018-04-16 PROBLEM — R33.9 URINARY RETENTION: Status: ACTIVE | Noted: 2018-01-01

## 2018-04-16 NOTE — PROGRESS NOTES
NEPHROLOGY PROGRESS NOTE   Denisse Cates  [de-identified] y o  male MRN: [id number]  Unit/Bed#: -01 Encounter: [id number]  Reason for Consult: SABINE/CKD    ASSESSMENT/PLAN:  1  Acute Kidney Injury- secondary to cardiorenal/volume overload +/- urinary retention  - creatinine has plateaued in the high 2s  - UA bland  - renal ultrasound: diffusely echogenic without hydroenphrosis  - PVR 151ml on 4/15  - no acute indication for dialysis currently but if respiratory status does not improve, dialysis or hospice will become necessary  2  Chronic Kidney Disease stage III/IV- Baseline creatinine 1 8-2 2   3  Volume Overload- patient was transitioned from lasix drip to intermittent IV diuretics but respiratory status worsened and he was started back on a bumex drip 4/15  - cardiology also on board  - CXR 4/15: stable pulmonary edema with bilateral effusions  4  Severe MR / TR / EF 45%  5  Azotemia- secondary to SABINE and diuresis  - FOBT negative  6  Urinary Retention- s/p viera catheter removal  7  Hypertension- BP controlled    Disposition:  Goals of care discussion with family this afternoon    SUBJECTIVE:  Patient feeling overall well  He states his SOB seems slightly improved  He also states LE edema maybe looks slightly improved  On discussion with patient, he states he does not want dialysis even if it is the last resort  He understands that without dialysis, he may not survive  He states he would rather die than start dialysis  He did not have any questions for me at this time about dialysis      OBJECTIVE:  Current Weight: Weight - Scale: 98 5 kg (217 lb 2 5 oz)  Vitals:    04/15/18 2130 04/15/18 2200 04/16/18 0600 04/16/18 0806   BP: 136/64 123/69  126/81   BP Location:  Right arm  Left arm   Pulse: 98 88  88   Resp:  18  18   Temp:  98 3 °F (36 8 °C)  98 3 °F (36 8 °C)   TempSrc:  Oral  Axillary   SpO2:  100%  100%   Weight:   98 5 kg (217 lb 2 5 oz)    Height:           Intake/Output Summary (Last 24 hours) at 04/16/18 1223  Last data filed at 04/16/18 1056   Gross per 24 hour   Intake             1558 ml   Output             1776 ml   Net             -218 ml     General: NAD  Skin: no rash, mild erythema of legs  HEENT: normocephalic  Neck: supple  Chest: CTAB  Heart: RRR  Abdomen: soft, nt, nd  Extremities: ++ edema  Neuro: alert awake  Psych: mood and affect appropriate    Medications:    Current Facility-Administered Medications:     acetaminophen (TYLENOL) tablet 975 mg, 975 mg, Oral, Q8H KENDRICKLisseth PA-C, 975 mg at 04/16/18 0501    aspirin (ECOTRIN LOW STRENGTH) EC tablet 81 mg, 81 mg, Oral, Daily, Chase Alberto MD, 81 mg at 04/16/18 1105    atorvastatin (LIPITOR) tablet 40 mg, 40 mg, Oral, Daily With Nichole Berna Alberto MD, 40 mg at 04/15/18 1738    bumetanide (BUMEX) 12 5 mg infusion 50 mL, 1 mg/hr, Intravenous, Continuous, Janes Garcia MD, Last Rate: 4 mL/hr at 04/16/18 1217, 1 mg/hr at 04/16/18 1217    calcium carbonate (TUMS) chewable tablet 1,000 mg, 1,000 mg, Oral, Daily PRN, Chase Alberto MD    docusate sodium (COLACE) capsule 100 mg, 100 mg, Oral, BID PRN, Chase Alberto MD, 100 mg at 04/10/18 1005    heparin (porcine) subcutaneous injection 5,000 Units, 5,000 Units, Subcutaneous, [facility]8H [facility], 5,000 Units at 04/16/18 0501    insulin glargine (LANTUS) subcutaneous injection 25 Units, 25 Units, Subcutaneous, HS, Jack Boyer MD, 25 Units at 04/15/18 2130    insulin lispro (HumaLOG) 100 units/mL subcutaneous injection 1-5 Units, 1-5 Units, Subcutaneous, TID AC, 1 Units at 04/15/18 2899 **AND** [CANCELED] Fingerstick Glucose (POCT), , , TID AC, Chase Alberto MD    insulin lispro (HumaLOG) 100 units/mL subcutaneous injection 1-5 Units, 1-5 Units, Subcutaneous, HS, Chase Alberto MD, 1 Units at 04/14/18 2144    insulin lispro (HumaLOG) 100 units/mL subcutaneous injection 12 Units, 12 Units, Subcutaneous, TID With Meals, Jack Boyer, MD, 12 Units at 04/15/18 1739    melatonin tablet 3 mg, 3 mg, Oral, HS, Lisseth Seals PA-C, 3 mg at 04/15/18 2131    metoprolol succinate (TOPROL-XL) 24 hr tablet 50 mg, 50 mg, Oral, Q12H, Lo Small MD, 50 mg at 04/16/18 1105    ondansetron (ZOFRAN) injection 4 mg, 4 mg, Intravenous, Q6H PRN, Chase Alberto MD    pantoprazole (PROTONIX) EC tablet 40 mg, 40 mg, Oral, Early Morning, Chase Alberto MD, 40 mg at 04/16/18 0501    tamsulosin (FLOMAX) capsule 0 4 mg, 0 4 mg, Oral, Daily With Lino Seals PA-C, 0 4 mg at 04/15/18 1738    Laboratory Results:    Results from last 7 days  Lab Units 04/16/18  0501 04/15/18  0432 04/14/18  0441 04/13/18  0828 04/12/18  0505 04/11/18  0446 04/10/18  0523   WBC Thousand/uL 7 44  --   --   --  7 13  --   --    HEMOGLOBIN g/dL 9 9*  --   --   --  9 9*  --   --    HEMATOCRIT % 32 5*  --   --   --  32 3*  --   --    PLATELETS Thousands/uL 170  --   --   --  173  --   --    SODIUM mmol/L 141 137 140 141 140 139 143   POTASSIUM mmol/L 3 7 3 9 3 7 3 9 3 7 3 6 4 2   CHLORIDE mmol/L 96* 94* 96* 98* 95* 96* 98*   CO2 mmol/L 34* 31 35* 34* 33* 33* 31   BUN mg/dL 135* 130* 131* 127* 126* 120* 107*   CREATININE mg/dL 2 92* 3 02* 2 79* 2 74* 2 88* 3 07* 3 26*   CALCIUM mg/dL 10 0 9 6 9 3 9 8 9 4 9 5 9 8   TOTAL PROTEIN g/dL 6 9  --   --   --   --   --   --    GLUCOSE RANDOM mg/dL 103 180* 94 204* 106 118 141*

## 2018-04-16 NOTE — PROGRESS NOTES
Progress Note - Neisha Mask  1935, [de-identified] y o  male MRN: [id number]    Unit/Bed#: -01 Encounter: [id number]    Primary Care Provider: Sony Kim MD   Date and time admitted to hospital: 4/4/2018  6:39 AM    * Acute on chronic diastolic congestive heart failure ([facility] )   Assessment & Plan    · Continue Bumex infusion at this time, managed per Nephrology  · Remains clinically volume overloaded and symptomatic although reports improvement this morning  · Still with significant lower extremity edema and Ace wraps in place  · Continue diuresis, daily weights, I's and O's  · Overall poor prognosis      Acute-on-chronic kidney injury ([facility] )   Assessment & Plan    · Baseline creatinine 1 8-2 2  Stable at 2 9 today on Bumex infusion  · Likely secondary to cardiorenal, +/-obstructive uropathy  · Continue to monitor closely while on diuretics and avoid nephrotoxins, hypotension, NSAIDS  · He is status post viera placed for retention during this admission and flomax added  Viera removed on 4/12/18 for voiding trial which so far has been successful  Continue bladder scan with urinary retention protocol and consult Urology if Viera needs to be replaced  · Management per Nephrology  Discussed with Dr Efrain Dyer      Non-ST elevation myocardial infarction (NSTEMI) [facility])   Assessment & Plan    · Mild troponin elevation likely the result of type 2 NSTEMI in the setting of heart failure and renal failure  · No further workup from the cardiac standpoint at this time      Rapid atrial fibrillation [facility])   Assessment & Plan    · Improved rate control    Continue metoprolol  · Patient declined anticoagulation due to prior bleeding while on Eliquis  · Not a candidate for digoxin due to SABINE      Type 2 diabetes mellitus (HCC)   Assessment & Plan    · Most recent hemoglobin A1c 7 6 1 month ago  · Continue Lantus/Humalog at current dose      Pulmonary hypertension ([facility] )   Assessment & Plan    · Severe pulmonary hypertension with estimated peak PA pressure at 65 mmHg  · Continue diuresis, being considered for RHC and sildenafil once euvolemic per Cardiology  · May need transfer to [facility] for continued management if dialysis becomes absolutely needed and patient/family is agreeable to this      Peripheral arterial disease ([facility] )   Assessment & Plan    · Patient with severe PAD with 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 managed by Podiatry with local wound care  · He is status post vascular evaluation and no plans for any intervention during this hospitalization in view of his unstable cardiovascular/renal status  · Continue scheduled Tylenol for pain control  · Continue Ace wrap for edema control, elevated extremity        VTE Pharmacologic Prophylaxis:   Pharmacologic: Heparin  Mechanical VTE Prophylaxis in Place: Yes    Patient Centered Rounds: I have performed bedside rounds with nursing staff today  Discussions with Specialists or Other Care Team Provider:  Nursing/nephrology/    Education and Discussions with Family / Patient:  Patient    Current Length of Stay: 12 day(s)    Current Patient Status: Inpatient   Certification Statement: The patient will continue to require additional inpatient hospital stay due to Above diagnosis and care plan    Discharge Plan:  Not medically stable for discharge    Code Status: Level 3 - DNAR and DNI      Subjective:   Patient reports feels better this morning  He slept well approximately 4 hrs overnight and has improved shortness of breath  Denies chest pain    Objective:     Vitals:   Temp (24hrs), Av 1 °F (36 7 °C), Min:97 6 °F (36 4 °C), Max:98 3 °F (36 8 °C)    HR:  [88-98] 88  Resp:  [18] 18  BP: (123-136)/(63-81) 131/63  SpO2:  [93 %-100 %] 93 %  Body mass index is 34 01 kg/m²  Input and Output Summary (last 24 hours):        Intake/Output Summary (Last 24 hours) at 18 1726  Last data filed at 18 1501   Gross per 24 hour   Intake 1380 ml   Output             1716 ml   Net             -336 ml       Physical Exam:  General Appearance:    Alert, cooperative, no distress, appropriately responsive    Head:    Normocephalic, without obvious abnormality, atraumatic, mucous membranes moist    Eyes:    Conjunctiva/corneas clear, EOM's intact   Neck:   Supple   Lungs:     Bibasilar crackles, decreased bilaterally, no wheezes     Heart:    irregularly irregular, S1 and S2, +SM    Abdomen:     Soft, non-tender, bowel sounds active all four quadrants,     no masses, no organomegaly   Extremities:   +2 bilateral lower extremity edema   Neurologic:  nonfocal         Additional Data:     Labs:      Results from last 7 days  Lab Units 04/16/18  0501 04/12/18  0505   WBC Thousand/uL 7 44 7 13   HEMOGLOBIN g/dL 9 9* 9 9*   HEMATOCRIT % 32 5* 32 3*   PLATELETS Thousands/uL 170 173   NEUTROS PCT %  --  71   LYMPHS PCT %  --  18   MONOS PCT %  --  7   EOS PCT %  --  4       Results from last 7 days  Lab Units 04/16/18  0501   SODIUM mmol/L 141   POTASSIUM mmol/L 3 7   CHLORIDE mmol/L 96*   CO2 mmol/L 34*   BUN mg/dL 135*   CREATININE mg/dL 2 92*   CALCIUM mg/dL 10 0   TOTAL PROTEIN g/dL 6 9   BILIRUBIN TOTAL mg/dL 1 20*   ALK PHOS U/L 67   ALT U/L 20   AST U/L 18   GLUCOSE RANDOM mg/dL 103           * I Have Reviewed All Lab Data Listed Above  * Additional Pertinent Lab Tests Reviewed: Colin 66 Admission Reviewed    Cultures:   Blood Culture:   Lab Results   Component Value Date    BLOODCX No Growth After 5 Days  04/04/2018    BLOODCX No Growth After 5 Days   04/04/2018     Urine Culture: No results found for: URINECX  Sputum Culture: No components found for: SPUTUMCX  Wound Culture: No results found for: WOUNDCULT    Last 24 Hours Medication List:     Current Facility-Administered Medications:  acetaminophen 975 mg Oral Q8H [facility] Lisseth Seals PA-C    aspirin 81 mg Oral Daily Chase Alberto MD    atorvastatin 40 mg Oral Daily With Liv Alberto MD    bumetanide 1 mg/hr Intravenous Continuous Philomena Brunner MD Last Rate: 1 mg/hr (04/16/18 1217)   calcium carbonate 1,000 mg Oral Daily PRN Chase Alberto MD    docusate sodium 100 mg Oral BID PRN Chase Alberto MD    heparin (porcine) 5,000 Units Subcutaneous Q8H [facility] Lisseth Seals PA-C    insulin glargine 25 Units Subcutaneous HS Shannon Hernandez MD    insulin lispro 1-5 Units Subcutaneous TID AC Chase Alberto MD    insulin lispro 1-5 Units Subcutaneous HS Chase Alberto MD    insulin lispro 12 Units Subcutaneous TID With Meals Shannon Hernandez MD    melatonin 3 mg Oral HS Lisseth Seals PA-C    metolazone 10 mg Oral BID Jamison Priest MD    metoprolol succinate 50 mg Oral Q12H Ashwin Delgado MD    ondansetron 4 mg Intravenous Q6H PRN Chase Alberto MD    pantoprazole 40 mg Oral Early Morning Chase Alberto MD    tamsulosin 0 4 mg Oral Daily With Lashawn Aayush Seals PA-C         Today, Patient Was Seen By: Shannon Hernandez MD    ** Please Note: Dragon 360 Dictation voice to text software may have been used in the creation of this document   **

## 2018-04-16 NOTE — ASSESSMENT & PLAN NOTE
· Severe pulmonary hypertension with estimated peak PA pressure at 65 mmHg  · Continue diuresis, being considered for RHC and sildenafil once euvolemic per Cardiology  · May need transfer to [facility] for continued management if dialysis becomes absolutely needed and patient/family is agreeable to this

## 2018-04-16 NOTE — PLAN OF CARE
Problem: PHYSICAL THERAPY ADULT  Goal: Performs mobility at highest level of function for planned discharge setting  See evaluation for individualized goals  Treatment/Interventions: Functional transfer training, LE strengthening/ROM, Therapeutic exercise, Endurance training, Patient/family training, Equipment eval/education, Bed mobility, Gait training (PT to see when stair training is appropriate )          See flowsheet documentation for full assessment, interventions and recommendations  Outcome: Not Progressing  Prognosis: Fair  Problem List: Decreased strength, Decreased range of motion, Decreased endurance, Impaired balance, Decreased mobility, Decreased coordination, Decreased safety awareness, Obesity, Decreased skin integrity, Impaired sensation (LE edema)  Assessment: Patient presented with increased levels of fatigue during todays session and was unable to ambulate more than 3 feet  Patient had more difficulty with sit to stand transfers requiring more assistance as well as verbal cues for body positioning  Sit to supine transfers continue to require signficant amount of person A to lift B LE into bed  Patient required 3-4 minute rest breaks between activity with visual SOB however SpO2 in mid 90s throughout  Educated on and patient performed pursed lip breathing technique with good follow through and understanding  Despite fatigue patient was motivated to participate and would benefit from continued mobility training to improve endurance  Recommendation: Post acute IP rehab          See flowsheet documentation for full assessment

## 2018-04-16 NOTE — PHYSICAL THERAPY NOTE
PHYSICAL THERAPY NOTE      Patient Name: Hugh Cabezas  Today's Date: 4/16/2018 04/16/18 1441   Pain Assessment   Pain Assessment No/denies pain   Pain Score No Pain   Restrictions/Precautions   Other Precautions Fall Risk;Pain;O2;Telemetry;Multiple lines   General   Chart Reviewed Yes   Family/Caregiver Present Yes  (wife)   Cognition   Comments 3L O2 via nasala cannula   Subjective   Subjective Patient seated in bedside chair and is agreeable to therapy session  Bed Mobility   Sit to Supine 3  Moderate assistance   Additional items Assist x 1;Bedrails; Increased time required;LE management   Transfers   Sit to Stand 4  Minimal assistance   Additional items Assist x 1; Increased time required;Verbal cues  (for body positioning)   Stand to Sit 4  Minimal assistance   Additional items Assist x 1; Armrests; Increased time required;Verbal cues  (body positioning and controlled descend)   Ambulation/Elevation   Gait pattern Decreased foot clearance;Shuffling; Short stride; Excessively slow; Foward flexed   Gait Assistance 4  Minimal assist   Additional items Assist x 1   Assistive Device Rolling walker   Distance 3 feet x 2  (additionial not possible due to fatigue)   Balance   Static Sitting Fair +   Static Standing Fair -   Ambulatory Poor +   Activity Tolerance   Activity Tolerance Patient limited by fatigue;Treatment limited secondary to medical complications (Comment)   Nurse Made Aware Spoke with Vernon CARINA CorbinG   Exercises   Quad Sets Sitting;10 reps;AROM; Bilateral  (with B LE elevated )   Glute Sets Sitting;10 reps;AROM; Bilateral   Hip Adduction Sitting;10 reps;AROM; Bilateral   Knee AROM Long Arc Quad Sitting;10 reps;AROM; Bilateral   Ankle Pumps Sitting;AROM;20 reps;Bilateral   Marching Sitting;10 reps;AROM; Bilateral   Assessment   Prognosis Fair   Problem List Decreased strength;Decreased range of motion;Decreased endurance; Impaired balance;Decreased mobility; Decreased coordination;Decreased safety awareness; Obesity; Decreased skin integrity; Impaired sensation  (LE edema)   Assessment Patient presented with increased levels of fatigue during todays session and was unable to ambulate more than 3 feet  Patient had more difficulty with sit to stand transfers requiring more assistance as well as verbal cues for body positioning  Sit to supine transfers continue to require signficant amount of person A to lift B LE into bed  Patient required 3-4 minute rest breaks between activity with visual SOB however SpO2 in mid 90s throughout  Educated on and patient performed pursed lip breathing technique with good follow through and understanding  Despite fatigue patient was motivated to participate and would benefit from continued mobility training to improve endurance  Goals   Patient Goals to go home   STG Expiration Date 04/17/18   Treatment Day 6   Plan   Treatment/Interventions Functional transfer training;LE strengthening/ROM; Therapeutic exercise; Endurance training;Patient/family training;Equipment eval/education; Bed mobility;Gait training  (T to see when stair training is appropriate )   Progress No functional improvements   PT Frequency 5x/wk   Recommendation   Recommendation Post acute IP rehab   Equipment Recommended Other (Comment)  (millaer walker)     Jessika Saunders, PTA

## 2018-04-16 NOTE — CASE MANAGEMENT
Continued Stay Review    Date: 4/16/18    Vital Signs: /63 (BP Location: Right arm)   Pulse 88   Temp 97 6 °F (36 4 °C) (Oral)   Resp 18   Ht 5' 7" (1 702 m)   Wt 98 5 kg (217 lb 2 5 oz)   SpO2 93%   BMI 34 01 kg/m²     Medications:   Scheduled Meds:   Current Facility-Administered Medications:  acetaminophen 975 mg Oral Q8H [address] Lisseth Seals PA-C    aspirin 81 mg Oral Daily Chase Alberto MD    atorvastatin 40 mg Oral Daily With Dinner Chase Alberto MD    bumetanide 1 mg/hr Intravenous Continuous King Coulter MD Last Rate: 1 mg/hr (04/16/18 1217)   calcium carbonate 1,000 mg Oral Daily PRN Chase Alberto MD    docusate sodium 100 mg Oral BID PRN Chase Alberto MD    heparin (porcine) 5,000 Units Subcutaneous Q8H [address] Lisseth Seals PA-C    insulin glargine 25 Units Subcutaneous HS Ap Larson MD    insulin lispro 1-5 Units Subcutaneous TID AC Chase Alberto MD    insulin lispro 1-5 Units Subcutaneous HS Chase Alberto MD    insulin lispro 12 Units Subcutaneous TID With Meals Ap Larson MD    melatonin 3 mg Oral HS Lisseth Seals PA-C    metolazone 10 mg Oral BID Liz Ortiz MD    metoprolol succinate 50 mg Oral Q12H Preeti Retana MD    ondansetron 4 mg Intravenous Q6H PRN Chase Alberto MD    pantoprazole 40 mg Oral Early Morning Chase Alberto MD    tamsulosin 0 4 mg Oral Daily With Dinner Lisseth Seals PA-C      Continuous Infusions:   bumetanide 1 mg/hr Last Rate: 1 mg/hr (04/16/18 1217)     PRN Meds: calcium carbonate    docusate sodium    ondansetron     Abnormal Labs/Diagnostic Results:     Age/Sex: [de-identified] y o  male     Assessment/Plan: 0984    Discharge Plan: TBD    ======================================================================  4/16 Nephrology Progress Note:    A/P:  1  CKD stage 3/4 with a baseline creatinine of approximately 1 8-2 2  2    SABINE:  Most likely cardiorenal syndrome/volume overload plus urinary retention:  Workup as outlined above  No acute indication for renal placement therapy  Recommendations:  -bumetanide drip for volume overload  -close follow-up with Cardiology  -monitor PVR with a bladder scans status post Geronimo catheter removal  -monitor and treat hypertension with hold parameters to avoid hypoperfusion  In reviewing his labs it would appear that he peaked at 3 2 mg/dL April 10th when he was on the furosemide drip  He had been diuresing at that time  Subsequently the Lasix drip was changed to intermittent furosemide, his creatinine has now leveled off and is roughly the same at 2 9 mg/dL  He was restarted yesterday a bumetanide drip given more respiratory decompensation and a persistent picture of acute pulmonary edema/pleural effusions  I had a lengthy discussion with both the patient and his wife by the bedside that he may require some form of kidney dialysis given his severe labs in the setting of ongoing fluid overload  He would like to avoid at all possible and is uncertain if he pursue it if it meant that he would die instead  He and his wife will discuss this  His wife had numerous questions which I try to answer  She requested specific statistics which I could not give her but try to give her a general idea of his overall prognosis given his bad heart, and progressive kidney disease  Overall prognosis is somewhat guarded  I will further discuss this with Cardiology as well      ==============================================================================    4/16 Cardiology Progress Note:       Assessment/Recommendations:  1  Acute on chronic diastolic heart failure:  Continues on bumex gtt - with continued minimal response  Continue this dose another day to evaluate response since it was just started yesterday  May benefit from sildenafil once euvolemic with pulm HTN  2   Paroxysmal atrial fibrillation/flutter:  Currently maintaining AFib  Heart rates are well controlled   Family has refused anticoagulation  3   AK I would CKD 3:  Creatinine stagnant in high 2 range  4   CAD status post remote CABG:  Currently stable, continue on maintenance medications  5   Aortic valve disease:  Status post bioprosthetic AVR  Stable on echocardiogram   6   Hypertension:  Well controlled, continue current regimen  7   Hyperlipidemia:  Continued on statin  8   Non ST-elevation myocardial infarction, type 2:  Due to acute exacerbation of heart failure  9   Severe pulmonary hypertension:  Continue on diuresis  As above may recommend sildenafil once euvolemic with RHC  10   Severe TR and dilated right ventricle with RV dysfunction

## 2018-04-16 NOTE — ASSESSMENT & PLAN NOTE
· Baseline creatinine 1 8-2 2  Stable at 2 9 today on Bumex infusion  · Likely secondary to cardiorenal, +/-obstructive uropathy  · Continue to monitor closely while on diuretics and avoid nephrotoxins, hypotension, NSAIDS  · He is status post viera placed for retention during this admission and flomax added  Viera removed on 4/12/18 for voiding trial which so far has been successful  Continue bladder scan with urinary retention protocol and consult Urology if Viera needs to be replaced  · Management per Nephrology    Discussed with Dr Thalia Rodriguez

## 2018-04-17 PROBLEM — R33.9 URINARY RETENTION: Status: RESOLVED | Noted: 2018-01-01 | Resolved: 2018-01-01

## 2018-04-17 NOTE — ASSESSMENT & PLAN NOTE
· On Bumex infusion with good response so far and increased diuresis/urinary output although still clinically volume overloaded  · Continue diuresis, daily weights, I's and O's  · Overall poor prognosis in view of significant valvular disease, renal disease, pulmonary hypertension

## 2018-04-17 NOTE — PROGRESS NOTES
NEPHROLOGY PROGRESS NOTE   Devante Barcenas  [de-identified] y o  male MRN: [id number]  Unit/Bed#: -01 Encounter: [id number]  Reason for Consult: SABINE/CKD    ASSESSMENT/PLAN:  1  Acute Kidney Injury- secondary to cardiorenal/volume overload +/- urinary retention  - creatinine has plateaued in the high 2s  - UA bland  - renal ultrasound: diffusely echogenic without hydroenphrosis  - PVR 151ml on 4/15  - creatinine actually improved from yesterday to today  - no acute indication for dialysis currently but if respiratory status does not improve, dialysis or hospice will become necessary  2  Chronic Kidney Disease stage III/IV- Baseline creatinine 1 8-2 2   3  Volume Overload- patient was transitioned from lasix drip to intermittent IV diuretics but respiratory status worsened and he was started back on a bumex drip 4/15  - cardiology also on board  - CXR 4/15: stable pulmonary edema with bilateral effusions  4  Severe MR / TR / EF 45%  5  Azotemia- secondary to SABINE and diuresis  - FOBT negative  6  Urinary Retention- s/p viera catheter removal  7  Hypertension- BP controlled    Disposition:  Will clarify goal of care this afternoon with wife and patient    SUBJECTIVE:  Patient states breathing is okay      OBJECTIVE:  Current Weight: Weight - Scale: 98 2 kg (216 lb 7 9 oz)  Vitals:    04/16/18 1500 04/16/18 2300 04/17/18 0600 04/17/18 0753   BP: 131/63 112/78  132/70   BP Location: Right arm Right arm  Right arm   Pulse: 88 95  92   Resp: 18 18 18   Temp: 97 6 °F (36 4 °C) 97 6 °F (36 4 °C)  98 9 °F (37 2 °C)   TempSrc: Oral Oral  Oral   SpO2: 93% 97%  97%   Weight:   98 2 kg (216 lb 7 9 oz)    Height:           Intake/Output Summary (Last 24 hours) at 04/17/18 1244  Last data filed at 04/17/18 1156   Gross per 24 hour   Intake              940 ml   Output             1990 ml   Net            -1050 ml     General: NAD  Skin: no rash  HEENT: normocephalic atraumatic  Neck: supple  Chest: basilar crackles  Heart: RRR  Abdomen: soft, nt, nd  Extremities: ++ bilateral edema  Neuro: alert awake  Psych: mood and affect appropriate    Medications:    Current Facility-Administered Medications:     acetaminophen (TYLENOL) tablet 975 mg, 975 mg, Oral, Q8H Lisseth MARKS PA-C, 975 mg at 04/17/18 0506    aspirin (ECOTRIN LOW STRENGTH) EC tablet 81 mg, 81 mg, Oral, Daily, Chase Alberto MD, 81 mg at 04/16/18 1105    atorvastatin (LIPITOR) tablet 40 mg, 40 mg, Oral, Daily With Richelle Alberto MD, 40 mg at 04/16/18 1553    bumetanide (BUMEX) 12 5 mg infusion 50 mL, 1 mg/hr, Intravenous, Continuous, Ani Peguero MD, Last Rate: 4 mL/hr at 04/17/18 0607, 1 mg/hr at 04/17/18 0607    calcium carbonate (TUMS) chewable tablet 1,000 mg, 1,000 mg, Oral, Daily PRN, Chase Alberto MD    docusate sodium (COLACE) capsule 100 mg, 100 mg, Oral, BID PRN, Chase Alberto MD, 100 mg at 04/10/18 1005    heparin (porcine) subcutaneous injection 5,000 Units, 5,000 Units, Subcutaneous, Q8H Lisseth MARKS PA-C, 5,000 Units at 04/17/18 0506    insulin glargine (LANTUS) subcutaneous injection 25 Units, 25 Units, Subcutaneous, HS, Carmela Oakes MD, 25 Units at 04/16/18 2200    insulin lispro (HumaLOG) 100 units/mL subcutaneous injection 1-5 Units, 1-5 Units, Subcutaneous, TID AC, 1 Units at 04/17/18 0925 **AND** [CANCELED] Fingerstick Glucose (POCT), , , TID ACChase MD    insulin lispro (HumaLOG) 100 units/mL subcutaneous injection 1-5 Units, 1-5 Units, Subcutaneous, HS, Chase Alberto MD, 2 Units at 04/16/18 2200    insulin lispro (HumaLOG) 100 units/mL subcutaneous injection 12 Units, 12 Units, Subcutaneous, TID With Meals, Carmela Oakes MD, 12 Units at 04/15/18 1739    melatonin tablet 3 mg, 3 mg, Oral, HS, Lisseth Seals PA-C, 3 mg at 04/16/18 2200    metolazone (ZAROXOLYN) tablet 10 mg, 10 mg, Oral, BID, Grisel Cotto MD, 10 mg at 04/16/18 1948    metoprolol succinate (TOPROL-XL) 24 hr tablet 50 mg, 50 mg, Oral, Q12H, Cecile Valverde MD, 50 mg at 04/16/18 2200    ondansetron (ZOFRAN) injection 4 mg, 4 mg, Intravenous, Q6H PRN, Chase Alberto MD    pantoprazole (PROTONIX) EC tablet 40 mg, 40 mg, Oral, Early Morning, Chase Alberto MD, 40 mg at 04/17/18 0506    tamsulosin (FLOMAX) capsule 0 4 mg, 0 4 mg, Oral, Daily With Leana Seals PA-C, 0 4 mg at 04/16/18 1553    Laboratory Results:    Results from last 7 days  Lab Units 04/17/18  0611 04/16/18  0501 04/15/18  0432 04/14/18  0441 04/13/18  0828 04/12/18  0505 04/11/18  0446   WBC Thousand/uL  --  7 44  --   --   --  7 13  --    HEMOGLOBIN g/dL  --  9 9*  --   --   --  9 9*  --    HEMATOCRIT %  --  32 5*  --   --   --  32 3*  --    PLATELETS Thousands/uL  --  170  --   --   --  173  --    SODIUM mmol/L 140 141 137 140 141 140 139   POTASSIUM mmol/L 3 5 3 7 3 9 3 7 3 9 3 7 3 6   CHLORIDE mmol/L 95* 96* 94* 96* 98* 95* 96*   CO2 mmol/L 34* 34* 31 35* 34* 33* 33*   BUN mg/dL 136* 135* 130* 131* 127* 126* 120*   CREATININE mg/dL 2 72* 2 92* 3 02* 2 79* 2 74* 2 88* 3 07*   CALCIUM mg/dL 9 5 10 0 9 6 9 3 9 8 9 4 9 5   TOTAL PROTEIN g/dL  --  6 9  --   --   --   --   --    GLUCOSE RANDOM mg/dL 140 103 180* 94 204* 106 118

## 2018-04-17 NOTE — CASE MANAGEMENT
Continued Stay Review    Date: 18    Vital Signs:    Temp (24hrs), Av 1 °F (36 7 °C), Min:97 6 °F (36 4 °C), Max:98 3 °F (36 8 °C)   HR:  [88-98] 88  Resp:  [18] 18  BP: (123-136)/(63-81) 131/63  SpO2:  [93 %-100 %] 93 %    Abnormal Labs/Diagnostic Results:     from last 7 days  Lab Units 18  0501 18  0505   WBC Thousand/uL 7 44 7 13   HEMOGLOBIN g/dL 9 9* 9 9*   HEMATOCRIT % 32 5* 32 3*   PLATELETS Thousands/uL 170 173   NEUTROS PCT %  --  71   LYMPHS PCT %  --  18   MONOS PCT %  --  7   EOS PCT %  --  4         Results from last 7 days  Lab Units 18  0501   SODIUM mmol/L 141   POTASSIUM mmol/L 3 7   CHLORIDE mmol/L 96*   CO2 mmol/L 34*   BUN mg/dL 135*   CREATININE mg/dL 2 92*   CALCIUM mg/dL 10 0   TOTAL PROTEIN g/dL 6 9   BILIRUBIN TOTAL mg/dL 1 20*   ALK PHOS U/L 67   ALT U/L 20   AST U/L 18   GLUCOSE RANDOM mg/dL 103       Last 24 Hours Medication List:      Current Facility-Administered Medications:  acetaminophen 975 mg Oral Q8H [facility] Lisseth Seals PA-C     aspirin 81 mg Oral Daily Chase Alberto MD     atorvastatin 40 mg Oral Daily With Dinner Chase Alberto MD     bumetanide 1 mg/hr Intravenous Continuous Esdras English MD Last Rate: 1 mg/hr (18 1217)   calcium carbonate 1,000 mg Oral Daily PRN Chase Alberto MD     docusate sodium 100 mg Oral BID PRN Chase Alberto MD     heparin (porcine) 5,000 Units Subcutaneous Q8H [facility] Lisseth Seals PA-C     insulin glargine 25 Units Subcutaneous HS Vicki Hooker MD     insulin lispro 1-5 Units Subcutaneous TID AC Chase Alberto MD     insulin lispro 1-5 Units Subcutaneous HS Chase Alberto MD     insulin lispro 12 Units Subcutaneous TID With Meals Vicki Hooker MD     melatonin 3 mg Oral HS Lisseth Seals PA-C     metolazone 10 mg Oral BID Isaac Stewart MD     metoprolol succinate 50 mg Oral Q12H Elena Eagle MD     ondansetron 4 mg Intravenous Q6H PRN Chase Alberto MD     pantoprazole 40 mg Oral Early Morning Chase Alberto MD     tamsulosin 0 4 mg Oral Daily With Dinner Lisseth Seals PA-C         Age/Sex: [de-identified] y o  male     Assessment/Plan:     Acute on chronic diastolic congestive heart failure (HCC)   Assessment & Plan     · Continue Bumex infusion at this time, managed per Nephrology  · Remains clinically volume overloaded and symptomatic although reports improvement this morning  · Still with significant lower extremity edema and Ace wraps in place  · Continue diuresis, daily weights, I's and O's  · Overall poor prognosis       Acute-on-chronic kidney injury ([facility] [address] )   Assessment & Plan     · Baseline creatinine 1 8-2 2  Stable at 2 9 today on Bumex infusion  · Likely secondary to cardiorenal, +/-obstructive uropathy  · Continue to monitor closely while on diuretics and avoid nephrotoxins, hypotension, NSAIDS  · He is status post viera placed for retention during this admission and flomax added  Viera removed on 4/12/18 for voiding trial which so far has been successful  Continue bladder scan with urinary retention protocol and consult Urology if Viera needs to be replaced  · Management per Nephrology  Discussed with Dr Ana Merino       Non-ST elevation myocardial infarction (NSTEMI) [facility])   Assessment & Plan     · Mild troponin elevation likely the result of type 2 NSTEMI in the setting of heart failure and renal failure  · No further workup from the cardiac standpoint at this time       Rapid atrial fibrillation [facility])   Assessment & Plan     · Improved rate control    Continue metoprolol  · Patient declined anticoagulation due to prior bleeding while on Eliquis  · Not a candidate for digoxin due to SABINE       Type 2 diabetes mellitus ([facility])   Assessment & Plan     · Most recent hemoglobin A1c 7 6 1 month ago  · Continue Lantus/Humalog at current dose       Pulmonary hypertension ([facility])   Assessment & Plan     · Severe pulmonary hypertension with estimated peak PA pressure at 65 mmHg  · Continue diuresis, being considered for RHC and sildenafil once euvolemic per Cardiology  · May need transfer to [facility] for continued management if dialysis becomes absolutely needed and patient/family is agreeable to this       Peripheral arterial disease ([address] )   Assessment & Plan     · Patient with severe PAD with 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 managed by Podiatry with local wound care  · He is status post vascular evaluation and no plans for any intervention during this hospitalization in view of his unstable cardiovascular/renal status  · Continue scheduled Tylenol for pain control  · Continue Ace wrap for edema control, elevated extremity          VTE Pharmacologic Prophylaxis:   Pharmacologic: Heparin  Mechanical VTE Prophylaxis in Place:  Yes     Patient Centered Rounds: I have performed bedside rounds with nursing staff today      Discussions with Specialists or Other Care Team Provider:  Nursing/nephrology/     Education and Discussions with Family / Patient:  Patient     Current Length of Stay: 12 day(s)     Current Patient Status: Inpatient   Certification Statement: The patient will continue to require additional inpatient hospital stay due to Above diagnosis and care plan     Discharge Plan:  Not medically stable for discharge

## 2018-04-17 NOTE — ASSESSMENT & PLAN NOTE
· Severe pulmonary hypertension with estimated peak PA pressure at 65 mmHg  · Continue diuresis, being considered for RHC and sildenafil once euvolemic per Cardiology  · May need transfer to [facility] for continued management if dialysis becomes absolutely needed and patient/family is agreeable to this    For now, family does not want dialysis

## 2018-04-17 NOTE — ASSESSMENT & PLAN NOTE
· Patient with severe PAD with 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, hallux and lateral heel, managed by Podiatry with local wound care  · He is status post vascular evaluation and no plans for any intervention during this hospitalization in view of his unstable cardiovascular/renal status  · Continue scheduled Tylenol for pain control  · Continue Ace wrap for edema control, elevated extremity

## 2018-04-17 NOTE — PROGRESS NOTES
Progress Note - Bertha Springer  1935, [de-identified] y o  male MRN: [id number]    Unit/Bed#: -01 Encounter: [id number]    Primary Care Provider: Kesha Sheehan MD   Date and time admitted to hospital: 4/4/2018  6:39 AM    * Acute on chronic diastolic congestive heart failure ([facility] )   Assessment & Plan    · On Bumex infusion with good response so far and increased diuresis/urinary output although still clinically volume overloaded  · Continue diuresis, daily weights, I's and O's  · Overall poor prognosis in view of significant valvular disease, renal disease, pulmonary hypertension      Acute-on-chronic kidney injury ([facility] )   Assessment & Plan    · Baseline creatinine 1 8-2 2  Renal function remains stable on Bumex infusion  · Likely secondary to cardiorenal, +/-obstructive uropathy  · Continue to monitor closely while on diuretics and avoid nephrotoxins, hypotension, NSAIDS  · He is status post geronimo placed for retention during this admission and flomax added  Geronimo removed on 4/12/18 for voiding trial which so far has been successful  Continue bladder scan with urinary retention protocol and consult Urology if Geronimo needs to be replaced  · Patient and family declined dialysis  · Management per Nephrology      Non-ST elevation myocardial infarction (NSTEMI) [facility])   Assessment & Plan    · Mild troponin elevation likely the result of type 2 NSTEMI in the setting of heart failure and renal failure  · No further workup from the cardiac standpoint at this time      Rapid atrial fibrillation ([facility] )   Assessment & Plan    · Rate controlled    Continue metoprolol  · Patient declined anticoagulation due to prior bleeding while on Eliquis  · Not a candidate for digoxin due to SABINE      Type 2 diabetes mellitus (HCC)   Assessment & Plan    · Most recent hemoglobin A1c 7 6 1 month ago  · Continue Lantus/Humalog at current dose      Pulmonary hypertension ([facility] )   Assessment & Plan    · Severe pulmonary hypertension with estimated peak PA pressure at 65 mmHg  · Continue diuresis, being considered for RHC and sildenafil once euvolemic per Cardiology  · May need transfer to [place] [id number] for continued management if dialysis becomes absolutely needed and patient/family is agreeable to this  For now, family does not want dialysis      Peripheral arterial disease ([facility] [address] )   Assessment & Plan    · Patient with severe PAD with 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, hallux and lateral heel, managed by Podiatry with local wound care  · He is status post vascular evaluation and no plans for any intervention during this hospitalization in view of his unstable cardiovascular/renal status  · Continue scheduled Tylenol for pain control  · Continue Ace wrap for edema control, elevated extremity        VTE Pharmacologic Prophylaxis:   Pharmacologic: Heparin  Mechanical VTE Prophylaxis in Place: Yes    Patient Centered Rounds: I have performed bedside rounds with nursing staff today  Discussions with Specialists or Other Care Team Provider: Nursing/Nephrology    Education and Discussions with Family / Patient: Patient    Current Length of Stay: 13 day(s)    Current Patient Status: Inpatient   Certification Statement: The patient will continue to require additional inpatient hospital stay due to Above diagnosis and care plan    Discharge Plan:  Not medically stable for discharge    Code Status: Level 3 - DNAR and DNI      Subjective:   Patient seen and evaluated  Per RN, had a rough night and is sleepy this morning  Sitting out of bed in chair, no acute distress  Still with shortness of breath on minimal exertion    Objective:     Vitals:   Temp (24hrs), Av °F (36 7 °C), Min:97 6 °F (36 4 °C), Max:98 9 °F (37 2 °C)    HR:  [88-95] 92  Resp:  [18] 18  BP: (112-132)/(63-78) 132/70  SpO2:  [93 %-97 %] 97 %  Body mass index is 33 91 kg/m²  Input and Output Summary (last 24 hours):        Intake/Output Summary (Last 24 hours) at 18 [facility] filed at 04/17/18 1156   Gross per 24 hour   Intake              940 ml   Output             1990 ml   Net            -1050 ml       Physical Exam:  General Appearance:    Alert, cooperative, no distress, appropriately responsive    Head:    Normocephalic, without obvious abnormality, atraumatic, mucous membranes moist    Eyes:    Conjunctiva/corneas clear, EOM's intact   Neck:   Supple, +JVD   Lungs:     Decreased breath sounds bilaterally with bibasilar crackles, no wheezes    Heart:    Irregularly irregular, S1 and S2    Abdomen:     Soft, obese, non-tender, nondistended   Extremities:   +2 bilateral lower extremity edema  Ace wrap on right  Patient declines Ace wrap on left due to discomfort   Neurologic:  nonfocal         Additional Data:     Labs:      Results from last 7 days  Lab Units 04/16/18  0501 04/12/18  0505   WBC Thousand/uL 7 44 7 13   HEMOGLOBIN g/dL 9 9* 9 9*   HEMATOCRIT % 32 5* 32 3*   PLATELETS Thousands/uL 170 173   NEUTROS PCT %  --  71   LYMPHS PCT %  --  18   MONOS PCT %  --  7   EOS PCT %  --  4       Results from last 7 days  Lab Units 04/17/18  0611 04/16/18  0501   SODIUM mmol/L 140 141   POTASSIUM mmol/L 3 5 3 7   CHLORIDE mmol/L 95* 96*   CO2 mmol/L 34* 34*   BUN mg/dL 136* 135*   CREATININE mg/dL 2 72* 2 92*   CALCIUM mg/dL 9 5 10 0   TOTAL PROTEIN g/dL  --  6 9   BILIRUBIN TOTAL mg/dL  --  1 20*   ALK PHOS U/L  --  67   ALT U/L  --  20   AST U/L  --  18   GLUCOSE RANDOM mg/dL 140 103           * I Have Reviewed All Lab Data Listed Above  * Additional Pertinent Lab Tests Reviewed: [address] Admission Reviewed    Cultures:   Blood Culture:   Lab Results   Component Value Date    BLOODCX No Growth After 5 Days  04/04/2018    BLOODCX No Growth After 5 Days   04/04/2018     Urine Culture: No results found for: URINECX  Sputum Culture: No components found for: SPUTUMCX  Wound Culture: No results found for: WOUNDCULT    Last 24 Hours Medication List: Current Facility-Administered Medications:  acetaminophen 975 mg Oral Q8H [address] Lisseth Seals PA-C    aspirin 81 mg Oral Daily Chase Alberto MD    atorvastatin 40 mg Oral Daily With Dinner Chase Alberto MD    bumetanide 1 mg/hr Intravenous Continuous Sylvia Hendrix MD Last Rate: 1 mg/hr (04/17/18 1619)   calcium carbonate 1,000 mg Oral Daily PRN Chase Alberto MD    docusate sodium 100 mg Oral BID PRN Chase Alberto MD    heparin (porcine) 5,000 Units Subcutaneous Q8H [address] Lisseth Seals PA-C    insulin glargine 25 Units Subcutaneous HS Mina Pugh MD    insulin lispro 1-5 Units Subcutaneous TID AC Chase Alberto MD    insulin lispro 1-5 Units Subcutaneous HS Chase Alberto MD    insulin lispro 12 Units Subcutaneous TID With Meals Mina Pugh MD    melatonin 3 mg Oral HS Lisseth Seals PA-C    metolazone 10 mg Oral BID Nadege Santoyo MD    metoprolol succinate 50 mg Oral Q12H Lorraine Butcher MD    ondansetron 4 mg Intravenous Q6H PRN Chase Alberto MD    pantoprazole 40 mg Oral Early Morning Chase Alberto MD    tamsulosin 0 4 mg Oral Daily With Dinner Blanca Melendez PA-C         Today, Patient Was Seen By: Mina Pugh MD    ** Please Note: Dragon 360 Dictation voice to text software may have been used in the creation of this document   **

## 2018-04-17 NOTE — ASSESSMENT & PLAN NOTE
· Rate controlled    Continue metoprolol  · Patient declined anticoagulation due to prior bleeding while on Eliquis  · Not a candidate for digoxin due to SABINE

## 2018-04-17 NOTE — PROGRESS NOTES
Cardiology Progress Note - Hugh Mail  [de-identified] y o  male MRN: [id number]    Unit/Bed#: -01 Encounter: [id number]      Assessment/Recommendations:  1  Acute on chronic diastolic heart failure:  Continues on bumex gtt - with good response with boost of metolazone  May benefit from sildenafil once euvolemic with pulm HTN  2   Paroxysmal atrial fibrillation/flutter:  Currently maintaining AFib  Heart rates are well controlled  Family has refused anticoagulation  3   AK I would CKD 3:  Creatinine stagnant in high 2 range  4   CAD status post remote CABG:  Currently stable, continue on maintenance medications  5   Aortic valve disease:  Status post bioprosthetic AVR  Stable on echocardiogram   6   Hypertension:  Well controlled, continue current regimen  7   Hyperlipidemia:  Continued on statin  8   Non ST-elevation myocardial infarction, type 2:  Due to acute exacerbation of heart failure  9   Severe pulmonary hypertension:  Continue on diuresis  As above may recommend sildenafil once euvolemic with RHC  10   Severe TR and dilated right ventricle with RV dysfunction  Subjective:   Patient seen and examined  No significant events overnight  Improving dyspnea ; pertinent negatives - chest pain, chest pressure/discomfort, palpitations and syncope  Objective:     Vitals: Blood pressure 132/70, pulse 92, temperature 98 9 °F (37 2 °C), temperature source Oral, resp  rate 18, height 5' 7" (1 702 m), weight 98 2 kg (216 lb 7 9 oz), SpO2 97 %  , Body mass index is 33 91 kg/m² ,   Orthostatic Blood Pressures    Flowsheet Row Most Recent Value   Blood Pressure  132/70 filed at 04/17/2018 0753   Patient Position - Orthostatic VS  Sitting filed at 04/17/2018 0753            Intake/Output Summary (Last 24 hours) at 04/17/18 1050  Last data filed at 04/17/18 0918   Gross per 24 hour   Intake              940 ml   Output             2090 ml   Net            -1150 ml       TELE: Afib rates controlled  Physical Exam:    GEN: Magda Crews   appears well, alert and oriented x 3, pleasant and cooperative   HEENT: pupils equal, round, and reactive to light; extraocular muscles intact  NECK: supple, no carotid bruits, +JVD   HEART: regular rhythm, normal S1 and S2, +systolic murmur, no clicks, gallops or rubs   LUNGS: clear to auscultation bilaterally; no wheezes, rales, or rhonchi   ABDOMEN: normal bowel sounds, soft, no tenderness, no distention  EXTREMITIES: peripheral pulses normal; no clubbing, cyanosis, + edema  NEURO: no focal findings   SKIN: normal without suspicious lesions on exposed skin      Medications:      Current Facility-Administered Medications:     acetaminophen (TYLENOL) tablet 975 mg, 975 mg, Oral, Q8H Lisseth MARKS PA-C, 975 mg at 04/17/18 0506    aspirin (ECOTRIN LOW STRENGTH) EC tablet 81 mg, 81 mg, Oral, Daily, Chase Alberto MD, 81 mg at 04/16/18 1105    atorvastatin (LIPITOR) tablet 40 mg, 40 mg, Oral, Daily With Miryam Alberto MD, 40 mg at 04/16/18 1553    bumetanide (BUMEX) 12 5 mg infusion 50 mL, 1 mg/hr, Intravenous, Continuous, Liliya Vogel MD, Last Rate: 4 mL/hr at 04/17/18 0607, 1 mg/hr at 04/17/18 0607    calcium carbonate (TUMS) chewable tablet 1,000 mg, 1,000 mg, Oral, Daily PRN, Chase Alberto MD    docusate sodium (COLACE) capsule 100 mg, 100 mg, Oral, BID PRN, Chase Alberto MD, 100 mg at 04/10/18 1005    heparin (porcine) subcutaneous injection 5,000 Units, 5,000 Units, Subcutaneous, [facility], 5,000 Units at 04/17/18 0506    insulin glargine (LANTUS) subcutaneous injection 25 Units, 25 Units, Subcutaneous, HS, Maximo Wang MD, 25 Units at 04/16/18 2200    insulin lispro (HumaLOG) 100 units/mL subcutaneous injection 1-5 Units, 1-5 Units, Subcutaneous, TID AC, 1 Units at 04/17/18 0921 **AND** [CANCELED] Fingerstick Glucose (POCT), , , TID CHAU, Chase Alberto MD    insulin lispro (HumaLOG) 100 units/mL subcutaneous injection 1-5 Units, 1-5 Units, Subcutaneous, HS, Chase Alberto MD, 2 Units at 04/16/18 2200    insulin lispro (HumaLOG) 100 units/mL subcutaneous injection 12 Units, 12 Units, Subcutaneous, TID With Meals, Mary Johnson MD, 12 Units at 04/15/18 1739    melatonin tablet 3 mg, 3 mg, Oral, HS, Lisseth Seals PA-C, 3 mg at 04/16/18 2200    metolazone (ZAROXOLYN) tablet 10 mg, 10 mg, Oral, BID, Cassi Brunson MD, 10 mg at 04/16/18 1948    metoprolol succinate (TOPROL-XL) 24 hr tablet 50 mg, 50 mg, Oral, Q12H, Reilly Rajan MD, 50 mg at 04/16/18 2200    ondansetron (ZOFRAN) injection 4 mg, 4 mg, Intravenous, Q6H PRN, Chase Alberto MD    pantoprazole (PROTONIX) EC tablet 40 mg, 40 mg, Oral, Early Morning, Chase Alberto MD, 40 mg at 04/17/18 0506    tamsulosin (FLOMAX) capsule 0 4 mg, 0 4 mg, Oral, Daily With Parthenindia Seals PA-C, 0 4 mg at 04/16/18 1553     Labs & Results:          Results from last 7 days  Lab Units 04/16/18  0501 04/12/18  0505   WBC Thousand/uL 7 44 7 13   HEMOGLOBIN g/dL 9 9* 9 9*   HEMATOCRIT % 32 5* 32 3*   PLATELETS Thousands/uL 170 173           Results from last 7 days  Lab Units 04/17/18  0611 04/16/18  0501 04/15/18  0432   SODIUM mmol/L 140 141 137   POTASSIUM mmol/L 3 5 3 7 3 9   CHLORIDE mmol/L 95* 96* 94*   CO2 mmol/L 34* 34* 31   BUN mg/dL 136* 135* 130*   CREATININE mg/dL 2 72* 2 92* 3 02*   CALCIUM mg/dL 9 5 10 0 9 6   TOTAL PROTEIN g/dL  --  6 9  --    BILIRUBIN TOTAL mg/dL  --  1 20*  --    ALK PHOS U/L  --  67  --    ALT U/L  --  20  --    AST U/L  --  18  --    GLUCOSE RANDOM mg/dL 140 103 180*             Echo:personally reviewed - EF 45%, diffuse HK with mod HK of apical septum, mild to mod RV dilation and reduced function, mod to sev MR, bio AVR    EKG personally reviewed by Virginia Brady MD

## 2018-04-18 NOTE — PROGRESS NOTES
Pt's vitals are stable on room air  Pt is resting comfortably in bed  Pt has no complaints at this time  Pt is a-fib on the monitor  Bumex is infusing at 4ml/hr  Will continue to monitor

## 2018-04-18 NOTE — PROGRESS NOTES
[facility] Internal Medicine Progress Note  Patient: Neisha Maldonado  [de-identified] y o  male   MRN: [id number]  PCP: Sony Kim MD  Unit/Bed#: -[place] Encounter: [id number]  Date Of Visit: 04/18/18    Assessment:    Principal Problem:    Acute on chronic diastolic congestive heart failure ([facility] )  Active Problems:    Rapid atrial fibrillation (HCC)    Benign essential hypertension    Type 2 diabetes mellitus ([facility] )    Peripheral arterial disease ([place] [address] )    Acute-on-chronic kidney injury ([facility] )    Non-ST elevation myocardial infarction (NSTEMI) [facility]    Pulmonary hypertension ([facility] )    Azotemia      Plan:    · Acute on chronic diastolic congestive heart failure  Continue with Bumex GTT  Dose increased by renal today  Continue with increased dose of metolazone  Continue with frequent intake output monitoring  Daily weight  · Severe pulmonary hypertension with underlying tricuspid regurgitation and RV dysfunction  Cardiology following  May benefit from sildenafil once euvolemic with congestive heart failure  · History of paroxysmal atrial fibrillation and flutter  Continued use to have heart rate controlled  Family has refused anticoagulation  · Acute kidney injury on chronic kidney disease stage 3  Baseline creatinine between 1 8-2 2  Creatinine remains at 2 7  Diuresis of 1 L during the last 24 hours  Bumex GTT and metolazone dose increased by Nephrology today  Continue to monitor daily BMP and avoid nephrotoxin medications  · Metabolic alkalosis likely contraction alkalosis  Follow up on VBG  · History of urinary retention  Resolved  Continue to monitor serial postvoid residual on bladder scan as needed  · Type 2 NSTEMI in the setting of heart failure and renal failure  No ischemic workup per Cardiology  · Type 2 diabetes  Continue with current insulin regimen  · 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  Podiatry following  No indication for surgery    Will need vascular of follow-up as an outpatient  VTE Pharmacologic Prophylaxis:   Pharmacologic: Heparin  Mechanical VTE Prophylaxis in Place: No    Patient Centered Rounds: I have performed bedside rounds with nursing staff today  Discussions with Specialists or Other Care Team Provider:  Discussed with Nephrology    Education and Discussions with Family / Patient:  Discussed with patient at bedside    Time Spent for Care: 30 minutes  More than 50% of total time spent on counseling and coordination of care as described above  Current Length of Stay: 14 day(s)    Current Patient Status: Inpatient   Certification Statement: The patient will continue to require additional inpatient hospital stay due to Ongoing management for congestive heart failure and fluid overload    Discharge Plan / Estimated Discharge Date:  Currently not medically stable for discharge    Code Status: Level 3 - DNAR and DNI      Subjective:   Patient continues to complain of shortness of breath with minimal exertion  Denies any chest discomfort  Denies any palpitations  Objective:     Vitals:   Temp (24hrs), Av 3 °F (36 8 °C), Min:97 7 °F (36 5 °C), Max:98 8 °F (37 1 °C)    HR:  [82-90] 82  Resp:  [16-18] 16  BP: (120-143)/(60-68) 120/62  SpO2:  [95 %-100 %] 98 %  Body mass index is 33 32 kg/m²  Input and Output Summary (last 24 hours): Intake/Output Summary (Last 24 hours) at 18 1358  Last data filed at 18 1344   Gross per 24 hour   Intake              895 ml   Output             2645 ml   Net            -1750 ml       Physical Exam:     Physical Exam   Constitutional: He is oriented to person, place, and time  No distress  HENT:   Head: Normocephalic and atraumatic  Mouth/Throat: Oropharynx is clear and moist    Eyes: Pupils are equal, round, and reactive to light  Neck: Neck supple  JVD present  Cardiovascular:   Murmur heard  Irregular rhythm  Pulmonary/Chest:   Decreased breath sounds at bases  Abdominal: Soft  Bowel sounds are normal  He exhibits no distension  There is no tenderness  There is no rebound  Musculoskeletal: He exhibits edema  Bilateral lower extremity edema left greater than right   Neurological: He is alert and oriented to person, place, and time  Skin: Skin is warm  He is not diaphoretic  Psychiatric: He has a normal mood and affect  Vitals reviewed  Additional Data:     Labs:      Results from last 7 days  Lab Units 04/18/18  0444  04/12/18  0505   WBC Thousand/uL 6 63  < > 7 13   HEMOGLOBIN g/dL 9 5*  < > 9 9*   HEMATOCRIT % 30 4*  < > 32 3*   PLATELETS Thousands/uL 170  < > 173   NEUTROS PCT %  --   --  71   LYMPHS PCT %  --   --  18   MONOS PCT %  --   --  7   EOS PCT %  --   --  4   < > = values in this interval not displayed  Results from last 7 days  Lab Units 04/18/18  0444  04/16/18  0501   SODIUM mmol/L 141  < > 141   POTASSIUM mmol/L 3 1*  < > 3 7   CHLORIDE mmol/L 94*  < > 96*   CO2 mmol/L 37*  < > 34*   BUN mg/dL 137*  < > 135*   CREATININE mg/dL 2 70*  < > 2 92*   CALCIUM mg/dL 9 9  < > 10 0   TOTAL PROTEIN g/dL  --   --  6 9   BILIRUBIN TOTAL mg/dL  --   --  1 20*   ALK PHOS U/L  --   --  67   ALT U/L  --   --  20   AST U/L  --   --  18   GLUCOSE RANDOM mg/dL 137  < > 103   < > = values in this interval not displayed  * I Have Reviewed All Lab Data Listed Above  * Additional Pertinent Lab Tests Reviewed:  Colin Rutherford Admission Reviewed    Imaging:    Imaging Reports Reviewed Today Include: NA      Recent Cultures (last 7 days):           Last 24 Hours Medication List:     Current Facility-Administered Medications:  acetaminophen 325 mg Oral Q8H PRN John Paul Renee MD    aspirin 81 mg Oral Daily Chase Alberto MD    atorvastatin 40 mg Oral Daily With Dinner Chase Alberto MD    bumetanide 2 mg/hr Intravenous Continuous John Paul Renee MD Last Rate: 2 mg/hr (04/18/18 1214)   calcium carbonate 1,000 mg Oral Daily PRN Chase Alberto MD    docusate sodium 100 mg Oral BID PRN Chase Alberto MD    heparin (porcine) 5,000 Units Subcutaneous Q8H [address] Lisseth Seals PA-C    insulin glargine 25 Units Subcutaneous HS Sandip Lincoln MD    insulin lispro 1-5 Units Subcutaneous TID AC Chase Alberto MD    insulin lispro 1-5 Units Subcutaneous HS Chase Alberto MD    insulin lispro 12 Units Subcutaneous TID With Meals Sandip Lincoln MD    melatonin 3 mg Oral HS Lisseth Seals PA-C    metolazone 10 mg Oral 4x Daily Briana Scott MD    metoprolol succinate 50 mg Oral Q12H Chintan Tillman MD    ondansetron 4 mg Intravenous Q6H PRN Chase Alberto MD    pantoprazole 40 mg Oral Early Morning Chase Alberto MD    potassium chloride 30 mEq Oral Q6H Briana Scott MD    sevelamer 800 mg Oral TID With Meals Briana Scott MD    spironolactone 25 mg Oral Daily Briana Scott MD    tamsulosin 0 4 mg Oral Daily With Dinner Gualberto Escoto PA-C         Today, Patient Was Seen By: Lala Kocher, MD    ** Please Note: This note has been constructed using a voice recognition system   **

## 2018-04-18 NOTE — PROGRESS NOTES
Progress Note - Nephrology   Walker Pretty  [de-identified] y o  male MRN: [id number]  Unit/Bed#: -01 Encounter: [id number]    ASSESSMENT AND PLAN:  1   CKD stage 3/4:  Baseline creatinine is 1 8-2 2   2   SABINE:  Secondary cardiorenal syndrome/urinary retention:  Creatinine remains unchanged 2 7 mg/dL but he remains azotemic as well  He is beginning to diurese but only 1 1 L negative  I would increase Bumetanide to 2 milligrams/hour along with increasing metolazone to 10 mg q 6 hours in order to elicit a greater diuresis  Both the patient and his wife would reserve renal placement therapy/hemodialysis with the kidney machine is an absolute last resort  Recommendations:  -repeat chest x-ray PA and lateral in a m   -bumetanide 2 milligrams/hour  -metolazone 10 mg q 6 hours  -intake and output and weights daily, monitor daily labs  3  Hypervolemia:  Please see above  4  Hypokalemia:  Replete  Add spironolactone  5   Metabolic alkalosis:  Most likely for contraction alkalosis  Check VBG in a m  to determine whether not Diamox may be helpful  6   Mineral bone disorder:  Hyperphosphatemia  Phosphate binders  7   Anemia:  Acceptable at 9 5   8   Urinary retention:  Monitor bladder scans  9   Hypertension is controlled at this time  7   Severe MR/TR/ejection fraction 45% and decreased RV function/pulmonary hypertension/aortic valve replacement/paroxysmal atrial fibrillation/flutter/non ST elevation myocardial infarction type 2:  Per Cardiology  Overall guarded prognosis  Subjective:   Patient is still complaining of shortness of breath without change  Slightly weak  No chest pain  No nausea vomiting or diarrhea  Urinating very well  Objective:     Vitals: Blood pressure 120/62, pulse 82, temperature 98 4 °F (36 9 °C), temperature source Oral, resp  rate 16, height 5' 7" (1 702 m), weight 96 5 kg (212 lb 11 9 oz), SpO2 98 %  ,Body mass index is 33 32 kg/m²      Weight (last 2 days)     Date/Time   Weight 04/18/18 0754  96 5 (212 74)    04/17/18 0600  98 2 (216 49)    04/16/18 0600  98 5 (217 15)                Intake/Output Summary (Last 24 hours) at 04/18/18 0913  Last data filed at 04/18/18 0801   Gross per 24 hour   Intake              600 ml   Output             2470 ml   Net            -1870 ml            Physical Exam: General:  Weak appearing but no acute distress out in a chair  Skin:  No acute rash  Eyes:  No scleral icterus  ENT:  Moist mucous membranes  Neck:  Supple, no jugular venous distention  Chest:  Essentially clear to auscultation  CVS:  No rubs or gallops appreciable  Abdomen:  Obese, soft and nontender with normal bowel sounds  Extremities:  2+ lower extremity edema soft, no cyanosis  Neuro:  Grossly intact  Psych:  Alert and oriented                Medications:    Scheduled Meds:  Current Facility-Administered Medications:  acetaminophen 325 mg Oral Q8H PRN Isaac Stewart MD    aspirin 81 mg Oral Daily Chase Alberto MD    atorvastatin 40 mg Oral Daily With Dinner Chase Alberto MD    bumetanide 1 mg/hr Intravenous Continuous Esdras English MD Last Rate: 1 mg/hr (04/17/18 0607)   calcium carbonate 1,000 mg Oral Daily PRN Chase Alberto MD    docusate sodium 100 mg Oral BID PRN Chase Alberto MD    heparin (porcine) 5,000 Units Subcutaneous Q8H [facility] Lisseth Seals PA-C    insulin glargine 25 Units Subcutaneous HS Vicki Hooker MD    insulin lispro 1-5 Units Subcutaneous TID AC Chase Alberto MD    insulin lispro 1-5 Units Subcutaneous HS Chase Alberto MD    insulin lispro 12 Units Subcutaneous TID With Meals Vicki Hooker MD    melatonin 3 mg Oral HS Lsiseth Seals PA-C    metolazone 10 mg Oral BID Isaac Stewart MD    metoprolol succinate 50 mg Oral Q12H Elena Eagle MD    ondansetron 4 mg Intravenous Q6H PRN Chase Alberto MD    pantoprazole 40 mg Oral Early Morning Chase Alberto MD    tamsulosin 0 4 mg Oral Daily With Angela Patterson JOSÉ LUIS Seals        PRN Meds:   acetaminophen    calcium carbonate    docusate sodium    ondansetron    Continuous Infusions:  bumetanide 1 mg/hr Last Rate: 1 mg/hr (04/17/18 0607)       Lab, Imaging and other studies: I have personally reviewed pertinent labs    Laboratory Results:    Results from last 7 days  Lab Units 04/18/18  0444 04/17/18  8544 04/16/18  0501 04/15/18  0432 04/14/18  0441 04/13/18  0828 04/12/18  0505   WBC Thousand/uL 6 63  --  7 44  --   --   --  7 13   HEMOGLOBIN g/dL 9 5*  --  9 9*  --   --   --  9 9*   HEMATOCRIT % 30 4*  --  32 5*  --   --   --  32 3*   PLATELETS Thousands/uL 170  --  170  --   --   --  173   SODIUM mmol/L 141 140 141 137 140 141 140   POTASSIUM mmol/L 3 1* 3 5 3 7 3 9 3 7 3 9 3 7   CHLORIDE mmol/L 94* 95* 96* 94* 96* 98* 95*   CO2 mmol/L 37* 34* 34* 31 35* 34* 33*   BUN mg/dL 137* 136* 135* 130* 131* 127* 126*   CREATININE mg/dL 2 70* 2 72* 2 92* 3 02* 2 79* 2 74* 2 88*   CALCIUM mg/dL 9 9 9 5 10 0 9 6 9 3 9 8 9 4   MAGNESIUM mg/dL 2 7*  --   --   --   --   --   --    PHOSPHORUS mg/dL 5 4*  --   --   --   --   --   --    TOTAL PROTEIN g/dL  --   --  6 9  --   --   --   --    GLUCOSE RANDOM mg/dL 137 140 103 180* 94 204* 106     Urinalysis: Lab Results   Component Value Date    COLORU Yellow 04/05/2018    COLORU Yellow 12/12/2017    CLARITYU Clear 04/05/2018    CLARITYU Transparent 12/12/2017    SPECGRAV 1 015 04/05/2018    SPECGRAV 1 005 12/12/2017    PHUR 5 0 04/05/2018    PHUR 5 0 12/12/2017    LEUKOCYTESUR Negative 04/05/2018    LEUKOCYTESUR - 12/12/2017    NITRITE Negative 04/05/2018    NITRITE - 12/12/2017    PROTEINUA Negative 04/05/2018    PROTEINUA - 12/12/2017    GLUCOSEU Negative 04/05/2018    GLUCOSEU Negative 11/24/2015    KETONESU Negative 04/05/2018    KETONESU - 12/12/2017    BILIRUBINUR Negative 04/05/2018    BILIRUBINUR - 12/12/2017    BLOODU Negative 04/05/2018    BLOODU - 12/12/2017     ABGs: No results found for: [facility]  Radiology review:     Portions of the record may have been created with voice recognition software   Occasional wrong word or "sound a like" substitutions may have occurred due to the inherent limitations of voice recognition software   Read the chart carefully and recognize, using context, where substitutions have occurred

## 2018-04-18 NOTE — PROGRESS NOTES
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, hallux and lateral heel  The left 3rd toenail bed has eschar without sign of infection from the removed toenail  2  DM neuropathy  3  Severe PAD  4  CHF with LE edema  5   Ischemic changes to right 1st and 2nd digit without gangrene  6  Left 3rd toe onycholysis without complication    Plan:  1  Patient's feet are stable from podiatric point of view  The edema is improving slowly  Continue Betadine paint to all dry gangrene areas and elevate as much as possible  Patient refuses surgical shoes as he says that will make him on balance  Will continue weekly observation  No need for antibiotics  2   Per vascular surgery, patient may be a candidate for revascularization however given his current state it is far too risky  Should infection set in his feet this may need to be Re discussed  Agree with conservative care for this time  Subjective/Objective   Chief Complaint:   Chief Complaint   Patient presents with    Shortness of Breath     pt reports being short of breath over the past week  Pt reports when he gets up and walks distances he has trouble breathing and chest pain  Daughter reports recent trip to [address] [place] [address] where pt did not take lasix to avoid voiding  Subjective: [de-identified] y o  y/o male seen and evaluated at bedside  No acute events overnight  Denies N/F/V/SOB/CP/cough/diarrhea/constipation  Pain to his left foot on palpation or ambulation  Blood pressure 122/60, pulse 90, temperature 97 7 °F (36 5 °C), temperature source Oral, resp  rate 18, height 5' 7" (1 702 m), weight 98 2 kg (216 lb 7 9 oz), SpO2 100 %  ,Body mass index is 33 91 kg/m²      Lab Results   Component Value Date    WBC 6 63 04/18/2018    HGB 9 5 (L) 04/18/2018    HCT 30 4 (L) 04/18/2018    MCV 86 04/18/2018     04/18/2018     Lab Results   Component Value Date    GLUCOSE 137 04/18/2018    CALCIUM 9 9 04/18/2018     04/18/2018    K 3 1 (L) 04/18/2018    CO2 37 (H) 04/18/2018    CL 94 (L) 04/18/2018     (H) 04/18/2018    CREATININE 2 70 (H) 04/18/2018         Invasive Devices     Peripheral Intravenous Line            Long-Dwell Peripheral IV (Midline) 20/81/14 Left Basilic 5 days                Physical Exam:   General: alert, cooperative and no distress  Lungs:  Shallow breath sounds  Heart: regular rate and rhythm   Abdomen: soft, non-tender  Extremity:  Stable dry gangrene left foot 5th digit, lateral heel, 3rd toenail bed  No sign of any cellulitis infection  No malodor  No changes from initial presentation  There is significant improvement in the edema to his lower extremities  The right great toe and 2nd toe are stable but do have some mild dusky changes that were noted on admission  No sign of any infection  Lab, Imaging and other studies:     Imaging: I have personally reviewed pertinent films in PACS  EKG, Pathology, and Other Studies: I have personally reviewed pertinent reports  Portions of the record may have been created with voice recognition software  Occasional wrong word or "sound a like" substitutions may have occurred due to the inherent limitations of voice recognition software  Read the chart carefully and recognize, using context, where substitutions have occurred

## 2018-04-19 NOTE — PROGRESS NOTES
Progress Note - Nephrology   Yunior Cuba  [de-identified] y o  male MRN: [id number]  Unit/Bed#: -01 Encounter: [id number]    ASSESSMENT AND PLAN:  1   CKD stage 3/4:  Baseline creatinine is 1 8-2 2   2   SABINE:  Secondary cardiorenal syndrome/urinary retention:  Creatinine actually has stabilized at 2 65 mg/dL with ongoing azotemia unchanged despite significant diuresis yesterday on an increase in the bumetanide drip  Intake and output is -2 L  Both the patient and his wife would reserve renal placement therapy/hemodialysis with the kidney machine is an absolute last resort  Recommendations:  -chest x-ray 04/18/2018: Moderate to severe pulmonary congestion bilateral effusions with mild improvement  Treatment:  -bumetanide 2 milligrams/hour  -metolazone 10 mg q 6 hours  -intake and output and weights daily, monitor daily labs  3  Hypervolemia:  Please see above  4  Hypokalemia:  Replete  Added spironolactone, which I will increase  5  Metabolic alkalosis:  Most likely for contraction alkalosis  VBG demonstrates pH is 7 45 with pCO2 52 and a bicarbonate of 36  Serum bicarbonates 39  If it goes significantly higher consider small dose of Diamox  6   Mineral bone disorder:  Hyperphosphatemia  Phosphate binders  7   Anemia:  Acceptable at 9 5   8   Urinary retention:  Monitor bladder scans  9   Hypertension is controlled at this time  Hold parameters  7   Severe MR/TR/ejection fraction 45% and decreased RV function/pulmonary hypertension/aortic valve replacement/paroxysmal atrial fibrillation/flutter/non ST elevation myocardial infarction type 2:  Per Cardiology  Overall guarded prognosis  Subjective:   Patient overall feels better  Less shortness of breath  No nausea vomiting or diarrhea  Urinating quite well  Good appetite  Objective:     Vitals: Blood pressure 112/72, pulse 95, temperature 97 8 °F (36 6 °C), resp  rate 18, height 5' 7" (1 702 m), weight 95 kg (209 lb 7 oz), SpO2 98 %  ,Body mass index is 32 8 kg/m²      Weight (last 2 days)     Date/Time   Weight    04/19/18 0600  95 (209 44)    04/18/18 0754  96 5 (212 74)    04/17/18 0600  98 2 (216 49)                Intake/Output Summary (Last 24 hours) at 04/19/18 0906  Last data filed at 04/19/18 4991   Gross per 24 hour   Intake             1155 ml   Output             2529 ml   Net            -1374 ml            Physical Exam: General:  No acute distress sitting out of bed  Skin:  No acute rash  Eyes:  No scleral icterus  ENT:  Moist mucous membranes  Neck:  Supple, no jugular venous distention  Chest:  Clear to auscultation  CVS:  No rubs or gallops appreciable, slightly irregular  Abdomen:  Obese, soft and nontender with normal bowel sounds  Extremities:  2+ lower extremity edema to the knees but softer  Neuro:  Grossly intact  Psych:  Alert and oriented                Medications:    Scheduled Meds:  Current Facility-Administered Medications:  acetaminophen 325 mg Oral Q8H PRN Cassi Brunson MD    aspirin 81 mg Oral Daily Chase Alberto MD    atorvastatin 40 mg Oral Daily With Dinner Chase Alberto MD    bumetanide 2 mg/hr Intravenous Continuous Cassi Brunson MD Last Rate: 2 mg/hr (04/19/18 0452)   calcium carbonate 1,000 mg Oral Daily PRN Chase Alberto MD    docusate sodium 100 mg Oral BID PRN Chase Alberto MD    docusate sodium 100 mg Oral BID Latoya Velasco MD    heparin (porcine) 5,000 Units Subcutaneous Q8H [facility] Lisseth Seals PA-C    insulin glargine 25 Units Subcutaneous HS Mary Johnson MD    insulin lispro 1-5 Units Subcutaneous TID AC Chase Alberto MD    insulin lispro 1-5 Units Subcutaneous HS Chase Alberto MD    insulin lispro 12 Units Subcutaneous TID With Meals Mary Johnson MD    melatonin 3 mg Oral HS Lisseth Seals PA-C    metolazone 10 mg Oral 4x Daily Cassi Brunson MD    metoprolol succinate 50 mg Oral Q12H Reilly Rajan MD    ondansetron 4 mg Intravenous Q6H PRN Chase MD Remy    pantoprazole 40 mg Oral Early Morning Chase Alberto MD    sevelamer 800 mg Oral TID With Meals Joseph Llamas MD    spironolactone 25 mg Oral Daily Joseph Llamas MD    tamsulosin 0 4 mg Oral Daily With Dinner Lisseth Seals PA-C        PRN Meds:   acetaminophen    calcium carbonate    docusate sodium    ondansetron    Continuous Infusions:  bumetanide 2 mg/hr Last Rate: 2 mg/hr (04/19/18 0452)       Lab, Imaging and other studies: I have personally reviewed pertinent labs    Laboratory Results:    Results from last 7 days  Lab Units 04/19/18  0438 04/18/18  0444 04/17/18  1475 04/16/18  0501 04/15/18  0432 04/14/18  0441 04/13/18  0828   WBC Thousand/uL  --  6 63  --  7 44  --   --   --    HEMOGLOBIN g/dL  --  9 5*  --  9 9*  --   --   --    HEMATOCRIT %  --  30 4*  --  32 5*  --   --   --    PLATELETS Thousands/uL  --  170  --  170  --   --   --    SODIUM mmol/L 141 141 140 141 137 140 141   POTASSIUM mmol/L 3 4* 3 1* 3 5 3 7 3 9 3 7 3 9   CHLORIDE mmol/L 94* 94* 95* 96* 94* 96* 98*   CO2 mmol/L 39* 37* 34* 34* 31 35* 34*   BUN mg/dL 139* 137* 136* 135* 130* 131* 127*   CREATININE mg/dL 2 65* 2 70* 2 72* 2 92* 3 02* 2 79* 2 74*   CALCIUM mg/dL 9 9 9 9 9 5 10 0 9 6 9 3 9 8   MAGNESIUM mg/dL  --  2 7*  --   --   --   --   --    PHOSPHORUS mg/dL  --  5 4*  --   --   --   --   --    TOTAL PROTEIN g/dL  --   --   --  6 9  --   --   --    GLUCOSE RANDOM mg/dL 147* 137 140 103 180* 94 204*     Urinalysis: Lab Results   Component Value Date    COLORU Yellow 04/05/2018    COLORU Yellow 12/12/2017    CLARITYU Clear 04/05/2018    CLARITYU Transparent 12/12/2017    SPECGRAV 1 015 04/05/2018    SPECGRAV 1 005 12/12/2017    PHUR 5 0 04/05/2018    PHUR 5 0 12/12/2017    LEUKOCYTESUR Negative 04/05/2018    LEUKOCYTESUR - 12/12/2017    NITRITE Negative 04/05/2018    NITRITE - 12/12/2017    PROTEINUA Negative 04/05/2018    PROTEINUA - 12/12/2017    GLUCOSEU Negative 04/05/2018    GLUCOSEU Negative 11/24/2015    KETONESU Negative 04/05/2018    KETONESU - 12/12/2017    BILIRUBINUR Negative 04/05/2018    BILIRUBINUR - 12/12/2017    BLOODU Negative 04/05/2018    BLOODU - 12/12/2017     ABGs: No results found for: [facility]  Radiology review:     Portions of the record may have been created with voice recognition software   Occasional wrong word or "sound a like" substitutions may have occurred due to the inherent limitations of voice recognition software   Read the chart carefully and recognize, using context, where substitutions have occurred

## 2018-04-19 NOTE — PROGRESS NOTES
Was called into patient's room by daughter  She wanted to discuss the "purple bractlet that was slapped onto his wrist while he was sleeping a couple of days ago "  I explained the purple bractlet was because of his code status being a level 3 DNR/DNI  The daughter stated "he should be a full code, no intervention should be made only if he is in a coma or has cancer " The patient was a full code when he entered the hospital then on 11 th The PA talked with the patient and he was then made a level 3 DNR/DNI which I explained to the daughter  The daughter wanted the patient's code status changed as "he has not idea what he wants he can not make that decision by himself, they must have asked him that while he was sleeping "  I informed the daughter I would get the PA as I can not change a code status  Ismael BANG was notified and is going in to talk to the daughter  Will continue to monitor, call bell within reach

## 2018-04-19 NOTE — PROGRESS NOTES
Asked by RN to speak with patient's daughter about code status  Daughter states that the patients wife was very upset today because of his DNR/DNI bracelet placed since 4/11/18  Daughter states that they initially want resuscitation with CRP  However, they only NOT want resuscitation if he were in a coma, diagnosed with cancer or required life support (examples per daughter)  She states that the patient's wife (POA) spoke with their  today to review his living will, which states that he wants resuscitation but not extreme measures  Daughter stated that code status should only be discussed if they are pursuing hospice, but they are working towards going home so it should have never been re-discussed  Patient's wife asked to bring in a copy of living will to have on file  Daughter asked for code status to only be discussed with patient if wife or daughter is present to interpret for the patient even though he is A&Ox3  Daughter states that he is not getting rest here in the hospital with constant interruptions so he should not be making decisions at this time  Will change patient back to Level 1: Full Code at this point

## 2018-04-20 NOTE — PHYSICAL THERAPY NOTE
PT TREATMENT NOTE  Patient identified by name and birth date as well as medical bracelet       04/20/18 5241   Pain Assessment   Pain Assessment 0-10   Pain Score 8   Pain Type Chronic pain   Pain Location Foot   Pain Orientation Left   Restrictions/Precautions   Weight Bearing Precautions Per Order No   Braces or Orthoses Other (Comment)  (none)   Other Precautions Pain; Fall Risk;O2;Telemetry;Multiple lines; Fluid restriction   General   Chart Reviewed Yes   Additional Pertinent History Patient is on a fluid restriction  Response to Previous Treatment Patient with no complaints from previous session  Family/Caregiver Present Yes  (wife)   Cognition   Overall Cognitive Status WFL   Arousal/Participation Alert; Responsive; Cooperative   Attention Within functional limits   Orientation Level Oriented X4   Memory Within functional limits   Following Commands Follows one step commands without difficulty   Comments Patient was eager to participate in PT  Subjective   Subjective "Where the heck have you been? I love PT  My left heel hurts  I'd rate it an 8/10 "    Bed Mobility   Additional Comments Unable to observe; patient was sitting in his recliner upon PT's arrival   Transfers   Sit to Stand 4  Minimal assistance   Additional items Assist x 1; Armrests; Increased time required;Verbal cues   Stand to Sit 4  Minimal assistance   Additional items Assist x 1; Armrests; Increased time required;Verbal cues   Ambulation/Elevation   Gait pattern Improper Weight shift;Narrow MARGO; Forward Flexion;Decreased foot clearance; Short stride; Excessively slow   Gait Assistance 4  Minimal assist   Additional items Assist x 1;Verbal cues   Assistive Device Rolling walker   Distance 4 ft x 1, 10 ft x 1, 12 ft x 1   Stair Management Assistance Not tested   Balance   Static Sitting Fair +   Dynamic Sitting Fair   Static Standing Poor +   Dynamic Standing Poor +   Ambulatory Poor +   Endurance Deficit   Endurance Deficit Yes   Activity Tolerance   Activity Tolerance Patient limited by fatigue;Treatment limited secondary to medical complications (Comment)  (CHF and general deconditioning)   Nurse Made Aware STEVE Ramsey made aware   Equipment Use   Comments PT and patient worked primarily on transfers and gait training today  He required extended rest breaks between each excursion  In total patient performed 3 excursions of varying distances: 4 ft, 10 ft and 12 ft  He required a rolling walker  Patient's gait was abnormal- decreased foot clearance, narrow MARGO, forward flexion of body and short stride  PT and patient also reviewed his HEP at the conclusion of the treatment session  Assessment   Prognosis Fair   Problem List Decreased strength;Decreased range of motion;Decreased endurance; Impaired balance;Decreased mobility; Decreased coordination;Decreased safety awareness; Obesity; Decreased skin integrity; Impaired sensation;Pain   Assessment PT performed a re-evaluation today in order to determine new goals  Previous goals had   Patient expressed a desire to go to [facility] in order to rehab  Patient and his wife both expressed a desire for patient to eventually return home  Patient has pitting edema in his bilateral lower extremities  His L heel was bandaged due to a wound  Patient's sensation is also compromised in the LLE  Barriers to Discharge Inaccessible home environment   Goals   Patient Goals "I want to go home"   [facility] Expiration Date 18   Short Term Goal #1 In 10-14 days, patient will be: 1  Supervision with Bed Mobility Rolling Right and Left - NOT MET     2  Supervision with Bed Mobility Supine-Sit - NOT MET     3  Supervision with Transfer Bed-Chair- NOT MET     4  Increase Dynamic Sitting Balance at least 1 Grade for improved stability with functional reach activities- NOT MET     5  Increase Dynamic Standing Balance at least 1 Grade for improved ease with Activities of Daily Living- NOT MET     6   Increase Lower Extremity Strength at least 1 Grade for improved ease mobility tasks- NOT MET     7  Supervision with Ambulation 100 feet using a rolling walker to facilitate home and community mobility- NOT MET; More goals will be determined at a future date upon completion of the aforementioned goals  Treatment Day 8   Plan   Treatment/Interventions Functional transfer training;LE strengthening/ROM; Elevations; Therapeutic exercise; Endurance training;Patient/family training;Equipment eval/education;Spoke to nursing;Gait training;Family   Progress Progressing toward goals   PT Frequency 5x/wk   Recommendation   Recommendation Long-term skilled PT   Equipment Recommended Wheelchair;Walker; Other (Comment)  Remus Raphael for short distances; Wheelchair for longer distances)   PT - OK to Discharge Yes  (when medically cleared to  skilled PT)   Additional Comments Patient and wife expressed a desire to go [facility]             Kaitlin Israel, PT

## 2018-04-20 NOTE — PROGRESS NOTES
[facility] Internal Medicine Progress Note  Patient: Angélica Wang  [de-identified] y o  male   MRN: [id number]  PCP: Marlys Fermin MD  Unit/Bed#: -[place] Encounter: [id number]  Date Of Visit: 04/20/18    Assessment:    Principal Problem:    Acute on chronic diastolic congestive heart failure ([facility] )  Active Problems:    Rapid atrial fibrillation (HCC)    Benign essential hypertension    Type 2 diabetes mellitus ([facility] )    Peripheral arterial disease ([facility] )    Acute-on-chronic kidney injury ([facility] )    Non-ST elevation myocardial infarction (NSTEMI) ([facility] )    Pulmonary hypertension ([facility] )    Azotemia      Plan:    · Acute on chronic diastolic congestive heart failure   Continue with Bumex GTT  At current dose   Continue with increased dose of metolazone   Continue with frequent intake/ output monitoring   Daily weight  · Severe pulmonary hypertension with underlying tricuspid regurgitation and RV dysfunction   Cardiology following   May benefit from sildenafil once euvolemic with congestive heart failure  · History of paroxysmal atrial fibrillation and flutter   Heart rate well controlled   Family has refused anticoagulation  · Acute kidney injury on chronic kidney disease stage 3   Baseline creatinine between 1 8-2 2   Creatinine remains at 2 6   Diuresis of 965 mL during the last 24 hours   Bumex GTT and metolazone dose per Nephrology   Gwen Beyer to monitor daily BMP and avoid nephrotoxic medications  · Metabolic alkalosis likely contraction alkalosis  Continue with daily BMP monitoring History of urinary retention  Radhacoy Ramirez   Continue to monitor serial postvoid residual on bladder scan as needed  · Type 2 NSTEMI in the setting of heart failure and renal failure   No ischemic workup per Cardiology  · Type 2 diabetes   Continue with current insulin regimen    · 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   Podiatry following   No indication for surgery   Will need vascular of follow-up as an outpatient  · Insomnia  Started on zolpidem and patient is not sleeping with daily melatonin       VTE Pharmacologic Prophylaxis:   Pharmacologic: Heparin  Mechanical VTE Prophylaxis in Place: No    Patient Centered Rounds: I have performed bedside rounds with nursing staff today  Discussions with Specialists or Other Care Team Provider:  Discussed with case management  Education and Discussions with Family / Patient:  Discussed with wife over the phone yesterday    Time Spent for Care: 30 minutes  More than 50% of total time spent on counseling and coordination of care as described above  Current Length of Stay: 16 day(s)    Current Patient Status: Inpatient   Certification Statement: The patient will continue to require additional inpatient hospital stay due to Ongoing management for heart failure and acute kidney injury    Discharge Plan / Estimated Discharge Date:  Currently not medically stable for discharge  Code Status: Level 1 - Full Code      Subjective:   Patient examined at bedside  Still states that he has shortness of breath on minimal exertion  Feels tired as he is unable to sleep for last many nights  Denies any chest pain or cough  Remains afebrile  Objective:     Vitals:   Temp (24hrs), Av 9 °F (36 6 °C), Min:97 6 °F (36 4 °C), Max:98 5 °F (36 9 °C)    HR:  [101-129] 102  Resp:  [18] 18  BP: (100-134)/(65-72) 123/71  SpO2:  [97 %] 97 %  Body mass index is 32 42 kg/m²  Input and Output Summary (last 24 hours): Intake/Output Summary (Last 24 hours) at 18 1048  Last data filed at 18 0845   Gross per 24 hour   Intake              940 ml   Output             2549 ml   Net            -1609 ml       Physical Exam:     Physical Exam   Constitutional: He is oriented to person, place, and time  No distress  HENT:   Head: Normocephalic and atraumatic  Eyes: Pupils are equal, round, and reactive to light  Neck: Normal range of motion  Neck supple  No JVD present  Cardiovascular:   Murmur heard  Pulmonary/Chest:   Decreased breath sounds at bases bilaterally   Abdominal: Soft  Bowel sounds are normal  He exhibits no distension and no mass  There is no tenderness  There is no rebound and no guarding  Musculoskeletal:   Bilateral lower extremity edema   Neurological: He is alert and oriented to person, place, and time  Skin: Skin is warm  He is not diaphoretic  Psychiatric: He has a normal mood and affect  Additional Data:     Labs:      Results from last 7 days  Lab Units 04/18/18  0444   WBC Thousand/uL 6 63   HEMOGLOBIN g/dL 9 5*   HEMATOCRIT % 30 4*   PLATELETS Thousands/uL 170       Results from last 7 days  Lab Units 04/20/18  0558  04/16/18  0501   SODIUM mmol/L 139  < > 141   POTASSIUM mmol/L 3 3*  < > 3 7   CHLORIDE mmol/L 91*  < > 96*   CO2 mmol/L 40*  < > 34*   BUN mg/dL 136*  < > 135*   CREATININE mg/dL 2 63*  < > 2 92*   CALCIUM mg/dL 9 9  < > 10 0   TOTAL PROTEIN g/dL  --   --  6 9   BILIRUBIN TOTAL mg/dL  --   --  1 20*   ALK PHOS U/L  --   --  67   ALT U/L  --   --  20   AST U/L  --   --  18   GLUCOSE RANDOM mg/dL 125  < > 103   < > = values in this interval not displayed  * I Have Reviewed All Lab Data Listed Above  * Additional Pertinent Lab Tests Reviewed:  Colin Rutherford Admission Reviewed    Imaging:    Imaging Reports Reviewed Today Include: NA      Recent Cultures (last 7 days):           Last 24 Hours Medication List:     Current Facility-Administered Medications:  acetaminophen 325 mg Oral Q8H PRN Charlotte Bucio MD    aspirin 81 mg Oral Daily Chase Alberto MD    atorvastatin 40 mg Oral Daily With Dinner Chase Alberto MD    bumetanide 2 mg/hr Intravenous Continuous Charlotte Bucio MD Last Rate: 2 mg/hr (04/20/18 0110)   calcium carbonate 1,000 mg Oral Daily PRN Chase Alberto MD    docusate sodium 100 mg Oral BID PRN Chase Alberto MD    docusate sodium 100 mg Oral BID Azell Lundborg, MD heparin (porcine) 5,000 Units Subcutaneous Q8H [facility] Lisseth Seals PA-C    insulin glargine 25 Units Subcutaneous HS Yakov Hutchins MD    insulin lispro 1-5 Units Subcutaneous TID AC Chase Alberto MD    insulin lispro 1-5 Units Subcutaneous HS Chase Alberto MD    insulin lispro 12 Units Subcutaneous TID With Meals Ykaov Hutchins MD    melatonin 3 mg Oral HS Lisseth Seals PA-C    metolazone 10 mg Oral 4x Daily Oakmanaurea Smith, MD    metoprolol succinate 50 mg Oral Q12H Sonali Maldonado MD    ondansetron 4 mg Intravenous Q6H PRN Chase Alberto MD    pantoprazole 40 mg Oral Early Morning Chase Alberto MD    sevelamer 800 mg Oral TID With Meals Juan Smith MD    spironolactone 50 mg Oral Daily Oakmanaurea Smith MD    tamsulosin 0 4 mg Oral Daily With Dinner Lisseth Seals PA-C    zolpidem 5 mg Oral HS PRN Catherine Sandhu MD         Today, Patient Was Seen By: Catherine Sandhu MD    ** Please Note: This note has been constructed using a voice recognition system   **

## 2018-04-20 NOTE — PROGRESS NOTES
Pt resting comfortably in bed  Bumex gtt running at 8mL/hr, new bag hung  Pt voiding efficiently  No complaints from pt at this time  A-fib on tele, 90-100s  Call bell within reach  Bed low and locked  Alarm on  Will continue to monitor

## 2018-04-20 NOTE — PHYSICAL THERAPY NOTE
PT RE-EVALUATION   Patient identified by name and birth date as well as medical bracelet    PT performed a re-evaluation today in order to determine new goals  Previous goals had   Patient expressed a desire to go to [facility] in order to rehab  Patient and his wife both expressed a desire for patient to eventually return home  Patient has pitting edema in his bilateral lower extremities  His L heel was bandaged due to a wound  Patient's sensation is also compromised in the LLE      18 1400   Note Type   Note type Eval only   Pain Assessment   Pain Assessment 0-10   Pain Score 8   Pain Type Chronic pain   Pain Location Foot   Pain Orientation Left   Home Living   Type of Home House  (2 NUNO)   Home Layout Two level;Bed/bath upstairs;Stairs to enter with rails   Bathroom Shower/Tub Walk-in shower   Bathroom Toilet Standard   Bathroom Equipment Grab bars in shower; Shower chair;Commode;Grab bars around toilet   [address] Walker;Cane;Grab bars   Additional Comments Patient is interested in obtaining a wheelchair  PT will speak with case management about this  Prior Function   Level of [place] Independent with ADLs and functional mobility   Lives With Spouse   Receives Help From Family   ADL Assistance Independent   IADLs Independent   Falls in the last 6 months 1 to 4  (1 in March; 1 in April )   Vocational Full time employment   Comments 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  Restrictions/Precautions   Weight Bearing Precautions Per Order No   Braces or Orthoses Other (Comment)  (none)   Other Precautions Pain; Fall Risk;O2;Telemetry;Multiple lines; Fluid restriction   General   Additional Pertinent History Patient is on a fluid restriction     Family/Caregiver Present Yes  (wife)   Cognition   Overall Cognitive Status WFL   Arousal/Participation Alert   Orientation Level Oriented X4   Memory Within functional limits   Following Commands Follows one step commands without difficulty   RUE Assessment   RUE Assessment WFL   LUE Assessment   LUE Assessment WFL   RLE Assessment   RLE Assessment WNL   LLE Assessment   LLE Assessment X  (Pain in the L heel )   Coordination   Movements are Fluid and Coordinated 0   Coordination and Movement Description Movements are Slow and Labored   Light Touch   RLE Light Touch Grossly intact   LLE Light Touch Impaired   LLE Light Touch Comments Patient has diabetic neuropathy  He does not have sensation below the L ankle   Bed Mobility   Additional Comments Unable to observe; patient was sitting in his recliner upon PT's arrival   Transfers   Sit to Stand 4  Minimal assistance   Additional items Assist x 1; Armrests; Increased time required;Verbal cues   Stand to Sit 4  Minimal assistance   Additional items Assist x 1; Armrests; Increased time required;Verbal cues   Ambulation/Elevation   Gait pattern Improper Weight shift;Narrow MARGO; Forward Flexion;Decreased foot clearance; Short stride; Excessively slow   Gait Assistance 4  Minimal assist   Additional items Assist x 1;Verbal cues   Assistive Device Rolling walker   Distance 4 ft x 1, 10 ft x 1, 12 ft x 1   Stair Management Assistance Not tested   Balance   Static Sitting Fair +   Dynamic Sitting Fair   Static Standing Poor +   Dynamic Standing Poor +   Ambulatory Poor +   Endurance Deficit   Endurance Deficit Yes   Activity Tolerance   Activity Tolerance Patient limited by fatigue;Treatment limited secondary to medical complications (Comment)  (CHF and general deconditioning)   Nurse Made Aware STEVE Rothman made aware   Assessment   Prognosis Fair   Problem List Decreased strength;Decreased range of motion;Decreased endurance; Impaired balance;Decreased mobility; Decreased coordination;Decreased safety awareness; Obesity; Decreased skin integrity; Impaired sensation;Pain   Assessment Pt is a [de-identified] y o  male admitted to [address] [place] [address] on 4/4/2018 w/ Acute on chronic diastolic congestive heart failure ([facility] [address] ); Patient has a complex medical history which includes pulmonary embolism, NSTEMI, acute on chronic kidney, non-proliferative diabetic retinopathy, CHF, atherosclerosis, PAD, hyperparathyroidism, Type 2 diabetes mellitus, acute respiratory failure with hypoxia, rapid A-fib and chronic diastolic congestive heart failure  Pt exhibits significant impairments with weakness, decreased ROM, impaired balance, decreased endurance, impaired coordination, gait deviations, pain, decreased activity tolerance, decreased functional mobility tolerance, altered sensation, fall risk, SOB upon exertion and decreased skin integrity; these impact independence with mobility, ADLs, and IADLs; Patient received a 60 on the Barthel Index reveal limitations;  therapy prognosis is impacted by relevant co morbidities as noted in evaluation; clinical presentation is currently stable - Patient is minimal assistance for transfers and ambulation  PT was unable to observe bed mobility; PTA, pt was modified independent with mobility  He told PT he used a cane  Patient was independent for bed mobility and transfers  Patient's wife lives at home with him  Eventually they would like patient to return home  PT recommends skilled PT in order to optimize functional independence and discharge planning; pending functional progress, PT recommendation at discharge is long term skilled physical therapy  Barriers to Discharge Inaccessible home environment   Goals   Patient Goals "I want to go home"   STG Expiration Date 05/04/18   Short Term Goal #1 In 10-14 days, patient will be: 1  Supervision with Bed Mobility Rolling Right and Left     2  Supervision with Bed Mobility Supine-Sit     3  Supervision with Transfer Bed-Chair     4  Increase Dynamic Sitting Balance at least 1 Grade for improved stability with functional reach activities     5  Increase Dynamic Standing Balance at least 1 Grade for improved ease with Activities of Daily Living     6  Increase Lower Extremity Strength at least 1 Grade for improved ease mobility tasks     7  Supervision with Ambulation 100 feet using a rolling walker to facilitate home and community mobility; More goals will be determined at a future date upon completion of the aforementioned goals  Plan   Treatment/Interventions Functional transfer training;LE strengthening/ROM; Elevations; Therapeutic exercise; Endurance training;Patient/family training;Equipment eval/education;Spoke to nursing;Gait training;Family   PT Frequency 5x/wk   Recommendation   Recommendation Long-term skilled PT   Equipment Recommended Wheelchair;Walker; Other (Comment)  [place] [employer] for short distances; Wheelchair for longer distances)   PT - OK to Discharge Yes  (when medically cleared to long term skilled PT)   Additional Comments Patient and wife expressed a desire to go [facility]       Barthel Index   Feeding 10   Bathing 5   Grooming Score 5   Dressing Score 5   Bladder Score 10   Bowels Score 10   Toilet Use Score 5   Transfers (Bed/Chair) Score 10   Mobility (Level Surface) Score 0   Stairs Score 0   Barthel Index Score 60   Yareli Pastor, PT

## 2018-04-20 NOTE — PROGRESS NOTES
NEPHROLOGY PROGRESS NOTE   Hipolito Gerard  [de-identified] y o  male MRN: [id number]  Unit/Bed#: -01 Encounter: [id number]  Reason for Consult:  Acute kidney injury on CKD    The patient is an 44-year-old gentleman who presented with shortness of breath on April 4th   He went on vacation for 5 days and did not take his diuretic, along with dietary indiscretion  [facility] complicated by atrial fibrillation, hypotension and severe diuretic resistance     1  Acute kidney injury on chronic kidney disease:  Baseline creatinine 1 8-2 2   Etiology possibly prerenal, decreased effective circulating volume in the setting of volume overload, cardiorenal also with concern for urinary retention   Geronimo catheter discontinued on 04/12/2018  · Creatinine slightly above baseline but stable in the mid 2s  · Status post Geronimo catheter removal no evidence of retention  · Urinalysis:  Negative protein, no RBCs, no bacteria, 0-1 WBCs  · Renal ultrasound:  Kidneys diffusely echogenic reflecting chronic medical renal disease  2  Azotemia:    persists  · FOBT  negative  3  Acute on chronic diastolic CHF, Volume overload:    · Currently on Bumex infusion with adjunct metolazone, spirolactone  2 L urine output  · Weight down to 207 lb  Previous dry weight 110-112 lb  · Cardiology following   May be related to worsening cardiac function/RV dysfunction/severe TR in the setting of atrial fibrillation, worsened MR, pulmonary hypertension and reduction in LVEF  · Venous Dopplers lower extremities completed   No DVT  · Outpatient diuretic regime: torsemide 40 mg every Monday, Wednesday, Friday the remaining days patient was taking 20 mg  · Fluid restriction 1500 mL a day  3  PAF:  Rate controlled with intermittent bouts of tachycardia   Cardiology following  4  Anemia:  Hemoglobin 9 5-stable  5  PVD:  Podiatry following  6  MGUS:  IgM kappa and IgM lambda followed by Dr Carl Diaz  7  Elevated bicarbonate:  Likely due to aggressive diuresis   Keep potassium level ~4  8  Hypokalemia:  Replete  Magnesium level 2 6  Now on spirolactone  Monitor  9  Hyperphosphatemia:  Improved on sevelamer -phosphorus 4 1  10  Other:  CAD, prior CABG, AS status post AVR-bioprosthetic     SUMMARY OF RECOMMENDATIONS:  · Continue Bumex, metolazone  · Fluid restriction, daily weight  · Replete potassium- 40 mEq b i d  p o  SUBJECTIVE / INTERVAL HISTORY:  No complaints offered  Patient states he feels okay at this time  Feels that he is eating well    OBJECTIVE:  Current Weight: Weight - Scale: 93 9 kg (207 lb 0 2 oz)  Vitals:    04/19/18 1535 04/19/18 2200 04/20/18 0600 04/20/18 0733   BP: 124/70 134/65  123/71   BP Location: Right arm Right arm  Right arm   Pulse: 105 101  102   Resp: 18 18 18   Temp: 97 7 °F (36 5 °C) 98 5 °F (36 9 °C)  97 6 °F (36 4 °C)   TempSrc: Oral Oral  Oral   SpO2: 97% 97%  97%   Weight:   93 9 kg (207 lb 0 2 oz)    Height:           Intake/Output Summary (Last 24 hours) at 04/20/18 1116  Last data filed at 04/20/18 0845   Gross per 24 hour   Intake              940 ml   Output             2349 ml   Net            -1409 ml     General:  Sitting up out of bed in the chair    Comfortable at rest  Skin:   Warm and dry, no rash  Eyes:  Sclera clear, anicteric  ENT:  Oropharynx moist  Neck:  Supple, neck veins appear full  Chest:  Fine crackles bilaterally, no wheezes or rhonchi  CVS:  Irregularly irregular rhythm  Abdomen:  Soft, nondistended, nontender, bowel sounds present  Extremities:  Pitting edema bilaterally left greater than right  :  No Geronimo  Neuro:  Alert and oriented  Psych:  Appropriate  Medications:    Current Facility-Administered Medications:     acetaminophen (TYLENOL) tablet 325 mg, 325 mg, Oral, Q8H PRN, Gilbert Martínez MD, 325 mg at 04/19/18 0948    aspirin (ECOTRIN LOW STRENGTH) EC tablet 81 mg, 81 mg, Oral, Daily, Chase Alberto MD, 81 mg at 04/20/18 0843    atorvastatin (LIPITOR) tablet 40 mg, 40 mg, Oral, Daily With Jensen Brisa Alberto MD, 40 mg at 04/19/18 1739    bumetanide (BUMEX) 12 5 mg infusion 50 mL, 2 mg/hr, Intravenous, Continuous, Storm Aguiar MD, Last Rate: 8 mL/hr at 04/20/18 0110, 2 mg/hr at 04/20/18 0110    calcium carbonate (TUMS) chewable tablet 1,000 mg, 1,000 mg, Oral, Daily PRN, Chase Alberto MD    docusate sodium (COLACE) capsule 100 mg, 100 mg, Oral, BID PRN, Chase Alberto MD, 100 mg at 04/10/18 1005    docusate sodium (COLACE) capsule 100 mg, 100 mg, Oral, BID, Sanjay Reinoso MD, 100 mg at 04/20/18 0843    heparin (porcine) subcutaneous injection 5,000 Units, 5,000 Units, Subcutaneous, Q8H [address], Lisseth Seals PA-C, 5,000 Units at 04/19/18 2154    insulin glargine (LANTUS) subcutaneous injection 25 Units, 25 Units, Subcutaneous, HS, Zachery Nuñez MD, 25 Units at 04/19/18 2154    insulin lispro (HumaLOG) 100 units/mL subcutaneous injection 1-5 Units, 1-5 Units, Subcutaneous, TID AC, 2 Units at 04/19/18 1743 **AND** [CANCELED] Fingerstick Glucose (POCT), , , TID AC, Chase Alberto MD    insulin lispro (HumaLOG) 100 units/mL subcutaneous injection 1-5 Units, 1-5 Units, Subcutaneous, HS, Chase Alberto MD, 1 Units at 04/18/18 2135    insulin lispro (HumaLOG) 100 units/mL subcutaneous injection 12 Units, 12 Units, Subcutaneous, TID With Meals, Zachery Nuñez MD, 12 Units at 04/15/18 1739    melatonin tablet 3 mg, 3 mg, Oral, HS, Lisseth Seals PA-C, 3 mg at 04/19/18 2154    metolazone (ZAROXOLYN) tablet 10 mg, 10 mg, Oral, 4x Daily, Storm Aguiar MD, 10 mg at 04/20/18 0843    metoprolol succinate (TOPROL-XL) 24 hr tablet 50 mg, 50 mg, Oral, Q12H, Donato Newsome MD, 50 mg at 04/20/18 0843    ondansetron (ZOFRAN) injection 4 mg, 4 mg, Intravenous, Q6H PRN, Chase Alberto MD    pantoprazole (PROTONIX) EC tablet 40 mg, 40 mg, Oral, Early Morning, Chase Alberto MD, 40 mg at 04/19/18 0601    sevelamer (RENAGEL) tablet 800 mg, 800 mg, Oral, TID With Meals, Sunshine Smith MD, 800 mg at 04/20/18 8107    spironolactone (ALDACTONE) tablet 50 mg, 50 mg, Oral, Daily, Sunshine Smith MD, 50 mg at 04/20/18 0842    tamsulosin (FLOMAX) capsule 0 4 mg, 0 4 mg, Oral, Daily With Michelet Seals PA-C, 0 4 mg at 04/19/18 1740    zolpidem (AMBIEN) tablet 5 mg, 5 mg, Oral, HS PRN, Kenneth Barnett MD    Laboratory Results:    Results from last 7 days  Lab Units 04/20/18  0558 04/19/18  5103 04/18/18  0444 04/17/18  3283 04/16/18  0501 04/15/18  0432 04/14/18  0441   WBC Thousand/uL  --   --  6 63  --  7 44  --   --    HEMOGLOBIN g/dL  --   --  9 5*  --  9 9*  --   --    HEMATOCRIT %  --   --  30 4*  --  32 5*  --   --    PLATELETS Thousands/uL  --   --  170  --  170  --   --    SODIUM mmol/L 139 141 141 140 141 137 140   POTASSIUM mmol/L 3 3* 3 4* 3 1* 3 5 3 7 3 9 3 7   CHLORIDE mmol/L 91* 94* 94* 95* 96* 94* 96*   CO2 mmol/L 40* 39* 37* 34* 34* 31 35*   BUN mg/dL 136* 139* 137* 136* 135* 130* 131*   CREATININE mg/dL 2 63* 2 65* 2 70* 2 72* 2 92* 3 02* 2 79*   CALCIUM mg/dL 9 9 9 9 9 9 9 5 10 0 9 6 9 3   MAGNESIUM mg/dL 2 6  --  2 7*  --   --   --   --    PHOSPHORUS mg/dL 4 1  --  5 4*  --   --   --   --    TOTAL PROTEIN g/dL  --   --   --   --  6 9  --   --    GLUCOSE RANDOM mg/dL 125 147* 137 140 103 180* 94     Previous work up:

## 2018-04-20 NOTE — PLAN OF CARE
Problem: PHYSICAL THERAPY ADULT  Goal: Performs mobility at highest level of function for planned discharge setting  See evaluation for individualized goals  Treatment/Interventions: Functional transfer training, LE strengthening/ROM, Therapeutic exercise, Endurance training, Patient/family training, Equipment eval/education, Bed mobility, Gait training (PT to see when stair training is appropriate )          See flowsheet documentation for full assessment, interventions and recommendations  Outcome: Progressing  Prognosis: Fair  Problem List: Decreased strength, Decreased range of motion, Decreased endurance, Impaired balance, Decreased mobility, Decreased coordination, Decreased safety awareness, Obesity, Decreased skin integrity, Impaired sensation, Pain  Assessment: PT performed a re-evaluation today in order to determine new goals  Previous goals had   Patient expressed a desire to go to [facility] in order to rehab  Patient and his wife both expressed a desire for patient to eventually return home  Patient has pitting edema in his bilateral lower extremities  His L heel was bandaged due to a wound  Patient's sensation is also compromised in the LLE  Barriers to Discharge: Inaccessible home environment     Recommendation: Long-term skilled PT     PT - OK to Discharge: Yes (when medically cleared to LT skilled PT)    See flowsheet documentation for full assessment

## 2018-04-20 NOTE — PROGRESS NOTES
Pts wife brought in a hamburger for pt to eat  Education was given today, and in the past, regarding low sodium diet  Family continues to be noncompliant with diet prescribed  SLIM made aware

## 2018-04-20 NOTE — PLAN OF CARE
Problem: PHYSICAL THERAPY ADULT  Goal: Performs mobility at highest level of function for planned discharge setting  See evaluation for individualized goals  Treatment/Interventions: Functional transfer training, LE strengthening/ROM, Therapeutic exercise, Endurance training, Patient/family training, Equipment eval/education, Bed mobility, Gait training (PT to see when stair training is appropriate )          See flowsheet documentation for full assessment, interventions and recommendations  Outcome: Progressing  Prognosis: Fair  Problem List: Decreased strength, Decreased range of motion, Decreased endurance, Impaired balance, Decreased mobility, Decreased coordination, Decreased safety awareness, Obesity, Decreased skin integrity, Impaired sensation, Pain  Assessment: Pt is a [de-identified] y o  male admitted to [address] [place] [address] on 4/4/2018 w/ Acute on chronic diastolic congestive heart failure ([facility] [address] ); Patient has a complex medical history which includes pulmonary embolism, NSTEMI, acute on chronic kidney, non-proliferative diabetic retinopathy, CHF, atherosclerosis, PAD, hyperparathyroidism, Type 2 diabetes mellitus, acute respiratory failure with hypoxia, rapid A-fib and chronic diastolic congestive heart failure  Pt exhibits significant impairments with weakness, decreased ROM, impaired balance, decreased endurance, impaired coordination, gait deviations, pain, decreased activity tolerance, decreased functional mobility tolerance, altered sensation, fall risk, SOB upon exertion and decreased skin integrity; these impact independence with mobility, ADLs, and IADLs; Patient received a 60 on the Barthel Index reveal limitations;  therapy prognosis is impacted by relevant co morbidities as noted in evaluation; clinical presentation is currently stable - Patient is minimal assistance for transfers and ambulation  PT was unable to observe bed mobility; PTA, pt was modified independent with mobility    He told PT he used a cane   Patient was independent for bed mobility and transfers  Patient's wife lives at home with him  Eventually they would like patient to return home  PT recommends skilled PT in order to optimize functional independence and discharge planning; pending functional progress, PT recommendation at discharge is long term skilled physical therapy  Barriers to Discharge: Inaccessible home environment     Recommendation: Long-term skilled PT     PT - OK to Discharge: Yes (when medically cleared to long term skilled PT)    See flowsheet documentation for full assessment

## 2018-04-20 NOTE — PLAN OF CARE
Problem: DISCHARGE PLANNING - CARE MANAGEMENT  Goal: Discharge to post-acute care or home with appropriate resources  INTERVENTIONS:  - Conduct assessment to determine patient/family and health care team treatment goals, and need for post-acute services based on payer coverage, community resources, and patient preferences, and barriers to discharge  - Address psychosocial, clinical, and financial barriers to discharge as identified in assessment in conjunction with the patient/family and health care team  - Arrange appropriate level of post-acute services according to patient's   needs and preference and payer coverage in collaboration with the physician and health care team  - Communicate with and update the patient/family, physician, and health care team regarding progress on the discharge plan  - Arrange appropriate transportation to post-acute venues   Outcome: Progressing  LOS 16   Bundle; Not a readmission  Cm provided update to [facility] stating that pt will be here through the weekend  Cm will be in contact with [facility] Monday  Cm will follow to assist with needs

## 2018-04-20 NOTE — SOCIAL WORK
LOS 16   Bundle; Not a readmission  Cm provided update to [facility] stating that pt will be here through the weekend  Cm will be in contact with [facility] Monday  Cm will follow to assist with needs

## 2018-04-20 NOTE — CASE MANAGEMENT
Continued Stay Review    Date:     Vital Signs: /71 (BP Location: Right arm)   Pulse 102   Temp 97 6 °F (36 4 °C) (Oral)   Resp 18   Ht 5' 7" (1 702 m)   Wt 93 9 kg (207 lb 0 2 oz)   SpO2 97% on 4L O2 NC  BMI 32 42 kg/m²     Medications:   Scheduled Meds:   Current Facility-Administered Medications:  acetaminophen 325 mg Oral Q8H PRN Mahin Jon MD    aspirin 81 mg Oral Daily Chase Alberto MD    atorvastatin 40 mg Oral Daily With Dinner Chase Alberto MD    bumetanide 2 mg/hr Intravenous Continuous Mahin Jon MD Last Rate: 2 mg/hr (04/20/18 1353)   calcium carbonate 1,000 mg Oral Daily PRN Chase Alberto MD    docusate sodium 100 mg Oral BID PRN Chase Alberto MD    docusate sodium 100 mg Oral BID Iron Fregoso MD    heparin (porcine) 5,000 Units Subcutaneous Q8H [address] Lisseth Seals PA-C    insulin glargine 25 Units Subcutaneous HS Gilbert Chen MD    insulin lispro 1-5 Units Subcutaneous TID AC Chase Alberto MD    insulin lispro 1-5 Units Subcutaneous HS Chase Alberto MD    insulin lispro 12 Units Subcutaneous TID With Meals Gilbert Chen MD    melatonin 3 mg Oral HS Lisseth Seals PA-C    metolazone 10 mg Oral 4x Daily Mahin Jon MD    metoprolol succinate 50 mg Oral Q12H Rabia Peres MD    ondansetron 4 mg Intravenous Q6H PRN Chase Alberto MD    pantoprazole 40 mg Oral Early Morning Chase Alberto MD    potassium chloride 40 mEq Oral BID AmariJEWELL Wen    sevelamer 800 mg Oral TID With Meals Mahin Jon MD    spironolactone 50 mg Oral Daily Mahin Jon MD    tamsulosin 0 4 mg Oral Daily With Dinner Lisseth Seals PA-C    zolpidem 5 mg Oral HS PRN Iron Fregoso MD      Continuous Infusions:   bumetanide 2 mg/hr Last Rate: 2 mg/hr (04/20/18 1353)     PRN Meds:   acetaminophen    calcium carbonate    docusate sodium    ondansetron    zolpidem    Abnormal Labs/Diagnostic Results:      04/20/18 0558    Sodium 136 - 145 mmol/L 139    Potassium 3 5 - 5 3 mmol/L 3 3     Chloride 100 - 108 mmol/L 91     CO2 21 - 32 mmol/L 40     Anion Gap 4 - 13 mmol/L 8    BUN 5 - 25 mg/dL 136     Creatinine 0 60 - 1 30 mg/dL 2 63           Age/Sex: [de-identified] y o  male     Assessment/Plan:   Principal Problem:    Acute on chronic diastolic congestive heart failure ([facility])  Active Problems:    Rapid atrial fibrillation ([facility])    Benign essential hypertension    Type 2 diabetes mellitus ([place] [address] )    Peripheral arterial disease ([facility])    Acute-on-chronic kidney injury ([facility])    Non-ST elevation myocardial infarction (NSTEMI) ([facility] )    Pulmonary hypertension ([facility])    Azotemia        Plan:     · Acute on chronic diastolic congestive heart failure   Continue with Bumex GTT   At current dose   Continue with increased dose of metolazone   Continue with frequent intake/ output monitoring   Daily weight  · Severe pulmonary hypertension with underlying tricuspid regurgitation and RV dysfunction   Cardiology following   May benefit from sildenafil once euvolemic with congestive heart failure  · History of paroxysmal atrial fibrillation and flutter   Heart rate well controlled   Family has refused anticoagulation  · Acute kidney injury on chronic kidney disease stage 3   Baseline creatinine between 1 8-2 2   Creatinine remains at 2 6   Diuresis of 965 mL during the last 24 hours   Bumex GTT and metolazone dose per Nephrology   [place] Raphael to monitor daily BMP and avoid nephrotoxic medications  · Metabolic alkalosis likely contraction alkalosis  Continue with daily BMP monitoring History of urinary retention  Riccardo Stauffer   Continue to monitor serial postvoid residual on bladder scan as needed  · Type 2 NSTEMI in the setting of heart failure and renal failure   No ischemic workup per Cardiology  · Type 2 diabetes   Continue with current insulin regimen    · 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   Podiatry following   No indication for surgery  Alexsanderrinorma Santoyo need vascular of follow-up as an outpatient  · Insomnia  Started on zolpidem and patient is not sleeping with daily melatonin        VTE Pharmacologic Prophylaxis:   Pharmacologic: Heparin  Mechanical VTE Prophylaxis in Place: No     Current Length of Stay: 16 day(s)     Current Patient Status: Inpatient   Certification Statement: The patient will continue to require additional inpatient hospital stay due to Ongoing management for heart failure and acute kidney injury     Discharge Plan / Estimated Discharge Date:  Currently not medically stable for discharge    Code Status: Level 1 - Full Code

## 2018-04-20 NOTE — PROGRESS NOTES
Cardiology Progress Note - Chaim Lynn  [de-identified] y o  male MRN: [id number]    Unit/Bed#: -01 Encounter: [id number]      Assessment/Recommendations:  1  Acute on chronic diastolic heart failure:  Continues on bumex gtt - with good response with boost of metolazone  CXR still congested, would continue diuresis  May benefit from sildenafil once euvolemic with pulm HTN  2   Paroxysmal atrial fibrillation/flutter:  Currently maintaining AFib  Heart rates are well controlled  Family has refused anticoagulation  3   AK I would CKD 3:  Creatinine improving with diuresis - with BUN that is high, but stable  4   CAD status post remote CABG:  Currently stable, continue on maintenance medications  5   Aortic valve disease:  Status post bioprosthetic AVR  Stable on echocardiogram   6   Hypertension:  Well controlled, continue current regimen  7   Hyperlipidemia:  Continued on statin  8   Non ST-elevation myocardial infarction, type 2:  Due to acute exacerbation of heart failure  9   Severe pulmonary hypertension:  Continue on diuresis  As above may recommend sildenafil once euvolemic with RHC  10   Severe TR and dilated right ventricle with RV dysfunction  Subjective:   Patient seen and examined  No significant events overnight  Improving dyspnea ; pertinent negatives - chest pain, chest pressure/discomfort, palpitations and syncope  Objective:     Vitals: Blood pressure 123/71, pulse 102, temperature 97 6 °F (36 4 °C), temperature source Oral, resp  rate 18, height 5' 7" (1 702 m), weight 93 9 kg (207 lb 0 2 oz), SpO2 97 %  , Body mass index is 32 42 kg/m² ,   Orthostatic Blood Pressures    Flowsheet Row Most Recent Value   Blood Pressure  123/71 filed at 04/20/2018 6431   Patient Position - Orthostatic VS  Lying filed at 04/20/2018 0733            Intake/Output Summary (Last 24 hours) at 04/20/18 0952  Last data filed at 04/20/18 0845   Gross per 24 hour   Intake              940 ml   Output 2699 ml   Net            -1759 ml       TELE: Afib rates controlled  Physical Exam:    GEN: Ric Davis   appears well, alert and oriented x 3, pleasant and cooperative   HEENT: pupils equal, round, and reactive to light; extraocular muscles intact  NECK: supple, no carotid bruits   HEART: regular rhythm, normal S1 and S2, +systolic murmur, no clicks, gallops or rubs   LUNGS: clear to auscultation bilaterally; no wheezes, rales, or rhonchi   ABDOMEN: normal bowel sounds, soft, no tenderness, no distention  EXTREMITIES: peripheral pulses normal; no clubbing, cyanosis, +edema  NEURO: no focal findings   SKIN: normal without suspicious lesions on exposed skin      Medications:      Current Facility-Administered Medications:     acetaminophen (TYLENOL) tablet 325 mg, 325 mg, Oral, Q8H PRN, Storm Aguiar MD, 325 mg at 04/19/18 0948    aspirin (ECOTRIN LOW STRENGTH) EC tablet 81 mg, 81 mg, Oral, Daily, Chase Alberto MD, 81 mg at 04/20/18 0843    atorvastatin (LIPITOR) tablet 40 mg, 40 mg, Oral, Daily With Dinner, Chase Alberto MD, 40 mg at 04/19/18 1739    bumetanide (BUMEX) 12 5 mg infusion 50 mL, 2 mg/hr, Intravenous, Continuous, Storm Aguiar MD, Last Rate: 8 mL/hr at 04/20/18 0110, 2 mg/hr at 04/20/18 0110    calcium carbonate (TUMS) chewable tablet 1,000 mg, 1,000 mg, Oral, Daily PRN, Chase Alberto MD    docusate sodium (COLACE) capsule 100 mg, 100 mg, Oral, BID PRN, Chase Alberto MD, 100 mg at 04/10/18 1005    docusate sodium (COLACE) capsule 100 mg, 100 mg, Oral, BID, Sanjay Reinoso MD, 100 mg at 04/20/18 0843    heparin (porcine) subcutaneous injection 5,000 Units, 5,000 Units, Subcutaneous, Q8H [facility], Lisseth Seals PA-C, 5,000 Units at 04/19/18 2154    insulin glargine (LANTUS) subcutaneous injection 25 Units, 25 Units, Subcutaneous, HS, Zachery Nuñez MD, 25 Units at 04/19/18 3227    insulin lispro (HumaLOG) 100 units/mL subcutaneous injection 1-5 Units, 1-5 Units, Subcutaneous, TID AC, 2 Units at 04/19/18 1743 **AND** [CANCELED] Fingerstick Glucose (POCT), , , TID AC, Chase Alberto MD    insulin lispro (HumaLOG) 100 units/mL subcutaneous injection 1-5 Units, 1-5 Units, Subcutaneous, HS, Chase Alberto MD, 1 Units at 04/18/18 2135    insulin lispro (HumaLOG) 100 units/mL subcutaneous injection 12 Units, 12 Units, Subcutaneous, TID With Meals, Shashi Liao MD, 12 Units at 04/15/18 1739    melatonin tablet 3 mg, 3 mg, Oral, HS, Lisseth Seals PA-C, 3 mg at 04/19/18 2154    metolazone (ZAROXOLYN) tablet 10 mg, 10 mg, Oral, 4x Daily, Shelbie Ji MD, 10 mg at 04/20/18 0843    metoprolol succinate (TOPROL-XL) 24 hr tablet 50 mg, 50 mg, Oral, Q12H, Delos Buerger, MD, 50 mg at 04/20/18 0843    ondansetron (ZOFRAN) injection 4 mg, 4 mg, Intravenous, Q6H PRN, Chase Alberto MD    pantoprazole (PROTONIX) EC tablet 40 mg, 40 mg, Oral, Early Morning, Chase Alberto MD, 40 mg at 04/19/18 0601    sevelamer (RENAGEL) tablet 800 mg, 800 mg, Oral, TID With Meals, Shelbie Ji MD, 800 mg at 04/20/18 0144    spironolactone (ALDACTONE) tablet 50 mg, 50 mg, Oral, Daily, Shelbie Ji MD, 50 mg at 04/20/18 0842    tamsulosin (FLOMAX) capsule 0 4 mg, 0 4 mg, Oral, Daily With Shyanne Seals PA-C, 0 4 mg at 04/19/18 1740     Labs & Results:          Results from last 7 days  Lab Units 04/18/18  0444 04/16/18  0501   WBC Thousand/uL 6 63 7 44   HEMOGLOBIN g/dL 9 5* 9 9*   HEMATOCRIT % 30 4* 32 5*   PLATELETS Thousands/uL 170 170           Results from last 7 days  Lab Units 04/20/18  0558 04/19/18  0438 04/18/18  0444  04/16/18  0501   SODIUM mmol/L 139 141 141  < > 141   POTASSIUM mmol/L 3 3* 3 4* 3 1*  < > 3 7   CHLORIDE mmol/L 91* 94* 94*  < > 96*   CO2 mmol/L 40* 39* 37*  < > 34*   BUN mg/dL 136* 139* 137*  < > 135*   CREATININE mg/dL 2 63* 2 65* 2 70*  < > 2 92*   CALCIUM mg/dL 9 9 9 9 9 9  < > 10 0   TOTAL PROTEIN g/dL  --   --   --   -- 6  9   BILIRUBIN TOTAL mg/dL  --   --   --   --  1 20*   ALK PHOS U/L  --   --   --   --  67   ALT U/L  --   --   --   --  20   AST U/L  --   --   --   --  18   GLUCOSE RANDOM mg/dL 125 147* 137  < > 103   < > = values in this interval not displayed          Results from last 7 days  Lab Units 04/20/18  0558 04/18/18  0444   MAGNESIUM mg/dL 2 6 2 7*     Echo:personally reviewed - EF 45%, diffuse HK with mod HK of apical septum, mild to mod RV dilation and reduced function, mod to sev MR, bio AVR    EKG personally reviewed by Binh Rubi MD

## 2018-04-21 NOTE — PROGRESS NOTES
Changed patient's Bumex bag and noticed some bleeding coming from Midline cap  Replaced cap and   Flushed the line  No leaking was found after that  Hooked up tubing and continued Bumex drip without issue  Held this dose of Heparin due to bleeding as it  seemed to concern family who was bedside  Will continue to monitor

## 2018-04-21 NOTE — PROGRESS NOTES
Wife asked if I could remove patient's oxygen since he never needed it before  Patient's saturations  For the day were well within normal limits so I O2 was removed  After a few minutes patient felt his breathing became labored  Checked patient's o2 sats and they were in the high 80s so I placed pt back on 2L  Patient's sats quickly rebounded to  94%

## 2018-04-21 NOTE — PROGRESS NOTES
[facility] Internal Medicine Progress Note  Patient: Neisha Maldonado  [de-identified] y o  male   MRN: [id number]  PCP: Sony Kim MD  Unit/Bed#: -[place] Encounter: [id number]  Date Of Visit: 04/21/18    Assessment:    Principal Problem:    Acute on chronic diastolic congestive heart failure ([facility] )  Active Problems:    Rapid atrial fibrillation ([facility])    Benign essential hypertension    Type 2 diabetes mellitus ([place] [address] )    Peripheral arterial disease ([place] [address] )    Acute-on-chronic kidney injury ([facility] )    Non-ST elevation myocardial infarction (NSTEMI) ([facility] )    Pulmonary hypertension ([facility] )    Azotemia      Plan:    · Acute on chronic diastolic congestive heart failure   Continue with Bumex GTT   At current dose   Continue with increased dose of metolazone and Aldactone   Continue with frequent intake/ output monitoring   Daily weight  · Severe pulmonary hypertension with underlying tricuspid regurgitation and RV dysfunction   Cardiology following   May benefit from sildenafil once euvolemic with congestive heart failure  · History of paroxysmal atrial fibrillation and flutter   Heart rate well controlled   Family has refused anticoagulation  · Acute kidney injury on chronic kidney disease stage 3   Baseline creatinine between 1 8-2 2   Creatinine slightly elevated today   Diuresis of >2L during the last 24 hours   Bumex GTT and metolazone dose per Nephrology   Chata Solano to monitor daily BMP and avoid nephrotoxic medications  · Metabolic alkalosis likely contraction alkalosis  Continue with daily BMP monitoring   · History of urinary retention  Rhetta Binder   Continue to monitor serial postvoid residual on bladder scan as needed  · Type 2 NSTEMI in the setting of heart failure and renal failure   No ischemic workup per Cardiology  · Type 2 diabetes   Well controlled Continue with current insulin regimen  · 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   Podiatry following   No indication for surgery   Will need vascular of follow-up as an outpatient  · Insomnia  continue with zolpidem             VTE Pharmacologic Prophylaxis:   Pharmacologic: Heparin  Mechanical VTE Prophylaxis in Place: No    Patient Centered Rounds: I have performed bedside rounds with nursing staff today  Discussions with Specialists or Other Care Team Provider:  Nursing    Education and Discussions with Family / Patient:  Discussed with wife  Time Spent for Care: 30 minutes  More than 50% of total time spent on counseling and coordination of care as described above  Current Length of Stay: 17 day(s)    Current Patient Status: Inpatient   Certification Statement: The patient will continue to require additional inpatient hospital stay due to Ongoing management of congestive heart failure and renal insufficiency    Discharge Plan / Estimated Discharge Date:  Currently not stable for discharge    Code Status: Level 1 - Full Code      Subjective:   Patient denies worsening shortness of breath  Slept well last night  Denies any cough nausea, vomiting, chest discomfort or diarrhea    Objective:     Vitals:   Temp (24hrs), Av 9 °F (36 6 °C), Min:97 8 °F (36 6 °C), Max:98 °F (36 7 °C)    HR:  [60-95] 60  Resp:  [18-20] 20  BP: ()/(58-66) 114/58  SpO2:  [95 %-100 %] 100 %  Body mass index is 31 84 kg/m²  Input and Output Summary (last 24 hours): Intake/Output Summary (Last 24 hours) at 18 1140  Last data filed at 18 0935   Gross per 24 hour   Intake              480 ml   Output             2100 ml   Net            -1620 ml       Physical Exam:     Physical Exam   Constitutional: He is oriented to person, place, and time  No distress  HENT:   Head: Normocephalic and atraumatic  Mouth/Throat: Oropharynx is clear and moist    Eyes: Pupils are equal, round, and reactive to light  Neck: Neck supple  Cardiovascular: Normal rate and regular rhythm  Murmur heard    Pulmonary/Chest:   Decreased breath sounds at bases   Abdominal: Soft  Bowel sounds are normal    Musculoskeletal:   Bilateral lower extremity edema   Neurological: He is alert and oriented to person, place, and time  Skin: Skin is warm  He is not diaphoretic  Additional Data:     Labs:      Results from last 7 days  Lab Units 04/18/18  0444   WBC Thousand/uL 6 63   HEMOGLOBIN g/dL 9 5*   HEMATOCRIT % 30 4*   PLATELETS Thousands/uL 170       Results from last 7 days  Lab Units 04/21/18  0503  04/16/18  0501   SODIUM mmol/L 140  < > 141   POTASSIUM mmol/L 3 9  < > 3 7   CHLORIDE mmol/L 92*  < > 96*   CO2 mmol/L 39*  < > 34*   BUN mg/dL 144*  < > 135*   CREATININE mg/dL 2 76*  < > 2 92*   CALCIUM mg/dL 10 2*  < > 10 0   TOTAL PROTEIN g/dL  --   --  6 9   BILIRUBIN TOTAL mg/dL  --   --  1 20*   ALK PHOS U/L  --   --  67   ALT U/L  --   --  20   AST U/L  --   --  18   GLUCOSE RANDOM mg/dL 131  < > 103   < > = values in this interval not displayed  * I Have Reviewed All Lab Data Listed Above  * Additional Pertinent Lab Tests Reviewed:  [facility] Admission Reviewed    Imaging:    Imaging Reports Reviewed Today Include: NA    Recent Cultures (last 7 days):           Last 24 Hours Medication List:     Current Facility-Administered Medications:  acetaminophen 325 mg Oral Q8H PRN Lesvia Tadeo MD    aspirin 81 mg Oral Daily Chase Alberto MD    atorvastatin 40 mg Oral Daily With Dinner Chase Alberto MD    bumetanide 2 mg/hr Intravenous Continuous Lesvia Tadeo MD Last Rate: 2 mg/hr (04/21/18 0901)   calcium carbonate 1,000 mg Oral Daily PRN Chase Alberto MD    docusate sodium 100 mg Oral BID PRN Chase Alebrto MD    docusate sodium 100 mg Oral BID Petrona Aragon MD    heparin (porcine) 5,000 Units Subcutaneous Q8H [facility] Lisseth Seals PA-C    insulin glargine 25 Units Subcutaneous HS Cleopatra Hill MD    insulin lispro 1-5 Units Subcutaneous TID AC Chase Alberto MD    insulin lispro 1-5 Units Subcutaneous HS Chase Alberto MD    insulin lispro 12 Units Subcutaneous TID With Meals Elida Plaza MD    melatonin 3 mg Oral HS Lisseth Seals PA-C    metolazone 10 mg Oral 4x Daily Daneen Gilford, MD    metoprolol succinate 50 mg Oral Q12H Kalli Farooq MD    ondansetron 4 mg Intravenous Q6H PRN Chase Alberto MD    pantoprazole 40 mg Oral Early Morning Chase Alberto MD    potassium chloride 20 mEq Oral Once JEWELL Barfield    sevelamer 800 mg Oral TID With Meals Daneen Gilford, MD    spironolactone 50 mg Oral Daily Daneen Gilford, MD    tamsulosin 0 4 mg Oral Daily With Dinner Lisseth Seals PA-C    zolpidem 5 mg Oral HS PRN Sabi Garland MD         Today, Patient Was Seen By: Sabi Garland MD    ** Please Note: This note has been constructed using a voice recognition system   **

## 2018-04-21 NOTE — PROGRESS NOTES
NEPHROLOGY PROGRESS NOTE   Geraldine Divers  [de-identified] y o  male MRN: [id number]  Unit/Bed#: -01 Encounter: [id number]  Reason for Consult: SABINE/CKD    ASSESSMENT/PLAN:  1  Acute kidney injury on top of chronic kidney disease:  Suspect prerenal with decreased effective circulating volume in the setting of volume overload, and cardiorenal syndrome  Possible component of urinary retention status post Geronimo catheter now discontinued on 04/12  -previous baseline creatinine 1 8-2 2 and likely secondary to hypertension diabetes and cardiorenal syndrome  -follows Dr Sangita Crawford in the outpatient setting  -currently on Bumex drip, creatinine above baseline, today at 2 76, slightly above 2 6 however may need higher creatinine to keep euvolemic  -previous renal ultrasound reported kidneys a few slightly echogenic reflecting chronic medical renal disease  -avoid hypotension  -avoid nephrotoxic  -follow I/O, lab values in volume status    2  Azotemia:  Continues, slightly elevated at one four four today most likely secondary to aggressive diuresis  -FOBT negative    3  Acute on chronic diastolic congestive heart failure/ volume overload:  -currently on Bumex infusion along with metolazone 10 mg 4 times a day and spironolactone 50 mg daily  -net -12 L, negative two 0 2 L overnight; weight slowly decreasing daily  -consider changing did oral torsemide in the next 24-48 hours will discuss with Dr Staples Bone  -cardiology following-concerned for worsening cardiac function, severe TR in the setting of atrial fibrillation, worsening MR, pulmonary hypertension and reduction in LV EF  -of note outpatient diuretic regiment was torsemide 40 mg every Monday Wednesday Friday  The remaining days 20 mg  -continue fluid restriction of 1500 mL daily    4  Anemia:  Currently stable  -trend    5  MGUS:  IgM kappa and IgM lambda  - follows Dr Pratibha Berman outpatient setting    6   Elevated bicarbonate:  Suspect secondary to aggressive diuresis  Tashi Burgos potassium level ~4 0  -today's level 3 9  -VBG done 4/19;2018  -will repeat today as recommended by Dr Danny Rodriguez no yesterday    7  Hypokalemia:  Will replete at 3 9 with 20 of K-Dur today to maintain potassium 4 0 with diuretic  -trend daily    8  Hyperphosphatemia:  Currently on sevelamer  with improvement of phosphorus    9  Other issues:  CAD/prior CABG/AS/status post AVR bioprosthetic      SUBJECTIVE:  Patient seen and examined  Felling better, No SOB at rest, or chest pain  Gets winded with activity  Frustrated that creatinine still elevated    Making lots of urine    OBJECTIVE:  Current Weight: Weight - Scale: 92 2 kg (203 lb 4 2 oz)  Vitals:    04/20/18 1517 04/20/18 2152 04/21/18 0638 04/21/18 0700   BP: 99/59 100/66  114/58   BP Location: Right arm Right arm  Right arm   Pulse: 95 94  60   Resp: 18 18  20   Temp: 97 8 °F (36 6 °C) 98 °F (36 7 °C)  97 9 °F (36 6 °C)   TempSrc: Oral Oral  Oral   SpO2: 95% 96%  100%   Weight:   92 2 kg (203 lb 4 2 oz)    Height:           Intake/Output Summary (Last 24 hours) at 04/21/18 6964  Last data filed at 04/21/18 0935   Gross per 24 hour   Intake              480 ml   Output             2100 ml   Net            -1620 ml     General:  NAD, OOB   Skin:   Warm and dry, no rash noted  Eyes:  Sclera clear, non-icterus  ENT:  MMM  Neck:  Supple, No JVD appreciated thisam  Chest:  Fine few crackles bilaterally baes,  no wheezes or rhonchi noted, can get MADISON  CVS:  IRRR  Abdomen:  Soft, nondistended, nontender, bowel sounds present x4  Extremities:  Edemanoted bilaterally, less pitting  Neuro:  Alert and oriented x3  Psych:  Appropriate affect    Medications:    Current Facility-Administered Medications:     acetaminophen (TYLENOL) tablet 325 mg, 325 mg, Oral, Q8H PRN, Lesvia Tadeo MD, 325 mg at 04/19/18 0948    aspirin (ECOTRIN LOW STRENGTH) EC tablet 81 mg, 81 mg, Oral, Daily, Chase Alberto MD, 81 mg at 04/21/18 0845    atorvastatin (LIPITOR) tablet 40 mg, 40 mg, Oral, Daily With George Alberto MD, 40 mg at 04/20/18 1725    bumetanide (BUMEX) 12 5 mg infusion 50 mL, 2 mg/hr, Intravenous, Continuous, Eliza Medina MD, Last Rate: 8 mL/hr at 04/21/18 0901, 2 mg/hr at 04/21/18 0901    calcium carbonate (TUMS) chewable tablet 1,000 mg, 1,000 mg, Oral, Daily PRN, Chase Alberto MD    docusate sodium (COLACE) capsule 100 mg, 100 mg, Oral, BID PRN, Chase Alberto MD, 100 mg at 04/10/18 1005    docusate sodium (COLACE) capsule 100 mg, 100 mg, Oral, BID, Curt Quezada MD, 100 mg at 04/21/18 0853    heparin (porcine) subcutaneous injection 5,000 Units, 5,000 Units, Subcutaneous, Q8H [address], Lisseth Seals PA-C, 5,000 Units at 04/21/18 0455    insulin glargine (LANTUS) subcutaneous injection 25 Units, 25 Units, Subcutaneous, HS, Kae Tomlin MD, 25 Units at 04/20/18 2157    insulin lispro (HumaLOG) 100 units/mL subcutaneous injection 1-5 Units, 1-5 Units, Subcutaneous, TID AC, 3 Units at 04/20/18 1309 **AND** [CANCELED] Fingerstick Glucose (POCT), , , TID AC, Chase Alberto MD    insulin lispro (HumaLOG) 100 units/mL subcutaneous injection 1-5 Units, 1-5 Units, Subcutaneous, HS, Chase Alberto MD, 2 Units at 04/20/18 2156    insulin lispro (HumaLOG) 100 units/mL subcutaneous injection 12 Units, 12 Units, Subcutaneous, TID With Meals, Kae Tomlin MD, 12 Units at 04/15/18 1739    melatonin tablet 3 mg, 3 mg, Oral, HS, Lisseth Seals PA-C, 3 mg at 04/20/18 2158    metolazone (ZAROXOLYN) tablet 10 mg, 10 mg, Oral, 4x Daily, Eliza Medina MD, 10 mg at 04/21/18 0853    metoprolol succinate (TOPROL-XL) 24 hr tablet 50 mg, 50 mg, Oral, Q12H, Lo Small MD, 50 mg at 04/21/18 0853    ondansetron (ZOFRAN) injection 4 mg, 4 mg, Intravenous, Q6H PRN, Chase Alberto MD    pantoprazole (PROTONIX) EC tablet 40 mg, 40 mg, Oral, Early Morning, Chase Alberto MD, 40 mg at 04/21/18 0455    sevelamer (RENAGEL) tablet 800 mg, 800 mg, Oral, TID With Meals, Grisel Cotto MD, 800 mg at 04/21/18 0845    spironolactone (ALDACTONE) tablet 50 mg, 50 mg, Oral, Daily, Grisel Cotto MD, 50 mg at 04/21/18 0853    tamsulosin (FLOMAX) capsule 0 4 mg, 0 4 mg, Oral, Daily With Barbarajay Seals PA-C, 0 4 mg at 04/20/18 1725    zolpidem (AMBIEN) tablet 5 mg, 5 mg, Oral, HS PRN, Magy Meza MD    Laboratory Results:    Results from last 7 days  Lab Units 04/21/18  0503 04/20/18  7624 04/19/18  0351 04/18/18  0444 04/17/18  0611 04/16/18  0501 04/15/18  0432   WBC Thousand/uL  --   --   --  6 63  --  7 44  --    HEMOGLOBIN g/dL  --   --   --  9 5*  --  9 9*  --    HEMATOCRIT %  --   --   --  30 4*  --  32 5*  --    PLATELETS Thousands/uL  --   --   --  170  --  170  --    SODIUM mmol/L 140 139 141 141 140 141 137   POTASSIUM mmol/L 3 9 3 3* 3 4* 3 1* 3 5 3 7 3 9   CHLORIDE mmol/L 92* 91* 94* 94* 95* 96* 94*   CO2 mmol/L 39* 40* 39* 37* 34* 34* 31   BUN mg/dL 144* 136* 139* 137* 136* 135* 130*   CREATININE mg/dL 2 76* 2 63* 2 65* 2 70* 2 72* 2 92* 3 02*   CALCIUM mg/dL 10 2* 9 9 9 9 9 9 9 5 10 0 9 6   MAGNESIUM mg/dL  --  2 6  --  2 7*  --   --   --    PHOSPHORUS mg/dL  --  4 1  --  5 4*  --   --   --    TOTAL PROTEIN g/dL  --   --   --   --   --  6 9  --    GLUCOSE RANDOM mg/dL 131 125 147* 137 140 103 180*

## 2018-04-21 NOTE — PROGRESS NOTES
Cardiology Progress Note - Tor Willoughby  [de-identified] y o  male MRN: [id number]    Unit/Bed#: -01 Encounter: [id number]      Assessment:  Principal Problem:    Acute on chronic diastolic congestive heart failure (HCC)  Active Problems:    Rapid atrial fibrillation (HCC)    Benign essential hypertension    Type 2 diabetes mellitus ([facility] )    Peripheral arterial disease (HCC)    Acute-on-chronic kidney injury ([place] [address] )    Non-ST elevation myocardial infarction (NSTEMI) ([place] [address] )    Pulmonary hypertension ([facility])    Azotemia    CAD/CABG, bioprosthetic AVR, AF, CHF, pulmonary hypertension  Still volume overloaded with cough, decreased breath sounds, rales, 3-4+ edema  Agree with ongoing diuresis in spite of increased serum creatinine  AF with structural heart disease, CHF  High risk for stroke  Anticoagulation refused  Possible candidate for sildenafil when stable  Subjective:    No significant events overnight  Objective:   Vitals: Blood pressure 114/58, pulse 60, temperature 97 9 °F (36 6 °C), temperature source Oral, resp  rate 20, height 5' 7" (1 702 m), weight 92 2 kg (203 lb 4 2 oz), SpO2 100 %  , Body mass index is 31 84 kg/m² , Orthostatic Blood Pressures    Flowsheet Row Most Recent Value   Blood Pressure  114/58 filed at 04/21/2018 0700   Patient Position - Orthostatic VS  Lying filed at 04/21/2018 3057         Systolic (18UVH), YOE:457 , Min:99 , BIQ:747     Diastolic (25ZZG), QJP:00, Min:58, Max:66      Intake/Output Summary (Last 24 hours) at 04/21/18 1124  Last data filed at 04/21/18 0935   Gross per 24 hour   Intake              480 ml   Output             2100 ml   Net            -1620 ml     Weight (last 2 days)     Date/Time   Weight    04/21/18 0638  92 2 (203 26)    04/20/18 0600  93 9 (207 01)    04/19/18 0600  95 (209 44)              Physical Exam  HEENT: normal  Neck: JVPs flat at 30 degrees; no carotid bruits  Chest: decreased BS, dependent rales     Heart: normal S1 and S2; no murmurs, rubs or gallops  Abdomen: normal BS, nontender, no masses or organomegaly  Ext: 3-4+ LE edema  Neuro: grossly normal    Telemetry Review: No significant sustained arrhythmias seen on telemetry review       Laboratory Results:        CBC with diff:   Results from last 7 days  Lab Units 04/18/18  0444 04/16/18  0501   WBC Thousand/uL 6 63 7 44   HEMOGLOBIN g/dL 9 5* 9 9*   HEMATOCRIT % 30 4* 32 5*   MCV fL 86 87   PLATELETS Thousands/uL 170 170   MCH pg 26 8 26 5*   MCHC g/dL 31 3* 30 5*   RDW % 15 2* 15 2*   MPV fL 10 0 10 6         CMP:  Results from last 7 days  Lab Units 04/21/18  0503 04/20/18  0558 04/19/18  0438 04/18/18  0444 04/17/18  0611 04/16/18  0501 04/15/18  0432   SODIUM mmol/L 140 139 141 141 140 141 137   POTASSIUM mmol/L 3 9 3 3* 3 4* 3 1* 3 5 3 7 3 9   CHLORIDE mmol/L 92* 91* 94* 94* 95* 96* 94*   CO2 mmol/L 39* 40* 39* 37* 34* 34* 31   ANION GAP mmol/L 9 8 8 10 11 11 12   BUN mg/dL 144* 136* 139* 137* 136* 135* 130*   CREATININE mg/dL 2 76* 2 63* 2 65* 2 70* 2 72* 2 92* 3 02*   GLUCOSE RANDOM mg/dL 131 125 147* 137 140 103 180*   CALCIUM mg/dL 10 2* 9 9 9 9 9 9 9 5 10 0 9 6   AST U/L  --   --   --   --   --  18  --    ALT U/L  --   --   --   --   --  20  --    ALK PHOS U/L  --   --   --   --   --  67  --    TOTAL PROTEIN g/dL  --   --   --   --   --  6 9  --    BILIRUBIN TOTAL mg/dL  --   --   --   --   --  1 20*  --    EGFR ml/min/1 73sq m 21 22 22 21 21 19 19         BMP:  Results from last 7 days  Lab Units 04/21/18  0503 04/20/18  0558 04/19/18  0438 04/18/18  0444 04/17/18  0611 04/16/18  0501 04/15/18  0432   SODIUM mmol/L 140 139 141 141 140 141 137   POTASSIUM mmol/L 3 9 3 3* 3 4* 3 1* 3 5 3 7 3 9   CHLORIDE mmol/L 92* 91* 94* 94* 95* 96* 94*   CO2 mmol/L 39* 40* 39* 37* 34* 34* 31   BUN mg/dL 144* 136* 139* 137* 136* 135* 130*   CREATININE mg/dL 2 76* 2 63* 2 65* 2 70* 2 72* 2 92* 3 02*   GLUCOSE RANDOM mg/dL 131 125 147* 137 140 103 180*   CALCIUM mg/dL 10 2* 9 9 9 9 9 9 9 5 10 0 9 6       BNP: No results for input(s): BNP in the last 72 hours  Magnesium:   Results from last 7 days  Lab Units 18  0558 18  0444   MAGNESIUM mg/dL 2 6 2 7*       Coags:       TSH:        Hemoglobin A1C       Lipid Profile:       Cardiac testing:   Results for orders placed during the hospital encounter of 18   Echo complete with contrast if indicated    Narrative [address] [facility]  [phone]    Transthoracic Echocardiogram  2D, M-mode, Doppler, and Color Doppler    Study date:  2018    Patient: Hayley Nance  MR number: [id number]  Account number: [de-identified]  : 1935  Age: [de-identified] years  Gender: Male  Status: Inpatient  Location: Bedside  Height: 67 in  Weight: 213 6 lb  BP: 108/ 58 mmHg    Indications: Assess left ventricular function  Diagnoses: I50 9 - Heart failure, unspecified    Sonographer:  MASON River  Primary Physician:  Dori Gaona MD  Referring Physician:  Ruel Hendrickson MD  Group:  [facility]  Interpreting Physician:  Ruel Hendrickson MD    SUMMARY    LEFT VENTRICLE:  Systolic function was mildly reduced  Ejection fraction was estimated to be 45 %  There was mild diffuse hypokinesis  There was moderate hypokinesis of the apical septal wall(s)  Wall thickness was mildly increased  There was mild concentric hypertrophy  RIGHT VENTRICLE:  The ventricle was mildly to moderately dilated  Systolic function was reduced  LEFT ATRIUM:  The atrium was moderately dilated  RIGHT ATRIUM:  The atrium was moderately dilated  MITRAL VALVE:  There was marked annular calcification  There was moderate to severe regurgitation  AORTIC VALVE:  A bioprosthesis was present  It exhibited normal function  Doppler cardiac output was 3 5 L/min, using LVOT flow data  Doppler cardiac index was 1 68 L/min/m squared, using LVOT flow data  TRICUSPID VALVE:  There was severe regurgitation    Pulmonary artery systolic pressure was markedly increased  Estimated peak PA pressure was 65 mmHg  PULMONIC VALVE:  Notching of the systolic pulse wave is noted, suggesting elevated pulmonary artery pressures  There was mild regurgitation  HISTORY: PRIOR HISTORY: CAD s/p CABG, s/p AVR, Afib, Hypertension, Hyperlipidemia, Heart failure    PROCEDURE: The procedure was performed at the bedside  This was a routine study  The transthoracic approach was used  The study included complete 2D imaging, M-mode, complete spectral Doppler, and color Doppler  The heart rate was 89 bpm,  at the start of the study  Images were obtained from the parasternal, apical, subcostal, and suprasternal notch acoustic windows  Echocardiographic views were limited due to decreased penetration and lung interference  This was a  technically difficult study  LEFT VENTRICLE: Size was normal  Systolic function was mildly reduced  Ejection fraction was estimated to be 45 %  There was mild diffuse hypokinesis  There was moderate hypokinesis of the apical septal wall(s)  Wall thickness was mildly  increased  There was mild concentric hypertrophy  DOPPLER: Transmitral flow pattern: atrial fibrillation  RIGHT VENTRICLE: The ventricle was mildly to moderately dilated  Systolic function was reduced  LEFT ATRIUM: The atrium was moderately dilated  RIGHT ATRIUM: The atrium was moderately dilated  MITRAL VALVE: There was marked annular calcification  Valve structure was normal  There was normal leaflet separation  DOPPLER: The transmitral velocity was within the normal range  There was no evidence for stenosis  There was moderate to  severe regurgitation  AORTIC VALVE: A bioprosthesis was present  It exhibited normal function  TRICUSPID VALVE: The annulus was dilated  There was normal leaflet separation  DOPPLER: The transtricuspid velocity was within the normal range  There was no evidence for stenosis  There was severe regurgitation   Pulmonary artery systolic  pressure was markedly increased  Estimated peak PA pressure was 65 mmHg  PULMONIC VALVE: Notching of the systolic pulse wave is noted, suggesting elevated pulmonary artery pressures  Leaflets exhibited normal thickness, no calcification, and normal cuspal separation  DOPPLER: The transpulmonic velocity was  within the normal range  There was mild regurgitation  PERICARDIUM: There was no pericardial effusion  AORTA: The root exhibited normal size  MEASUREMENT TABLES    2D MEASUREMENTS  LVOT   (Reference normals)  Diam   20 5 mm   (--)    DOPPLER MEASUREMENTS  LVOT   (Reference normals)  VTI   13 6 cm   (--)  R-R interval   769 ms   (--)  HR   78 bpm   (--)  Stroke vol   44 89 ml   (--)  Cardiac output   3 5 L/min   (--)  Cardiac index   1 68 L/min/m squared   (--)    SYSTEM MEASUREMENT TABLES    2D  %FS: 21 04 %  AV Diam: 3 14 cm  EDV(Teich): 91 37 ml  EF(Cube): 50 77 %  EF(Teich): 42 96 %  ESV(Cube): 44 19 ml  ESV(Teich): 52 12 ml  IVSd: 1 25 cm  LA Area: 32 18 cm2  LA Diam: 4 78 cm  LVEDV MOD A4C: 143 38 ml  LVEF MOD A4C: 30 68 %  LVESV MOD A4C: 99 4 ml  LVIDd: 4 48 cm  LVIDs: 3 54 cm  LVLd A4C: 8 59 cm  LVLs A4C: 8 06 cm  LVPWd: 1 17 cm  RA Area: 26 08 cm2  RV Diam: 4 04 cm  SI(Cube): 21 92 ml/m2  SI(Teich): 18 87 ml/m2  SV MOD A4C: 43 98 ml  SV(Cube): 45 58 ml  SV(Teich): 39 25 ml    CW  TR MaxP 39 mmHg  TR Vmax: 3 85 m/s    MM  TAPSE: 1 9 cm    [employer] Accredited Echocardiography [facility]    Prepared and electronically signed by    Tej Lopez MD  Signed 2018 18:07:57       No results found for this or any previous visit  No results found for this or any previous visit  No results found for this or any previous visit      Meds/Allergies   all current active meds have been reviewed  Prescriptions Prior to Admission   Medication    aspirin (ECOTRIN LOW STRENGTH) 81 mg EC tablet    atorvastatin (LIPITOR) 40 mg tablet    febuxostat (ULORIC) 40 mg tablet    metoprolol tartrate (LOPRESSOR) 25 mg tablet    metoprolol tartrate (LOPRESSOR) 50 mg tablet    multivitamin-iron-minerals-folic acid (CENTRUM) chewable tablet    NOVOLOG MIX 70/30 FLEXPEN (70-30) 100 UNIT/ML SUPN    omeprazole (PriLOSEC) 20 mg delayed release capsule    paricalcitol (ZEMPLAR) 1 mcg capsule    ramipril (ALTACE) 2 5 mg capsule    torsemide (DEMADEX) 20 mg tablet         bumetanide 2 mg/hr Last Rate: 2 mg/hr (04/21/18 0901)

## 2018-04-22 NOTE — PROGRESS NOTES
[facility] Internal Medicine Progress Note  Patient: Barbara Nix  [de-identified] y o  male   MRN: [id number]  PCP: Lorena Shannon MD  Unit/Bed#: -01 Encounter: [id number]  Date Of Visit: 04/22/18    Assessment:    Principal Problem:    Acute on chronic diastolic congestive heart failure ([facility] )  Active Problems:    Rapid atrial fibrillation (HCC)    Benign essential hypertension    Type 2 diabetes mellitus ([place] [address] )    Peripheral arterial disease ([place] [address] )    Acute-on-chronic kidney injury ([place] [address] )    Non-ST elevation myocardial infarction (NSTEMI) ([place] [address] )    Pulmonary hypertension ([place] [address] )    Azotemia      Plan:    · Acute kidney injury on chronic kidney disease  Increased creatinine today  Likely component of urinary retention in addition to cardiorenal syndrome  Remains on Bumex GTT, metolazone and spironolactone  Nephrology following  Continue to monitor postvoid residual and follow urinary retention protocol  · Acute on chronic diastolic congestive heart failure  Of cardiology following  At 2 L negative in the last 24 hours  Continue to monitor daily weight  · Severe pulmonary hypertension with underlying tricuspid regurgitation and RV dysfunction     May benefit from sildenafil once euvolemic with congestive heart failure  · History of paroxysmal atrial fibrillation and flutter   Heart rate well controlled   Family has refused anticoagulation  ·   · Metabolic alkalosis likely contraction alkalosis   Continue with daily BMP monitoring   Currently on Diamox  · History of urinary retention   Currently on urinary retention protocol   Continue to monitor serial postvoid residual on bladder scan as needed  · Type 2 NSTEMI in the setting of heart failure and renal failure   No ischemic workup per Cardiology  · Type 2 diabetes   Well controlled Continue with current insulin regimen    · 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   Podiatry following   No indication for surgery   Will need vascular of follow-up as an outpatient  · Insomnia    continue with zolpidem          VTE Pharmacologic Prophylaxis:   Pharmacologic: Heparin  Mechanical VTE Prophylaxis in Place: No    Patient Centered Rounds: I have performed bedside rounds with nursing staff today  Discussions with Specialists or Other Care Team Provider: Nursing    Education and Discussions with Family / Patient:D/W pt  Time Spent for Care: 30 minutes  More than 50% of total time spent on counseling and coordination of care as described above  Current Length of Stay: 18 day(s)    Current Patient Status: Inpatient   Certification Statement: The patient will continue to require additional inpatient hospital stay due to Ongoing monitoring of renal function treatment for congestive heart failure  Discharge Plan / Estimated Discharge Date: currently not medically stable for discharge  Code Status: Level 1 - Full Code      Subjective:   Patient denies any worsening shortness of breath  Has been having episodes of urinary retention with increased postvoid residual requiring straight catheterization x1 overnight     Objective:     Vitals:   Temp (24hrs), Av 9 °F (36 6 °C), Min:97 8 °F (36 6 °C), Max:98 °F (36 7 °C)    HR:  [] 101  Resp:  [18-19] 18  BP: (100-121)/(59-77) 100/59  SpO2:  [94 %-97 %] 97 %  Body mass index is 31 42 kg/m²  Input and Output Summary (last 24 hours): Intake/Output Summary (Last 24 hours) at 18 0959  Last data filed at 18 0900   Gross per 24 hour   Intake              800 ml   Output             2726 ml   Net            -1926 ml       Physical Exam:     Physical Exam   Constitutional: He is oriented to person, place, and time  No distress  HENT:   Head: Normocephalic and atraumatic  Eyes: Conjunctivae are normal  Pupils are equal, round, and reactive to light  Neck: Normal range of motion  Neck supple  No JVD present  Cardiovascular: Normal rate and regular rhythm  Pulmonary/Chest:   Decreased breath sounds at bases   Abdominal: Soft  Bowel sounds are normal    Musculoskeletal: He exhibits edema  Neurological: He is alert and oriented to person, place, and time  Skin: He is not diaphoretic  Psychiatric: He has a normal mood and affect  Additional Data:     Labs:      Results from last 7 days  Lab Units 04/18/18  0444   WBC Thousand/uL 6 63   HEMOGLOBIN g/dL 9 5*   HEMATOCRIT % 30 4*   PLATELETS Thousands/uL 170       Results from last 7 days  Lab Units 04/22/18  0558  04/16/18  0501   SODIUM mmol/L 137  < > 141   POTASSIUM mmol/L 3 8  < > 3 7   CHLORIDE mmol/L 90*  < > 96*   CO2 mmol/L 40*  < > 34*   BUN mg/dL 145*  < > 135*   CREATININE mg/dL 2 78*  < > 2 92*   CALCIUM mg/dL 10 8*  < > 10 0   TOTAL PROTEIN g/dL  --   --  6 9   BILIRUBIN TOTAL mg/dL  --   --  1 20*   ALK PHOS U/L  --   --  67   ALT U/L  --   --  20   AST U/L  --   --  18   GLUCOSE RANDOM mg/dL 145*  < > 103   < > = values in this interval not displayed  * I Have Reviewed All Lab Data Listed Above  * Additional Pertinent Lab Tests Reviewed:  [facility] Admission Reviewed    Imaging:    Imaging Reports Reviewed Today Include: NA  Recent Cultures (last 7 days):           Last 24 Hours Medication List:     Current Facility-Administered Medications:  acetaminophen 325 mg Oral Q8H PRN Neville Youngblood MD    acetaZOLAMIDE 250 mg Oral [facility] Neville Youngblood MD    aspirin 81 mg Oral Daily Chase Alberto MD    atorvastatin 40 mg Oral Daily With Dinner Chase Alberto MD    bumetanide 2 mg/hr Intravenous Continuous Neville Youngblood MD Last Rate: 2 mg/hr (04/22/18 0422)   calcium carbonate 1,000 mg Oral Daily PRN Chase Alberto MD    docusate sodium 100 mg Oral BID PRN Chase Alberto MD    docusate sodium 100 mg Oral BID Maggie Babb MD    heparin (porcine) 5,000 Units Subcutaneous Q8H [address] Seals PA-C    insulin glargine 25 Units Subcutaneous HS Nereida Brianna Akins MD    insulin lispro 1-5 Units Subcutaneous TID AC Chase Alberto MD    insulin lispro 1-5 Units Subcutaneous HS Chase Alberto MD    insulin lispro 12 Units Subcutaneous TID With Meals Cleopatra Hill MD    melatonin 3 mg Oral HS FERMÍN YingC    metolazone 10 mg Oral 4x Daily Lesvia Tadeo MD    metoprolol succinate 50 mg Oral Q12H Emilie Ruffin MD    ondansetron 4 mg Intravenous Q6H PRN Chase Alberto MD    pantoprazole 40 mg Oral Early Morning Chase Alberto MD    potassium chloride 40 mEq Oral Once JEWELL Morales    sevelamer 800 mg Oral TID With Meals Lesvia Tadeo MD    spironolactone 50 mg Oral Daily Lesvia Tadeo MD    tamsulosin 0 4 mg Oral Daily With Dinner Lisseth Seals, JOSÉ LUIS    zolpidem 5 mg Oral HS PRN Petrona Aragon MD         Today, Patient Was Seen By: Petrona Aragon MD    ** Please Note: This note has been constructed using a voice recognition system   **

## 2018-04-22 NOTE — PHYSICAL THERAPY NOTE
PHYSICAL THERAPY TREATMENT NOTE  NAME:  Angélica Wang  DATE: 04/22/18    Length Of Stay: 18  Performed at least 2 patient identifiers during session: Name and Birthday    TREATMENT:      04/22/18 1205   Pain Assessment   Pain Assessment 0-10   Pain Score 3   Pain Type Acute pain   Pain Location Foot  (heel)   Pain Orientation Left   Patient's Stated Pain Goal No pain   Hospital Pain Intervention(s) Repositioned; Ambulation/increased activity; Elevated   Restrictions/Precautions   Weight Bearing Precautions Per Order No   Other Precautions Fall Risk;Pain;O2;Telemetry   General   Chart Reviewed Yes   Family/Caregiver Present No   Cognition   Overall Cognitive Status WFL   Orientation Level Oriented X4   Memory Within functional limits   Following Commands Follows one step commands without difficulty   Subjective   Subjective Pt agreeable to participate; Pt reports frustrated that no one told him that therapy was not coming yesterday (N I caitlyn;ed Charge nurse to inform her  and pass along as per [facility] instructions  Transfers   Sit to Stand 4  Minimal assistance   Additional items Assist x 1; Increased time required;Verbal cues;Armrests  (forward weight shift)   Stand to Sit 4  Minimal assistance   Additional items Assist x 1; Increased time required;Verbal cues;Armrests  (hand placement)   Ambulation/Elevation   Gait pattern Forward Flexion; Shuffling;Excessively slow   Gait Assistance 4  Minimal assist   Additional items Assist x 1;Verbal cues   Assistive Device Rolling walker   Distance 10'x2, declined futher amb due to fatigue--> HR in 110-120's; Spo2 > 92%   Balance   Static Sitting Fair +   Static Standing Poor +   Ambulatory Poor +   Endurance Deficit   Endurance Deficit Yes   Endurance Deficit Description increased SOB   Activity Tolerance   Activity Tolerance Patient limited by fatigue;Treatment limited secondary to medical complications (Comment)   Nurse Made Aware spoke to STEVE Paiz RN Exercises   Heelslides Sitting;5 reps;AROM; Bilateral   Ankle Pumps Sitting;10 reps;Bilateral;AROM   UE Exercise (not performed-- due to elevated HR)   Assessment   Prognosis Fair   Problem List Decreased strength;Decreased range of motion;Decreased endurance; Impaired balance;Decreased mobility; Decreased coordination;Decreased safety awareness; Obesity; Decreased skin integrity; Impaired sensation;Pain   Assessment Pt did make mobility gain this session with walking furher total distances using walker, but is limited due to fatigue/SOB  Did note increased HR (but not low Spo2) post ambulation  Pt instructed to continued to perform today's therex within 1-2 sets for the remainder of the day, but was iunstructed to NOT perform UE ther ex as requested due to increased HR And potential increased workload on heart  Recommend continued trials with standing tolerance and amb while here, including with nursing staff offering pt to perform standing tolerance during hourly rounds  Barriers to Discharge Inaccessible home environment   Goals   Patient Goals to walk more and do PT everyday   STG Expiration Date 05/04/18   Treatment Day 2   Plan   Treatment/Interventions Functional transfer training;LE strengthening/ROM; Elevations; Therapeutic exercise; Endurance training;Cognitive reorientation;Patient/family training;Equipment eval/education; Bed mobility;Gait training;Spoke to nursing   Progress Progressing toward goals   PT Frequency 5x/wk     Ekaterina Geronimo, PT, DPT

## 2018-04-22 NOTE — PROGRESS NOTES
Pts PVR after voiding 250mL was 426mL  Pt stated he does not feel like he needs to pee again  Will follow urinary retention protocol

## 2018-04-22 NOTE — PROGRESS NOTES
Cardiology Progress Note - Angelito Turner  [de-identified] y o  male MRN: [id number]    Unit/Bed#: -01 Encounter: [id number]        Assessment:  Principal Problem:    Acute on chronic diastolic congestive heart failure (HCC)  Active Problems:    Rapid atrial fibrillation (HCC)    Benign essential hypertension    Type 2 diabetes mellitus ([facility] 75 )    Peripheral arterial disease (HCC)    Acute-on-chronic kidney injury ([facility] 75 )    Non-ST elevation myocardial infarction (NSTEMI) ([facility] 75 )    Pulmonary hypertension ([facility] )    Azotemia    Continues to diurese  2 L negative past 24 hours  Cr  Increasing  Agree with plans to switch to PO diuretic tomorrow    Subjective:    No significant events overnight  Objective:   Vitals: Blood pressure 100/59, pulse 101, temperature 97 8 °F (36 6 °C), temperature source Oral, resp  rate 18, height 5' 7" (1 702 m), weight 91 kg (200 lb 9 9 oz), SpO2 97 %  , Body mass index is 31 42 kg/m² , Orthostatic Blood Pressures    Flowsheet Row Most Recent Value   Blood Pressure  100/59 filed at 04/22/2018 0706   Patient Position - Orthostatic VS  Lying filed at 04/22/2018 3831         Systolic (85BKX), CPY:968 , Min:100 , SUN:370     Diastolic (35OPU), GZJ:55, Min:59, Max:77      Intake/Output Summary (Last 24 hours) at 04/22/18 1010  Last data filed at 04/22/18 0900   Gross per 24 hour   Intake              800 ml   Output             2726 ml   Net            -1926 ml     Weight (last 2 days)     Date/Time   Weight    04/22/18 0544  91 (200 62)    04/21/18 0638  92 2 (203 26)    04/20/18 0600  93 9 (207 01)              Physical Exam  HEENT: normal  Neck: JVPs flat at 30 degrees; no carotid bruits  Chest: decreased BS, bibasilar rales  Heart: normal S1 and S2; no murmurs, rubs or gallops  Abdomen: normal BS, nontender, no masses or organomegaly  Ext: 3+ LE edema  Neuro: grossly normal    Telemetry Review: No significant arrhythmias seen on telemetry review         Laboratory Results:        CBC with diff: Results from last 7 days  Lab Units 04/18/18  0444 04/16/18  0501   WBC Thousand/uL 6 63 7 44   HEMOGLOBIN g/dL 9 5* 9 9*   HEMATOCRIT % 30 4* 32 5*   MCV fL 86 87   PLATELETS Thousands/uL 170 170   MCH pg 26 8 26 5*   MCHC g/dL 31 3* 30 5*   RDW % 15 2* 15 2*   MPV fL 10 0 10 6         CMP:  Results from last 7 days  Lab Units 04/22/18  0558 04/21/18  0503 04/20/18  0558 04/19/18  0438 04/18/18  0444 04/17/18  0611 04/16/18  0501   SODIUM mmol/L 137 140 139 141 141 140 141   POTASSIUM mmol/L 3 8 3 9 3 3* 3 4* 3 1* 3 5 3 7   CHLORIDE mmol/L 90* 92* 91* 94* 94* 95* 96*   CO2 mmol/L 40* 39* 40* 39* 37* 34* 34*   ANION GAP mmol/L 7 9 8 8 10 11 11   BUN mg/dL 145* 144* 136* 139* 137* 136* 135*   CREATININE mg/dL 2 78* 2 76* 2 63* 2 65* 2 70* 2 72* 2 92*   GLUCOSE RANDOM mg/dL 145* 131 125 147* 137 140 103   CALCIUM mg/dL 10 8* 10 2* 9 9 9 9 9 9 9 5 10 0   AST U/L  --   --   --   --   --   --  18   ALT U/L  --   --   --   --   --   --  20   ALK PHOS U/L  --   --   --   --   --   --  67   TOTAL PROTEIN g/dL  --   --   --   --   --   --  6 9   BILIRUBIN TOTAL mg/dL  --   --   --   --   --   --  1 20*   EGFR ml/min/1 73sq m 21 21 22 22 21 21 19         BMP:  Results from last 7 days  Lab Units 04/22/18  0558 04/21/18  0503 04/20/18  0558 04/19/18  0438 04/18/18  0444 04/17/18  0611 04/16/18  0501   SODIUM mmol/L 137 140 139 141 141 140 141   POTASSIUM mmol/L 3 8 3 9 3 3* 3 4* 3 1* 3 5 3 7   CHLORIDE mmol/L 90* 92* 91* 94* 94* 95* 96*   CO2 mmol/L 40* 39* 40* 39* 37* 34* 34*   BUN mg/dL 145* 144* 136* 139* 137* 136* 135*   CREATININE mg/dL 2 78* 2 76* 2 63* 2 65* 2 70* 2 72* 2 92*   GLUCOSE RANDOM mg/dL 145* 131 125 147* 137 140 103   CALCIUM mg/dL 10 8* 10 2* 9 9 9 9 9 9 9 5 10 0       BNP: No results for input(s): BNP in the last 72 hours      Magnesium:   Results from last 7 days  Lab Units 04/22/18  0558 04/20/18  0558 04/18/18  0444   MAGNESIUM mg/dL 2 7* 2 6 2 7*       Coags:       TSH:        Hemoglobin A1C Lipid Profile:       Cardiac testing:   Results for orders placed during the hospital encounter of 18   Echo complete with contrast if indicated    Narrative [facility], [address] [facility]  [phone]    Transthoracic Echocardiogram  2D, M-mode, Doppler, and Color Doppler    Study date:  2018    Patient: Erik Mitchell  MR number: [id number]  Account number: [de-identified]  : 1935  Age: [de-identified] years  Gender: Male  Status: Inpatient  Location: Bedside  Height: 67 in  Weight: 213 6 lb  BP: 108/ 58 mmHg    Indications: Assess left ventricular function  Diagnoses: I50 9 - Heart failure, unspecified    Sonographer:  MASON Manley  Primary Physician:  Kellie Lowry MD  Referring Physician:  Kenna Rosas MD  Group:  [facility]  Interpreting Physician:  Kenna Rosas MD    SUMMARY    LEFT VENTRICLE:  Systolic function was mildly reduced  Ejection fraction was estimated to be 45 %  There was mild diffuse hypokinesis  There was moderate hypokinesis of the apical septal wall(s)  Wall thickness was mildly increased  There was mild concentric hypertrophy  RIGHT VENTRICLE:  The ventricle was mildly to moderately dilated  Systolic function was reduced  LEFT ATRIUM:  The atrium was moderately dilated  RIGHT ATRIUM:  The atrium was moderately dilated  MITRAL VALVE:  There was marked annular calcification  There was moderate to severe regurgitation  AORTIC VALVE:  A bioprosthesis was present  It exhibited normal function  Doppler cardiac output was 3 5 L/min, using LVOT flow data  Doppler cardiac index was 1 68 L/min/m squared, using LVOT flow data  TRICUSPID VALVE:  There was severe regurgitation  Pulmonary artery systolic pressure was markedly increased  Estimated peak PA pressure was 65 mmHg  PULMONIC VALVE:  Notching of the systolic pulse wave is noted, suggesting elevated pulmonary artery pressures    There was mild regurgitation  HISTORY: PRIOR HISTORY: CAD s/p CABG, s/p AVR, Afib, Hypertension, Hyperlipidemia, Heart failure    PROCEDURE: The procedure was performed at the bedside  This was a routine study  The transthoracic approach was used  The study included complete 2D imaging, M-mode, complete spectral Doppler, and color Doppler  The heart rate was 89 bpm,  at the start of the study  Images were obtained from the parasternal, apical, subcostal, and suprasternal notch acoustic windows  Echocardiographic views were limited due to decreased penetration and lung interference  This was a  technically difficult study  LEFT VENTRICLE: Size was normal  Systolic function was mildly reduced  Ejection fraction was estimated to be 45 %  There was mild diffuse hypokinesis  There was moderate hypokinesis of the apical septal wall(s)  Wall thickness was mildly  increased  There was mild concentric hypertrophy  DOPPLER: Transmitral flow pattern: atrial fibrillation  RIGHT VENTRICLE: The ventricle was mildly to moderately dilated  Systolic function was reduced  LEFT ATRIUM: The atrium was moderately dilated  RIGHT ATRIUM: The atrium was moderately dilated  MITRAL VALVE: There was marked annular calcification  Valve structure was normal  There was normal leaflet separation  DOPPLER: The transmitral velocity was within the normal range  There was no evidence for stenosis  There was moderate to  severe regurgitation  AORTIC VALVE: A bioprosthesis was present  It exhibited normal function  TRICUSPID VALVE: The annulus was dilated  There was normal leaflet separation  DOPPLER: The transtricuspid velocity was within the normal range  There was no evidence for stenosis  There was severe regurgitation  Pulmonary artery systolic  pressure was markedly increased  Estimated peak PA pressure was 65 mmHg  PULMONIC VALVE: Notching of the systolic pulse wave is noted, suggesting elevated pulmonary artery pressures  Leaflets exhibited normal thickness, no calcification, and normal cuspal separation  DOPPLER: The transpulmonic velocity was  within the normal range  There was mild regurgitation  PERICARDIUM: There was no pericardial effusion  AORTA: The root exhibited normal size  MEASUREMENT TABLES    2D MEASUREMENTS  LVOT   (Reference normals)  Diam   20 5 mm   (--)    DOPPLER MEASUREMENTS  LVOT   (Reference normals)  VTI   13 6 cm   (--)  R-R interval   769 ms   (--)  HR   78 bpm   (--)  Stroke vol   44 89 ml   (--)  Cardiac output   3 5 L/min   (--)  Cardiac index   1 68 L/min/m squared   (--)    SYSTEM MEASUREMENT TABLES    2D  %FS: 21 04 %  AV Diam: 3 14 cm  EDV(Teich): 91 37 ml  EF(Cube): 50 77 %  EF(Teich): 42 96 %  ESV(Cube): 44 19 ml  ESV(Teich): 52 12 ml  IVSd: 1 25 cm  LA Area: 32 18 cm2  LA Diam: 4 78 cm  LVEDV MOD A4C: 143 38 ml  LVEF MOD A4C: 30 68 %  LVESV MOD A4C: 99 4 ml  LVIDd: 4 48 cm  LVIDs: 3 54 cm  LVLd A4C: 8 59 cm  LVLs A4C: 8 06 cm  LVPWd: 1 17 cm  RA Area: 26 08 cm2  RV Diam: 4 04 cm  SI(Cube): 21 92 ml/m2  SI(Teich): 18 87 ml/m2  SV MOD A4C: 43 98 ml  SV(Cube): 45 58 ml  SV(Teich): 39 25 ml    CW  TR MaxP 39 mmHg  TR Vmax: 3 85 m/s    MM  TAPSE: 1 9 cm    [facility]    Prepared and electronically signed by    Lora Segovia MD  Signed 2018 18:07:57       No results found for this or any previous visit  No results found for this or any previous visit  No results found for this or any previous visit      Meds/Allergies   all current active meds have been reviewed  Prescriptions Prior to Admission   Medication    aspirin (ECOTRIN LOW STRENGTH) 81 mg EC tablet    atorvastatin (LIPITOR) 40 mg tablet    febuxostat (ULORIC) 40 mg tablet    metoprolol tartrate (LOPRESSOR) 25 mg tablet    metoprolol tartrate (LOPRESSOR) 50 mg tablet    multivitamin-iron-minerals-folic acid (CENTRUM) chewable tablet    NOVOLOG MIX 70/30 FLEXPEN (70-30) 100 UNIT/ML SUPN    omeprazole (PriLOSEC) 20 mg delayed release capsule    paricalcitol (ZEMPLAR) 1 mcg capsule    ramipril (ALTACE) 2 5 mg capsule    torsemide (DEMADEX) 20 mg tablet         bumetanide 2 mg/hr Last Rate: 2 mg/hr (04/22/18 4109)

## 2018-04-22 NOTE — PROGRESS NOTES
NEPHROLOGY PROGRESS NOTE   Ric Davis  [de-identified] y o  male MRN: [id number]  Unit/Bed#: -01 Encounter: [id number]  Reason for Consult: SABINE/CKD    ASSESSMENT/PLAN:  1  Acute kidney injury on top of chronic kidney disease:  Suspect prerenal with decreased effective circulating volume in the setting of volume overload, and cardiorenal syndrome  Possible component of urinary retention status post Geronimo catheter now discontinued on 04/12, PVR to 250 yesterday-currently on urinary retention protocol  -previous baseline creatinine 1 8-2 2 and likely secondary to hypertension diabetes and cardiorenal syndrome  -follows Dr Dee Dee Jacobsen in the outpatient setting  -remains on Bumex drip and metolazone, creatinine above baseline, but now plateaued, at 0 54, however may need higher creatinine to keep euvolemic  -previous renal ultrasound reported kidneys a few slightly echogenic reflecting chronic medical renal disease  -avoid hypotension  -avoid nephrotoxic  -follow I/O, lab values in volume status  -plan is to change torsemide 40 mg b i d  on Monday morning and stop the IV diuretics      2  Azotemia:  Continues, slightly elevated at 145 today most likely secondary to aggressive diuresis  -FOBT negative     3  Acute on chronic diastolic congestive heart failure/ volume overload:  -currently on Bumex infusion along with metolazone 10 mg 4 times a day and spironolactone 50 mg daily  -net -13 L, -2 0 L overnight; weight slowly decreasing daily and now 91 kg (actually down 17 3 lb since admission)  -cardiology following-concerned for worsening cardiac function, severe TR in the setting of atrial fibrillation, worsening MR, pulmonary hypertension and reduction in LV EF  -plan is to change to 40 mg torsemide b i d  in a m     Will discuss metolazone dosing with Dr Carmen Seymour  -continue fluid restriction of 1500 mL daily     4  Anemia:  Currently stable  -trend     5   MGUS:  IgM kappa and IgM lambda  - follows Dr Lashell Peterson outpatient setting     6  Elevated bicarbonate:  Suspect secondary to aggressive diuresis   Keep potassium level ~4 0  -Diamox 250 mg every 8 hr started yesterday for metabolic alkalosis  -bicarb remains elevated at 40 today  -will trend for now    7  Hypokalemia:  Will replete at 3 8 with 40 of K-Dur today to maintain potassium 4 0 with diuretic  -trend daily     8  Hyperphosphatemia:  Currently on sevelamer with improvement of phosphorus     9   Other issues:  CAD/prior CABG/AS/status post AVR bioprosthetic       SUBJECTIVE:  Patient seen and examined  Denies chest pain or shortness of Breath    Feels less short of breath with activity also states he can feel decreased swelling and overall feeling better    OBJECTIVE:  Current Weight: Weight - Scale: 91 kg (200 lb 9 9 oz)  Vitals:    04/21/18 1501 04/21/18 2204 04/22/18 0544 04/22/18 0706   BP: 121/77 106/66  100/59   BP Location: Right arm Right arm  Right arm   Pulse: 65 74  101   Resp: 19 18 18   Temp: 97 9 °F (36 6 °C) 98 °F (36 7 °C)  97 8 °F (36 6 °C)   TempSrc: Oral Oral  Oral   SpO2: 94% 94%  97%   Weight:   91 kg (200 lb 9 9 oz)    Height:           Intake/Output Summary (Last 24 hours) at 04/22/18 0913  Last data filed at 04/22/18 0900   Gross per 24 hour   Intake              800 ml   Output             3026 ml   Net            -2226 ml     General:  NAD, cooperative, resting in bed   Skin:   Warm and dry, no rash  Eyes:  Sclera clear, non-icterus  ENT:  MMM  Neck:  Supple, No JVD appreciated  Chest:  Fine few crackles bilaterally bases, no wheezes or rhonchi  CVS:  IRRR  Abdomen:  Soft, nondistended, nontender, bowel sounds present  Extremities:  Edema noted bilaterally, decreasing from yesterday  Neuro:  Alert and oriented   Psych:  Appropriate affect    Medications:    Current Facility-Administered Medications:     acetaminophen (TYLENOL) tablet 325 mg, 325 mg, Oral, Q8H PRN, Liz Ortiz MD, 325 mg at 04/19/18 0948    acetaZOLAMIDE (DIAMOX) tablet 250 mg, 250 mg, Oral, Q8H [facility], Daneen Gilford, MD, 250 mg at 04/22/18 4376    aspirin (ECOTRIN LOW STRENGTH) EC tablet 81 mg, 81 mg, Oral, Daily, Chase Alberto MD, 81 mg at 04/22/18 0820    atorvastatin (LIPITOR) tablet 40 mg, 40 mg, Oral, Daily With Dinner, Chase Alberto MD, 40 mg at 04/21/18 1546    bumetanide (BUMEX) 12 5 mg infusion 50 mL, 2 mg/hr, Intravenous, Continuous, Daneen Gilford, MD, Last Rate: 8 mL/hr at 04/22/18 0422, 2 mg/hr at 04/22/18 0422    calcium carbonate (TUMS) chewable tablet 1,000 mg, 1,000 mg, Oral, Daily PRN, Chase Alberto MD    docusate sodium (COLACE) capsule 100 mg, 100 mg, Oral, BID PRN, Chase Alberto MD, 100 mg at 04/10/18 1005    docusate sodium (COLACE) capsule 100 mg, 100 mg, Oral, BID, Sabi Garland MD, 100 mg at 04/22/18 0820    heparin (porcine) subcutaneous injection 5,000 Units, 5,000 Units, Subcutaneous, Q8H [facility], Lisseth Seals PA-C, 5,000 Units at 04/21/18 0455    insulin glargine (LANTUS) subcutaneous injection 25 Units, 25 Units, Subcutaneous, HS, Elida Plaza MD, 25 Units at 04/21/18 2227    insulin lispro (HumaLOG) 100 units/mL subcutaneous injection 1-5 Units, 1-5 Units, Subcutaneous, TID AC, 1 Units at 04/22/18 0821 **AND** [CANCELED] Fingerstick Glucose (POCT), , , TID AC, Chase Alberto MD    insulin lispro (HumaLOG) 100 units/mL subcutaneous injection 1-5 Units, 1-5 Units, Subcutaneous, HS, Chase Alberto MD, 1 Units at 04/21/18 2227    insulin lispro (HumaLOG) 100 units/mL subcutaneous injection 12 Units, 12 Units, Subcutaneous, TID With Meals, Elida Plaza MD, 12 Units at 04/22/18 9510    melatonin tablet 3 mg, 3 mg, Oral, HS, Lisseth Seals PA-C, 3 mg at 04/21/18 2226    metolazone (ZAROXOLYN) tablet 10 mg, 10 mg, Oral, 4x Daily, Daneen Gilford, MD, 10 mg at 04/22/18 0820    metoprolol succinate (TOPROL-XL) 24 hr tablet 50 mg, 50 mg, Oral, Q12H, Kalli Farooq MD, 50 mg at 04/21/18 0853    ondansetron ([place]) injection 4 mg, 4 mg, Intravenous, Q6H PRN, Chase Alberto MD    pantoprazole (PROTONIX) EC tablet 40 mg, 40 mg, Oral, Early Morning, Chase Alberto MD, 40 mg at 04/22/18 9925    sevelamer (RENAGEL) tablet 800 mg, 800 mg, Oral, TID With Meals, Carlota Maloney MD, 800 mg at 04/22/18 0820    spironolactone (ALDACTONE) tablet 50 mg, 50 mg, Oral, Daily, Carlota Maloney MD, Stopped at 04/22/18 0820    tamsulosin (FLOMAX) capsule 0 4 mg, 0 4 mg, Oral, Daily With Liss Seals PA-C, 0 4 mg at 04/21/18 1546    zolpidem (AMBIEN) tablet 5 mg, 5 mg, Oral, HS PRN, Milagros Olivas MD    Laboratory Results:    Results from last 7 days  Lab Units 04/22/18  0558 04/21/18  0503 04/20/18  7760 04/19/18  6330 04/18/18  0444 04/17/18  0611 04/16/18  0501   WBC Thousand/uL  --   --   --   --  6 63  --  7 44   HEMOGLOBIN g/dL  --   --   --   --  9 5*  --  9 9*   HEMATOCRIT %  --   --   --   --  30 4*  --  32 5*   PLATELETS Thousands/uL  --   --   --   --  170  --  170   SODIUM mmol/L 137 140 139 141 141 140 141   POTASSIUM mmol/L 3 8 3 9 3 3* 3 4* 3 1* 3 5 3 7   CHLORIDE mmol/L 90* 92* 91* 94* 94* 95* 96*   CO2 mmol/L 40* 39* 40* 39* 37* 34* 34*   BUN mg/dL 145* 144* 136* 139* 137* 136* 135*   CREATININE mg/dL 2 78* 2 76* 2 63* 2 65* 2 70* 2 72* 2 92*   CALCIUM mg/dL 10 8* 10 2* 9 9 9 9 9 9 9 5 10 0   MAGNESIUM mg/dL 2 7*  --  2 6  --  2 7*  --   --    PHOSPHORUS mg/dL 4 4*  --  4 1  --  5 4*  --   --    TOTAL PROTEIN g/dL  --   --   --   --   --   --  6 9   GLUCOSE RANDOM mg/dL 145* 131 125 147* 137 140 103

## 2018-04-22 NOTE — PLAN OF CARE
Problem: PHYSICAL THERAPY ADULT  Goal: Performs mobility at highest level of function for planned discharge setting  See evaluation for individualized goals  Treatment/Interventions: Functional transfer training, LE strengthening/ROM, Therapeutic exercise, Endurance training, Patient/family training, Equipment eval/education, Bed mobility, Gait training (PT to see when stair training is appropriate )          See flowsheet documentation for full assessment, interventions and recommendations  Outcome: Progressing  Prognosis: Fair  Problem List: Decreased strength, Decreased range of motion, Decreased endurance, Impaired balance, Decreased mobility, Decreased coordination, Decreased safety awareness, Obesity, Decreased skin integrity, Impaired sensation, Pain  Assessment: Pt did make mobility gain this session with walking furher total distances using walker, but is limited due to fatigue/SOB  Did note increased HR (but not low Spo2) post ambulation  Pt instructed to continued to perform today's therex within 1-2 sets for the remainder of the day, but was iunstructed to NOT perform UE ther ex as requested due to increased HR And potential increased workload on heart  Recommend continued trials with standing tolerance and amb while here, including with nursing staff offering pt to perform standing tolerance during hourly rounds  Barriers to Discharge: Inaccessible home environment     Recommendation: Long-term skilled PT     PT - OK to Discharge: Yes (when medically cleared to LT skilled PT)    See flowsheet documentation for full assessment

## 2018-04-23 NOTE — PROGRESS NOTES
[facility] Progress Note  Patient: Mag Pereira  [de-identified] y o  male   MRN: [id number]  PCP: Rosemarie Aguilar MD  Unit/Bed#: -[place] Encounter: [id number]  Date Of Visit: 04/23/18    Assessment:    Principal Problem:    Acute on chronic diastolic congestive heart failure ([facility] )  Active Problems:    Rapid atrial fibrillation (HCC)    Benign essential hypertension    Type 2 diabetes mellitus ([place] [address] )    Peripheral arterial disease ([place] [address] )    Acute-on-chronic kidney injury ([place] [address] )    Non-ST elevation myocardial infarction (NSTEMI) ([place] [address] )    Pulmonary hypertension ([place] [address] )    Azotemia      Plan:    · Acute kidney injury on chronic kidney disease stage 4  Creatinine continues to trend up today  Continue to monitor PVR closely  Last PVR about 208 ml  On Bumex GTT, diamox, metolazone and spironolactone  Waiting for nephrology input  · Acute on chronic diastolic congestive heart failure  cardiology following  Recent CXR 4/22 showed worsening pulmonary edema  · Severe pulmonary hypertension with underlying tricuspid regurgitation and RV dysfunction     May benefit from sildenafil once euvolemic with congestive heart failure  · History of paroxysmal atrial fibrillation and flutter   Heart rate well controlled   Family has refused anticoagulation  · Metabolic alkalosis likely contraction alkalosis   Continue with daily BMP monitoring   Currently on Diamox  · History of urinary retention   Currently on urinary retention protocol   Continue to monitor serial postvoid residual  · Type 2 NSTEMI in the setting of heart failure and renal failure   No ischemic workup per Cardiology  · Type 2 diabetes   Well controlled Continue with current insulin regimen    · 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   Podiatry following   No indication for surgery   Will need vascular of follow-up as an outpatient  · Insomnia    continue with zolpidem/increase melatonin        VTE Pharmacologic Prophylaxis:   Pharmacologic: Heparin    Discussions with Specialists or Other Care Team Provider:     Education and Discussions with Family / Patient:     Time Spent for Care: 20 minutes  More than 50% of total time spent on counseling and coordination of care as described above  Current Length of Stay: 19 day(s)    Current Patient Status: Inpatient   Certification Statement: The patient will continue to require additional inpatient hospital stay due to close monitoring Cr/IV bumex     Discharge Plan / Estimated Discharge Date: Not ready    Code Status: Level 1 - Full Code      Subjective:   C/O fatigue due to insomnia last night  Pt  didn't offer other complaints    Objective:     Vitals:   Temp (24hrs), Av 7 °F (36 5 °C), Min:97 3 °F (36 3 °C), Max:97 9 °F (36 6 °C)    HR:  [] 99  Resp:  [18-20] 20  BP: ()/(56-70) 99/70  SpO2:  [99 %-100 %] 100 %  Body mass index is 30 85 kg/m²  Input and Output Summary (last 24 hours): Intake/Output Summary (Last 24 hours) at 18 1419  Last data filed at 18 1331   Gross per 24 hour   Intake             1610 ml   Output             2658 ml   Net            -1048 ml       Physical Exam:     Physical Exam   Constitutional: No distress  Eyes: Conjunctivae are normal  Pupils are equal, round, and reactive to light  Neck: Normal range of motion  Neck supple  Cardiovascular: Normal rate and regular rhythm  Pulmonary/Chest: Effort normal  No respiratory distress  He has no wheezes  Abdominal: Soft  Bowel sounds are normal  He exhibits no distension  There is no tenderness  Musculoskeletal: He exhibits no edema  Neurological:   Eyes closed during conversation  Easily arousable and answer questions appropriately   Skin: Skin is warm and dry  He is not diaphoretic  Psychiatric: He has a normal mood and affect             Additional Data:     Labs:      Results from last 7 days  Lab Units 18  0552   WBC Thousand/uL 8 76 HEMOGLOBIN g/dL 9 3*   HEMATOCRIT % 30 9*   PLATELETS Thousands/uL 198       Results from last 7 days  Lab Units 04/23/18  0552   SODIUM mmol/L 136   POTASSIUM mmol/L 3 7   CHLORIDE mmol/L 86*   CO2 mmol/L 41*   BUN mg/dL 147*   CREATININE mg/dL 2 90*   CALCIUM mg/dL 10 1   GLUCOSE RANDOM mg/dL 136             Recent Cultures (last 7 days):           Last 24 Hours Medication List:     Current Facility-Administered Medications:  acetaminophen 325 mg Oral Q8H PRN Lesvia Tadeo MD    acetaZOLAMIDE 250 mg Oral [facility] Lesvia Tadeo MD    aspirin 81 mg Oral Daily Chase Alberto MD    atorvastatin 40 mg Oral Daily With Dinner Chase Alberto MD    bumetanide 2 mg/hr Intravenous Continuous Lesvia Tadeo MD Last Rate: 2 mg/hr (04/23/18 8085)   calcium carbonate 1,000 mg Oral Daily PRN Chase Alberto MD    docusate sodium 100 mg Oral BID PRN Chase Alberto MD    docusate sodium 100 mg Oral BID Petrona Aragon MD    heparin (porcine) 5,000 Units Subcutaneous Q8H [address] Lisseth Seals PA-C    insulin glargine 25 Units Subcutaneous HS Cleopatra Hill MD    insulin lispro 1-5 Units Subcutaneous TID AC Chase Alberto MD    insulin lispro 1-5 Units Subcutaneous HS Chase Alberto MD    insulin lispro 12 Units Subcutaneous TID With Meals Cleopatra Hill MD    melatonin 6 mg Oral HS Tray Ariza MD    [facility] metolazone 10 mg Oral BID (diuretic) Judy Leal MD    metoprolol succinate 50 mg Oral Q12H Emilie Ruffin MD    ondansetron 4 mg Intravenous Q6H PRN Chase Alberto MD    pantoprazole 40 mg Oral Early Morning Chase Alberto MD    sevelamer 800 mg Oral TID With Meals Lesvia Tadeo MD    tamsulosin 0 4 mg Oral Daily With Dinner Lisseth Seals PA-C    zolpidem 5 mg Oral HS PRN Petrona Aragon MD         Today, Patient Was Seen By: Tray Ariza MD    ** Please Note: This note has been constructed using a voice recognition system   **

## 2018-04-23 NOTE — PROGRESS NOTES
NEPHROLOGY PROGRESS NOTE   Neisha Maldonado  [de-identified] y o  male MRN: [id number]  Unit/Bed#: -01 Encounter: [id number]  Reason for Consult: SABINE    ASSESSMENT and PLAN:    1 )  Acute kidney injury  -non-oliguric  -creatinine slightly trending up but overall his creatinine has remained stable with Azotemia  -may be starting to have some subtle signs of uremia  -discontinue spironolactone  -reduce metolazone to twice a day  -continue bumetanide drip  -I did discuss hemodialysis with the patient and his wife  I did recommend that it would probably best to start hemodialysis given his tenuous volume status, worsening chest x-ray, and possible asterixis related to uremia  -continue acetazolamide  -last postvoid residual was 208 mL, continue to monitor bladder scans  -discussed with hospitalist    2 )  Chronic kidney disease stage IV  -baseline creatinine 1 8-2 2  -may be a higher baseline to keep better volume overload, appears that his creatinine has kind of plateaued at around 9 2-0 7    3,) Blood pressure/volume status  -blood pressure is controlled, no episodes of hypotension  -overall net negative fluid balance is being achieved  -weights are trending down  -discontinue spironolactone  -reduce metolazone to twice a day  -continue bumetanide drip  -chest x-ray from yesterday reviewed and appears worse compared to the April 18th chest x-ray  -repeat another chest x-ray tomorrow morning    4 )  Metabolic alkalosis  -check a VBG tomorrow morning  -the setting of chloride depletion/diuretics  -stop spironolactone, reduce metolazone    Over 45 minutes was spent with the patient his wife terms of discussing the plan and examination of this patient  SUBJECTIVE / INTERVAL HISTORY:    Swelling is slightly better breathing remains unchanged  Adibu felt being tremors in his wrists which is acute      OBJECTIVE:  Current Weight: Weight - Scale: 89 4 kg (197 lb)  Vitals:    04/22/18 2217 04/22/18 2219 04/23/18 0630 04/23/18 0703   BP: 110/56 112/62  107/58   BP Location: Right arm Right arm  Right arm   Pulse: (!) 125 96  81   Resp:  18  20   Temp:  97 9 °F (36 6 °C)  97 7 °F (36 5 °C)   TempSrc:  Oral     SpO2:  99%  100%   Weight:   89 4 kg (197 lb)    Height:           Intake/Output Summary (Last 24 hours) at 04/23/18 1339  Last data filed at 04/23/18 1331   Gross per 24 hour   Intake             1610 ml   Output             2658 ml   Net            -1048 ml       Physical Exam   Constitutional: He is oriented to person, place, and time  He appears well-developed and well-nourished  No distress  HENT:   Head: Normocephalic and atraumatic  Eyes: Pupils are equal, round, and reactive to light  No scleral icterus  Neck: Normal range of motion  Neck supple  Cardiovascular: Normal rate, regular rhythm and normal heart sounds  Exam reveals no gallop and no friction rub  No murmur heard  Pulmonary/Chest: Effort normal  No respiratory distress  He has no wheezes  He has rales  He exhibits no tenderness  Abdominal: Soft  Bowel sounds are normal  He exhibits no distension  There is no tenderness  There is no rebound  Musculoskeletal: Normal range of motion  He exhibits edema  Neurological: He is alert and oriented to person, place, and time  Skin: No rash noted  He is not diaphoretic  Psychiatric: He has a normal mood and affect  Nursing note and vitals reviewed        Medications:    Current Facility-Administered Medications:     acetaminophen (TYLENOL) tablet 325 mg, 325 mg, Oral, Q8H PRN, Destin Rao MD, 325 mg at 04/22/18 2218    acetaZOLAMIDE (DIAMOX) tablet 250 mg, 250 mg, Oral, Q8H [facility], Destin Rao MD, 250 mg at 04/23/18 0519    aspirin (ECOTRIN LOW STRENGTH) EC tablet 81 mg, 81 mg, Oral, Daily, Chase Alberto MD, 81 mg at 04/23/18 0834    atorvastatin (LIPITOR) tablet 40 mg, 40 mg, Oral, Daily With Dinner, Chase Alberto MD, 40 mg at 04/22/18 1728    bumetanide (BUMEX) 12 5 mg infusion 50 mL, 2 mg/hr, Intravenous, Continuous, Lesvia Tadeo MD, Last Rate: 8 mL/hr at 04/23/18 0833, 2 mg/hr at 04/23/18 0833    calcium carbonate (TUMS) chewable tablet 1,000 mg, 1,000 mg, Oral, Daily PRN, Chase Alberto MD    docusate sodium (COLACE) capsule 100 mg, 100 mg, Oral, BID PRN, Chase Alberto MD, 100 mg at 04/10/18 1005    docusate sodium (COLACE) capsule 100 mg, 100 mg, Oral, BID, Petrona Aragon MD, 100 mg at 04/22/18 1728    heparin (porcine) subcutaneous injection 5,000 Units, 5,000 Units, Subcutaneous, Q8H [facility], Lisseth Seals PA-C, 5,000 Units at 04/22/18 2217    insulin glargine (LANTUS) subcutaneous injection 25 Units, 25 Units, Subcutaneous, HS, Cleopatra Hill MD, 25 Units at 04/22/18 2218    insulin lispro (HumaLOG) 100 units/mL subcutaneous injection 1-5 Units, 1-5 Units, Subcutaneous, TID AC, 1 Units at 04/23/18 0835 **AND** [CANCELED] Fingerstick Glucose (POCT), , , TID AC, Chase Alberto MD    insulin lispro (HumaLOG) 100 units/mL subcutaneous injection 1-5 Units, 1-5 Units, Subcutaneous, HS, Chase Alberto MD, 1 Units at 04/21/18 2227    insulin lispro (HumaLOG) 100 units/mL subcutaneous injection 12 Units, 12 Units, Subcutaneous, TID With Meals, Cleopatra Hill MD, 12 Units at 04/23/18 0835    melatonin tablet 3 mg, 3 mg, Oral, HS, Lisseth Seals PA-C, 3 mg at 04/22/18 2218    metolazone (ZAROXOLYN) tablet 10 mg, 10 mg, Oral, 4x Daily, Lesvia Tadeo MD, 10 mg at 04/23/18 2582    metoprolol succinate (TOPROL-XL) 24 hr tablet 50 mg, 50 mg, Oral, Q12H, Emilie Ruffin MD, Stopped at 04/23/18 0833    ondansetron (ZOFRAN) injection 4 mg, 4 mg, Intravenous, Q6H PRN, Chase Alberto MD    pantoprazole (PROTONIX) EC tablet 40 mg, 40 mg, Oral, Early Morning, Chase Alberto MD, 40 mg at 04/23/18 0519    sevelamer (RENAGEL) tablet 800 mg, 800 mg, Oral, TID With Meals, Lesvia Tadeo MD, 800 mg at 04/23/18 0833    tamsulosin (FLOMAX) capsule 0 4 mg, 0 4 mg, Oral, Daily With Dinner, Lisseth Seals PA-C, 0 4 mg at 04/22/18 1728    zolpidem (AMBIEN) tablet 5 mg, 5 mg, Oral, HS PRN, Julián Pressley MD    Laboratory Results:    Results from last 7 days  Lab Units 04/23/18  1485 04/22/18  4409 04/21/18  0503 04/20/18  9520 04/19/18  9562 04/18/18  0444 04/17/18  0611   WBC Thousand/uL 8 76  --   --   --   --  6 63  --    HEMOGLOBIN g/dL 9 3*  --   --   --   --  9 5*  --    HEMATOCRIT % 30 9*  --   --   --   --  30 4*  --    PLATELETS Thousands/uL 198  --   --   --   --  170  --    SODIUM mmol/L 136 137 140 139 141 141 140   POTASSIUM mmol/L 3 7 3 8 3 9 3 3* 3 4* 3 1* 3 5   CHLORIDE mmol/L 86* 90* 92* 91* 94* 94* 95*   CO2 mmol/L 41* 40* 39* 40* 39* 37* 34*   BUN mg/dL 147* 145* 144* 136* 139* 137* 136*   CREATININE mg/dL 2 90* 2 78* 2 76* 2 63* 2 65* 2 70* 2 72*   CALCIUM mg/dL 10 1 10 8* 10 2* 9 9 9 9 9 9 9 5   MAGNESIUM mg/dL  --  2 7*  --  2 6  --  2 7*  --    PHOSPHORUS mg/dL  --  4 4*  --  4 1  --  5 4*  --    GLUCOSE RANDOM mg/dL 136 145* 131 125 147* 137 140

## 2018-04-23 NOTE — PROGRESS NOTES
Cardiology Progress Note - Annamarie Ilwaco  [de-identified] y o  male MRN: [id number]    Unit/Bed#: -01 Encounter: [id number]      Assessment:  Principal Problem:    Acute on chronic diastolic congestive heart failure (HCC)  Active Problems:    Rapid atrial fibrillation (HCC)    Benign essential hypertension    Type 2 diabetes mellitus ([facility] [address] )    Peripheral arterial disease (HCC)    Acute-on-chronic kidney injury ([facility] )    Non-ST elevation myocardial infarction (NSTEMI) ([facility] )    Pulmonary hypertension ([facility])    Azotemia      Plan:    1  Acute on chronic diastolic CHF - Patient having more for worsening acute kidney injury, and Nephrology managing diuretic dosing  We will follow along with them  Continue all other medications from a cardiovascular standpoint  Follow urine output, daily labs and weight  2   Valvular heart disease - Moderate to severe mitral and tricuspid regurgitation  Patient had a prior bioprosthetic aortic valve replacement  We will continue to follow this closely, and treat CHF as above  Not an open heart surgery candidate, and likely not a MItraClip candidate given degree of annular calcification, but we will review echocardiogram     3  CAD - Prior bypass surgery at the time of his aortic valve replacement  Continue all other outpatient medications prescribed  4  AF - Likely permanent  Continue medications prescribed  Heart rates controlled  Had been taking off of anticoagulation as an outpatient, secondary to a traumatic fall and ongoing fall risk  Subjective:   Patient seen and examined  No significant events overnight  Objective:     Vitals: Blood pressure 99/70, pulse 99, temperature (!) 97 3 °F (36 3 °C), resp  rate 20, height 5' 7" (1 702 m), weight 89 4 kg (197 lb), SpO2 100 %  , Body mass index is 30 85 kg/m² , Orthostatic Blood Pressures    Flowsheet Row Most Recent Value   Blood Pressure  99/70 filed at 04/23/2018 1418   Patient Position - Orthostatic VS  Sitting filed at 04/23/2018 1418            Intake/Output Summary (Last 24 hours) at 04/23/18 1517  Last data filed at 04/23/18 1424   Gross per 24 hour   Intake             1846 ml   Output             2658 ml   Net             -812 ml       Telemetry review:  Atrial fibrillation with short runs of nonsustained VT, and PVCs      Physical Exam:    GEN: Devante Barcenas  appears well, alert and oriented x 3, pleasant and cooperative   HEENT: pupils equal, round, and reactive to light; extraocular muscles intact  NECK: supple, no carotid bruits  + JVD   HEART: irregular rhythm, distant S1 and S2, +sys murmur, clicks, gallops or rubs   LUNGS:  Diminished with basilar rales bilaterally; no wheezes or rhonchi   ABDOMEN: normal bowel sounds, soft, no tenderness, no distention  EXTREMITIES: peripheral pulses normal; no clubbing, cyanosis   ++ edema bilaterally  NEURO: no focal findings   SKIN: normal without suspicious lesions on exposed skin    Medications:      Current Facility-Administered Medications:     acetaminophen (TYLENOL) tablet 325 mg, 325 mg, Oral, Q8H PRN, Deandra Sykes MD, 325 mg at 04/22/18 2218    acetaZOLAMIDE (DIAMOX) tablet 250 mg, 250 mg, Oral, Q8H [facility], Deandra Sykes MD, 250 mg at 04/23/18 1415    aspirin (ECOTRIN LOW STRENGTH) EC tablet 81 mg, 81 mg, Oral, Daily, Chase Alberto MD, 81 mg at 04/23/18 0834    atorvastatin (LIPITOR) tablet 40 mg, 40 mg, Oral, Daily With Pito Alberto MD, 40 mg at 04/22/18 1728    bumetanide (BUMEX) 12 5 mg infusion 50 mL, 2 mg/hr, Intravenous, Continuous, Deandra Sykes MD, Last Rate: 8 mL/hr at 04/23/18 0833, 2 mg/hr at 04/23/18 0833    calcium carbonate (TUMS) chewable tablet 1,000 mg, 1,000 mg, Oral, Daily PRN, Chase Alberto MD    docusate sodium (COLACE) capsule 100 mg, 100 mg, Oral, BID PRN, Chase Alberto MD, 100 mg at 04/10/18 1005    docusate sodium (COLACE) capsule 100 mg, 100 mg, Oral, BID, Shelby Troncoso MD, 100 mg at 04/22/18 1728    heparin (porcine) subcutaneous injection 5,000 Units, 5,000 Units, Subcutaneous, Q8H KENDRICK, Lisseth Seals PA-C, 5,000 Units at 04/23/18 1416    insulin glargine (LANTUS) subcutaneous injection 25 Units, 25 Units, Subcutaneous, HS, Jakob Gonzales MD, 25 Units at 04/22/18 2218    insulin lispro (HumaLOG) 100 units/mL subcutaneous injection 1-5 Units, 1-5 Units, Subcutaneous, TID AC, Stopped at 04/23/18 1414 **AND** [CANCELED] Fingerstick Glucose (POCT), , , TID AC, Chase Alberto MD    insulin lispro (HumaLOG) 100 units/mL subcutaneous injection 1-5 Units, 1-5 Units, Subcutaneous, HS, Chase Alberto MD, 1 Units at 04/21/18 2227    insulin lispro (HumaLOG) 100 units/mL subcutaneous injection 12 Units, 12 Units, Subcutaneous, TID With Meals, Jakob Gonzales MD, Stopped at 04/23/18 1416    melatonin tablet 6 mg, 6 mg, Oral, HS, MD Cedrick Hartmann  [START ON 4/24/2018] metolazone (ZAROXOLYN) tablet 10 mg, 10 mg, Oral, BID (diuretic), Elma Jolley MD    metoprolol succinate (TOPROL-XL) 24 hr tablet 50 mg, 50 mg, Oral, Q12H, Travis Berrios MD, Stopped at 04/23/18 0833    ondansetron (ZOFRAN) injection 4 mg, 4 mg, Intravenous, Q6H PRN, Chase Alberto MD    pantoprazole (PROTONIX) EC tablet 40 mg, 40 mg, Oral, Early Morning, Chase Alberto MD, 40 mg at 04/23/18 0519    sevelamer (RENAGEL) tablet 800 mg, 800 mg, Oral, TID With Meals, Georgiana Maria MD, 800 mg at 04/23/18 1415    tamsulosin (FLOMAX) capsule 0 4 mg, 0 4 mg, Oral, Daily With Dinner, Lisseth Seals PA-C, 0 4 mg at 04/22/18 1728    zolpidem (AMBIEN) tablet 5 mg, 5 mg, Oral, HS PRN, uSsan Garcia MD     Labs & Results:          Results from last 7 days  Lab Units 04/23/18  0552 04/18/18  0444   WBC Thousand/uL 8 76 6 63   HEMOGLOBIN g/dL 9 3* 9 5*   HEMATOCRIT % 30 9* 30 4*   PLATELETS Thousands/uL 198 170           Results from last 7 days  Lab Units 04/23/18  0552 04/22/18  0558 04/21/18  0503   SODIUM mmol/L 136 137 140 POTASSIUM mmol/L 3 7 3 8 3 9   CHLORIDE mmol/L 86* 90* 92*   CO2 mmol/L 41* 40* 39*   BUN mg/dL 147* 145* 144*   CREATININE mg/dL 2 90* 2 78* 2 76*   CALCIUM mg/dL 10 1 10 8* 10 2*   GLUCOSE RANDOM mg/dL 136 145* 131           Results from last 7 days  Lab Units 04/22/18  0558 04/20/18  0558 04/18/18  0444   MAGNESIUM mg/dL 2 7* 2 6 2 7*         EKG personally reviewed by Colette Ferrara MD   Atrial fibrillation/flutter, nonspecific IVCD, nonspecific ST changes  ECHO:  LEFT VENTRICLE:  Systolic function was mildly reduced  Ejection fraction was estimated to be 45 %  There was mild diffuse hypokinesis  There was moderate hypokinesis of the apical septal wall(s)  Wall thickness was mildly increased  There was mild concentric hypertrophy      RIGHT VENTRICLE:  The ventricle was mildly to moderately dilated  Systolic function was reduced      LEFT ATRIUM:  The atrium was moderately dilated      RIGHT ATRIUM:  The atrium was moderately dilated      MITRAL VALVE:  There was marked annular calcification  There was moderate to severe regurgitation      AORTIC VALVE:  A bioprosthesis was present  It exhibited normal function  Doppler cardiac output was 3 5 L/min, using LVOT flow data  Doppler cardiac index was 1 68 L/min/m squared, using LVOT flow data      TRICUSPID VALVE:  There was severe regurgitation  Pulmonary artery systolic pressure was markedly increased  Estimated peak PA pressure was 65 mmHg  Counseling / Coordination of Care  Total floor / unit time spent today 25 minutes  Greater than 50% of total time was spent with the patient and / or family counseling and / or coordination of care

## 2018-04-24 NOTE — PHYSICAL THERAPY NOTE
PHYSICAL THERAPY NOTE    Patient Name: William Jacobsen  Today's Date: 4/24/2018 04/24/18 1242   Pain Assessment   Pain Assessment No/denies pain   Pain Score No Pain   Restrictions/Precautions   Weight Bearing Precautions Per Order No   Other Precautions Fall Risk;Multiple lines;Telemetry;O2;Pain   Subjective   Subjective Patient seated in recliner with B LE elevated and is agreeable to therapy session  Bed Mobility   Additional Comments Patient seated OOB pre and post session  Transfers   Additional Comments tranfsers not performed secondary to fatigue  Balance   Static Sitting Fair +   Dynamic Sitting Fair   Static Standing Poor +   Dynamic Standing Poor +   Ambulatory Poor +   Endurance Deficit   Endurance Deficit Yes   Activity Tolerance   Activity Tolerance Patient limited by fatigue;Treatment limited secondary to medical complications (Comment)   Nurse Made Aware spoke to ADDY Castaneda   Exercises   Quad Sets Sitting;10 reps;AROM; Bilateral   Heelslides Bilateral;5 reps; Sitting;AAROM  (in recliner with B LE elevated)   Glute Sets Sitting;10 reps;AROM; Bilateral   Ankle Pumps Sitting;20 reps;AROM; Bilateral   Assessment   Prognosis Fair   Problem List Decreased strength;Decreased range of motion;Decreased endurance; Impaired balance;Decreased mobility; Decreased coordination;Decreased safety awareness; Obesity; Decreased skin integrity; Impaired sensation;Pain   Assessment Patient significantly fatigued for todays session limiting any transfer and ambulation training  Patient participated in LE exercise program however required AAROM for some movements and input to stay on task and alert  Patient presents with functional decline today and will continue to follow as appropriate     Barriers to Discharge Inaccessible home environment   Goals   Patient Goals none stated   STG Expiration Date 05/04/18   Treatment Day 3   Plan Treatment/Interventions Functional transfer training;LE strengthening/ROM; Elevations; Therapeutic exercise; Endurance training;Cognitive reorientation;Patient/family training;Equipment eval/education; Bed mobility;Gait training;Spoke to nursing   Progress No functional improvements   PT Frequency 5x/wk   Recommendation   Recommendation Long-term skilled PT   Equipment Recommended Wheelchair; Other (Comment)  (wheelchair for longer distances and RW for short distances)     Kailey Garcia, PTA

## 2018-04-24 NOTE — PROGRESS NOTES
Progress Note - Aramis Escort  1935, [de-identified] y o  male MRN: [id number]    Unit/Bed#: -01 Encounter: [id number]    Primary Care Provider: Dwayne Gonzalez MD   Date and time admitted to hospital: 4/4/2018  6:39 AM    * Acute on chronic diastolic congestive heart failure (HCC)   Assessment & Plan    · Bumex infusion and metolazone discontinued today secondary to worsening creatinine levels and azotemia  · Patient and spouse continue to decline dialysis and will prefer this to be the absolute last resort  · Patient has overall been declining since being in the hospital and getting more deconditioned  · Overall poor prognosis in view of significant valvular disease, renal disease, pulmonary hypertension      Acute-on-chronic kidney injury ([facility] [address] )   Assessment & Plan    · Baseline creatinine 1 8-2 2  Off Bumex infusion and metolazone today due to worsening renal function with azotemia  · Likely secondary to cardiorenal, +/-obstructive uropathy  · He is status post viera placed for retention during this admission and flomax added  Viera removed on 4/12/18 for voiding trial which so far has been successful  Continue urinary retention protocol  · Management per Nephrology      Non-ST elevation myocardial infarction (NSTEMI) ([facility])   Assessment & Plan    · Mild troponin elevation likely the result of type 2 NSTEMI in the setting of heart failure and renal failure  · No further workup from the cardiac standpoint at this time      Rapid atrial fibrillation [facility])   Assessment & Plan    · Rate controlled    Continue metoprolol  · Patient declined anticoagulation due to prior bleeding while on Eliquis      Type 2 diabetes mellitus ([facility])   Assessment & Plan    · Most recent hemoglobin A1c 7 6 2 months ago  · Continue Lantus/Humalog at current dose      Pulmonary hypertension ([facility])   Assessment & Plan    · Severe pulmonary hypertension with estimated peak PA pressure at 65 mmHg  · Diuretics held today, probably start on sildenafil once euvolemic        VTE Pharmacologic Prophylaxis:   Pharmacologic: Heparin  Mechanical VTE Prophylaxis in Place: Yes    Patient Centered Rounds: I have performed bedside rounds with nursing staff today  Discussions with Specialists or Other Care Team Provider:  Nursing    Education and Discussions with Family / Patient:  Patient    Current Length of Stay: 20 day(s)    Current Patient Status: Inpatient   Certification Statement: The patient will continue to require additional inpatient hospital stay due to Above diagnosis and care plan    Discharge Plan:  Not medically stable for discharge    Code Status: Level 1 - Full Code      Subjective:   No new complaints or acute overnight events  Reports shortness of breath with exertion but none at rest   Denies chest pain    Objective:     Vitals:   Temp (24hrs), Av 9 °F (36 6 °C), Min:97 7 °F (36 5 °C), Max:98 2 °F (36 8 °C)    HR:  [76-98] 76  Resp:  [16] 16  BP: (103-162)/(55-84) 103/55  SpO2:  [98 %-100 %] 100 %  Body mass index is 28 87 kg/m²  Input and Output Summary (last 24 hours):        Intake/Output Summary (Last 24 hours) at 18 1548  Last data filed at 18 1330   Gross per 24 hour   Intake              990 ml   Output             2768 ml   Net            -1778 ml       Physical Exam:  General Appearance:    Alert, cooperative, no distress, appropriately responsive    Head:    Normocephalic, without obvious abnormality, atraumatic, mucous membranes moist    Eyes:    Conjunctiva/corneas clear, EOM's intact   Neck:   Supple   Lungs:    Rales Bibasilar, decreased bilaterally, no wheezes    Heart:   Irregularly regular,S1 and S2, +SM    Abdomen:     Soft, non-tender, nondistended   Extremities:  2+ Bilateral lower extremity edema   Neurologic:  nonfocal         Additional Data:     Labs:      Results from last 7 days  Lab Units 18  0552   WBC Thousand/uL 8 76   HEMOGLOBIN g/dL 9 3*   HEMATOCRIT % 30 9*   PLATELETS Thousands/uL 198       Results from last 7 days  Lab Units 04/24/18  0441   SODIUM mmol/L 135*   POTASSIUM mmol/L 2 8*   CHLORIDE mmol/L 85*   CO2 mmol/L 40*   BUN mg/dL 164*   CREATININE mg/dL 3 01*   CALCIUM mg/dL 9 9   GLUCOSE RANDOM mg/dL 170*           * I Have Reviewed All Lab Data Listed Above  * Additional Pertinent Lab Tests Reviewed: [address] Admission Reviewed    Cultures:   Blood Culture:   Lab Results   Component Value Date    BLOODCX No Growth After 5 Days  04/04/2018    BLOODCX No Growth After 5 Days   04/04/2018     Urine Culture: No results found for: URINECX  Sputum Culture: No components found for: SPUTUMCX  Wound Culture: No results found for: WOUNDCULT    Last 24 Hours Medication List:     Current Facility-Administered Medications:  acetaminophen 325 mg Oral Q8H PRN Juaquin Macias MD   aspirin 81 mg Oral Daily Chase Alberto MD   atorvastatin 40 mg Oral Daily With Dinner Chase Alberto MD   calcium carbonate 1,000 mg Oral Daily PRN Chase Alberto MD   docusate sodium 100 mg Oral BID PRN Chase Alberto MD   docusate sodium 100 mg Oral BID China Rooney MD   heparin (porcine) 5,000 Units Subcutaneous Q8H [address] Lisseth Seals PA-C   insulin glargine 25 Units Subcutaneous HS Sixto Guadarrama MD   insulin lispro 1-5 Units Subcutaneous TID AC Chase Alberto MD   insulin lispro 1-5 Units Subcutaneous HS Chase Alberto MD   insulin lispro 12 Units Subcutaneous TID With Meals Sixto Guadarrama MD   melatonin 6 mg Oral HS Chato Aguilar MD   metoprolol succinate 50 mg Oral Q12H Aranza Chakraborty MD   ondansetron 4 mg Intravenous Q6H PRN Chase Alberto MD   pantoprazole 40 mg Oral Early Morning Chase Alberto MD   potassium chloride 40 mEq Oral BID Julius Barahona MD   sevelamer 800 mg Oral TID With Meals Juaquin Macias MD   tamsulosin 0 4 mg Oral Daily With Dinner Lisseth Seals PA-C   zolpidem 5 mg Oral HS PRN China Rooney MD        Today, Patient Was Seen By: Trevon Peterson MD    ** Please Note: Dragon 360 Dictation voice to text software may have been used in the creation of this document   **

## 2018-04-24 NOTE — ASSESSMENT & PLAN NOTE
· Severe pulmonary hypertension with estimated peak PA pressure at 65 mmHg  · Diuretics held today, probably start on sildenafil once euvolemic

## 2018-04-24 NOTE — PROGRESS NOTES
NEPHROLOGY PROGRESS NOTE   Barbara Nix  [de-identified] y o  male MRN: [id number]  Unit/Bed#: -01 Encounter: [id number]  Reason for Consult: SABINE    ASSESSMENT and PLAN:    1 )  Acute kidney injury  -non-oliguric  -worsening creatinine worsening azotemia  -may be over diuresing, weights are dropping  -may be starting to have some subtle signs of uremia  -off spironolactone  -stop metolazone   -stop bumetanide drip  -I did discuss hemodialysis with the patient and his wife    They would prefer to be the absolute last resort  -discontinue acetazolamide given the normal serum pH and hypokalemia  -last postvoid residual was 120 mL, continue to monitor bladder scans  -discussed with wife over the phone     2 )  Chronic kidney disease stage IV  -baseline creatinine 1 8-2 2  -may be a higher baseline to keep better volume overload, appears that his creatinine has kind of plateaued at around 8 5-6 3     3,) Blood pressure/volume status  -blood pressure is controlled, no episodes of hypotension  -overall net negative fluid balance is being achieved  -weights are trending down, now 184 lb  -discontinue spironolactone  -discontinue metolazone   -discontinue bumetanide drip  -chest x-ray from yesterday reviewed and appears worse compared to the April 18th chest x-ray  -repeat chest x-ray reviewed appears to be stable     4 )  Metabolic alkalosis  -VBG shows a normal pH of 7 47  -discontinue acetazolamide  -replace potassium    5 )  Hypokalemia  -secondary to diuresis  -discontinue all diuretics  -replace potassium  -check a magnesium level in the morning      SUBJECTIVE / INTERVAL HISTORY:    No overnight events    OBJECTIVE:  Current Weight: Weight - Scale: 83 6 kg (184 lb 4 9 oz)  Vitals:    04/23/18 1418 04/23/18 2213 04/24/18 0527 04/24/18 0700   BP: 99/70 162/84  113/65   BP Location: Left arm Right arm  Right arm   Pulse: 99 98  92   Resp: 20   16   Temp: (!) 97 3 °F (36 3 °C) 98 2 °F (36 8 °C)  97 8 °F (36 6 °C)   TempSrc: Oral  Oral   SpO2: 100% 98%  99%   Weight:   83 6 kg (184 lb 4 9 oz)    Height:           Intake/Output Summary (Last 24 hours) at 04/24/18 1042  Last data filed at 04/24/18 0910   Gross per 24 hour   Intake             1346 ml   Output             3061 ml   Net            -1715 ml       Physical Exam   Constitutional: He is oriented to person, place, and time  He appears well-developed and well-nourished  No distress  HENT:   Head: Normocephalic and atraumatic  Eyes: Pupils are equal, round, and reactive to light  No scleral icterus  Neck: Normal range of motion  Neck supple  Cardiovascular: Normal rate, regular rhythm and normal heart sounds  Exam reveals no gallop and no friction rub  No murmur heard  Pulmonary/Chest: Effort normal and breath sounds normal  No respiratory distress  He has no wheezes  He has no rales  He exhibits no tenderness  Abdominal: Soft  Bowel sounds are normal  He exhibits no distension  There is no tenderness  There is no rebound  Musculoskeletal: Normal range of motion  He exhibits edema  Neurological: He is alert and oriented to person, place, and time  Skin: No rash noted  He is not diaphoretic  Psychiatric: He has a normal mood and affect  Nursing note and vitals reviewed        Medications:    Current Facility-Administered Medications:     acetaminophen (TYLENOL) tablet 325 mg, 325 mg, Oral, Q8H PRN, John Paul Renee MD, 325 mg at 04/22/18 2218    acetaZOLAMIDE (DIAMOX) tablet 250 mg, 250 mg, Oral, Q8H [address], John Paul Renee MD, 250 mg at 04/24/18 0536    aspirin (ECOTRIN LOW STRENGTH) EC tablet 81 mg, 81 mg, Oral, Daily, Chase Alberto MD, 81 mg at 04/24/18 0800    atorvastatin (LIPITOR) tablet 40 mg, 40 mg, Oral, Daily With Dinner, Chase Alberto MD, 40 mg at 04/23/18 1654    calcium carbonate (TUMS) chewable tablet 1,000 mg, 1,000 mg, Oral, Daily PRN, Chase Alberto MD    docusate sodium (COLACE) capsule 100 mg, 100 mg, Oral, BID PRN, Chase Alberto MD, 100 mg at 04/10/18 1005    docusate sodium (COLACE) capsule 100 mg, 100 mg, Oral, BID, China Rooney MD, 100 mg at 04/24/18 0800    heparin (porcine) subcutaneous injection 5,000 Units, 5,000 Units, Subcutaneous, Q8H [address], Lisseth Seals PA-C, 5,000 Units at 04/24/18 0536    insulin glargine (LANTUS) subcutaneous injection 25 Units, 25 Units, Subcutaneous, HS, Sixto Guadarrama MD, 25 Units at 04/23/18 2212    insulin lispro (HumaLOG) 100 units/mL subcutaneous injection 1-5 Units, 1-5 Units, Subcutaneous, TID AC, 1 Units at 04/24/18 0802 **AND** [CANCELED] Fingerstick Glucose (POCT), , , TID AC, Chase Alberto MD    insulin lispro (HumaLOG) 100 units/mL subcutaneous injection 1-5 Units, 1-5 Units, Subcutaneous, HS, Chase Alberto MD, 1 Units at 04/23/18 2223    insulin lispro (HumaLOG) 100 units/mL subcutaneous injection 12 Units, 12 Units, Subcutaneous, TID With Meals, Sixto Guadarrama MD, 12 Units at 04/24/18 0801    melatonin tablet 6 mg, 6 mg, Oral, HS, Chato Aguilar MD, 6 mg at 04/23/18 2222    metoprolol succinate (TOPROL-XL) 24 hr tablet 50 mg, 50 mg, Oral, Q12H, Aranza Chakraborty MD, 50 mg at 04/24/18 0800    ondansetron (ZOFRAN) injection 4 mg, 4 mg, Intravenous, Q6H PRN, Chase Alberto MD    pantoprazole (PROTONIX) EC tablet 40 mg, 40 mg, Oral, Early Morning, Chase Alberto MD, 40 mg at 04/24/18 0536    potassium chloride (K-DUR,KLOR-CON) CR tablet 40 mEq, 40 mEq, Oral, BID, Julius Barahona MD    sevelamer (RENAGEL) tablet 800 mg, 800 mg, Oral, TID With Meals, Juaquin Macias MD, 800 mg at 04/24/18 0757    tamsulosin (FLOMAX) capsule 0 4 mg, 0 4 mg, Oral, Daily With Dinner, Lisseth Seals PA-C, 0 4 mg at 04/23/18 1653    zolpidem (AMBIEN) tablet 5 mg, 5 mg, Oral, HS PRN, China Rooney MD    Laboratory Results:    Results from last 7 days  Lab Units 04/24/18  0441 04/23/18  8514 04/22/18  8244 04/21/18  0503 04/20/18  9652 04/19/18  0464 04/18/18  5319 WBC Thousand/uL  --  8 76  --   --   --   --  6 63   HEMOGLOBIN g/dL  --  9 3*  --   --   --   --  9 5*   HEMATOCRIT %  --  30 9*  --   --   --   --  30 4*   PLATELETS Thousands/uL  --  198  --   --   --   --  170   SODIUM mmol/L 135* 136 137 140 139 141 141   POTASSIUM mmol/L 2 8* 3 7 3 8 3 9 3 3* 3 4* 3 1*   CHLORIDE mmol/L 85* 86* 90* 92* 91* 94* 94*   CO2 mmol/L 40* 41* 40* 39* 40* 39* 37*   BUN mg/dL 164* 147* 145* 144* 136* 139* 137*   CREATININE mg/dL 3 01* 2 90* 2 78* 2 76* 2 63* 2 65* 2 70*   CALCIUM mg/dL 9 9 10 1 10 8* 10 2* 9 9 9 9 9 9   MAGNESIUM mg/dL  --   --  2 7*  --  2 6  --  2 7*   PHOSPHORUS mg/dL  --   --  4 4*  --  4 1  --  5 4*   GLUCOSE RANDOM mg/dL 170* 136 145* 131 125 147* 137

## 2018-04-24 NOTE — PLAN OF CARE
Problem: PHYSICAL THERAPY ADULT  Goal: Performs mobility at highest level of function for planned discharge setting  See evaluation for individualized goals  Treatment/Interventions: Functional transfer training, LE strengthening/ROM, Therapeutic exercise, Endurance training, Patient/family training, Equipment eval/education, Bed mobility, Gait training (PT to see when stair training is appropriate )          See flowsheet documentation for full assessment, interventions and recommendations  Outcome: Not Progressing  Prognosis: Fair  Problem List: Decreased strength, Decreased range of motion, Decreased endurance, Impaired balance, Decreased mobility, Decreased coordination, Decreased safety awareness, Obesity, Decreased skin integrity, Impaired sensation, Pain  Assessment: Patient significantly fatigued for todays session limiting any transfer and ambulation training  Patient participated in LE exercise program however required AAROM for some movements and input to stay on task and alert  Patient presents with functional decline today and will continue to follow as appropriate  Barriers to Discharge: Inaccessible home environment     Recommendation: Long-term skilled PT     PT - OK to Discharge: Yes (when medically cleared to LT skilled PT)    See flowsheet documentation for full assessment

## 2018-04-24 NOTE — ASSESSMENT & PLAN NOTE
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

## 2018-04-24 NOTE — ASSESSMENT & PLAN NOTE
· Bumex infusion and metolazone discontinued today secondary to worsening creatinine levels and azotemia  · Patient and spouse continue to decline dialysis and will prefer this to be the absolute last resort  · Patient has overall been declining since being in the hospital and getting more deconditioned  · Overall poor prognosis in view of significant valvular disease, renal disease, pulmonary hypertension

## 2018-04-24 NOTE — PROGRESS NOTES
Cardiology Progress Note - Geraldine Divers  [de-identified] y o  male MRN: [id number]    Unit/Bed#: -01 Encounter: [id number]      Assessment:  Principal Problem:    Acute on chronic diastolic congestive heart failure (HCC)  Active Problems:    Rapid atrial fibrillation (HCC)    Benign essential hypertension    Type 2 diabetes mellitus ([facility] )    Peripheral arterial disease (HCC)    Acute-on-chronic kidney injury ([facility] )    Non-ST elevation myocardial infarction (NSTEMI) ([facility] )    Pulmonary hypertension ([facility])    Azotemia      Plan:    1  Acute on chronic diastolic CHF - Patient having more for worsening acute kidney injury, and Nephrology managing diuretic dosing  Diuretics currently on hold  We will follow along with them  Continue all other medications from a cardiovascular standpoint  Follow urine output, daily labs and weight  2   Valvular heart disease - Moderate to severe mitral and tricuspid regurgitation  Patient had a prior bioprosthetic aortic valve replacement  We will continue to follow this closely, and treat CHF as above  Not an open heart surgery candidate  I reviewed his prior echocardiograms  There is a significant amount of mitral annular calcification, which would likely prohibit a noninvasive MitraClip  However we would not know this definitively without a KRYSTLE  He would need clinically improve from a volume and renal standpoint, but before we would consider this  3  CAD - Prior bypass surgery at the time of his aortic valve replacement  Continue all other outpatient medications prescribed  4  AF - Likely permanent  Continue medications prescribed  Heart rates controlled  Had been taking off of anticoagulation as an outpatient, secondary to a traumatic fall and ongoing fall risk  Subjective:   Patient seen and examined  Patient continues to remain volume overloaded  With worsening renal function, nephrology has held Bumex and metolazone    Patient is frustrated, and just wants to go home"  Objective:     Vitals: Blood pressure 113/65, pulse 92, temperature 97 8 °F (36 6 °C), temperature source Oral, resp  rate 16, height 5' 7" (1 702 m), weight 83 6 kg (184 lb 4 9 oz), SpO2 99 %  , Body mass index is 28 87 kg/m² ,   Orthostatic Blood Pressures    Flowsheet Row Most Recent Value   Blood Pressure  113/65 filed at 04/24/2018 0700   Patient Position - Orthostatic VS  Lying filed at 04/24/2018 0700            Intake/Output Summary (Last 24 hours) at 04/24/18 1130  Last data filed at 04/24/18 0910   Gross per 24 hour   Intake             1106 ml   Output             2711 ml   Net            -1605 ml       Telemetry review:  Atrial fibrillation with short runs of nonsustained VT, and PVCs    Physical Exam:    GEN: Marni Parish  appears well, alert and oriented x 3, pleasant and cooperative   HEENT: pupils equal, round, and reactive to light; extraocular muscles intact  NECK: supple, no carotid bruits  +JVD   HEART: irregular rhythm, distant S1 and S2, soft systolic murmur, no clicks, gallops or rubs   LUNGS:  Diminished bilaterally; no wheezes, rales, or rhonchi   ABDOMEN: normal bowel sounds, soft, no tenderness, no distention  EXTREMITIES: peripheral pulses normal; no clubbing, cyanosis  ++ Edema     NEURO: no focal findings   SKIN: normal without suspicious lesions on exposed skin    Medications:      Current Facility-Administered Medications:     acetaminophen (TYLENOL) tablet 325 mg, 325 mg, Oral, Q8H PRN, Mesfin Dumont MD, 325 mg at 04/22/18 2218    aspirin (ECOTRIN LOW STRENGTH) EC tablet 81 mg, 81 mg, Oral, Daily, Chase Alberto MD, 81 mg at 04/24/18 0800    atorvastatin (LIPITOR) tablet 40 mg, 40 mg, Oral, Daily With Maggy Alberto MD, 40 mg at 04/23/18 1654    calcium carbonate (TUMS) chewable tablet 1,000 mg, 1,000 mg, Oral, Daily PRN, Chase Alberto MD    docusate sodium (COLACE) capsule 100 mg, 100 mg, Oral, BID PRN, Chase Alberto MD, 100 mg at 04/10/18 1005    docusate sodium (COLACE) capsule 100 mg, 100 mg, Oral, BID, Mireya Vergara MD, 100 mg at 04/24/18 0800    heparin (porcine) subcutaneous injection 5,000 Units, 5,000 Units, Subcutaneous, Q8H [facility], Lisseth Seals PA-C, 5,000 Units at 04/24/18 0536    insulin glargine (LANTUS) subcutaneous injection 25 Units, 25 Units, Subcutaneous, HS, Shannon Hernandez MD, 25 Units at 04/23/18 2212    insulin lispro (HumaLOG) 100 units/mL subcutaneous injection 1-5 Units, 1-5 Units, Subcutaneous, TID AC, 1 Units at 04/24/18 0802 **AND** [CANCELED] Fingerstick Glucose (POCT), , , TID AC, Chase Alberto MD    insulin lispro (HumaLOG) 100 units/mL subcutaneous injection 1-5 Units, 1-5 Units, Subcutaneous, HS, Chase Alberto MD, 1 Units at 04/23/18 2223    insulin lispro (HumaLOG) 100 units/mL subcutaneous injection 12 Units, 12 Units, Subcutaneous, TID With Meals, Shannon Hernandez MD, 12 Units at 04/24/18 0801    melatonin tablet 6 mg, 6 mg, Oral, HS, Robi Allan MD, 6 mg at 04/23/18 2222    metoprolol succinate (TOPROL-XL) 24 hr tablet 50 mg, 50 mg, Oral, Q12H, Ashwin Delgado MD, 50 mg at 04/24/18 0800    ondansetron (ZOFRAN) injection 4 mg, 4 mg, Intravenous, Q6H PRN, Chase Alberto MD    pantoprazole (PROTONIX) EC tablet 40 mg, 40 mg, Oral, Early Morning, Chase Alberto MD, 40 mg at 04/24/18 0536    potassium chloride (K-DUR,KLOR-CON) CR tablet 40 mEq, 40 mEq, Oral, BID, Sesar Sutton MD    sevelamer (RENAGEL) tablet 800 mg, 800 mg, Oral, TID With Meals, Jamison Priest MD, 800 mg at 04/24/18 0757    tamsulosin (FLOMAX) capsule 0 4 mg, 0 4 mg, Oral, Daily With Dinner, Lisseth Seals PA-C, 0 4 mg at 04/23/18 1653    zolpidem (AMBIEN) tablet 5 mg, 5 mg, Oral, HS PRN, Mireya Vergara MD     Labs & Results:          Results from last 7 days  Lab Units 04/23/18  0552 04/18/18  0444   WBC Thousand/uL 8 76 6 63   HEMOGLOBIN g/dL 9 3* 9 5*   HEMATOCRIT % 30 9* 30 4*   PLATELETS Thousands/uL 198 170           Results from last 7 days  Lab Units 04/24/18  0441 04/23/18  0552 04/22/18  0558   SODIUM mmol/L 135* 136 137   POTASSIUM mmol/L 2 8* 3 7 3 8   CHLORIDE mmol/L 85* 86* 90*   CO2 mmol/L 40* 41* 40*   BUN mg/dL 164* 147* 145*   CREATININE mg/dL 3 01* 2 90* 2 78*   CALCIUM mg/dL 9 9 10 1 10 8*   GLUCOSE RANDOM mg/dL 170* 136 145*           Results from last 7 days  Lab Units 04/22/18  0558 04/20/18  0558 04/18/18  0444   MAGNESIUM mg/dL 2 7* 2 6 2 7*         EKG personally reviewed by William Ramirez MD   Atrial fibrillation/flutter, nonspecific IVCD, nonspecific ST changes  ECHO:  LEFT VENTRICLE:  Systolic function was mildly reduced  Ejection fraction was estimated to be 45 %  There was mild diffuse hypokinesis  There was moderate hypokinesis of the apical septal wall(s)  Wall thickness was mildly increased  There was mild concentric hypertrophy      RIGHT VENTRICLE:  The ventricle was mildly to moderately dilated  Systolic function was reduced      LEFT ATRIUM:  The atrium was moderately dilated      RIGHT ATRIUM:  The atrium was moderately dilated      MITRAL VALVE:  There was marked annular calcification  There was moderate to severe regurgitation      AORTIC VALVE:  A bioprosthesis was present  It exhibited normal function  Doppler cardiac output was 3 5 L/min, using LVOT flow data  Doppler cardiac index was 1 68 L/min/m squared, using LVOT flow data      TRICUSPID VALVE:  There was severe regurgitation  Pulmonary artery systolic pressure was markedly increased  Estimated peak PA pressure was 65 mmHg  Counseling / Coordination of Care  Total floor / unit time spent today 25 minutes  Greater than 50% of total time was spent with the patient and / or family counseling and / or coordination of care

## 2018-04-25 NOTE — ASSESSMENT & PLAN NOTE
· Baseline creatinine 1 8-2 2  Off Bumex infusion and metolazone due to worsening renal function with azotemia  · Likely secondary to cardiorenal, +/-obstructive uropathy  · He is status post viera placed for retention during this admission and flomax added  Viera removed on 4/12/18 for voiding trial which so far has been successful    Continue urinary retention protocol  · Management per Nephrology

## 2018-04-25 NOTE — PLAN OF CARE
Problem: PAIN - ADULT  Goal: Verbalizes/displays adequate comfort level or baseline comfort level  Interventions:  - Encourage patient to monitor pain and request assistance  - Assess pain using appropriate pain scale  - Administer analgesics based on type and severity of pain and evaluate response  - Implement non-pharmacological measures as appropriate and evaluate response  - Consider cultural and social influences on pain and pain management  - Notify physician/advanced practitioner if interventions unsuccessful or patient reports new pain   Outcome: Progressing      Problem: INFECTION - ADULT  Goal: Absence or prevention of progression during hospitalization  INTERVENTIONS:  - Assess and monitor for signs and symptoms of infection  - Monitor lab/diagnostic results  - Monitor all insertion sites, i e  indwelling lines, tubes, and drains  - Monitor endotracheal (as able) and nasal secretions for changes in amount and color  - [place] appropriate cooling/warming therapies per order  - Administer medications as ordered  - Instruct and encourage patient and family to use good hand hygiene technique  - Identify and instruct in appropriate isolation precautions for identified infection/condition   Outcome: Progressing    Goal: Absence of fever/infection during neutropenic period  INTERVENTIONS:  - Monitor WBC  - Implement neutropenic guidelines   Outcome: Progressing      Problem: SAFETY ADULT  Goal: Maintain or return to baseline ADL function  INTERVENTIONS:  -  Assess patient's ability to carry out ADLs; assess patient's baseline for ADL function and identify physical deficits which impact ability to perform ADLs (bathing, care of mouth/teeth, toileting, grooming, dressing, etc )  - Assess/evaluate cause of self-care deficits   - Assess range of motion  - Assess patient's mobility; develop plan if impaired  - Assess patient's need for assistive devices and provide as appropriate  - Encourage maximum independence but intervene and supervise when necessary  ¯ Involve family in performance of ADLs  ¯ Assess for home care needs following discharge   ¯ Request OT consult to assist with ADL evaluation and planning for discharge  ¯ Provide patient education as appropriate   Outcome: Progressing    Goal: Maintain or return mobility status to optimal level  INTERVENTIONS:  - Assess patient's baseline mobility status (ambulation, transfers, stairs, etc )    - Identify cognitive and physical deficits and behaviors that affect mobility  - Identify mobility aids required to assist with transfers and/or ambulation (gait belt, sit-to-stand, lift, walker, cane, etc )  - [place] fall precautions as indicated by assessment  - Record patient progress and toleration of activity level on Mobility SBAR; progress patient to next Phase/Stage  - Instruct patient to call for assistance with activity based on assessment  - Request Rehabilitation consult to assist with strengthening/weightbearing, etc    Outcome: Progressing      Problem: DISCHARGE PLANNING  Goal: Discharge to home or other facility with appropriate resources  INTERVENTIONS:  - Identify barriers to discharge w/patient and caregiver  - Arrange for needed discharge resources and transportation as appropriate  - Identify discharge learning needs (meds, wound care, etc )  - Arrange for interpretive services to assist at discharge as needed  - Refer to Case Management Department for coordinating discharge planning if the patient needs post-hospital services based on physician/advanced practitioner order or complex needs related to functional status, cognitive ability, or social support system   Outcome: Progressing      Problem: Knowledge Deficit  Goal: Patient/family/caregiver demonstrates understanding of disease process, treatment plan, medications, and discharge instructions  Complete learning assessment and assess knowledge base    Interventions:  - Provide teaching at level of understanding  - Provide teaching via preferred learning methods   Outcome: Progressing      Problem: Nutrition/Hydration-ADULT  Goal: Nutrient/Hydration intake appropriate for improving, restoring or maintaining nutritional needs  Monitor and assess patient's nutrition/hydration status for malnutrition (ex- brittle hair, bruises, dry skin, pale skin and conjunctiva, muscle wasting, smooth red tongue, and disorientation)  Collaborate with interdisciplinary team and initiate plan and interventions as ordered  Monitor patient's weight and dietary intake as ordered or per policy  Utilize nutrition screening tool and intervene per policy  Determine patient's food preferences and provide high-protein, high-caloric foods as appropriate       INTERVENTIONS:  - Monitor oral intake, urinary output, labs, and treatment plans  - Assess nutrition and hydration status and recommend course of action  - Evaluate amount of meals eaten  - Assist patient with eating if necessary   - Allow adequate time for meals  - Recommend/ encourage appropriate diets, oral nutritional supplements, and vitamin/mineral supplements  - Order, calculate, and assess calorie counts as needed  - Recommend, monitor, and adjust tube feedings and TPN/PPN based on assessed needs  - Assess need for intravenous fluids  - Provide specific nutrition/hydration education as appropriate  - Include patient/family/caregiver in decisions related to nutrition   Outcome: Progressing      Problem: Prexisting or High Potential for Compromised Skin Integrity  Goal: Skin integrity is maintained or improved  INTERVENTIONS:  - Identify patients at risk for skin breakdown  - Assess and monitor skin integrity  - Assess and monitor nutrition and hydration status  - Monitor labs (i e  albumin)  - Assess for incontinence   - Turn and reposition patient  - Assist with mobility/ambulation  - Relieve pressure over bony prominences  - Avoid friction and shearing  - Provide appropriate hygiene as needed including keeping skin clean and dry  - Evaluate need for skin moisturizer/barrier cream  - Collaborate with interdisciplinary team (i e  Nutrition, Rehabilitation, etc )   - Patient/family teaching   Outcome: Progressing      Problem: DISCHARGE PLANNING - CARE MANAGEMENT  Goal: Discharge to post-acute care or home with appropriate resources  INTERVENTIONS:  - Conduct assessment to determine patient/family and health care team treatment goals, and need for post-acute services based on payer coverage, community resources, and patient preferences, and barriers to discharge  - Address psychosocial, clinical, and financial barriers to discharge as identified in assessment in conjunction with the patient/family and health care team  - Arrange appropriate level of post-acute services according to patient's   needs and preference and payer coverage in collaboration with the physician and health care team  - Communicate with and update the patient/family, physician, and health care team regarding progress on the discharge plan  - Arrange appropriate transportation to post-acute venues   Outcome: Progressing      Problem: CARDIOVASCULAR - ADULT  Goal: Maintains optimal cardiac output and hemodynamic stability  INTERVENTIONS:  - Monitor I/O, vital signs and rhythm  - Monitor for S/S and trends of decreased cardiac output i e  bleeding, hypotension  - Administer and titrate ordered vasoactive medications to optimize hemodynamic stability  - Assess quality of pulses, skin color and temperature  - Assess for signs of decreased coronary artery perfusion - ex   Angina  - Instruct patient to report change in severity of symptoms   Outcome: Progressing    Goal: Absence of cardiac dysrhythmias or at baseline rhythm  INTERVENTIONS:  - Continuous cardiac monitoring, monitor vital signs, obtain 12 lead EKG if indicated  - Administer antiarrhythmic and heart rate control medications as ordered  - Monitor electrolytes and administer replacement therapy as ordered   Outcome: Progressing

## 2018-04-25 NOTE — ASSESSMENT & PLAN NOTE
· Most recent hemoglobin A1c 7 6 2 months ago  · Increase Lantus 30 units, continue Humalog with meals

## 2018-04-25 NOTE — PROGRESS NOTES
NEPHROLOGY PROGRESS NOTE   Harjinder Seymour  [de-identified] y o  male MRN: [id number]  Unit/Bed#: -01 Encounter: [id number]  Reason for Consult: SABINE, CKD    The patient is an 59-year-old gentleman who presented with shortness of breath on April 4th   He went on vacation for 5 days and did not take his diuretic, along with dietary indiscretion  Pinky Border course complicated by atrial fibrillation, hypotension and severe diuretic resistance     1  Acute kidney injury on chronic kidney disease:  Baseline creatinine 1 8-2 2   Etiology possibly prerenal, decreased effective circulating volume in the setting of volume overload, cardiorenal,  hypotension  Post renal also contributing-  Geronimo catheter discontinued on 04/12/2018  · Creatinine up to 3 in the setting of aggressive diuresis  Severe azotemia  Diuretics placed on hold 4/24/18  · Chest x-ray 4/24 stable interstitial edema  Small pleural effusions  · Status post Geronimo catheter removal no evidence of retention  · Urinalysis:  Negative protein, no RBCs, no bacteria, 0-1 WBCs  · Renal ultrasound:  Kidneys diffusely echogenic reflecting chronic medical renal disease  · Spoke with Dr Norah Flaherty regarding plan of care  Wife continues to be upset concerning lack of progression and would like to have Dr Arely Montez who is his primary nephrologist involved in discussion regarding hemodialysis  Team meeting planned  2  Hypokalemia:  Potassium level up to 3 3  Receiving 40 mEq of K-Dur twice a day  3  Acute on chronic diastolic CHF, Volume overload:  Cardiology following  · Weight down to 190 lb  Previous dry weight 210-212 lb  · Likely 2/2 to worsening cardiac function/RV dysfunction/severe TR in the setting of atrial fibrillation, worsened MR, pulmonary hypertension and reduction in LVEF  EF 45% (last September EF 65%)  · Venous Dopplers lower extremities completed   No DVT     · Outpatient diuretic regime: torsemide 40 mg every Monday, Wednesday, Friday the remaining days patient was taking 20 mg  3  PAF:  Now likely chronic   Rate controlled with intermittent bouts of tachycardia   Cardiology following  4  Anemia:  Hemoglobin fairly stable  5  MGUS:  IgM kappa and IgM lambda followed by Dr Bon Washington  6  Elevated bicarbonate:  Likely due to aggressive diuresis   Goal -keep potassium level ~4  7  Hypokalemia:  Replete  Magnesium level 2 8  Potassium level up to 3 3 continue oral repletion  8  Hyperphosphatemia:  Improved on sevelamer -phosphorus 4 1  9  Weakness:  Severe deconditioning in the setting of 3 week hospitalization  10  Other:  PVD, CAD, prior CABG, AS status post AVR-bioprosthetic     SUMMARY OF RECOMMENDATIONS:  · Replete potassium  · Team meeting to discuss care plan/goals of care    SUBJECTIVE / INTERVAL HISTORY:  Not sleeping well, poor appetite  Very weak  OBJECTIVE:  Current Weight: Weight - Scale: 86 3 kg (190 lb 4 1 oz)  Vitals:    04/24/18 0700 04/24/18 1500 04/24/18 2245 04/25/18 0804   BP: 113/65 103/55 135/79 116/59   BP Location: Right arm Right arm Right arm Left arm   Pulse: 92 76 86 78   Resp: 16 16 16 16   Temp: 97 8 °F (36 6 °C) 97 7 °F (36 5 °C) 97 9 °F (36 6 °C) (!) 96 3 °F (35 7 °C)   TempSrc: Oral Oral Oral Oral   SpO2: 99% 100% 100% 100%   Weight:    86 3 kg (190 lb 4 1 oz)   Height:           Intake/Output Summary (Last 24 hours) at 04/25/18 1129  Last data filed at 04/25/18 0203   Gross per 24 hour   Intake              240 ml   Output             1202 ml   Net             -962 ml   General:  No acute   Skin:  Warm and dry  Eyes:  Conjunctiva normal  ENT:  Oropharynx moist  Neck:  Supple, neck veins appear full  Chest:  Bibasilar crackles, no wheezes or rhonchi  CVS:  Irregularly irregular rhythm, soft systolic murmur  Abdomen:  Soft, nontender, bowel sounds present  Extremities:  Persistent lower extremity edema  :  No Geronimo  Neuro:  No acute deficits  Psych:   Withdrawn/lethargic    Medications:    Current Facility-Administered Medications:   acetaminophen (TYLENOL) tablet 325 mg, 325 mg, Oral, Q8H PRN, Brinaa Scott MD, 325 mg at 04/24/18 2241    aspirin (ECOTRIN LOW STRENGTH) EC tablet 81 mg, 81 mg, Oral, Daily, Chase Alberto MD, 81 mg at 04/24/18 0800    atorvastatin (LIPITOR) tablet 40 mg, 40 mg, Oral, Daily With Tawana Alberto MD, 40 mg at 04/24/18 1746    calcium carbonate (TUMS) chewable tablet 1,000 mg, 1,000 mg, Oral, Daily PRN, Chase Alberto MD    docusate sodium (COLACE) capsule 100 mg, 100 mg, Oral, BID PRN, Chase Alberto MD, 100 mg at 04/10/18 1005    docusate sodium (COLACE) capsule 100 mg, 100 mg, Oral, BID, Lala Kocher, MD, 100 mg at 04/24/18 0800    heparin (porcine) subcutaneous injection 5,000 Units, 5,000 Units, Subcutaneous, Q8H KENDRICK, Lisseth Seals PA-C, 5,000 Units at 04/25/18 0602    insulin glargine (LANTUS) subcutaneous injection 25 Units, 25 Units, Subcutaneous, HS, Sandip Lincoln MD, 25 Units at 04/24/18 2242    insulin lispro (HumaLOG) 100 units/mL subcutaneous injection 1-5 Units, 1-5 Units, Subcutaneous, TID AC, 1 Units at 04/24/18 1153 **AND** [CANCELED] Fingerstick Glucose (POCT), , , TID AC, Chase Alberto MD    insulin lispro (HumaLOG) 100 units/mL subcutaneous injection 1-5 Units, 1-5 Units, Subcutaneous, HS, Chase Alberto MD, 2 Units at 04/24/18 2240    insulin lispro (HumaLOG) 100 units/mL subcutaneous injection 12 Units, 12 Units, Subcutaneous, TID With Meals, Sandip Lincoln MD, 12 Units at 04/24/18 1153    melatonin tablet 6 mg, 6 mg, Oral, HS, David Spann MD, 6 mg at 04/24/18 2241    metoprolol succinate (TOPROL-XL) 24 hr tablet 50 mg, 50 mg, Oral, Q12H, Chintan Tillman MD, 50 mg at 04/24/18 2242    ondansetron (ZOFRAN) injection 4 mg, 4 mg, Intravenous, Q6H PRN, Chase Alberto MD    pantoprazole (PROTONIX) EC tablet 40 mg, 40 mg, Oral, Early Morning, Chase Alberto MD, 40 mg at 04/25/18 0602    sevelamer (RENAGEL) tablet 800 mg, 800 mg, Oral, TID With Meals, Carlota Maloney MD, 800 mg at 04/24/18 1745    tamsulosin [facility]) capsule 0 4 mg, 0 4 mg, Oral, Daily With Dinner, Lisseth Seals PA-C, 0 4 mg at 04/24/18 1746    zolpidem (AMBIEN) tablet 5 mg, 5 mg, Oral, HS PRN, Milagros Olivas MD    Laboratory Results:    Results from last 7 days  Lab Units 04/25/18  0612 04/24/18  0441 04/23/18  2942 04/22/18  7402 04/21/18  0503 04/20/18  0558 04/19/18  0438   WBC Thousand/uL  --   --  8 76  --   --   --   --    HEMOGLOBIN g/dL  --   --  9 3*  --   --   --   --    HEMATOCRIT %  --   --  30 9*  --   --   --   --    PLATELETS Thousands/uL  --   --  198  --   --   --   --    SODIUM mmol/L 134* 135* 136 137 140 139 141   POTASSIUM mmol/L 3 3* 2 8* 3 7 3 8 3 9 3 3* 3 4*   CHLORIDE mmol/L 85* 85* 86* 90* 92* 91* 94*   CO2 mmol/L 42* 40* 41* 40* 39* 40* 39*   BUN mg/dL 165* 164* 147* 145* 144* 136* 139*   CREATININE mg/dL 3 09* 3 01* 2 90* 2 78* 2 76* 2 63* 2 65*   CALCIUM mg/dL 9 7 9 9 10 1 10 8* 10 2* 9 9 9 9   MAGNESIUM mg/dL 2 8*  --   --  2 7*  --  2 6  --    PHOSPHORUS mg/dL  --   --   --  4 4*  --  4 1  --    GLUCOSE RANDOM mg/dL 160* 170* 136 145* 131 125 147*     Previous work up:

## 2018-04-25 NOTE — PROGRESS NOTES
It was requested by the patient's wife that I speak with her on the phone today  I had initially spoken with Dr Sera Stone as well as the rounding nephrologist Dr Thu Green I also spoke with the patient's wife at length this morning  I have had the privilege of knowing Mr España Reason for several years  His baseline Cr has been 1 8-2 2 in the office  Chart notes reviewed and appreciated  The patient has been on a bumex infusion and there has been a decrease in weight and a rise in the Cr level  I explained to the patient's wife that they have been trying to remove his excess fluid to try and help his breathing  He has valvular disease and pulmonary hypertension which  Also effects his kidney status  One of the issues I had spoken with dr Pablo Rodrigez is despite his aggressive diuresis, he still had an abnormal CXR showing vascular congestion  In my discussion with Dr Thu Green he is beginning to have uremic symptoms and this also is affecting his appetite and lethargy which I had discussed with the patient's wife  I think that we are at the point where initiating dialysis needs to be a significant consideration given the diuretic resistance especially with the CXR still showing interstitial edema  A team meeting is planned to discuss goals of care and I agree with plan of care up to this point  When I had spoken with the the patient's wife she had remarked that his weight had decreased and he was the lightest in terms of weight she remembered him being  I spoke with Dr Thu Green after and it is important to note that the patient still has persistent vascular congestion  I explained to his wife that this was a complex situation but that the worsening Cr  And kidney function affected by altered cardiac hemodynamics and pulmonary hypertension are affecting his appetite and lethargy  I do think it is very reasonable and appropriate to consider dialysis if this is direction the family and wife wish to pursue   I also think discussing palliative care would also be appropriate if they did not wish to pursue dialysis  For team meeting to clarify goals of care

## 2018-04-25 NOTE — ASSESSMENT & PLAN NOTE
· Rate controlled    Continue metoprolol  · Patient declined anticoagulation due to prior bleeding while on Eliquis

## 2018-04-25 NOTE — ASSESSMENT & PLAN NOTE
· Bumex infusion and metolazone discontinued yesterday secondary to worsening creatinine levels and azotemia  Patient remains volume overloaded  · I spoke with the spouse on phone today and she states she will like to hear from the patient's primary nephrologist and cardiologist before making any decisions about dialysis  I spoke with Dr Terrell Schulz and he will be in touch with patient's spouse today  Unfortunately Dr Sergio Kruger is out of town on vacation at this time    · Patient has overall been declining since being in the hospital and getting more deconditioned  · Overall poor prognosis in view of significant valvular disease, renal disease, pulmonary hypertension  · Continues to decline dialysis at this time

## 2018-04-25 NOTE — PROGRESS NOTES
Progress Note - Angelito Turner  1935, [de-identified] y o  male MRN: [id number]    Unit/Bed#: -01 Encounter: [id number]    Primary Care Provider: Abdias Delgado MD   Date and time admitted to hospital: 4/4/2018  6:39 AM    * Acute on chronic diastolic congestive heart failure ([address] )   Assessment & Plan    · Bumex infusion and metolazone discontinued yesterday secondary to worsening creatinine levels and azotemia  Patient remains volume overloaded  · I spoke with the spouse on phone today and she states she will like to hear from the patient's primary nephrologist and cardiologist before making any decisions about dialysis  I spoke with Dr Naga Raya and he will be in touch with patient's spouse today  Unfortunately Dr Clif Bowser is out of town on vacation at this time  · Patient has overall been declining since being in the hospital and getting more deconditioned  · Overall poor prognosis in view of significant valvular disease, renal disease, pulmonary hypertension  · Continues to decline dialysis at this time      Acute-on-chronic kidney injury [facility])   Assessment & Plan    · Baseline creatinine 1 8-2 2  Off Bumex infusion and metolazone due to worsening renal function with azotemia  · Likely secondary to cardiorenal, +/-obstructive uropathy  · He is status post viera placed for retention during this admission and flomax added  Viera removed on 4/12/18 for voiding trial which so far has been successful  Continue urinary retention protocol  · Management per Nephrology      Non-ST elevation myocardial infarction (NSTEMI) ([facility])   Assessment & Plan    · Mild troponin elevation likely the result of type 2 NSTEMI in the setting of heart failure and renal failure  · No further workup from the cardiac standpoint at this time      Rapid atrial fibrillation [facility])   Assessment & Plan    · Rate controlled    Continue metoprolol  · Patient declined anticoagulation due to prior bleeding while on Eliquis      Type 2 diabetes mellitus ([facility] )   Assessment & Plan    · Most recent hemoglobin A1c 7 6 2 months ago  · Increase Lantus 30 units, continue Humalog with meals      Pulmonary hypertension (H[facility])   Assessment & Plan    · Severe pulmonary hypertension with estimated peak PA pressure at 65 mmHg  · Diuretics held, probably start on sildenafil once euvolemic      Peripheral arterial disease ([facility] )   Assessment & Plan    · Patient with severe PAD with 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, hallux and lateral heel, managed by Podiatry with local wound care  · He is status post vascular evaluation and no plans for any intervention during this hospitalization in view of his unstable cardiovascular/renal status  · Pain control and Ace wraps as tolerated        VTE Pharmacologic Prophylaxis:   Pharmacologic: Heparin  Mechanical VTE Prophylaxis in Place: Yes    Patient Centered Rounds: I have performed bedside rounds with nursing staff today  Discussions with Specialists or Other Care Team Provider:  Nursing/Nephrology    Education and Discussions with Family / Patient:  Patient's spouse updated on phone  All questions answered  Current Length of Stay: 21 day(s)    Current Patient Status: Inpatient   Certification Statement: The patient will continue to require additional inpatient hospital stay due to Above diagnosis and care plan    Discharge Plan:  Not medically stable for discharge    Code Status: Level 1 - Full Code      Subjective:   Patient seen and evaluated  Did not sleep well through the night per RN  Very lethargic this morning but easily arousable  No acute distress noted    Objective:     Vitals:   Temp (24hrs), Av 3 °F (36 3 °C), Min:96 3 °F (35 7 °C), Max:97 9 °F (36 6 °C)    HR:  [76-86] 78  Resp:  [16] 16  BP: (103-135)/(55-79) 116/59  SpO2:  [100 %] 100 %  Body mass index is 29 8 kg/m²  Input and Output Summary (last 24 hours):        Intake/Output Summary (Last 24 hours) at 18 0968  Last data filed at 04/25/18 0203   Gross per 24 hour   Intake              240 ml   Output             1202 ml   Net             -962 ml       Physical Exam:  General Appearance:    Lethargic, no acute distress   Head:    Normocephalic, without obvious abnormality, atraumatic, mucous membranes moist    Eyes:    Conjunctiva/corneas clear   Neck:   Supple   Lungs:     Bibasilar rales with decreased breath sounds bilaterally, no wheezes     Heart:    Irregularly irregular, S1 and S2, +SM    Abdomen:     Soft, non-tender, bowel sounds active all four quadrants,     no masses, no organomegaly   Extremities:   +2 bilateral lower extremity edema   Neurologic:   Lethargic, moving all extremities, follows simple commands         Additional Data:     Labs:      Results from last 7 days  Lab Units 04/23/18  0552   WBC Thousand/uL 8 76   HEMOGLOBIN g/dL 9 3*   HEMATOCRIT % 30 9*   PLATELETS Thousands/uL 198       Results from last 7 days  Lab Units 04/25/18  0612   SODIUM mmol/L 134*   POTASSIUM mmol/L 3 3*   CHLORIDE mmol/L 85*   CO2 mmol/L 42*   BUN mg/dL 165*   CREATININE mg/dL 3 09*   CALCIUM mg/dL 9 7   GLUCOSE RANDOM mg/dL 160*           * I Have Reviewed All Lab Data Listed Above  * Additional Pertinent Lab Tests Reviewed: [address] Admission Reviewed    Cultures:   Blood Culture:   Lab Results   Component Value Date    BLOODCX No Growth After 5 Days  04/04/2018    BLOODCX No Growth After 5 Days   04/04/2018     Urine Culture: No results found for: URINECX  Sputum Culture: No components found for: SPUTUMCX  Wound Culture: No results found for: WOUNDCULT    Last 24 Hours Medication List:     Current Facility-Administered Medications:  acetaminophen 325 mg Oral Q8H PRN Daneen Gilford, MD   aspirin 81 mg Oral Daily Chase Alberto MD   atorvastatin 40 mg Oral Daily With Dinner Chase Alberto MD   calcium carbonate 1,000 mg Oral Daily PRN Chase Alberto MD   docusate sodium 100 mg Oral BID PRN Chase MD Remy   docusate sodium 100 mg Oral BID Garcia Castellanos MD   heparin (porcine) 5,000 Units Subcutaneous Q8H [address] Lisseth Seals PA-C   insulin glargine 25 Units Subcutaneous HS Markus Da Silva MD   insulin lispro 1-5 Units Subcutaneous TID AC Chase Alberto MD   insulin lispro 1-5 Units Subcutaneous HS Chase Alberto MD   insulin lispro 12 Units Subcutaneous TID With Meals Markus Da Silva MD   melatonin 6 mg Oral HS Roel Romero MD   metoprolol succinate 50 mg Oral Q12H Irene Kohli MD   ondansetron 4 mg Intravenous Q6H PRN Chase Alberto MD   pantoprazole 40 mg Oral Early Morning Chase Alberto MD   sevelamer 800 mg Oral TID With Meals Mitra Griffin MD   tamsulosin 0 4 mg Oral Daily With Dinner Lisseth Seals PA-C   zolpidem 5 mg Oral HS PRN Garcia Castellanos MD        Today, Patient Was Seen By: Markus Da Silva MD    ** Please Note: Dragon 360 Dictation voice to text software may have been used in the creation of this document   **

## 2018-04-25 NOTE — ASSESSMENT & PLAN NOTE
· Severe pulmonary hypertension with estimated peak PA pressure at 65 mmHg  · Diuretics held, probably start on sildenafil once euvolemic

## 2018-04-25 NOTE — PROGRESS NOTES
Pt calling for assistance to commode, ,ax assist of 3 required to stand and pivot pt onto commode, small bm noted, pt placed back in bed, denies any other needs at this time, agree with prior nurse assessment, call bell within reach, will continue to monitor

## 2018-04-25 NOTE — PROGRESS NOTES
Cardiology Progress Note - Kaden Kennedy  [de-identified] y o  male MRN: [id number]    Unit/Bed#: -01 Encounter: [id number]      Assessment:  Principal Problem:    Acute on chronic diastolic congestive heart failure (HCC)  Active Problems:    Rapid atrial fibrillation (HCC)    Benign essential hypertension    Type 2 diabetes mellitus ([facility] )    Peripheral arterial disease (HCC)    Acute-on-chronic kidney injury ([email or web address] )    Non-ST elevation myocardial infarction (NSTEMI) ([email or web address] )    Pulmonary hypertension (HCC)    Azotemia      Plan:    1  Acute on chronic diastolic CHF - Patient having more for worsening acute kidney injury, and Nephrology managing diuretic dosing  Diuretics currently on hold  I do agree that he is also showing signs of uremia and worsening renal failure  Dialysis does need to be a strong consideration, given his lack of response to diuretic therapy, and ongoing pulmonary vascular congestion  They will continue to discuss this with Nephrology, and will be having a family meeting tomorrow  2   Valvular heart disease - Moderate to severe mitral and tricuspid regurgitation  Patient had a prior bioprosthetic aortic valve replacement  We will continue to follow this closely, and treat CHF as above  Not an open heart surgery candidate  I reviewed his prior echocardiograms  There is a significant amount of mitral annular calcification, which would likely prohibit a noninvasive MitraClip  However we would not know this definitively without a KRYSTLE  He would need clinically improve from a volume and renal standpoint, before we would consider this  3  CAD - Prior bypass surgery at the time of his aortic valve replacement  Continue all other outpatient medications prescribed  4  AF - Likely permanent  Continue medications prescribed  Heart rates controlled  Had been taking off of anticoagulation as an outpatient, secondary to a traumatic fall and ongoing fall risk        Subjective:   Patient seen and examined  Patient continues to remain volume overloaded  With worsening renal function, nephrology has held Bumex and metolazone  Patient is frustrated, and just wants to go home"  Objective:     Vitals: Blood pressure 116/59, pulse 78, temperature (!) 96 3 °F (35 7 °C), temperature source Oral, resp  rate 16, height 5' 7" (1 702 m), weight 86 3 kg (190 lb 4 1 oz), SpO2 100 %  , Body mass index is 29 8 kg/m² ,   Orthostatic Blood Pressures    Flowsheet Row Most Recent Value   Blood Pressure  116/59 filed at 04/25/2018 2135   Patient Position - Orthostatic VS  Lying filed at 04/25/2018 0804            Intake/Output Summary (Last 24 hours) at 04/25/18 1459  Last data filed at 04/25/18 1300   Gross per 24 hour   Intake              560 ml   Output             1111 ml   Net             -551 ml       Telemetry review:  Atrial fibrillation with short runs of nonsustained VT, and PVCs    Physical Exam:    GEN: Neisha Mask  appears well, alert and oriented, but appears lethargic  HEENT: pupils equal, round, and reactive to light; extraocular muscles intact  NECK: supple, no carotid bruits  + JVD   HEART: irregular rhythm, distant S1 and S2, systolic murmur, no clicks, gallops or rubs   LUNGS:  Diminished bilaterally; basilar rales, no wheezes or rhonchi   ABDOMEN: normal bowel sounds, soft, no tenderness, no distention  EXTREMITIES: peripheral pulses normal; no clubbing, cyanosis   ++ Edema - similar to yesterday  NEURO: no focal findings   SKIN: normal without suspicious lesions on exposed skin    Medications:      Current Facility-Administered Medications:     acetaminophen (TYLENOL) tablet 325 mg, 325 mg, Oral, Q8H PRN, Zane Piedra MD, 325 mg at 04/24/18 2241    aspirin (ECOTRIN LOW STRENGTH) EC tablet 81 mg, 81 mg, Oral, Daily, Chase Alberto MD, 81 mg at 04/25/18 1020    atorvastatin (LIPITOR) tablet 40 mg, 40 mg, Oral, Daily With Richmond Alberto MD, 40 mg at 04/24/18 3231   calcium carbonate (TUMS) chewable tablet 1,000 mg, 1,000 mg, Oral, Daily PRN, Chase Alberto MD    docusate sodium (COLACE) capsule 100 mg, 100 mg, Oral, BID PRN, Chase Alberto MD, 100 mg at 04/10/18 1005    docusate sodium (COLACE) capsule 100 mg, 100 mg, Oral, BID, Iron Fregoso MD, 100 mg at 04/25/18 1020    heparin (porcine) subcutaneous injection 5,000 Units, 5,000 Units, Subcutaneous, Q8H [address], Lisseth Seals PA-C, 5,000 Units at 04/25/18 1404    insulin glargine (LANTUS) subcutaneous injection 25 Units, 25 Units, Subcutaneous, HS, Gilbert Chen MD, 25 Units at 04/24/18 2242    insulin lispro (HumaLOG) 100 units/mL subcutaneous injection 1-5 Units, 1-5 Units, Subcutaneous, TID AC, 1 Units at 04/25/18 1305 **AND** [CANCELED] Fingerstick Glucose (POCT), , , TID AC, Chase Alberto MD    insulin lispro (HumaLOG) 100 units/mL subcutaneous injection 1-5 Units, 1-5 Units, Subcutaneous, HS, Chase Alberto MD, 2 Units at 04/24/18 2240    insulin lispro (HumaLOG) 100 units/mL subcutaneous injection 12 Units, 12 Units, Subcutaneous, TID With Meals, Gilbert Chen MD, 12 Units at 04/24/18 1153    melatonin tablet 6 mg, 6 mg, Oral, HS, Tyree Quick MD, 6 mg at 04/24/18 2241    metoprolol succinate (TOPROL-XL) 24 hr tablet 50 mg, 50 mg, Oral, Q12H, Rabia Peres MD, 50 mg at 04/25/18 1020    ondansetron (ZOFRAN) injection 4 mg, 4 mg, Intravenous, Q6H PRN, Chase Alberto MD    pantoprazole (PROTONIX) EC tablet 40 mg, 40 mg, Oral, Early Morning, Chase Alberto MD, 40 mg at 04/25/18 0602    sevelamer (RENAGEL) tablet 800 mg, 800 mg, Oral, TID With Meals, Mahin Jon MD, 800 mg at 04/25/18 1404    tamsulosin (FLOMAX) capsule 0 4 mg, 0 4 mg, Oral, Daily With Dinner, Lisseth Seals PA-C, 0 4 mg at 04/24/18 1746    zolpidem (AMBIEN) tablet 5 mg, 5 mg, Oral, HS PRN, Iron Fregoso MD     Labs & Results:          Results from last 7 days  Lab Units 04/23/18  0529   WBC Thousand/uL 8 76   HEMOGLOBIN g/dL 9 3*   HEMATOCRIT % 30 9*   PLATELETS Thousands/uL 198           Results from last 7 days  Lab Units 04/25/18  0612 04/24/18  0441 04/23/18  0552   SODIUM mmol/L 134* 135* 136   POTASSIUM mmol/L 3 3* 2 8* 3 7   CHLORIDE mmol/L 85* 85* 86*   CO2 mmol/L 42* 40* 41*   BUN mg/dL 165* 164* 147*   CREATININE mg/dL 3 09* 3 01* 2 90*   CALCIUM mg/dL 9 7 9 9 10 1   GLUCOSE RANDOM mg/dL 160* 170* 136           Results from last 7 days  Lab Units 04/25/18  0612 04/22/18  0558 04/20/18  0558   MAGNESIUM mg/dL 2 8* 2 7* 2 6         EKG personally reviewed by Jerome Earl MD   Atrial fibrillation/flutter, nonspecific IVCD, nonspecific ST changes  ECHO:  LEFT VENTRICLE:  Systolic function was mildly reduced  Ejection fraction was estimated to be 45 %  There was mild diffuse hypokinesis  There was moderate hypokinesis of the apical septal wall(s)  Wall thickness was mildly increased  There was mild concentric hypertrophy      RIGHT VENTRICLE:  The ventricle was mildly to moderately dilated  Systolic function was reduced      LEFT ATRIUM:  The atrium was moderately dilated      RIGHT ATRIUM:  The atrium was moderately dilated      MITRAL VALVE:  There was marked annular calcification  There was moderate to severe regurgitation      AORTIC VALVE:  A bioprosthesis was present  It exhibited normal function  Doppler cardiac output was 3 5 L/min, using LVOT flow data  Doppler cardiac index was 1 68 L/min/m squared, using LVOT flow data      TRICUSPID VALVE:  There was severe regurgitation  Pulmonary artery systolic pressure was markedly increased  Estimated peak PA pressure was 65 mmHg  Counseling / Coordination of Care  Total floor / unit time spent today 25 minutes  Greater than 50% of total time was spent with the patient and / or family counseling and / or coordination of care

## 2018-04-26 NOTE — SOCIAL WORK
Transfer Center contacted Cm and stated they have a bed available for this evening (6C14A)  They requested transport be arranged and report called to [phone]  Cm contacted [facility] who is not able to transport until 9pm   Cm contacted [facility] and they are able to  pt at 5:30pm   Cm went to meet with pt's wife to inform her of the time and she stated she does not want her  transferred to another hospital as she was told the doctor could talk with the doctor here to provide a second opinion  Cm explained that normally another doctor would examine the pt and run his own tests in order to provide his opinion  The wife stated she does not want him transferred as he is too weak and he is not well yet  Cm explained his weakness is due to being in the hospital and he will be going to rehab at [facility] in order to get stronger  Cm explained pt does not have to go to [facility] if the family has changed their minds  The transfer is easy to cancel  Pt's daughter called in and was on speaker phone  Wife explained the situation to her  Daughter became angry and stated they never gave consent for pt to go anywhere as they were going to think about it  Cm explained the transfer can easily be cancelled  Daughter wanted to know why Cm did not touch base with her or her mother regarding pt's transfer as this is how the process works  Cm apologized and explained SLIM had started the process after meeting with pt's wife and daughter and Cm contacted the transfer center at  in order to determine when a bed was available and to set up transportation  Daughter stated she had received a list of facilities in the past and they provided choices for the Cm to look into  Cm explained that is the process for choosing a rehab facility  Daughter became upset and hung up the phone  Cm explained to pt's wife that the transport will be cancelled and she was agreeable       Dr Radha Farfan was notified, transport was cancelled and transfer center at  was notified of the cancellation  SLIM met with the wife again to discuss plan of care  Cm will continue to follow

## 2018-04-26 NOTE — ASSESSMENT & PLAN NOTE
· Severe pulmonary hypertension with estimated peak PA pressure at 65 mmHg  · Will probably benefit from sildenafil once euvolemic

## 2018-04-26 NOTE — PROGRESS NOTES
Pt resting comfortably in bed, no complaints at this time, call bell within reach, bed alarm is on, will continue to monitor

## 2018-04-26 NOTE — PLAN OF CARE
Problem: DISCHARGE PLANNING - CARE MANAGEMENT  Goal: Discharge to post-acute care or home with appropriate resources  INTERVENTIONS:  - Conduct assessment to determine patient/family and health care team treatment goals, and need for post-acute services based on payer coverage, community resources, and patient preferences, and barriers to discharge  - Address psychosocial, clinical, and financial barriers to discharge as identified in assessment in conjunction with the patient/family and health care team  - Arrange appropriate level of post-acute services according to patient's   needs and preference and payer coverage in collaboration with the physician and health care team  - Communicate with and update the patient/family, physician, and health care team regarding progress on the discharge plan  - Arrange appropriate transportation to post-acute venues   Outcome: [facility] contacted Cm and stated they have a bed available for this evening (6C14A)  They requested transport be arranged and report called to [phone]  Cm contacted [facility] who is not able to transport until 9pm   Cm contacted [facility] and they are able to  pt at 5:30pm   Cm went to meet with pt's wife to inform her of the time and she stated she does not want her  transferred to another hospital as she was told the doctor could talk with the doctor here to provide a second opinion  Cm explained that normally another doctor would examine the pt and run his own tests in order to provide his opinion  The wife stated she does not want him transferred as he is too weak and he is not well yet  Cm explained his weakness is due to being in the hospital and he will be going to rehab at [facility] in order to get stronger  Cm explained pt does not have to go to [facility] if the family has changed their minds  The transfer is easy to cancel  Pt's daughter called in and was on speaker phone    Wife explained the situation to her   Daughter became angry and stated they never gave consent for pt to go anywhere as they were going to think about it  Cm explained the transfer can easily be cancelled  Daughter wanted to know why Cm did not touch base with her or her mother regarding pt's transfer as this is how the process works  Cm apologized and explained SLIM had started the process after meeting with pt's wife and daughter and Cm contacted the transfer center at  in order to determine when a bed was available and to set up transportation  Daughter stated she had received a list of facilities in the past and they provided choices for the Cm to look into  Cm explained that is the process for choosing a rehab facility  Daughter became upset and hung up the phone  Cm explained to pt's wife that the transport will be cancelled and she was agreeable  Dr Susana Munoz was notified, transport was cancelled and transfer center at  was notified of the cancellation  HERBER met with the wife again to discuss plan of care  Cm will continue to follow

## 2018-04-26 NOTE — PLAN OF CARE
Problem: DISCHARGE PLANNING - CARE MANAGEMENT  Goal: Discharge to post-acute care or home with appropriate resources  INTERVENTIONS:  - Conduct assessment to determine patient/family and health care team treatment goals, and need for post-acute services based on payer coverage, community resources, and patient preferences, and barriers to discharge  - Address psychosocial, clinical, and financial barriers to discharge as identified in assessment in conjunction with the patient/family and health care team  - Arrange appropriate level of post-acute services according to patient's   needs and preference and payer coverage in collaboration with the physician and health care team  - Communicate with and update the patient/family, physician, and health care team regarding progress on the discharge plan  - Arrange appropriate transportation to post-acute venues   Outcome: Progressing  LOS 22  Bundle; Not a readmission  Cm was told family would like to have pt transferred to [facility] for a second opinion  SLIM contacted  to obtain an accepting doctor  She has been able to find a doctor and Cm contacted the [facility]  Cm's contact information has been provided to [facility]  They stated they do no foresee a bed becoming available today  Cm will continue to follow to assist with needs

## 2018-04-26 NOTE — ASSESSMENT & PLAN NOTE
· Remains off diuretics at this time  Repeat chest x-ray today shows no significant interval change from 2 days ago  · Extensive discussion with patient's spouse, daughter (on phone), Nephrology and Cardiology at the bedside today  Cardiologist (Dr Giovanna Gonzalez) and nephrologist (Dr Willem Cardenas) carefully explained patients overall clinical picture from the cardiac and renal standpoints  Family was more concerned with patient's overall deconditioning and will like more focus to be placed on physical rehab and improving his nutritional status  It was explained to the family and they experience understanding to the fact that without his renal and cardiac conditions been stabilized, physical therapy and Nutrition will continue to be hindered and possibly worsened as these are contributing to his overall deconditioning and poor appetite  Family brought up the idea of intravenous nutrition to which the nephrologist explained this will likely worsen his azotemia and they recommend strongly against this  Also the patient is able to eat and swallow appropriately but is hindered by his poor appetite which is made worse with current level of azotemia  Spouse and daughter do not want dialysis at this time and believe decision on this is too premature    They have expressed the desire for a 2nd opinion at [facility]   · Patient has overall been declining since being in the hospital and getting more deconditioned  · Overall poor prognosis in view of significant valvular disease, renal disease, severe pulmonary hypertension

## 2018-04-26 NOTE — PROGRESS NOTES
NEPHROLOGY PROGRESS NOTE   Vanessa Mancilla  [de-identified] y o  male MRN: [id number]  Unit/Bed#: -01 Encounter: [id number]  Reason for Consult:  Acute kidney injury, CKD      The patient is an 82-year-old gentleman who presented with shortness of breath on April 4th   He went on vacation for 5 days and did not take his diuretic, along with dietary indiscretion   Clinical course complicated by atrial fibrillation, hypotension and severe diuretic resistance     1  Acute kidney injury on chronic kidney disease:  Baseline creatinine 1 8-2 2   Etiology possibly prerenal, decreased effective circulating volume in the setting of volume overload, cardiorenal,  hypotension  Post renal also contributing-  Geronimo catheter discontinued on 04/12/2018  · Creatinine up to 3 in the setting of aggressive diuresis  Severe azotemia  Diuretics placed on hold 4/24/18  · Uremic signs and symptoms/diuretic resistance/persistent vascular congestion on chest x-ray  Discussed initiating hemodialysis with patient and his wife  Dr Monroe Speak also spoke with wife yesterday since he is his primary nephrologist   Team meeting planned for today with further discussion regarding plan of care  · Awaiting a m  labs  · Chest x-ray 4/24 stable interstitial edema  Small pleural effusions  · Status post Geronimo catheter removal no evidence of retention  · Urinalysis:  Negative protein, no RBCs, no bacteria, 0-1 WBCs  · Renal ultrasound:  Kidneys diffusely echogenic reflecting chronic medical renal disease  2  Hypokalemia:  Potassium level up to 3 3  Receiving 40 mEq of K-Dur twice a day  · Await a m  labs to make further adjustment  3  Acute on chronic diastolic CHF, Volume overload:  Cardiology following  · Weight fluctuating but below dry weight    Impressive reduction in lower extremity edema   Previous dry weight 210-212 lb  · Worsening cardiac function/RV dysfunction/severe TR in the setting of atrial fibrillation, worsened MR, pulmonary hypertension and reduction in LVEF  EF 45% (last September EF 65%)  · Venous Dopplers lower extremities completed   No DVT  · Outpatient diuretic regime: torsemide 40 mg every Monday, Wednesday, Friday the remaining days patient was taking 20 mg  3  PAF:  Now likely chronic   Rate controlled with intermittent bouts of tachycardia   Cardiology following  4  Anemia:  Hemoglobin fairly stable  5  MGUS:  IgM kappa and IgM lambda followed by Dr Meme Rich  6  Elevated bicarbonate:  Likely due to aggressive diuresis   Goal -keep potassium level ~4  7  Hyperphosphatemia:  Improved on sevelamer -phosphorus 4 1  8  Weakness:  Severe deconditioning in the setting of 3 week hospitalization, uremia contributing  9  Other:  PVD, CAD, prior CABG, AS status post AVR-bioprosthetic     SUMMARY OF RECOMMENDATIONS:  · Await a m  labs, chest x-ray  · Team meeting today to discuss care plan/goals of care    OBJECTIVE:  Current Weight: Weight - Scale: 89 6 kg (197 lb 8 5 oz)  Vitals:    04/25/18 2133 04/25/18 2300 04/26/18 0600 04/26/18 0746   BP: (!) 100/49 101/52  109/56   BP Location:  Right arm  Left arm   Pulse: 73 75  66   Resp:  18  18   Temp:  97 9 °F (36 6 °C)  97 9 °F (36 6 °C)   TempSrc:  Oral  Axillary   SpO2:  99%  100%   Weight:   89 6 kg (197 lb 8 5 oz)    Height:           Intake/Output Summary (Last 24 hours) at 04/26/18 0959  Last data filed at 04/26/18 0949   Gross per 24 hour   Intake             1000 ml   Output             1106 ml   Net             -106 ml   General:  No acute distress  Quietly lying in bed    Awakens to voice  Skin:  Warm and dry, no rash  Eyes:  Sclera clear, anicteric  ENT:  Oropharynx appears slightly dry  Neck:  Supple, elevated jugular vein pressure  Chest:  Scattered crackles posteriorly  CVS:  Irregularly irregular rhythm, soft systolic murmur  Abdomen:  Nondistended, nontender, bowel sounds present  Extremities:  Minimal lower extremity edema  :  No Geronimo  Neuro:  No acute deficits  Psych:  Depressive affect  Medications:    Current Facility-Administered Medications:     acetaminophen (TYLENOL) tablet 325 mg, 325 mg, Oral, Q8H PRN, Tammy Kothari MD, 325 mg at 04/24/18 2241    aspirin (ECOTRIN LOW STRENGTH) EC tablet 81 mg, 81 mg, Oral, Daily, Chase Alberto MD, 81 mg at 04/26/18 0841    atorvastatin (LIPITOR) tablet 40 mg, 40 mg, Oral, Daily With Wendy Alberto MD, 40 mg at 04/25/18 1752    calcium carbonate (TUMS) chewable tablet 1,000 mg, 1,000 mg, Oral, Daily PRN, Chase Alberto MD    docusate sodium (COLACE) capsule 100 mg, 100 mg, Oral, BID PRN, Chase Alberto MD, 100 mg at 04/10/18 1005    docusate sodium (COLACE) capsule 100 mg, 100 mg, Oral, BID, Sofya Suazo MD, 100 mg at 04/26/18 0841    heparin (porcine) subcutaneous injection 5,000 Units, 5,000 Units, Subcutaneous, Q8H KENDRICK, Lisseth Seals PA-C, 5,000 Units at 04/26/18 0519    insulin glargine (LANTUS) subcutaneous injection 25 Units, 25 Units, Subcutaneous, HS, Judith Escalante MD, 25 Units at 04/25/18 2135    insulin lispro (HumaLOG) 100 units/mL subcutaneous injection 1-5 Units, 1-5 Units, Subcutaneous, TID AC, 1 Units at 04/26/18 0842 **AND** [CANCELED] Fingerstick Glucose (POCT), , , TID AC, Chase Alberto MD    insulin lispro (HumaLOG) 100 units/mL subcutaneous injection 1-5 Units, 1-5 Units, Subcutaneous, HS, Chase Alberto MD, 3 Units at 04/25/18 2138    insulin lispro (HumaLOG) 100 units/mL subcutaneous injection 12 Units, 12 Units, Subcutaneous, TID With Meals, Judith Escalante MD, 12 Units at 04/26/18 0842    melatonin tablet 6 mg, 6 mg, Oral, HS, Harvey Tejada MD, 6 mg at 04/25/18 2136    metoprolol succinate (TOPROL-XL) 24 hr tablet 50 mg, 50 mg, Oral, Q12H, Kelsi Hernandez MD, 50 mg at 04/25/18 1020    ondansetron (ZOFRAN) injection 4 mg, 4 mg, Intravenous, Q6H PRN, Chase Alberto MD    pantoprazole (PROTONIX) EC tablet 40 mg, 40 mg, Oral, Early Morning, Chase Alberto MD, 40 mg at 04/26/18 0519    sevelamer (RENAGEL) tablet 800 mg, 800 mg, Oral, TID With Meals, Wilber Estes MD, 800 mg at 04/26/18 0841    tamsulosin (FLOMAX) capsule 0 4 mg, 0 4 mg, Oral, Daily With Yoon Seals PA-C, 0 4 mg at 04/25/18 1753    zolpidem (AMBIEN) tablet 5 mg, 5 mg, Oral, HS PRN, Jose Antonio Smith MD    Laboratory Results:    Results from last 7 days  Lab Units 04/25/18  0612 04/24/18  0441 04/23/18  0552 04/22/18  0558 04/21/18  0503 04/20/18  0558   WBC Thousand/uL  --   --  8 76  --   --   --    HEMOGLOBIN g/dL  --   --  9 3*  --   --   --    HEMATOCRIT %  --   --  30 9*  --   --   --    PLATELETS Thousands/uL  --   --  198  --   --   --    SODIUM mmol/L 134* 135* 136 137 140 139   POTASSIUM mmol/L 3 3* 2 8* 3 7 3 8 3 9 3 3*   CHLORIDE mmol/L 85* 85* 86* 90* 92* 91*   CO2 mmol/L 42* 40* 41* 40* 39* 40*   BUN mg/dL 165* 164* 147* 145* 144* 136*   CREATININE mg/dL 3 09* 3 01* 2 90* 2 78* 2 76* 2 63*   CALCIUM mg/dL 9 7 9 9 10 1 10 8* 10 2* 9 9   MAGNESIUM mg/dL 2 8*  --   --  2 7*  --  2 6   PHOSPHORUS mg/dL  --   --   --  4 4*  --  4 1   GLUCOSE RANDOM mg/dL 160* 170* 136 145* 131 125     Previous work up:

## 2018-04-26 NOTE — PROGRESS NOTES
Cardiology Progress Note - Yunior Cuba  [de-identified] y o  male MRN: [id number]    Unit/Bed#: -01 Encounter: [id number]      Assessment:  Principal Problem:    Acute on chronic diastolic congestive heart failure (HCC)  Active Problems:    Rapid atrial fibrillation (HCC)    Benign essential hypertension    Type 2 diabetes mellitus ([facility] )    Peripheral arterial disease (HCC)    Acute-on-chronic kidney injury ([facility] )    Non-ST elevation myocardial infarction (NSTEMI) ([facility] )    Pulmonary hypertension (HCC)    Azotemia      Plan:    1  Acute on chronic diastolic CHF - Patient having more for worsening acute kidney injury, and Nephrology managing diuretic dosing  Diuretics currently on hold  We had a lengthy discussion with the patient's family today, that included his wife and daughter, as stated below  Diuretic dosing I defer to nephrology  I do agree that he continues to have pulmonary edema, and signs of uremia, necessitating at least discussion for dialysis  Continue to follow urine output, daily labs and weight  2   Valvular heart disease - Moderate to severe mitral and tricuspid regurgitation  Patient had a prior bioprosthetic aortic valve replacement  We will continue to follow this closely, and treat CHF as above  Not an open heart surgery candidate  I reviewed his prior echocardiograms  There is a significant amount of mitral annular calcification, which would likely prohibit a noninvasive MitraClip  However we would not know this definitively without a KRYSTLE  He would need clinically improve from a volume and renal standpoint, before we would consider this  He would need an extensive amount of rehabilitation before this occurs as well  3  CAD - Prior bypass surgery at the time of his aortic valve replacement  Continue all other outpatient medications prescribed  4  AF - Likely permanent  Continue medications prescribed  Heart rates controlled    Had been taking off of anticoagulation as an outpatient, secondary to a traumatic fall and ongoing fall risk  5  Disp:  We had a long meeting with the family today  This included the hospitalist and Nephrology  The family was somewhat concerned over his nutrition and lack of physical therapy  I do feel that a lot of this is inhibited because of his ongoing issues with pulmonary edema, and uremia  I do feel that a series discussion with dialysis needs to continue  I also feel that, with worsening renal failure and CHF, and not being able to control this with medical therapy, attenuates his ability to recover from a nutrition and physical therapy standpoint  This was discussed at length with the patient's family  Subjective:   Patient seen and examined  Patient continues to remain volume overloaded  With worsening renal function, nephrology has held Bumex and metolazone  Patient has become more somnolent, and showing signs of uremia  We had a family meeting today to discuss goals of care  Objective:     Vitals: Blood pressure 109/56, pulse 66, temperature 97 9 °F (36 6 °C), temperature source Axillary, resp  rate 18, height 5' 7" (1 702 m), weight 89 6 kg (197 lb 8 5 oz), SpO2 100 %  , Body mass index is 30 94 kg/m² ,   Orthostatic Blood Pressures      Most Recent Value   Blood Pressure  109/56 filed at 04/26/2018 0746   Patient Position - Orthostatic VS  Lying filed at 04/26/2018 0746            Intake/Output Summary (Last 24 hours) at 04/26/18 1510  Last data filed at 04/26/18 1402   Gross per 24 hour   Intake              860 ml   Output             1095 ml   Net             -235 ml       Telemetry review:  Atrial fibrillation with short runs of nonsustained VT, and PVCs    Physical Exam:    GEN: Sunshine Colon  appears well, resting comfortably and appears lethargic  HEENT: pupils equal, round, and reactive to light; extraocular muscles intact  NECK: supple, no carotid bruits   + JVD   HEART: irregular rhythm, distant S1 and S2, systolic murmur, no clicks, gallops or rubs   LUNGS:  Diminished bilaterally; basilar rales, no wheezes or rhonchi   ABDOMEN: normal bowel sounds, soft, no tenderness, no distention  EXTREMITIES: peripheral pulses normal; no clubbing, cyanosis  + Edema - similar to yesterday  NEURO: no focal findings  Tremor noted     SKIN: normal without suspicious lesions on exposed skin    Medications:      Current Facility-Administered Medications:     acetaminophen (TYLENOL) tablet 325 mg, 325 mg, Oral, Q8H PRN, Charly Hope MD, 325 mg at 04/24/18 2241    aspirin (ECOTRIN LOW STRENGTH) EC tablet 81 mg, 81 mg, Oral, Daily, Chase Alberto MD, 81 mg at 04/26/18 0841    atorvastatin (LIPITOR) tablet 40 mg, 40 mg, Oral, Daily With Idania Alberto MD, 40 mg at 04/25/18 1752    calcium carbonate (TUMS) chewable tablet 1,000 mg, 1,000 mg, Oral, Daily PRN, Chase Alberto MD    docusate sodium (COLACE) capsule 100 mg, 100 mg, Oral, BID PRN, Chase Alberto MD, 100 mg at 04/10/18 1005    docusate sodium (COLACE) capsule 100 mg, 100 mg, Oral, BID, Lauren Reina MD, 100 mg at 04/26/18 0841    heparin (porcine) subcutaneous injection 5,000 Units, 5,000 Units, Subcutaneous, Q8H [address], Lisseth Seals PA-C, 5,000 Units at 04/26/18 1306    insulin glargine (LANTUS) subcutaneous injection 30 Units 0 3 mL, 30 Units, Subcutaneous, HS, Trevon Peterson MD    insulin lispro (HumaLOG) 100 units/mL subcutaneous injection 1-5 Units, 1-5 Units, Subcutaneous, TID AC, 2 Units at 04/26/18 1307 **AND** [CANCELED] Fingerstick Glucose (POCT), , , TID AC, Chase Alberto MD    insulin lispro (HumaLOG) 100 units/mL subcutaneous injection 1-5 Units, 1-5 Units, Subcutaneous, HS, Chase Alberto MD, 3 Units at 04/25/18 2138    insulin lispro (HumaLOG) 100 units/mL subcutaneous injection 12 Units, 12 Units, Subcutaneous, TID With Meals, Trevon Peterson MD, 12 Units at 04/26/18 1307    melatonin tablet 6 mg, 6 mg, Oral, HS, Adrienne Pires MD, 6 mg at 04/25/18 2136    metoprolol succinate (TOPROL-XL) 24 hr tablet 50 mg, 50 mg, Oral, Q12H, Travis Berrios MD, 50 mg at 04/25/18 1020    ondansetron (ZOFRAN) injection 4 mg, 4 mg, Intravenous, Q6H PRN, Chase Alberto MD    pantoprazole (PROTONIX) EC tablet 40 mg, 40 mg, Oral, Early Morning, Chase Alberto MD, 40 mg at 04/26/18 0519    sevelamer (RENAGEL) tablet 800 mg, 800 mg, Oral, TID With Meals, Georgiana Maria MD, 800 mg at 04/26/18 1306    tamsulosin (FLOMAX) capsule 0 4 mg, 0 4 mg, Oral, Daily With [facility] Seals PA-C, 0 4 mg at 04/25/18 1753    zolpidem (AMBIEN) tablet 5 mg, 5 mg, Oral, HS PRN, Susan Garcia MD     Labs & Results:          Results from last 7 days  Lab Units 04/23/18  0552   WBC Thousand/uL 8 76   HEMOGLOBIN g/dL 9 3*   HEMATOCRIT % 30 9*   PLATELETS Thousands/uL 198           Results from last 7 days  Lab Units 04/26/18  0948 04/25/18  0612 04/24/18  0441   SODIUM mmol/L 133* 134* 135*   POTASSIUM mmol/L 3 1* 3 3* 2 8*   CHLORIDE mmol/L 85* 85* 85*   CO2 mmol/L 40* 42* 40*   BUN mg/dL 161* 165* 164*   CREATININE mg/dL 2 96* 3 09* 3 01*   CALCIUM mg/dL 10 3* 9 7 9 9   GLUCOSE RANDOM mg/dL 170* 160* 170*           Results from last 7 days  Lab Units 04/25/18  0612 04/22/18  0558 04/20/18  0558   MAGNESIUM mg/dL 2 8* 2 7* 2 6         EKG personally reviewed by Joby Borrero MD   Atrial fibrillation/flutter, nonspecific IVCD, nonspecific ST changes  ECHO:  LEFT VENTRICLE:  Systolic function was mildly reduced  Ejection fraction was estimated to be 45 %  There was mild diffuse hypokinesis  There was moderate hypokinesis of the apical septal wall(s)  Wall thickness was mildly increased  There was mild concentric hypertrophy      RIGHT VENTRICLE:  The ventricle was mildly to moderately dilated    Systolic function was reduced      LEFT ATRIUM:  The atrium was moderately dilated      RIGHT ATRIUM:  The atrium was moderately dilated      MITRAL VALVE:  There was marked annular calcification  There was moderate to severe regurgitation      AORTIC VALVE:  A bioprosthesis was present  It exhibited normal function  Doppler cardiac output was 3 5 L/min, using LVOT flow data  Doppler cardiac index was 1 68 L/min/m squared, using LVOT flow data      TRICUSPID VALVE:  There was severe regurgitation  Pulmonary artery systolic pressure was markedly increased  Estimated peak PA pressure was 65 mmHg  Counseling / Coordination of Care  Total floor / unit time spent today 45 minutes  Greater than 50% of total time was spent with the patient and / or family counseling and / or coordination of care

## 2018-04-26 NOTE — PROGRESS NOTES
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, hallux and lateral heel  The left 3rd toenail bed has eschar without sign of infection from the removed toenail  2  DM neuropathy  3  Severe PAD  4  CHF with LE edema  5   Ischemic changes to right 1st and 2nd digit without gangrene  Minor  6   Left 3rd toe onycholysis without complication    Plan:  1  Feet remained stable  Continue Betadine paint with proper offloading  No intervention planned for the feet  If feet were to become acutely infected he would need vascular procedure or aggressive amputation  Monitor for now  Subjective/Objective   Chief Complaint:   Chief Complaint   Patient presents with    Shortness of Breath     pt reports being short of breath over the past week  Pt reports when he gets up and walks distances he has trouble breathing and chest pain  Daughter reports recent trip to [facility] where pt did not take lasix to avoid voiding  Subjective: [de-identified] y o  y/o male seen and evaluated at bedside  No acute events overnight  Left heel is tender to touch  Blood pressure 109/56, pulse 66, temperature 97 9 °F (36 6 °C), temperature source Axillary, resp  rate 18, height 5' 7" (1 702 m), weight 89 6 kg (197 lb 8 5 oz), SpO2 100 %  ,Body mass index is 30 94 kg/m²      Lab Results   Component Value Date    WBC 8 76 04/23/2018    HGB 9 3 (L) 04/23/2018    HCT 30 9 (L) 04/23/2018    MCV 88 04/23/2018     04/23/2018     Lab Results   Component Value Date    GLUCOSE 170 (H) 04/26/2018    CALCIUM 10 3 (H) 04/26/2018     (L) 04/26/2018    K 3 1 (L) 04/26/2018    CO2 40 (H) 04/26/2018    CL 85 (L) 04/26/2018     (H) 04/26/2018    CREATININE 2 96 (H) 04/26/2018         Invasive Devices     Peripheral Intravenous Line            Long-Dwell Peripheral IV (Midline) 77/58/74 Left Basilic 13 days Physical Exam:   General: alert, cooperative and no distress  Lungs: Normal respiratory effort, LCTABL  Heart: regular rate and rhythm   Abdomen: soft, non-tender  Extremity:  Feet remained stable  No acute concern to the right foot  The left foot gangrene to the 5th toe and lateral heel have not changed  There is mild bogginess to the heel  There is no drainage from any site  There is no cellulitis  There is no sign of any infection acutely  The left 3rd toe nail bed is stable with a small eschar  Lab, Imaging and other studies:     Imaging: I have personally reviewed pertinent films in PACS  EKG, Pathology, and Other Studies: I have personally reviewed pertinent reports  Portions of the record may have been created with voice recognition software  Occasional wrong word or "sound a like" substitutions may have occurred due to the inherent limitations of voice recognition software  Read the chart carefully and recognize, using context, where substitutions have occurred

## 2018-04-26 NOTE — PROGRESS NOTES
Progress Note - Marni Parish  1935, [de-identified] y o  male MRN: [id number]    Unit/Bed#: -01 Encounter: [id number]    Primary Care Provider: La Martinez MD   Date and time admitted to hospital: 4/4/2018  6:39 AM    * Acute on chronic diastolic congestive heart failure ([address] )   Assessment & Plan    · Remains off diuretics at this time  Repeat chest x-ray today shows no significant interval change from 2 days ago  · Extensive discussion with patient's spouse, daughter (on phone), Nephrology and Cardiology at the bedside today  Cardiologist (Dr Anitra Britton) and nephrologist (Dr Natalya Gallegos) carefully explained patients overall clinical picture from the cardiac and renal standpoints  Family was more concerned with patient's overall deconditioning and will like more focus to be placed on physical rehab and improving his nutritional status  It was explained to the family and they experience understanding to the fact that without his renal and cardiac conditions been stabilized, physical therapy and Nutrition will continue to be hindered and possibly worsened as these are contributing to his overall deconditioning and poor appetite  Family brought up the idea of intravenous nutrition to which the nephrologist explained this will likely worsen his azotemia and they recommend strongly against this  Also the patient is able to eat and swallow appropriately but is hindered by his poor appetite which is made worse with current level of azotemia  Spouse and daughter do not want dialysis at this time and believe decision on this is too premature  They have expressed the desire for a 2nd opinion at [facility]   · Patient has overall been declining since being in the hospital and getting more deconditioned  · Overall poor prognosis in view of significant valvular disease, renal disease, severe pulmonary hypertension      Acute-on-chronic kidney injury ([address] )   Assessment & Plan    · Baseline creatinine 1 8-2 2  Off Bumex infusion and metolazone due to worsening renal function with azotemia  · Likely secondary to cardiorenal, +/-obstructive uropathy  · He is status post viera placed for retention during this admission and flomax added  Viera removed on 4/12/18 for voiding trial which so far has been successful  Continue urinary retention protocol  · Management per Nephrology      Non-ST elevation myocardial infarction (NSTEMI) ([facility])   Assessment & Plan    · Mild troponin elevation likely the result of type 2 NSTEMI in the setting of heart failure and renal failure  · No further workup from the cardiac standpoint at this time      Rapid atrial fibrillation [facility])   Assessment & Plan    · Rate controlled  Continue metoprolol  · Patient declined anticoagulation due to prior bleeding while on Eliquis      Type 2 diabetes mellitus ([facility])   Assessment & Plan    · Most recent hemoglobin A1c 7 6 2 months ago  · Increase Lantus to 30 units, Humalog with meals      Pulmonary hypertension ([facility])   Assessment & Plan    · Severe pulmonary hypertension with estimated peak PA pressure at 65 mmHg  · Will probably benefit from sildenafil once euvolemic      Peripheral arterial disease ([facility] )   Assessment & Plan    · Patient with severe PAD with 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, hallux and lateral heel, managed by Podiatry with local wound care  · He is status post vascular evaluation and no plans for any intervention during this hospitalization in view of his unstable cardiovascular/renal status  · Pain control and Ace wraps as tolerated        VTE Pharmacologic Prophylaxis:   Pharmacologic: Heparin  Mechanical VTE Prophylaxis in Place: Yes    Patient Centered Rounds: I have performed bedside rounds with nursing staff today      Discussions with Specialists or Other Care Team Provider:  Nursing/nephrology/cardiology/PT (will see patient daily)    Education and Discussions with Family / Patient:  Family meeting with Patient, spouse, daughter on phone    Current Length of Stay: 22 day(s)    Over 70 mins total visit time today including family meeting with patient and subspecialists at the bedside  Current Patient Status: Inpatient   Certification Statement: The patient will continue to require additional inpatient hospital stay due to Above diagnosis and care plan    Discharge Plan:  Called transfer center at [place] [phone] call back from accepting physician to determine if patient will be accepted for transfer    Code Status: Level 1 - Full Code      Subjective:   Patient seen and evaluated  Denies any chest pain, no orthopnea  Still with shortness of breath on minimal exertion  Objective:     Vitals:   Temp (24hrs), Av 9 °F (36 6 °C), Min:97 9 °F (36 6 °C), Max:97 9 °F (36 6 °C)    HR:  [66-75] 66  Resp:  [18-20] 18  BP: (100-118)/(49-56) 109/56  SpO2:  [99 %-100 %] 100 %  Body mass index is 30 94 kg/m²  Input and Output Summary (last 24 hours):        Intake/Output Summary (Last 24 hours) at 18 1305  Last data filed at 18 1053   Gross per 24 hour   Intake              680 ml   Output             1095 ml   Net             -415 ml       Physical Exam:  General Appearance:    Alert, cooperative, no distress, appropriately responsive    Head:    Normocephalic, without obvious abnormality, atraumatic, mucous membranes moist    Eyes:    Conjunctiva/corneas clear, EOM's intact   Neck:   Supple   Lungs:     Bibasilar rales, no wheezes     Heart:    Irregularly irregular, S1 and S2, +SM    Abdomen:     Soft, non-tender, bowel sounds active all four quadrants,     no masses, no organomegaly   Extremities:   Decreased lower extremity edema, ischemic changes to right 1st and 2nd digit   Neurologic:  Alert and oriented x3, moves all extremities, follows commands         Additional Data:     Labs:      Results from last 7 days  Lab Units 18  0552   WBC Thousand/uL 8 76   HEMOGLOBIN g/dL 9 3*   HEMATOCRIT % 30 9*   PLATELETS Thousands/uL 198       Results from last 7 days  Lab Units 04/26/18  0948   SODIUM mmol/L 133*   POTASSIUM mmol/L 3 1*   CHLORIDE mmol/L 85*   CO2 mmol/L 40*   BUN mg/dL 161*   CREATININE mg/dL 2 96*   CALCIUM mg/dL 10 3*   GLUCOSE RANDOM mg/dL 170*           * I Have Reviewed All Lab Data Listed Above  * Additional Pertinent Lab Tests Reviewed: All Labs Within Last 24 Hours Reviewed    Cultures:   Blood Culture:   Lab Results   Component Value Date    BLOODCX No Growth After 5 Days  04/04/2018    BLOODCX No Growth After 5 Days   04/04/2018     Urine Culture: No results found for: URINECX  Sputum Culture: No components found for: SPUTUMCX  Wound Culture: No results found for: WOUNDCULT    Last 24 Hours Medication List:     Current Facility-Administered Medications:  acetaminophen 325 mg Oral Q8H PRN Mahin Jon MD   aspirin 81 mg Oral Daily Chase Alberto MD   atorvastatin 40 mg Oral Daily With Dinner Chase Alberto MD   calcium carbonate 1,000 mg Oral Daily PRN Chase Alberto MD   docusate sodium 100 mg Oral BID PRN Chase Alberto MD   docusate sodium 100 mg Oral BID Iron Fregoso MD   heparin (porcine) 5,000 Units Subcutaneous Q8H [address] Lisseth Seals PA-C   insulin glargine 30 Units Subcutaneous HS Gilbert Chen MD   insulin lispro 1-5 Units Subcutaneous TID AC Chase Alberto MD   insulin lispro 1-5 Units Subcutaneous HS Chase Alberto MD   insulin lispro 12 Units Subcutaneous TID With Meals Gilbert Chen MD   melatonin 6 mg Oral HS Tyree Quick MD   metoprolol succinate 50 mg Oral Q12H Rabia Peres MD   ondansetron 4 mg Intravenous Q6H PRN Chase Alberto MD   pantoprazole 40 mg Oral Early Morning Chase Alberto MD   potassium chloride 40 mEq Oral Once Gianfranco Do MD   sevelamer 800 mg Oral TID With Meals Mahin Jon MD   tamsulosin 0 4 mg Oral Daily With Dinner Lisseth Seals PA-C   zolpidem 5 mg Oral HS PRN Iron Fregoso MD Today, Patient Was Seen By: Braeden Ji MD    ** Please Note: Dragon 360 Dictation voice to text software may have been used in the creation of this document   **

## 2018-04-26 NOTE — PHYSICAL THERAPY NOTE
PHYSICAL THERAPY NOTE      Patient Name: Tor Willoughby  Today's Date: 4/26/2018 04/26/18 5090   Pain Assessment   Pain Assessment No/denies pain   Pain Score No Pain   Restrictions/Precautions   Weight Bearing Precautions Per Order No   Other Precautions Fall Risk;O2;Telemetry;Multiple lines   General   Family/Caregiver Present Yes   Subjective   Subjective Patient seated in recliner and is agreeable to therapy session  Bed Mobility   Supine to Sit Unable to assess   Sit to Supine Unable to assess   Additional Comments Patient seated OOB in recliner pre and post session  Transfers   Sit to Stand 4  Minimal assistance   Additional items Assist x 1; Increased time required;Verbal cues  (for LE and body positioning )   Stand to Sit 4  Minimal assistance   Additional items Assist x 1; Increased time required;Armrests; Verbal cues  (hand placement)   Additional Comments Standing tolerance at recliner with B UE on RW for 2 minutes    Ambulation/Elevation   Gait pattern Decreased foot clearance;Shuffling; Short stride; Excessively slow; Foward flexed   Gait Assistance 4  Minimal assist   Additional items Assist x 1;Verbal cues   Assistive Device Rolling walker   Distance 10' x2   (declined further due to fatigue)   Balance   Static Sitting Fair +   Dynamic Sitting Fair   Static Standing Poor +   Dynamic Standing Poor +   Ambulatory Poor +   Endurance Deficit   Endurance Deficit Yes   Activity Tolerance   Activity Tolerance Patient limited by fatigue   Nurse Made Aware spoke to ADDY Rubio   Exercises   Quad Sets Sitting;10 reps;AROM; Bilateral  (B LE elevated )   Glute Sets Sitting;10 reps;AROM; Bilateral   Hip Adduction Sitting;10 reps;AROM; Bilateral   Knee AROM Long Arc Quad Sitting;10 reps;AROM; Bilateral   Ankle Pumps Sitting;20 reps;AROM; Bilateral   Assessment   Prognosis Fair   Problem List Decreased strength;Decreased range of motion;Decreased endurance; Impaired balance;Decreased mobility; Decreased coordination;Decreased safety awareness; Obesity; Decreased skin integrity; Impaired sensation;Pain  (LE Edema)   Assessment Patient demonstrated functional improvement from previous visit however remains limited from fatigue  Demonstarted ability to ambulate 10 feet x 2 trials with RW and min a  Further ambulation declined secondary to fatigue  Patient requires verbal instruction for hand placement and body positioning during transfers as well as RW management during ambulation  Patient tolerated standing with UE supported on RW for 2 minutes with verbal cues for posture as fatigued  Rest breaks required between all task with cuing for proper breathing technique  Barriers to Discharge Inaccessible home environment   Goals   Patient Goals to feel better   STG Expiration Date 05/04/18   Treatment Day 4   Plan   Treatment/Interventions Functional transfer training;LE strengthening/ROM; Therapeutic exercise; Endurance training;Patient/family training;Equipment eval/education; Bed mobility;Gait training  (PT to see when stair training is appropriate)   Progress Slow progress, decreased activity tolerance   PT Frequency 5x/wk   Recommendation   Recommendation Long-term skilled PT   Equipment Recommended Wheelchair; Other (Comment)       Luna Wang, PTA

## 2018-04-26 NOTE — PLAN OF CARE
Problem: PHYSICAL THERAPY ADULT  Goal: Performs mobility at highest level of function for planned discharge setting  See evaluation for individualized goals  Treatment/Interventions: Functional transfer training, LE strengthening/ROM, Therapeutic exercise, Endurance training, Patient/family training, Equipment eval/education, Bed mobility, Gait training (PT to see when stair training is appropriate )          See flowsheet documentation for full assessment, interventions and recommendations  Outcome: Progressing  Prognosis: Fair  Problem List: Decreased strength, Decreased range of motion, Decreased endurance, Impaired balance, Decreased mobility, Decreased coordination, Decreased safety awareness, Obesity, Decreased skin integrity, Impaired sensation, Pain (LE Edema)  Assessment: Patient demonstrated functional improvement from previous visit however remains limited from fatigue  Demonstarted ability to ambulate 10 feet x 2 trials with RW and min a  Further ambulation declined secondary to fatigue  Patient requires verbal instruction for hand placement and body positioning during transfers as well as RW management during ambulation  Patient tolerated standing with UE supported on RW for 2 minutes with verbal cues for posture as fatigued  Rest breaks required between all task with cuing for proper breathing technique  Barriers to Discharge: Inaccessible home environment     Recommendation: Long-term skilled PT     PT - OK to Discharge: Yes (when medically cleared to LT skilled PT)    See flowsheet documentation for full assessment

## 2018-04-27 NOTE — ASSESSMENT & PLAN NOTE
· Remains off diuretics  Cr stable at 2 8 although BUN continues to rise  ·  No HD desired  Patients spouse reported yesterday she did not want him transferred to [facility] even though she initially had made this request and spoke with the Hospitalist attending at [facility]  Transfer was cancelled  · Will continue supportive care as able    Family wants more focus on physical therapy and nutrition which are both being addressed  · Overall poor prognosis in view of significant valvular disease, renal disease, severe pulmonary hypertension

## 2018-04-27 NOTE — PLAN OF CARE
Problem: PHYSICAL THERAPY ADULT  Goal: Performs mobility at highest level of function for planned discharge setting  See evaluation for individualized goals  Treatment/Interventions: Functional transfer training, LE strengthening/ROM, Therapeutic exercise, Endurance training, Patient/family training, Equipment eval/education, Bed mobility, Gait training (PT to see when stair training is appropriate )          See flowsheet documentation for full assessment, interventions and recommendations  Outcome: Not Progressing  Prognosis: Fair  Problem List: Decreased strength, Decreased range of motion, Decreased endurance, Impaired balance, Decreased mobility, Decreased coordination, Decreased safety awareness, Obesity, Decreased skin integrity, Impaired sensation, Pain (LE edema)  Assessment: Patient presented with ability to perform increased sets of exercise with good form and understanding  Required min A for repositionig within chair as well as verbal instruction for hand and foot placement  Transfers remain consistent with min a and verbal instruction for hand placment and body positioning  Tolerance to ambulation decreased this session with ability to only ambulate 10 feet, refusal of any additional ambulation secondary to fatigue  Barriers to Discharge: Inaccessible home environment     Recommendation: Long-term skilled PT     PT - OK to Discharge: Yes (when medically cleared to LT skilled PT)    See flowsheet documentation for full assessment

## 2018-04-27 NOTE — ASSESSMENT & PLAN NOTE
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

## 2018-04-27 NOTE — PROGRESS NOTES
Progress Note - Lorraine Degree  1935, [de-identified] y o  male MRN: [id number]    Unit/Bed#: -01 Encounter: [id number]    Primary Care Provider: Natividad Moreno MD   Date and time admitted to hospital: 4/4/2018  6:39 AM    * Acute on chronic diastolic congestive heart failure ([facility] )   Assessment & Plan    · Remains off diuretics  Cr stable at 2 8 although BUN continues to rise  ·  No HD desired  Patients spouse reported yesterday she did not want him transferred to [facility] even though she initially had made this request and spoke with the Hospitalist attending at [facility]  Transfer was cancelled  · Will continue supportive care as able  Family wants more focus on physical therapy and nutrition which are both being addressed  · Overall poor prognosis in view of significant valvular disease, renal disease, severe pulmonary hypertension  ·       Acute-on-chronic kidney injury ([place] [address] )   Assessment & Plan    · Baseline creatinine 1 8-2 2  Off Bumex infusion and metolazone due to worsening renal function with azotemia  · Likely secondary to cardiorenal, +/-obstructive uropathy  · He is status post viera placed for retention during this admission and flomax added  Viera removed on 4/12/18 for voiding trial which so far has been successful  Continue urinary retention protocol  · Management per Nephrology      Non-ST elevation myocardial infarction (NSTEMI) ([facility])   Assessment & Plan    · Mild troponin elevation likely the result of type 2 NSTEMI in the setting of heart failure and renal failure  · No further workup from the cardiac standpoint at this time      Rapid atrial fibrillation [facility])   Assessment & Plan    · Rate remains controlled    Continue metoprolol  · Patient declined anticoagulation due to prior bleeding while on Eliquis      Type 2 diabetes mellitus ([facility] )   Assessment & Plan    · Most recent hemoglobin A1c 7 6 2 months ago  · Continue Lantus/Humalog at current dose      Pulmonary hypertension ([place] [address] )   Assessment & Plan    · Severe pulmonary hypertension with estimated peak PA pressure at 65 mmHg  · Will probably benefit from sildenafil once euvolemic      Peripheral arterial disease ([facility])   Assessment & Plan    · Patient with severe PAD with 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, hallux and lateral heel, managed by Podiatry with local wound care  Seen yesterday by Podiatry  Feet stable and no intervention needed at this time  · He is status post vascular evaluation and no plans for any intervention during this hospitalization in view of his unstable cardiovascular/renal status  · Discussed with Dr Tim Grier        VTE Pharmacologic Prophylaxis:   Pharmacologic: Heparin  Mechanical VTE Prophylaxis in Place: Yes    Patient Centered Rounds: I have performed bedside rounds with nursing staff today  Discussions with Specialists or Other Care Team Provider: Nursing/Nephrology    Education and Discussions with Family / Patient: patient    Current Length of Stay: 23 day(s)    Current Patient Status: Inpatient   Certification Statement: The patient will continue to require additional inpatient hospital stay due to Above diagnosis and care plan    Discharge Plan:  Not medically stable for discharge    Code Status: Level 1 - Full Code      Subjective:   Patient seen and evaluated  Offers no new complaints this morning  Denies any chest pain, no shortness of breath at rest, no fever or chills  He will like to be able to get off the floor for short periods daily  Discussed this with the RN who will need to accompany the patient if he gets off the floor as he remains on the monitor  Objective:     Vitals:   Temp (24hrs), Av 8 °F (36 6 °C), Min:97 4 °F (36 3 °C), Max:98 °F (36 7 °C)    HR:  [74-87] 87  Resp:  [18] 18  BP: (103-118)/(54-82) 105/82  SpO2:  [100 %] 100 %  Body mass index is 29 97 kg/m²  Input and Output Summary (last 24 hours):        Intake/Output Summary (Last 24 hours) at 18 2793  Last data filed at 04/27/18 1314   Gross per 24 hour   Intake              688 ml   Output              975 ml   Net             -287 ml       Physical Exam:  General Appearance:    Alert, cooperative, no distress, appropriately responsive    Head:    Normocephalic, without obvious abnormality, atraumatic, mucous membranes moist    Eyes:    Conjunctiva/corneas clear, EOM's intact   Neck:   Supple   Lungs:     Bibasilar rales, no wheezes     Heart:    Irregularly irregular, S1 and S2, +SM    Abdomen:     Soft, non-tender, bowel sounds active all four quadrants,     no masses, no organomegaly   Extremities:   Bilateral lower extremity edema improved from prior, dressing over left foot intact, stable ischemic changes to right 1st and 2nd digit   Neurologic:  Alert and oriented x3, moves all extremities         Additional Data:     Labs:      Results from last 7 days  Lab Units 04/27/18  0550   WBC Thousand/uL 8 81   HEMOGLOBIN g/dL 9 1*   HEMATOCRIT % 29 2*   PLATELETS Thousands/uL 182       Results from last 7 days  Lab Units 04/27/18  0550   SODIUM mmol/L 132*   POTASSIUM mmol/L 3 1*   CHLORIDE mmol/L 86*   CO2 mmol/L 38*   BUN mg/dL 163*   CREATININE mg/dL 2 80*   CALCIUM mg/dL 9 7   GLUCOSE RANDOM mg/dL 127           * I Have Reviewed All Lab Data Listed Above  * Additional Pertinent Lab Tests Reviewed: Colin [facility] Admission Reviewed    Cultures:   Blood Culture:   Lab Results   Component Value Date    BLOODCX No Growth After 5 Days  04/04/2018    BLOODCX No Growth After 5 Days   04/04/2018     Urine Culture: No results found for: URINECX  Sputum Culture: No components found for: SPUTUMCX  Wound Culture: No results found for: WOUNDCULT    Last 24 Hours Medication List:     Current Facility-Administered Medications:  acetaminophen 325 mg Oral Q8H PRN Lesvia Tadeo MD   aspirin 81 mg Oral Daily Chase Alberto MD   atorvastatin 40 mg Oral Daily With Chiquis Alberto MD   calcium carbonate 1,000 mg Oral Daily PRN Chase Alberto MD   docusate sodium 100 mg Oral BID PRN Chase Alberto MD   docusate sodium 100 mg Oral BID Becki Reyna MD   heparin (porcine) 5,000 Units Subcutaneous Q8H [facility] Lisseth Seals PA-C   insulin glargine 30 Units Subcutaneous HS Jcak Boyer MD   insulin lispro 1-5 Units Subcutaneous TID AC Chase Alberto MD   insulin lispro 1-5 Units Subcutaneous HS Chase Alberto MD   insulin lispro 12 Units Subcutaneous TID With Meals Jack Boyer MD   melatonin 6 mg Oral HS Sharon Chris MD   metoprolol succinate 50 mg Oral Q12H Svetlana Darnell MD   multivitamin-minerals 1 tablet Oral Daily JEWELL Dewitt   ondansetron 4 mg Intravenous Q6H PRN Chase Alberto MD   pantoprazole 40 mg Oral Early Morning Chase Alberto MD   potassium chloride 40 mEq Oral BID JEWELL Dewitt   sevelamer 800 mg Oral TID With Meals Gearld Boxer, MD   tamsulosin 0 4 mg Oral Daily With Dinner Lisseth Seals PA-C   zolpidem 5 mg Oral HS PRN Becki Reyna MD        Today, Patient Was Seen By: Jack Boyer MD    ** Please Note: Dragon 360 Dictation voice to text software may have been used in the creation of this document   **

## 2018-04-27 NOTE — PHYSICAL THERAPY NOTE
PHYSICAL THERAPY NOTE      Patient Name: Harjinder Seymour  Today's Date: 4/27/2018 04/27/18 1150   Pain Assessment   Pain Assessment No/denies pain   Pain Score No Pain   Restrictions/Precautions   Weight Bearing Precautions Per Order No   Other Precautions Fall Risk;O2;Telemetry;Multiple lines   General   Family/Caregiver Present Yes  (spouse)   Subjective   Subjective Patient seated in recliner with B LE elevated and is agreeable to therapy session  Bed Mobility   Supine to Sit Unable to assess   Sit to Supine Unable to assess   Additional Comments Patient seated OOB in recliner pre and post session with call bell and belongings in reach  Transfers   Sit to Stand 4  Minimal assistance   Additional items Assist x 1; Armrests; Increased time required;Verbal cues   Stand to Sit 4  Minimal assistance   Additional items Assist x 1; Armrests; Increased time required;Verbal cues  (for hand placement to control descend into chair)   Additional Comments standing tolerance with B UE on RW and contact guard assist for 2 minutes 10 seconda for urinal use    Ambulation/Elevation   Gait pattern Decreased foot clearance;Shuffling; Short stride; Excessively slow; Foward flexed   Gait Assistance 4  Minimal assist   Additional items Assist x 1;Verbal cues   Assistive Device Rolling walker   Distance 10'   Balance   Static Sitting Fair +   Dynamic Sitting Fair   Static Standing Poor +   Dynamic Standing Poor +   Ambulatory Poor +   Endurance Deficit   Endurance Deficit Yes   Endurance Deficit Description SOB with activity, decreased ambulation distance & standing tolerance    Activity Tolerance   Activity Tolerance Patient limited by fatigue;Treatment limited secondary to medical complications (Comment)   Nurse Made Aware spoke to [place], [place]   Exercises   Quad Sets Sitting;10 reps;Bilateral  (x 2 sets /c B LE elevated )   Heelslides Sitting;10 reps;AROM; Bilateral  (x 2 sets /c B LE elevated )   Glute Sets Sitting;10 reps;AROM; Bilateral  (x 2 sets)   Hip Adduction Sitting;10 reps;AROM; Bilateral  (pillow squeezes)   Knee AROM Long Arc Quad Sitting;10 reps;AROM; Bilateral   Ankle Pumps Sitting;20 reps;AROM; Bilateral  (/c B LE elevated)   Marching Sitting;10 reps;AROM; Bilateral   Assessment   Prognosis Fair   Problem List Decreased strength;Decreased range of motion;Decreased endurance; Impaired balance;Decreased mobility; Decreased coordination;Decreased safety awareness; Obesity; Decreased skin integrity; Impaired sensation;Pain  (LE edema)   Assessment Patient presented with ability to perform increased sets of exercise with good form and understanding  Required min A for repositionig within chair as well as verbal instruction for hand and foot placement  Transfers remain consistent with min a and verbal instruction for hand placment and body positioning  Tolerance to ambulation decreased this session with ability to only ambulate 10 feet, refusal of any additional ambulation secondary to fatigue  Barriers to Discharge Inaccessible home environment   Goals   Patient Goals to get stronger   STG Expiration Date 05/04/18   Short Term Goal #1 Per last PT goal assessment    In 10-14 days, patient will be: 1  Supervision with Bed Mobility Rolling Right and Left - NOT MET 2  Supervision with Bed Mobility Supine-Sit - NOT MET 3  Supervision with Transfer Bed-Chair- NOT MET 4  Increase Dynamic Sitting Balance at least 1 Grade for improved stability with functional reach activities- NOT MET 5  Increase Dynamic Standing Balance at least 1 Grade for improved ease with Activities of Daily Living- NOT MET 6  Increase Lower Extremity Strength at least 1 Grade for improved ease mobility tasks- NOT MET 7   Supervision with Ambulation 100 feet using a rolling walker to facilitate home and community mobility- NOT MET; More goals will be determined at a future date upon completion of the aforementioned goals  Treatment Day 5   Plan   Treatment/Interventions Functional transfer training;LE strengthening/ROM; Therapeutic exercise; Endurance training;Patient/family training;Equipment eval/education; Bed mobility;Gait training  (PT to see when stair training appropriate)   Progress Slow progress, decreased activity tolerance   PT Frequency 5x/wk   Recommendation   Recommendation Long-term skilled PT   Equipment Recommended Wheelchair; Other (Comment)  (roller walker)     Deandre Whalen, PTA

## 2018-04-27 NOTE — NUTRITION
04/27/18 1323   Assessment   Timepoint Reassess   Labs   List Completed Labs (4/27/18 Na 132, K 3 1, , Creat 2 80,Phos 4 8, Mag 3 1   Meds reviewed)   Feeding Route   PO Independent   Nutritional Supplement Ensure (specify type)  (Ensure Clear BID Breakfast and Lunch)   Adequacy of Intake   Nutrition Modality PO  (CCD level 2, 1500ml FR, Sodium 4grm)   Intake Meals 25-50%;%  (PO fluctuates )   Intake Supplements 50-75%   Estimated Calorie Intake 50-75%   Estimated Protein Intake  50-75%   Estimated Fluid Intake 50-75%   Estimated calorie intake compared to estimated need Meeting protein needs and calorie intake fluctuates   Nutrition Prognosis   Potential Fair   PES Statement   Problem Continue previous diagnosis   Patient Nutrition Goals   Goal glucose in range;meet PO needs   Goal Status extended   Timeframe to complete goal by d/c   Recommendations/Interventions   Summary Patient continues with renal status  Recalculated protein needs(40-51grm daily) r/t Creat above baseline and ongoing discussion with pt/family regarding need for dialysis  Wife continues to be concerned with patient nutrition , however low protein diet beneficial to kidney function at this time, she would like the Ensure Clear to continue  Provides 8grm protein/serving  Pre Renal diet recommended however, pt did not accept previous restricted diet  Noted Nephrology ordered MVT/MIn, monitor Phos/Magnesium and lytes    Malnutrition/BMI Present No   Interventions Diet: continued as ordered; Supplement continue   Nutrition Recommendations Continue diet as ordered   Nutrition Complexity Risk   Nutrition complexity level Moderate risk   Nutrition review: 05/01/18  (chk lytes/Phos/Mag and PO/glu)   Follow up date 05/04/18

## 2018-04-27 NOTE — NURSING NOTE
Bladder scanned patient for value of 445 mL  Patient requested to be straight cath'ed because pressure in bladder  Pt encouraged to void, with no urine output  Patient tolerated straight cath well, patient stated relief from pressure

## 2018-04-27 NOTE — PROGRESS NOTES
Cardiology Progress Note - Wavicente Newry  [de-identified] y o  male MRN: [id number]    Unit/Bed#: -01 Encounter: [id number]      Assessment:  Principal Problem:    Acute on chronic diastolic congestive heart failure (HCC)  Active Problems:    Rapid atrial fibrillation (HCC)    Benign essential hypertension    Type 2 diabetes mellitus ([facility] [address] )    Peripheral arterial disease (HCC)    Acute-on-chronic kidney injury ([email or web address] )    Non-ST elevation myocardial infarction (NSTEMI) ([email or web address] 75 )    Pulmonary hypertension ([facility])    Azotemia      Plan:    1  Acute on chronic diastolic CHF - Patient was having more for worsening acute kidney injury earlier this week, and Nephrology managing diuretic dosing  Diuretics currently on hold  We had a lengthy discussion with the patient's family today, that included his wife and daughter, as stated below  Diuretic dosing I defer to nephrology  I do agree that he continues to have issues with pulmonary edema, and signs of uremia, necessitating at least discussion for dialysis  Continue to follow urine output, daily labs and weight  2   Valvular heart disease - Moderate to severe mitral and tricuspid regurgitation  Patient had a prior bioprosthetic aortic valve replacement  We will continue to follow this closely, and treat CHF as above  Not an open heart surgery candidate  I reviewed his prior echocardiograms  There is a significant amount of mitral annular calcification, which would likely prohibit a noninvasive MitraClip  However we would not know this definitively without a KRYSTLE  He would need clinically improve from a volume and renal standpoint, before we would consider this  He would need an extensive amount of rehabilitation before this occurs as well  3  CAD - Prior bypass surgery at the time of his aortic valve replacement  Continue all other outpatient medications prescribed  4  AF - Likely permanent  Continue medications prescribed  Heart rates controlled    Had been taking off of anticoagulation as an outpatient, secondary to a traumatic fall and ongoing fall risk  5  Disp:  We had a long meeting with the family yesterday  This included the hospitalist and Nephrology  The family was somewhat concerned over his nutrition and lack of physical therapy  I do feel that a lot of this is inhibited because of his ongoing issues with pulmonary edema, and uremia  I do feel that a series discussion with dialysis needs to continue  Creatinine is stable, but BUN continues to rise  I also feel that, with worsening renal failure and CHF, and not being able to control this with medical therapy, attenuates his ability to recover from a nutrition and physical therapy standpoint  This was discussed at length with the patient's family  We will continue to follow along with Nephrology  Subjective:   Patient seen and examined  Patient continues to remain volume overloaded  With worsening renal function, nephrology has held Bumex and metolazone  Patient has shown signs of uremia  Looks more alert today  Objective:     Vitals: Blood pressure 92/52, pulse 72, temperature 97 7 °F (36 5 °C), temperature source Oral, resp  rate 18, height 5' 7" (1 702 m), weight 86 8 kg (191 lb 5 8 oz), SpO2 100 %  , Body mass index is 29 97 kg/m² ,   Orthostatic Blood Pressures      Most Recent Value   Blood Pressure  92/52 filed at 04/27/2018 1500   Patient Position - Orthostatic VS  Sitting filed at 04/27/2018 1500            Intake/Output Summary (Last 24 hours) at 04/27/18 1600  Last data filed at 04/27/18 1440   Gross per 24 hour   Intake              688 ml   Output             1025 ml   Net             -337 ml       Telemetry review:  Atrial fibrillation with short runs of nonsustained VT, and PVCs    Physical Exam:    GEN: Yunior [place]   appears well, alert and oriented x 3, pleasant and cooperative   HEENT: pupils equal, round, and reactive to light; extraocular muscles intact  NECK: supple, no carotid bruits  + jVD   HEART: irregular rhythm, normal S1 and S2, no murmurs, clicks, gallops or rubs   LUNGS: Diminished bilaterally; no wheezes, rales, or rhonchi   ABDOMEN: normal bowel sounds, soft, no tenderness, no distention  EXTREMITIES: peripheral pulses normal; no clubbing, cyanosis  Positive edema bilaterally - improved    NEURO: no focal findings   SKIN: normal without suspicious lesions on exposed skin        Medications:      Current Facility-Administered Medications:     acetaminophen (TYLENOL) tablet 325 mg, 325 mg, Oral, Q8H PRN, Storm Aguiar MD, 325 mg at 04/24/18 2241    aspirin (ECOTRIN LOW STRENGTH) EC tablet 81 mg, 81 mg, Oral, Daily, Chase Alberto MD, 81 mg at 04/27/18 0828    atorvastatin (LIPITOR) tablet 40 mg, 40 mg, Oral, Daily With Raya Alberto MD, 40 mg at 04/26/18 1641    calcium carbonate (TUMS) chewable tablet 1,000 mg, 1,000 mg, Oral, Daily PRN, Chase Alberto MD    docusate sodium (COLACE) capsule 100 mg, 100 mg, Oral, BID PRN, Chase Alberto MD, 100 mg at 04/10/18 1005    docusate sodium (COLACE) capsule 100 mg, 100 mg, Oral, BID, Sanjay Reinoso MD, 100 mg at 04/27/18 0829    heparin (porcine) subcutaneous injection 5,000 Units, 5,000 Units, Subcutaneous, Q8H [facility], Lisseth Seals PA-C, 5,000 Units at 04/27/18 1309    insulin glargine (LANTUS) subcutaneous injection 30 Units 0 3 mL, 30 Units, Subcutaneous, HS, Zachery Nuñez MD, 30 Units at 04/26/18 2157    insulin lispro (HumaLOG) 100 units/mL subcutaneous injection 1-5 Units, 1-5 Units, Subcutaneous, TID AC, 1 Units at 04/27/18 1309 **AND** [CANCELED] Fingerstick Glucose (POCT), , , TID AC, Chase Alberto MD    insulin lispro (HumaLOG) 100 units/mL subcutaneous injection 1-5 Units, 1-5 Units, Subcutaneous, HS, Chase Alberto MD, 2 Units at 04/26/18 2200    insulin lispro (HumaLOG) 100 units/mL subcutaneous injection 12 Units, 12 Units, Subcutaneous, TID With Meals, Neda Rivera MD, 12 Units at 04/27/18 1309    melatonin tablet 6 mg, 6 mg, Oral, HS, Carly Packer MD, 6 mg at 04/26/18 2157    metoprolol succinate (TOPROL-XL) 24 hr tablet 50 mg, 50 mg, Oral, Q12H, Diane Jensen MD, 50 mg at 04/27/18 6199    multivitamin-minerals (CENTRUM) tablet 1 tablet, 1 tablet, Oral, Daily, JEWELL Bazzi, 1 tablet at 04/27/18 1020    ondansetron (ZOFRAN) injection 4 mg, 4 mg, Intravenous, Q6H PRN, Chase Alberto MD    pantoprazole (PROTONIX) EC tablet 40 mg, 40 mg, Oral, Early Morning, Chase Alberto MD, 40 mg at 04/27/18 0526    potassium chloride (K-DUR,KLOR-CON) CR tablet 40 mEq, 40 mEq, Oral, BID, JEWELL Bazzi, 40 mEq at 04/27/18 4737    sevelamer (RENAGEL) tablet 800 mg, 800 mg, Oral, TID With Meals, Sunshine Smith MD, 800 mg at 04/27/18 1309    tamsulosin (FLOMAX) capsule 0 4 mg, 0 4 mg, Oral, Daily With Dinner, Lisseth Seals PA-C, 0 4 mg at 04/26/18 1641    zolpidem (AMBIEN) tablet 5 mg, 5 mg, Oral, HS PRN, Kenneth Barnett MD     Labs & Results:          Results from last 7 days  Lab Units 04/27/18  0550 04/23/18  0552   WBC Thousand/uL 8 81 8 76   HEMOGLOBIN g/dL 9 1* 9 3*   HEMATOCRIT % 29 2* 30 9*   PLATELETS Thousands/uL 182 198           Results from last 7 days  Lab Units 04/27/18  0550 04/26/18  0948 04/25/18  0612   SODIUM mmol/L 132* 133* 134*   POTASSIUM mmol/L 3 1* 3 1* 3 3*   CHLORIDE mmol/L 86* 85* 85*   CO2 mmol/L 38* 40* 42*   BUN mg/dL 163* 161* 165*   CREATININE mg/dL 2 80* 2 96* 3 09*   CALCIUM mg/dL 9 7 10 3* 9 7   GLUCOSE RANDOM mg/dL 127 170* 160*           Results from last 7 days  Lab Units 04/27/18  0550 04/25/18  0612 04/22/18  0558   MAGNESIUM mg/dL 3 1* 2 8* 2 7*         EKG personally reviewed by Rohith Craig MD   Atrial fibrillation/flutter, nonspecific IVCD, nonspecific ST changes  ECHO:  LEFT VENTRICLE:  Systolic function was mildly reduced  Ejection fraction was estimated to be 45 %    There was mild diffuse hypokinesis  There was moderate hypokinesis of the apical septal wall(s)  Wall thickness was mildly increased  There was mild concentric hypertrophy      RIGHT VENTRICLE:  The ventricle was mildly to moderately dilated  Systolic function was reduced      LEFT ATRIUM:  The atrium was moderately dilated      RIGHT ATRIUM:  The atrium was moderately dilated      MITRAL VALVE:  There was marked annular calcification  There was moderate to severe regurgitation      AORTIC VALVE:  A bioprosthesis was present  It exhibited normal function  Doppler cardiac output was 3 5 L/min, using LVOT flow data  Doppler cardiac index was 1 68 L/min/m squared, using LVOT flow data      TRICUSPID VALVE:  There was severe regurgitation  Pulmonary artery systolic pressure was markedly increased  Estimated peak PA pressure was 65 mmHg  Counseling / Coordination of Care  Total floor / unit time spent today 45 minutes  Greater than 50% of total time was spent with the patient and / or family counseling and / or coordination of care

## 2018-04-27 NOTE — PROGRESS NOTES
NEPHROLOGY PROGRESS NOTE   Harjinder Seymour  [de-identified] y o  male MRN: [id number]  Unit/Bed#: -01 Encounter: [id number]  Reason for Consult: SABINE, CKD    Assessment and Plan:  The patient is an 82-year-old gentleman who presented with shortness of breath on April 4th   He went on vacation for 5 days and did not take his diuretic, along with dietary indiscretion   Clinical course complicated by atrial fibrillation, hypotension and severe diuretic resistance     1  Acute kidney injury on chronic kidney disease:  Baseline creatinine 1 8-2 2   Etiology possibly prerenal, decreased effective circulating volume in the setting of volume overload, cardiorenal,  hypotension  Post renal also contributing-  Geronimo catheter discontinued on 04/12/2018  · Creatinine up to 3 in the setting of aggressive diuresis  Severe azotemia  Diuretics placed on hold 4/24/18  Creatinine down to 2 8,   · Chest x-ray 4/24 stable interstitial edema  Small pleural effusions  · Status post Geronimo catheter --required straight cath for retention early this morning  · Urinalysis:  Negative protein, no RBCs, no bacteria, 0-1 WBCs  · Renal ultrasound:  Kidneys diffusely echogenic reflecting chronic medical renal disease  · Spoke with Dr Norah Flaherty regarding plan of care- hopefully we can arrange to take Tevin Bookbinder outside each day for a short period of time which I think will help with depression  Also started a daily vitamin  Wife resistant to starting HD  2  Hypokalemia:  Potassium level  3 1  40 mEq of K-Dur twice a day x 3 doses ordered- reassess in AM  3  Acute on chronic diastolic CHF, Volume overload:  Cardiology following  · Weight below dry weight   Previous dry weight 210-212 lb  · Severe diuretic resistance- Likely 2/2 to worsening cardiac function/RV dysfunction/severe TR in the setting of atrial fibrillation, worsened MR, pulmonary hypertension and reduction in LVEF, worsening renal function      · EF 45% (last September EF 65%)  · Venous Dopplers lower extremities completed   No DVT  · Outpatient diuretic regime: torsemide 40 mg every Monday, Wednesday, Friday the remaining days patient was taking 20 mg  3  PAF:  Now likely chronic   Rate controlled with intermittent bouts of tachycardia   Cardiology following  4  Anemia:  Hemoglobin fairly stable  5  MGUS:  IgM kappa and IgM lambda followed by Dr Lizzy Woods  6  Elevated bicarbonate:  Likely due to aggressive diuresis   Goal -keep potassium level ~4  7  Hyperphosphatemia:  Improved on sevelamer -phosphorus 4 8  8  Weakness:  Severe deconditioning in the setting of 3 week hospitalization  Depression contributing  9  Other:  PVD, CAD, prior CABG, AS status post AVR-bioprosthetic    SUMMARY OF RECOMMENDATIONS:  · Monitor for urinary retention  · Start daily vitamin  · Try to arrange to take pt outside  · Kdur 40 meq BID x 3 doses-reevaluate in AM    SUBJECTIVE / INTERVAL HISTORY:  Very depressed- wants to go outside  More alert today  Ate at least half of breakfast     OBJECTIVE:  Current Weight: Weight - Scale: 86 8 kg (191 lb 5 8 oz)  Vitals:    04/26/18 1523 04/26/18 2158 04/27/18 0600 04/27/18 0730   BP: 103/68 118/54  105/82   BP Location: Left arm   Right arm   Pulse: 80 74  87   Resp: 18 18  18   Temp: 98 °F (36 7 °C) 98 °F (36 7 °C)  (!) 97 4 °F (36 3 °C)   TempSrc: Oral Oral  Oral   SpO2: 100% 100%  100%   Weight:   86 8 kg (191 lb 5 8 oz)    Height:           Intake/Output Summary (Last 24 hours) at 04/27/18 1028  Last data filed at 04/27/18 1021   Gross per 24 hour   Intake              750 ml   Output              925 ml   Net             -175 ml   General:  No acute distress   Sitting up in the chair- eating breafast  Skin:  Warm and dry, no rash  Eyes:  Sclera clear, anicteric  ENT:  Oropharynx moist  Neck:  Supple, elevated jugular vein pressure  Chest:  fairly clear, decreased at the bases  CVS:  Irregularly irregular rhythm, soft systolic murmur  Abdomen:  Nondistended, nontender, bowel sounds present  Extremities:  Minimal lower extremity edema  :  No Geronimo  Neuro:  No acute deficits  Psych:  Depressive affect    Medications:    Current Facility-Administered Medications:     acetaminophen (TYLENOL) tablet 325 mg, 325 mg, Oral, Q8H PRN, Wilber Estes MD, 325 mg at 04/24/18 2241    aspirin (ECOTRIN LOW STRENGTH) EC tablet 81 mg, 81 mg, Oral, Daily, Chase Alberto MD, 81 mg at 04/27/18 0828    atorvastatin (LIPITOR) tablet 40 mg, 40 mg, Oral, Daily With Russell Alberto MD, 40 mg at 04/26/18 1641    calcium carbonate (TUMS) chewable tablet 1,000 mg, 1,000 mg, Oral, Daily PRN, Chase Alberto MD    docusate sodium (COLACE) capsule 100 mg, 100 mg, Oral, BID PRN, Chase Alberto MD, 100 mg at 04/10/18 1005    docusate sodium (COLACE) capsule 100 mg, 100 mg, Oral, BID, Jose Antonio Smith MD, 100 mg at 04/27/18 0829    heparin (porcine) subcutaneous injection 5,000 Units, 5,000 Units, Subcutaneous, Q8H [address], Lisseth Seals PA-C, 5,000 Units at 04/27/18 0526    insulin glargine (LANTUS) subcutaneous injection 30 Units 0 3 mL, 30 Units, Subcutaneous, HS, Hyacinth Rosales MD, 30 Units at 04/26/18 2157    insulin lispro (HumaLOG) 100 units/mL subcutaneous injection 1-5 Units, 1-5 Units, Subcutaneous, TID AC, 1 Units at 04/27/18 0829 **AND** [CANCELED] Fingerstick Glucose (POCT), , , TID AC, Chase Alberto MD    insulin lispro (HumaLOG) 100 units/mL subcutaneous injection 1-5 Units, 1-5 Units, Subcutaneous, HS, Chase Alberto MD, 2 Units at 04/26/18 2200    insulin lispro (HumaLOG) 100 units/mL subcutaneous injection 12 Units, 12 Units, Subcutaneous, TID With Meals, Hyacinth Rosales MD, 12 Units at 04/27/18 0829    melatonin tablet 6 mg, 6 mg, Oral, HS, Pedro Moore MD, 6 mg at 04/26/18 2157    metoprolol succinate (TOPROL-XL) 24 hr tablet 50 mg, 50 mg, Oral, Q12H, June Knox MD, 50 mg at 04/27/18 0828    multivitamin-minerals (CENTRUM) tablet 1 tablet, 1 tablet, Oral, Daily, JEWELL Moffett, 1 tablet at 04/27/18 1020    ondansetron (ZOFRAN) injection 4 mg, 4 mg, Intravenous, Q6H PRN, Chase Alberto MD    pantoprazole (PROTONIX) EC tablet 40 mg, 40 mg, Oral, Early Morning, Chase Alberto MD, 40 mg at 04/27/18 0526    potassium chloride (K-DUR,KLOR-CON) CR tablet 40 mEq, 40 mEq, Oral, BID, JEWELL Moffett, 40 mEq at 04/27/18 7046    sevelamer (RENAGEL) tablet 800 mg, 800 mg, Oral, TID With Meals, Shelbie Ji MD, 800 mg at 04/27/18 9562    tamsulosin (FLOMAX) capsule 0 4 mg, 0 4 mg, Oral, Daily With Shyanne Seals PA-C, 0 4 mg at 04/26/18 1641    zolpidem (AMBIEN) tablet 5 mg, 5 mg, Oral, HS PRN, Haven Voss MD    Laboratory Results:    Results from last 7 days  Lab Units 04/27/18  0550 04/26/18  7511 04/25/18  0612 04/24/18  0441 04/23/18  0552 04/22/18  0558 04/21/18  0503   WBC Thousand/uL 8 81  --   --   --  8 76  --   --    HEMOGLOBIN g/dL 9 1*  --   --   --  9 3*  --   --    HEMATOCRIT % 29 2*  --   --   --  30 9*  --   --    PLATELETS Thousands/uL 182  --   --   --  198  --   --    SODIUM mmol/L 132* 133* 134* 135* 136 137 140   POTASSIUM mmol/L 3 1* 3 1* 3 3* 2 8* 3 7 3 8 3 9   CHLORIDE mmol/L 86* 85* 85* 85* 86* 90* 92*   CO2 mmol/L 38* 40* 42* 40* 41* 40* 39*   BUN mg/dL 163* 161* 165* 164* 147* 145* 144*   CREATININE mg/dL 2 80* 2 96* 3 09* 3 01* 2 90* 2 78* 2 76*   CALCIUM mg/dL 9 7 10 3* 9 7 9 9 10 1 10 8* 10 2*   MAGNESIUM mg/dL 3 1*  --  2 8*  --   --  2 7*  --    PHOSPHORUS mg/dL 4 8*  --   --   --   --  4 4*  --    GLUCOSE RANDOM mg/dL 127 170* 160* 170* 136 145* 131     Previous work up:

## 2018-04-27 NOTE — PLAN OF CARE
Problem: Potential for Falls  Goal: Patient will remain free of falls  INTERVENTIONS:  - Assess patient frequently for physical needs  -  Identify cognitive and physical deficits and behaviors that affect risk of falls    -  [place] fall precautions as indicated by assessment   - Educate patient/family on patient safety including physical limitations  - Instruct patient to call for assistance with activity based on assessment  - Modify environment to reduce risk of injury  - Consider OT/PT consult to assist with strengthening/mobility   Outcome: Progressing      Problem: PAIN - ADULT  Goal: Verbalizes/displays adequate comfort level or baseline comfort level  Interventions:  - Encourage patient to monitor pain and request assistance  - Assess pain using appropriate pain scale  - Administer analgesics based on type and severity of pain and evaluate response  - Implement non-pharmacological measures as appropriate and evaluate response  - Consider cultural and social influences on pain and pain management  - Notify physician/advanced practitioner if interventions unsuccessful or patient reports new pain   Outcome: Progressing      Problem: INFECTION - ADULT  Goal: Absence or prevention of progression during hospitalization  INTERVENTIONS:  - Assess and monitor for signs and symptoms of infection  - Monitor lab/diagnostic results  - Monitor all insertion sites, i e  indwelling lines, tubes, and drains  - Monitor endotracheal (as able) and nasal secretions for changes in amount and color  - [place] appropriate cooling/warming therapies per order  - Administer medications as ordered  - Instruct and encourage patient and family to use good hand hygiene technique  - Identify and instruct in appropriate isolation precautions for identified infection/condition   Outcome: Progressing    Goal: Absence of fever/infection during neutropenic period  INTERVENTIONS:  - Monitor WBC  - Implement neutropenic guidelines   Outcome: Progressing      Problem: SAFETY ADULT  Goal: Maintain or return to baseline ADL function  INTERVENTIONS:  -  Assess patient's ability to carry out ADLs; assess patient's baseline for ADL function and identify physical deficits which impact ability to perform ADLs (bathing, care of mouth/teeth, toileting, grooming, dressing, etc )  - Assess/evaluate cause of self-care deficits   - Assess range of motion  - Assess patient's mobility; develop plan if impaired  - Assess patient's need for assistive devices and provide as appropriate  - Encourage maximum independence but intervene and supervise when necessary  ¯ Involve family in performance of ADLs  ¯ Assess for home care needs following discharge   ¯ Request OT consult to assist with ADL evaluation and planning for discharge  ¯ Provide patient education as appropriate   Outcome: Progressing    Goal: Maintain or return mobility status to optimal level  INTERVENTIONS:  - Assess patient's baseline mobility status (ambulation, transfers, stairs, etc )    - Identify cognitive and physical deficits and behaviors that affect mobility  - Identify mobility aids required to assist with transfers and/or ambulation (gait belt, sit-to-stand, lift, walker, cane, etc )  - [place] fall precautions as indicated by assessment  - Record patient progress and toleration of activity level on Mobility SBAR; progress patient to next Phase/Stage  - Instruct patient to call for assistance with activity based on assessment  - Request Rehabilitation consult to assist with strengthening/weightbearing, etc    Outcome: Progressing      Problem: DISCHARGE PLANNING  Goal: Discharge to home or other facility with appropriate resources  INTERVENTIONS:  - Identify barriers to discharge w/patient and caregiver  - Arrange for needed discharge resources and transportation as appropriate  - Identify discharge learning needs (meds, wound care, etc )  - Arrange for interpretive services to assist at discharge as needed  - Refer to Case Management Department for coordinating discharge planning if the patient needs post-hospital services based on physician/advanced practitioner order or complex needs related to functional status, cognitive ability, or social support system   Outcome: Progressing      Problem: Knowledge Deficit  Goal: Patient/family/caregiver demonstrates understanding of disease process, treatment plan, medications, and discharge instructions  Complete learning assessment and assess knowledge base  Interventions:  - Provide teaching at level of understanding  - Provide teaching via preferred learning methods   Outcome: Progressing      Problem: Nutrition/Hydration-ADULT  Goal: Nutrient/Hydration intake appropriate for improving, restoring or maintaining nutritional needs  Monitor and assess patient's nutrition/hydration status for malnutrition (ex- brittle hair, bruises, dry skin, pale skin and conjunctiva, muscle wasting, smooth red tongue, and disorientation)  Collaborate with interdisciplinary team and initiate plan and interventions as ordered  Monitor patient's weight and dietary intake as ordered or per policy  Utilize nutrition screening tool and intervene per policy  Determine patient's food preferences and provide high-protein, high-caloric foods as appropriate       INTERVENTIONS:  - Monitor oral intake, urinary output, labs, and treatment plans  - Assess nutrition and hydration status and recommend course of action  - Evaluate amount of meals eaten  - Assist patient with eating if necessary   - Allow adequate time for meals  - Recommend/ encourage appropriate diets, oral nutritional supplements, and vitamin/mineral supplements  - Order, calculate, and assess calorie counts as needed  - Recommend, monitor, and adjust tube feedings and TPN/PPN based on assessed needs  - Assess need for intravenous fluids  - Provide specific nutrition/hydration education as appropriate  - Include patient/family/caregiver in decisions related to nutrition   Outcome: Progressing      Problem: Prexisting or High Potential for Compromised Skin Integrity  Goal: Skin integrity is maintained or improved  INTERVENTIONS:  - Identify patients at risk for skin breakdown  - Assess and monitor skin integrity  - Assess and monitor nutrition and hydration status  - Monitor labs (i e  albumin)  - Assess for incontinence   - Turn and reposition patient  - Assist with mobility/ambulation  - Relieve pressure over bony prominences  - Avoid friction and shearing  - Provide appropriate hygiene as needed including keeping skin clean and dry  - Evaluate need for skin moisturizer/barrier cream  - Collaborate with interdisciplinary team (i e  Nutrition, Rehabilitation, etc )   - Patient/family teaching   Outcome: Progressing      Problem: DISCHARGE PLANNING - CARE MANAGEMENT  Goal: Discharge to post-acute care or home with appropriate resources  INTERVENTIONS:  - Conduct assessment to determine patient/family and health care team treatment goals, and need for post-acute services based on payer coverage, community resources, and patient preferences, and barriers to discharge  - Address psychosocial, clinical, and financial barriers to discharge as identified in assessment in conjunction with the patient/family and health care team  - Arrange appropriate level of post-acute services according to patient's   needs and preference and payer coverage in collaboration with the physician and health care team  - Communicate with and update the patient/family, physician, and health care team regarding progress on the discharge plan  - Arrange appropriate transportation to post-acute venues   Outcome: Progressing      Problem: CARDIOVASCULAR - ADULT  Goal: Maintains optimal cardiac output and hemodynamic stability  INTERVENTIONS:  - Monitor I/O, vital signs and rhythm  - Monitor for S/S and trends of decreased cardiac output i e  bleeding, hypotension  - Administer and titrate ordered vasoactive medications to optimize hemodynamic stability  - Assess quality of pulses, skin color and temperature  - Assess for signs of decreased coronary artery perfusion - ex   Angina  - Instruct patient to report change in severity of symptoms   Outcome: Progressing    Goal: Absence of cardiac dysrhythmias or at baseline rhythm  INTERVENTIONS:  - Continuous cardiac monitoring, monitor vital signs, obtain 12 lead EKG if indicated  - Administer antiarrhythmic and heart rate control medications as ordered  - Monitor electrolytes and administer replacement therapy as ordered   Outcome: Progressing

## 2018-04-27 NOTE — ASSESSMENT & PLAN NOTE
· Rate remains controlled    Continue metoprolol  · Patient declined anticoagulation due to prior bleeding while on Eliquis

## 2018-04-28 NOTE — PROGRESS NOTES
Progress Note - Gabriel Vazquez  1935, [de-identified] y o  male MRN: [id number]    Unit/Bed#: -01 Encounter: [id number]    Primary Care Provider: Cortney Joiner MD   Date and time admitted to hospital: 4/4/2018  6:39 AM    * Acute on chronic diastolic congestive heart failure ([place] [address] )   Assessment & Plan    · Creatinine 2 6 today  BUN remains elevated at 160  Remains of diuretics  · Hemodialysis continues to be the recommendation from the nephrology standpoint however family do not want dialysis at this time  · Will continue supportive care as able  Family wants more focus on physical therapy and nutrition which are both being addressed  · Overall poor prognosis in view of significant valvular disease, renal disease, severe pulmonary hypertension  · If patient remains stable over the weekend, will work on discharge planning to rehab sometimes next week      Acute-on-chronic kidney injury ([place] [address] )   Assessment & Plan    · Baseline creatinine 1 8-2 2  Diuretics remain on hold  · Likely secondary to cardiorenal, +/-obstructive uropathy  · He is status post viera placed for retention during this admission and flomax added  Viera removed on 4/12/18 for voiding trial which so far has been successful  Continue urinary retention protocol  · Management per Nephrology      Non-ST elevation myocardial infarction (NSTEMI) ([facility])   Assessment & Plan    · Mild troponin elevation likely the result of type 2 NSTEMI in the setting of heart failure and renal failure  · No further workup from the cardiac standpoint at this time      Rapid atrial fibrillation [facility])   Assessment & Plan    · Rate remains controlled    Continue metoprolol  · Patient declined anticoagulation due to prior bleeding while on Eliquis and high risk for falls      Type 2 diabetes mellitus ([place] [address] )   Assessment & Plan    · Most recent hemoglobin A1c 7 6 2 months ago  · Continue Lantus/Humalog at current dose      Pulmonary hypertension [facility])   Assessment & Plan · Severe pulmonary hypertension with estimated peak PA pressure at 65 mmHg  · Will probably benefit from sildenafil once euvolemic      Peripheral arterial disease ([facility])   Assessment & Plan    · Patient with severe PAD with 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, hallux and lateral heel, managed by Podiatry (who continue to follow on a regular basis) with local wound care  Feet stable and no intervention needed at this time  · He is status post vascular evaluation and no plans for any intervention during this hospitalization in view of his unstable cardiovascular/renal status        VTE Pharmacologic Prophylaxis:   Pharmacologic: Heparin  Mechanical VTE Prophylaxis in Place: Yes    Patient Centered Rounds: I have performed bedside rounds with nursing staff today  Discussions with Specialists or Other Care Team Provider:  Nursing/Nephrology    Education and Discussions with Family / Patient:  Patient    Current Length of Stay: 24 day(s)    Current Patient Status: Inpatient   Certification Statement: The patient will continue to require additional inpatient hospital stay due to Above diagnosis and care plan    Discharge Plan:  Tentative planning for rehab next week if patient remains stable and family still does not desire dialysis  Code Status: Level 1 - Full Code      Subjective:   Patient seen and evaluated  Offers no new complaints  Reports orthopnea and dyspnea on exertion  No chest pain  He was able to get outside yesterday with nursing staff which was refreshing to him  Objective:     Vitals:   Temp (24hrs), Av °F (36 7 °C), Min:97 7 °F (36 5 °C), Max:98 2 °F (36 8 °C)    HR:  [72-89] 75  Resp:  [17-18] 18  BP: ()/(47-65) 100/50  SpO2:  [100 %] 100 %  Body mass index is 29 87 kg/m²  Input and Output Summary (last 24 hours):        Intake/Output Summary (Last 24 hours) at 18 0921  Last data filed at 18 0815   Gross per 24 hour   Intake              688 ml   Output 1000 ml   Net             -312 ml       Physical Exam:  General Appearance:    Alert, cooperative, no distress, appropriately responsive    Head:    Normocephalic, without obvious abnormality, atraumatic, mucous membranes moist    Eyes:    Conjunctiva/corneas clear, EOM's intact   Neck:   Supple   Lungs:     Bibasilar crackles, no wheezes     Heart:    Irregularly irregular, S1 and S2 , +SM   Abdomen:     Soft, non-tender, bowel sounds active all four quadrants,     no masses, no organomegaly   Extremities:   Left foot dressings intact, stable ischemic changes to right 1st and 2nd digits, +1-2 bilateral lower extremity edema L>R   Neurologic:  nonfocal         Additional Data:     Labs:      Results from last 7 days  Lab Units 04/27/18  0550   WBC Thousand/uL 8 81   HEMOGLOBIN g/dL 9 1*   HEMATOCRIT % 29 2*   PLATELETS Thousands/uL 182       Results from last 7 days  Lab Units 04/28/18  0524   SODIUM mmol/L 130*   POTASSIUM mmol/L 3 7   CHLORIDE mmol/L 87*   CO2 mmol/L 35*   BUN mg/dL 162*   CREATININE mg/dL 2 63*   CALCIUM mg/dL 9 8   GLUCOSE RANDOM mg/dL 103           * I Have Reviewed All Lab Data Listed Above  * Additional Pertinent Lab Tests Reviewed: Colin 66 Admission Reviewed    Cultures:   Blood Culture:   Lab Results   Component Value Date    BLOODCX No Growth After 5 Days  04/04/2018    BLOODCX No Growth After 5 Days   04/04/2018     Urine Culture: No results found for: URINECX  Sputum Culture: No components found for: SPUTUMCX  Wound Culture: No results found for: WOUNDCULT    Last 24 Hours Medication List:     Current Facility-Administered Medications:  acetaminophen 325 mg Oral Q8H PRN Gearld Boxer, MD   aspirin 81 mg Oral Daily Chase Alberto MD   atorvastatin 40 mg Oral Daily With Dinner Chsae Alberto MD   calcium carbonate 1,000 mg Oral Daily PRN Chase Alberto MD   docusate sodium 100 mg Oral BID PRN Chase Alberto MD   docusate sodium 100 mg Oral BID Moe Clarke MD   heparin (porcine) 5,000 Units Subcutaneous Q8H [address] Lisseth Seals PA-C   insulin glargine 30 Units Subcutaneous HS Jasmin Real MD   insulin lispro 1-5 Units Subcutaneous TID AC Chase Alberto MD   insulin lispro 1-5 Units Subcutaneous HS Chase Alberto MD   insulin lispro 12 Units Subcutaneous TID With Meals Jasmin Real MD   melatonin 6 mg Oral HS Nighat Ceron MD   metoprolol succinate 50 mg Oral Q12H Mayra Ferrara MD   multivitamin-minerals 1 tablet Oral Daily JEWELL Headley   ondansetron 4 mg Intravenous Q6H PRN Chase Alberto MD   pantoprazole 40 mg Oral Early Morning Chase Alberto MD   sevelamer 800 mg Oral TID With Meals Lester Castle MD   tamsulosin 0 4 mg Oral Daily With Dinner Lisseth Seals PA-C   zolpidem 5 mg Oral HS PRN Moe Clarke MD        Today, Patient Was Seen By: Jasmin Real MD    ** Please Note: Dragon 360 Dictation voice to text software may have been used in the creation of this document   **

## 2018-04-28 NOTE — PROGRESS NOTES
NEPHROLOGY PROGRESS NOTE   Fadia Turcios  [de-identified] y o  male MRN: [id number]  Unit/Bed#: -01 Encounter: [id number]  Reason for Consult: SABINE    ASSESSMENT and PLAN:    The patient is an 71-year-old gentleman who presented with shortness of breath on April 4th   He went on vacation for 5 days and did not take his diuretic, along with dietary indiscretion   Clinical course complicated by atrial fibrillation, hypotension and severe diuretic resistance     1  Acute kidney injury on chronic kidney disease:  Baseline creatinine 1 8-2 2   Etiology possibly prerenal, decreased effective circulating volume in the setting of volume overload, cardiorenal,  hypotension   Post renal also contributing-  Geronimo catheter discontinued on 04/12/2018  · Creatinine trended down to 2 6, BUN remains elevated at 160  · Chest x-ray 4/24 stable interstitial edema   Small pleural effusions  · Status post Geronimo catheter   · Urinalysis:  Negative protein, no RBCs, no bacteria, 0-1 WBCs  · Renal ultrasound:  Kidneys diffusely echogenic reflecting chronic medical renal disease  · Treatment team meeting occurred this past week  Family has been on starting hemodialysis  I did multiple occasions stressed my concerns and recommended that starting dialysis would be the best option at this point  I reiterate this point multiple times as my concern was his elevated BUN/azotemia may be contributing to his poor oral intake and possibly slight uremia  Also help manage his volume status better than the diuretics have been  · At this time will continue to hold diuretics, his blood pressure is soft this morning  2  Hypokalemia:  Resolved  3   Acute on chronic diastolic CHF, Volume overload:  Cardiology following  · Weight below dry weight   Previous dry weight 210-212 lb  · Severe diuretic resistance- Likely 2/2 to worsening cardiac function/RV dysfunction/severe TR in the setting of atrial fibrillation, worsened MR, pulmonary hypertension and reduction in LVEF, worsening renal function     · EF 45% (last September EF 65%)  · Venous Dopplers lower extremities completed   No DVT  · Outpatient diuretic regime: torsemide 40 mg every Monday, Wednesday, Friday the remaining days patient was taking 20 mg  3  PAF:  Now likely chronic   Rate controlled with intermittent bouts of tachycardia   Cardiology following  4  Anemia:  Hemoglobin fairly stable  5  MGUS:  IgM kappa and IgM lambda followed by Dr Pratibha Berman  6  Elevated bicarbonate:  Likely due to aggressive diuresis   Goal -keep potassium level ~4  7  Hyperphosphatemia:  Improved on sevelamer -phosphorus 4 8  8  Weakness:  Severe deconditioning in the setting of 3 week hospitalization  Depression contributing  9  Other:  PVD, CAD, prior CABG, AS status post AVR-bioprosthetic      SUBJECTIVE / INTERVAL HISTORY:    A feels comfortable  His breathing is okay    OBJECTIVE:  Current Weight: Weight - Scale: 86 5 kg (190 lb 11 2 oz)  Vitals:    04/27/18 2247 04/28/18 0000 04/28/18 0536 04/28/18 0727   BP: 141/65 118/58  100/50   BP Location: Right arm   Right arm   Pulse: 79 89  75   Resp: 17   18   Temp: 98 °F (36 7 °C)   98 2 °F (36 8 °C)   TempSrc: Axillary   Oral   SpO2: 100%   100%   Weight:   86 5 kg (190 lb 11 2 oz)    Height:           Intake/Output Summary (Last 24 hours) at 04/28/18 0834  Last data filed at 04/28/18 0815   Gross per 24 hour   Intake              688 ml   Output             1000 ml   Net             -312 ml       Physical Exam   Constitutional: He is oriented to person, place, and time  He appears well-developed and well-nourished  No distress  HENT:   Head: Normocephalic and atraumatic  Eyes: Pupils are equal, round, and reactive to light  No scleral icterus  Neck: Normal range of motion  Neck supple  Cardiovascular: Normal rate, regular rhythm and normal heart sounds  Exam reveals no gallop and no friction rub  No murmur heard    Pulmonary/Chest: Effort normal and breath sounds normal  No respiratory distress  He has no wheezes  He has no rales  He exhibits no tenderness  Abdominal: Soft  Bowel sounds are normal  He exhibits no distension  There is no tenderness  There is no rebound  Musculoskeletal: Normal range of motion  He exhibits no edema  Neurological: He is alert and oriented to person, place, and time  Skin: No rash noted  He is not diaphoretic  Psychiatric: He has a normal mood and affect  Nursing note and vitals reviewed        Medications:    Current Facility-Administered Medications:     acetaminophen (TYLENOL) tablet 325 mg, 325 mg, Oral, Q8H PRN, Georgiana aMria MD, 325 mg at 04/24/18 2241    aspirin (ECOTRIN LOW STRENGTH) EC tablet 81 mg, 81 mg, Oral, Daily, Chase Alberto MD, 81 mg at 04/27/18 0828    atorvastatin (LIPITOR) tablet 40 mg, 40 mg, Oral, Daily With Dinner, Chase Alberto MD, 40 mg at 04/27/18 1720    calcium carbonate (TUMS) chewable tablet 1,000 mg, 1,000 mg, Oral, Daily PRN, Chase Alberto MD    docusate sodium (COLACE) capsule 100 mg, 100 mg, Oral, BID PRN, Chase Alberto MD, 100 mg at 04/10/18 1005    docusate sodium (COLACE) capsule 100 mg, 100 mg, Oral, BID, Suasn Garcia MD, 100 mg at 04/27/18 1720    heparin (porcine) subcutaneous injection 5,000 Units, 5,000 Units, Subcutaneous, Q8H [address], Lisseth Seals PA-C, 5,000 Units at 04/28/18 0536    insulin glargine (LANTUS) subcutaneous injection 30 Units 0 3 mL, 30 Units, Subcutaneous, HS, Jakob Gonzales MD, 30 Units at 04/27/18 2117    insulin lispro (HumaLOG) 100 units/mL subcutaneous injection 1-5 Units, 1-5 Units, Subcutaneous, TID AC, 1 Units at 04/27/18 1721 **AND** [CANCELED] Fingerstick Glucose (POCT), , , TID AC, Chase Alberto MD    insulin lispro (HumaLOG) 100 units/mL subcutaneous injection 1-5 Units, 1-5 Units, Subcutaneous, HS, Chase Alberto MD, 1 Units at 04/27/18 2430    insulin lispro (HumaLOG) 100 units/mL subcutaneous injection 12 Units, 12 Units, Subcutaneous, TID With Meals, Cleopatra Hill MD, 12 Units at 04/27/18 1721    melatonin tablet 6 mg, 6 mg, Oral, HS, Tray Ariza MD, 6 mg at 04/27/18 2117    metoprolol succinate (TOPROL-XL) 24 hr tablet 50 mg, 50 mg, Oral, Q12H, Emilie Ruffin MD, 50 mg at 04/27/18 6136    multivitamin-minerals (CENTRUM) tablet 1 tablet, 1 tablet, Oral, Daily, JEWELL Barker, 1 tablet at 04/27/18 1020    ondansetron (ZOFRAN) injection 4 mg, 4 mg, Intravenous, Q6H PRN, Chase Alberto MD    pantoprazole (PROTONIX) EC tablet 40 mg, 40 mg, Oral, Early Morning, Chase Alberto MD, 40 mg at 04/28/18 0536    potassium chloride (K-DUR,KLOR-CON) CR tablet 40 mEq, 40 mEq, Oral, BID, JEWELL Barker, 40 mEq at 04/27/18 1720    sevelamer (RENAGEL) tablet 800 mg, 800 mg, Oral, TID With Meals, Lesvia Tadeo MD, 800 mg at 04/27/18 1720    tamsulosin (FLOMAX) capsule 0 4 mg, 0 4 mg, Oral, Daily With Eddy Seals PA-C, 0 4 mg at 04/27/18 1720    zolpidem (AMBIEN) tablet 5 mg, 5 mg, Oral, HS PRN, Petrona Aragon MD    Laboratory Results:    Results from last 7 days  Lab Units 04/28/18  0524 04/27/18  0550 04/26/18  7855 04/25/18  0612 04/24/18  0441 04/23/18  0552 04/22/18  0558   WBC Thousand/uL  --  8 81  --   --   --  8 76  --    HEMOGLOBIN g/dL  --  9 1*  --   --   --  9 3*  --    HEMATOCRIT %  --  29 2*  --   --   --  30 9*  --    PLATELETS Thousands/uL  --  182  --   --   --  198  --    SODIUM mmol/L 130* 132* 133* 134* 135* 136 137   POTASSIUM mmol/L 3 7 3 1* 3 1* 3 3* 2 8* 3 7 3 8   CHLORIDE mmol/L 87* 86* 85* 85* 85* 86* 90*   CO2 mmol/L 35* 38* 40* 42* 40* 41* 40*   BUN mg/dL 162* 163* 161* 165* 164* 147* 145*   CREATININE mg/dL 2 63* 2 80* 2 96* 3 09* 3 01* 2 90* 2 78*   CALCIUM mg/dL 9 8 9 7 10 3* 9 7 9 9 10 1 10 8*   MAGNESIUM mg/dL  --  3 1*  --  2 8*  --   --  2 7*   PHOSPHORUS mg/dL  --  4 8*  --   --   --   --  4 4*   GLUCOSE RANDOM mg/dL 103 127 170* 160* 170* 136 145*

## 2018-04-28 NOTE — PROGRESS NOTES
Cardiology Progress Note - Angelito Turner  [de-identified] y o  male MRN: [id number]    Unit/Bed#: -01 Encounter: [id number]        Subjective:    No significant events overnight  States breathing is somewhat improved  Review of Systems   Cardiovascular: Negative for chest pain, leg swelling and palpitations  Respiratory: Positive for shortness of breath  Objective:   Vitals: Blood pressure 100/50, pulse 75, temperature 98 2 °F (36 8 °C), temperature source Oral, resp  rate 18, height 5' 7" (1 702 m), weight 86 5 kg (190 lb 11 2 oz), SpO2 100 %  , Body mass index is 29 87 kg/m² , Orthostatic Blood Pressures      Most Recent Value   Blood Pressure  100/50 filed at 04/28/2018 0727   Patient Position - Orthostatic VS  Lying filed at 04/28/2018 3207         Systolic (72ECY), OVE:301 , Min:89 , ZFI:755     Diastolic (82RIE), FGK:68, Min:47, Max:65      Intake/Output Summary (Last 24 hours) at 04/28/18 1046  Last data filed at 04/28/18 0952   Gross per 24 hour   Intake              238 ml   Output             1100 ml   Net             -862 ml     Weight (last 2 days)     Date/Time   Weight    04/28/18 0536  86 5 (190 7)    04/27/18 0600  86 8 (191 36)    04/26/18 0600  89 6 (197 53)            Telemetry Review: Afib    Physical Exam   Cardiovascular: Normal rate and normal heart sounds  An irregularly irregular rhythm present  Exam reveals no gallop and no friction rub  No murmur heard  Pulmonary/Chest: Breath sounds normal  He has no wheezes  He has no rales  Musculoskeletal: He exhibits no edema           Laboratory Results:        CBC with diff:   Results from last 7 days  Lab Units 04/27/18  0550 04/23/18  0552   WBC Thousand/uL 8 81 8 76   HEMOGLOBIN g/dL 9 1* 9 3*   HEMATOCRIT % 29 2* 30 9*   MCV fL 87 88   PLATELETS Thousands/uL 182 198   MCH pg 27 0 26 6*   MCHC g/dL 31 2* 30 1*   RDW % 15 9* 16 0*   MPV fL 10 5 10 8         CMP:  Results from last 7 days  Lab Units 04/28/18  0524 04/27/18  0550 18  5850 18  0612 18  0441 18  0552 18  0558   SODIUM mmol/L 130* 132* 133* 134* 135* 136 137   POTASSIUM mmol/L 3 7 3 1* 3 1* 3 3* 2 8* 3 7 3 8   CHLORIDE mmol/L 87* 86* 85* 85* 85* 86* 90*   CO2 mmol/L 35* 38* 40* 42* 40* 41* 40*   ANION GAP mmol/L 8 8 8 7 10 9 7   BUN mg/dL 162* 163* 161* 165* 164* 147* 145*   CREATININE mg/dL 2 63* 2 80* 2 96* 3 09* 3 01* 2 90* 2 78*   GLUCOSE RANDOM mg/dL 103 127 170* 160* 170* 136 145*   CALCIUM mg/dL 9 8 9 7 10 3* 9 7 9 9 10 1 10 8*   EGFR ml/min/1 73sq m 22 20 19 18 19 20 21         BMP:  Results from last 7 days  Lab Units 18  0524 18  0550 18  0948 18  0612 18  0441 18  0552 18  0558   SODIUM mmol/L 130* 132* 133* 134* 135* 136 137   POTASSIUM mmol/L 3 7 3 1* 3 1* 3 3* 2 8* 3 7 3 8   CHLORIDE mmol/L 87* 86* 85* 85* 85* 86* 90*   CO2 mmol/L 35* 38* 40* 42* 40* 41* 40*   BUN mg/dL 162* 163* 161* 165* 164* 147* 145*   CREATININE mg/dL 2 63* 2 80* 2 96* 3 09* 3 01* 2 90* 2 78*   GLUCOSE RANDOM mg/dL 103 127 170* 160* 170* 136 145*   CALCIUM mg/dL 9 8 9 7 10 3* 9 7 9 9 10 1 10 8*         Magnesium:   Results from last 7 days  Lab Units 18  0550 18  0612 18  0558   MAGNESIUM mg/dL 3 1* 2 8* 2 7*               Cardiac testing:   Results for orders placed during the hospital encounter of 18   Echo complete with contrast if indicated    Narrative [facility] [address] [facility]  [phone]    Transthoracic Echocardiogram  2D, M-mode, Doppler, and Color Doppler    Study date:  2018    Patient: Mariah Haley  MR number: [id number]  Account number: [de-identified]  : 1935  Age: [de-identified] years  Gender: Male  Status: Inpatient  Location: Bedside  Height: 67 in  Weight: 213 6 lb  BP: 108/ 58 mmHg    Indications: Assess left ventricular function      Diagnoses: I50 9 - Heart failure, unspecified    Sonographer:  MASON Dye Junior  Primary Physician: Jim Bee MD  Referring Physician:  Lidia Dunne MD  Group:  [facility]  Interpreting Physician:  Lidia Dunne MD    SUMMARY    LEFT VENTRICLE:  Systolic function was mildly reduced  Ejection fraction was estimated to be 45 %  There was mild diffuse hypokinesis  There was moderate hypokinesis of the apical septal wall(s)  Wall thickness was mildly increased  There was mild concentric hypertrophy  RIGHT VENTRICLE:  The ventricle was mildly to moderately dilated  Systolic function was reduced  LEFT ATRIUM:  The atrium was moderately dilated  RIGHT ATRIUM:  The atrium was moderately dilated  MITRAL VALVE:  There was marked annular calcification  There was moderate to severe regurgitation  AORTIC VALVE:  A bioprosthesis was present  It exhibited normal function  Doppler cardiac output was 3 5 L/min, using LVOT flow data  Doppler cardiac index was 1 68 L/min/m squared, using LVOT flow data  TRICUSPID VALVE:  There was severe regurgitation  Pulmonary artery systolic pressure was markedly increased  Estimated peak PA pressure was 65 mmHg  PULMONIC VALVE:  Notching of the systolic pulse wave is noted, suggesting elevated pulmonary artery pressures  There was mild regurgitation  HISTORY: PRIOR HISTORY: CAD s/p CABG, s/p AVR, Afib, Hypertension, Hyperlipidemia, Heart failure    PROCEDURE: The procedure was performed at the bedside  This was a routine study  The transthoracic approach was used  The study included complete 2D imaging, M-mode, complete spectral Doppler, and color Doppler  The heart rate was 89 bpm,  at the start of the study  Images were obtained from the parasternal, apical, subcostal, and suprasternal notch acoustic windows  Echocardiographic views were limited due to decreased penetration and lung interference  This was a  technically difficult study  LEFT VENTRICLE: Size was normal  Systolic function was mildly reduced   Ejection fraction was estimated to be 45 %  There was mild diffuse hypokinesis  There was moderate hypokinesis of the apical septal wall(s)  Wall thickness was mildly  increased  There was mild concentric hypertrophy  DOPPLER: Transmitral flow pattern: atrial fibrillation  RIGHT VENTRICLE: The ventricle was mildly to moderately dilated  Systolic function was reduced  LEFT ATRIUM: The atrium was moderately dilated  RIGHT ATRIUM: The atrium was moderately dilated  MITRAL VALVE: There was marked annular calcification  Valve structure was normal  There was normal leaflet separation  DOPPLER: The transmitral velocity was within the normal range  There was no evidence for stenosis  There was moderate to  severe regurgitation  AORTIC VALVE: A bioprosthesis was present  It exhibited normal function  TRICUSPID VALVE: The annulus was dilated  There was normal leaflet separation  DOPPLER: The transtricuspid velocity was within the normal range  There was no evidence for stenosis  There was severe regurgitation  Pulmonary artery systolic  pressure was markedly increased  Estimated peak PA pressure was 65 mmHg  PULMONIC VALVE: Notching of the systolic pulse wave is noted, suggesting elevated pulmonary artery pressures  Leaflets exhibited normal thickness, no calcification, and normal cuspal separation  DOPPLER: The transpulmonic velocity was  within the normal range  There was mild regurgitation  PERICARDIUM: There was no pericardial effusion  AORTA: The root exhibited normal size      MEASUREMENT TABLES    2D MEASUREMENTS  LVOT   (Reference normals)  Diam   20 5 mm   (--)    DOPPLER MEASUREMENTS  LVOT   (Reference normals)  VTI   13 6 cm   (--)  R-R interval   769 ms   (--)  HR   78 bpm   (--)  Stroke vol   44 89 ml   (--)  Cardiac output   3 5 L/min   (--)  Cardiac index   1 68 L/min/m squared   (--)    SYSTEM MEASUREMENT TABLES    2D  %FS: 21 04 %  AV Diam: 3 14 cm  EDV(Teich): 91 37 ml  EF(Cube): 50 77 %  EF(Teich): 42 96 %  ESV(Cube): 44 19 ml  ESV(Teich): 52 12 ml  IVSd: 1 25 cm  LA Area: 32 18 cm2  LA Diam: 4 78 cm  LVEDV MOD A4C: 143 38 ml  LVEF MOD A4C: 30 68 %  LVESV MOD A4C: 99 4 ml  LVIDd: 4 48 cm  LVIDs: 3 54 cm  LVLd A4C: 8 59 cm  LVLs A4C: 8 06 cm  LVPWd: 1 17 cm  RA Area: 26 08 cm2  RV Diam: 4 04 cm  SI(Cube): 21 92 ml/m2  SI(Teich): 18 87 ml/m2  SV MOD A4C: 43 98 ml  SV(Cube): 45 58 ml  SV(Teich): 39 25 ml    CW  TR MaxP 39 mmHg  TR Vmax: 3 85 m/s    MM  TAPSE: 1 9 cm    [facility]    Prepared and electronically signed by    Lidia Dunne MD  Signed 2018 18:07:57               Meds/Allergies     Current Facility-Administered Medications:  acetaminophen 325 mg Oral Q8H PRN Ozzie Mtz MD   aspirin 81 mg Oral Daily Chase Alberto MD   atorvastatin 40 mg Oral Daily With Dinner Chase Alberto MD   calcium carbonate 1,000 mg Oral Daily PRN Chase Alberto MD   docusate sodium 100 mg Oral BID PRN Chase Alberto MD   docusate sodium 100 mg Oral BID Aaliyah Hendrickson MD   heparin (porcine) 5,000 Units Subcutaneous Q8H [facility] Lisseth Seals PA-C   insulin glargine 30 Units Subcutaneous HS Evangelist Santoyo MD   insulin lispro 1-5 Units Subcutaneous TID AC Chase Alberto MD   insulin lispro 1-5 Units Subcutaneous HS Chase Alberto MD   insulin lispro 12 Units Subcutaneous TID With Meals Evangelist Santoyo MD   melatonin 6 mg Oral HS Markel Gil MD   metoprolol succinate 50 mg Oral Q12H Cecile Valverde MD   multivitamin-minerals 1 tablet Oral Daily JEWELL Bell   ondansetron 4 mg Intravenous Q6H PRN Chase Alberto MD   pantoprazole 40 mg Oral Early Morning Chase Alberto MD   sevelamer 800 mg Oral TID With Meals Ozzie Mtz MD   tamsulosin 0 4 mg Oral Daily With Dinner Lisseth Seals PA-C   zolpidem 5 mg Oral HS PRN Aaliyah Hendrickson MD        Prescriptions Prior to Admission   Medication    aspirin (ECOTRIN LOW STRENGTH) 81 mg EC tablet    atorvastatin (LIPITOR) 40 mg tablet    febuxostat (ULORIC) 40 mg tablet    metoprolol tartrate (LOPRESSOR) 25 mg tablet    metoprolol tartrate (LOPRESSOR) 50 mg tablet    multivitamin-iron-minerals-folic acid (CENTRUM) chewable tablet    NOVOLOG MIX 70/30 FLEXPEN (70-30) 100 UNIT/ML SUPN    omeprazole (PriLOSEC) 20 mg delayed release capsule    paricalcitol (ZEMPLAR) 1 mcg capsule    ramipril (ALTACE) 2 5 mg capsule    torsemide (DEMADEX) 20 mg tablet       Assessment:  Principal Problem:    Acute on chronic diastolic congestive heart failure (HCC)  Active Problems:    Rapid atrial fibrillation (HCC)    Benign essential hypertension    Type 2 diabetes mellitus (HCC)    Peripheral arterial disease (HCC)    Acute-on-chronic kidney injury (HCC)    Non-ST elevation myocardial infarction (NSTEMI) ([facility] [address] )    Pulmonary hypertension (HCC)    Azotemia      Impression:  1  Acute on chronic diastolic heart failure - appears reasonably compensated  Deferring to nephrology for diuretic dosing  2  Acute on chronic renal failure - stable  3  Atrial fibrillation - rate controlled  Off anticoagulation due to fall risk  4  CAD - stable  5  Valvular heart disease - s/p AVR with moderate to severe MR/TR  Plan:  1  Continue diuretics per nephrology

## 2018-04-28 NOTE — ASSESSMENT & PLAN NOTE
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

## 2018-04-28 NOTE — ASSESSMENT & PLAN NOTE
· Rate remains controlled    Continue metoprolol  · Patient declined anticoagulation due to prior bleeding while on Eliquis and high risk for falls

## 2018-04-28 NOTE — ASSESSMENT & PLAN NOTE
· Baseline creatinine 1 8-2 2  Diuretics remain on hold  · Likely secondary to cardiorenal, +/-obstructive uropathy  · He is status post viera placed for retention during this admission and flomax added  Viera removed on 4/12/18 for voiding trial which so far has been successful    Continue urinary retention protocol  · Management per Nephrology

## 2018-04-28 NOTE — PLAN OF CARE
Problem: Potential for Falls  Goal: Patient will remain free of falls  INTERVENTIONS:  - Assess patient frequently for physical needs  -  Identify cognitive and physical deficits and behaviors that affect risk of falls    -  [place] fall precautions as indicated by assessment   - Educate patient/family on patient safety including physical limitations  - Instruct patient to call for assistance with activity based on assessment  - Modify environment to reduce risk of injury  - Consider OT/PT consult to assist with strengthening/mobility   Outcome: Progressing      Problem: PAIN - ADULT  Goal: Verbalizes/displays adequate comfort level or baseline comfort level  Interventions:  - Encourage patient to monitor pain and request assistance  - Assess pain using appropriate pain scale  - Administer analgesics based on type and severity of pain and evaluate response  - Implement non-pharmacological measures as appropriate and evaluate response  - Consider cultural and social influences on pain and pain management  - Notify physician/advanced practitioner if interventions unsuccessful or patient reports new pain   Outcome: Progressing      Problem: INFECTION - ADULT  Goal: Absence or prevention of progression during hospitalization  INTERVENTIONS:  - Assess and monitor for signs and symptoms of infection  - Monitor lab/diagnostic results  - Monitor all insertion sites, i e  indwelling lines, tubes, and drains  - Monitor endotracheal (as able) and nasal secretions for changes in amount and color  - [place] appropriate cooling/warming therapies per order  - Administer medications as ordered  - Instruct and encourage patient and family to use good hand hygiene technique  - Identify and instruct in appropriate isolation precautions for identified infection/condition   Outcome: Progressing    Goal: Absence of fever/infection during neutropenic period  INTERVENTIONS:  - Monitor WBC  - Implement neutropenic guidelines   Outcome: Progressing      Problem: SAFETY ADULT  Goal: Maintain or return to baseline ADL function  INTERVENTIONS:  -  Assess patient's ability to carry out ADLs; assess patient's baseline for ADL function and identify physical deficits which impact ability to perform ADLs (bathing, care of mouth/teeth, toileting, grooming, dressing, etc )  - Assess/evaluate cause of self-care deficits   - Assess range of motion  - Assess patient's mobility; develop plan if impaired  - Assess patient's need for assistive devices and provide as appropriate  - Encourage maximum independence but intervene and supervise when necessary  ¯ Involve family in performance of ADLs  ¯ Assess for home care needs following discharge   ¯ Request OT consult to assist with ADL evaluation and planning for discharge  ¯ Provide patient education as appropriate   Outcome: Progressing    Goal: Maintain or return mobility status to optimal level  INTERVENTIONS:  - Assess patient's baseline mobility status (ambulation, transfers, stairs, etc )    - Identify cognitive and physical deficits and behaviors that affect mobility  - Identify mobility aids required to assist with transfers and/or ambulation (gait belt, sit-to-stand, lift, walker, cane, etc )  - [place] fall precautions as indicated by assessment  - Record patient progress and toleration of activity level on Mobility SBAR; progress patient to next Phase/Stage  - Instruct patient to call for assistance with activity based on assessment  - Request Rehabilitation consult to assist with strengthening/weightbearing, etc    Outcome: Progressing      Problem: DISCHARGE PLANNING  Goal: Discharge to home or other facility with appropriate resources  INTERVENTIONS:  - Identify barriers to discharge w/patient and caregiver  - Arrange for needed discharge resources and transportation as appropriate  - Identify discharge learning needs (meds, wound care, etc )  - Arrange for interpretive services to assist at discharge as needed  - Refer to Case Management Department for coordinating discharge planning if the patient needs post-hospital services based on physician/advanced practitioner order or complex needs related to functional status, cognitive ability, or social support system   Outcome: Progressing      Problem: Knowledge Deficit  Goal: Patient/family/caregiver demonstrates understanding of disease process, treatment plan, medications, and discharge instructions  Complete learning assessment and assess knowledge base  Interventions:  - Provide teaching at level of understanding  - Provide teaching via preferred learning methods   Outcome: Progressing      Problem: Nutrition/Hydration-ADULT  Goal: Nutrient/Hydration intake appropriate for improving, restoring or maintaining nutritional needs  Monitor and assess patient's nutrition/hydration status for malnutrition (ex- brittle hair, bruises, dry skin, pale skin and conjunctiva, muscle wasting, smooth red tongue, and disorientation)  Collaborate with interdisciplinary team and initiate plan and interventions as ordered  Monitor patient's weight and dietary intake as ordered or per policy  Utilize nutrition screening tool and intervene per policy  Determine patient's food preferences and provide high-protein, high-caloric foods as appropriate       INTERVENTIONS:  - Monitor oral intake, urinary output, labs, and treatment plans  - Assess nutrition and hydration status and recommend course of action  - Evaluate amount of meals eaten  - Assist patient with eating if necessary   - Allow adequate time for meals  - Recommend/ encourage appropriate diets, oral nutritional supplements, and vitamin/mineral supplements  - Order, calculate, and assess calorie counts as needed  - Recommend, monitor, and adjust tube feedings and TPN/PPN based on assessed needs  - Assess need for intravenous fluids  - Provide specific nutrition/hydration education as appropriate  - Include patient/family/caregiver in decisions related to nutrition   Outcome: Progressing      Problem: Prexisting or High Potential for Compromised Skin Integrity  Goal: Skin integrity is maintained or improved  INTERVENTIONS:  - Identify patients at risk for skin breakdown  - Assess and monitor skin integrity  - Assess and monitor nutrition and hydration status  - Monitor labs (i e  albumin)  - Assess for incontinence   - Turn and reposition patient  - Assist with mobility/ambulation  - Relieve pressure over bony prominences  - Avoid friction and shearing  - Provide appropriate hygiene as needed including keeping skin clean and dry  - Evaluate need for skin moisturizer/barrier cream  - Collaborate with interdisciplinary team (i e  Nutrition, Rehabilitation, etc )   - Patient/family teaching   Outcome: Progressing      Problem: DISCHARGE PLANNING - CARE MANAGEMENT  Goal: Discharge to post-acute care or home with appropriate resources  INTERVENTIONS:  - Conduct assessment to determine patient/family and health care team treatment goals, and need for post-acute services based on payer coverage, community resources, and patient preferences, and barriers to discharge  - Address psychosocial, clinical, and financial barriers to discharge as identified in assessment in conjunction with the patient/family and health care team  - Arrange appropriate level of post-acute services according to patient's   needs and preference and payer coverage in collaboration with the physician and health care team  - Communicate with and update the patient/family, physician, and health care team regarding progress on the discharge plan  - Arrange appropriate transportation to post-acute venues   Outcome: Progressing      Problem: CARDIOVASCULAR - ADULT  Goal: Maintains optimal cardiac output and hemodynamic stability  INTERVENTIONS:  - Monitor I/O, vital signs and rhythm  - Monitor for S/S and trends of decreased cardiac output i e  bleeding, hypotension  - Administer and titrate ordered vasoactive medications to optimize hemodynamic stability  - Assess quality of pulses, skin color and temperature  - Assess for signs of decreased coronary artery perfusion - ex   Angina  - Instruct patient to report change in severity of symptoms   Outcome: Progressing    Goal: Absence of cardiac dysrhythmias or at baseline rhythm  INTERVENTIONS:  - Continuous cardiac monitoring, monitor vital signs, obtain 12 lead EKG if indicated  - Administer antiarrhythmic and heart rate control medications as ordered  - Monitor electrolytes and administer replacement therapy as ordered   Outcome: Progressing

## 2018-04-28 NOTE — ASSESSMENT & PLAN NOTE
· Creatinine 2 6 today  BUN remains elevated at 160  Remains of diuretics  · Hemodialysis continues to be the recommendation from the nephrology standpoint however family do not want dialysis at this time  · Will continue supportive care as able    Family wants more focus on physical therapy and nutrition which are both being addressed  · Overall poor prognosis in view of significant valvular disease, renal disease, severe pulmonary hypertension  · If patient remains stable over the weekend, will work on discharge planning to rehab sometimes next week

## 2018-04-29 PROBLEM — E87.1 HYPONATREMIA: Status: ACTIVE | Noted: 2018-01-01

## 2018-04-29 NOTE — ASSESSMENT & PLAN NOTE
· Most recent hemoglobin A1c 7 6 2 months ago  · Continue Lantus/Humalog at current dose and continue to adjust as indicated

## 2018-04-29 NOTE — ASSESSMENT & PLAN NOTE
· Baseline creatinine 1 8-2 2  Diuretics remain on hold due to worsening creatinine levels  · Likely secondary to cardiorenal, +/-obstructive uropathy  · He is status post viera placed for retention during this admission and flomax added  Viera removed on 4/12/18 for voiding trial which so far has been successful    Continue urinary retention protocol  · Management per Nephrology

## 2018-04-29 NOTE — PROGRESS NOTES
NEPHROLOGY PROGRESS NOTE   Ozzie Ly  [de-identified] y o  male MRN: [id number]  Unit/Bed#: -01 Encounter: [id number]  Reason for Consult: SABINE    ASSESSMENT and PLAN:    The patient is an 61-year-old gentleman who presented with shortness of breath on April 4th   He went on vacation for 5 days and did not take his diuretic, along with dietary indiscretion   Clinical course complicated by atrial fibrillation, hypotension and severe diuretic resistance     1  Acute kidney injury on chronic kidney disease:  Baseline creatinine 1 8-2 2   Etiology possibly prerenal, decreased effective circulating volume in the setting of volume overload, cardiorenal,  hypotension   Post renal also contributing-  Geronimo catheter discontinued on 04/12/2018  · Creatinine trended down to 2 6, BUN remains elevated at 160, no labs today to comment on, check a basic metabolic panel  · Chest x-ray 4/24 stable interstitial edema   Small pleural effusions  · Status post Geronimo catheter   · Urinalysis:  Negative protein, no RBCs, no bacteria, 0-1 WBCs  · Renal ultrasound:  Kidneys diffusely echogenic reflecting chronic medical renal disease  · Treatment team meeting occurred this past week  Family has been on starting hemodialysis  I did multiple occasions stressed my concerns and recommended that starting dialysis would be the best option at this point  I reiterate this point multiple times as my concern was his elevated BUN/azotemia may be contributing to his poor oral intake and possibly slight uremia  Also help manage his volume status better than the diuretics have been  · At this time will continue to hold diuretics, his blood pressure is soft this morning  2  Hypokalemia:  Resolved, no labs today  3   Acute on chronic diastolic CHF, Volume overload:  Cardiology following  · Weight below dry weight   Previous dry weight 210-212 lb, weight is currently stable at 190  · Hypotension this morning, was given a dose of albumin, blood pressure has improved, would recommend reducing the dose of the metoprolol to 25 mg every 12 hours with hold parameters  · Severe diuretic resistance- Likely 2/2 to worsening cardiac function/RV dysfunction/severe TR in the setting of atrial fibrillation, worsened MR, pulmonary hypertension and reduction in LVEF, worsening renal function     · EF 45% (last September EF 65%)  · Venous Dopplers lower extremities completed   No DVT  · Outpatient diuretic regime: torsemide 40 mg every Monday, Wednesday, Friday the remaining days patient was taking 20 mg  3  PAF:  Now likely chronic   Rate controlled with intermittent bouts of tachycardia   Cardiology following  4  Anemia:  Hemoglobin fairly stable  5  MGUS:  IgM kappa and IgM lambda followed by Dr Tessa Fernandez  6  Elevated bicarbonate:  Likely due to aggressive diuresis   Goal -keep potassium level ~4  7  Hyperphosphatemia:  Improved on sevelamer -phosphorus 4 8  8  Weakness:  Severe deconditioning in the setting of 3 week hospitalization  Depression contributing  9  Other:  PVD, CAD, prior CABG, AS status post AVR-bioprosthetic      SUBJECTIVE / INTERVAL HISTORY:    Patient is constipated and for that reason it making him feel lousy  He is not eating very well  There is no blood work this morning to comment on  He was hypotensive he was given a dose of albumin and his blood pressure has improved      OBJECTIVE:  Current Weight: Weight - Scale: 86 4 kg (190 lb 7 6 oz)  Vitals:    04/28/18 2352 04/29/18 0300 04/29/18 0600 04/29/18 0726   BP: 116/60   (!) 80/52   BP Location: Right arm   Right arm   Pulse: 73   92   Resp: 18   18   Temp: 98 2 °F (36 8 °C)   97 8 °F (36 6 °C)   TempSrc: Oral      SpO2: 95% 97%  92%   Weight:   86 4 kg (190 lb 7 6 oz)    Height:           Intake/Output Summary (Last 24 hours) at 04/29/18 0858  Last data filed at 04/29/18 0401   Gross per 24 hour   Intake              840 ml   Output              967 ml   Net             -127 ml       Physical Exam Constitutional: He is oriented to person, place, and time  He appears well-developed and well-nourished  No distress  HENT:   Head: Normocephalic and atraumatic  Eyes: Pupils are equal, round, and reactive to light  No scleral icterus  Neck: Normal range of motion  Neck supple  Cardiovascular: Normal rate, regular rhythm and normal heart sounds  Exam reveals no gallop and no friction rub  No murmur heard  Pulmonary/Chest: Effort normal and breath sounds normal  No respiratory distress  He has no wheezes  He has no rales  He exhibits no tenderness  Abdominal: Soft  Bowel sounds are normal  He exhibits no distension  There is no tenderness  There is no rebound  Musculoskeletal: Normal range of motion  He exhibits no edema  Neurological: He is alert and oriented to person, place, and time  Skin: No rash noted  He is not diaphoretic  Psychiatric: He has a normal mood and affect  Nursing note and vitals reviewed        Medications:    Current Facility-Administered Medications:     acetaminophen (TYLENOL) tablet 325 mg, 325 mg, Oral, Q8H PRN, Shelbie Ji MD, 325 mg at 04/28/18 1602    aspirin (ECOTRIN LOW STRENGTH) EC tablet 81 mg, 81 mg, Oral, Daily, Chsae Alberto MD, 81 mg at 04/29/18 0856    atorvastatin (LIPITOR) tablet 40 mg, 40 mg, Oral, Daily With Jennie Alberto MD, 40 mg at 04/28/18 1701    calcium carbonate (TUMS) chewable tablet 1,000 mg, 1,000 mg, Oral, Daily PRN, Chase Alberto MD    diphenhydrAMINE (BENADRYL) tablet 12 5 mg, 12 5 mg, Oral, HS PRN, Romelia Arevalo PA-C, 12 5 mg at 04/29/18 0107    docusate sodium (COLACE) capsule 100 mg, 100 mg, Oral, BID PRN, Chase Alberto MD, 100 mg at 04/10/18 1005    docusate sodium (COLACE) capsule 100 mg, 100 mg, Oral, BID, Haven Voss MD, 100 mg at 04/29/18 0857    heparin (porcine) subcutaneous injection 5,000 Units, 5,000 Units, Subcutaneous, Q8H [address], Lisseth Seals PA-C, 5,000 Units at 04/29/18 0604    insulin glargine (LANTUS) subcutaneous injection 30 Units 0 3 mL, 30 Units, Subcutaneous, HS, Jack Boyer MD, 15 Units at 04/28/18 2248    insulin lispro (HumaLOG) 100 units/mL subcutaneous injection 1-5 Units, 1-5 Units, Subcutaneous, TID AC, 1 Units at 04/27/18 1721 **AND** [CANCELED] Fingerstick Glucose (POCT), , , TID AC, Chase Alberto MD    insulin lispro (HumaLOG) 100 units/mL subcutaneous injection 1-5 Units, 1-5 Units, Subcutaneous, HS, Chase Alberto MD, 1 Units at 04/27/18 2118    insulin lispro (HumaLOG) 100 units/mL subcutaneous injection 12 Units, 12 Units, Subcutaneous, TID With Meals, Jack Boyer MD, 12 Units at 04/28/18 1702    melatonin tablet 6 mg, 6 mg, Oral, HS, Sharon Chris MD, 6 mg at 04/28/18 2131    metoprolol succinate (TOPROL-XL) 24 hr tablet 50 mg, 50 mg, Oral, Q12H, Svetlana Darnell MD, 50 mg at 04/28/18 2131    multivitamin-minerals (CENTRUM) tablet 1 tablet, 1 tablet, Oral, Daily, JEWELL Dewitt, 1 tablet at 04/29/18 0856    ondansetron (ZOFRAN) injection 4 mg, 4 mg, Intravenous, Q6H PRN, Chase Alberto MD    pantoprazole (PROTONIX) EC tablet 40 mg, 40 mg, Oral, Early Morning, Cahse Alberto MD, 40 mg at 04/29/18 0604    polyethylene glycol (MIRALAX) packet 17 g, 17 g, Oral, Daily PRN, Chris Land PA-C, 17 g at 04/29/18 0856    sevelamer (RENAGEL) tablet 800 mg, 800 mg, Oral, TID With Meals, Gearld Boxer, MD, 800 mg at 04/29/18 0856    tamsulosin (FLOMAX) capsule 0 4 mg, 0 4 mg, Oral, Daily With Dinner, Lisseth Seals PA-C, 0 4 mg at 04/28/18 1701    zolpidem (AMBIEN) tablet 5 mg, 5 mg, Oral, HS PRN, Becki Reyna MD    Laboratory Results:    Results from last 7 days  Lab Units 04/28/18  0524 04/27/18  0550 04/26/18  0948 04/25/18  0612 04/24/18  0441 04/23/18  0552   WBC Thousand/uL  --  8 81  --   --   --  8 76   HEMOGLOBIN g/dL  --  9 1*  --   --   --  9 3*   HEMATOCRIT %  --  29 2*  --   --   --  30 9* PLATELETS Thousands/uL  --  182  --   --   --  198   SODIUM mmol/L 130* 132* 133* 134* 135* 136   POTASSIUM mmol/L 3 7 3 1* 3 1* 3 3* 2 8* 3 7   CHLORIDE mmol/L 87* 86* 85* 85* 85* 86*   CO2 mmol/L 35* 38* 40* 42* 40* 41*   BUN mg/dL 162* 163* 161* 165* 164* 147*   CREATININE mg/dL 2 63* 2 80* 2 96* 3 09* 3 01* 2 90*   CALCIUM mg/dL 9 8 9 7 10 3* 9 7 9 9 10 1   MAGNESIUM mg/dL  --  3 1*  --  2 8*  --   --    PHOSPHORUS mg/dL  --  4 8*  --   --   --   --    GLUCOSE RANDOM mg/dL 103 127 170* 160* 170* 136

## 2018-04-29 NOTE — PROGRESS NOTES
Progress Note - Harjinder Seymour  1935, [de-identified] y o  male MRN: [id number]    Unit/Bed#: -01 Encounter: [id number]    Primary Care Provider: Dolly Trna MD   Date and time admitted to hospital: 4/4/2018  6:39 AM    * Acute on chronic diastolic congestive heart failure ([facility] )   Assessment & Plan    · Episode of hypotension this morning  Received albumin with improved blood pressures  · Hemodialysis continues to be the recommendation from the nephrology standpoint however family do not want dialysis at this time  · Will continue supportive care as able  Family wants more focus on physical therapy and nutrition which are both being addressed  · Overall poor prognosis in view of significant valvular disease, renal disease, severe pulmonary hypertension  · If patient remains stable over the weekend, will work on discharge planning to rehab sometimes next week if able to tolerate      Acute-on-chronic kidney injury [facility])   Assessment & Plan    · Baseline creatinine 1 8-2 2  Diuretics remain on hold due to worsening creatinine levels  · Likely secondary to cardiorenal, +/-obstructive uropathy  · He is status post viera placed for retention during this admission and flomax added  Viera removed on 4/12/18 for voiding trial which so far has been successful  Continue urinary retention protocol  · Management per Nephrology      Hyponatremia   Assessment & Plan    · Sodium 126 this morning  Discussed with Dr Stiven Pope   Recommends tolvaptan 7 5 mg x1 now and will recheck levels later tonight  · Continue fluid restriction      Rapid atrial fibrillation (HCC)   Assessment & Plan    · Rate remains controlled    Continue metoprolol  · Patient declined anticoagulation due to prior bleeding while on Eliquis and high risk for falls      Type 2 diabetes mellitus ([facility] )   Assessment & Plan    · Most recent hemoglobin A1c 7 6 2 months ago  · Continue Lantus/Humalog at current dose and continue to adjust as indicated Pulmonary hypertension (HCC)   Assessment & Plan    · Severe pulmonary hypertension with estimated peak PA pressure at 65 mmHg  · Will probably benefit from sildenafil once euvolemic      Peripheral arterial disease ([facility] )   Assessment & Plan    · Patient with severe PAD with 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, hallux and lateral heel, managed by Podiatry (who continue to follow on a regular basis) with local wound care  Feet stable and no intervention needed at this time  · He is status post vascular evaluation and no plans for any intervention during this hospitalization in view of his unstable cardiovascular/renal status        VTE Pharmacologic Prophylaxis:   Pharmacologic: Heparin  Mechanical VTE Prophylaxis in Place: Yes    Patient Centered Rounds: I have performed bedside rounds with nursing staff today  Discussions with Specialists or Other Care Team Provider:  Nursing/nephrology    Education and Discussions with Family / Patient:  Patient/Spouse updated at bedside    Current Length of Stay: 25 day(s)    Current Patient Status: Inpatient   Certification Statement: The patient will continue to require additional inpatient hospital stay due to Above diagnosis and care plan    Discharge Plan:  Not medically stable for discharge    Code Status: Level 1 - Full Code      Subjective:   Patient seen and evaluated  Noted with hypotension this morning  He reports shortness of breath  No chest pain    Objective:     Vitals:   Temp (24hrs), Av 9 °F (36 6 °C), Min:97 8 °F (36 6 °C), Max:98 2 °F (36 8 °C)    HR:  [73-92] 80  Resp:  [18] 18  BP: ()/(48-69) 94/55  SpO2:  [92 %-97 %] 92 %  Body mass index is 29 83 kg/m²  Input and Output Summary (last 24 hours):        Intake/Output Summary (Last 24 hours) at 18 1216  Last data filed at 18 1149   Gross per 24 hour   Intake              960 ml   Output              917 ml   Net               43 ml       Physical Exam:  General Appearance:    Alert, cooperative, no distress, appropriately responsive    Head:    Normocephalic, without obvious abnormality, atraumatic, mucous membranes moist    Eyes:    Conjunctiva/corneas clear, EOM's intact   Neck:   Supple   Lungs:     Bibasilar crackles, no wheezes     Heart:    Irregularly irregular, S1 and S2, +SM    Abdomen:     Soft, non-tender, bowel sounds active all four quadrants,     no masses, no organomegaly   Extremities:  Left foot dressing intact, bilateral lower extremity edema +2, stable ischemic changes right 1st and 2nd digits   Neurologic:  nonfocal         Additional Data:     Labs:      Results from last 7 days  Lab Units 04/27/18  0550   WBC Thousand/uL 8 81   HEMOGLOBIN g/dL 9 1*   HEMATOCRIT % 29 2*   PLATELETS Thousands/uL 182       Results from last 7 days  Lab Units 04/29/18  0924   SODIUM mmol/L 126*   POTASSIUM mmol/L 4 4   CHLORIDE mmol/L 86*   CO2 mmol/L 32   BUN mg/dL 158*   CREATININE mg/dL 2 95*   CALCIUM mg/dL 9 9   GLUCOSE RANDOM mg/dL 170*           * I Have Reviewed All Lab Data Listed Above  * Additional Pertinent Lab Tests Reviewed: All Labs Within Last 24 Hours Reviewed    Cultures:   Blood Culture:   Lab Results   Component Value Date    BLOODCX No Growth After 5 Days  04/04/2018    BLOODCX No Growth After 5 Days   04/04/2018     Urine Culture: No results found for: URINECX  Sputum Culture: No components found for: SPUTUMCX  Wound Culture: No results found for: WOUNDCULT    Last 24 Hours Medication List:     Current Facility-Administered Medications:  acetaminophen 325 mg Oral Q8H PRN Mitra Griffin MD   aspirin 81 mg Oral Daily Chase Alberto MD   atorvastatin 40 mg Oral Daily With Dinner Chase Alberto MD   calcium carbonate 1,000 mg Oral Daily PRN Chase Alberto MD   diphenhydrAMINE 12 5 mg Oral HS PRN Taye Andres PA-C   docusate sodium 100 mg Oral BID PRN Chase Alberto MD   docusate sodium 100 mg Oral BID Garcia Castellanos MD heparin (porcine) 5,000 Units Subcutaneous Q8H [address] Lisseth Seals PA-C   insulin glargine 30 Units Subcutaneous HS Yakov Hutchins MD   insulin lispro 1-5 Units Subcutaneous TID AC Chase Alberto MD   insulin lispro 1-5 Units Subcutaneous HS Chase Alberto MD   insulin lispro 12 Units Subcutaneous TID With Meals Yakov Hutchins MD   melatonin 6 mg Oral HS Maryanne Collins MD   metoprolol succinate 25 mg Oral Q12H Bon Monsalve MD   multivitamin-minerals 1 tablet Oral Daily JEWELL Rodríguez   ondansetron 4 mg Intravenous Q6H PRN Chase Alberto MD   pantoprazole 40 mg Oral Early Morning Chase Alberto MD   polyethylene glycol 17 g Oral Daily PRN Channing Buckley PA-C   sevelamer 800 mg Oral TID With Meals Juan Smith MD   tamsulosin 0 4 mg Oral Daily With Dinner Lisseth Seals PA-C   tolvaptan 7 5 mg Oral Once Yakov Hutchins MD   zolpidem 5 mg Oral HS PRN Catherine Sandhu MD        Today, Patient Was Seen By: Yakov Hutchins MD    ** Please Note: Dragon 360 Dictation voice to text software may have been used in the creation of this document   **

## 2018-04-29 NOTE — ASSESSMENT & PLAN NOTE
· Sodium 126 this morning    Discussed with Dr Oral Block   Recommends tolvaptan 7 5 mg x1 now and will recheck levels later tonight  · Continue fluid restriction

## 2018-04-29 NOTE — ASSESSMENT & PLAN NOTE
· Episode of hypotension this morning  Received albumin with improved blood pressures  · Hemodialysis continues to be the recommendation from the nephrology standpoint however family do not want dialysis at this time  · Will continue supportive care as able    Family wants more focus on physical therapy and nutrition which are both being addressed  · Overall poor prognosis in view of significant valvular disease, renal disease, severe pulmonary hypertension  · If patient remains stable over the weekend, will work on discharge planning to rehab sometimes next week if able to tolerate

## 2018-04-30 NOTE — CASE MANAGEMENT
Continued Stay Review    Date: 18    Temp (24hrs), Av °F (36 7 °C), Min:97 7 °F (36 5 °C), Max:98 2 °F (36 8 °C)     HR:  [72-89] 75  Resp:  [17-18] 18  BP: ()/(47-65) 100/50  SpO2:  [100 %] 100 %  Body mass index is 29 87 kg/m²       Current Facility-Administered Medications:  acetaminophen 325 mg Oral Q8H PRN   aspirin 81 mg Oral Daily   atorvastatin 40 mg Oral Daily With Dinner   calcium carbonate 1,000 mg Oral Daily PRN   docusate sodium 100 mg Oral BID PRN   docusate sodium 100 mg Oral BID   heparin (porcine) 5,000 Units Subcutaneous Q8H [facility]   insulin glargine 30 Units Subcutaneous HS   insulin lispro 1-5 Units Subcutaneous TID AC   insulin lispro 1-5 Units Subcutaneous HS   insulin lispro 12 Units Subcutaneous TID With Meals   melatonin 6 mg Oral HS   metoprolol succinate 50 mg Oral Q12H   multivitamin-minerals 1 tablet Oral Daily   ondansetron 4 mg Intravenous Q6H PRN   pantoprazole 40 mg Oral Early Morning   sevelamer 800 mg Oral TID With Meals   tamsulosin 0 4 mg Oral Daily With Dinner   zolpidem 5 mg Oral HS PRN       Abnormal Labs/Diagnostic Results:        Results from last 7 days  Lab Units 18  0524   SODIUM mmol/L 130*   POTASSIUM mmol/L 3 7   CHLORIDE mmol/L 87*   CO2 mmol/L 35*   BUN mg/dL 162*   CREATININE mg/dL 2 63*   CALCIUM mg/dL 9 8   GLUCOSE RANDOM mg/dL 103        Results from last 7 days  Lab Units 18  0550   WBC Thousand/uL 8 81   HEMOGLOBIN g/dL 9 1*   HEMATOCRIT % 29 2*   PLATELETS Thousands/uL 182       Age/Sex: [de-identified] y o  male     Assessment/Plan:     * Acute on chronic diastolic congestive heart failure (HCC)   Assessment & Plan     · Creatinine 2 6 today  BUN remains elevated at 160  Remains off diuretics  · Hemodialysis continues to be the recommendation from the nephrology standpoint however family do not want dialysis at this time  · Will continue supportive care as able    Family wants more focus on physical therapy and nutrition which are both being addressed  · Overall poor prognosis in view of significant valvular disease, renal disease, severe pulmonary hypertension  · If patient remains stable over the weekend, will work on discharge planning to rehab sometimes next week       Acute-on-chronic kidney injury ([facility] )   Assessment & Plan     · Baseline creatinine 1 8-2 2  Diuretics remain on hold  · Likely secondary to cardiorenal, +/-obstructive uropathy  · He is status post viera placed for retention during this admission and flomax added  Viera removed on 4/12/18 for voiding trial which so far has been successful  Continue urinary retention protocol  · Management per Nephrology       Non-ST elevation myocardial infarction (NSTEMI) ([facility])   Assessment & Plan     · Mild troponin elevation likely the result of type 2 NSTEMI in the setting of heart failure and renal failure  · No further workup from the cardiac standpoint at this time       Rapid atrial fibrillation [facility])   Assessment & Plan     · Rate remains controlled  Continue metoprolol  · Patient declined anticoagulation due to prior bleeding while on Eliquis and high risk for falls       Type 2 diabetes mellitus ([facility])   Assessment & Plan     · Most recent hemoglobin A1c 7 6 2 months ago  · Continue Lantus/Humalog at current dose       Pulmonary hypertension ([facility])   Assessment & Plan     · Severe pulmonary hypertension with estimated peak PA pressure at 65 mmHg  · Will probably benefit from sildenafil once euvolemic       Peripheral arterial disease ([facility])   Assessment & Plan     · Patient with severe PAD with 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, hallux and lateral heel, managed by Podiatry (who continue to follow on a regular basis) with local wound care  Feet stable and no intervention needed at this time    · He is status post vascular evaluation and no plans for any intervention during this hospitalization in view of his unstable cardiovascular/renal status          VTE Pharmacologic Prophylaxis: Pharmacologic: Heparin  Mechanical VTE Prophylaxis in Place:  Yes     Patient Centered Rounds: I have performed bedside rounds with nursing staff today      Discussions with Specialists or Other Care Team Provider:  Nursing/Nephrology     Education and Discussions with Family / Patient:  Patient     Current Length of Stay: 24 day(s)     Current Patient Status: Inpatient   Certification Statement: The patient will continue to require additional inpatient hospital stay due to Above diagnosis and care plan     Discharge Plan: Rehab

## 2018-04-30 NOTE — PROGRESS NOTES
Pt had nosebleed overnight  Nose bleed had already resolved when discovered  Pt gown changed  Will continue to monitor

## 2018-04-30 NOTE — PROGRESS NOTES
NEPHROLOGY PROGRESS NOTE   Gianna Point  [de-identified] y o  male MRN: [id number]  Unit/Bed#: -01 Encounter: [id number]  Reason for Consult: CKD, SABINE    ASSESSMENT and PLAN:  1  SABINE:  · Clinically behaving like cardiorenal syndrome  UA is bland and renal US is negative  · Creatinine stable at around 2 9 to 3 2 but he is quite azotemic and I am worried that his poor oral intake is a function of uremia  · At this time, would reevaluate for reversible causes of SABINE by rechecking UA and bladder scan  Will also recheck CXR although I do feel he remains fluid overloaded  · Would favor restarting oral diuretics at this time and see if he can be weaned off oxygen  · I am planning to discuss with family and offer temporary dialysis as a bridge to getting his appetite, weakness, and volume status better  2  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  Follows with Dr Concha Yeager  3  CHF, volume overload: Recheck CXR  Possible oral diuretics today  4  Azotemia: Recheck CBC  Check stool for occult blood  5  Hypokalemia: resolved  6  PAF, valvular heart disease, CAD s/p CABG  7  Anemia: Recheck CBC  8  MGUS  9  High bicarbonate: resolved  10  MBD: high phos - continue Sevelamer  11  Weakness, deconditioning: per primary team      SUMMARY OF RECOMMENDATIONS:  · Recheck UA, bladder scan, CXR  · Check FOBT  · I am planning to discuss with family regarding temporary dialysis  · Possible oral diuretics today  Discussed with CM, SLIM, cardiology  SUBJECTIVE / INTERVAL HISTORY:  Poor appetite, some dyspnea  Feels weak       OBJECTIVE:  Current Weight: Weight - Scale: 92 6 kg (204 lb 2 3 oz)  Vitals:    04/29/18 2144 04/29/18 2235 04/30/18 0600 04/30/18 0759   BP: 112/55 117/56  109/54   BP Location:  Right arm  Right arm   Pulse: 74 75  79   Resp:  18  18   Temp:  97 5 °F (36 4 °C)  (!) 97 3 °F (36 3 °C)   TempSrc:  Oral  Axillary   SpO2:  94%  98%   Weight:   92 6 kg (204 lb 2 3 oz)    Height: Intake/Output Summary (Last 24 hours) at 04/30/18 0935  Last data filed at 04/30/18 0759   Gross per 24 hour   Intake              780 ml   Output              550 ml   Net              230 ml     General: conscious, coherent, cooperative, appears weak  Chest/Lungs:  crackles in lower lung fields  CVS: distinct heart sounds, normal rate, irregular  Abdomen: soft  Extremities: mild edema  : no viera catheter       Medications:    Current Facility-Administered Medications:     acetaminophen (TYLENOL) tablet 325 mg, 325 mg, Oral, Q8H PRN, Shelbie Ji MD, 325 mg at 04/28/18 1602    aspirin (ECOTRIN LOW STRENGTH) EC tablet 81 mg, 81 mg, Oral, Daily, Chase Alberto MD, 81 mg at 04/30/18 0842    atorvastatin (LIPITOR) tablet 40 mg, 40 mg, Oral, Daily With Jennie Alberto MD, 40 mg at 04/29/18 1724    calcium carbonate (TUMS) chewable tablet 1,000 mg, 1,000 mg, Oral, Daily PRN, Chase Alberto MD    diphenhydrAMINE (BENADRYL) tablet 12 5 mg, 12 5 mg, Oral, HS PRN, Romelia Arevalo PA-C, 12 5 mg at 04/29/18 0107    docusate sodium (COLACE) capsule 100 mg, 100 mg, Oral, BID PRN, Chase Alberto MD, 100 mg at 04/10/18 1005    docusate sodium (COLACE) capsule 100 mg, 100 mg, Oral, BID, Haven Voss MD, 100 mg at 04/30/18 0841    heparin (porcine) subcutaneous injection 5,000 Units, 5,000 Units, Subcutaneous, Q8H [address], Lisseth Seals PA-C, 5,000 Units at 04/30/18 0622    insulin glargine (LANTUS) subcutaneous injection 30 Units 0 3 mL, 30 Units, Subcutaneous, HS, Shashi Liao MD, 30 Units at 04/29/18 2145    insulin lispro (HumaLOG) 100 units/mL subcutaneous injection 1-5 Units, 1-5 Units, Subcutaneous, TID AC, Stopped at 04/30/18 0840 **AND** [CANCELED] Fingerstick Glucose (POCT), , , TID ACChase MD    insulin lispro (HumaLOG) 100 units/mL subcutaneous injection 1-5 Units, 1-5 Units, Subcutaneous, HS, Chase Alberto MD, 2 Units at 04/29/18 2145    insulin lispro (HumaLOG) 100 units/mL subcutaneous injection 12 Units, 12 Units, Subcutaneous, TID With Meals, Jakob Gonzales MD, 12 Units at 04/30/18 0842    melatonin tablet 6 mg, 6 mg, Oral, HS, Adrienne Pires MD, 6 mg at 04/29/18 2144    metoprolol succinate (TOPROL-XL) 24 hr tablet 25 mg, 25 mg, Oral, Q12H, Elma Jolley MD, Stopped at 04/30/18 0841    multivitamin-minerals (CENTRUM) tablet 1 tablet, 1 tablet, Oral, Daily, JEWELL Vee, 1 tablet at 04/30/18 0842    ondansetron (ZOFRAN) injection 4 mg, 4 mg, Intravenous, Q6H PRN, Chase Alberto MD    pantoprazole (PROTONIX) EC tablet 40 mg, 40 mg, Oral, Early Morning, Chase Alberto MD, 40 mg at 04/30/18 0622    polyethylene glycol (MIRALAX) packet 17 g, 17 g, Oral, Daily PRN, Saman Pandey PA-C, 17 g at 04/29/18 0856    sevelamer (RENAGEL) tablet 800 mg, 800 mg, Oral, TID With Meals, Georgiana Maria MD, 800 mg at 04/30/18 0841    tamsulosin (FLOMAX) capsule 0 4 mg, 0 4 mg, Oral, Daily With Ludowicifrances Seals PA-C, 0 4 mg at 04/29/18 1724    zolpidem (AMBIEN) tablet 5 mg, 5 mg, Oral, HS PRN, Susan Garcia MD    Laboratory Results:    Results from last 7 days  Lab Units 04/30/18  0509 04/29/18  2050 04/29/18  2373 04/28/18  0524 04/27/18  0550 04/26/18  0948 04/25/18  0612   WBC Thousand/uL  --   --   --   --  8 81  --   --    HEMOGLOBIN g/dL  --   --   --   --  9 1*  --   --    HEMATOCRIT %  --   --   --   --  29 2*  --   --    PLATELETS Thousands/uL  --   --   --   --  182  --   --    SODIUM mmol/L 127* 126* 126* 130* 132* 133* 134*   POTASSIUM mmol/L 3 9 4 5 4 4 3 7 3 1* 3 1* 3 3*   CHLORIDE mmol/L 87* 86* 86* 87* 86* 85* 85*   CO2 mmol/L 30 31 32 35* 38* 40* 42*   BUN mg/dL 160* 162* 158* 162* 163* 161* 165*   CREATININE mg/dL 2 96* 3 19* 2 95* 2 63* 2 80* 2 96* 3 09*   CALCIUM mg/dL 10 0 9 6 9 9 9 8 9 7 10 3* 9 7   MAGNESIUM mg/dL  --   --   --   --  3 1*  --  2 8*   PHOSPHORUS mg/dL  --   --   --   --  4 8*  --   -- GLUCOSE RANDOM mg/dL 138 213* 170* 103 127 170* 160*     Previous work up:  4/6/18 Renal US: no hydronephrosis, distended bladder, right renal cyst    4/5/18 UA - bland  No blood, no protein

## 2018-04-30 NOTE — PROGRESS NOTES
[address] Internal Medicine Progress Note  Patient: Modesta Gaviria  [de-identified] y o  male   MRN: [id number]  PCP: Kyung Belle MD  Unit/Bed#: -01 Encounter: [id number]  Date Of Visit: 04/30/18    Assessment:    Principal Problem:    Acute on chronic diastolic congestive heart failure ([facility] )  Active Problems:    Rapid atrial fibrillation (HCC)    Benign essential hypertension    Type 2 diabetes mellitus ([facility] )    Peripheral arterial disease ([facility] )    Acute-on-chronic kidney injury ([facility] )    Non-ST elevation myocardial infarction (NSTEMI) ([place] [address] )    Pulmonary hypertension (HCC)    Azotemia    Hyponatremia      Plan:    · Acute on chronic diastolic congestive heart failure  Still appears volume overloaded  He nephrology continues to recommend hemodialysis as patient has poorly responded to diuretics  Diuretics were on hold secondary to hypotension yesterday  Cardiology and nephrology on board  The prognosis remains poor secondary to significant valvular disease, pulmonary hypertension with underlying severe renal disease  Continue with sodium and fluid restriction with daily intake output charting  · Acute on chronic kidney disease with baseline creatinine between 1 8-2 2  Diuretics had been on hold  · Hyponatremia  Status post 1 dose of tolvaptan yesterday  No significant improvement in sodium levels  Likely secondary to volume overload  Continue with fluid restriction  · Rapid atrial fibrillation  Currently rate controlled on metoprolol  Family denies anticoagulation secondary to prior bleeding history  · Epistaxis  Could be secondary to platelet dysfunction because of uremia  Continue with humidified oxygen and nasal saline  · Severe pulmonary hypertension with PA pressures around 65 mm Hg  Would benefit from sildenafil once euvolemic  Continue to monitor closely  · Peripheral arterial disease with dry gangrene left 5th toe  Outpatient podiatry/vascular surgery follow-up    No plan for intervention during this hospitalization secondary to unstable cardiac status  · Type 2 diabetes  Continue with current regimen  · Ambulatory dysfunction  Continue with daily physical therapy and out of bed  · POORoral intake likely secondary to uremia and passive congestion of the liver  encourage oral intake  VTE Pharmacologic Prophylaxis:   Pharmacologic: Heparin  Mechanical VTE Prophylaxis in Place: No    Patient Centered Rounds: I have performed bedside rounds with nursing staff today  Discussions with Specialists or Other Care Team Provider:  Nephrology    Education and Discussions with Family / Patient:  Discussed with wife  Time Spent for Care: 30 minutes  More than 50% of total time spent on counseling and coordination of care as described above  Current Length of Stay: 26 day(s)    Current Patient Status: Inpatient   Certification Statement: The patient will continue to require additional inpatient hospital stay due to Unstable renal function and cardiovascular status    Discharge Plan / Estimated Discharge Date:  currently not medically stable for discharge  Code Status: Level 1 - Full Code      Subjective:   Patient continues to feel unwell will poor oral intake  Complains of mild shortness of breath  Had epistaxis  overnight which had resolved spontaneously    Objective:     Vitals:   Temp (24hrs), Av 4 °F (36 3 °C), Min:97 3 °F (36 3 °C), Max:97 5 °F (36 4 °C)    HR:  [66-80] 79  Resp:  [18] 18  BP: ()/(53-56) 109/54  SpO2:  [94 %-99 %] 98 %  Body mass index is 31 97 kg/m²  Input and Output Summary (last 24 hours): Intake/Output Summary (Last 24 hours) at 18 0942  Last data filed at 18 0759   Gross per 24 hour   Intake              780 ml   Output              550 ml   Net              230 ml       Physical Exam:     Physical Exam   Constitutional: He is oriented to person, place, and time  No distress  HENT:   Head: Normocephalic and atraumatic     Mouth/Throat: Oropharynx is clear and moist    Eyes: Pupils are equal, round, and reactive to light  Neck: Normal range of motion  Neck supple  No JVD present  Cardiovascular: Normal rate  Murmur heard  Irregularly irregular rhythm   Pulmonary/Chest:   Decreased breath sounds bilaterally   Abdominal: Soft  Bowel sounds are normal    Musculoskeletal: He exhibits edema  He exhibits no deformity  Neurological: He is alert and oriented to person, place, and time  Skin: Skin is warm  He is not diaphoretic  Additional Data:     Labs:      Results from last 7 days  Lab Units 04/27/18  0550   WBC Thousand/uL 8 81   HEMOGLOBIN g/dL 9 1*   HEMATOCRIT % 29 2*   PLATELETS Thousands/uL 182       Results from last 7 days  Lab Units 04/30/18  0509   SODIUM mmol/L 127*   POTASSIUM mmol/L 3 9   CHLORIDE mmol/L 87*   CO2 mmol/L 30   BUN mg/dL 160*   CREATININE mg/dL 2 96*   CALCIUM mg/dL 10 0   GLUCOSE RANDOM mg/dL 138           * I Have Reviewed All Lab Data Listed Above  * Additional Pertinent Lab Tests Reviewed:  Colin Rutherford Admission Reviewed    Imaging:    Imaging Reports Reviewed Today Include: NARecent Cultures (last 7 days):           Last 24 Hours Medication List:     Current Facility-Administered Medications:  acetaminophen 325 mg Oral Q8H PRN Ozzie Mtz MD   aspirin 81 mg Oral Daily Chase Alberto MD   atorvastatin 40 mg Oral Daily With Dinner Chase Alberto MD   calcium carbonate 1,000 mg Oral Daily PRN Chase Alberto MD   diphenhydrAMINE 12 5 mg Oral HS PRN Cortez Birmingham PA-C   docusate sodium 100 mg Oral BID PRN Chase Alberto MD   docusate sodium 100 mg Oral BID Aaliyah Hendrickson MD   heparin (porcine) 5,000 Units Subcutaneous Q8H [address] Lisseth Seals PA-C   insulin glargine 30 Units Subcutaneous HS Nereida Hines MD   insulin lispro 1-5 Units Subcutaneous TID AC Chase Alberto MD   insulin lispro 1-5 Units Subcutaneous HS Chase Alberto MD   insulin lispro 12 Units Subcutaneous TID With Meals Zachery Nuñez MD   melatonin 6 mg Oral HS Linda Daniel MD   metoprolol succinate 25 mg Oral Q12H Esther Garduno MD   multivitamin-minerals 1 tablet Oral Daily JEWELL Guerrero   ondansetron 4 mg Intravenous Q6H PRN Chase Alberto MD   pantoprazole 40 mg Oral Early Morning Chase Alberto MD   polyethylene glycol 17 g Oral Daily PRN Darcie Tsai PA-C   sevelamer 800 mg Oral TID With Meals Storm Aguiar MD   tamsulosin 0 4 mg Oral Daily With Dinner Lisseth Seals PA-C   zolpidem 5 mg Oral HS PRN Sanjay Reinoso MD        Today, Patient Was Seen By: Sanjay Reinoso MD    ** Please Note: This note has been constructed using a voice recognition system   **

## 2018-04-30 NOTE — PROGRESS NOTES
Cardiology Progress Note - Jennifer Long  [de-identified] y o  male MRN: [id number]    Unit/Bed#: -01 Encounter: [id number]        Subjective:    No significant events overnight  Still with dyspnea  Review of Systems   Cardiovascular: Negative for chest pain, leg swelling and palpitations  Respiratory: Positive for shortness of breath  Objective:   Vitals: Blood pressure 109/54, pulse 79, temperature (!) 97 3 °F (36 3 °C), temperature source Axillary, resp  rate 18, height 5' 7" (1 702 m), weight 92 6 kg (204 lb 2 3 oz), SpO2 98 %  , Body mass index is 31 97 kg/m² ,   Orthostatic Blood Pressures      Most Recent Value   Blood Pressure  109/54 filed at 2018 0759   Patient Position - Orthostatic VS  Lying filed at 2018 4771         Systolic (67QPF), OG , Min:94 , QE     Diastolic (98NTE), PTJ:51, Min:53, Max:64      Intake/Output Summary (Last 24 hours) at 18 0839  Last data filed at 18 0759   Gross per 24 hour   Intake              780 ml   Output              550 ml   Net              230 ml     Weight (last 2 days)     Date/Time   Weight    18 0600  92 6 (204 15)    18 0600  86 4 (190 48)    18 0536  86 5 (190 7)            Telemetry Review: Afib    Physical Exam   Cardiovascular: Normal rate and normal heart sounds  An irregularly irregular rhythm present  Exam reveals no gallop and no friction rub  No murmur heard  Pulmonary/Chest: Breath sounds normal  He has no wheezes  He has no rales  Musculoskeletal: He exhibits no edema           Laboratory Results:        CBC with diff:     Results from last 7 days  Lab Units 18  0550   WBC Thousand/uL 8 81   HEMOGLOBIN g/dL 9 1*   HEMATOCRIT % 29 2*   MCV fL 87   PLATELETS Thousands/uL 182   MCH pg 27 0   MCHC g/dL 31 2*   RDW % 15 9*   MPV fL 10 5         CMP:    Results from last 7 days  Lab Units 18  0509 18  2050 18  0235 18  0524 18  0550 18  3483 18  0612   SODIUM mmol/L 127* 126* 126* 130* 132* 133* 134*   POTASSIUM mmol/L 3 9 4 5 4 4 3 7 3 1* 3 1* 3 3*   CHLORIDE mmol/L 87* 86* 86* 87* 86* 85* 85*   CO2 mmol/L 30 31 32 35* 38* 40* 42*   ANION GAP mmol/L 10 9 8 8 8 8 7   BUN mg/dL 160* 162* 158* 162* 163* 161* 165*   CREATININE mg/dL 2 96* 3 19* 2 95* 2 63* 2 80* 2 96* 3 09*   GLUCOSE RANDOM mg/dL 138 213* 170* 103 127 170* 160*   CALCIUM mg/dL 10 0 9 6 9 9 9 8 9 7 10 3* 9 7   EGFR ml/min/1 73sq m 19 17 19 22          BMP:    Results from last 7 days  Lab Units 18  0509 18  2050 18  0924 18  0524 18  0550 18  0948 18  0612   SODIUM mmol/L 127* 126* 126* 130* 132* 133* 134*   POTASSIUM mmol/L 3 9 4 5 4 4 3 7 3 1* 3 1* 3 3*   CHLORIDE mmol/L 87* 86* 86* 87* 86* 85* 85*   CO2 mmol/L 30 31 32 35* 38* 40* 42*   BUN mg/dL 160* 162* 158* 162* 163* 161* 165*   CREATININE mg/dL 2 96* 3 19* 2 95* 2 63* 2 80* 2 96* 3 09*   GLUCOSE RANDOM mg/dL 138 213* 170* 103 127 170* 160*   CALCIUM mg/dL 10 0 9 6 9 9 9 8 9 7 10 3* 9 7         Magnesium:     Results from last 7 days  Lab Units 18  0550 18  0612   MAGNESIUM mg/dL 3 1* 2 8*               Cardiac testing:   Results for orders placed during the hospital encounter of 18   Echo complete with contrast if indicated    Narrative [facility], [address]  [phone]    Transthoracic Echocardiogram  2D, M-mode, Doppler, and Color Doppler    Study date:  2018    Patient: Larry Gerber  MR number: [id number]  Account number: [de-identified]  : 1935  Age: [de-identified] years  Gender: Male  Status: Inpatient  Location: Bedside  Height: 67 in  Weight: 213 6 lb  BP: 108/ 58 mmHg    Indications: Assess left ventricular function      Diagnoses: I50 9 - Heart failure, unspecified    Sonographer:  MASON Adams  Primary Physician:  Jim Bee MD  Referring Physician:  Lidia Dunne MD  Group:  [facility]  Interpreting Physician:  Kenna Rosas MD    SUMMARY    LEFT VENTRICLE:  Systolic function was mildly reduced  Ejection fraction was estimated to be 45 %  There was mild diffuse hypokinesis  There was moderate hypokinesis of the apical septal wall(s)  Wall thickness was mildly increased  There was mild concentric hypertrophy  RIGHT VENTRICLE:  The ventricle was mildly to moderately dilated  Systolic function was reduced  LEFT ATRIUM:  The atrium was moderately dilated  RIGHT ATRIUM:  The atrium was moderately dilated  MITRAL VALVE:  There was marked annular calcification  There was moderate to severe regurgitation  AORTIC VALVE:  A bioprosthesis was present  It exhibited normal function  Doppler cardiac output was 3 5 L/min, using LVOT flow data  Doppler cardiac index was 1 68 L/min/m squared, using LVOT flow data  TRICUSPID VALVE:  There was severe regurgitation  Pulmonary artery systolic pressure was markedly increased  Estimated peak PA pressure was 65 mmHg  PULMONIC VALVE:  Notching of the systolic pulse wave is noted, suggesting elevated pulmonary artery pressures  There was mild regurgitation  HISTORY: PRIOR HISTORY: CAD s/p CABG, s/p AVR, Afib, Hypertension, Hyperlipidemia, Heart failure    PROCEDURE: The procedure was performed at the bedside  This was a routine study  The transthoracic approach was used  The study included complete 2D imaging, M-mode, complete spectral Doppler, and color Doppler  The heart rate was 89 bpm,  at the start of the study  Images were obtained from the parasternal, apical, subcostal, and suprasternal notch acoustic windows  Echocardiographic views were limited due to decreased penetration and lung interference  This was a  technically difficult study  LEFT VENTRICLE: Size was normal  Systolic function was mildly reduced  Ejection fraction was estimated to be 45 %  There was mild diffuse hypokinesis   There was moderate hypokinesis of the apical septal wall(s)  Wall thickness was mildly  increased  There was mild concentric hypertrophy  DOPPLER: Transmitral flow pattern: atrial fibrillation  RIGHT VENTRICLE: The ventricle was mildly to moderately dilated  Systolic function was reduced  LEFT ATRIUM: The atrium was moderately dilated  RIGHT ATRIUM: The atrium was moderately dilated  MITRAL VALVE: There was marked annular calcification  Valve structure was normal  There was normal leaflet separation  DOPPLER: The transmitral velocity was within the normal range  There was no evidence for stenosis  There was moderate to  severe regurgitation  AORTIC VALVE: A bioprosthesis was present  It exhibited normal function  TRICUSPID VALVE: The annulus was dilated  There was normal leaflet separation  DOPPLER: The transtricuspid velocity was within the normal range  There was no evidence for stenosis  There was severe regurgitation  Pulmonary artery systolic  pressure was markedly increased  Estimated peak PA pressure was 65 mmHg  PULMONIC VALVE: Notching of the systolic pulse wave is noted, suggesting elevated pulmonary artery pressures  Leaflets exhibited normal thickness, no calcification, and normal cuspal separation  DOPPLER: The transpulmonic velocity was  within the normal range  There was mild regurgitation  PERICARDIUM: There was no pericardial effusion  AORTA: The root exhibited normal size      MEASUREMENT TABLES    2D MEASUREMENTS  LVOT   (Reference normals)  Diam   20 5 mm   (--)    DOPPLER MEASUREMENTS  LVOT   (Reference normals)  VTI   13 6 cm   (--)  R-R interval   769 ms   (--)  HR   78 bpm   (--)  Stroke vol   44 89 ml   (--)  Cardiac output   3 5 L/min   (--)  Cardiac index   1 68 L/min/m squared   (--)    SYSTEM MEASUREMENT TABLES    2D  %FS: 21 04 %  AV Diam: 3 14 cm  EDV(Teich): 91 37 ml  EF(Cube): 50 77 %  EF(Teich): 42 96 %  ESV(Cube): 44 19 ml  ESV(Teich): 52 12 ml  IVSd: 1 25 cm  LA Area: 32 18 cm2  LA Diam: 4 78 cm  LVEDV MOD A4C: 143 38 ml  LVEF MOD A4C: 30 68 %  LVESV MOD A4C: 99 4 ml  LVIDd: 4 48 cm  LVIDs: 3 54 cm  LVLd A4C: 8 59 cm  LVLs A4C: 8 06 cm  LVPWd: 1 17 cm  RA Area: 26 08 cm2  RV Diam: 4 04 cm  SI(Cube): 21 92 ml/m2  SI(Teich): 18 87 ml/m2  SV MOD A4C: 43 98 ml  SV(Cube): 45 58 ml  SV(Teich): 39 25 ml    CW  TR MaxP 39 mmHg  TR Vmax: 3 85 m/s    MM  TAPSE: 1 9 cm    [facility]    Prepared and electronically signed by    Ruel Hendrickson MD  Signed 2018 18:07:57               Meds/Allergies     Current Facility-Administered Medications:  acetaminophen 325 mg Oral Q8H PRN Mitra Griffin MD   aspirin 81 mg Oral Daily Chase Alberto MD   atorvastatin 40 mg Oral Daily With Dinner Chase Alberto MD   calcium carbonate 1,000 mg Oral Daily PRN Chase Alberto MD   diphenhydrAMINE 12 5 mg Oral HS PRN Taye Andres PA-C   docusate sodium 100 mg Oral BID PRN Chase Alberto MD   docusate sodium 100 mg Oral BID Garcia Castellanos MD   heparin (porcine) 5,000 Units Subcutaneous Q8H [address] Lisseth Seals PA-C   insulin glargine 30 Units Subcutaneous HS Nereida Hines MD   insulin lispro 1-5 Units Subcutaneous TID AC Chase Alberto MD   insulin lispro 1-5 Units Subcutaneous HS Chase Alberto MD   insulin lispro 12 Units Subcutaneous TID With Meals Markus Da Silva MD   melatonin 6 mg Oral HS Roel Romero MD   metoprolol succinate 25 mg Oral Q12H Carleen Pierre MD   multivitamin-minerals 1 tablet Oral Daily JEWELL Rader   ondansetron 4 mg Intravenous Q6H PRN Chase Alberto MD   pantoprazole 40 mg Oral Early Morning Chase Alberto MD   polyethylene glycol 17 g Oral Daily PRN Taye Andres PA-C   sevelamer 800 mg Oral TID With Meals Mitra Griffin MD   tamsulosin 0 4 mg Oral Daily With Dinner Lisseth Seals PA-C   zolpidem 5 mg Oral HS PRN Garcia Castellanos MD        Prescriptions Prior to Admission Medication    aspirin (ECOTRIN LOW STRENGTH) 81 mg EC tablet    atorvastatin (LIPITOR) 40 mg tablet    febuxostat (ULORIC) 40 mg tablet    metoprolol tartrate (LOPRESSOR) 25 mg tablet    metoprolol tartrate (LOPRESSOR) 50 mg tablet    multivitamin-iron-minerals-folic acid (CENTRUM) chewable tablet    NOVOLOG MIX 70/30 FLEXPEN (70-30) 100 UNIT/ML SUPN    omeprazole (PriLOSEC) 20 mg delayed release capsule    paricalcitol (ZEMPLAR) 1 mcg capsule    ramipril (ALTACE) 2 5 mg capsule    torsemide (DEMADEX) 20 mg tablet       Assessment:  Principal Problem:    Acute on chronic diastolic congestive heart failure (HCC)  Active Problems:    Rapid atrial fibrillation (HCC)    Benign essential hypertension    Type 2 diabetes mellitus (HCC)    Peripheral arterial disease (HCC)    Acute-on-chronic kidney injury (HCC)    Non-ST elevation myocardial infarction (NSTEMI) ([facility] [address] )    Pulmonary hypertension (HCC)    Azotemia    Hyponatremia      Impression:  1  Acute on chronic diastolic heart failure - appears reasonably compensated  Deferring to nephrology for diuretic dosing, but he appears somewhat volume overloaded  2  Acute on chronic renal failure - stable  3  Atrial fibrillation - rate controlled  Off anticoagulation due to fall risk  4  CAD - stable  5  Valvular heart disease - s/p AVR with moderate to severe MR/TR  6  Hyponatremia    Plan:  1  Continue diuretics per nephrology

## 2018-05-01 NOTE — PHYSICAL THERAPY NOTE
PHYSICAL THERAPY NOTE      Patient Name: Hipolito Gerard  Today's Date: 5/1/2018 05/01/18 1024   Pain Assessment   Pain Assessment No/denies pain   Pain Score No Pain   Restrictions/Precautions   Weight Bearing Precautions Per Order No   Other Precautions Fall Risk;O2;Telemetry   General   Family/Caregiver Present No   Subjective   Subjective Patient standing at EOB with nurse present upon entry to patients room  Is agreeable to therapy session  Bed Mobility   Supine to Sit Unable to assess   Sit to Supine Unable to assess   Additional Comments Patient OOB pre and post session  Transfers   Sit to Stand 4  Minimal assistance   Additional items Assist x 1; Increased time required;Verbal cues  (for hand placement and body positioning)   Stand to Sit 4  Minimal assistance   Additional items Assist x 1; Armrests; Increased time required;Verbal cues  (for controlled descent )   Stand pivot 4  Minimal assistance  (EOB to chair & chair to commode)   Additional items Assist x 1; Increased time required;Verbal cues  (for turning and body positioning )   Ambulation/Elevation   Gait pattern Decreased foot clearance;Shuffling; Short stride; Excessively slow; Foward flexed   Gait Assistance 4  Minimal assist   Additional items Assist x 1;Verbal cues   Assistive Device Rolling walker   Distance 4 feet x 2, 2 feet    Balance   Static Sitting Fair +   Dynamic Sitting Fair   Static Standing Poor +   Dynamic Standing Poor +   Ambulatory Poor +   Endurance Deficit   Endurance Deficit Yes   Endurance Deficit Description SOB with activity, decreased ambulation distance & standing tolerance   Activity Tolerance   Activity Tolerance Patient limited by fatigue;Treatment limited secondary to medical complications (Comment)   Nurse Made Aware spoke to ADDY Pierre   Exercises   Glute Sets Sitting;10 reps;AROM; Bilateral  (x 2 sets)   Hip Adduction Sitting;10 reps;AROM; Bilateral  (x 2 sets)   Knee AROM Long Arc Quad Sitting;10 reps;AROM; Bilateral  (x 2 sets)   Ankle Pumps Sitting;20 reps;AROM; Bilateral   Marching Sitting;10 reps;AROM; Bilateral   Assessment   Prognosis Fair   Problem List Decreased strength;Decreased range of motion;Decreased endurance; Impaired balance;Decreased mobility; Decreased coordination;Decreased safety awareness; Obesity; Decreased skin integrity; Impaired sensation;Pain  ((LE edema))   Assessment Patient remains significantly fatigued requiring input durign session to stay alert and complete task  Performed seated B LE exercise program with good form and understanding, pt encouraged to perform exercise throughout day  Patient presented with decreased tolerance to ambulation today with shorter distances as well as 3-4  minute rest periods between each trial  Further ambulation trials were refused by patient secondary to fatigue  Continue to focus on gait progression to improve functional mobility  Barriers to Discharge Inaccessible home environment   Goals   Patient Goals to feel better   STG Expiration Date 05/04/18   Short Term Goal #1 Per last PT goal assessment    In 10-14 days, patient will be: 1  Supervision with Bed Mobility Rolling Right and Left - NOT MET 2  Supervision with Bed Mobility Supine-Sit - NOT MET 3  Supervision with Transfer Bed-Chair- NOT MET 4  Increase Dynamic Sitting Balance at least 1 Grade for improved stability with functional reach activities- NOT MET 5  Increase Dynamic Standing Balance at least 1 Grade for improved ease with Activities of Daily Living- NOT MET 6  Increase Lower Extremity Strength at least 1 Grade for improved ease mobility tasks- NOT MET 7  Supervision with Ambulation 100 feet using a rolling walker to facilitate home and community mobility- NOT MET; More goals will be determined at a future date upon completion of the aforementioned goals      Treatment Day 6   Plan   Treatment/Interventions Functional transfer training;LE strengthening/ROM; Therapeutic exercise; Endurance training;Patient/family training;Equipment eval/education; Bed mobility;Gait training  (Pt to see when stair training appropriate)   Progress Slow progress, decreased activity tolerance   PT Frequency 5x/wk   Recommendation   Recommendation Long-term skilled PT   Equipment Recommended Wheelchair; Other (Comment)  (roller walker)       Fela Chisholm, PTA

## 2018-05-01 NOTE — PROGRESS NOTES
NEPHROLOGY PROGRESS NOTE   Aramis Arias  [de-identified] y o  male MRN: [id number]  Unit/Bed#: -01 Encounter: [id number]  Reason for Consult:     ASSESSMENT/PLAN:  1  Acute Kidney Injury- secondary to cardiorenal syndrome   - UA: bland  - renal ultrasound: negative  - PVR: 200ml, continue to check periodically  2  Chronic Kidney Disease stage III- Baseline creatinine is 1 8-2 2  Follows with Dr Leslie Jean     - will likely have to tolerate a higher baseline creatinine  3  Uremia/Azotemia- secondary to SABINE  - FOBT: negative on 4/12  - discussion later today with wife and Dr Ciera Laureano about dialysis  4  Volume Status- was significantly diuresed initially and was restarted on oral torsemide 40mg daily on 4/30  - CXR 4/30: worsening congestive heart failure  5  Hypokalemia- replete  6  Hyperphosphatemia- sevelamer with meals  7  Hyponatremia- likely secondary to poor oral intake and volume overload  - continue fluid restriction  8  MGUS  Will discuss further with Dr Carl Hint:  Patient very lethargic  Decreased appetite  Discussed with wife who wants a holistic approach and does not want to continue giving him more pills      OBJECTIVE:  Current Weight: Weight - Scale: 87 8 kg (193 lb 9 oz)  Vitals:    04/30/18 2153 04/30/18 2204 05/01/18 0600 05/01/18 0818   BP: 117/58 119/64  107/53   BP Location:  Left arm  Right arm   Pulse: 72 74  93   Resp:  18  18   Temp:  97 9 °F (36 6 °C)  98 °F (36 7 °C)   TempSrc:  Oral  Axillary   SpO2:  94%  99%   Weight:   87 8 kg (193 lb 9 oz)    Height:           Intake/Output Summary (Last 24 hours) at 05/01/18 1441  Last data filed at 05/01/18 1424   Gross per 24 hour   Intake              760 ml   Output             1295 ml   Net             -535 ml     General: NAD  Skin: no rash  HEENT: normocephalic atraumatic  Neck: supple  Chest: CTAB  Heart: RRR  Abdomen: soft, nt, nd  Extremities: + bilateral edema (improved)  Neuro: lethargic    Medications:    Current Facility-Administered Medications:     acetaminophen (TYLENOL) tablet 325 mg, 325 mg, Oral, Q8H PRN, Cassi Brunson MD, 325 mg at 04/28/18 1602    aspirin (ECOTRIN LOW STRENGTH) EC tablet 81 mg, 81 mg, Oral, Daily, Chase Alberto MD, 81 mg at 05/01/18 0913    atorvastatin (LIPITOR) tablet 40 mg, 40 mg, Oral, Daily With Hong Alberto MD, 40 mg at 04/30/18 1707    calcium carbonate (TUMS) chewable tablet 1,000 mg, 1,000 mg, Oral, Daily PRN, Chaes Alberto MD    diphenhydrAMINE (BENADRYL) tablet 12 5 mg, 12 5 mg, Oral, HS PRN, Josh Monsalve PA-C, 12 5 mg at 04/29/18 0107    docusate sodium (COLACE) capsule 100 mg, 100 mg, Oral, BID PRN, Chase Alberto MD, 100 mg at 04/10/18 1005    docusate sodium (COLACE) capsule 100 mg, 100 mg, Oral, BID, Latoya Velasco MD, 100 mg at 05/01/18 0917    heparin (porcine) subcutaneous injection 5,000 Units, 5,000 Units, Subcutaneous, Q8H KENDRICK, Lisseth Seals PA-C, 5,000 Units at 05/01/18 0504    insulin glargine (LANTUS) subcutaneous injection 30 Units 0 3 mL, 30 Units, Subcutaneous, HS, Mary Johnson MD, 30 Units at 04/30/18 2153    insulin lispro (HumaLOG) 100 units/mL subcutaneous injection 1-5 Units, 1-5 Units, Subcutaneous, TID AC, 1 Units at 05/01/18 1314 **AND** [CANCELED] Fingerstick Glucose (POCT), , , TID AC, Chase Alberto MD    insulin lispro (HumaLOG) 100 units/mL subcutaneous injection 1-5 Units, 1-5 Units, Subcutaneous, HS, Chase lAberto MD, 1 Units at 04/30/18 2154    insulin lispro (HumaLOG) 100 units/mL subcutaneous injection 12 Units, 12 Units, Subcutaneous, TID With Meals, Mary Johnson MD, 12 Units at 05/01/18 1314    melatonin tablet 6 mg, 6 mg, Oral, HS, Raymond De Jesus MD, 6 mg at 04/30/18 2153    metoprolol succinate (TOPROL-XL) 24 hr tablet 25 mg, 25 mg, Oral, Q12H, Mitzy Urrutia MD, 25 mg at 05/01/18 0913    multivitamin-minerals (CENTRUM) tablet 1 tablet, 1 tablet, Oral, Daily, JEWELL Claros, 1 tablet at 05/01/18 0913    ondansetron (ZOFRAN) injection 4 mg, 4 mg, Intravenous, Q6H PRN, Chase Alberto MD    pantoprazole (PROTONIX) EC tablet 40 mg, 40 mg, Oral, Early Morning, Chsae Alberto MD, 40 mg at 05/01/18 0504    polyethylene glycol (MIRALAX) packet 17 g, 17 g, Oral, Daily PRN, Helen Smallwood PA-C, 17 g at 04/29/18 0856    potassium chloride (K-DUR,KLOR-CON) CR tablet 40 mEq, 40 mEq, Oral, BID, Steven Yanes MD, 40 mEq at 05/01/18 1125    sevelamer (RENAGEL) tablet 800 mg, 800 mg, Oral, TID With Meals, Jamison Priest MD, 800 mg at 05/01/18 1316    sodium chloride (OCEAN) 0 65 % nasal spray 1 spray, 1 spray, Each Nare, PRN, Mireya Vergara MD    [facility] capsule 0 4 mg, 0 4 mg, Oral, Daily With Toyaurea Seals PA-C, 0 4 mg at 04/30/18 1707    torsemide (DEMADEX) tablet 40 mg, 40 mg, Oral, Daily, Brayan Montalvo MD, 40 mg at 05/01/18 0913    zolpidem (AMBIEN) tablet 5 mg, 5 mg, Oral, HS PRN, Mireya Vergara MD    Laboratory Results:    Results from last 7 days  Lab Units 05/01/18  0505 04/30/18  1020 04/30/18  0509 04/29/18  2050 04/29/18  2308 04/28/18  0524 04/27/18  0550 04/26/18  0948 04/25/18  0612   WBC Thousand/uL 7 61 6 63  --   --   --   --  8 81  --   --    HEMOGLOBIN g/dL 8 6* 9 0*  --   --   --   --  9 1*  --   --    HEMATOCRIT % 27 1* 27 7*  --   --   --   --  29 2*  --   --    PLATELETS Thousands/uL 147* 153  --   --   --   --  182  --   --    SODIUM mmol/L 128*  --  127* 126* 126* 130* 132* 133* 134*   POTASSIUM mmol/L 3 2*  --  3 9 4 5 4 4 3 7 3 1* 3 1* 3 3*   CHLORIDE mmol/L 89*  --  87* 86* 86* 87* 86* 85* 85*   CO2 mmol/L 30  --  30 31 32 35* 38* 40* 42*   BUN mg/dL 164*  --  160* 162* 158* 162* 163* 161* 165*   CREATININE mg/dL 2 88*  --  2 96* 3 19* 2 95* 2 63* 2 80* 2 96* 3 09*   CALCIUM mg/dL 9 4  --  10 0 9 6 9 9 9 8 9 7 10 3* 9 7   MAGNESIUM mg/dL 3 2*  --   --   --   --   --  3 1*  --  2 8*   PHOSPHORUS mg/dL 4 2*  --   --   --   --   -- 4 8*  --   --    TOTAL PROTEIN g/dL 6 9  --   --   --   --   --   --   --   --    GLUCOSE RANDOM mg/dL 104  --  138 213* 170* 103 127 170* 160*

## 2018-05-01 NOTE — PLAN OF CARE
Problem: PHYSICAL THERAPY ADULT  Goal: Performs mobility at highest level of function for planned discharge setting  See evaluation for individualized goals  Treatment/Interventions: Functional transfer training, LE strengthening/ROM, Therapeutic exercise, Endurance training, Patient/family training, Equipment eval/education, Bed mobility, Gait training (PT to see when stair training is appropriate )          See flowsheet documentation for full assessment, interventions and recommendations  Outcome: Progressing  Prognosis: Fair  Problem List: Decreased strength, Decreased range of motion, Decreased endurance, Impaired balance, Decreased mobility, Decreased coordination, Decreased safety awareness, Obesity, Decreased skin integrity, Impaired sensation, Pain ((LE edema))  Assessment: Patient remains significantly fatigued requiring input durign session to stay alert and complete task  Performed seated B LE exercise program with good form and understanding, pt encouraged to perform exercise throughout day  Patient presented with decreased tolerance to ambulation today with shorter distances as well as 3-4  minute rest periods between each trial  Further ambulation trials were refused by patient secondary to fatigue  Continue to focus on gait progression to improve functional mobility  Barriers to Discharge: Inaccessible home environment     Recommendation: Long-term skilled PT     PT - OK to Discharge: Yes (when medically cleared to LT skilled PT)    See flowsheet documentation for full assessment

## 2018-05-01 NOTE — PROGRESS NOTES
Cardiology Progress Note - Rossana Stephens  [de-identified] y o  male MRN: [id number]    Unit/Bed#: -01 Encounter: [id number]        Subjective:    No significant events overnight  Still with dyspnea  Review of Systems   Cardiovascular: Negative for chest pain, leg swelling and palpitations  Respiratory: Positive for shortness of breath  Objective:   Vitals: Blood pressure 107/53, pulse 93, temperature 98 °F (36 7 °C), temperature source Axillary, resp  rate 18, height 5' 7" (1 702 m), weight 87 8 kg (193 lb 9 oz), SpO2 99 %  , Body mass index is 30 32 kg/m² ,   Orthostatic Blood Pressures      Most Recent Value   Blood Pressure  107/53 filed at 05/01/2018 0818   Patient Position - Orthostatic VS  Lying filed at 05/01/2018 4516         Systolic (96ECT), ELX:254 , Min:95 , RRO:195     Diastolic (78EMX), VDM:97, Min:50, Max:64      Intake/Output Summary (Last 24 hours) at 05/01/18 0910  Last data filed at 05/01/18 0818   Gross per 24 hour   Intake              820 ml   Output             1200 ml   Net             -380 ml     Weight (last 2 days)     Date/Time   Weight    05/01/18 0600  87 8 (193 56)    04/30/18 0600  92 6 (204 15)    04/29/18 0600  86 4 (190 48)            Telemetry Review: Afib    Physical Exam   Cardiovascular: Normal rate and normal heart sounds  An irregularly irregular rhythm present  Exam reveals no gallop and no friction rub  No murmur heard  Pulmonary/Chest: Breath sounds normal  He has no wheezes  He has no rales  Musculoskeletal: He exhibits no edema           Laboratory Results:        CBC with diff:     Results from last 7 days  Lab Units 05/01/18  0505 04/30/18  1020 04/27/18  0550   WBC Thousand/uL 7 61 6 63 8 81   HEMOGLOBIN g/dL 8 6* 9 0* 9 1*   HEMATOCRIT % 27 1* 27 7* 29 2*   MCV fL 84 85 87   PLATELETS Thousands/uL 147* 153 182   MCH pg 26 8 27 6 27 0   MCHC g/dL 31 7 32 5 31 2*   RDW % 16 2* 16 5* 15 9*   MPV fL 10 2 10 5 10 5         CMP:    Results from last 7 days  Lab Units 18  0505 18  0509 18  8127 18  0524 18  0550 18  0948   SODIUM mmol/L 128* 127* 126* 126* 130* 132* 133*   POTASSIUM mmol/L 3 2* 3 9 4 5 4 4 3 7 3 1* 3 1*   CHLORIDE mmol/L 89* 87* 86* 86* 87* 86* 85*   CO2 mmol/L 30 30 31 32 35* 38* 40*   ANION GAP mmol/L 9 10 9 8 8 8 8   BUN mg/dL 164* 160* 162* 158* 162* 163* 161*   CREATININE mg/dL 2 88* 2 96* 3 19* 2 95* 2 63* 2 80* 2 96*   GLUCOSE RANDOM mg/dL 104 138 213* 170* 103 127 170*   CALCIUM mg/dL 9 4 10 0 9 6 9 9 9 8 9 7 10 3*   AST U/L 18  --   --   --   --   --   --    ALT U/L 31  --   --   --   --   --   --    ALK PHOS U/L 74  --   --   --   --   --   --    TOTAL PROTEIN g/dL 6 9  --   --   --   --   --   --    BILIRUBIN TOTAL mg/dL 1 20*  --   --   --   --   --   --    EGFR ml/min/1 73sq m 20 19 17 19 22 20 19         BMP:    Results from last 7 days  Lab Units 18  0505 18  0509 18  0924 18  0524 18  0550 18  0948   SODIUM mmol/L 128* 127* 126* 126* 130* 132* 133*   POTASSIUM mmol/L 3 2* 3 9 4 5 4 4 3 7 3 1* 3 1*   CHLORIDE mmol/L 89* 87* 86* 86* 87* 86* 85*   CO2 mmol/L 30 30 31 32 35* 38* 40*   BUN mg/dL 164* 160* 162* 158* 162* 163* 161*   CREATININE mg/dL 2 88* 2 96* 3 19* 2 95* 2 63* 2 80* 2 96*   GLUCOSE RANDOM mg/dL 104 138 213* 170* 103 127 170*   CALCIUM mg/dL 9 4 10 0 9 6 9 9 9 8 9 7 10 3*         Magnesium:     Results from last 7 days  Lab Units 18  0505 18  0550 18  0612   MAGNESIUM mg/dL 3 2* 3 1* 2 8*               Cardiac testing:   Results for orders placed during the hospital encounter of 18   Echo complete with contrast if indicated    Narrative [address] [place] [address]  [phone]    Transthoracic Echocardiogram  2D, M-mode, Doppler, and Color Doppler    Study date:  2018    Patient: Mariah Haley  MR number: [id number]  Account number: [de-identified]  : 1935  Age: [de-identified] years  Gender: Male  Status: Inpatient  Location: Bedside  Height: 67 in  Weight: 213 6 lb  BP: 108/ 58 mmHg    Indications: Assess left ventricular function  Diagnoses: I50 9 - Heart failure, unspecified    Sonographer:  MASON Queen  Primary Physician:  La Martinez MD  Referring Physician:  Sherri Duffy MD  Group:  [facility]  Interpreting Physician:  Sherri Duffy MD    SUMMARY    LEFT VENTRICLE:  Systolic function was mildly reduced  Ejection fraction was estimated to be 45 %  There was mild diffuse hypokinesis  There was moderate hypokinesis of the apical septal wall(s)  Wall thickness was mildly increased  There was mild concentric hypertrophy  RIGHT VENTRICLE:  The ventricle was mildly to moderately dilated  Systolic function was reduced  LEFT ATRIUM:  The atrium was moderately dilated  RIGHT ATRIUM:  The atrium was moderately dilated  MITRAL VALVE:  There was marked annular calcification  There was moderate to severe regurgitation  AORTIC VALVE:  A bioprosthesis was present  It exhibited normal function  Doppler cardiac output was 3 5 L/min, using LVOT flow data  Doppler cardiac index was 1 68 L/min/m squared, using LVOT flow data  TRICUSPID VALVE:  There was severe regurgitation  Pulmonary artery systolic pressure was markedly increased  Estimated peak PA pressure was 65 mmHg  PULMONIC VALVE:  Notching of the systolic pulse wave is noted, suggesting elevated pulmonary artery pressures  There was mild regurgitation  HISTORY: PRIOR HISTORY: CAD s/p CABG, s/p AVR, Afib, Hypertension, Hyperlipidemia, Heart failure    PROCEDURE: The procedure was performed at the bedside  This was a routine study  The transthoracic approach was used  The study included complete 2D imaging, M-mode, complete spectral Doppler, and color Doppler  The heart rate was 89 bpm,  at the start of the study   Images were obtained from the parasternal, apical, subcostal, and suprasternal notch acoustic windows  Echocardiographic views were limited due to decreased penetration and lung interference  This was a  technically difficult study  LEFT VENTRICLE: Size was normal  Systolic function was mildly reduced  Ejection fraction was estimated to be 45 %  There was mild diffuse hypokinesis  There was moderate hypokinesis of the apical septal wall(s)  Wall thickness was mildly  increased  There was mild concentric hypertrophy  DOPPLER: Transmitral flow pattern: atrial fibrillation  RIGHT VENTRICLE: The ventricle was mildly to moderately dilated  Systolic function was reduced  LEFT ATRIUM: The atrium was moderately dilated  RIGHT ATRIUM: The atrium was moderately dilated  MITRAL VALVE: There was marked annular calcification  Valve structure was normal  There was normal leaflet separation  DOPPLER: The transmitral velocity was within the normal range  There was no evidence for stenosis  There was moderate to  severe regurgitation  AORTIC VALVE: A bioprosthesis was present  It exhibited normal function  TRICUSPID VALVE: The annulus was dilated  There was normal leaflet separation  DOPPLER: The transtricuspid velocity was within the normal range  There was no evidence for stenosis  There was severe regurgitation  Pulmonary artery systolic  pressure was markedly increased  Estimated peak PA pressure was 65 mmHg  PULMONIC VALVE: Notching of the systolic pulse wave is noted, suggesting elevated pulmonary artery pressures  Leaflets exhibited normal thickness, no calcification, and normal cuspal separation  DOPPLER: The transpulmonic velocity was  within the normal range  There was mild regurgitation  PERICARDIUM: There was no pericardial effusion  AORTA: The root exhibited normal size      MEASUREMENT TABLES    2D MEASUREMENTS  LVOT   (Reference normals)  Diam   20 5 mm   (--)    DOPPLER MEASUREMENTS  LVOT   (Reference normals)  VTI   13 6 cm (--)  R-R interval   769 ms   (--)  HR   78 bpm   (--)  Stroke vol   44 89 ml   (--)  Cardiac output   3 5 L/min   (--)  Cardiac index   1 68 L/min/m squared   (--)    SYSTEM MEASUREMENT TABLES    2D  %FS: 21 04 %  AV Diam: 3 14 cm  EDV(Teich): 91 37 ml  EF(Cube): 50 77 %  EF(Teich): 42 96 %  ESV(Cube): 44 19 ml  ESV(Teich): 52 12 ml  IVSd: 1 25 cm  LA Area: 32 18 cm2  LA Diam: 4 78 cm  LVEDV MOD A4C: 143 38 ml  LVEF MOD A4C: 30 68 %  LVESV MOD A4C: 99 4 ml  LVIDd: 4 48 cm  LVIDs: 3 54 cm  LVLd A4C: 8 59 cm  LVLs A4C: 8 06 cm  LVPWd: 1 17 cm  RA Area: 26 08 cm2  RV Diam: 4 04 cm  SI(Cube): 21 92 ml/m2  SI(Teich): 18 87 ml/m2  SV MOD A4C: 43 98 ml  SV(Cube): 45 58 ml  SV(Teich): 39 25 ml    CW  TR MaxP 39 mmHg  TR Vmax: 3 85 m/s    MM  TAPSE: 1 9 cm    [facility] Accredited Echocardiography Laboratory    Prepared and electronically signed by    Abiodun Mcguire MD  Signed 2018 18:07:57               Meds/Allergies     Current Facility-Administered Medications:  acetaminophen 325 mg Oral Q8H PRN Ramos Verduzco MD   aspirin 81 mg Oral Daily Chase Alberto MD   atorvastatin 40 mg Oral Daily With Dinner Chase Alberto MD   calcium carbonate 1,000 mg Oral Daily PRN Chase Alberto MD   diphenhydrAMINE 12 5 mg Oral HS PRN Vijay Lopes PA-C   docusate sodium 100 mg Oral BID PRN Chase Alberto MD   docusate sodium 100 mg Oral BID Mikal Bneitez MD   heparin (porcine) 5,000 Units Subcutaneous Q8H [address] Lisseth Seals PA-C   insulin glargine 30 Units Subcutaneous HS Nereida Hines MD   insulin lispro 1-5 Units Subcutaneous TID AC Chase Alberto MD   insulin lispro 1-5 Units Subcutaneous HS Chase Alberto MD   insulin lispro 12 Units Subcutaneous TID With Meals Sean Aparicio MD   melatonin 6 mg Oral HS Faye Howell MD   metoprolol succinate 25 mg Oral Q12H Ivon Ryder MD   multivitamin-minerals 1 tablet Oral Daily JEWELL Barrios   ondansetron 4 mg Intravenous Q6H PRN Chase Alberto MD   pantoprazole 40 mg Oral Early Morning Chase Alberto MD   polyethylene glycol 17 g Oral Daily PRN Chris Land PA-C   sevelamer 800 mg Oral TID With Meals Gearld Boxer, MD   sodium chloride 1 spray Each Nare PRN Becki Reyna MD   tamsulosin 0 4 mg Oral Daily With Dinner Lisseth Seals PA-C   torsemide 40 mg Oral Daily Tegan Mendoza MD   zolpidem 5 mg Oral HS PRN Becki Reyna MD        Prescriptions Prior to Admission   Medication    aspirin (ECOTRIN LOW STRENGTH) 81 mg EC tablet    atorvastatin (LIPITOR) 40 mg tablet    febuxostat (ULORIC) 40 mg tablet    metoprolol tartrate (LOPRESSOR) 25 mg tablet    metoprolol tartrate (LOPRESSOR) 50 mg tablet    multivitamin-iron-minerals-folic acid (CENTRUM) chewable tablet    NOVOLOG MIX 70/30 FLEXPEN (70-30) 100 UNIT/ML SUPN    omeprazole (PriLOSEC) 20 mg delayed release capsule    paricalcitol (ZEMPLAR) 1 mcg capsule    ramipril (ALTACE) 2 5 mg capsule    torsemide (DEMADEX) 20 mg tablet       Assessment:  Principal Problem:    Acute on chronic diastolic congestive heart failure (HCC)  Active Problems:    Rapid atrial fibrillation (HCC)    Benign essential hypertension    Type 2 diabetes mellitus ([facility] [address] )    Peripheral arterial disease (HCC)    [facility]on[facility]chronic [facility] )    Non-ST elevation myocardial infarction (NSTEMI) ([facility] )    Pulmonary hypertension ([facility])    Azotemia    Hyponatremia      Impression:  1  Acute on chronic diastolic heart failure - appears reasonably compensated  Deferring to nephrology for diuretic dosing, but he appears somewhat volume overloaded  2  Acute on chronic renal failure - no significant improvement  May benefit from temporary HD  3  Atrial fibrillation - rate controlled  Off anticoagulation due to fall risk  4  CAD - stable  5  Valvular heart disease - s/p AVR with moderate to severe MR/TR  6  Hyponatremia    Plan:  1   Continue diuretics per nephrology, and consideration for temporary hemodialysis  2  Continue remainder of medications

## 2018-05-01 NOTE — PROGRESS NOTES
[address] Hospitalist Service - Internal Medicine Progress Note      PATIENT INFORMATION      Patient: Angelito Turner  [de-identified] y o  male   MRN: [id number]  PCP: Abdias Delgado MD  Unit/Bed#: -[facility] Encounter: [id number]  Date Of Visit: 05/01/18       ASSESSMENTS & PLAN        1  Acute respiratory failure with hypoxia  · Per wife, baseline oxygen requirement is room air - currently down to 2 L via nasal cannula - likely secondary to CHF exacerbation (see plan below) - continue to titrate down to baseline as tolerated    2  Acute on chronic diastolic CHF  · last EF of 45% with mild VA, severe TR, moderate-severe MR, and diffuse hypokinesis  · currently on Torsemide/Toprol-XL regimen - net fluid balance of (-) 13 3 L thus far    3  Acute kidney injury - Chronic kidney disease stage 4  · creatinine @ 2 8 today from a peak of 3 26 earlier in hospitalization - baseline creatinine is approximately 1 8-2 2 - likely progressively worsening secondary to CHF  · family asked by nephrology to consider initiation of hemodialysis    4  Insulin-dependent diabetes mellitus  · HgA1c of 7 6 on 3/2018   · c/w basal/prandial insulin regimen w/ additional SSI coverage per accuchecks     5  Atrial fibrillation  · Rate controlled on Toprol-XL - continue ASA  · Not a candidate for chronic anticoagulation secondary to comorbidities and fall risk    6  CAD - Aortic valvular disease  · Status post prior CABG - status post bioprosthetic aortic valve replaced  · Continue ASA/Lipitor/Toprol-XL    7  Hypokalemia  · Monitor/replete potassium as necessary   · Serum magnesium within normal limits     8  Hyponatremia  · Secondary to CHF/fluid overload  · Monitor BMP - family still considering initiation of hemodialysis    9  Anemia of chronic disease  · H/H stable - continue to monitor       DVT Prophylaxis:  Heparin SC      SUBJECTIVE     Seen/examined earlier today with wife at bedside    Had a long and in-depth discussion with the wife along with nursing regarding patient's long-term prognosis as well as prior medical history  He remains on approximately 2 L of oxygen via nasal cannula  He denies any significant shortness of breath although he does complain of weakness/fatigue  OBJECTIVE     Vitals:   Temp (24hrs), Av 8 °F (36 6 °C), Min:97 5 °F (36 4 °C), Max:98 °F (36 7 °C)    HR:  [60-93] 93  Resp:  [18] 18  BP: ()/(50-64) 107/53  SpO2:  [94 %-99 %] 99 %  Body mass index is 30 32 kg/m²  Input and Output Summary (last 24 hours):        Intake/Output Summary (Last 24 hours) at 18 1354  Last data filed at 18 1322   Gross per 24 hour   Intake              780 ml   Output             1145 ml   Net             -365 ml       Physical Exam:     GENERAL:  Well-developed/nourished - improving respiratory distress  HEAD:  Normocephalic - atraumatic  EYES: PERRL - EOMI   MOUTH:  Mucosa moist  NECK:  Supple - full range of motion  CARDIAC:  Irregularly irregular rhythm but rate controlled - S1/S2 positive  PULMONARY:  Diminished breath sounds with bibasilar rales   ABDOMEN:  Soft - nontender/nondistended - active bowel sounds  MUSCULOSKELETAL:  Motor strength/range of motion quite deconditioned - 1+ distal LE pitting edema  NEUROLOGIC:  Alert/oriented x 3  SKIN:  Chronic wrinkles/blemishes   PSYCHIATRIC:  Mood/affect pleasant      ADDITIONAL DATA     Labs & Recent Cultures:       Results from last 7 days  Lab Units 18  0505 18  1020   WBC Thousand/uL 7 61 6 63   HEMOGLOBIN g/dL 8 6* 9 0*   HEMATOCRIT % 27 1* 27 7*   PLATELETS Thousands/uL 147* 153   NEUTROS PCT %  --  76*   LYMPHS PCT %  --  13*   MONOS PCT %  --  10   EOS PCT %  --  1       Results from last 7 days  Lab Units 18  0505   SODIUM mmol/L 128*   POTASSIUM mmol/L 3 2*   CHLORIDE mmol/L 89*   CO2 mmol/L 30   BUN mg/dL 164*   CREATININE mg/dL 2 88*   CALCIUM mg/dL 9 4   TOTAL PROTEIN g/dL 6 9   BILIRUBIN TOTAL mg/dL 1 20*   ALK PHOS U/L 74 ALT U/L 31   AST U/L 18   GLUCOSE RANDOM mg/dL 104       Results from last 7 days  Lab Units 04/30/18  1020   INR  1 11         Last 24 Hours Medication List:     Current Facility-Administered Medications:  acetaminophen 325 mg Oral Q8H PRN Liz Ortiz MD   aspirin 81 mg Oral Daily Chase Alberto MD   atorvastatin 40 mg Oral Daily With Dinner Chase Alberto MD   calcium carbonate 1,000 mg Oral Daily PRN Chase Alberto MD   diphenhydrAMINE 12 5 mg Oral HS PRN Pk Lugo PA-C   docusate sodium 100 mg Oral BID PRN Chase Alberto MD   docusate sodium 100 mg Oral BID Kel Ravi MD   heparin (porcine) 5,000 Units Subcutaneous Q8H [facility] Lisseth Seals PA-C   insulin glargine 30 Units Subcutaneous HS Ap Larson MD   insulin lispro 1-5 Units Subcutaneous TID AC Chase Alberto MD   insulin lispro 1-5 Units Subcutaneous HS Chase Alberto MD   insulin lispro 12 Units Subcutaneous TID With Meals Ap Larson MD   melatonin 6 mg Oral HS Dina Chisholm MD   metoprolol succinate 25 mg Oral Q12H Yani Bush MD   multivitamin-minerals 1 tablet Oral Daily Samul Homans, CRNP   ondansetron 4 mg Intravenous Q6H PRN Chase Alberto MD   pantoprazole 40 mg Oral Early Morning Chase Alberto MD   polyethylene glycol 17 g Oral Daily PRN Pk Lugo PA-C   potassium chloride 40 mEq Oral BID Teagan Xavier MD   sevelamer 800 mg Oral TID With Meals Liz Ortiz MD   sodium chloride 1 spray Each Nare PRN Kel Ravi MD   tamsulosin 0 4 mg Oral Daily With Dinner Lisseth Seals PA-C   torsemide 40 mg Oral Daily Jacinto Paiz MD   zolpidem 5 mg Oral HS PRN Kel Ravi MD          Time Spent for Care: 38 minutes  More than 50% of total time spent on counseling and coordination of care as described above        Current Length of Stay: 27 day(s)      Code Status: Level 1 - Full Code Cami Lightning Cami Lightning Cami Lightning    ** Please Note: This note is constructed using a voice recognition dictation system   **

## 2018-05-02 NOTE — SOCIAL WORK
CM met with patient and patient wife and daughter along with Dr Deb Herrera, Dr Batool Beasley, PT and CM manager  Pt's family is concerned that they were promised to have PT 7 days a week as well as having nutrition see the patient to help with his eating, because he does not eat much    Pt's daughter reports that patient was probably only seen about 2-3 times out of the week or less  Romayne Eriksson from PT explained that patient can be put on a 5 day schedule not 7 because hospital isn't a rehab facility and seeing the patient 7 times a week is not feasible  Family wanted a feeding tube placed but doctor explained that would cause more risk and isn't necessary for patient at this moment  Family was also concerned about patient being disturbed constantly and asked if patient can have quiet time between 10pm- 4am  The family was informed the more people consulted to see the patient will result in more disturbance  AS well as having the food intake monitored frequently by nursing  Overall care team and family came to an agreement of having PT  see patient 5 times a week, respiratory, and speech therapy,as well as having  nutrition see the patient  The goal is to get patient medically stable to to go [place]V  Patient will be closely watched from now til next week Friday in hopes of getting better   Family still doesn't agree with dialysis for short term because they believe the result of whether he has it or not will be possibly the same

## 2018-05-02 NOTE — PROGRESS NOTES
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, hallux and lateral heel   The left 3rd toenail bed has eschar without sign of infection from the removed toenail  2  DM neuropathy  3  Severe PAD  4  CHF with LE edema  5   Duskiness to right 1st and 2nd digit without gangrene  Minor  6   Left 3rd toe onycholysis without complication    Plan:  1  Patient's left foot is stable  Continue offloading pressure areas  Continue Betadine paint to any black eschar with DSD  No plan for any intervention at this time  Will continue to see weekly unless conditions change with his feet  Subjective/Objective   Chief Complaint:   Chief Complaint   Patient presents with    Shortness of Breath     pt reports being short of breath over the past week  Pt reports when he gets up and walks distances he has trouble breathing and chest pain  Daughter reports recent trip to [facility] where pt did not take lasix to avoid voiding  Subjective: [de-identified] y o  y/o male seen and evaluated at bedside  No acute events overnight  Denies N/F/V/SOB/CP/cough/diarrhea/constipation  Patient was resting comfortably in bed with his feet elevated and the heels offloaded  Blood pressure 106/61, pulse 76, temperature 97 7 °F (36 5 °C), temperature source Oral, resp  rate 18, height 5' 7" (1 702 m), weight 87 8 kg (193 lb 9 oz), SpO2 98 %  ,Body mass index is 30 32 kg/m²      Lab Results   Component Value Date    WBC 6 89 05/02/2018    HGB 8 6 (L) 05/02/2018    HCT 26 6 (L) 05/02/2018    MCV 85 05/02/2018     05/02/2018     Lab Results   Component Value Date    GLUCOSE 101 05/02/2018    CALCIUM 9 8 05/02/2018     (L) 05/02/2018    K 3 2 (L) 05/02/2018    CO2 31 05/02/2018    CL 89 (L) 05/02/2018     (H) 05/02/2018    CREATININE 2 58 (H) 05/02/2018         Invasive Devices     Peripheral Intravenous Line Long-Dwell Peripheral IV (Midline) 97/19/11 Left Basilic 19 days                Physical Exam:   General: alert, cooperative and no distress  Lungs:  Shallow breath sounds  Heart: regular rate and rhythm   Abdomen: soft, non-tender  Extremity:  Stable dry gangrene to the left foot at the 5th toe, lateral heel, and 3rd toe where the nail was torn off  No sign of any cellulitis or infection  The heel is tender to touch  No drainage from any site  No malodor  No acute findings the right foot        Lab, Imaging and other studies:     Imaging: I have personally reviewed pertinent films in PACS  EKG, Pathology, and Other Studies: I have personally reviewed pertinent reports  Portions of the record may have been created with voice recognition software  Occasional wrong word or "sound a like" substitutions may have occurred due to the inherent limitations of voice recognition software  Read the chart carefully and recognize, using context, where substitutions have occurred

## 2018-05-02 NOTE — PHYSICAL THERAPY NOTE
PHYSICAL THERAPY NOTE      Patient Name: Gianna Danielson  Today's Date: 5/2/2018 05/02/18 0902   Pain Assessment   Pain Assessment No/denies pain   Pain Score No Pain   Restrictions/Precautions   Weight Bearing Precautions Per Order No   Other Precautions Fall Risk;O2;Telemetry   General   Response to Previous Treatment Patient with no complaints from previous session  Family/Caregiver Present No   Subjective   Subjective Patient seated in recliner and is agreeable to therapy session  Patient stated "I'm not good though, I'm depressed and I dont know what I want "   Bed Mobility   Supine to Sit Unable to assess   Sit to Supine Unable to assess   Additional Comments Patient OOB pre and post session  Transfers   Sit to Stand 4  Minimal assistance   Additional items Assist x 1; Armrests; Increased time required;Verbal cues  (hand placement)   Stand to Sit 4  Minimal assistance   Additional items Assist x 1; Armrests; Increased time required   Additional Comments standing tolerance at RW with unilateral support for use of urinal for 1 minute with no LOB  Ambulation/Elevation   Gait pattern Decreased foot clearance;Shuffling; Short stride; Excessively slow; Foward flexed   Gait Assistance 4  Minimal assist  (with chair follow)   Additional items Assist x 1;Verbal cues   Assistive Device Rolling walker   Distance 4 steps forward, 4 steps backward  (additional not possible secondary to fatigue)   Stair Management Assistance Not tested   Balance   Static Sitting Fair +   Dynamic Sitting Fair   Static Standing Poor +   Dynamic Standing Poor +   Ambulatory Poor +   Endurance Deficit   Endurance Deficit Yes   Endurance Deficit Description SOB with activity, decreased ambulation distance and standing tolerance   Activity Tolerance   Activity Tolerance Patient limited by fatigue;Treatment limited secondary to medical complications (Comment)   Nurse Made Aware spoke to ADDY Velazquez   Exercises   Quad Sets Sitting;10 reps;AAROM; Bilateral  (with B LE elevated)   Heelslides Sitting;10 reps;AROM; Bilateral  (/c B LE elevated )   Glute Sets Sitting;10 reps;AROM; Bilateral  (x 2 sets)   Ankle Pumps Sitting;20 reps;AROM; Bilateral   Assessment   Prognosis Fair   Problem List Decreased strength;Decreased range of motion;Decreased endurance; Impaired balance;Decreased mobility; Decreased coordination;Decreased safety awareness; Obesity; Decreased skin integrity; Impaired sensation;Pain  (LE Edema)   Assessment Patient more alert this session however required encouragement throughout as patient stating feeling depressed  Patient continues to remain consistent with transfers requiring min a and 25% verbal cuing for hand placement on ascend  Gait distance and toleration has decreased to 4 steps forward and back with inability to trial more despite rest breaks  Demonstrated ability to stand with unilateral support at [facility] for 1 minute with no LOB  Patient remains limited by fatigue and greatly impacts ability to improve functional mobility  Barriers to Discharge Inaccessible home environment   Goals   Patient Goals to feel better   [facility] Expiration Date 05/04/18   Short Term Goal #1 Per last PT goal assessment    In 10-14 days, patient will be: 1  Supervision with Bed Mobility Rolling Right and Left - NOT MET 2  Supervision with Bed Mobility Supine-Sit - NOT MET 3  Supervision with Transfer Bed-Chair- NOT MET 4  Increase Dynamic Sitting Balance at least 1 Grade for improved stability with functional reach activities- NOT MET 5  Increase Dynamic Standing Balance at least 1 Grade for improved ease with Activities of Daily Living- NOT MET 6  Increase Lower Extremity Strength at least 1 Grade for improved ease mobility tasks- NOT MET 7   Supervision with Ambulation 100 feet using a rolling walker to facilitate home and community mobility- NOT MET; More goals will be determined at a future date upon completion of the aforementioned goals  Treatment Day 7   Plan   Treatment/Interventions Functional transfer training;LE strengthening/ROM; Therapeutic exercise; Endurance training;Patient/family training;Equipment eval/education; Bed mobility;Gait training  (PT to see for stair training when appropriate)   Progress Slow progress, decreased activity tolerance   PT Frequency 5x/wk   Recommendation   Recommendation Long-term skilled PT   Equipment Recommended Wheelchair; Other (Comment)  (roller walker)       Yessi Blackmon, PTA

## 2018-05-02 NOTE — PROGRESS NOTES
[address] Hospitalist Service - Internal Medicine Progress Note      PATIENT INFORMATION      Patient: Angélica Wang  [de-identified] y o  male   MRN: [id number]  PCP: Marlys Fermin MD  Unit/Bed#: -55 Encounter: [id number]  Date Of Visit: 05/02/18       ASSESSMENTS & PLAN        1  Acute respiratory failure with hypoxia  · baseline oxygen requirement is room air - remains at approximately 2 L via nasal cannula - likely secondary to CHF exacerbation (see plan below) - continue to titrate down to baseline as tolerated    2  Acute on chronic diastolic CHF - NSTEMI type 2  · last EF of 45% with mild VT, severe TR, moderate-severe MR, and diffuse hypokinesis  · currently on Torsemide/Toprol-XL regimen - net fluid balance of (-) 13 37 L thus far  · Elevated troponin level on admission noted with serial repeat showing stabilization - no further trending in over four weeks since admission - likely secondary to CHF exacerbation - cardiology following    3  Acute kidney injury - Chronic kidney disease stage 4  · creatinine mildly improved @ 2 58 from 2 88 yesterday from a peak of 3 26 earlier in hospitalization with improvement in BUN as well - baseline creatinine is approximately 1 8-2 2 - likely progressively worsening secondary to CHF  · In family meeting today they expressed that they are on board with consideration of hemodialysis once his physical therapy and nutrition are optimized    4  Insulin-dependent diabetes mellitus  · HgA1c of 7 6 on 3/2018   · c/w basal/prandial insulin regimen w/ additional SSI coverage per accuchecks     5  Atrial fibrillation  · Rate controlled on Toprol-XL - continue ASA  · Not a candidate for chronic anticoagulation secondary to comorbidities and fall risk    6  CAD - Aortic valvular disease  · Status post prior CABG - status post bioprosthetic aortic valve replacement   · Continue ASA/Lipitor/Toprol-XL    7    Hypokalemia  · Monitor/replete potassium as necessary   · Serum magnesium within normal limits     8  Hyponatremia  · waxing/waning secondary to CHF/fluid overload - discussed with Nephrology today considering Samsca initiation improved levels  · Monitor BMP and monitor fluid intake    9  Anemia of chronic disease  · H/H stable - continue to monitor       DVT Prophylaxis:  Heparin SC      SUBJECTIVE     Initially seen/examined earlier in the morning with nursing  Had a family meeting later in the day with nephrology, physical therapy, case management, and mother/daughter present  Concerns of the family were brought out to the open conversation including a lack of daily physical therapy along with not optimizing his nutrition status  The family agreed that they are contemplating initiation of dialysis once his physical therapy status and nutritional status are optimized  The patient self was sitting upright in a chair complaining of weakness/fatigue  He also noted a desire to go outside of the hospital just for 5-10 minutes just to get some fresh air and sunlight  We had a long discussion regarding relationship between kidney disease and congestive heart failure  The agreement has been made to observe him on an oral diuretic regimen over the next few days and to assess for fluid overload in his heart  Physical therapy was also present and they agreed to make him a top priority in regards to daily evaluation at least five days a week  The family is also requesting the patient to be seen by respiratory therapy for recommendations on helping him breathe better  The medical staff has been explaining to the family that his intermittent confusion and fatigue are likely due to worsening renal disease and possible development of uremia which may only fully be controlled/resolved with temporary hemodialysis    Discussion also took place regarding the patient's life expectancy with initiation of dialysis at this point in his life versus conservative measures with medication along   They will have a discussion amongst themselves and with the patient to fully understand his desires and goals and in his life  OBJECTIVE     Vitals:   Temp (24hrs), Av 6 °F (36 4 °C), Min:97 5 °F (36 4 °C), Max:97 7 °F (36 5 °C)    HR:  [76-91] 76  Resp:  [18] 18  BP: ()/(52-62) 95/52  SpO2:  [97 %-99 %] 99 %  Body mass index is 30 11 kg/m²  Input and Output Summary (last 24 hours):        Intake/Output Summary (Last 24 hours) at 18 1656  Last data filed at 18 1652   Gross per 24 hour   Intake              600 ml   Output             1520 ml   Net             -920 ml       Physical Exam:     GENERAL:  Well-developed/nourished - improving respiratory distress  HEAD:  Normocephalic - atraumatic  EYES: PERRL - EOMI   MOUTH:  Mucosa moist  NECK:  Supple - full range of motion  CARDIAC:  Irregularly irregular rhythm but rate controlled - S1/S2 positive  PULMONARY:  Diminished breath sounds with bibasilar rales   ABDOMEN:  Soft - nontender/nondistended - active bowel sounds  MUSCULOSKELETAL:  Motor strength/range of motion quite deconditioned - 1+ distal LE pitting edema  NEUROLOGIC:  Alert/oriented x 3  SKIN:  Chronic wrinkles/blemishes   PSYCHIATRIC:  Mood/affect pleasant      ADDITIONAL DATA     Labs & Recent Cultures:       Results from last 7 days  Lab Units 18  0410   WBC Thousand/uL 6 89   HEMOGLOBIN g/dL 8 6*   HEMATOCRIT % 26 6*   PLATELETS Thousands/uL 152   NEUTROS PCT % 76*   LYMPHS PCT % 12*   MONOS PCT % 10   EOS PCT % 2       Results from last 7 days  Lab Units 18  0420 18  0505   SODIUM mmol/L 131* 128*   POTASSIUM mmol/L 3 2* 3 2*   CHLORIDE mmol/L 89* 89*   CO2 mmol/L 31 30   BUN mg/dL 155* 164*   CREATININE mg/dL 2 58* 2 88*   CALCIUM mg/dL 9 8 9 4   TOTAL PROTEIN g/dL  --  6 9   BILIRUBIN TOTAL mg/dL  --  1 20*   ALK PHOS U/L  --  74   ALT U/L  --  31   AST U/L  --  18   GLUCOSE RANDOM mg/dL 101 104       Results from last 7 days  Lab Units 04/30/18  1020   INR  1 11         Last 24 Hours Medication List:     Current Facility-Administered Medications:  aspirin 81 mg Oral Daily Chase Alberto MD   atorvastatin 40 mg Oral Daily With Dinner Chase Alberto MD   calcium carbonate 1,000 mg Oral Daily PRN Chase Alberto MD   diphenhydrAMINE 12 5 mg Oral HS PRN Pipe Gama PA-C   docusate sodium 100 mg Oral BID PRN Chase Alberto MD   docusate sodium 100 mg Oral BID Sofya Suazo MD   heparin (porcine) 5,000 Units Subcutaneous Q8H [facility] Lisseth Seals PA-C   insulin glargine 30 Units Subcutaneous HS Judith Escalante MD   insulin lispro 1-5 Units Subcutaneous TID AC Chase Alberto MD   insulin lispro 1-5 Units Subcutaneous HS Chase Alberto MD   insulin lispro 12 Units Subcutaneous TID With Meals Judith Escalante MD   melatonin 6 mg Oral HS Harvey Tejada MD   metoprolol succinate 25 mg Oral Q12H Lora Ross MD   multivitamin-minerals 1 tablet Oral Daily JEWELL Sherwood   ondansetron 4 mg Intravenous Q6H PRN Chase Alberto MD   pantoprazole 40 mg Oral Early Morning Chase Alberto MD   polyethylene glycol 17 g Oral Daily PRN Pipe Gama PA-C   potassium chloride 40 mEq Oral BID Manda Miner MD   sevelamer 800 mg Oral TID With Meals Tammy Kothari MD   sodium chloride 1 spray Each Nare PRN Sofya Suazo MD   tamsulosin 0 4 mg Oral Daily With Dinner Lisseth Seals PA-C   torsemide 40 mg Oral Daily Tang Lin MD   zolpidem 5 mg Oral HS PRN Sofya Suazo MD          Time Spent for Care:  51 minutes  More than 50% of total time spent on counseling and coordination of care as described above  Current Length of Stay: 28 day(s)      Code Status: Level 1 - Full Code          ** Please Note: This note is constructed using a voice recognition dictation system  **

## 2018-05-02 NOTE — WOUND OSTOMY CARE
Progress Note - Wound   Sunshine Colon  [de-identified] y o  male MRN: [id number]  Unit/Bed#: -01 Encounter: [id number]      Assessment:  Wound care to see patient for nursing documentation of pressure injury  Per nursing patient is pending starting dialysis  Patient seen Erika Santana in recliner sitting on soft care cushion  Assist of 2 to stand using rolling walker  Continent of bowel and bladder per nursing  Nutrition is following along with patient  Podiatry is following along with patient for lower extremity wounds  Stage 1 pressure injury noted to mid sacrum, including left and right aspects  This wound is hospital acquired  Skin is intact non-blanchable redness  Tender to touch  No open aspects noted  Prabha-wound is intact with no induration or fluctuance  Hyperpigmentation & redness noted to the buttocks that is blanchable  Recommend to use foam dressing  New dressing applied  Educated patient on the importance of weight shifting, offloading and turning to relieve pressure  Patient verbalized understanding of plan of care  CC and manager of unit aware  See flow sheets for more detailed assessment findings  Wound care will follow  Primary nurse aware of plan of care  Plan:   1  Apply Allevyn foam to sacrum  Mohan with T for treatment and date  Peel back to assess skin every shift  Change every 5 days and PRN  2  Apply skin nourishing cream to the entire skin daily for moisture   3  Elevate heels off of bed with pillows to offload pressure  4  Turn and reposition patient every 2 hours   5  Soft care cushion to chair when OOB   6  Wound care will follow along with patient weekly, please call with questions and concerns [phone]  7  Wound care to lower extremities as per podiatry  Objective:    Vitals: Blood pressure 110/55, pulse 76, temperature 97 7 °F (36 5 °C), temperature source Oral, resp  rate 18, height 5' 7" (1 702 m), weight 87 2 kg (192 lb 3 9 oz), SpO2 98 %  ,Body mass index is 30 11 kg/m²      Pressure Ulcer 04/29/18 Sacrum Mid (Active)   Staging Stage I 5/2/2018 11:00 AM   Wound Description Non-blanchable erythema;Dry 5/2/2018 11:00 AM   Prabha-wound Assessment Clean;Dry; Intact; Hyperpigmented 5/2/2018 11:00 AM   Shape round 5/2/2018 11:00 AM   Wound Length (cm) 4 cm 5/2/2018 11:00 AM   Wound Width (cm) 4 cm 5/2/2018 11:00 AM   Wound Depth (cm) 0 5/2/2018 11:00 AM   Calculated Wound Area (cm^2) 16 cm^2 5/2/2018 11:00 AM   Calculated Wound Volume (cm^3) 0 cm^3 5/2/2018 11:00 AM   Tunneling 0 cm 5/2/2018 11:00 AM   Undermining 0 5/2/2018 11:00 AM   Drainage Amount None 5/2/2018 11:00 AM   Treatment Cleansed;Elevated with pillow(s); Offload;Softcare cushion;Turn & reposition 5/2/2018 11:00 AM   Dressing Foam 5/2/2018 11:00 AM   Dressing Changed New dressing applied 5/2/2018 11:00 AM   Wound packed? No 5/2/2018 11:00 AM   Packing- # removed 0 5/2/2018 11:00 AM   Packing- # inserted 0 5/2/2018 11:00 AM   Patient Tolerance Tolerated well 5/2/2018 11:00 AM   Dressing Status Clean;Dry; Intact 5/2/2018 11:00 AM     Recommendations written as orders    Savanah Vargas RN BSN

## 2018-05-02 NOTE — PLAN OF CARE
Problem: DISCHARGE PLANNING  Goal: Discharge to home or other facility with appropriate resources  INTERVENTIONS:  - Identify barriers to discharge w/patient and caregiver  - Arrange for needed discharge resources and transportation as appropriate  - Identify discharge learning needs (meds, wound care, etc )  - Arrange for interpretive services to assist at discharge as needed  - Refer to Case Management Department for coordinating discharge planning if the patient needs post-hospital services based on physician/advanced practitioner order or complex needs related to functional status, cognitive ability, or social support system   Outcome: Progressing  CM met with patient and patient wife and daughter along with Dr Estephania Nuñez, Dr Jasper Dodd, PT and CM manager  Pt's family is concerned that they were promised to have PT 7 days a week as well as having nutrition see the patient to help with his eating, because he does not eat much    Pt's daughter reports that patient was probably only seen about 2-3 times out of the week or less  Jose Dsouza from PT explained that patient can be put on a 5 day schedule not 7 because hospital isn't a rehab facility and seeing the patient 7 times a week is not feasible  Family wanted a feeding tube placed but doctor explained that would cause more risk and isn't necessary for patient at this moment  Family was also concerned about patient being disturbed constantly and asked if patient can have quiet time between 10pm- 4am  The family was informed the more people consulted to see the patient will result in more disturbance  AS well as having the food intake monitored frequently by nursing  Overall care team and family came to an agreement of having PT  see patient 5 times a week, respiratory, and speech therapy,as well as having  nutrition see the patient  The goal is to get patient medically stable to to go [facility]   Patient will be closely watched from now til next week Friday in hopes of getting better  Family still doesn't agree with dialysis for short term because they believe the result of whether he has it or not will be possibly the same

## 2018-05-02 NOTE — PROGRESS NOTES
NEPHROLOGY PROGRESS NOTE   Shawn Beltran  [de-identified] y o  male MRN: [id number]  Unit/Bed#: -01 Encounter: [id number]  Reason for Consult: CKD, SABINE    ASSESSMENT and PLAN:  1  SABINE:  · Clinically behaving like cardiorenal syndrome  UA is bland and renal US is negative  · Creatinine improved to 2 6 likely because of lower dose of diuretics ~ now Torsemide 40 mg daily  · However, remains fluid overloaded  See discussion below  · Oral intake is labile and I believe the poor oral intake is a function of uremia  See discussion below  2  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  Follows with Dr Pancho Jerez  Likely need to tolerate a higher baseline to keep euvolemic  3  CHF, volume overload: Remains fluid overloaded  Continue Torsemide 40 mg daily  As patient's family has been refusing dialysis and only allowing it as a last measure, I am trying to find an oral dose where his weight is stable  His CXR shows CHF but he is clinically stable and I will monitor weights with Torsemide 40 mg daily  4  Hyponatremia: Will give a trial of Samsca if worse tomorrow  5  Azotemia: Due to CRS  6  Hypokalemia: Kdur as ordered by [facility]  7  PAF, valvular heart disease, CAD s/p CABG  8  Anemia: Hgb stable  9  MGUS  10  MBD: high phos - continue Sevelamer  11  Weakness, deconditioning: per primary team      Devon Padillar family meeting today with SLIM, CM, nursing, and PT in attendance  Patient's wife and daughter were present  Their concerns were that they only want to do dialysis as a last option and they wanted to push nutrition and PT to hopefully have him eat better and get stronger  I explained to the family that the BUN and the creatinine are better likely because he is on a lower diuretic dose and this is not unexpected for cardiorenal syndrome  However, there is a high chance that he is going to go back into CHF with this lower diuretic dose   The family wants to continue to try nutritional efforts and more aggressive PT  From a renal standpoint, his creatinine and his BUN are better but he remains hyponatremic likely due to CHF  I am going to maintain Torsemide at 40 mg daily for now and possibly increase to 40 mg BID if his weight goes above 88 kg  I would like to try to avoid using IV diuretics so as to not worsen the azotemia and renal function which will likely worsen his uremia  I clearly explained to the patient that there is risk for worsened CHF at any time with this intervention  Again, they would like to optimize nutrition and PT before considering dialysis  SUMMARY OF RECOMMENDATIONS:  · Continue Torsemide 40 mg daily  · Samsca 15 mg PO x 1 if Na worse tomorrow  · No dialysis for now  · Optimize PT and nutrition as outlined above  SUBJECTIVE / INTERVAL HISTORY:  Appetite apparently better from yesterday night and this morning  SOB is not worse  OBJECTIVE:  Current Weight: Weight - Scale: 87 2 kg (192 lb 3 9 oz)  Vitals:    05/02/18 0600 05/02/18 0705 05/02/18 0828 05/02/18 1500   BP:  106/61 110/55 95/52   BP Location:  Right arm Right arm Right arm   Pulse:  76  76   Resp:  18  18   Temp:  97 7 °F (36 5 °C)  97 5 °F (36 4 °C)   TempSrc:  Oral  Oral   SpO2:  98%  99%   Weight: 87 2 kg (192 lb 3 9 oz)      Height:           Intake/Output Summary (Last 24 hours) at 05/02/18 1747  Last data filed at 05/02/18 1652   Gross per 24 hour   Intake              600 ml   Output             1520 ml   Net             -920 ml     General: conscious, cooperative, weak  Chest/Lungs: decreased in the bases  CVS: distinct heart sounds, normal rate  Abdomen: soft  Extremities: (+) mild LE edema  : no viera catheter  Neuro: awake, alert       Medications:    Current Facility-Administered Medications:     aspirin (ECOTRIN LOW STRENGTH) EC tablet 81 mg, 81 mg, Oral, Daily, Chase Alberto MD, 81 mg at 05/02/18 0801    atorvastatin (LIPITOR) tablet 40 mg, 40 mg, Oral, Daily With Wendy Alberto MD, 40 mg at 05/02/18 1743    calcium carbonate (TUMS) chewable tablet 1,000 mg, 1,000 mg, Oral, Daily PRN, Chase Alberto MD    diphenhydrAMINE (BENADRYL) tablet 12 5 mg, 12 5 mg, Oral, HS PRN, Jasmin Alfaro PA-C, 12 5 mg at 04/29/18 0107    docusate sodium (COLACE) capsule 100 mg, 100 mg, Oral, BID PRN, Chase Alberto MD, 100 mg at 04/10/18 1005    docusate sodium (COLACE) capsule 100 mg, 100 mg, Oral, BID, Kirt Doshi MD, 100 mg at 05/02/18 1743    heparin (porcine) subcutaneous injection 5,000 Units, 5,000 Units, Subcutaneous, Q8H [facility], Lisseth Seals PA-C, 5,000 Units at 05/02/18 1307    insulin glargine (LANTUS) subcutaneous injection 30 Units 0 3 mL, 30 Units, Subcutaneous, HS, Maximo Wang MD, 30 Units at 05/01/18 2244    insulin lispro (HumaLOG) 100 units/mL subcutaneous injection 1-5 Units, 1-5 Units, Subcutaneous, TID AC, 2 Units at 05/02/18 1302 **AND** [CANCELED] Fingerstick Glucose (POCT), , , TID AC, Chase Alberto MD    insulin lispro (HumaLOG) 100 units/mL subcutaneous injection 1-5 Units, 1-5 Units, Subcutaneous, HS, Chase Alberto MD, 1 Units at 04/30/18 2154    insulin lispro (HumaLOG) 100 units/mL subcutaneous injection 12 Units, 12 Units, Subcutaneous, TID With Meals, Maximo Wang MD, 12 Units at 05/02/18 1302    melatonin tablet 6 mg, 6 mg, Oral, HS, Israel Almaraz MD, 6 mg at 05/01/18 2244    metoprolol succinate (TOPROL-XL) 24 hr tablet 25 mg, 25 mg, Oral, Q12H, Pranay Long MD, 25 mg at 05/02/18 4075    multivitamin-minerals (CENTRUM) tablet 1 tablet, 1 tablet, Oral, Daily, Deborra Kind, CRNP, 1 tablet at 05/02/18 0802    ondansetron (ZOFRAN) injection 4 mg, 4 mg, Intravenous, Q6H PRN, Chase Alberto MD    pantoprazole (PROTONIX) EC tablet 40 mg, 40 mg, Oral, Early Morning, Chase Alberto MD, 40 mg at 05/02/18 0511    polyethylene glycol (MIRALAX) packet 17 g, 17 g, Oral, Daily PRN, Jasmin Alfaro PA-C, 17 g at 04/29/18 5275    potassium chloride (K-DUR,KLOR-CON) CR tablet 40 mEq, 40 mEq, Oral, BID, Mitchell Temple MD, 40 mEq at 05/02/18 1742    sevelamer (RENAGEL) tablet 800 mg, 800 mg, Oral, TID With Meals, Mesfin Dumont MD, 800 mg at 05/02/18 1743    sodium chloride (OCEAN) 0 65 % nasal spray 1 spray, 1 spray, Each Nare, PRN, Cher Barragan MD    tamsulosin [facility]) capsule 0 4 mg, 0 4 mg, Oral, Daily With Serena Seals PA-C, 0 4 mg at 05/02/18 1742    torsemide (DEMADEX) tablet 40 mg, 40 mg, Oral, Daily, Teresas Frey MD, 40 mg at 05/02/18 0802    zolpidem (AMBIEN) tablet 5 mg, 5 mg, Oral, HS PRN, Cher Barragan MD    Laboratory Results:    Results from last 7 days  Lab Units 05/02/18  0420 05/02/18  0410 05/01/18  0505 04/30/18  1020 04/30/18  0509 04/29/18  2050 04/29/18  0924 04/28/18  0524 04/27/18  0550   WBC Thousand/uL  --  6 89 7 61 6 63  --   --   --   --  8 81   HEMOGLOBIN g/dL  --  8 6* 8 6* 9 0*  --   --   --   --  9 1*   HEMATOCRIT %  --  26 6* 27 1* 27 7*  --   --   --   --  29 2*   PLATELETS Thousands/uL  --  152 147* 153  --   --   --   --  182   SODIUM mmol/L 131*  --  128*  --  127* 126* 126* 130* 132*   POTASSIUM mmol/L 3 2*  --  3 2*  --  3 9 4 5 4 4 3 7 3 1*   CHLORIDE mmol/L 89*  --  89*  --  87* 86* 86* 87* 86*   CO2 mmol/L 31  --  30  --  30 31 32 35* 38*   BUN mg/dL 155*  --  164*  --  160* 162* 158* 162* 163*   CREATININE mg/dL 2 58*  --  2 88*  --  2 96* 3 19* 2 95* 2 63* 2 80*   CALCIUM mg/dL 9 8  --  9 4  --  10 0 9 6 9 9 9 8 9 7   MAGNESIUM mg/dL  --   --  3 2*  --   --   --   --   --  3 1*   PHOSPHORUS mg/dL  --   --  4 2*  --   --   --   --   --  4 8*   TOTAL PROTEIN g/dL  --   --  6 9  --   --   --   --   --   --    GLUCOSE RANDOM mg/dL 101  --  104  --  138 213* 170* 103 127     Previous work up:  4/6/18 Renal US: no hydronephrosis, distended bladder, right renal cyst    4/5/18 UA - bland  No blood, no protein

## 2018-05-02 NOTE — PROGRESS NOTES
Cardiology Progress Note - Angélica Wang  [de-identified] y o  male MRN: [id number]    Unit/Bed#: -01 Encounter: [id number]        Subjective:    No significant events overnight  Still with dyspnea  Feels poorly  Review of Systems   Cardiovascular: Negative for chest pain, leg swelling and palpitations  Respiratory: Positive for shortness of breath  Objective:   Vitals: Blood pressure 110/55, pulse 76, temperature 97 7 °F (36 5 °C), temperature source Oral, resp  rate 18, height 5' 7" (1 702 m), weight 87 2 kg (192 lb 3 9 oz), SpO2 98 %  , Body mass index is 30 11 kg/m² ,   Orthostatic Blood Pressures      Most Recent Value   Blood Pressure  110/55 filed at 05/02/2018 0828   Patient Position - Orthostatic VS  Sitting filed at 05/02/2018 8183         Systolic (22QWR), JKJ:159 , Min:100 , VOR:993     Diastolic (43CMD), IEM:68, Min:51, Max:62      Intake/Output Summary (Last 24 hours) at 05/02/18 1032  Last data filed at 05/02/18 1008   Gross per 24 hour   Intake              280 ml   Output             1845 ml   Net            -1565 ml     Weight (last 2 days)     Date/Time   Weight    05/02/18 0600  87 2 (192 24)    05/01/18 0600  87 8 (193 56)    04/30/18 0600  92 6 (204 15)            Telemetry Review: Afib    Physical Exam   Cardiovascular: Normal rate and normal heart sounds  An irregularly irregular rhythm present  Exam reveals no gallop and no friction rub  No murmur heard  Pulmonary/Chest: Breath sounds normal  He has no wheezes  He has no rales  Musculoskeletal: He exhibits no edema           Laboratory Results:        CBC with diff:     Results from last 7 days  Lab Units 05/02/18  0410 05/01/18  0505 04/30/18  1020 04/27/18  0550   WBC Thousand/uL 6 89 7 61 6 63 8 81   HEMOGLOBIN g/dL 8 6* 8 6* 9 0* 9 1*   HEMATOCRIT % 26 6* 27 1* 27 7* 29 2*   MCV fL 85 84 85 87   PLATELETS Thousands/uL 152 147* 153 182   MCH pg 27 4 26 8 27 6 27 0   MCHC g/dL 32 3 31 7 32 5 31 2*   RDW % 16 4* 16 2* 16 5* 15 9*   MPV fL 10 3 10 2 10 5 10 5         CMP:    Results from last 7 days  Lab Units 05/02/18  0420 05/01/18  0505 04/30/18  0509 04/29/18 2050 04/29/18 0924 04/28/18  0524 04/27/18  0550   SODIUM mmol/L 131* 128* 127* 126* 126* 130* 132*   POTASSIUM mmol/L 3 2* 3 2* 3 9 4 5 4 4 3 7 3 1*   CHLORIDE mmol/L 89* 89* 87* 86* 86* 87* 86*   CO2 mmol/L 31 30 30 31 32 35* 38*   ANION GAP mmol/L 11 9 10 9 8 8 8   BUN mg/dL 155* 164* 160* 162* 158* 162* 163*   CREATININE mg/dL 2 58* 2 88* 2 96* 3 19* 2 95* 2 63* 2 80*   GLUCOSE RANDOM mg/dL 101 104 138 213* 170* 103 127   CALCIUM mg/dL 9 8 9 4 10 0 9 6 9 9 9 8 9 7   AST U/L  --  18  --   --   --   --   --    ALT U/L  --  31  --   --   --   --   --    ALK PHOS U/L  --  74  --   --   --   --   --    TOTAL PROTEIN g/dL  --  6 9  --   --   --   --   --    BILIRUBIN TOTAL mg/dL  --  1 20*  --   --   --   --   --    EGFR ml/min/1 73sq m 23 20 19 17 19 22 20         BMP:    Results from last 7 days  Lab Units 05/02/18  0420 05/01/18  0505 04/30/18  0509 04/29/18 2050 04/29/18 0924 04/28/18  0524 04/27/18  0550   SODIUM mmol/L 131* 128* 127* 126* 126* 130* 132*   POTASSIUM mmol/L 3 2* 3 2* 3 9 4 5 4 4 3 7 3 1*   CHLORIDE mmol/L 89* 89* 87* 86* 86* 87* 86*   CO2 mmol/L 31 30 30 31 32 35* 38*   BUN mg/dL 155* 164* 160* 162* 158* 162* 163*   CREATININE mg/dL 2 58* 2 88* 2 96* 3 19* 2 95* 2 63* 2 80*   GLUCOSE RANDOM mg/dL 101 104 138 213* 170* 103 127   CALCIUM mg/dL 9 8 9 4 10 0 9 6 9 9 9 8 9 7         Magnesium:     Results from last 7 days  Lab Units 05/01/18  0505 04/27/18  0550   MAGNESIUM mg/dL 3 2* 3 1*               Cardiac testing:   Results for orders placed during the hospital encounter of 04/04/18   Echo complete with contrast if indicated    Narrative [address] [place] [address]  [phone]    Transthoracic Echocardiogram  2D, M-mode, Doppler, and Color Doppler    Study date:  11-Apr-2018    Patient: Joane Litten  MR number: [id number]  Account number: [de-identified]  : 1935  Age: [de-identified] years  Gender: Male  Status: Inpatient  Location: Bedside  Height: 67 in  Weight: 213 6 lb  BP: 108/ 58 mmHg    Indications: Assess left ventricular function  Diagnoses: I50 9 - Heart failure, unspecified    Sonographer:  MASON Clifton  Primary Physician:  Mark Ford MD  Referring Physician:  Smith Tolbert MD  Group:  [facility]  Interpreting Physician:  Smith Tolbert MD    SUMMARY    LEFT VENTRICLE:  Systolic function was mildly reduced  Ejection fraction was estimated to be 45 %  There was mild diffuse hypokinesis  There was moderate hypokinesis of the apical septal wall(s)  Wall thickness was mildly increased  There was mild concentric hypertrophy  RIGHT VENTRICLE:  The ventricle was mildly to moderately dilated  Systolic function was reduced  LEFT ATRIUM:  The atrium was moderately dilated  RIGHT ATRIUM:  The atrium was moderately dilated  MITRAL VALVE:  There was marked annular calcification  There was moderate to severe regurgitation  AORTIC VALVE:  A bioprosthesis was present  It exhibited normal function  Doppler cardiac output was 3 5 L/min, using LVOT flow data  Doppler cardiac index was 1 68 L/min/m squared, using LVOT flow data  TRICUSPID VALVE:  There was severe regurgitation  Pulmonary artery systolic pressure was markedly increased  Estimated peak PA pressure was 65 mmHg  PULMONIC VALVE:  Notching of the systolic pulse wave is noted, suggesting elevated pulmonary artery pressures  There was mild regurgitation  HISTORY: PRIOR HISTORY: CAD s/p CABG, s/p AVR, Afib, Hypertension, Hyperlipidemia, Heart failure    PROCEDURE: The procedure was performed at the bedside  This was a routine study  The transthoracic approach was used  The study included complete 2D imaging, M-mode, complete spectral Doppler, and color Doppler   The heart rate was 89 bpm,  at the start of the study  Images were obtained from the parasternal, apical, subcostal, and suprasternal notch acoustic windows  Echocardiographic views were limited due to decreased penetration and lung interference  This was a  technically difficult study  LEFT VENTRICLE: Size was normal  Systolic function was mildly reduced  Ejection fraction was estimated to be 45 %  There was mild diffuse hypokinesis  There was moderate hypokinesis of the apical septal wall(s)  Wall thickness was mildly  increased  There was mild concentric hypertrophy  DOPPLER: Transmitral flow pattern: atrial fibrillation  RIGHT VENTRICLE: The ventricle was mildly to moderately dilated  Systolic function was reduced  LEFT ATRIUM: The atrium was moderately dilated  RIGHT ATRIUM: The atrium was moderately dilated  MITRAL VALVE: There was marked annular calcification  Valve structure was normal  There was normal leaflet separation  DOPPLER: The transmitral velocity was within the normal range  There was no evidence for stenosis  There was moderate to  severe regurgitation  AORTIC VALVE: A bioprosthesis was present  It exhibited normal function  TRICUSPID VALVE: The annulus was dilated  There was normal leaflet separation  DOPPLER: The transtricuspid velocity was within the normal range  There was no evidence for stenosis  There was severe regurgitation  Pulmonary artery systolic  pressure was markedly increased  Estimated peak PA pressure was 65 mmHg  PULMONIC VALVE: Notching of the systolic pulse wave is noted, suggesting elevated pulmonary artery pressures  Leaflets exhibited normal thickness, no calcification, and normal cuspal separation  DOPPLER: The transpulmonic velocity was  within the normal range  There was mild regurgitation  PERICARDIUM: There was no pericardial effusion  AORTA: The root exhibited normal size      MEASUREMENT TABLES    2D MEASUREMENTS  LVOT   (Reference normals)  Diam   20 5 mm   (--)    DOPPLER MEASUREMENTS  LVOT   (Reference normals)  VTI   13 6 cm   (--)  R-R interval   769 ms   (--)  HR   78 bpm   (--)  Stroke vol   44 89 ml   (--)  Cardiac output   3 5 L/min   (--)  Cardiac index   1 68 L/min/m squared   (--)    SYSTEM MEASUREMENT TABLES    2D  %FS: 21 04 %  AV Diam: 3 14 cm  EDV(Teich): 91 37 ml  EF(Cube): 50 77 %  EF(Teich): 42 96 %  ESV(Cube): 44 19 ml  ESV(Teich): 52 12 ml  IVSd: 1 25 cm  LA Area: 32 18 cm2  LA Diam: 4 78 cm  LVEDV MOD A4C: 143 38 ml  LVEF MOD A4C: 30 68 %  LVESV MOD A4C: 99 4 ml  LVIDd: 4 48 cm  LVIDs: 3 54 cm  LVLd A4C: 8 59 cm  LVLs A4C: 8 06 cm  LVPWd: 1 17 cm  RA Area: 26 08 cm2  RV Diam: 4 04 cm  SI(Cube): 21 92 ml/m2  SI(Teich): 18 87 ml/m2  SV MOD A4C: 43 98 ml  SV(Cube): 45 58 ml  SV(Teich): 39 25 ml    CW  TR MaxP 39 mmHg  TR Vmax: 3 85 m/s    MM  TAPSE: 1 9 cm    [facility] [place] [facility] Accredited Ech[facility]    Prepared and electronically signed by    Clover Sauceda MD  Signed 2018 18:07:57               Meds/Allergies     Current Facility-Administered Medications:  acetaminophen 325 mg Oral Q8H PRN Herbie Dubose MD   aspirin 81 mg Oral Daily Chase Alberto MD   atorvastatin 40 mg Oral Daily With Dinner Chase Alberto MD   calcium carbonate 1,000 mg Oral Daily PRN Chase Alberto MD   dextrose       diphenhydrAMINE 12 5 mg Oral HS PRN Leland Rasheed PA-C   docusate sodium 100 mg Oral BID PRN Chase Alberto MD   docusate sodium 100 mg Oral BID Ozzie Hobson MD   heparin (porcine) 5,000 Units Subcutaneous Q8H [facility] Lisseth Seals PA-C   insulin glargine 30 Units Subcutaneous HS Nereida Hines MD   insulin lispro 1-5 Units Subcutaneous TID AC Chase Alberto MD   insulin lispro 1-5 Units Subcutaneous HS Chase Alberto MD   insulin lispro 12 Units Subcutaneous TID With Meals Jan Purvis MD   melatonin 6 mg Oral HS [place] Paulino MD   metoprolol succinate 25 mg Oral Q12H Magno Ortiz MD multivitamin-minerals 1 tablet Oral Daily JEWELL Barrios   ondansetron 4 mg Intravenous Q6H PRN Chase Alberto MD   pantoprazole 40 mg Oral Early Morning Chase Alberto MD   polyethylene glycol 17 g Oral Daily PRN Vijay Lopes PA-C   potassium chloride 40 mEq Oral BID Liv Hui MD   sevelamer 800 mg Oral TID With Meals Ramos Verduzco MD   sodium chloride 1 spray Each Nare PRN Mikal Benitez MD   tamsulosin 0 4 mg Oral Daily With Dinner Lisseth Seals PA-C   torsemide 40 mg Oral Daily Norma Murray MD   zolpidem 5 mg Oral HS PRN Mikal Benitez MD        Prescriptions Prior to Admission   Medication    aspirin (ECOTRIN LOW STRENGTH) 81 mg EC tablet    atorvastatin (LIPITOR) 40 mg tablet    febuxostat (ULORIC) 40 mg tablet    metoprolol tartrate (LOPRESSOR) 25 mg tablet    metoprolol tartrate (LOPRESSOR) 50 mg tablet    multivitamin-iron-minerals-folic acid (CENTRUM) chewable tablet    NOVOLOG MIX 70/30 FLEXPEN (70-30) 100 UNIT/ML SUPN    omeprazole (PriLOSEC) 20 mg delayed release capsule    paricalcitol (ZEMPLAR) 1 mcg capsule    ramipril (ALTACE) 2 5 mg capsule    torsemide (DEMADEX) 20 mg tablet       Assessment:  Principal Problem:    Acute on chronic diastolic congestive heart failure (HCC)  Active Problems:    Rapid atrial fibrillation (HCC)    Benign essential hypertension    Type 2 diabetes mellitus (HCC)    Peripheral arterial disease (HCC)    Acute-on-chronic kidney injury (HCC)    Non-ST elevation myocardial infarction (NSTEMI) ([facility] [address] )    Pulmonary hypertension (HCC)    Azotemia    Hyponatremia      Impression:  1  Acute on chronic diastolic heart failure - appears reasonably compensated  Deferring to nephrology for diuretic dosing, but he appears somewhat volume overloaded  2  Acute on chronic renal failure - no significant improvement  May benefit from temporary HD  3  Atrial fibrillation - rate controlled  Off anticoagulation due to fall risk     4  CAD - stable  5  Valvular heart disease - s/p AVR with moderate to severe MR/TR  6  Hyponatremia    Plan:  1  Continue diuretics per nephrology  HD on hold until decision made per family  2  Continue remainder of cardiac medications

## 2018-05-02 NOTE — PLAN OF CARE
Problem: PHYSICAL THERAPY ADULT  Goal: Performs mobility at highest level of function for planned discharge setting  See evaluation for individualized goals  Treatment/Interventions: Functional transfer training, LE strengthening/ROM, Therapeutic exercise, Endurance training, Patient/family training, Equipment eval/education, Bed mobility, Gait training (PT to see when stair training is appropriate )          See flowsheet documentation for full assessment, interventions and recommendations  Outcome: Not Progressing  Prognosis: Fair  Problem List: Decreased strength, Decreased range of motion, Decreased endurance, Impaired balance, Decreased mobility, Decreased coordination, Decreased safety awareness, Obesity, Decreased skin integrity, Impaired sensation, Pain (LE Edema)  Assessment: Patient more alert this session however required encouragement throughout as patient stating feeling depressed  Patient continues to remain consistent with transfers requiring min a and 25% verbal cuing for hand placement on ascend  Gait distance and toleration has decreased to 4 steps forward and back with inability to trial more despite rest breaks  Demonstrated ability to stand with unilateral support [place] [facility] [place] with no LOB  Patient remains limited by fatigue and greatly impacts ability to improve functional mobility  Barriers to Discharge: Inaccessible home environment     Recommendation: Long-term skilled PT     PT - OK to Discharge: Yes (when medically cleared to LT skilled PT)    See flowsheet documentation for full assessment

## 2018-05-03 NOTE — PROGRESS NOTES
Cardiology Progress Note - Jennifer Long  [de-identified] y o  male MRN: [id number]    Unit/Bed#: -01 Encounter: [id number]        Subjective:    No significant events overnight  Still with dyspnea  Somnolent  Review of Systems   Cardiovascular: Negative for chest pain, leg swelling and palpitations  Respiratory: Positive for shortness of breath  Objective:   Vitals: Blood pressure 110/52, pulse 84, temperature 98 6 °F (37 °C), temperature source Oral, resp  rate 18, height 5' 7" (1 702 m), weight 87 2 kg (192 lb 3 9 oz), SpO2 92 %  , Body mass index is 30 11 kg/m² ,   Orthostatic Blood Pressures      Most Recent Value   Blood Pressure  110/52 filed at 05/03/2018 0846   Patient Position - Orthostatic VS  Lying filed at 05/03/2018 5227         Systolic (60ALL), MJA:892 , Min:95 , SXZ:131     Diastolic (19CJZ), ZJF:87, Min:51, Max:52      Intake/Output Summary (Last 24 hours) at 05/03/18 0854  Last data filed at 05/03/18 0816   Gross per 24 hour   Intake              800 ml   Output             1320 ml   Net             -520 ml     Weight (last 2 days)     Date/Time   Weight    05/02/18 0600  87 2 (192 24)    05/01/18 0600  87 8 (193 56)            Telemetry Review: Afib    Physical Exam   Cardiovascular: Normal rate and normal heart sounds  An irregularly irregular rhythm present  Exam reveals no gallop and no friction rub  No murmur heard  Pulmonary/Chest: Breath sounds normal  He has no wheezes  He has no rales  Musculoskeletal: He exhibits no edema           Laboratory Results:        CBC with diff:     Results from last 7 days  Lab Units 05/03/18  0520 05/02/18  0410 05/01/18  0505 04/30/18  1020 04/27/18  0550   WBC Thousand/uL 7 92 6 89 7 61 6 63 8 81   HEMOGLOBIN g/dL 8 7* 8 6* 8 6* 9 0* 9 1*   HEMATOCRIT % 27 3* 26 6* 27 1* 27 7* 29 2*   MCV fL 86 85 84 85 87   PLATELETS Thousands/uL 177 152 147* 153 182   MCH pg 27 4 27 4 26 8 27 6 27 0   MCHC g/dL 31 9 32 3 31 7 32 5 31 2*   RDW % 16 7* 16 4* 16 2* 16 5* 15 9*   MPV fL 10 7 10 3 10 2 10 5 10 5         CMP:    Results from last 7 days  Lab Units 05/03/18  0520 05/02/18  0420 05/01/18  0505 04/30/18  0509 04/29/18 2050 04/29/18  0924 04/28/18  0524   SODIUM mmol/L 132* 131* 128* 127* 126* 126* 130*   POTASSIUM mmol/L 4 8 3 2* 3 2* 3 9 4 5 4 4 3 7   CHLORIDE mmol/L 92* 89* 89* 87* 86* 86* 87*   CO2 mmol/L 30 31 30 30 31 32 35*   ANION GAP mmol/L 10 11 9 10 9 8 8   BUN mg/dL 141* 155* 164* 160* 162* 158* 162*   CREATININE mg/dL 2 55* 2 58* 2 88* 2 96* 3 19* 2 95* 2 63*   GLUCOSE RANDOM mg/dL 121 101 104 138 213* 170* 103   CALCIUM mg/dL 9 8 9 8 9 4 10 0 9 6 9 9 9 8   AST U/L  --   --  18  --   --   --   --    ALT U/L  --   --  31  --   --   --   --    ALK PHOS U/L  --   --  74  --   --   --   --    TOTAL PROTEIN g/dL  --   --  6 9  --   --   --   --    BILIRUBIN TOTAL mg/dL  --   --  1 20*  --   --   --   --    EGFR ml/min/1 73sq m 23 23 20 19 17 19 22         BMP:    Results from last 7 days  Lab Units 05/03/18  0520 05/02/18  0420 05/01/18  0505 04/30/18  0509 04/29/18 2050 04/29/18  0924 04/28/18  0524   SODIUM mmol/L 132* 131* 128* 127* 126* 126* 130*   POTASSIUM mmol/L 4 8 3 2* 3 2* 3 9 4 5 4 4 3 7   CHLORIDE mmol/L 92* 89* 89* 87* 86* 86* 87*   CO2 mmol/L 30 31 30 30 31 32 35*   BUN mg/dL 141* 155* 164* 160* 162* 158* 162*   CREATININE mg/dL 2 55* 2 58* 2 88* 2 96* 3 19* 2 95* 2 63*   GLUCOSE RANDOM mg/dL 121 101 104 138 213* 170* 103   CALCIUM mg/dL 9 8 9 8 9 4 10 0 9 6 9 9 9 8         Magnesium:     Results from last 7 days  Lab Units 05/01/18  0505 04/27/18  0550   MAGNESIUM mg/dL 3 2* 3 1*               Cardiac testing:   Results for orders placed during the hospital encounter of 04/04/18   Echo complete with contrast if indicated    Narrative [facility], [address] [facility]  [phone]    Transthoracic Echocardiogram  2D, M-mode, Doppler, and Color Doppler    Study date:  11-Apr-2018    Patient: Jerardo Ibrahim BERTHA  MR number: [id number]  Account number: [de-identified]  : 1935  Age: [de-identified] years  Gender: Male  Status: Inpatient  Location: Bedside  Height: 67 in  Weight: 213 6 lb  BP: 108/ 58 mmHg    Indications: Assess left ventricular function  Diagnoses: I50 9 - Heart failure, unspecified    Sonographer:  MASON Head  Primary Physician:  Veronica Bella MD  Referring Physician:  Awais Florentino MD  Group:  [facility]  Interpreting Physician:  Awais Florentino MD    SUMMARY    LEFT VENTRICLE:  Systolic function was mildly reduced  Ejection fraction was estimated to be 45 %  There was mild diffuse hypokinesis  There was moderate hypokinesis of the apical septal wall(s)  Wall thickness was mildly increased  There was mild concentric hypertrophy  RIGHT VENTRICLE:  The ventricle was mildly to moderately dilated  Systolic function was reduced  LEFT ATRIUM:  The atrium was moderately dilated  RIGHT ATRIUM:  The atrium was moderately dilated  MITRAL VALVE:  There was marked annular calcification  There was moderate to severe regurgitation  AORTIC VALVE:  A bioprosthesis was present  It exhibited normal function  Doppler cardiac output was 3 5 L/min, using LVOT flow data  Doppler cardiac index was 1 68 L/min/m squared, using LVOT flow data  TRICUSPID VALVE:  There was severe regurgitation  Pulmonary artery systolic pressure was markedly increased  Estimated peak PA pressure was 65 mmHg  PULMONIC VALVE:  Notching of the systolic pulse wave is noted, suggesting elevated pulmonary artery pressures  There was mild regurgitation  HISTORY: PRIOR HISTORY: CAD s/p CABG, s/p AVR, Afib, Hypertension, Hyperlipidemia, Heart failure    PROCEDURE: The procedure was performed at the bedside  This was a routine study  The transthoracic approach was used  The study included complete 2D imaging, M-mode, complete spectral Doppler, and color Doppler   The heart rate was 89 bpm,  at the start of the study  Images were obtained from the parasternal, apical, subcostal, and suprasternal notch acoustic windows  Echocardiographic views were limited due to decreased penetration and lung interference  This was a  technically difficult study  LEFT VENTRICLE: Size was normal  Systolic function was mildly reduced  Ejection fraction was estimated to be 45 %  There was mild diffuse hypokinesis  There was moderate hypokinesis of the apical septal wall(s)  Wall thickness was mildly  increased  There was mild concentric hypertrophy  DOPPLER: Transmitral flow pattern: atrial fibrillation  RIGHT VENTRICLE: The ventricle was mildly to moderately dilated  Systolic function was reduced  LEFT ATRIUM: The atrium was moderately dilated  RIGHT ATRIUM: The atrium was moderately dilated  MITRAL VALVE: There was marked annular calcification  Valve structure was normal  There was normal leaflet separation  DOPPLER: The transmitral velocity was within the normal range  There was no evidence for stenosis  There was moderate to  severe regurgitation  AORTIC VALVE: A bioprosthesis was present  It exhibited normal function  TRICUSPID VALVE: The annulus was dilated  There was normal leaflet separation  DOPPLER: The transtricuspid velocity was within the normal range  There was no evidence for stenosis  There was severe regurgitation  Pulmonary artery systolic  pressure was markedly increased  Estimated peak PA pressure was 65 mmHg  PULMONIC VALVE: Notching of the systolic pulse wave is noted, suggesting elevated pulmonary artery pressures  Leaflets exhibited normal thickness, no calcification, and normal cuspal separation  DOPPLER: The transpulmonic velocity was  within the normal range  There was mild regurgitation  PERICARDIUM: There was no pericardial effusion  AORTA: The root exhibited normal size      MEASUREMENT TABLES    2D MEASUREMENTS  LVOT   (Reference normals)  Diam   20 5 mm (--)    DOPPLER MEASUREMENTS  LVOT   (Reference normals)  VTI   13 6 cm   (--)  R-R interval   769 ms   (--)  HR   78 bpm   (--)  Stroke vol   44 89 ml   (--)  Cardiac output   3 5 L/min   (--)  Cardiac index   1 68 L/min/m squared   (--)    SYSTEM MEASUREMENT TABLES    2D  %FS: 21 04 %  AV Diam: 3 14 cm  EDV(Teich): 91 37 ml  EF(Cube): 50 77 %  EF(Teich): 42 96 %  ESV(Cube): 44 19 ml  ESV(Teich): 52 12 ml  IVSd: 1 25 cm  LA Area: 32 18 cm2  LA Diam: 4 78 cm  LVEDV MOD A4C: 143 38 ml  LVEF MOD A4C: 30 68 %  LVESV MOD A4C: 99 4 ml  LVIDd: 4 48 cm  LVIDs: 3 54 cm  LVLd A4C: 8 59 cm  LVLs A4C: 8 06 cm  LVPWd: 1 17 cm  RA Area: 26 08 cm2  RV Diam: 4 04 cm  SI(Cube): 21 92 ml/m2  SI(Teich): 18 87 ml/m2  SV MOD A4C: 43 98 ml  SV(Cube): 45 58 ml  SV(Teich): 39 25 ml    CW  TR MaxP 39 mmHg  TR Vmax: 3 85 m/s    MM  TAPSE: 1 9 cm    [facility] Accredited Echocardiography Laboratory    Prepared and electronically signed by    Kristy Hernandez MD  Signed 2018 18:07:57               Meds/Allergies     Current Facility-Administered Medications:  aspirin 81 mg Oral Daily Chase Alberto MD   atorvastatin 40 mg Oral Daily With Dinner Chsae Alberto MD   calcium carbonate 1,000 mg Oral Daily PRN Chase Alberto MD   diphenhydrAMINE 12 5 mg Oral HS PRN Mayank Mishra PA-C   docusate sodium 100 mg Oral BID PRN Chase Alberto MD   docusate sodium 100 mg Oral BID Gentry Ayoub MD   heparin (porcine) 5,000 Units Subcutaneous Q8H [facility] Lisseth Seals PA-C   insulin glargine 30 Units Subcutaneous HS Nereida Hines MD   insulin lispro 1-5 Units Subcutaneous TID AC Chase Alberto MD   insulin lispro 1-5 Units Subcutaneous HS Chase Alberto MD   insulin lispro 12 Units Subcutaneous TID With Meals Koki Islas MD   melatonin 6 mg Oral HS Patrizia Hoyt MD   metoprolol succinate 25 mg Oral Q12H Bayron Mendes MD   multivitamin-minerals 1 tablet Oral Daily JEWELL Alvarez   ondansetron 4 mg Intravenous Q6H PRN Chase Alberto MD   pantoprazole 40 mg Oral Early Morning Chase Alberto MD   polyethylene glycol 17 g Oral Daily PRN Yunior Rangel PA-C   sevelamer 800 mg Oral TID With Meals Jocelyn Story MD   sodium chloride 1 spray Each Nare PRN Papito Oreilly MD   tamsulosin 0 4 mg Oral Daily With Dinner Lisseth Seals PA-C   torsemide 40 mg Oral Daily Angie Brewer MD   zolpidem 5 mg Oral HS PRN Papito Oreilly MD        Prescriptions Prior to Admission   Medication    aspirin (ECOTRIN LOW STRENGTH) 81 mg EC tablet    atorvastatin (LIPITOR) 40 mg tablet    febuxostat (ULORIC) 40 mg tablet    metoprolol tartrate (LOPRESSOR) 25 mg tablet    metoprolol tartrate (LOPRESSOR) 50 mg tablet    multivitamin-iron-minerals-folic acid (CENTRUM) chewable tablet    NOVOLOG MIX 70/30 FLEXPEN (70-30) 100 UNIT/ML SUPN    omeprazole (PriLOSEC) 20 mg delayed release capsule    paricalcitol (ZEMPLAR) 1 mcg capsule    ramipril (ALTACE) 2 5 mg capsule    torsemide (DEMADEX) 20 mg tablet       Assessment:  Principal Problem:    Acute on chronic diastolic congestive heart failure (HCC)  Active Problems:    Rapid atrial fibrillation (HCC)    Benign essential hypertension    Type 2 diabetes mellitus ([facility] )    Peripheral arterial disease (HCC)    Acute-on-chronic kidney injury (HCC)    Non-ST elevation myocardial infarction (NSTEMI) [facility] )    Pulmonary hypertension (HCC)    Azotemia    Hyponatremia      Impression:  1  Acute on chronic diastolic heart failure - appears reasonably compensated  Deferring to nephrology for diuretic dosing, but he appears somewhat volume overloaded  2  Acute on chronic renal failure - slight improvement  3  Atrial fibrillation - rate controlled  Off anticoagulation due to fall risk  4  CAD - stable  5  Valvular heart disease - s/p AVR with moderate to severe MR/TR  6  Hyponatremia - improving  Plan:  1  Continue diuretics per nephrology    Appreciate   Jaime's efforts  Will maintain lower dose of oral diuretics, and hopefully will remain compensated  2  Continue remainder of cardiac medications

## 2018-05-03 NOTE — PHYSICAL THERAPY NOTE
PHYSICAL THERAPY NOTE      Patient Name: Angelito Turner  Today's Date: 5/3/2018        05/03/18 1433   Pain Assessment   Pain Assessment No/denies pain   Pain Score No Pain   Restrictions/Precautions   Weight Bearing Precautions Per Order No   Other Precautions Fall Risk;O2;Telemetry   General   Family/Caregiver Present Yes  (wife)   Subjective   Subjective Patient seated in recliner and is agreeable to therapy session  Bed Mobility   Supine to Sit Unable to assess   Sit to Supine Unable to assess   Additional Comments Patient seated OOB in recliner pre and post session with call bell and belongings in reach  Transfers   Sit to Stand 4  Minimal assistance   Additional items Assist x 1; Increased time required;Verbal cues  (for hand and foot placement)   Stand to Sit 4  Minimal assistance   Additional items Assist x 1;Verbal cues  (body positioning and hand placement)   Additional Comments Multiple STS transfers from recliner with standing tolerance of 2 minutes with B UE support on RE  Ambulation/Elevation   Gait pattern Decreased foot clearance;Shuffling; Short stride; Excessively slow; Foward flexed   Gait Assistance 4  Minimal assist   Additional items Assist x 1;Verbal cues   Assistive Device Rolling walker   Distance 5 feet forward and backward, 3 feet forward and backward   Balance   Static Sitting Fair +   Dynamic Sitting Fair   Static Standing Poor +   Dynamic Standing Poor +   Ambulatory Poor +   Endurance Deficit   Endurance Deficit Yes   Endurance Deficit Description limited ambulation distance   Activity Tolerance   Activity Tolerance Patient limited by fatigue;Treatment limited secondary to medical complications (Comment)   Nurse Made Aware spoke to [address]   Exercises   Glute Sets Sitting;10 reps;AROM; Bilateral   Hip Adduction Sitting;10 reps;AROM; Bilateral   Knee AROM Long Arc Quad Sitting;10 reps;AROM; Bilateral   Ankle Pumps Sitting;20 reps;AROM; Bilateral   Marching Sitting;10 reps;AROM; Bilateral   Assessment   Prognosis Fair   Problem List Decreased strength;Decreased range of motion;Decreased endurance; Impaired balance;Decreased mobility; Decreased coordination;Decreased safety awareness; Obesity; Decreased skin integrity; Impaired sensation  (LE Edema)   Assessment Patient fatigued but motivated to participate  Improvement with standing tolernace as well as ambulation  Patient able to ambulate slight increase in distance with more trials than previous session  Requires verbal instruction for hand placement and body positioning with transfers as well as occasional min a to manuever RW  Will continue to benefit from skilled PT services with focus on gait progression as able  Barriers to Discharge Inaccessible home environment   Goals   Patient Goals to feel better   STG Expiration Date 05/04/18   Short Term Goal #1 Per last PT goal assessment    In 10-14 days, patient will be: 1  Supervision with Bed Mobility Rolling Right and Left - NOT MET 2  Supervision with Bed Mobility Supine-Sit - NOT MET 3  Supervision with Transfer Bed-Chair- NOT MET 4  Increase Dynamic Sitting Balance at least 1 Grade for improved stability with functional reach activities- NOT MET 5  Increase Dynamic Standing Balance at least 1 Grade for improved ease with Activities of Daily Living- NOT MET 6  Increase Lower Extremity Strength at least 1 Grade for improved ease mobility tasks- NOT MET 7  Supervision with Ambulation 100 feet using a rolling walker to facilitate home and community mobility- NOT MET; More goals will be determined at a future date upon completion of the aforementioned goals  Treatment Day 8   Plan   Treatment/Interventions Functional transfer training;LE strengthening/ROM; Therapeutic exercise;Patient/family training;Equipment eval/education; Bed mobility;Gait training;Spoke to nursing   Progress Slow progress, decreased activity tolerance   PT Frequency 5x/wk   Recommendation   Recommendation Long-term skilled PT   Equipment Recommended Wheelchair; Other (Comment)  (roller walker)       Umm Solid, PTA

## 2018-05-03 NOTE — PHYSICIAN ADVISOR
Current patient class: Inpatient  The patient is currently on Hospital Day: 30      The patient was admitted to the hospital at [phone] on 4/4/18 for the following diagnosis:  CHF (congestive heart failure) (HCC) [I50 9]  SOB (shortness of breath) [R06 02]  Elevated troponin [R74 8]  Acute on chronic diastolic congestive heart failure (HCC) [I50 33]  Cellulitis of foot, left [L03 116]  Acute-on-chronic kidney injury ([facility] ) [N17 9, N18 9]     CMS OUTLIER STAY REVIEW    After review of the relevant documentation, labs, vital signs and test results, the patient is appropriate for CONTINUED INPATIENT ADMISSION  The patient continues to remain hospitalized receiving acute medical care  The patient has surpassed the expected duration of stay, however given the clinical condition, need for further acute care management, the patient is appropriate to remain in an inpatient status  The patient still being actively managed, and does have unresolved medical issues requiring further hospitalization  This review is conducted at 10 day intervals, to help satisfy the requirements for significant outlier stay review as per CMS  Given the current condition of this patient, the patient satisfies this review was determination for continued inpatient stay  Rationale is as follows: The patient is a [de-identified] yrs old Male who presented to the ED at 4/4/2018  6:39 AM with a chief complaint of Shortness of Breath (pt reports being short of breath over the past week  Pt reports when he gets up and walks distances he has trouble breathing and chest pain   Daughter reports recent trip to [facility] where pt did not take lasix to avoid voiding  )    The patients vitals on arrival were ED Triage Vitals   Temperature Pulse Respirations Blood Pressure SpO2   04/04/18 0647 04/04/18 0646 04/04/18 0646 04/04/18 0646 04/04/18 0646   97 8 °F (36 6 °C) (!) 111 (!) 24 120/60 98 %      Temp Source Heart Rate Source Patient Position - Orthostatic VS BP Location FiO2 (%)   04/04/18 0647 04/04/18 0646 04/04/18 0646 04/04/18 0646 --   Oral Monitor Lying Right arm       Pain Score       04/04/18 0647       8           Past Medical History:   Diagnosis Date    Cardiac disease     CHF [facility]congestive [facility] failure) [facility] )     Diabetes mellitus ([facility] )     Hypertension     Renal disorder      Past Surgical History:   Procedure Laterality Date    AORTIC VALVE REPLACEMENT      CARDIAC VALVE REPLACEMENT      CATARACT EXTRACTION, BILATERAL      COLONOSCOPY      [facility] [place] [address] 3RD+ ORD SLCTV ABDL PEL/LXTR [facility] Left 12/22/2017    Procedure: LEG ANGIOGRAM; RIGHT FEMORAL ACCESS; CO2-CONTRAST ;  Surgeon: Rola Samuels MD;  Location: [facility];  Service: Vascular    REPLACEMENT TOTAL KNEE BILATERAL      VASCULAR SURGERY             Consults have been placed to:   IP CONSULT TO CARDIOLOGY  IP CONSULT TO NEPHROLOGY  IP CONSULT TO PODIATRY  IP CONSULT TO VASCULAR SURGERY    Vitals:    05/02/18 0600 05/02/18 0705 05/02/18 0828 05/02/18 1500   BP:  106/61 110/55 95/52   BP Location:  Right arm Right arm Right arm   Pulse:  76  76   Resp:  18  18   Temp:  97 7 °F (36 5 °C)  97 5 °F (36 4 °C)   TempSrc:  Oral  Oral   SpO2:  98%  99%   Weight: 87 2 kg (192 lb 3 9 oz)      Height:           Most recent labs:    Recent Labs      04/30/18   1020  05/01/18   0505  05/02/18   0410  05/02/18   0420   WBC  6 63  7 61  6 89   --    HGB  9 0*  8 6*  8 6*   --    HCT  27 7*  27 1*  26 6*   --    PLT  153  147*  152   --    K   --   3 2*   --   3 2*   NA   --   128*   --   131*   CALCIUM   --   9 4   --   9 8   BUN   --   164*   --   155*   CREATININE   --   2 88*   --   2 58*   INR  1 11   --    --    --    AST   --   18   --    --    ALT   --   31   --    --    ALKPHOS   --   74   --    --    BILITOT   --   1 20*   --    --        Scheduled Meds:  Current Facility-Administered Medications:  aspirin 81 mg Oral Daily Chase Alberto MD   atorvastatin 40 mg Oral Daily With Zeus MD Remy   calcium carbonate 1,000 mg Oral Daily PRN Chase Alberto MD   diphenhydrAMINE 12 5 mg Oral HS PRN Pk Lugo PA-C   docusate sodium 100 mg Oral BID PRN Chase Alberto MD   docusate sodium 100 mg Oral BID Kel Ravi MD   heparin (porcine) 5,000 Units Subcutaneous Q8H [address] Lisseth Seals PA-C   insulin glargine 30 Units Subcutaneous HS Ap Larson MD   insulin lispro 1-5 Units Subcutaneous TID AC Chase Alberto MD   insulin lispro 1-5 Units Subcutaneous HS Chase Alberto MD   insulin lispro 12 Units Subcutaneous TID With Meals Ap Larson MD   melatonin 6 mg Oral HS Dina Chisholm MD   metoprolol succinate 25 mg Oral Q12H Yani Bush MD   multivitamin-minerals 1 tablet Oral Daily Samul Homans, CRNP   ondansetron 4 mg Intravenous Q6H PRN Chase Alberto MD   pantoprazole 40 mg Oral Early Morning Chase Alberto MD   polyethylene glycol 17 g Oral Daily PRN Pk Lugo PA-C   potassium chloride 40 mEq Oral BID Talonutus Duc MD   sevelamer 800 mg Oral TID With Meals Milderd MD Judah   sodium chloride 1 spray Each Nare PRN Kel Ravi MD   tamsulosin 0 4 mg Oral Daily With Dinner Lisseth Seals PA-C   torsemide 40 mg Oral Daily Jacinto Paiz MD   zolpidem 5 mg Oral HS PRN Kel Ravi MD     Continuous Infusions:   PRN Meds: calcium carbonate    diphenhydrAMINE    docusate sodium    ondansetron    polyethylene glycol    sodium chloride    zolpidem    Surgical procedures (if appropriate):

## 2018-05-03 NOTE — PLAN OF CARE
Problem: PHYSICAL THERAPY ADULT  Goal: Performs mobility at highest level of function for planned discharge setting  See evaluation for individualized goals  Treatment/Interventions: Functional transfer training, LE strengthening/ROM, Therapeutic exercise, Endurance training, Patient/family training, Equipment eval/education, Bed mobility, Gait training (PT to see when stair training is appropriate )          See flowsheet documentation for full assessment, interventions and recommendations  Outcome: Progressing  Prognosis: Fair  Problem List: Decreased strength, Decreased range of motion, Decreased endurance, Impaired balance, Decreased mobility, Decreased coordination, Decreased safety awareness, Obesity, Decreased skin integrity, Impaired sensation (LE Edema)  Assessment: Patient fatigued but motivated to participate  Improvement with standing tolernace as well as ambulation  Patient able to ambulate slight increase in distance with more trials than previous session  Requires verbal instruction for hand placement and body positioning with transfers as well as occasional min a to manuever RW  Will continue to benefit from skilled PT services with focus on gait progression as able  Barriers to Discharge: Inaccessible home environment     Recommendation: Long-term skilled PT     PT - OK to Discharge: Yes (when medically cleared to LT skilled PT)    See flowsheet documentation for full assessment

## 2018-05-03 NOTE — PROGRESS NOTES
NEPHROLOGY PROGRESS NOTE   Lorene Chisholm  [de-identified] y o  male MRN: [id number]  Unit/Bed#: -01 Encounter: [id number]  Reason for Consult: CKD, SABINE    ASSESSMENT and PLAN:  1  SABINE:  · Due to cardiorenal syndrome  UA is bland and renal US is negative  · Creatinine stable and BUN improving on Torsemide 40 mg daily  · Has mild uremic symptoms but family refusing dialysis until nutrition and PT are optimized  · It has been explained to them that the poor appetite and weakness is worsened by the uremia  However, they want to push for more nourishment and more PT  · For now, I am not going to pursue dialysis until patient and family agrees  I recommend that they look into a probiotic used to treat uremia which was recommended by Dr Justin Dunbar  I gave Lucian Howard the name of the product  2  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  Follows with Dr Justin Dunbar  Likely need to tolerate a higher baseline creatinine  3  CHF, volume overload: Continue Torsemide 40 mg PO daily  Recheck CXR tomorrow  If stable, may continue with current regimen  If worse, will increase Torsemide to 40 mg BID  Check weight today  4  Hyponatremia: Better today  Continue to monitor  Will give a trial of Samsca if worse  5  Azotemia: Due to CRS  6  Hypokalemia: Resolved  7  PAF, valvular heart disease, CAD s/p CABG  8  Anemia: Hgb stable  9  MGUS  10  MBD: high phos - continue Sevelamer  11  Weakness, deconditioning: per primary team      SUMMARY OF RECOMMENDATIONS:  · Continue Torsemide 40 mg daily  · Recheck CXR tomorrow  Discussed with SLIM and daughter  SUBJECTIVE / INTERVAL HISTORY:  Appetite a little better  No SOB during PT but felt "dizzy"       OBJECTIVE:  Current Weight: Weight - Scale: 87 2 kg (192 lb 3 9 oz)  Vitals:    05/02/18 1500 05/03/18 0650 05/03/18 0846 05/03/18 1603   BP: 95/52 106/51 110/52 96/53   BP Location: Right arm Left arm  Right arm   Pulse: 76 84  89   Resp: 18 18 18   Temp: 97 5 °F (36 4 °C) 98 6 °F (37 °C)  97 9 °F (36 6 °C)   TempSrc: Oral Oral  Oral   SpO2: 99% 92%  97%   Weight:       Height:           Intake/Output Summary (Last 24 hours) at 05/03/18 1801  Last data filed at 05/03/18 1446   Gross per 24 hour   Intake              560 ml   Output             1425 ml   Net             -865 ml     General: conscious, cooperative, weak  Chest/Lungs: crackles in the bases  CVS: distinct heart sounds, normal rate  Abdomen: soft  Extremities: (+) mild LE edema  : no viera catheter  Neuro: awake, alert       Medications:    Current Facility-Administered Medications:     aspirin (ECOTRIN LOW STRENGTH) EC tablet 81 mg, 81 mg, Oral, Daily, Chase Alberto MD, 81 mg at 05/03/18 0845    atorvastatin (LIPITOR) tablet 40 mg, 40 mg, Oral, Daily With Liv Alberto MD, 40 mg at 05/03/18 1731    calcium carbonate (TUMS) chewable tablet 1,000 mg, 1,000 mg, Oral, Daily PRN, Chase Alberto MD    diphenhydrAMINE (BENADRYL) tablet 12 5 mg, 12 5 mg, Oral, HS PRN, Helen Smallwood PA-C, 12 5 mg at 04/29/18 0107    docusate sodium (COLACE) capsule 100 mg, 100 mg, Oral, BID PRN, Chase Alberto MD, 100 mg at 04/10/18 1005    docusate sodium (COLACE) capsule 100 mg, 100 mg, Oral, BID, Mireya Vergara MD, 100 mg at 05/03/18 1730    heparin (porcine) subcutaneous injection 5,000 Units, 5,000 Units, Subcutaneous, Q8H [address], Lisseth Seals PA-C, 5,000 Units at 05/02/18 1307    insulin glargine (LANTUS) subcutaneous injection 30 Units 0 3 mL, 30 Units, Subcutaneous, HS, Shannon Hernandez MD, 30 Units at 05/02/18 2138    insulin lispro (HumaLOG) 100 units/mL subcutaneous injection 1-5 Units, 1-5 Units, Subcutaneous, TID AC, 2 Units at 05/02/18 1302 **AND** [CANCELED] Fingerstick Glucose (POCT), , , TID AC, Chase Alberto MD    insulin lispro (HumaLOG) 100 units/mL subcutaneous injection 1-5 Units, 1-5 Units, Subcutaneous, HS, Chase Alberto MD, 1 Units at 04/30/18 7119    insulin lispro (HumaLOG) 100 units/mL subcutaneous injection 12 Units, 12 Units, Subcutaneous, TID With Meals, Koki Islas MD, 12 Units at 05/03/18 0848    melatonin tablet 6 mg, 6 mg, Oral, HS, Patrizia Hoyt MD, 6 mg at 05/02/18 2137    metoprolol succinate (TOPROL-XL) 24 hr tablet 25 mg, 25 mg, Oral, Q12H, Bayron Mendes MD, 25 mg at 05/03/18 0846    multivitamin-minerals (CENTRUM) tablet 1 tablet, 1 tablet, Oral, Daily, JEWELL Alvarez, 1 tablet at 05/03/18 0845    ondansetron (ZOFRAN) injection 4 mg, 4 mg, Intravenous, Q6H PRN, Chase Alberto MD    pantoprazole (PROTONIX) EC tablet 40 mg, 40 mg, Oral, Early Morning, Chase Alberto MD, 40 mg at 05/03/18 0511    polyethylene glycol (MIRALAX) packet 17 g, 17 g, Oral, Daily PRN, Mayank Mishra PA-C, 17 g at 04/29/18 0856    sevelamer (RENAGEL) tablet 800 mg, 800 mg, Oral, TID With Meals, James Frias MD, 800 mg at 05/03/18 1332    sodium chloride (OCEAN) 0 65 % nasal spray 1 spray, 1 spray, Each Nare, PRN, Gentry Ayoub MD    tamsulosin [facility]) capsule 0 4 mg, 0 4 mg, Oral, Daily With [place] Nikolas Seals PA-C, 0 4 mg at 05/03/18 1731    torsemide (DEMADEX) tablet 40 mg, 40 mg, Oral, Daily, Anish Heath MD, 40 mg at 05/03/18 0846    zolpidem (AMBIEN) tablet 5 mg, 5 mg, Oral, HS PRN, Gentry Ayoub MD    Laboratory Results:    Results from last 7 days  Lab Units 05/03/18  0520 05/02/18  0420 05/02/18  0410 05/01/18  0505 04/30/18  1020 04/30/18  0509 04/29/18 2050 04/29/18  0924 04/28/18  0524 04/27/18  0550   WBC Thousand/uL 7 92  --  6 89 7 61 6 63  --   --   --   --  8 81   HEMOGLOBIN g/dL 8 7*  --  8 6* 8 6* 9 0*  --   --   --   --  9 1*   HEMATOCRIT % 27 3*  --  26 6* 27 1* 27 7*  --   --   --   --  29 2*   PLATELETS Thousands/uL 177  --  152 147* 153  --   --   --   --  182   SODIUM mmol/L 132* 131*  --  128*  --  127* 126* 126* 130* 132*   POTASSIUM mmol/L 4 8 3 2*  --  3 2*  --  3 9 4 5 4 4 3 7 3 1*   CHLORIDE mmol/L 92* 89*  -- 89*  --  87* 86* 86* 87* 86*   CO2 mmol/L 30 31  --  30  --  30 31 32 35* 38*   BUN mg/dL 141* 155*  --  164*  --  160* 162* 158* 162* 163*   CREATININE mg/dL 2 55* 2 58*  --  2 88*  --  2 96* 3 19* 2 95* 2 63* 2 80*   CALCIUM mg/dL 9 8 9 8  --  9 4  --  10 0 9 6 9 9 9 8 9 7   MAGNESIUM mg/dL  --   --   --  3 2*  --   --   --   --   --  3 1*   PHOSPHORUS mg/dL  --   --   --  4 2*  --   --   --   --   --  4 8*   TOTAL PROTEIN g/dL  --   --   --  6 9  --   --   --   --   --   --    GLUCOSE RANDOM mg/dL 121 101  --  104  --  138 213* 170* 103 127     Previous work up:  4/6/18 Renal US: no hydronephrosis, distended bladder, right renal cyst    4/5/18 UA - bland  No blood, no protein

## 2018-05-03 NOTE — SOCIAL WORK
Complex CM reviewed information with active CM and determined that at this point, Complex CM will be taking over the case  Complex CM will follow patient and review chart  At this time, request for consult to Neuro capacity evaluation and Psychiatrist evaluation for capacity has been requested by previous CM  Complex CM will follow patient at this time

## 2018-05-03 NOTE — PLAN OF CARE
Problem: Potential for Falls  Goal: Patient will remain free of falls  INTERVENTIONS:  - Assess patient frequently for physical needs  -  Identify cognitive and physical deficits and behaviors that affect risk of falls    -  [place] fall precautions as indicated by assessment   - Educate patient/family on patient safety including physical limitations  - Instruct patient to call for assistance with activity based on assessment  - Modify environment to reduce risk of injury  - Consider OT/PT consult to assist with strengthening/mobility   Outcome: Progressing

## 2018-05-03 NOTE — PLAN OF CARE
Problem: DISCHARGE PLANNING - CARE MANAGEMENT  Goal: Discharge to post-acute care or home with appropriate resources  INTERVENTIONS:  - Conduct assessment to determine patient/family and health care team treatment goals, and need for post-acute services based on payer coverage, community resources, and patient preferences, and barriers to discharge  - Address psychosocial, clinical, and financial barriers to discharge as identified in assessment in conjunction with the patient/family and health care team  - Arrange appropriate level of post-acute services according to patient's   needs and preference and payer coverage in collaboration with the physician and health care team  - Communicate with and update the patient/family, physician, and health care team regarding progress on the discharge plan  - Arrange appropriate transportation to post-acute venues   Outcome: Progressing  Complex CM reviewed information with active CM and determined that at this point, Complex CM will be taking over the case  Complex CM will follow patient and review chart  At this time, request for consult to Neuro capacity evaluation and Psychiatrist evaluation for capacity has been requested by previous CM  Complex CM will follow patient at this time

## 2018-05-03 NOTE — PROGRESS NOTES
[address] Hospitalist Service - Internal Medicine Progress Note      PATIENT INFORMATION      Patient: Walker Pretty  [de-identified] y o  male   MRN: [id number]  PCP: Geoffrey Lomax MD  Unit/Bed#: MS ZhangNarendra Encounter: [id number]  Date Of Visit: 05/03/18       ASSESSMENTS & PLAN        1  Acute respiratory failure with hypoxia  · baseline oxygen requirement is room air - continues to oxygenate on 2 L via nasal cannula - likely secondary to CHF exacerbation (see plan below) - will attempt to titrate down to baseline as tolerated    2  Acute on chronic diastolic CHF - NSTEMI type 2  · last EF of 45% with mild MD, severe TR, moderate-severe MR, and diffuse hypokinesis  · currently on Torsemide/Toprol-XL regimen - net fluid balance of (-) 12 21 L thus far  · Elevated troponin level on admission noted with serial repeat showing stabilization - no further trending in over four weeks since admission - likely secondary to CHF exacerbation - cardiology following  · check portable CXR tomorrow morning    3  Acute kidney injury - Chronic kidney disease stage 4  · creatinine seem stable @ 2 55 from 2 58 yesterday from a peak of 3 26 earlier in hospitalization with mild improvement in BUN as well - baseline creatinine is approximately 1 8-2 2 - likely progressively worsened secondary to CHF  · In family meeting yesterday they expressed that they are on board with consideration of hemodialysis once his physical therapy and nutrition are optimized    4  Insulin-dependent diabetes mellitus  · HgA1c of 7 6 on 3/2018   · c/w basal/prandial insulin regimen w/ additional SSI coverage per accuchecks     5  Atrial fibrillation  · Rate controlled on Toprol-XL - continue ASA  · Not a candidate for chronic anticoagulation secondary to comorbidities and fall risk    6  CAD - Aortic valvular disease  · Status post prior CABG - status post bioprosthetic aortic valve replacement   · Continue ASA/Lipitor/Toprol-XL    7  Hypokalemia  · Monitor/replete potassium as necessary (normalized today)  · Serum magnesium within normal limits     8  Hyponatremia  · waxing/waning secondary to CHF/fluid overload - discussed with nephrology yesterday considering Samsca initiation if serum sodium levels worsened  · Monitor BMP and monitor fluid intake    9  Anemia of chronic disease  · H/H stable - continue to monitor       DVT Prophylaxis:  Heparin SC      SUBJECTIVE     Seen and examined this afternoon with family and nursing at bedside  No acute overnight events were noted  He still remains on 2 L of oxygen via nasal cannula and is generally weak/fatigued  As agreed upon yesterday, initiation of hemodialysis will remain held off for now pending improvement in renal function and fluid overload status with oral diuretics  Family is requesting patient be allowed to get fresh air" for 5-10 minutes a day as he has been in the hospital for 29 days now  OBJECTIVE     Vitals:   Temp (24hrs), Av 3 °F (36 8 °C), Min:97 9 °F (36 6 °C), Max:98 6 °F (37 °C)    HR:  [84-89] 89  Resp:  [18] 18  BP: ()/(51-53) 96/53  SpO2:  [92 %-97 %] 97 %  Body mass index is 30 11 kg/m²  Input and Output Summary (last 24 hours):        Intake/Output Summary (Last 24 hours) at 18 1755  Last data filed at 18 1446   Gross per 24 hour   Intake              560 ml   Output             1425 ml   Net             -865 ml       Physical Exam:     GENERAL:  Well-developed/nourished - waxing/waning respiratory distress  HEAD:  Normocephalic - atraumatic  EYES: PERRL - EOMI   MOUTH:  Mucosa moist  NECK:  Supple - full range of motion  CARDIAC:  Irregularly irregular rhythm but rate controlled - S1/S2 positive  PULMONARY:  Diminished breath sounds with bibasilar rales   ABDOMEN:  Soft - nontender/nondistended - active bowel sounds  MUSCULOSKELETAL:  Motor strength/range of motion remains deconditioned - 1+ distal LE pitting edema  NEUROLOGIC: Alert/oriented x 3 upon questioning although has intermittent periods of somnolence  SKIN:  Chronic wrinkles/blemishes - left heel/hallux/5th toe chronic gangrenous necrotic changes noted  PSYCHIATRIC:  Mood/affect stable      ADDITIONAL DATA     Labs & Recent Cultures:       Results from last 7 days  Lab Units 05/03/18  0520   WBC Thousand/uL 7 92   HEMOGLOBIN g/dL 8 7*   HEMATOCRIT % 27 3*   PLATELETS Thousands/uL 177   NEUTROS PCT % 74   LYMPHS PCT % 17   MONOS PCT % 8   EOS PCT % 1       Results from last 7 days  Lab Units 05/03/18  0520  05/01/18  0505   SODIUM mmol/L 132*  < > 128*   POTASSIUM mmol/L 4 8  < > 3 2*   CHLORIDE mmol/L 92*  < > 89*   CO2 mmol/L 30  < > 30   BUN mg/dL 141*  < > 164*   CREATININE mg/dL 2 55*  < > 2 88*   CALCIUM mg/dL 9 8  < > 9 4   TOTAL PROTEIN g/dL  --   --  6 9   BILIRUBIN TOTAL mg/dL  --   --  1 20*   ALK PHOS U/L  --   --  74   ALT U/L  --   --  31   AST U/L  --   --  18   GLUCOSE RANDOM mg/dL 121  < > 104   < > = values in this interval not displayed      Results from last 7 days  Lab Units 04/30/18  1020   INR  1 11         Last 24 Hours Medication List:     Current Facility-Administered Medications:  aspirin 81 mg Oral Daily Chase Alberto MD   atorvastatin 40 mg Oral Daily With Dinner Chase Alberto MD   calcium carbonate 1,000 mg Oral Daily PRN Chase Alberto MD   diphenhydrAMINE 12 5 mg Oral HS PRN Dion Ha PA-C   docusate sodium 100 mg Oral BID PRN Chase Alberto MD   docusate sodium 100 mg Oral BID Cher Barragan MD   heparin (porcine) 5,000 Units Subcutaneous Q8H [facility] Lisseth Seals PA-C   insulin glargine 30 Units Subcutaneous HS Nereida Hines MD   insulin lispro 1-5 Units Subcutaneous TID AC Chase Alberto MD   insulin lispro 1-5 Units Subcutaneous HS Chase Alberto MD   insulin lispro 12 Units Subcutaneous TID With Meals Juana Monteiro MD   melatonin 6 mg Oral HS Kelsie Aase, MD   metoprolol succinate 25 mg Oral Q12H Linda Covarrubias MD   multivitamin-minerals 1 tablet Oral Daily JEWELL Walker   ondansetron 4 mg Intravenous Q6H PRN Chase Alberto MD   pantoprazole 40 mg Oral Early Morning Chase Alberto MD   polyethylene glycol 17 g Oral Daily PRN Ti Mckeon PA-C   sevelamer 800 mg Oral TID With Meals Nadege Santoyo MD   sodium chloride 1 spray Each Nare PRN Love Wong MD   tamsulosin 0 4 mg Oral Daily With Dinner Lisseth Seals PA-C   torsemide 40 mg Oral Daily Linda Wing MD   zolpidem 5 mg Oral HS PRN Love Wong MD          Time Spent for Care:  38 minutes  More than 50% of total time spent on counseling and coordination of care as described above  Current Length of Stay: 29 day(s)      Code Status: Level 1 - Full Code          ** Please Note: This note is constructed using a voice recognition dictation system   **

## 2018-05-04 NOTE — PROGRESS NOTES
Unable to physical round on patient b/c family requested not to disturbed the pt bwt the hours of 10pm-4 am   Pt is on tele and is controlled AFib on the monitor  Will continue to monitor

## 2018-05-04 NOTE — PROGRESS NOTES
Nephrology saw patient  Pt asked about going outside  Nephro approved it and talked to SLIM  SLIM okay'd the patient to go outside  Asked about a midline, nephrology doesn't want a midline placed

## 2018-05-04 NOTE — PROGRESS NOTES
[address] Hospitalist Service - Internal Medicine Progress Note      PATIENT INFORMATION      Patient: Magda Shirley  [de-identified] y o  male   MRN: [id number]  PCP: Oswaldo Roberts MD  Unit/Bed#: MS Dave-11 Encounter: [id number]  Date Of Visit: 05/04/18       ASSESSMENTS & PLAN        1  Acute respiratory failure with hypoxia  · baseline oxygen requirement is room air and has been weaned down to baseline today - initial increase in requirements likely secondary to CHF exacerbation (see plan below) - will attempt to titrate down to baseline as tolerated    2  Acute on chronic diastolic CHF - NSTEMI type 2  · last EF of 45% with mild PA, severe TR, moderate-severe MR, and diffuse hypokinesis  · currently on Torsemide/Toprol-XL regimen - net fluid balance of (-) 12 5 L thus far  · Elevated troponin level on admission noted with serial repeat showing stabilization - no further trending in over four weeks since admission - likely secondary to CHF exacerbation - cardiology following  · portable CXR this morning revealed improvement and CHF with stable effusions    3  Acute kidney injury - Chronic kidney disease stage 4  · creatinine seem stable @ 2 48 from 2 55 yesterday from a peak of 3 26 earlier in hospitalization with  improvement in BUN as well - baseline creatinine is approximately 1 8-2 2 - likely progressively worsened secondary to CHF  · As renal function is slowly improving with conservative oral diuretic measures, nephrology has opted to hold off on consideration of dialysis at this point continue monitoring    4  Insulin-dependent diabetes mellitus  · HgA1c of 7 6 on 3/2018   · c/w basal/prandial insulin regimen w/ additional SSI coverage per accuchecks     5  Atrial fibrillation  · Rate controlled on Toprol-XL - continue ASA  · Not a candidate for chronic anticoagulation secondary to comorbidities and fall risk    6    CAD - Aortic valvular disease  · Status post prior CABG - status post bioprosthetic aortic valve replacement   · Continue ASA/Lipitor/Toprol-XL    7  Hypokalemia  · Monitor/replete potassium as necessary (normalized today)  · Serum magnesium within normal limits     8  Hyponatremia  · waxing/waning secondary to CHF/fluid overload - nephrology will consider initiation of Samsca if serum sodium worsens  · Monitor BMP and monitor fluid intake    9  Anemia of chronic disease  · H/H stable - continue to monitor       DVT Prophylaxis:  Heparin SC      SUBJECTIVE     Seen/examined earlier today  Patient notes that he is breathing somewhat better today  He has been weaned off oxygen and is currently saturating on room air  He does complain of some generalized weakness/fatigue but denies any new complaints currently  OBJECTIVE     Vitals:   Temp (24hrs), Av 9 °F (36 6 °C), Min:97 9 °F (36 6 °C), Max:98 °F (36 7 °C)    HR:  [75-89] 75  Resp:  [18-24] 24  BP: ()/(53-55) 107/54  SpO2:  [97 %-99 %] 99 %  Body mass index is 29 8 kg/m²  Input and Output Summary (last 24 hours):        Intake/Output Summary (Last 24 hours) at 18 1250  Last data filed at 18 1201   Gross per 24 hour   Intake                0 ml   Output             1525 ml   Net            -1525 ml       Physical Exam:     GENERAL:  Well-developed/nourished - no immediate distress  HEAD:  Normocephalic - atraumatic  EYES: PERRL - EOMI   MOUTH:  Mucosa moist  NECK:  Supple - full range of motion  CARDIAC:  Irregularly irregular rhythm but rate controlled - S1/S2 positive  PULMONARY:  Diminished bibasilar breath sounds but improving    ABDOMEN:  Soft - nontender/nondistended - active bowel sounds  MUSCULOSKELETAL:  Motor strength/range of motion deconditioned - 1+ distal LE pitting edema  NEUROLOGIC:  Alert/oriented x 3 upon questioning today  SKIN:  Chronic wrinkles/blemishes - left heel/hallux/5th toe chronic gangrenous necrotic changes noted  PSYCHIATRIC:  Mood/affect pleasant      ADDITIONAL DATA     Labs & Recent Cultures:       Results from last 7 days  Lab Units 05/04/18  0531   WBC Thousand/uL 7 65   HEMOGLOBIN g/dL 8 3*   HEMATOCRIT % 26 3*   PLATELETS Thousands/uL 142*   NEUTROS PCT % 74   LYMPHS PCT % 16   MONOS PCT % 8   EOS PCT % 2       Results from last 7 days  Lab Units 05/04/18  0531  05/01/18  0505   SODIUM mmol/L 131*  < > 128*   POTASSIUM mmol/L 4 0  < > 3 2*   CHLORIDE mmol/L 92*  < > 89*   CO2 mmol/L 29  < > 30   BUN mg/dL 134*  < > 164*   CREATININE mg/dL 2 48*  < > 2 88*   CALCIUM mg/dL 9 5  < > 9 4   TOTAL PROTEIN g/dL  --   --  6 9   BILIRUBIN TOTAL mg/dL  --   --  1 20*   ALK PHOS U/L  --   --  74   ALT U/L  --   --  31   AST U/L  --   --  18   GLUCOSE RANDOM mg/dL 99  < > 104   < > = values in this interval not displayed      Results from last 7 days  Lab Units 04/30/18  1020   INR  1 11         Last 24 Hours Medication List:     Current Facility-Administered Medications:  aspirin 81 mg Oral Daily Chase Alberto MD   atorvastatin 40 mg Oral Daily With Dinner Chase Alberto MD   calcium carbonate 1,000 mg Oral Daily PRN Chase Alberto MD   diphenhydrAMINE 12 5 mg Oral HS PRN Romelia Arevalo PA-C   docusate sodium 100 mg Oral BID PRN Chase Alberto MD   docusate sodium 100 mg Oral BID Haven Voss MD   heparin (porcine) 5,000 Units Subcutaneous Q8H [facility] Lisseth Seals PA-C   insulin glargine 30 Units Subcutaneous HS Nereida Hines MD   insulin lispro 1-5 Units Subcutaneous TID AC Chase Alberto MD   insulin lispro 1-5 Units Subcutaneous HS Chase Alberto MD   insulin lispro 12 Units Subcutaneous TID With Meals Shashi Liao MD   melatonin 6 mg Oral HS Tomasa Hassan MD   metoprolol succinate 25 mg Oral Q12H Jeanna Zhang MD   multivitamin-minerals 1 tablet Oral Daily JEWELL Moffett   ondansetron 4 mg Intravenous Q6H PRN Chase Alberto MD   pantoprazole 40 mg Oral Early Morning Chase Alberto MD   polyethylene glycol 17 g Oral Daily PRN Molly BOSE David Corbin PA-C   sevelamer 800 mg Oral TID With Meals Mitra Griffin MD   sodium chloride 1 spray Each Nare PRN Garcia Castellanos MD   tamsulosin 0 4 mg Oral Daily With Dinner Lisseth Seals PA-C   torsemide 40 mg Oral Daily Esperanza Vasquez MD   zolpidem 5 mg Oral HS PRN Garcia Castellanos MD          Time Spent for Care:  37 minutes  More than 50% of total time spent on counseling and coordination of care as described above  Current Length of Stay: 30 day(s)      Code Status: Level 1 - Full Code          ** Please Note: This note is constructed using a voice recognition dictation system   **

## 2018-05-04 NOTE — PROGRESS NOTES
Educated pt on fluid restriction of 1500  Pt states understanding but continues to ask for more water, coffee, and water based products  Pt is allowed to express needs and feelings on fluid restriction and will continue to encourage pt to follow fluid restriction as ordered

## 2018-05-04 NOTE — PROGRESS NOTES
NEPHROLOGY PROGRESS NOTE   Dylan Israel  [de-identified] y o  male MRN: [id number]  Unit/Bed#: -01 Encounter: [id number]  Reason for Consult: CKD, SABINE    ASSESSMENT and PLAN:  1  SABINE:  · Due to cardiorenal syndrome  UA is bland and renal US is negative  · BUN and creatinine improving with Torsemide 40 mg daily  · CXR unchanged if not better and weights coming down  · Has mild uremic symptoms but family refusing dialysis until nutrition and PT are optimized  2  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  Follows with Dr Jimbo Garcia  3  CHF, volume overload: Continue Torsemide 40 mg PO daily  4  Hyponatremia: Slightly worse today  Will give Samsca 7 5 mg PO x 1 dose as this will also help volume  5  Azotemia: Due to CRS  6  Hypokalemia: Add Kdur 10 meq daily  7  PAF, valvular heart disease, CAD s/p CABG  8  Anemia: Hgb stable  9  MGUS  10  MBD: high phos - continue Sevelamer  11  Weakness, deconditioning: per primary team      SUMMARY OF RECOMMENDATIONS:  · Continue Torsemide 40 mg daily  · Samsca 7 5 mg PO x 1 dose today  · Kdur 10 meq PO daily  Discussed with SLIM and wife  SUBJECTIVE / INTERVAL HISTORY:  Requesting to go out for fresh air multiple times through the day  No SOB at rest      OBJECTIVE:  Current Weight: Weight - Scale: 86 3 kg (190 lb 4 1 oz)  Vitals:    05/03/18 2300 05/04/18 0600 05/04/18 0700 05/04/18 1500   BP: 96/55  107/54 99/54   BP Location: Right arm  Right arm Left arm   Pulse: 78  75 71   Resp: 18  (!) 24 18   Temp: 97 9 °F (36 6 °C)  98 °F (36 7 °C) 98 °F (36 7 °C)   TempSrc: Oral  Oral Oral   SpO2: 98%  99% 100%   Weight:  86 3 kg (190 lb 4 1 oz)     Height:           Intake/Output Summary (Last 24 hours) at 05/04/18 1634  Last data filed at 05/04/18 1201   Gross per 24 hour   Intake                0 ml   Output             1250 ml   Net            -1250 ml     General: conscious, cooperative, weak  Chest/Lungs: crackles on the L base     CVS: distinct heart sounds, normal rate  Abdomen: soft  Extremities: (+) bipedal edema  : no viera catheter  Neuro: awake, alert       Medications:    Current Facility-Administered Medications:     aspirin (ECOTRIN LOW STRENGTH) EC tablet 81 mg, 81 mg, Oral, Daily, Chase Alberto MD, 81 mg at 05/04/18 0822    atorvastatin (LIPITOR) tablet 40 mg, 40 mg, Oral, Daily With Wendy Alberto MD, 40 mg at 05/03/18 1731    calcium carbonate (TUMS) chewable tablet 1,000 mg, 1,000 mg, Oral, Daily PRN, Chase Alberto MD    diphenhydrAMINE (BENADRYL) tablet 12 5 mg, 12 5 mg, Oral, HS PRN, Saman Pandey PA-C, 12 5 mg at 04/29/18 0107    docusate sodium (COLACE) capsule 100 mg, 100 mg, Oral, BID PRN, Chase Alberto MD, 100 mg at 04/10/18 1005    docusate sodium (COLACE) capsule 100 mg, 100 mg, Oral, BID, Susan Garcia MD, 100 mg at 05/04/18 0824    heparin (porcine) subcutaneous injection 5,000 Units, 5,000 Units, Subcutaneous, Q8H [facility], Lisseth Seals PA-C, Stopped at 05/04/18 0526    insulin glargine (LANTUS) subcutaneous injection 30 Units 0 3 mL, 30 Units, Subcutaneous, HS, Jakob Gonzales MD, 30 Units at 05/03/18 2251    insulin lispro (HumaLOG) 100 units/mL subcutaneous injection 1-5 Units, 1-5 Units, Subcutaneous, TID AC, 1 Units at 05/04/18 1326 **AND** [CANCELED] Fingerstick Glucose (POCT), , , TID ACChase MD    insulin lispro (HumaLOG) 100 units/mL subcutaneous injection 1-5 Units, 1-5 Units, Subcutaneous, HS, Chase Alberto MD, 1 Units at 04/30/18 2154    insulin lispro (HumaLOG) 100 units/mL subcutaneous injection 12 Units, 12 Units, Subcutaneous, TID With Meals, Jakob Gonzales MD, 12 Units at 05/04/18 1326    melatonin tablet 6 mg, 6 mg, Oral, HS, Adrienne Pires MD, 6 mg at 05/03/18 2251    metoprolol succinate (TOPROL-XL) 24 hr tablet 25 mg, 25 mg, Oral, Q12H, Elma Jolley MD, 25 mg at 05/04/18 0822    multivitamin-minerals (CENTRUM) tablet 1 tablet, 1 tablet, Oral, Daily, JEWELL Rodríguez, 1 tablet at 05/04/18 0823    ondansetron (ZOFRAN) injection 4 mg, 4 mg, Intravenous, Q6H PRN, Chase Alberto MD    pantoprazole (PROTONIX) EC tablet 40 mg, 40 mg, Oral, Early Morning, Chase Alberto MD, 40 mg at 05/04/18 0526    polyethylene glycol (MIRALAX) packet 17 g, 17 g, Oral, Daily PRN, Channing Buckley PA-C, 17 g at 04/29/18 0856    sevelamer (RENAGEL) tablet 800 mg, 800 mg, Oral, TID With Meals, Juan Smith MD, 800 mg at 05/04/18 1339    sodium chloride (OCEAN) 0 65 % nasal spray 1 spray, 1 spray, Each Nare, PRN, Catherine Sandhu MD    tamsulosin [facility]) capsule 0 4 mg, 0 4 mg, Oral, Daily With Sandy Marla Seals PA-C, 0 4 mg at 05/03/18 1731    torsemide (DEMADEX) tablet 40 mg, 40 mg, Oral, Daily, Erika Cooper MD, 40 mg at 05/04/18 1921    zolpidem (AMBIEN) tablet 5 mg, 5 mg, Oral, HS PRN, Catherine Sandhu MD    Laboratory Results:    Results from last 7 days  Lab Units 05/04/18  0531 05/03/18  0520 05/02/18  0420 05/02/18  0410 05/01/18  0505 04/30/18  1020 04/30/18  0509 04/29/18  2050 04/29/18  0924   WBC Thousand/uL 7 65 7 92  --  6 89 7 61 6 63  --   --   --    HEMOGLOBIN g/dL 8 3* 8 7*  --  8 6* 8 6* 9 0*  --   --   --    HEMATOCRIT % 26 3* 27 3*  --  26 6* 27 1* 27 7*  --   --   --    PLATELETS Thousands/uL 142* 177  --  152 147* 153  --   --   --    SODIUM mmol/L 131* 132* 131*  --  128*  --  127* 126* 126*   POTASSIUM mmol/L 4 0 4 8 3 2*  --  3 2*  --  3 9 4 5 4 4   CHLORIDE mmol/L 92* 92* 89*  --  89*  --  87* 86* 86*   CO2 mmol/L 29 30 31  --  30  --  30 31 32   BUN mg/dL 134* 141* 155*  --  164*  --  160* 162* 158*   CREATININE mg/dL 2 48* 2 55* 2 58*  --  2 88*  --  2 96* 3 19* 2 95*   CALCIUM mg/dL 9 5 9 8 9 8  --  9 4  --  10 0 9 6 9 9   MAGNESIUM mg/dL  --   --   --   --  3 2*  --   --   --   --    PHOSPHORUS mg/dL  --   --   --   --  4 2*  --   --   --   --    TOTAL PROTEIN g/dL  --   --   --   --  6 9  --   --   --   --    GLUCOSE RANDOM mg/dL 99 121 101  --  104  --  138 213* 170*     Previous work up:  4/6/18 Renal US: no hydronephrosis, distended bladder, right renal cyst    4/5/18 UA - bland  No blood, no protein

## 2018-05-04 NOTE — PROGRESS NOTES
Pt up in bed and repositioned  Offered nutrition, water,  and urinal   Pt has no complaints of pain and states, " I slept well last night "  Midline used to draw labs and blood work sent  Midline flushed as per protocol  Pt call bell within reach and will continue to monitor

## 2018-05-04 NOTE — DISCHARGE INSTR - OTHER ORDERS
Wound care recommendations:  Apply Allevyn foam to sacrum for protection and prevention  Change every 5 days and as needed

## 2018-05-04 NOTE — CASE MANAGEMENT
Continued Stay Review    Date: 05/02/18    Vital Signs: /54 (BP Location: Right arm)   Pulse 75   Temp 98 °F (36 7 °C) (Oral)   Resp (!) 24   Ht 5' 7" (1 702 m)   Wt 86 3 kg (190 lb 4 1 oz)   SpO2 99%   BMI 29 80 kg/m²     Medications:   Scheduled Meds:   Current Facility-Administered Medications:  aspirin 81 mg Oral Daily   atorvastatin 40 mg Oral Daily With Dinner   calcium carbonate 1,000 mg Oral Daily PRN   diphenhydrAMINE 12 5 mg Oral HS PRN   docusate sodium 100 mg Oral BID PRN   docusate sodium 100 mg Oral BID   heparin (porcine) 5,000 Units Subcutaneous Q8H [address]   insulin glargine 30 Units Subcutaneous HS   insulin lispro 1-5 Units Subcutaneous TID AC   insulin lispro 1-5 Units Subcutaneous HS   insulin lispro 12 Units Subcutaneous TID With Meals   melatonin 6 mg Oral HS   metoprolol succinate 25 mg Oral Q12H   multivitamin-minerals 1 tablet Oral Daily   ondansetron 4 mg Intravenous Q6H PRN   pantoprazole 40 mg Oral Early Morning   polyethylene glycol 17 g Oral Daily PRN   sevelamer 800 mg Oral TID With Meals   sodium chloride 1 spray Each Nare PRN   tamsulosin 0 4 mg Oral Daily With Dinner   torsemide 40 mg Oral Daily   zolpidem 5 mg Oral HS PRN     Continuous Infusions:    PRN Meds: calcium carbonate    diphenhydrAMINE    docusate sodium    ondansetron    polyethylene glycol    sodium chloride    zolpidem    Abnormal Labs/Diagnostic Results:   HEMOGLOBIN 8 6*   HEMATOCRIT 26 6*   NEUTROS PCT 76*   LYMPHS PCT 12*     SODIUM 131*   POTASSIUM 3 2*   CHLORIDE 89*   *   CREATININE 2 58*     Age/Sex: [de-identified] y o  male     Assessment/Plan:   1  Acute respiratory failure with hypoxia  · baseline oxygen requirement is room air - remains at approximately 2 L via nasal cannula - likely secondary to CHF exacerbation (see plan below) - continue to titrate down to baseline as tolerated     2    Acute on chronic diastolic CHF - NSTEMI type 2  · last EF of 45% with mild OK, severe TR, moderate-severe MR, and diffuse hypokinesis  · currently on Torsemide/Toprol-XL regimen - net fluid balance of (-) 13 37 L thus far  · Elevated troponin level on admission noted with serial repeat showing stabilization - no further trending in over four weeks since admission - likely secondary to CHF exacerbation - cardiology following     3  Acute kidney injury - Chronic kidney disease stage 4  · creatinine mildly improved @ 2 58 from 2 88 yesterday from a peak of 3 26 earlier in hospitalization with improvement in BUN as well - baseline creatinine is approximately 1 8-2 2 - likely progressively worsening secondary to CHF  · In family meeting today they expressed that they are on board with consideration of hemodialysis once his physical therapy and nutrition are optimized     4  Insulin-dependent diabetes mellitus  · HgA1c of 7 6 on 3/2018   · c/w basal/prandial insulin regimen w/ additional SSI coverage per accuchecks      5  Atrial fibrillation  · Rate controlled on Toprol-XL - continue ASA  · Not a candidate for chronic anticoagulation secondary to comorbidities and fall risk     6  CAD - Aortic valvular disease  · Status post prior CABG - status post bioprosthetic aortic valve replacement   · Continue ASA/Lipitor/Toprol-XL     7   Hypokalemia  · Monitor/replete potassium as necessary   · Serum magnesium within normal limits      8  Hyponatremia  · waxing/waning secondary to CHF/fluid overload - discussed with Nephrology today considering Samsca initiation improved levels  · Monitor BMP and monitor fluid intake     9  Anemia of chronic disease  · H/H stable - continue to monitor         DVT Prophylaxis:  Heparin SC    Discharge Plan: TBD      Thank you,  [address] [place] [address] in the [facility] by Jeff Mcarthur for 2017  Network Utilization Review Department  Phone: [phone];  Fax [phone]  ATTENTION: The Network Utilization Review Department is now centralized for our 7 Facilities  Make a note that we have a new phone and fax numbers for our Department  Please call with any questions or concerns to [phone] and carefully follow the prompts so that you are directed to the right person  All voicemails are confidential  Fax any determinations, approvals, denials, and requests for initial or continue stay review clinical to [phone]  Due to HIGH CALL volume, it would be easier if you could please send faxed requests to expedite your requests and in part, help us provide discharge notifications faster

## 2018-05-04 NOTE — PROGRESS NOTES
Dr Yolande Bowling discussed taking the patient outside for some fresh air  Nurse addressed safety and tele monitoring concerns with Dr Yolande Bowling stated that he will discuss the needs to continue telemonitoring with nephrology, Dr Arturo Pastor  Pt is out of the bed into the chair  He is assist Ax1 , with walker/ pivot turn  Pt understands and correctly demonstrates leg raises to help maintain pt strength and mobility  Call bell within reach and will continue to monitor

## 2018-05-04 NOTE — PHYSICAL THERAPY NOTE
PHYSICAL THERAPY NOTE    Patient Name: Modesta Gaviria  Today's Date: 5/4/2018 05/04/18 0239   Pain Assessment   Pain Assessment No/denies pain   Pain Score No Pain   Restrictions/Precautions   Weight Bearing Precautions Per Order No   Other Precautions Fall Risk;O2;Telemetry   General   Family/Caregiver Present Yes  (wife)   Subjective   Subjective Patient seated OOB in recliner upon entry to room and pt is agreeable to therapy session  Bed Mobility   Supine to Sit Unable to assess   Sit to Supine Unable to assess   Additional Comments Patient seated OOB in recliner pre and post session with call bell and belongings in reach  Transfers   Sit to Stand 4  Minimal assistance   Additional items Assist x 1; Increased time required;Verbal cues  (foot placement)   Stand to Sit 4  Minimal assistance   Additional items Assist x 1   Stand pivot 4  Minimal assistance   Additional items Assist x 1;Verbal cues  (for directional turning )   Ambulation/Elevation   Gait pattern Decreased foot clearance;Shuffling; Short stride; Excessively slow; Foward flexed   Gait Assistance 4  Minimal assist   Additional items Assist x 1;Verbal cues  (breathing technique)   Assistive Device Rolling walker   Distance 8 feet, 2 steps fwd/2 steps back   Balance   Static Sitting Fair +   Dynamic Sitting Fair   Static Standing Poor +   Dynamic Standing Poor +   Ambulatory Poor +   Endurance Deficit   Endurance Deficit Yes   Endurance Deficit Description limited ambulation distance and standing tolerance   Activity Tolerance   Activity Tolerance Patient limited by fatigue;Treatment limited secondary to medical complications (Comment)   Nurse Made Aware spoke to ADDY Jung   Exercises   Glute Sets Sitting;10 reps;AROM; Bilateral   Knee AROM Long Arc Quad Sitting;10 reps;AROM; Bilateral   Ankle Pumps Sitting;20 reps;AROM; Bilateral   Marching Sitting;10 reps;AROM; Bilateral   Assessment   Prognosis Fair   Problem List Decreased strength;Decreased range of motion;Decreased endurance; Impaired balance;Decreased mobility; Decreased coordination;Decreased safety awareness; Obesity; Decreased skin integrity; Impaired sensation  (LE edema )   Assessment Patient presents with increased fatigue level today impacting activity tolerance during session  Performed multiple STS transfers from recliner with min a of 1 with less verbal instruction needed for hand placement and body positioning  Patient ambulated short distances x 2 trials with approx 4 minute seated rest breaks in between  SOB noted with mobility with SpO2 100% on 2L O2 via NC  Continue to focus on gait progression as appropriate  Barriers to Discharge Inaccessible home environment   Goals   Patient Goals to feel better   STG Expiration Date 05/04/18   Short Term Goal #1 Per last PT goal assessment    In 10-14 days, patient will be: 1  Supervision with Bed Mobility Rolling Right and Left - NOT MET 2  Supervision with Bed Mobility Supine-Sit - NOT MET 3  Supervision with Transfer Bed-Chair- NOT MET 4  Increase Dynamic Sitting Balance at least 1 Grade for improved stability with functional reach activities- NOT MET 5  Increase Dynamic Standing Balance at least 1 Grade for improved ease with Activities of Daily Living- NOT MET 6  Increase Lower Extremity Strength at least 1 Grade for improved ease mobility tasks- NOT MET 7  Supervision with Ambulation 100 feet using a rolling walker to facilitate home and community mobility- NOT MET; More goals will be determined at a future date upon completion of the aforementioned goals  Treatment Day 9   Plan   Treatment/Interventions Functional transfer training;LE strengthening/ROM; Therapeutic exercise;Patient/family training;Equipment eval/education; Bed mobility;Gait training;Spoke to nursing   Progress Slow progress, decreased activity tolerance   PT Frequency 5x/wk Recommendation   Recommendation Long-term skilled PT   Equipment Recommended Wheelchair; Other (Comment)  (citlali johnson )     Yessi Blackmon, PTA

## 2018-05-04 NOTE — PROGRESS NOTES
Cardiology Progress Note - Marni Parish  [de-identified] y o  male MRN: [id number]    Unit/Bed#: -01 Encounter: [id number]        Subjective:   No significant events overnight  Still with dyspnea  Still somnolent  Review of Systems   Cardiovascular: Negative for chest pain, leg swelling and palpitations  Respiratory: Positive for shortness of breath  Objective:   Vitals: Blood pressure 107/54, pulse 75, temperature 98 °F (36 7 °C), temperature source Oral, resp  rate (!) 24, height 5' 7" (1 702 m), weight 86 3 kg (190 lb 4 1 oz), SpO2 99 %  , Body mass index is 29 8 kg/m² ,   Orthostatic Blood Pressures      Most Recent Value   Blood Pressure  107/54 filed at 05/04/2018 0700   Patient Position - Orthostatic VS  Lying filed at 05/04/2018 6723         Systolic (54CCO), DLW:747 , Min:96 , VQH:109     Diastolic (48WEX), YSU:25, Min:53, Max:55      Intake/Output Summary (Last 24 hours) at 05/04/18 1028  Last data filed at 05/04/18 0750   Gross per 24 hour   Intake              240 ml   Output              975 ml   Net             -735 ml     Weight (last 2 days)     Date/Time   Weight    05/04/18 0600  86 3 (190 26)    05/02/18 0600  87 2 (192 24)            Telemetry Review: Afib    Physical Exam   Cardiovascular: Normal rate and normal heart sounds  An irregularly irregular rhythm present  Exam reveals no gallop and no friction rub  No murmur heard  Pulmonary/Chest: Breath sounds normal  He has no wheezes  He has no rales  Musculoskeletal: He exhibits no edema           Laboratory Results:        CBC with diff:     Results from last 7 days  Lab Units 05/04/18  0531 05/03/18  0520 05/02/18  0410 05/01/18  0505 04/30/18  1020   WBC Thousand/uL 7 65 7 92 6 89 7 61 6 63   HEMOGLOBIN g/dL 8 3* 8 7* 8 6* 8 6* 9 0*   HEMATOCRIT % 26 3* 27 3* 26 6* 27 1* 27 7*   MCV fL 86 86 85 84 85   PLATELETS Thousands/uL 142* 177 152 147* 153   MCH pg 27 1 27 4 27 4 26 8 27 6   MCHC g/dL 31 6 31 9 32 3 31 7 32 5   RDW % 16 9* 16 7* 16 4* 16 2* 16 5*   MPV fL 9 6 10 7 10 3 10 2 10 5         CMP:    Results from last 7 days  Lab Units 05/04/18  0531 05/03/18  0520 05/02/18  0420 05/01/18  0505 04/30/18  0509 04/29/18 2050 04/29/18  0924   SODIUM mmol/L 131* 132* 131* 128* 127* 126* 126*   POTASSIUM mmol/L 4 0 4 8 3 2* 3 2* 3 9 4 5 4 4   CHLORIDE mmol/L 92* 92* 89* 89* 87* 86* 86*   CO2 mmol/L 29 30 31 30 30 31 32   ANION GAP mmol/L 10 10 11 9 10 9 8   BUN mg/dL 134* 141* 155* 164* 160* 162* 158*   CREATININE mg/dL 2 48* 2 55* 2 58* 2 88* 2 96* 3 19* 2 95*   GLUCOSE RANDOM mg/dL 99 121 101 104 138 213* 170*   CALCIUM mg/dL 9 5 9 8 9 8 9 4 10 0 9 6 9 9   AST U/L  --   --   --  18  --   --   --    ALT U/L  --   --   --  31  --   --   --    ALK PHOS U/L  --   --   --  74  --   --   --    TOTAL PROTEIN g/dL  --   --   --  6 9  --   --   --    BILIRUBIN TOTAL mg/dL  --   --   --  1 20*  --   --   --    EGFR ml/min/1 73sq m 24 23 23 20 19 17 19         BMP:    Results from last 7 days  Lab Units 05/04/18  0531 05/03/18  0520 05/02/18  0420 05/01/18  0505 04/30/18  0509 04/29/18 2050 04/29/18  0924   SODIUM mmol/L 131* 132* 131* 128* 127* 126* 126*   POTASSIUM mmol/L 4 0 4 8 3 2* 3 2* 3 9 4 5 4 4   CHLORIDE mmol/L 92* 92* 89* 89* 87* 86* 86*   CO2 mmol/L 29 30 31 30 30 31 32   BUN mg/dL 134* 141* 155* 164* 160* 162* 158*   CREATININE mg/dL 2 48* 2 55* 2 58* 2 88* 2 96* 3 19* 2 95*   GLUCOSE RANDOM mg/dL 99 121 101 104 138 213* 170*   CALCIUM mg/dL 9 5 9 8 9 8 9 4 10 0 9 6 9 9         Magnesium:     Results from last 7 days  Lab Units 05/01/18  0505   MAGNESIUM mg/dL 3 2*               Cardiac testing:   Results for orders placed during the hospital encounter of 04/04/18   Echo complete with contrast if indicated    Narrative [facility], [address] [facility]  [phone]    Transthoracic Echocardiogram  2D, M-mode, Doppler, and Color Doppler    Study date:  11-Apr-2018    Patient: Yany Laech  MR number: [id number]  Account number: [de-identified]  : 1935  Age: [de-identified] years  Gender: Male  Status: Inpatient  Location: Bedside  Height: 67 in  Weight: 213 6 lb  BP: 108/ 58 mmHg    Indications: Assess left ventricular function  Diagnoses: I50 9 - Heart failure, unspecified    Sonographer:  MASON Zhang  Primary Physician:  Maday Hernandez MD  Referring Physician:  Hermilo Murphy MD  Group:  [facility]  Interpreting Physician:  Hermilo Murphy MD    SUMMARY    LEFT VENTRICLE:  Systolic function was mildly reduced  Ejection fraction was estimated to be 45 %  There was mild diffuse hypokinesis  There was moderate hypokinesis of the apical septal wall(s)  Wall thickness was mildly increased  There was mild concentric hypertrophy  RIGHT VENTRICLE:  The ventricle was mildly to moderately dilated  Systolic function was reduced  LEFT ATRIUM:  The atrium was moderately dilated  RIGHT ATRIUM:  The atrium was moderately dilated  MITRAL VALVE:  There was marked annular calcification  There was moderate to severe regurgitation  AORTIC VALVE:  A bioprosthesis was present  It exhibited normal function  Doppler cardiac output was 3 5 L/min, using LVOT flow data  Doppler cardiac index was 1 68 L/min/m squared, using LVOT flow data  TRICUSPID VALVE:  There was severe regurgitation  Pulmonary artery systolic pressure was markedly increased  Estimated peak PA pressure was 65 mmHg  PULMONIC VALVE:  Notching of the systolic pulse wave is noted, suggesting elevated pulmonary artery pressures  There was mild regurgitation  HISTORY: PRIOR HISTORY: CAD s/p CABG, s/p AVR, Afib, Hypertension, Hyperlipidemia, Heart failure    PROCEDURE: The procedure was performed at the bedside  This was a routine study  The transthoracic approach was used  The study included complete 2D imaging, M-mode, complete spectral Doppler, and color Doppler   The heart rate was 89 bpm,  at the start of the study  Images were obtained from the parasternal, apical, subcostal, and suprasternal notch acoustic windows  Echocardiographic views were limited due to decreased penetration and lung interference  This was a  technically difficult study  LEFT VENTRICLE: Size was normal  Systolic function was mildly reduced  Ejection fraction was estimated to be 45 %  There was mild diffuse hypokinesis  There was moderate hypokinesis of the apical septal wall(s)  Wall thickness was mildly  increased  There was mild concentric hypertrophy  DOPPLER: Transmitral flow pattern: atrial fibrillation  RIGHT VENTRICLE: The ventricle was mildly to moderately dilated  Systolic function was reduced  LEFT ATRIUM: The atrium was moderately dilated  RIGHT ATRIUM: The atrium was moderately dilated  MITRAL VALVE: There was marked annular calcification  Valve structure was normal  There was normal leaflet separation  DOPPLER: The transmitral velocity was within the normal range  There was no evidence for stenosis  There was moderate to  severe regurgitation  AORTIC VALVE: A bioprosthesis was present  It exhibited normal function  TRICUSPID VALVE: The annulus was dilated  There was normal leaflet separation  DOPPLER: The transtricuspid velocity was within the normal range  There was no evidence for stenosis  There was severe regurgitation  Pulmonary artery systolic  pressure was markedly increased  Estimated peak PA pressure was 65 mmHg  PULMONIC VALVE: Notching of the systolic pulse wave is noted, suggesting elevated pulmonary artery pressures  Leaflets exhibited normal thickness, no calcification, and normal cuspal separation  DOPPLER: The transpulmonic velocity was  within the normal range  There was mild regurgitation  PERICARDIUM: There was no pericardial effusion  AORTA: The root exhibited normal size      MEASUREMENT TABLES    2D MEASUREMENTS  LVOT   (Reference normals)  Diam   20 5 mm   (--)    DOPPLER MEASUREMENTS  LVOT   (Reference normals)  VTI   13 6 cm   (--)  R-R interval   769 ms   (--)  HR   78 bpm   (--)  Stroke vol   44 89 ml   (--)  Cardiac output   3 5 L/min   (--)  Cardiac index   1 68 L/min/m squared   (--)    SYSTEM MEASUREMENT TABLES    2D  %FS: 21 04 %  AV Diam: 3 14 cm  EDV(Teich): 91 37 ml  EF(Cube): 50 77 %  EF(Teich): 42 96 %  ESV(Cube): 44 19 ml  ESV(Teich): 52 12 ml  IVSd: 1 25 cm  LA Area: 32 18 cm2  LA Diam: 4 78 cm  LVEDV MOD A4C: 143 38 ml  LVEF MOD A4C: 30 68 %  LVESV MOD A4C: 99 4 ml  LVIDd: 4 48 cm  LVIDs: 3 54 cm  LVLd A4C: 8 59 cm  LVLs A4C: 8 06 cm  LVPWd: 1 17 cm  RA Area: 26 08 cm2  RV Diam: 4 04 cm  SI(Cube): 21 92 ml/m2  SI(Teich): 18 87 ml/m2  SV MOD A4C: 43 98 ml  SV(Cube): 45 58 ml  SV(Teich): 39 25 ml    CW  TR MaxP 39 mmHg  TR Vmax: 3 85 m/s    MM  TAPSE: 1 9 cm    [facility] Accredited Echocardiography Laboratory    Prepared and electronically signed by    Rowdy Gee MD  Signed 2018 18:07:57               Meds/Allergies     Current Facility-Administered Medications:  aspirin 81 mg Oral Daily Chase Alberto MD   atorvastatin 40 mg Oral Daily With Dinner Chase Alberto MD   calcium carbonate 1,000 mg Oral Daily PRN Chase Alberto MD   diphenhydrAMINE 12 5 mg Oral HS PRN Ti Mckeon PA-C   docusate sodium 100 mg Oral BID PRN Chase Alberto MD   docusate sodium 100 mg Oral BID Love Wong MD   heparin (porcine) 5,000 Units Subcutaneous Q8H [facility] Lisseth Seals PA-C   insulin glargine 30 Units Subcutaneous HS Nereida Hines MD   insulin lispro 1-5 Units Subcutaneous TID AC Chase Alberto MD   insulin lispro 1-5 Units Subcutaneous HS Chase Alberto MD   insulin lispro 12 Units Subcutaneous TID With Meals Mina Pugh MD   melatonin 6 mg Oral HS Meghan Barba MD   metoprolol succinate 25 mg Oral Q12H Linda Covarrubias MD   multivitamin-minerals 1 tablet Oral Daily JEWELL Walker   ondansetron 4 mg Intravenous Q6H PRN Chase Alberto MD   pantoprazole 40 mg Oral Early Morning Chase Alberto MD   polyethylene glycol 17 g Oral Daily PRN Mayank Mishra PA-C   sevelamer 800 mg Oral TID With Meals James Frias MD   sodium chloride 1 spray Each Nare PRN Gentry Ayoub MD   tamsulosin 0 4 mg Oral Daily With Dinner Lisseth Seals PA-C   torsemide 40 mg Oral Daily Anish Heath MD   zolpidem 5 mg Oral HS PRN Gentry Ayoub MD        Prescriptions Prior to Admission   Medication    aspirin (ECOTRIN LOW STRENGTH) 81 mg EC tablet    atorvastatin (LIPITOR) 40 mg tablet    febuxostat (ULORIC) 40 mg tablet    metoprolol tartrate (LOPRESSOR) 25 mg tablet    metoprolol tartrate (LOPRESSOR) 50 mg tablet    multivitamin-iron-minerals-folic acid (CENTRUM) chewable tablet    NOVOLOG MIX 70/30 FLEXPEN (70-30) 100 UNIT/ML SUPN    omeprazole (PriLOSEC) 20 mg delayed release capsule    paricalcitol (ZEMPLAR) 1 mcg capsule    ramipril (ALTACE) 2 5 mg capsule    torsemide (DEMADEX) 20 mg tablet       Assessment:  Principal Problem:    Acute on chronic diastolic congestive heart failure (HCC)  Active Problems:    Rapid atrial fibrillation (HCC)    Benign essential hypertension    Type 2 diabetes mellitus ([facility] )    Peripheral arterial disease (HCC)    Acute-on-chronic kidney injury ([facility] )    Non-ST elevation myocardial infarction (NSTEMI) ([facility] )    Pulmonary hypertension ([facility])    Azotemia    Hyponatremia      Impression:  1  Acute on chronic diastolic heart failure - appears reasonably compensated  Deferring to nephrology for diuretic dosing, but he appears somewhat volume overloaded  2  Acute on chronic renal failure - slight improvement  3  Atrial fibrillation - rate controlled  Off anticoagulation due to fall risk  4  CAD - stable  5  Valvular heart disease - s/p AVR with moderate to severe MR/TR  6  Hyponatremia - stable  Plan:  1  Continue diuretics per nephrology  Appreciate Dr Salud Mendoza efforts    Will maintain lower dose of oral diuretics, and hopefully will remain compensated  2  Continue remainder of cardiac medications

## 2018-05-04 NOTE — PLAN OF CARE
Problem: PHYSICAL THERAPY ADULT  Goal: Performs mobility at highest level of function for planned discharge setting  See evaluation for individualized goals  Treatment/Interventions: Functional transfer training, LE strengthening/ROM, Therapeutic exercise, Endurance training, Patient/family training, Equipment eval/education, Bed mobility, Gait training (PT to see when stair training is appropriate )          See flowsheet documentation for full assessment, interventions and recommendations  Outcome: Progressing  Prognosis: Fair  Problem List: Decreased strength, Decreased range of motion, Decreased endurance, Impaired balance, Decreased mobility, Decreased coordination, Decreased safety awareness, Obesity, Decreased skin integrity, Impaired sensation (LE edema )  Assessment: Patient presents with increased fatigue level today impacting activity tolerance during session  Performed multiple STS transfers from recliner with min a of 1 with less verbal instruction needed for hand placement and body positioning  Patient ambulated short distances x 2 trials with approx 4 minute seated rest breaks in between  SOB noted with mobility with SpO2 100% on 2L O2 via NC  Continue to focus on gait progression as appropriate  Barriers to Discharge: Inaccessible home environment     Recommendation: Long-term skilled PT     PT - OK to Discharge: Yes (when medically cleared to LT skilled PT)    See flowsheet documentation for full assessment

## 2018-05-05 NOTE — PROGRESS NOTES
NEPHROLOGY PROGRESS NOTE   Walekr Pretty  [de-identified] y o  male MRN: [id number]  Unit/Bed#: -[place] Encounter: [id number]  Reason for Consult: CKD, SABINE    ASSESSMENT and PLAN:  1  SABINE:  · Due to cardiorenal syndrome  UA is bland and renal US is negative  · BUN continues to improve with torsemide 40 mg daily  Creatinine is stable overall  · Weights have been stable  · Has mild uremic symptoms but family refusing dialysis until nutrition and PT are optimized  2  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  Follows with Dr Zarate Given  3  CHF, volume overload: Continue Torsemide 40 mg PO daily  4  Hyponatremia:  Redose Samsca 15 mg PO x 1 today  5  Azotemia: Due to CRS  6  Hypokalemia: Kdur 10 meq daily  7  PAF, valvular heart disease, CAD s/p CABG  8  Anemia: Hgb stable  9  MGUS  10  MBD:  Hyperphosphatemia -  continue Sevelamer  Now better  11  Weakness, deconditioning: per primary team      SUMMARY OF RECOMMENDATIONS:  · Continue Torsemide 40 mg daily  · Samsca 15 mg PO x 1    · FR can be relaxed on Samsca  Discussed with SLIM and wife  SUBJECTIVE / INTERVAL HISTORY:  In better spirits today since he was able to go out last night and this morning  Appetite remains labile, mostly poor  Complaining about the quality of food in the hospital     OBJECTIVE:  Current Weight: Weight - Scale: 86 5 kg (190 lb 11 2 oz)  Vitals:    05/04/18 2219 05/04/18 2300 05/05/18 0600 05/05/18 0709   BP: 112/89 99/55  110/56   BP Location:  Left arm  Left arm   Pulse: 72 88  72   Resp: 18 18  16   Temp:  98 3 °F (36 8 °C)  98 5 °F (36 9 °C)   TempSrc:  Oral  Oral   SpO2: 94% 94%  99%   Weight:   86 5 kg (190 lb 11 2 oz)    Height:           Intake/Output Summary (Last 24 hours) at 05/05/18 1412  Last data filed at 05/05/18 1219   Gross per 24 hour   Intake              480 ml   Output             1025 ml   Net             -545 ml     General: conscious, cooperative, weak but better     Chest/Lungs: crackles on the left base    CVS: distinct heart sounds, normal rate, no rub  Abdomen: soft  Extremities: (+) bipedal edema  : no viera catheter  Neuro: awake, alert       Medications:    Current Facility-Administered Medications:     aspirin (ECOTRIN LOW STRENGTH) EC tablet 81 mg, 81 mg, Oral, Daily, Chase Alberto MD, 81 mg at 05/05/18 0909    atorvastatin (LIPITOR) tablet 40 mg, 40 mg, Oral, Daily With Priscilla Alberto MD, 40 mg at 05/04/18 1747    calcium carbonate (TUMS) chewable tablet 1,000 mg, 1,000 mg, Oral, Daily PRN, Chase Alberto MD    diphenhydrAMINE (BENADRYL) tablet 12 5 mg, 12 5 mg, Oral, HS PRN, Jyoti Smith PA-C, 12 5 mg at 04/29/18 0107    docusate sodium (COLACE) capsule 100 mg, 100 mg, Oral, BID PRN, Chase Alberto MD, 100 mg at 04/10/18 1005    docusate sodium (COLACE) capsule 100 mg, 100 mg, Oral, BID, Sabi Garland MD, 100 mg at 05/05/18 0910    heparin (porcine) subcutaneous injection 5,000 Units, 5,000 Units, Subcutaneous, Q8H [facility], Lisseth Seals PA-C, Stopped at 05/04/18 0526    insulin glargine (LANTUS) subcutaneous injection 30 Units 0 3 mL, 30 Units, Subcutaneous, HS, Elida Plaza MD, 30 Units at 05/04/18 2245    insulin lispro (HumaLOG) 100 units/mL subcutaneous injection 1-5 Units, 1-5 Units, Subcutaneous, TID AC, 1 Units at 05/05/18 1221 **AND** [CANCELED] Fingerstick Glucose (POCT), , , TID AC, Chase Alberto MD    insulin lispro (HumaLOG) 100 units/mL subcutaneous injection 1-5 Units, 1-5 Units, Subcutaneous, HS, Chase Alberto MD, 1 Units at 04/30/18 2154    insulin lispro (HumaLOG) 100 units/mL subcutaneous injection 12 Units, 12 Units, Subcutaneous, TID With Meals, Elida Plaza MD, 12 Units at 05/05/18 1221    melatonin tablet 6 mg, 6 mg, Oral, HS, Adriana Navarro MD, 6 mg at 05/04/18 2219    metoprolol succinate (TOPROL-XL) 24 hr tablet 25 mg, 25 mg, Oral, Q12H, Carla Lewis MD, 25 mg at 05/05/18 0908    multivitamin-minerals (CENTRUM) tablet 1 tablet, 1 tablet, Oral, Daily, JEWELL Baez, 1 tablet at 05/05/18 0908    ondansetron (ZOFRAN) injection 4 mg, 4 mg, Intravenous, Q6H PRN, Chase Alberto MD    pantoprazole (PROTONIX) EC tablet 40 mg, 40 mg, Oral, Early Morning, Chase Alberto MD, 40 mg at 05/05/18 0523    polyethylene glycol (MIRALAX) packet 17 g, 17 g, Oral, Daily PRN, Yunior Rangel PA-C, 17 g at 04/29/18 0856    potassium chloride (K-DUR,KLOR-CON) CR tablet 10 mEq, 10 mEq, Oral, Daily, Angie Brewer MD, 10 mEq at 05/05/18 0908    sevelamer (RENAGEL) tablet 800 mg, 800 mg, Oral, TID With Meals, Jocelyn Story MD, 800 mg at 05/05/18 1218    sodium chloride (OCEAN) 0 65 % nasal spray 1 spray, 1 spray, Each Nare, PRN, Papito Oreilly MD    [facility]) capsule 0 4 mg, 0 4 mg, Oral, Daily With Torrie Seals PA-C, 0 4 mg at 05/04/18 1747    torsemide (DEMADEX) tablet 40 mg, 40 mg, Oral, Daily, Angie Brewer MD, 40 mg at 05/05/18 0909    zolpidem (AMBIEN) tablet 5 mg, 5 mg, Oral, HS PRN, Papito Oreilly MD    Laboratory Results:    Results from last 7 days  Lab Units 05/05/18  0520 05/04/18  0531 05/03/18  0520 05/02/18  0420 05/02/18  0410 05/01/18  0505 04/30/18  1020 04/30/18  0509 04/29/18  2050   WBC Thousand/uL 7 77 7 65 7 92  --  6 89 7 61 6 63  --   --    HEMOGLOBIN g/dL 8 7* 8 3* 8 7*  --  8 6* 8 6* 9 0*  --   --    HEMATOCRIT % 27 2* 26 3* 27 3*  --  26 6* 27 1* 27 7*  --   --    PLATELETS Thousands/uL 142* 142* 177  --  152 147* 153  --   --    SODIUM mmol/L 128* 131* 132* 131*  --  128*  --  127* 126*   POTASSIUM mmol/L 3 9 4 0 4 8 3 2*  --  3 2*  --  3 9 4 5   CHLORIDE mmol/L 90* 92* 92* 89*  --  89*  --  87* 86*   CO2 mmol/L 26 29 30 31  --  30  --  30 31   BUN mg/dL 132* 134* 141* 155*  --  164*  --  160* 162*   CREATININE mg/dL 2 49* 2 48* 2 55* 2 58*  --  2 88*  --  2 96* 3 19*   CALCIUM mg/dL 9 6 9 5 9 8 9 8  --  9 4  --  10 0 9 6   MAGNESIUM mg/dL 2 6  --   --   --   -- 3 2*  --   --   --    PHOSPHORUS mg/dL 3 8  --   --   --   --  4 2*  --   --   --    TOTAL PROTEIN g/dL  --   --   --   --   --  6 9  --   --   --    GLUCOSE RANDOM mg/dL 90 99 121 101  --  104  --  138 213*     Previous work up:  4/6/18 Renal US: no hydronephrosis, distended bladder, right renal cyst    4/5/18 UA - bland  No blood, no protein

## 2018-05-05 NOTE — PROGRESS NOTES
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, hallux and lateral heel   The left 3rd toenail bed has eschar without sign of infection from the removed toenail  2  DM neuropathy  3  Severe PAD  4  CHF with LE edema  5   Duskiness to right 1st and 2nd digit without gangrene   Minor  6   Left 3rd toe onycholysis without complication    Plan:  1  Left foot remains stable with no sign of infection  Continue Betadine paint with dry sterile dressing coverage  Protect the foot from any trauma  No current vascular plan is the patient is not stable for any aggressive vascular intervention  Subjective/Objective   Chief Complaint:   Chief Complaint   Patient presents with    Shortness of Breath     pt reports being short of breath over the past week  Pt reports when he gets up and walks distances he has trouble breathing and chest pain  Daughter reports recent trip to [facility] where pt did not take lasix to avoid voiding  Subjective: [de-identified] y o  y/o male seen and evaluated at bedside  No acute events overnight  Stable foot pain  Blood pressure 110/56, pulse 72, temperature 98 5 °F (36 9 °C), temperature source Oral, resp  rate 16, height 5' 7" (1 702 m), weight 86 5 kg (190 lb 11 2 oz), SpO2 99 %  ,Body mass index is 29 87 kg/m²      Lab Results   Component Value Date    WBC 7 77 05/05/2018    HGB 8 7 (L) 05/05/2018    HCT 27 2 (L) 05/05/2018    MCV 85 05/05/2018     (L) 05/05/2018     Lab Results   Component Value Date    GLUCOSE 90 05/05/2018    CALCIUM 9 6 05/05/2018     (L) 05/05/2018    K 3 9 05/05/2018    CO2 26 05/05/2018    CL 90 (L) 05/05/2018     (H) 05/05/2018    CREATININE 2 49 (H) 05/05/2018         Invasive Devices     Peripheral Intravenous Line            Peripheral IV 05/03/18 Right Antecubital 1 day                Physical Exam:   General: alert, cooperative and no distress  Lungs: Normal respiratory effort  Heart: regular rate and rhythm   Abdomen: soft, non-tender  Extremity:  Stable gangrene left foot  There is no drainage or sign of infection  There is eschar to the lateral heel 5th toe and 3rd toe  Patient does have general lower extremity pain likely secondary to ischemia  Lab, Imaging and other studies:     Imaging: I have personally reviewed pertinent films in PACS  EKG, Pathology, and Other Studies: I have personally reviewed pertinent reports  Portions of the record may have been created with voice recognition software  Occasional wrong word or "sound a like" substitutions may have occurred due to the inherent limitations of voice recognition software  Read the chart carefully and recognize, using context, where substitutions have occurred

## 2018-05-05 NOTE — PROGRESS NOTES
[facility] Hospitalist Service - Internal Medicine Progress Note      PATIENT INFORMATION      Patient: Tor Willoughby  [de-identified] y o  male   MRN: [id number]  PCP: Veronica Bella MD  Unit/Bed#: [facility] Encounter: [id number]  Date Of Visit: 05/05/18       ASSESSMENTS & PLAN        1  Acute respiratory failure with hypoxia  · baseline oxygen requirement is room air - currently down to 1-2 L of oxygen via nasal cannula but was maintained on room air yesterday - initial increase in requirements earlier during hospital course likely secondary to CHF exacerbation (see plan below) - will attempt again to titrate down to baseline as tolerated    2  Acute on chronic diastolic CHF - NSTEMI type 2  · last EF of 45% with mild CO, severe TR, moderate-severe MR, and diffuse hypokinesis  · currently on Torsemide/Toprol-XL regimen - net fluid balance of (-) 10 9 L thus far  · Elevated troponin level on admission noted with serial repeat showing stabilization - no further trending in over four weeks since admission - likely secondary to CHF exacerbation - cardiology following  · portable CXR yesterday morning revealed improvement and CHF with stable effusions    3  Acute kidney injury - Chronic kidney disease stage 4  · creatinine has remained stable @ 2 48-2 49 over last two days from a peak of 3 26 earlier in hospitalization with improvement in BUN as well - baseline creatinine is approximately 1 8-2 2 - likely progressively worsened secondary to CHF  · As renal function is slowly improving with conservative oral diuretic measures, nephrology has opted to hold off on consideration of dialysis at this point - continue monitoring    4  Insulin-dependent diabetes mellitus  · HgA1c of 7 6 on 3/2018   · c/w basal/prandial insulin regimen w/ additional SSI coverage per accuchecks     5    Atrial fibrillation  · Rate controlled on Toprol-XL - continue ASA  · Not a candidate for chronic anticoagulation secondary to comorbidities and fall risk    6  CAD - Aortic valvular disease  · Status post prior CABG - status post bioprosthetic aortic valve replacement   · Continue ASA/Lipitor/Toprol-XL    7  Hypokalemia  · Monitor/replete potassium as necessary (remaining normalized currently)  · Serum magnesium within normal limits     8  Hyponatremia  · waxing/waning secondary to CHF/fluid overload - nephrology initiated a dose of Samsca yesterday and will give an additional dose today as serum sodium dropped  · Monitor BMP and monitor fluid intake    9  Anemia of chronic disease  · H/H stable - continue to monitor       DVT Prophylaxis:  Heparin SC      SUBJECTIVE     No acute overnight events  Patient is more alert and awake today and is happy that he was able to go outside for some fresh air a few times  His wife is also present at bedside and she is also content with his improvement over the last few days  He is currently on 1-2 L of oxygen via nasal cannula although he was on room air yesterday  He has been told that attempts will be made to wean him off back to room air again today as he denies any shortness of breath/chest pain at this time      OBJECTIVE     Vitals:   Temp (24hrs), Av 3 °F (36 8 °C), Min:98 2 °F (36 8 °C), Max:98 5 °F (36 9 °C)    HR:  [72-88] 74  Resp:  [16-18] 18  BP: ()/(54-89) 116/54  SpO2:  [94 %-100 %] 100 %  Body mass index is 29 87 kg/m²  Input and Output Summary (last 24 hours):        Intake/Output Summary (Last 24 hours) at 18 1824  Last data filed at 18 1642   Gross per 24 hour   Intake              720 ml   Output             1275 ml   Net             -555 ml       Physical Exam:     GENERAL:  Well-developed/nourished - no current distress  HEAD:  Normocephalic - atraumatic  EYES: PERRL - EOMI   MOUTH:  Mucosa moist  NECK:  Supple - full range of motion  CARDIAC:  Irregularly irregular rhythm but rate controlled - S1/S2 positive  PULMONARY:  Improving bibasilar breath sounds - nonlabored respirations  ABDOMEN:  Soft - nontender/nondistended - active bowel sounds  MUSCULOSKELETAL:  Motor strength/range of motion somewhat deconditioned - 1+ distal LE pitting edema  NEUROLOGIC:  Alert/oriented x 3 upon questioning today  SKIN:  Chronic wrinkles/blemishes - left heel/hallux/5th toe chronic gangrenous necrotic changes are stable  PSYCHIATRIC:  Mood/affect remains pleasant      ADDITIONAL DATA     Labs & Recent Cultures:       Results from last 7 days  Lab Units 05/05/18  0520   WBC Thousand/uL 7 77   HEMOGLOBIN g/dL 8 7*   HEMATOCRIT % 27 2*   PLATELETS Thousands/uL 142*   NEUTROS PCT % 71   LYMPHS PCT % 17   MONOS PCT % 11   EOS PCT % 1       Results from last 7 days  Lab Units 05/05/18  0520  05/01/18  0505   SODIUM mmol/L 128*  < > 128*   POTASSIUM mmol/L 3 9  < > 3 2*   CHLORIDE mmol/L 90*  < > 89*   CO2 mmol/L 26  < > 30   BUN mg/dL 132*  < > 164*   CREATININE mg/dL 2 49*  < > 2 88*   CALCIUM mg/dL 9 6  < > 9 4   TOTAL PROTEIN g/dL  --   --  6 9   BILIRUBIN TOTAL mg/dL  --   --  1 20*   ALK PHOS U/L  --   --  74   ALT U/L  --   --  31   AST U/L  --   --  18   GLUCOSE RANDOM mg/dL 90  < > 104   < > = values in this interval not displayed      Results from last 7 days  Lab Units 04/30/18  1020   INR  1 11         Last 24 Hours Medication List:     Current Facility-Administered Medications:  aspirin 81 mg Oral Daily Chase Alberto MD   atorvastatin 40 mg Oral Daily With Dinner Chase Alberto MD   calcium carbonate 1,000 mg Oral Daily PRN Chase Alberto MD   cephalexin 250 mg Oral Q8H [address] Placido Wayne PA-C   diphenhydrAMINE 12 5 mg Oral HS PRN Kalyn Barton PA-C   docusate sodium 100 mg Oral BID PRN Chase Alberto MD   docusate sodium 100 mg Oral BID Magy Meza MD   heparin (porcine) 5,000 Units Subcutaneous Q8H [address] Lisseth Seals PA-C   insulin glargine 30 Units Subcutaneous HS Carmela Oakes MD   insulin lispro 1-5 Units Subcutaneous TID CHAU Stone MD Remy   insulin lispro 1-5 Units Subcutaneous HS Chase Alberto MD   insulin lispro 12 Units Subcutaneous TID With Meals Sonali Valdez MD   melatonin 6 mg Oral HS Radhames Smith MD   metoprolol succinate 25 mg Oral Q12H Burak Guthrie MD   multivitamin-minerals 1 tablet Oral Daily JEWELL Saha   ondansetron 4 mg Intravenous Q6H PRN Chase Alberto MD   pantoprazole 40 mg Oral Early Morning Chase Alberto MD   polyethylene glycol 17 g Oral Daily PRN FERMÍN MonroyC   potassium chloride 10 mEq Oral Daily Marlen Carter MD   sevelamer 800 mg Oral TID With Meals Janina Xie MD   sodium chloride 1 spray Each Nare PRN Sharron Salazar MD   tamsulosin 0 4 mg Oral Daily With Dinner Lisseth Seals PA-C   torsemide 40 mg Oral Daily Marlen Carter MD   zolpidem 5 mg Oral HS PRN Sharron Salazar MD          Time Spent for Care:  39 minutes  More than 50% of total time spent on counseling and coordination of care as described above  Current Length of Stay: 31 day(s)      Code Status: Level 1 - Full Code          ** Please Note: This note is constructed using a voice recognition dictation system   **

## 2018-05-05 NOTE — PROGRESS NOTES
Progress Note - Cardiology   Devante Barcenas  [de-identified] y o  male MRN: [id number]  Unit/Bed#: -01 Encounter: [id number]    Assessment:  Principal Problem:    Acute on chronic diastolic congestive heart failure (HCC)  Active Problems:    Rapid atrial fibrillation (HCC)    Benign essential hypertension    Type 2 diabetes mellitus ([facility] [address] )    Peripheral arterial disease (HCC)    Acute-on-chronic kidney injury ([facility] )    Non-ST elevation myocardial infarction (NSTEMI) ([facility] )    Pulmonary hypertension (HCC)    Azotemia    Hyponatremia    [de-identified]year old man with acute on chronic diastolic CHF  Severe AS s/p AVR  Severe MR, TR  Atrial fibrillation, rate controlled  CAD without angina  SABINE on CKD  Plan:  Diuretics per nephrology team   Not open heart valve surgery candidate  Unlikely Mitral Clip candidate, and evaluation only could be done with better functional status  Not currently on anticoagulation, due to fall risk  Subjective/Objective     Subjective:  No significant events last 24 hours  Objective:    Vitals: /54 (BP Location: Right arm)   Pulse 74   Temp 98 2 °F (36 8 °C) (Oral)   Resp 18   Ht 5' 7" (1 702 m)   Wt 86 5 kg (190 lb 11 2 oz)   SpO2 100%   BMI 29 87 kg/m²   Vitals:    05/04/18 0600 05/05/18 0600   Weight: 86 3 kg (190 lb 4 1 oz) 86 5 kg (190 lb 11 2 oz)     Orthostatic Blood Pressures      Most Recent Value   Blood Pressure  116/54 filed at 05/05/2018 1500   Patient Position - Orthostatic VS  Lying filed at 05/05/2018 1500            Intake/Output Summary (Last 24 hours) at 05/05/18 1549  Last data filed at 05/05/18 1426   Gross per 24 hour   Intake              720 ml   Output             1225 ml   Net             -505 ml     Physical Exam:   General appearance: Resting comfortably  Head: Normocephalic, without obvious abnormality, atraumatic  Lungs: clear to auscultation bilaterally  Normal air entry  Normal effort  Heart: S1, S2  Irregularly irregular + HSM    Abdomen: obese, soft, nontender    Skin: Skin color, texture, turgor normal  No rashes or lesions  Neurologic: nonfocal     Medications:    Current Facility-Administered Medications:     aspirin (ECOTRIN LOW STRENGTH) EC tablet 81 mg, 81 mg, Oral, Daily, Chase Alberto MD, 81 mg at 05/05/18 0909    atorvastatin (LIPITOR) tablet 40 mg, 40 mg, Oral, Daily With Hillary Alberto MD, 40 mg at 05/04/18 1747    calcium carbonate (TUMS) chewable tablet 1,000 mg, 1,000 mg, Oral, Daily PRN, Chase Alberto MD    diphenhydrAMINE (BENADRYL) tablet 12 5 mg, 12 5 mg, Oral, HS PRN, Vijay Lopes PA-C, 12 5 mg at 04/29/18 0107    docusate sodium (COLACE) capsule 100 mg, 100 mg, Oral, BID PRN, Chase Alberto MD, 100 mg at 04/10/18 1005    docusate sodium (COLACE) capsule 100 mg, 100 mg, Oral, BID, Mikal Benitez MD, 100 mg at 05/05/18 0910    heparin (porcine) subcutaneous injection 5,000 Units, 5,000 Units, Subcutaneous, Q8H [address], Lisseth Seals PA-C, Stopped at 05/04/18 0526    insulin glargine (LANTUS) subcutaneous injection 30 Units 0 3 mL, 30 Units, Subcutaneous, HS, Sean Aparicio MD, 30 Units at 05/04/18 2245    insulin lispro (HumaLOG) 100 units/mL subcutaneous injection 1-5 Units, 1-5 Units, Subcutaneous, TID AC, 1 Units at 05/05/18 1221 **AND** [CANCELED] Fingerstick Glucose (POCT), , , TID AC, Chase Alberto MD    insulin lispro (HumaLOG) 100 units/mL subcutaneous injection 1-5 Units, 1-5 Units, Subcutaneous, HS, Chase Alberto MD, 1 Units at 04/30/18 2154    insulin lispro (HumaLOG) 100 units/mL subcutaneous injection 12 Units, 12 Units, Subcutaneous, TID With Meals, Sean Aparicio MD, 12 Units at 05/05/18 1221    melatonin tablet 6 mg, 6 mg, Oral, HS, Faye Howell MD, 6 mg at 05/04/18 2219    metoprolol succinate (TOPROL-XL) 24 hr tablet 25 mg, 25 mg, Oral, Q12H, Ivon Ryder MD, 25 mg at 05/05/18 0908    multivitamin-minerals (CENTRUM) tablet 1 tablet, 1 tablet, Oral, Daily, Preet Cuevas JEWELL Villa, 1 tablet at 05/05/18 0908    ondansetron (ZOFRAN) injection 4 mg, 4 mg, Intravenous, Q6H PRN, Chase Alberto MD    pantoprazole (PROTONIX) EC tablet 40 mg, 40 mg, Oral, Early Morning, Chase Alberto MD, 40 mg at 05/05/18 0523    polyethylene glycol (MIRALAX) packet 17 g, 17 g, Oral, Daily PRN, Izabella Mary PA-C, 17 g at 04/29/18 0856    potassium chloride (K-DUR,KLOR-CON) CR tablet 10 mEq, 10 mEq, Oral, Daily, Timothy Bateman MD, 10 mEq at 05/05/18 0908    sevelamer (RENAGEL) tablet 800 mg, 800 mg, Oral, TID With Meals, Carlota Maloney MD, 800 mg at 05/05/18 1218    sodium chloride (OCEAN) 0 65 % nasal spray 1 spray, 1 spray, Each Nare, PRN, Milagros Olivas MD    [facility]) capsule 0 4 mg, 0 4 mg, Oral, Daily With Pechattie Seals PA-C, 0 4 mg at 05/04/18 1747    torsemide (DEMADEX) tablet 40 mg, 40 mg, Oral, Daily, Timothy Bateman MD, 40 mg at 05/05/18 0909    zolpidem (AMBIEN) tablet 5 mg, 5 mg, Oral, HS PRN, Milagros Olivas MD    Lab Results:        Results from last 7 days  Lab Units 05/05/18  0520 05/04/18  0531 05/03/18  0520   WBC Thousand/uL 7 77 7 65 7 92   HEMOGLOBIN g/dL 8 7* 8 3* 8 7*   HEMATOCRIT % 27 2* 26 3* 27 3*   PLATELETS Thousands/uL 142* 142* 177           Results from last 7 days  Lab Units 05/05/18  0520 05/04/18  0531 05/03/18  0520  05/01/18  0505   SODIUM mmol/L 128* 131* 132*  < > 128*   POTASSIUM mmol/L 3 9 4 0 4 8  < > 3 2*   CHLORIDE mmol/L 90* 92* 92*  < > 89*   CO2 mmol/L 26 29 30  < > 30   BUN mg/dL 132* 134* 141*  < > 164*   CREATININE mg/dL 2 49* 2 48* 2 55*  < > 2 88*   CALCIUM mg/dL 9 6 9 5 9 8  < > 9 4   TOTAL PROTEIN g/dL  --   --   --   --  6 9   BILIRUBIN TOTAL mg/dL  --   --   --   --  1 20*   ALK PHOS U/L  --   --   --   --  74   ALT U/L  --   --   --   --  31   AST U/L  --   --   --   --  18   GLUCOSE RANDOM mg/dL 90 99 121  < > 104   < > = values in this interval not displayed      Results from last 7 days  Lab Units 04/30/18  1020   INR  1 11   PTT seconds 30       Results from last 7 days  Lab Units 05/05/18  0520 05/01/18  0505   MAGNESIUM mg/dL 2 6 3 2*     Echo:  LEFT VENTRICLE:  Systolic function was mildly reduced  Ejection fraction was estimated to be 45 %  There was mild diffuse hypokinesis  There was moderate hypokinesis of the apical septal wall(s)  Wall thickness was mildly increased  There was mild concentric hypertrophy      RIGHT VENTRICLE:  The ventricle was mildly to moderately dilated  Systolic function was reduced      LEFT ATRIUM:  The atrium was moderately dilated      RIGHT ATRIUM:  The atrium was moderately dilated      MITRAL VALVE:  There was marked annular calcification  There was moderate to severe regurgitation      AORTIC VALVE:  A bioprosthesis was present  It exhibited normal function  Doppler cardiac output was 3 5 L/min, using LVOT flow data  Doppler cardiac index was 1 68 L/min/m squared, using LVOT flow data      TRICUSPID VALVE:  There was severe regurgitation  Pulmonary artery systolic pressure was markedly increased  Estimated peak PA pressure was 65 mmHg      PULMONIC VALVE:  Notching of the systolic pulse wave is noted, suggesting elevated pulmonary artery pressures  There was mild regurgitation

## 2018-05-05 NOTE — PROGRESS NOTES
Per Dr Jerilyn Rai, okay to take patient outside without telemetry with PCA supervision  Will take patient outside and continue to monitor

## 2018-05-05 NOTE — PROGRESS NOTES
Pt c/o of burning w/ urination  UA ordered and obtained  Pt's UA came back positive for nitrates  NFM=[id number]  Esau Sparks w/ mandeep made aware  Will continue to monitor

## 2018-05-06 NOTE — PROGRESS NOTES
Progress Note - Cardiology   Yunior Cuba  [de-identified] y o  male MRN: [id number]  Unit/Bed#: -01 Encounter: [id number]    Assessment:  Principal Problem:    Acute on chronic diastolic congestive heart failure (HCC)  Active Problems:    Rapid atrial fibrillation (HCC)    Benign essential hypertension    Type 2 diabetes mellitus ([facility] [address] )    Peripheral arterial disease (HCC)    Acute-on-chronic kidney injury ([facility] )    Non-ST elevation myocardial infarction (NSTEMI) ([facility] )    Pulmonary hypertension (HCC)    Azotemia    Hyponatremia    [de-identified]year old man with acute on chronic diastolic CHF  Severe AS s/p AVR  Severe MR, TR  Atrial fibrillation, rate controlled  CAD without angina  SABINE on CKD  Plan:  Diuretics per nephrology team  Currently on oral torsemide  Not open heart valve surgery candidate  Unlikely Mitral Clip candidate, and evaluation only could be done with better functional status  Not currently on anticoagulation, due to fall risk  Subjective/Objective     Subjective:  No significant events last 24 hours  Objective:    Vitals: /57 (BP Location: Right arm)   Pulse 66   Temp 98 2 °F (36 8 °C) (Oral)   Resp 18   Ht 5' 7" (1 702 m)   Wt 88 9 kg (195 lb 15 8 oz) Comment: pt refusing standing weight  SpO2 100%   BMI 30 70 kg/m²   Vitals:    05/05/18 0600 05/06/18 0600   Weight: 86 5 kg (190 lb 11 2 oz) 88 9 kg (195 lb 15 8 oz)     Orthostatic Blood Pressures      Most Recent Value   Blood Pressure  114/57 filed at 05/06/2018 1118   Patient Position - Orthostatic VS  Sitting filed at 05/06/2018 1118            Intake/Output Summary (Last 24 hours) at 05/06/18 1346  Last data filed at 05/06/18 1219   Gross per 24 hour   Intake             1080 ml   Output             1400 ml   Net             -320 ml     Physical Exam:   General appearance: Resting comfortably  Head: Normocephalic, without obvious abnormality, atraumatic  Lungs: basilar rales  Heart: S1, S2   Irregularly irregular + HSM   Abdomen: obese, soft, nontender    Skin: Skin color, texture, turgor normal  No rashes or lesions  Neurologic: nonfocal     Medications:    Current Facility-Administered Medications:     aspirin (ECOTRIN LOW STRENGTH) EC tablet 81 mg, 81 mg, Oral, Daily, Chase Alberto MD, 81 mg at 05/06/18 1014    atorvastatin (LIPITOR) tablet 40 mg, 40 mg, Oral, Daily With Dinner, Chase Alberto MD, 40 mg at 05/05/18 1701    calcium carbonate (TUMS) chewable tablet 1,000 mg, 1,000 mg, Oral, Daily PRN, Chase Alberto MD    cephalexin (KEFLEX) capsule 250 mg, 250 mg, Oral, Q8H [address], Placido Wayne PA-C, 250 mg at 05/06/18 0550    diphenhydrAMINE (BENADRYL) tablet 12 5 mg, 12 5 mg, Oral, HS PRN, Jessica Lopez PA-C, 12 5 mg at 05/06/18 0220    docusate sodium (COLACE) capsule 100 mg, 100 mg, Oral, BID PRN, Chase Alberto MD, 100 mg at 04/10/18 1005    docusate sodium (COLACE) capsule 100 mg, 100 mg, Oral, BID, Julián Pressley MD, 100 mg at 05/06/18 1013    heparin (porcine) subcutaneous injection 5,000 Units, 5,000 Units, Subcutaneous, Q8H [address], Lisseth Seals PA-C, Stopped at 05/04/18 0526    insulin glargine (LANTUS) subcutaneous injection 30 Units 0 3 mL, 30 Units, Subcutaneous, HS, Lulu Lo MD, 30 Units at 05/05/18 2213    insulin lispro (HumaLOG) 100 units/mL subcutaneous injection 1-5 Units, 1-5 Units, Subcutaneous, TID AC, 2 Units at 05/05/18 1915 **AND** [CANCELED] Fingerstick Glucose (POCT), , , TID AC, Chase Alberto MD    insulin lispro (HumaLOG) 100 units/mL subcutaneous injection 1-5 Units, 1-5 Units, Subcutaneous, HS, Chase Alberto MD, 1 Units at 04/30/18 2154    insulin lispro (HumaLOG) 100 units/mL subcutaneous injection 12 Units, 12 Units, Subcutaneous, TID With Meals, Lulu Lo MD, 12 Units at 05/06/18 1016    melatonin tablet 6 mg, 6 mg, Oral, HS, Madyson Harris MD, 6 mg at 05/05/18 2213    metoprolol succinate (TOPROL-XL) 24 hr tablet 25 mg, 25 mg, Oral, Q12H, Sesar Sutton MD, 25 mg at 05/06/18 1133    multivitamin-minerals (CENTRUM) tablet 1 tablet, 1 tablet, Oral, Daily, Ludivina Barley, CRNP, 1 tablet at 05/06/18 1013    ondansetron (ZOFRAN) injection 4 mg, 4 mg, Intravenous, Q6H PRN, Chase Alberto MD    pantoprazole (PROTONIX) EC tablet 40 mg, 40 mg, Oral, Early Morning, Chase Alberto MD, 40 mg at 05/06/18 0550    polyethylene glycol (MIRALAX) packet 17 g, 17 g, Oral, Daily PRN, Helen Smallwood PA-C, 17 g at 04/29/18 0856    potassium chloride (K-DUR,KLOR-CON) CR tablet 10 mEq, 10 mEq, Oral, Daily, Brayan Montalvo MD, 10 mEq at 05/06/18 1013    sevelamer (RENAGEL) tablet 800 mg, 800 mg, Oral, TID With Meals, Jamison Priest MD, 800 mg at 05/06/18 1012    sodium chloride (OCEAN) 0 65 % nasal spray 1 spray, 1 spray, Each Nare, PRN, Mireya Vergara MD    [facility]) capsule 0 4 mg, 0 4 mg, Oral, Daily With Toy Seals PA-C, 0 4 mg at 05/05/18 1701    torsemide (DEMADEX) tablet 40 mg, 40 mg, Oral, Daily, Brayan Montalvo MD, 40 mg at 05/06/18 1133    zolpidem (AMBIEN) tablet 5 mg, 5 mg, Oral, HS PRN, Mireya Vergara MD    Lab Results:        Results from last 7 days  Lab Units 05/06/18  0531 05/05/18  0520 05/04/18  0531   WBC Thousand/uL 7 45 7 77 7 65   HEMOGLOBIN g/dL 8 2* 8 7* 8 3*   HEMATOCRIT % 25 7* 27 2* 26 3*   PLATELETS Thousands/uL 132* 142* 142*           Results from last 7 days  Lab Units 05/06/18  0531 05/05/18  0520 05/04/18  0531  05/01/18  0505   SODIUM mmol/L 133* 128* 131*  < > 128*   POTASSIUM mmol/L 3 9 3 9 4 0  < > 3 2*   CHLORIDE mmol/L 94* 90* 92*  < > 89*   CO2 mmol/L 29 26 29  < > 30   BUN mg/dL 118* 132* 134*  < > 164*   CREATININE mg/dL 2 47* 2 49* 2 48*  < > 2 88*   CALCIUM mg/dL 9 2 9 6 9 5  < > 9 4   TOTAL PROTEIN g/dL  --   --   --   --  6 9   BILIRUBIN TOTAL mg/dL  --   --   --   --  1 20*   ALK PHOS U/L  --   --   --   --  74   ALT U/L  --   --   --   --  31   AST U/L  --   --   --   --  18 GLUCOSE RANDOM mg/dL 129 90 99  < > 104   < > = values in this interval not displayed  Results from last 7 days  Lab Units 04/30/18  1020   INR  1 11   PTT seconds 30       Results from last 7 days  Lab Units 05/05/18  0520 05/01/18  0505   MAGNESIUM mg/dL 2 6 3 2*     Echo:  LEFT VENTRICLE:  Systolic function was mildly reduced  Ejection fraction was estimated to be 45 %  There was mild diffuse hypokinesis  There was moderate hypokinesis of the apical septal wall(s)  Wall thickness was mildly increased  There was mild concentric hypertrophy      RIGHT VENTRICLE:  The ventricle was mildly to moderately dilated  Systolic function was reduced      LEFT ATRIUM:  The atrium was moderately dilated      RIGHT ATRIUM:  The atrium was moderately dilated      MITRAL VALVE:  There was marked annular calcification  There was moderate to severe regurgitation      AORTIC VALVE:  A bioprosthesis was present  It exhibited normal function  Doppler cardiac output was 3 5 L/min, using LVOT flow data  Doppler cardiac index was 1 68 L/min/m squared, using LVOT flow data      TRICUSPID VALVE:  There was severe regurgitation  Pulmonary artery systolic pressure was markedly increased  Estimated peak PA pressure was 65 mmHg      PULMONIC VALVE:  Notching of the systolic pulse wave is noted, suggesting elevated pulmonary artery pressures  There was mild regurgitation

## 2018-05-06 NOTE — PROGRESS NOTES
Pt frustrated by low BP and low blood sugars, states that he "just doesn't care anymore, one thing improves and then another one gets worse!" Pt encouraged to get out of bed for standing weight, Pt refused to get up out of bed, even after encouragement and explaining why  Will continue to monitor

## 2018-05-06 NOTE — PROGRESS NOTES
NEPHROLOGY PROGRESS NOTE   Harjinder Seymour  [de-identified] y o  male MRN: [id number]  Unit/Bed#: -01 Encounter: [id number]  Reason for Consult: CKD, SABINE    ASSESSMENT and PLAN:  1  SABINE:  · Due to cardiorenal syndrome  UA is bland and renal US is negative  · In early hospital course, BUN and creatinine had risen with aggressive diuresis and patient had uremic symptoms at peak creatinine  · However, family refusing dialysis until nutrition and PT are optimized  · With cutting back on aggressive diuresis and placing only on Torsemide 40 mg daily, BUN has improved and continues to get better  Creatinine also stable 2 5 to 2 6 x past 5 days  2  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  Follows with Dr Arely Montez  3  CHF, volume overload: Continue Torsemide 40 mg PO daily  4  Hyponatremia: Improved after Samsca 15 mg yesterday  5  Azotemia: Due to CRS  Improving  6  Hypokalemia: Stable on Kdur 10 meq daily  7  PAF, valvular heart disease, CAD s/p CABG  8  Anemia: Hgb stable  9  MGUS  10  MBD:  Hyperphosphatemia resolved with Sevelamer  11  Weakness, deconditioning: per primary team      SUMMARY OF RECOMMENDATIONS:  · Stable renal function on Torsemide 40 mg daily - continue same dose  · Hopeful discharge planning  Discussed with wife  SUBJECTIVE / INTERVAL HISTORY:  Had transient hypotension last night which improved with albumin  Also noted to be hypoglycemic     No SOB at rest      OBJECTIVE:  Current Weight: Weight - Scale: 88 9 kg (195 lb 15 8 oz) (pt refusing standing weight)  Vitals:    05/06/18 0500 05/06/18 0600 05/06/18 0829 05/06/18 1118   BP: 105/53  102/56 114/57   BP Location:   Left arm Right arm   Pulse: 61  75 66   Resp: 20 18 18   Temp:   98 2 °F (36 8 °C)    TempSrc:   Oral    SpO2: 98%  97% 100%   Weight:  88 9 kg (195 lb 15 8 oz)     Height:           Intake/Output Summary (Last 24 hours) at 05/06/18 1349  Last data filed at 05/06/18 1219   Gross per 24 hour   Intake 1080 ml   Output             1400 ml   Net             -320 ml     General: conscious, coherent, cooperative  Chest/Lungs: crackles on the bases  CVS: distinct heart sounds, normal rate, no rub  Abdomen: soft  Extremities: (+) bipedal edema  : no viera catheter  Neuro: awake, alert       Medications:    Current Facility-Administered Medications:     aspirin (ECOTRIN LOW STRENGTH) EC tablet 81 mg, 81 mg, Oral, Daily, Chase Alberto MD, 81 mg at 05/06/18 1014    atorvastatin (LIPITOR) tablet 40 mg, 40 mg, Oral, Daily With Raya Alberto MD, 40 mg at 05/05/18 1701    calcium carbonate (TUMS) chewable tablet 1,000 mg, 1,000 mg, Oral, Daily PRN, Chase Alberto MD    cephalexin (KEFLEX) capsule 250 mg, 250 mg, Oral, Q8H [address], Placido Wayne PA-C, 250 mg at 05/06/18 0550    diphenhydrAMINE (BENADRYL) tablet 12 5 mg, 12 5 mg, Oral, HS PRN, Darcie Tsai PA-C, 12 5 mg at 05/06/18 0220    docusate sodium (COLACE) capsule 100 mg, 100 mg, Oral, BID PRN, Chase Alberto MD, 100 mg at 04/10/18 1005    docusate sodium (COLACE) capsule 100 mg, 100 mg, Oral, BID, Sanjay Reinoso MD, 100 mg at 05/06/18 1013    heparin (porcine) subcutaneous injection 5,000 Units, 5,000 Units, Subcutaneous, Q8H [address], Lisseth Seals PA-C, Stopped at 05/04/18 0526    insulin glargine (LANTUS) subcutaneous injection 30 Units 0 3 mL, 30 Units, Subcutaneous, HS, Zachery Nuñez MD, 30 Units at 05/05/18 2213    insulin lispro (HumaLOG) 100 units/mL subcutaneous injection 1-5 Units, 1-5 Units, Subcutaneous, TID AC, 2 Units at 05/05/18 1915 **AND** [CANCELED] Fingerstick Glucose (POCT), , , TID AC, Chase Alberto MD    insulin lispro (HumaLOG) 100 units/mL subcutaneous injection 1-5 Units, 1-5 Units, Subcutaneous, HS, Chase Alberto MD, 1 Units at 04/30/18 2158    insulin lispro (HumaLOG) 100 units/mL subcutaneous injection 12 Units, 12 Units, Subcutaneous, TID With Meals, Zachery Nuñez, MD, 12 Units at 05/06/18 1016    melatonin tablet 6 mg, 6 mg, Oral, HS, Harvey Tejada MD, 6 mg at 05/05/18 2213    metoprolol succinate (TOPROL-XL) 24 hr tablet 25 mg, 25 mg, Oral, Q12H, Lora Ross MD, 25 mg at 05/06/18 1133    multivitamin-minerals (CENTRUM) tablet 1 tablet, 1 tablet, Oral, Daily, HANNAH SherwoodNP, 1 tablet at 05/06/18 1013    ondansetron (ZOFRAN) injection 4 mg, 4 mg, Intravenous, Q6H PRN, Chase Alberto MD    pantoprazole (PROTONIX) EC tablet 40 mg, 40 mg, Oral, Early Morning, Chase Alberto MD, 40 mg at 05/06/18 0550    polyethylene glycol (MIRALAX) packet 17 g, 17 g, Oral, Daily PRN, Pipe Gama PA-C, 17 g at 04/29/18 0856    potassium chloride (K-DUR,KLOR-CON) CR tablet 10 mEq, 10 mEq, Oral, Daily, Tang Lin MD, 10 mEq at 05/06/18 1013    sevelamer (RENAGEL) tablet 800 mg, 800 mg, Oral, TID With Meals, Tammy Kothari MD, 800 mg at 05/06/18 1012    sodium chloride (OCEAN) 0 65 % nasal spray 1 spray, 1 spray, Each Nare, PRN, Sofya Suazo MD    [facility]) capsule 0 4 mg, 0 4 mg, Oral, Daily With Dinner, Lisseth Seals PA-C, 0 4 mg at 05/05/18 1701    torsemide (DEMADEX) tablet 40 mg, 40 mg, Oral, Daily, Tang Lin MD, 40 mg at 05/06/18 1133    zolpidem (AMBIEN) tablet 5 mg, 5 mg, Oral, HS PRN, Sofya Suazo MD    Laboratory Results:    Results from last 7 days  Lab Units 05/06/18  0531 05/05/18  0520 05/04/18  0531 05/03/18  0520 05/02/18  0420 05/02/18  0410 05/01/18  0505 04/30/18  1020 04/30/18  0509   WBC Thousand/uL 7 45 7 77 7 65 7 92  --  6 89 7 61 6 63  --    HEMOGLOBIN g/dL 8 2* 8 7* 8 3* 8 7*  --  8 6* 8 6* 9 0*  --    HEMATOCRIT % 25 7* 27 2* 26 3* 27 3*  --  26 6* 27 1* 27 7*  --    PLATELETS Thousands/uL 132* 142* 142* 177  --  152 147* 153  --    SODIUM mmol/L 133* 128* 131* 132* 131*  --  128*  --  127*   POTASSIUM mmol/L 3 9 3 9 4 0 4 8 3 2*  --  3 2*  --  3 9   CHLORIDE mmol/L 94* 90* 92* 92* 89*  --  89*  --  87*   CO2 mmol/L 29 26 29 30 31  --  30  --  30   BUN mg/dL 118* 132* 134* 141* 155*  --  164*  --  160*   CREATININE mg/dL 2 47* 2 49* 2 48* 2 55* 2 58*  --  2 88*  --  2 96*   CALCIUM mg/dL 9 2 9 6 9 5 9 8 9 8  --  9 4  --  10 0   MAGNESIUM mg/dL  --  2 6  --   --   --   --  3 2*  --   --    PHOSPHORUS mg/dL  --  3 8  --   --   --   --  4 2*  --   --    TOTAL PROTEIN g/dL  --   --   --   --   --   --  6 9  --   --    GLUCOSE RANDOM mg/dL 129 90 99 121 101  --  104  --  138     Previous work up:  4/6/18 Renal US: no hydronephrosis, distended bladder, right renal cyst    4/5/18 UA - bland  No blood, no protein

## 2018-05-06 NOTE — PROGRESS NOTES
[address] Hospitalist Service - Internal Medicine Progress Note      PATIENT INFORMATION      Patient: Royal Imus  [address] y o  male   MRN: [id number]  PCP: Clayton Martin MD  Unit/Bed#: [facility] Encounter: [id number]  Date Of Visit: 05/06/18       ASSESSMENTS & PLAN        1  Acute respiratory failure with hypoxia  · baseline oxygen requirement is room air - currently at baseline room air during my encounter today - initial increase in requirements earlier during hospital course likely secondary to CHF exacerbation (see plan below)   · will start discharge planning as physical therapy recommends skilled rehabilitation - will appreciate case management input for hopeful discharge early this coming week    2  Acute on chronic diastolic CHF - NSTEMI type 2  · last EF of 45% with mild DE, severe TR, moderate-severe MR, and diffuse hypokinesis  · currently on Torsemide/Toprol-XL regimen - net fluid balance of (-) 9 46 L thus far  · Elevated troponin level on admission noted with serial repeat showing stabilization - no further trending in over four weeks since admission - likely secondary to CHF exacerbation - cardiology following  · portable CXR on 5/4 revealed improvement of CHF with stable effusions    3  Acute kidney injury - Chronic kidney disease stage 4  · creatinine has remained continuously stable @ 2 47-2 49 over last three days from a peak of 3 26 earlier in hospitalization with improvement in BUN as well - baseline creatinine is approximately 1 8-2 2 - likely progressively worsened initial secondary to CHF  · As renal function is slowly improving with conservative oral diuretic measures, nephrology has opted to hold off on consideration of dialysis at this point - continue monitoring with current Torsemide dosing    4    Insulin-dependent diabetes mellitus  · HgA1c of 7 6 on 3/2018   · c/w basal/prandial insulin regimen w/ additional SSI coverage per accuchecks   · Monitor for hypoglycemic episodes    5  Atrial fibrillation  · Rate controlled on Toprol-XL - continue ASA  · Not a candidate for chronic anticoagulation secondary to comorbidities and fall risk    6  CAD - Aortic valvular disease  · Status post prior CABG - status post bioprosthetic aortic valve replacement   · Continue ASA/Lipitor/Toprol-XL    7  Hypokalemia  · Monitor/replete potassium as necessary (remaining normalized currently)  · Serum magnesium within normal limits     8  Hyponatremia  · has been waxing/waning secondary to CHF/fluid overload but improved today with second daily dose of Samsca - nephrology following  · Monitor BMP and monitor fluid intake    9  Anemia of chronic disease  · H/H remain stable - continue to monitor       DVT Prophylaxis:  Heparin SC      SUBJECTIVE     Seen and examined this afternoon with family at bedside  They are all in happy spirits as the patient is symptomatically improving  The patient self is more alert and awake today and is grateful that he was able to go outside for some pressure over the last few days  He did developed one episode of hypotension with a blood pressure of 76/38 at approximately 10 PM last night which was transient in nature and did improve  He also had some mild hypoglycemia which also improved  He has continued to saturate on room air throughout the day  He complained of some burning/itching on urination yesterday and has been prophylactically started on renally dosed Keflex pending a urine culture  No other complaints or acute issues at this time  OBJECTIVE     Vitals:   Temp (24hrs), Av °F (36 7 °C), Min:97 8 °F (36 6 °C), Max:98 2 °F (36 8 °C)    HR:  [61-75] 71  Resp:  [18-20] 18  BP: ()/(38-57) 113/53  SpO2:  [97 %-100 %] 100 %  Body mass index is 30 7 kg/m²  Input and Output Summary (last 24 hours):        Intake/Output Summary (Last 24 hours) at 18 1738  Last data filed at 18 1725   Gross per 24 hour   Intake              520 ml   Output             1375 ml   Net             -855 ml       Physical Exam:     GENERAL:  Well-developed/nourished - no immediate distress  HEAD:  Normocephalic - atraumatic  EYES: PERRL - EOMI   MOUTH:  Mucosa moist  NECK:  Supple - full range of motion  CARDIAC:  Irregularly irregular rhythm but rate controlled - S1/S2 positive  PULMONARY:  Improving bibasilar breath sounds - respirations currently remain nonlabored  ABDOMEN:  Soft - nontender/nondistended - active bowel sounds  MUSCULOSKELETAL:  Motor strength/range of motion somewhat deconditioned - 1+ distal LE pitting edema  NEUROLOGIC:  Alert/oriented x 3   SKIN:  Chronic wrinkles/blemishes - left heel/hallux/5th toe chronic gangrenous necrotic changes are stable  PSYCHIATRIC:  Mood/affect quite pleasant today      ADDITIONAL DATA     Labs & Recent Cultures:       Results from last 7 days  Lab Units 05/06/18  0531   WBC Thousand/uL 7 45   HEMOGLOBIN g/dL 8 2*   HEMATOCRIT % 25 7*   PLATELETS Thousands/uL 132*   NEUTROS PCT % 77*   LYMPHS PCT % 16   MONOS PCT % 7   EOS PCT % 0       Results from last 7 days  Lab Units 05/06/18  0531  05/01/18  0505   SODIUM mmol/L 133*  < > 128*   POTASSIUM mmol/L 3 9  < > 3 2*   CHLORIDE mmol/L 94*  < > 89*   CO2 mmol/L 29  < > 30   BUN mg/dL 118*  < > 164*   CREATININE mg/dL 2 47*  < > 2 88*   CALCIUM mg/dL 9 2  < > 9 4   TOTAL PROTEIN g/dL  --   --  6 9   BILIRUBIN TOTAL mg/dL  --   --  1 20*   ALK PHOS U/L  --   --  74   ALT U/L  --   --  31   AST U/L  --   --  18   GLUCOSE RANDOM mg/dL 129  < > 104   < > = values in this interval not displayed      Results from last 7 days  Lab Units 04/30/18  1020   INR  1 11         Last 24 Hours Medication List:     Current Facility-Administered Medications:  aspirin 81 mg Oral Daily Chase Alberto MD   atorvastatin 40 mg Oral Daily With Dinner Chase Alberto MD   calcium carbonate 1,000 mg Oral Daily PRN Chase Alberto MD   cephalexin 250 mg Oral Q8H [address] Placido JUICE JOSÉ LUIS Wayne   diphenhydrAMINE 12 5 mg Oral HS PRN Anabell Cox PA-C   docusate sodium 100 mg Oral BID PRN Chase Alberto MD   docusate sodium 100 mg Oral BID Azell Lundborg, MD   heparin (porcine) 5,000 Units Subcutaneous Q8H [address] Lisseth Seals PA-C   insulin glargine 30 Units Subcutaneous HS Oksana Kidd MD   insulin lispro 1-5 Units Subcutaneous TID AC Chase Alberto MD   insulin lispro 1-5 Units Subcutaneous HS Chase Alberto MD   insulin lispro 12 Units Subcutaneous TID With Meals Oksana Kidd MD   melatonin 6 mg Oral HS Peterson Leroy MD   metoprolol succinate 25 mg Oral Q12H Sofia Kelly MD   multivitamin-minerals 1 tablet Oral Daily [place] JEWELL Cisneros   ondansetron 4 mg Intravenous Q6H PRN Chase Alberto MD   pantoprazole 40 mg Oral Early Morning Chase Alberto MD   polyethylene glycol 17 g Oral Daily PRN Anabell Cox PA-C   potassium chloride 10 mEq Oral Daily German Baldwin MD   sevelamer 800 mg Oral TID With Meals Charlotte Bucio MD   sodium chloride 1 spray Each Nare PRN Azell Lundborg, MD   tamsulosin 0 4 mg Oral Daily With Dinner Lisseth Seals PA-C   torsemide 40 mg Oral Daily German Baldwin MD   zolpidem 5 mg Oral HS PRN Azell Lundborg, MD          Time Spent for Care:  37 minutes  More than 50% of total time spent on counseling and coordination of care as described above  Current Length of Stay: 32 day(s)      Code Status: Level 1 - Full Code          ** Please Note: This note is constructed using a voice recognition dictation system   **

## 2018-05-07 NOTE — PROGRESS NOTES
[address] [facility] Hospitalist Service - Internal Medicine Progress Note      PATIENT INFORMATION      Patient: Hugh Mail  [de-identified] y o  male   MRN: [id number]  PCP: Molly Hill MD  Unit/Bed#: -63 Encounter: [id number]  Date Of Visit: 05/07/18       ASSESSMENTS & PLAN        1  Acute respiratory failure with hypoxia  · baseline oxygen requirement is room air - has remained at baseline room air over the last two days - initial increase in requirements earlier during hospital course likely secondary to CHF exacerbation (see plan below)   · PT recommends skilled rehabilitation on discharge - hopefully mid week pending final urine cultures (see plan for UTI below)    2  Acute UTI  · Urine cultures sent as patient complained over the weekend of burning/dysuria - started by covering practitioner on oral Keflex (renally dosed) - await final cultures - remains afebrile without leukocytosis - urinary symptomatology resolving with current regimen    3  Acute on chronic diastolic CHF - NSTEMI type 2  · last EF of 45% with mild GA, severe TR, moderate-severe MR, and diffuse hypokinesis  · currently on Torsemide/Toprol-XL regimen - net fluid balance of (-) 8 86 L thus far  · Elevated troponin level on admission noted with serial repeat showing stabilization - no further trending in over four weeks since admission - likely secondary to CHF exacerbation - cardiology following  · portable CXR on 5/4 revealed improvement of CHF with stable effusions    4    Acute kidney injury - Chronic kidney disease stage 4  · creatinine has remained continuously stable @ 2 47-2 49 over last four days from a peak of 3 26 earlier in hospitalization with improvement in BUN as well - baseline creatinine is approximately 1 8-2 2 - likely progressively worsened initially secondary to CHF  · As renal function seems to have stabilized with conservative oral diuretic measures, nephrology has opted to hold off on consideration of dialysis at this point - continue monitoring with current Torsemide dosing    5  Insulin-dependent diabetes mellitus  · HgA1c of 7 6 on 3/2018   · c/w basal/prandial insulin regimen w/ additional SSI coverage per accuchecks   · Monitor for morning hypoglycemic episodes and titrate basal insulin accordingly    6  Atrial fibrillation  · Rate controlled on Toprol-XL - continue ASA  · Not a candidate for chronic anticoagulation secondary to comorbidities and fall risk    7  CAD - Aortic valvular disease  · Status post prior CABG - status post bioprosthetic aortic valve replacement   · Continue ASA/Lipitor/Toprol-XL    8  Hypokalemia  · Monitor/replete potassium as necessary (remaining normalized currently)  · Serum magnesium within normal limits     9  Hyponatremia  · has been waxing/waning secondary to CHF/fluid overload - given a few doses of Samsca over the weekend per nephrology   · Monitor BMP and monitor fluid intake    10  Anemia of chronic disease  · H/H remains stable - continue to monitor       DVT Prophylaxis:  Heparin SC      SUBJECTIVE     Seen/examined with nursing and wife at bedside  Did have a few transient episodes of hypotension again overnight as he did the night before and was given IV albumin for intravascular repletion  He remains asymptomatic however  He also notes that his urinary symptoms over the weekend are resolving with the current oral antibiotic regimen  He continues to remain alert/awake  Denies shortness of breath today  His wife notes that his appetite has also improved  OBJECTIVE     Vitals:   Temp (24hrs), Av 2 °F (36 8 °C), Min:98 1 °F (36 7 °C), Max:98 3 °F (36 8 °C)    HR:  [69-92] 92  Resp:  [18] 18  BP: ()/(45-76) 106/53  SpO2:  [95 %-98 %] 98 %  Body mass index is 30 11 kg/m²  Input and Output Summary (last 24 hours):        Intake/Output Summary (Last 24 hours) at 18 1752  Last data filed at 18 1457   Gross per 24 hour   Intake              440 ml Output             1045 ml   Net             -605 ml       Physical Exam:     GENERAL:  Well-developed/nourished - no acute distress  HEAD:  Normocephalic - atraumatic  EYES: PERRL - EOMI   MOUTH:  Mucosa moist  NECK:  Supple - full range of motion  CARDIAC:  Irregularly irregular rhythm but rate controlled - S1/S2 positive  PULMONARY:  Improving but diminished bibasilar breath sounds - respirations nonlabored  ABDOMEN:  Soft - nontender/nondistended - active bowel sounds  MUSCULOSKELETAL:  Motor strength/range of motion deconditioned - 1+ distal LE pitting edema  NEUROLOGIC:  Alert/oriented x 3   SKIN:  Chronic wrinkles/blemishes - left heel/hallux/5th toe chronic gangrenous necrotic changes are stable  PSYCHIATRIC:  Mood/affect remains pleasant      ADDITIONAL DATA     Labs & Recent Cultures:       Results from last 7 days  Lab Units 05/07/18  0612   WBC Thousand/uL 7 41   HEMOGLOBIN g/dL 8 6*   HEMATOCRIT % 26 8*   PLATELETS Thousands/uL 145*   NEUTROS PCT % 72   LYMPHS PCT % 19   MONOS PCT % 8   EOS PCT % 1       Results from last 7 days  Lab Units 05/07/18  0612  05/01/18  0505   SODIUM mmol/L 131*  < > 128*   POTASSIUM mmol/L 4 3  < > 3 2*   CHLORIDE mmol/L 93*  < > 89*   CO2 mmol/L 26  < > 30   BUN mg/dL 113*  < > 164*   CREATININE mg/dL 2 47*  < > 2 88*   CALCIUM mg/dL 9 4  < > 9 4   TOTAL PROTEIN g/dL  --   --  6 9   BILIRUBIN TOTAL mg/dL  --   --  1 20*   ALK PHOS U/L  --   --  74   ALT U/L  --   --  31   AST U/L  --   --  18   GLUCOSE RANDOM mg/dL 86  < > 104   < > = values in this interval not displayed            Last 24 Hours Medication List:     Current Facility-Administered Medications:  aspirin 81 mg Oral Daily Chase Alberto MD   atorvastatin 40 mg Oral Daily With Dinner Chase Alberto MD   calcium carbonate 1,000 mg Oral Daily PRN Chase Alberto MD   cephalexin 250 mg Oral Q8H [address] Placido Wayne PA-C   diphenhydrAMINE 12 5 mg Oral HS PRN Ti Mckeon PA-C   docusate sodium 100 mg Oral BID PRN Chase Alberto MD   docusate sodium 100 mg Oral BID Lauren Reina MD   heparin (porcine) 5,000 Units Subcutaneous Q8H [facility] Lisseth Seals PA-C   insulin glargine 30 Units Subcutaneous HS Trevon Peterson MD   insulin lispro 1-5 Units Subcutaneous TID AC Chase Alberto MD   insulin lispro 1-5 Units Subcutaneous HS Chase Alberto MD   insulin lispro 12 Units Subcutaneous TID With Meals Trevon Peterson MD   melatonin 6 mg Oral HS Lucero Aldrich MD   metoprolol succinate 25 mg Oral Q12H Abby Rodriguez MD   multivitamin-minerals 1 tablet Oral Daily [facility] JEWELL See   ondansetron 4 mg Intravenous Q6H PRN Chase Alberto MD   pantoprazole 40 mg Oral Early Morning Chase Alberto MD   polyethylene glycol 17 g Oral Daily PRN Adriel Jones PA-C   potassium chloride 10 mEq Oral Daily Machelle Espinoza MD   sevelamer 800 mg Oral TID With Meals Charly Hope MD   sodium chloride 1 spray Each Nare PRN Lauren Reina MD   tamsulosin 0 4 mg Oral Daily With Dinner Lisseth Seals PA-C   torsemide 40 mg Oral Daily Machelle Espinoza MD   zolpidem 5 mg Oral HS PRN Lauren Reina MD          Time Spent for Care:  36 minutes  More than 50% of total time spent on counseling and coordination of care as described above  Current Length of Stay: 33 day(s)      Code Status: Level 1 - Full Code          ** Please Note: This note is constructed using a voice recognition dictation system   **

## 2018-05-07 NOTE — PHYSICAL THERAPY NOTE
PHYSICAL THERAPY REEVAL NOTE    Patient Name: Neisha Maldonado  Today's Date: 5/7/2018 05/07/18 4997   Pain Assessment   Pain Assessment 0-10   Pain Score 8   Pain Location Foot   Pain Orientation Left   Restrictions/Precautions   Other Precautions Chair Alarm; Bed Alarm;Multiple lines;O2;Telemetry; Fall Risk;Pain   General   Chart Reviewed Yes   Additional Pertinent History Reeval completed due to expiration of previously established goals  Dx: ARF/SABINE, fluid overload, hypokalemia, stage I pressure injury, and anemia, Comorbidities: cardiac disease, CHF, DM, HTN, AVR, cataract extraction, and bilateral TKR  Personal factors: lives in 2 story house, ambulating w/ assistive device, stairs to enter home, positive fall history, and advanced age  UEs: WFL active ROM (limited shoulders); 3+/5 (shoulders 3-/5)  R LE: WFL active ROM (3+/5)  L LE: limited active ROM; hip and knee 3-/5, ankle 2+/5  Coordination impaired all extremities  Light touch impaired feet  2L oxygen via nasal cannula  Resting pulse ox: 98% and 63 BPM, active 94% and 78  Larry Felton stated to measure room air pulse ox  room air resting pulse ox 94% and 58 BPM, active 92% and 87 BPM  Clinical presentation: unstable/unpredictable (evident in need for input for mobility technique/safety, need for supplemental oxygen to maintain oxygen saturation, pain affecting mobility status)  Family/Caregiver Present No   Cognition   Overall Cognitive Status WFL   Arousal/Participation Alert; Cooperative   Attention Within functional limits   Orientation Level Oriented X4;Other (Comment)  (pt was identified w/ full name and birth date)   Following Commands Follows one step commands without difficulty   Subjective   Subjective pt seen supine in bed  agreed to participate in PT reeval  c/o left foot pain      Bed Mobility   Supine to Sit 3  Moderate assistance   Additional items Assist x 1;HOB elevated; Bedrails; Increased time required;LE management   Transfers   Sit to Stand 4  Minimal assistance   Additional items Assist x 1; Increased time required;Verbal cues  (for hand placement)   Stand to Sit 4  Minimal assistance   Additional items Assist x 1; Increased time required;Verbal cues  (for body positioning)   Ambulation/Elevation   Gait pattern Decreased foot clearance;Shuffling; Short stride; Excessively slow; Foward flexed   Gait Assistance 4  Minimal assist  (w/ chair follow)   Additional items Assist x 1;Verbal cues; Tactile cues   Assistive Device Rolling walker   Distance 10 feet  8 feet x2 w/ seated rest breaks x 4 to 5 minutes each  (additional not possible due to fatigue and pain  )   Stair Management Assistance Not tested  (due to limited ambulation distance)   Balance   Static Sitting Fair +   Dynamic Sitting Fair   Static Standing Fair -   Dynamic Standing Poor +   Ambulatory Poor +  (w/ roller walker)   Activity Tolerance   Activity Tolerance Patient limited by fatigue;Patient limited by pain   Nurse Made Aware spoke to [address] [place] [address]   Equipment Use   Comments ankle pumps 20  quad sets, heel slides, and hip abduction 10 each  handout provided to expedite understanding of technique  Assessment   Prognosis Fair   Problem List Decreased strength;Decreased range of motion;Decreased endurance; Impaired balance;Decreased mobility; Decreased coordination;Decreased safety awareness; Obesity; Decreased skin integrity; Impaired sensation;Pain   Assessment Pt shows improvement in mobility status since initial eval w/ increased ambulation distance and decreased level of assist to maintain mobility safety  Pt continues to need assist w/ chair follow and verbal input to maintain mobility safety  Pt's mobility impairments are related to weakness, impaired balance, decreased endurance, gait deviations, and sensory changes  Mobility impairment is also evident in Barthel Index score of 50/100   Pt needed input for mobility and breathing technique  Pt is at risk for falling due to physical and safety awareness impairments  Pt needs continued inpatient PT intervention to facilitate further improvements in mobility status  Discharge recommendation is for inpatient rehab to expedite return to independent level of function  Goals   Patient Goals get better, move more   STG Expiration Date 05/17/18   Short Term Goal #1 pt will: Increase L LE strength 1 grade to facilitate independent mobility, Perform all bed mobility tasks w/ supervision to decrease fall risk factors, Perform all transfers w/ supervision to improve independence, Ambulate 100 ft  with roller walker w/ supervision w/o LOB, Increase ambulatory balance 1 grade to decrease risk for falls, Complete exercise program independently, Tolerate 3 hr OOB to faciliate upright tolerance and Improve Barthel Index score to 75 or greater to facilitate independence   Treatment Day 1   Plan   Treatment/Interventions Functional transfer training;LE strengthening/ROM; Therapeutic exercise; Endurance training;Patient/family training;Bed mobility; Equipment eval/education;Gait training   Progress Slow progress, decreased activity tolerance   PT Frequency 5x/wk   Recommendation   Recommendation Post acute IP rehab   Equipment Recommended Other (Comment)  (roller walker)     Skilled inpatient PT recommended while in hospital to progress pt toward treatment goals      Watson Manzanares, PT

## 2018-05-07 NOTE — PROGRESS NOTES
Progress Note - Cardiology   Kaden Kennedy  [de-identified] y o  male MRN: [id number]  Unit/Bed#: -01 Encounter: [id number]  05/07/18  11:07 AM        Subjective/Objective   Chief Complaint:   Chief Complaint   Patient presents with    Shortness of Breath     pt reports being short of breath over the past week  Pt reports when he gets up and walks distances he has trouble breathing and chest pain  Daughter reports recent trip [facility] where pt did not take lasix to avoid voiding  Subjective: Patient denies any medical complaints    Specifically denies chest pains or shortness of breath, frustrated at his long hospital stay    Objective: Comfortable , no distress at the time of exam      Patient Active Problem List   Diagnosis    Rapid [facility] fibrillation [facility]    Acute on chronic diastolic congestive heart failure ([address] )    Benign essential hypertension    [facility] respiratory failure with hypoxia ([facility]    Type 2 diabetes mellitus ([address] )    MADISON (dyspnea on exertion)    Cough    S/P aortic valve replacement with bioprosthetic valve    Acute encephalopathy    Nasal laceration, sequela    Coronary artery disease involving native coronary artery of native heart    Chronic kidney disease, stage 3    Closed nondisplaced fracture of greater tuberosity of left humerus    Cellulitis    Edema    Hyperparathyroidism ([address]    Vitamin D deficiency    Pain in left shoulder    Peripheral arterial disease ([address]    Atherosclerosis of artery of extremity with ulceration ([address]    Aortic valve disorder    Asymptomatic bilateral carotid artery stenosis    Congestive heart failure ([facility])    Esophageal reflux    Impingement syndrome of left shoulder    Mild mitral regurgitation    Monoclonal gammopathy of undetermined significance    Non-proliferative diabetic retinopathy ([address]    Peripheral vascular disease ([address]    Acute-on-chronic kidney injury ([address] )    Cellulitis of foot, left    Non-ST elevation myocardial infarction (NSTEMI) ([facility] [address] )    Other hyperlipidemia    Pulmonary hypertension (HCC)    Azotemia    Hyponatremia       Vitals: BP (!) 82/58   Pulse 75   Temp 98 3 °F (36 8 °C) (Oral)   Resp 18   Ht 5' 7" (1 702 m)   Wt 87 2 kg (192 lb 3 9 oz)   SpO2 96%   BMI 30 11 kg/m²     I/O this shift:  In: 360 [P O :360]  Out: 100 [Urine:100]  Wt Readings from Last 3 Encounters:   05/07/18 87 2 kg (192 lb 3 9 oz)   04/02/18 106 kg (233 lb 3 2 oz)   02/28/18 98 kg (216 lb)       Intake/Output Summary (Last 24 hours) at 05/07/18 1107  Last data filed at 05/07/18 0930   Gross per 24 hour   Intake              360 ml   Output             1100 ml   Net             -740 ml     I/O last 3 completed shifts: In: 520 [P O :520]  Out: 1800 [Urine:1800]    Invasive Devices     Peripheral Intravenous Line            Peripheral IV 05/03/18 Right Antecubital 3 days                  Physical Exam:  GEN: Neisha Mask   appears well, alert and oriented x 3, pleasant and cooperative   HEENT: pupils equal, round, and reactive to light; extraocular muscles intact  NECK: supple, no carotid bruits or JVD  HEART: regular rhythm, normal S1 and S2, no murmurs, clicks, gallops or rubs   LUNGS: clear to auscultation bilaterally; no wheezes, rales, or rhonchi   ABDOMEN/GI: normal bowel sounds, soft, no tenderness, no distention  EXTREMITIES/Musculoskeltal: peripheral pulses normal; no clubbing, cyanosis, or edema  NEURO: no focal findings   SKIN: normal without suspicious lesions on exposed skin            Lab Results:       Results from last 7 days  Lab Units 05/07/18  0612 05/06/18  0531 05/05/18  0520   WBC Thousand/uL 7 41 7 45 7 77   HEMOGLOBIN g/dL 8 6* 8 2* 8 7*   HEMATOCRIT % 26 8* 25 7* 27 2*   PLATELETS Thousands/uL 145* 132* 142*           Results from last 7 days  Lab Units 05/07/18  0612 05/06/18  0531 05/05/18  0520  05/01/18  0505   SODIUM mmol/L 131* 133* 128*  < > 128*   POTASSIUM mmol/L 4 3 3 9 3 9  < > 3 2* CHLORIDE mmol/L 93* 94* 90*  < > 89*   CO2 mmol/L 26 29 26  < > 30   BUN mg/dL 113* 118* 132*  < > 164*   CREATININE mg/dL 2 47* 2 47* 2 49*  < > 2 88*   CALCIUM mg/dL 9 4 9 2 9 6  < > 9 4   TOTAL PROTEIN g/dL  --   --   --   --  6 9   BILIRUBIN TOTAL mg/dL  --   --   --   --  1 20*   ALK PHOS U/L  --   --   --   --  74   ALT U/L  --   --   --   --  31   AST U/L  --   --   --   --  18   GLUCOSE RANDOM mg/dL 86 129 90  < > 104   < > = values in this interval not displayed  Imaging: I have personally reviewed pertinent reports      EKG:  No significant events    Scheduled Meds:    Current Facility-Administered Medications:  albumin human 12 5 g Intravenous Once Fanny Mi MD   aspirin 81 mg Oral Daily Chase Alberto MD   atorvastatin 40 mg Oral Daily With Dinner Chase Alberto MD   calcium carbonate 1,000 mg Oral Daily PRN Chase Alberto MD   cephalexin 250 mg Oral Q8H [facility] Placido Wayne PA-C   diphenhydrAMINE 12 5 mg Oral HS PRN Tj Butler PA-C   docusate sodium 100 mg Oral BID PRN Chase Alberto MD   docusate sodium 100 mg Oral BID Maribel Gipson MD   heparin (porcine) 5,000 Units Subcutaneous Q8H [facility] Lisseth Seals PA-C   insulin glargine 30 Units Subcutaneous HS Shelley Pickett MD   insulin lispro 1-5 Units Subcutaneous TID AC Chase Alberto MD   insulin lispro 1-5 Units Subcutaneous HS Chase Alberto MD   insulin lispro 12 Units Subcutaneous TID With Meals Shelley Pickett MD   melatonin 6 mg Oral HS Pat James MD   metoprolol succinate 25 mg Oral Q12H Denae Chavez MD   multivitamin-minerals 1 tablet Oral Daily JEWELL Gresham   ondansetron 4 mg Intravenous Q6H PRN Chase Alberto MD   pantoprazole 40 mg Oral Early Morning Chase Alberto MD   polyethylene glycol 17 g Oral Daily PRN Tj Butler PA-C   potassium chloride 10 mEq Oral Daily Sascha Pritchard MD   sevelamer 800 mg Oral TID With Meals John Paul Renee MD   sodium chloride 1 spray Each Hans PRN Kirt Doshi MD   tamsulosin 0 4 mg Oral Daily With Dinner Lisseth Seals PA-C   torsemide 40 mg Oral Daily Jamee Quiros MD   zolpidem 5 mg Oral HS PRN Kirt Doshi MD     Continuous Infusions:       VTE Pharmacologic Prophylaxis: Heparin  VTE Mechanical Prophylaxis: sequential compression device    [de-identified]year-old with a prolonged hospital stay-over 1 month, known history of coronary artery disease, prior CABG, prior bioprosthetic AVR, known PAF admitted with acute on chronic CHF, atrial fibrillation    Currently, active issues are Neptali I on CKD which appears to be stable, volume overload, steadily and slowly diuresing and predominantly deconditioning needing rehabilitation after discharge    Plan:    Acute on chronic diastolic CHF:  Currently on torsemide 40 mg daily, on 20 mg daily at home, continue the same, has JVD but without significant pulmonary edema, has mild lower extremity edema   Creatinine seems to have stabilized around 2 5, Peak creatinine of 3 2 during this hospitalization, pre hospital baseline probably around 1 9  Atrial fibrillation:  Rate controlled, not on anticoagulation at baseline  Continue aspirin alone    Coronary artery disease:  Stable, continue aspirin, statin, beta-blocker    Status post bioprosthetic AVR-last echo from 1 month ago-normal function    Cardiomyopathy:  Ejection fraction of 45% with diffuse hypokinesis, continue beta-blocker, not a candidate for ACE-inhibitor this time    Overall, considering multiple comorbidities, prognosis in the long term will be poor  Will need a good amount of rehabilitation after discharge    Counseling / Coordination of Care  Total time spent 20 minutes including teaching and family updates  More than 50% was spent counseling pt and family

## 2018-05-07 NOTE — PROGRESS NOTES
Patient blood pressure 119/76 at 07:21  Gave torsemide but held metoprolol  Blood pressure retaken 10:08 now 82/58  Patient alert, oriented and asymptomatic  Dr Nicole barnes  Will continue to monitor   Alda Jain RN

## 2018-05-07 NOTE — PLAN OF CARE
Problem: PHYSICAL THERAPY ADULT  Goal: Performs mobility at highest level of function for planned discharge setting  See evaluation for individualized goals  Treatment/Interventions: Functional transfer training, LE strengthening/ROM, Therapeutic exercise, Endurance training, Patient/family training, Bed mobility, Equipment eval/education, Gait training  Equipment Recommended: Other (Comment) (roller walker)       See flowsheet documentation for full assessment, interventions and recommendations  Outcome: Progressing  Prognosis: Fair  Problem List: Decreased strength, Decreased range of motion, Decreased endurance, Impaired balance, Decreased mobility, Decreased coordination, Decreased safety awareness, Obesity, Decreased skin integrity, Impaired sensation, Pain  Assessment: Pt shows improvement in mobility status since initial eval w/ increased ambulation distance and decreased level of assist to maintain mobility safety  Pt continues to need assist w/ chair follow and verbal input to maintain mobility safety  Pt's mobility impairments are related to weakness, impaired balance, decreased endurance, gait deviations, and sensory changes  Mobility impairment is also evident in Barthel Index score of 50/100  Pt needed input for mobility and breathing technique  Pt is at risk for falling due to physical and safety awareness impairments  Pt needs continued inpatient PT intervention to facilitate further improvements in mobility status  Discharge recommendation is for inpatient rehab to expedite return to independent level of function  Barriers to Discharge: Inaccessible home environment     Recommendation: Post acute IP rehab     PT - OK to Discharge: Yes (when medically cleared to LT skilled PT)    See flowsheet documentation for full assessment

## 2018-05-07 NOTE — SOCIAL WORK
Cm called spouse home phone and cell phone however, both continue to ring  CM will try again later regarding possible d c tmw to [facility]

## 2018-05-07 NOTE — CONSULTS
Consultation - Neuropsychology/Psychology Department  Yunior Cuba  [de-identified] y o  male MRN: [id number]  Unit/Bed#: -01 Encounter: [id number]        BACKGROUND:  Yunior Cuba  is a [de-identified]y o  year old male who was referred for a Neuropsychological Exam to assess cognitive functioning and comment on capacity to make informed medical decisions    History of Present Illness  Presented with shortness of breath    Physician Requesting Consult: Galindo Alamo MD  Consults      Historical Information   Past Medical History:   Diagnosis Date    Cardiac disease     [facility]     Diabetes mellitus ([facility]     Hypertension     Renal disorder      Past Surgical History:   Procedure Laterality Date    AORTIC VALVE REPLACEMENT      CARDIAC VALVE REPLACEMENT      CATARACT EXTRACTION, BILATERAL      COLONOSCOPY      [place] [facility] 3RD+ ORD SLCTV ABDL PEL/LXTR [facility] Left 12/22/2017    Procedure: LEG ANGIOGRAM; RIGHT FEMORAL ACCESS; CO2-CONTRAST ;  Surgeon: Khalif Brock MD;  [facility]: [facility];  Service: Vascular    REPLACEMENT TOTAL KNEE BILATERAL      VASCULAR SURGERY       Social History   History   Alcohol Use No     History   Drug Use No     History   Smoking Status    Former Smoker    Quit date: 10/11/1987   Smokeless Tobacco    Never Used     Family History:   Family History   Problem Relation Age of Onset    Cancer Mother     Heart disease Father     Diabetes Sister     Heart disease Sister     Diabetes Brother        Meds/Allergies   current meds:   Current Facility-Administered Medications   Medication Dose Route Frequency    aspirin (ECOTRIN LOW STRENGTH) EC tablet 81 mg  81 mg Oral Daily    atorvastatin (LIPITOR) tablet 40 mg  40 mg Oral Daily With Dinner    calcium carbonate (TUMS) chewable tablet 1,000 mg  1,000 mg Oral Daily PRN    cephalexin (KEFLEX) capsule 250 mg  250 mg Oral Q8H [facility]    diphenhydrAMINE (BENADRYL) tablet 12 5 mg  12 5 mg Oral HS PRN    docusate sodium (COLACE) capsule 100 mg  100 mg Oral BID PRN    docusate sodium (COLACE) capsule 100 mg  100 mg Oral BID    heparin (porcine) subcutaneous injection 5,000 Units  5,000 Units Subcutaneous Q8H [address]    insulin glargine (LANTUS) subcutaneous injection 20 Units 0 2 mL  20 Units Subcutaneous HS    insulin lispro (HumaLOG) 100 units/mL subcutaneous injection 1-5 Units  1-5 Units Subcutaneous TID AC    insulin lispro (HumaLOG) 100 units/mL subcutaneous injection 1-5 Units  1-5 Units Subcutaneous HS    insulin lispro (HumaLOG) 100 units/mL subcutaneous injection 12 Units  12 Units Subcutaneous TID With Meals    melatonin tablet 6 mg  6 mg Oral HS    metoprolol succinate (TOPROL-XL) 24 hr tablet 25 mg  25 mg Oral Q12H    multivitamin-minerals (CENTRUM) tablet 1 tablet  1 tablet Oral Daily    ondansetron (ZOFRAN) injection 4 mg  4 mg Intravenous Q6H PRN    pantoprazole (PROTONIX) EC tablet 40 mg  40 mg Oral Early Morning    polyethylene glycol (MIRALAX) packet 17 g  17 g Oral Daily PRN    potassium chloride (K-DUR,KLOR-CON) CR tablet 10 mEq  10 mEq Oral Daily    sevelamer (RENAGEL) tablet 800 mg  800 mg Oral TID With Meals    sodium chloride (OCEAN) 0 65 % nasal spray 1 spray  1 spray Each Nare PRN    tamsulosin (FLOMAX) capsule 0 4 mg  0 4 mg Oral Daily With Dinner    torsemide (DEMADEX) tablet 40 mg  40 mg Oral Daily    zolpidem (AMBIEN) tablet 5 mg  5 mg Oral HS PRN       Allergies   Allergen Reactions    Codeine      "swelling shaky"    Hydrocodone      Other reaction(s):  Other (See Comments)  Heart racing       Imaging Studies:       Family and Social Support:   No Data Recorded     Behavioral Observations:  Alert, oriented x 3, cooperative, spontaneous; affect appeared pleasant, admitted to depressed mood and anxiety; denied any cognitive difficulty and spouse reported he was very active prior to hospitalization; no overt evidence of psychotic process; sleep and appetite reportedly variable; denies drug and alcohol abuse  Cognitive Examination    General Cognitive Functioning  MMSE = 25/30 with deficits in visuospatial ability and working memory; [employer] of Information = Average    Attention/Concertration  Auditory Selective Attention = Average; Auditory Vigilance = Average; Information Processing Speed = Average    Frontal Systems/Executive Functioning  Mental Flexibility/Cognitive Control = Low Average; Working Memory = Mildly Imparied; Abstract Reasoning = Average;  Generative Ability = Impaired;  Commonsense Reasoning and Judgement = Average    Language Functioning  Confrontation naming = Average; Phonemic Fluency = Low Average; Semantic Retrieval = Borderline; Comprehension of Complex Ideational Material = Average;  Praxis = WNL's; Repetition = WNL'S; Basic Reading = WNL's; Written Expression = WNL's; Following Commands = WNL's    Memory Functioning  Narrative Recall - Short Delay = Average; Long Delay Narrative Recall = Average;  Visual Recognition = Average    Visuo-Spatial Abilities  Visuo-Construction ([employer]) = Impaired    Functional Knowledge  Health & Safety Knowledge = Within Normal Limits    Emotional Functioning: Admitted to depressed mood and worry    Summary/Impression: Results of Neuropsychological Exam revealed deficits in working memory, generative ability and semantic retrieval   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

## 2018-05-07 NOTE — PROGRESS NOTES
Progress Note - Nephrology   Rossana Stephens  [de-identified] y o  male MRN: [id number]  Unit/Bed#: -01 Encounter: [id number]    Assessment:  1  Acute renal failure/acute kidney injury on stage 4 chronic kidney disease baseline creatinine is 1 8-2 2  Acute kidney injury likely secondary to ATN plus or minus cardiorenal syndrome  I had the opportunity to speak with the patient and his wife at length  I completely understand and I stated to Mrs Omaira Mccoy about the importance of nutrition and physical therapy  That being said I had wanted her understand the points that in the presence of uremic symptoms that he had had before that would not allow him to he and also he was probably too weak to perform physical therapy at that time  I also had wanted her to acknowledge that despite the intravenous diuretics that he was on and the weight that he had lost that he still had fluid in his lungs and there was concern over the past week for still some degree of fluid overload and congestive heart failure which is why in the combination of those 2 reasons there was discussion regarding dialysis  At this point, his creatinine is decreased to 2 5 and his BUN has decreased to 112 from as high as 163  Continue right now to maintain Demadex 40 milligrams daily may need to increase this but do not want to push diuretics yet until there is more of a decrease in his BUN level  His appetite is improving  2   Stage 4 chronic kidney disease: His baseline Cr is 1 8-2 2  I see the patient in the office  3   Fluid overload:  Continue with torsemide 40 milligrams daily    4  Hypokalemia:  Potassium is stable at 10 milliequivalents daily    5  Anemia:  Hemoglobin is 8 6 continue to follow; H review prior Hematology notes that there is no contraindication would see if we could initiate GUILLERMO here      Plan:   as above, consider increasing torsemide dose over the next 24-48 hours depending on improvement level in kidney function      Subjective:   Patient seen in follow-up for acute renal failure on chronic kidney disease  I had the opportunity to speak at length with the patient and his wife at bedside  I spoke with the patient and his wife of approximately 1 hour today  He is feeling little bit better is appetite is improving  He denies any acute shortness of breath  Chart notes reviewed    Objective:     Vitals: Blood pressure (!) 84/54, pulse 75, temperature 98 3 °F (36 8 °C), temperature source Oral, resp  rate 18, height 5' 7" (1 702 m), weight 87 2 kg (192 lb 3 9 oz), SpO2 96 %  ,Body mass index is 30 11 kg/m²  Weight (last 2 days)     Date/Time   Weight    05/07/18 0600  87 2 (192 24)    05/06/18 0600  88 9 (195 99)    Weight: pt refusing standing weight at 05/06/18 0600    05/05/18 0600  86 5 (190 7)                Intake/Output Summary (Last 24 hours) at 05/07/18 1505  Last data filed at 05/07/18 1457   Gross per 24 hour   Intake              440 ml   Output             1270 ml   Net             -830 ml            Physical Exam: General: patient is in NAD  Skin:  No new rash  Eyes:  No scleral icterus  Neck:  Supple no adenopathy  Chest:  Coarse breath sounds decreased at bases mild crackles at bases  CVS:  S1-S2 no rub  Abdomen:  Abdomen positive bowel sounds  Extremities:  Left foot is bandaged there is no significant leg edema noted  Neuro:  Nonfocal  Psych:   Answers questions appropriately              Prescriptions Prior to Admission   Medication    aspirin (ECOTRIN LOW STRENGTH) 81 mg EC tablet    atorvastatin (LIPITOR) 40 mg tablet    febuxostat (ULORIC) 40 mg tablet    metoprolol tartrate (LOPRESSOR) 25 mg tablet    metoprolol tartrate (LOPRESSOR) 50 mg tablet    multivitamin-iron-minerals-folic acid (CENTRUM) chewable tablet    NOVOLOG MIX 70/30 FLEXPEN (70-30) 100 UNIT/ML SUPN    omeprazole (PriLOSEC) 20 mg delayed release capsule    paricalcitol (ZEMPLAR) 1 mcg capsule    ramipril (ALTACE) 2 5 mg capsule    torsemide (DEMADEX) 20 mg tablet       Current Facility-Administered Medications   Medication Dose Route Frequency    aspirin (ECOTRIN LOW STRENGTH) EC tablet 81 mg  81 mg Oral Daily    atorvastatin (LIPITOR) tablet 40 mg  40 mg Oral Daily With Dinner    calcium carbonate (TUMS) chewable tablet 1,000 mg  1,000 mg Oral Daily PRN    cephalexin (KEFLEX) capsule 250 mg  250 mg Oral Q8H [facility]    diphenhydrAMINE (BENADRYL) tablet 12 5 mg  12 5 mg Oral HS PRN    docusate sodium (COLACE) capsule 100 mg  100 mg Oral BID PRN    docusate sodium (COLACE) capsule 100 mg  100 mg Oral BID    heparin (porcine) subcutaneous injection 5,000 Units  5,000 Units Subcutaneous Q8H [facility]    insulin glargine (LANTUS) subcutaneous injection 30 Units 0 3 mL  30 Units Subcutaneous HS    insulin lispro (HumaLOG) 100 units/mL subcutaneous injection 1-5 Units  1-5 Units Subcutaneous TID AC    insulin lispro (HumaLOG) 100 units/mL subcutaneous injection 1-5 Units  1-5 Units Subcutaneous HS    insulin lispro (HumaLOG) 100 units/mL subcutaneous injection 12 Units  12 Units Subcutaneous TID With Meals    melatonin tablet 6 mg  6 mg Oral HS    metoprolol succinate (TOPROL-XL) 24 hr tablet 25 mg  25 mg Oral Q12H    multivitamin-minerals (CENTRUM) tablet 1 tablet  1 tablet Oral Daily    ondansetron (ZOFRAN) injection 4 mg  4 mg Intravenous Q6H PRN    pantoprazole (PROTONIX) EC tablet 40 mg  40 mg Oral Early Morning    polyethylene glycol (MIRALAX) packet 17 g  17 g Oral Daily PRN    potassium chloride (K-DUR,KLOR-CON) CR tablet 10 mEq  10 mEq Oral Daily    sevelamer (RENAGEL) tablet 800 mg  800 mg Oral TID With Meals    sodium chloride (OCEAN) 0 65 % nasal spray 1 spray  1 spray Each Nare PRN    tamsulosin (FLOMAX) capsule 0 4 mg  0 4 mg Oral Daily With Dinner    torsemide (DEMADEX) tablet 40 mg  40 mg Oral Daily    zolpidem (AMBIEN) tablet 5 mg  5 mg Oral HS PRN        Lab, Imaging and other studies: I have personally reviewed pertinent labs    CBC: Lab Results   Component Value Date    WBC 7 41 05/07/2018    WBC 9 45 11/24/2015    RBC 3 14 (L) 05/07/2018    RBC 4 78 11/24/2015     CMP: Lab Results   Component Value Date     (L) 05/07/2018     12/16/2015    CL 93 (L) 05/07/2018     12/16/2015    CO2 26 05/07/2018    CO2 30 12/16/2015    ANIONGAP 12 05/07/2018    ANIONGAP 5 12/16/2015     (H) 05/07/2018    BUN 32 (H) 12/16/2015    CREATININE 2 47 (H) 05/07/2018    CREATININE 1 98 (H) 12/16/2015    GLUCOSE 86 05/07/2018    GLUCOSE 167 (H) 12/16/2015    CALCIUM 9 4 05/07/2018    CALCIUM 9 2 12/16/2015    AST 18 05/01/2018    AST 15 11/24/2015    ALT 31 05/01/2018    ALT 19 11/24/2015    ALKPHOS 74 05/01/2018    ALKPHOS 88 11/24/2015    PROT 6 9 05/01/2018    PROT 7 2 11/24/2015    BILITOT 1 20 (H) 05/01/2018    BILITOT 0 47 11/24/2015    EGFR 24 05/07/2018     Phosphorus:   Lab Results   Component Value Date    PHOS 3 8 05/05/2018    PHOS 3 6 11/24/2015     Magnesium:   Lab Results   Component Value Date    MG 2 6 05/05/2018    MG 2 2 06/04/2015     Urinalysis: Lab Results   Component Value Date    COLORU Yellow 05/05/2018    COLORU Yellow 12/12/2017    CLARITYU Slightly Cloudy 05/05/2018    CLARITYU Transparent 12/12/2017    SPECGRAV <=1 005 05/05/2018    SPECGRAV 1 005 12/12/2017    PHUR 5 0 05/05/2018    PHUR 5 0 12/12/2017    LEUKOCYTESUR Large (A) 05/05/2018    LEUKOCYTESUR - 12/12/2017    NITRITE Positive (A) 05/05/2018    NITRITE - 12/12/2017    PROTEINUA Negative 05/05/2018    PROTEINUA - 12/12/2017    GLUCOSEU Negative 05/05/2018    GLUCOSEU Negative 11/24/2015    KETONESU Negative 05/05/2018    KETONESU - 12/12/2017    BILIRUBINUR Negative 05/05/2018    BILIRUBINUR - 12/12/2017    BLOODU Moderate (A) 05/05/2018    BLOODU - 12/12/2017     BMP: Lab Results   Component Value Date    GLUCOSE 86 05/07/2018    GLUCOSE 167 (H) 12/16/2015    CO2 26 05/07/2018    CO2 30 12/16/2015     (H) 05/07/2018 BUN 32 (H) 12/16/2015    CREATININE 2 47 (H) 05/07/2018    CREATININE 1 98 (H) 12/16/2015    CALCIUM 9 4 05/07/2018    CALCIUM 9 2 12/16/2015

## 2018-05-08 PROBLEM — D63.1 ANEMIA IN CHRONIC KIDNEY DISEASE: Status: ACTIVE | Noted: 2018-01-01

## 2018-05-08 PROBLEM — N39.0 UTI (URINARY TRACT INFECTION): Status: ACTIVE | Noted: 2018-01-01

## 2018-05-08 PROBLEM — N18.9 ANEMIA IN CHRONIC KIDNEY DISEASE: Status: ACTIVE | Noted: 2018-01-01

## 2018-05-08 NOTE — PHYSICAL THERAPY NOTE
PHYSICAL THERAPY NOTE      Patient Name: Aramis Arias  Today's Date: 5/8/2018 05/08/18 1440   Pain Assessment   Pain Assessment No/denies pain   Pain Score No Pain   Restrictions/Precautions   Weight Bearing Precautions Per Order No   Other Precautions Chair Alarm; Bed Alarm;Multiple lines;O2;Telemetry; Fall Risk;Pain   General   Family/Caregiver Present Yes  (wife)   Subjective   Subjective Patient seated OOB in recliner and is agreeable to therapy session,   Bed Mobility   Additional Comments Patient seated OOB pre and post session, call bell and belongings in reach   Transfers   Sit to Stand 4  Minimal assistance  (contact guard)   Additional items Assist x 1; Increased time required;Verbal cues  (for hand placement)   Stand to Sit 4  Minimal assistance  (contact guard)   Additional items Assist x 1; Armrests; Increased time required;Verbal cues  (hand placement)   Stand pivot 4  Minimal assistance   Additional items Assist x 1; Increased time required;Verbal cues  (for RW management with turns)   Ambulation/Elevation   Gait pattern Decreased foot clearance;Shuffling; Short stride; Excessively slow; Foward flexed   Gait Assistance 4  Minimal assist  (with chair follow)   Additional items Assist x 1;Verbal cues   Assistive Device Rolling walker   Distance 10 feet x 3 with seated rest breaks between trials   Balance   Static Sitting Fair +   Dynamic Sitting Fair   Static Standing Fair -   Dynamic Standing Poor +   Ambulatory Poor +  (roller walker)   Endurance Deficit   Endurance Deficit Yes   Endurance Deficit Description limited ambulation distance and standing tolerance   Activity Tolerance   Activity Tolerance Patient limited by fatigue;Patient limited by pain   Nurse Made Aware spoke to Joseph Keating   Exercises   Glute Sets Sitting;10 reps;AROM; Bilateral   Hip Adduction Sitting;10 reps;AROM; Bilateral   Knee AROM [employer] Sitting;10 reps;AROM; Bilateral   Ankle Pumps Sitting;20 reps;AROM; Bilateral   Marching Sitting;10 reps;AROM; Bilateral   Assessment   Prognosis Fair   Problem List Decreased strength;Decreased range of motion;Decreased endurance; Impaired balance;Decreased mobility; Decreased coordination;Decreased safety awareness; Obesity; Decreased skin integrity; Impaired sensation;Pain   Assessment Patient required decreased levels of assistance for transfers however requires increased amounts of time and verbal cues  Patient consistent with gait distance of 10 feet however able to increase trails with seated rest breaks in between  Patient requires verbal cuing for hand placement for transfers as well as cuing for RW management during turns  Patient motivated throughout session however continues to remain limtied by fatigue & SOB  Performed seated B LE exercise program with good understanding and form  Continue to focus on endurance and gait progression to maximize functional mobility  Barriers to Discharge Inaccessible home environment   Goals   Patient Goals to get better   STG Expiration Date 05/17/18   Short Term Goal #1 Per last PT goal assessment    pt will: Increase L LE strength 1 grade to facilitate independent mobility, Perform all bed mobility tasks w/ supervision to decrease fall risk factors, Perform all transfers w/ supervision to improve independence, Ambulate 100 ft  with roller walker w/ supervision w/o LOB, Increase ambulatory balance 1 grade to decrease risk for falls, Complete exercise program independently, Tolerate 3 hr OOB to faciliate upright tolerance and Improve Barthel Index score to 75 or greater to facilitate independence   Treatment Day 2   Plan   Treatment/Interventions Functional transfer training;LE strengthening/ROM; Therapeutic exercise; Endurance training;Patient/family training;Bed mobility; Equipment eval/education;Gait training   Progress Slow progress, decreased activity tolerance   PT Frequency 5x/wk   Recommendation   Recommendation Post acute IP rehab   Equipment Recommended Other (Comment)  (roller walker)       Clogilbertta Hodgkins, PTA

## 2018-05-08 NOTE — CASE MANAGEMENT
Continued Stay Review    Date: 2018    Vitals:   Temp (24hrs), Av °F (36 7 °C), Min:97 8 °F (36 6 °C), Max:98 2 °F (36 8 °C)   HR:  [61-75] 71  Resp:  [18-20] 18  BP: ()/(38-57) 113/53  SpO2:  [97 %-100 %] 100 %  Body mass index is 30 7 kg/m²       Current Facility-Administered Medications:  aspirin 81 mg Oral Daily Chase Alberto MD   atorvastatin 40 mg Oral Daily With Dinner Chase Alberto MD   calcium carbonate 1,000 mg Oral Daily PRN Chase Alberto MD   cephalexin 250 mg Oral Q8H [address] Placido Wayne PA-C   diphenhydrAMINE 12 5 mg Oral HS PRN Daylin Kern PA-C   docusate sodium 100 mg Oral BID PRN Chase Alberto MD   docusate sodium 100 mg Oral BID Iron Fregoso MD   heparin (porcine) 5,000 Units Subcutaneous Q8H [address] Lisseth Seals PA-C   insulin glargine 30 Units Subcutaneous HS Gilbert Chen MD   insulin lispro 1-5 Units Subcutaneous TID AC Chase Alberto MD   insulin lispro 1-5 Units Subcutaneous HS Chase Alberto MD   insulin lispro 12 Units Subcutaneous TID With Meals Gilbert Chen MD   melatonin 6 mg Oral HS Tyree Quick MD   metoprolol succinate 25 mg Oral Q12H Gianfranco Do MD   multivitamin-minerals 1 tablet Oral Daily Amari InksJEWELL   ondansetron 4 mg Intravenous Q6H PRN Chase Alberto MD   pantoprazole 40 mg Oral Early Morning Chase Alberto MD   polyethylene glycol 17 g Oral Daily PRN Daylin Kern PA-C   potassium chloride 10 mEq Oral Daily Thuan Krause MD   sevelamer 800 mg Oral TID With Meals Mahin Jon MD   sodium chloride 1 spray Each Nare PRN Iron Fregoso MD   tamsulosin 0 4 mg Oral Daily With Dinner Lisseth Seals PA-C   torsemide 40 mg Oral Daily Thuan Krause MD   zolpidem 5 mg Oral HS PRN Iron Fregoso MD        Abnormal Labs/Diagnostic Results:     Results from last 7 days  Lab Units 18  0531   WBC Thousand/uL 7 45   HEMOGLOBIN g/dL 8 2*   HEMATOCRIT % 25 7*   PLATELETS Thousands/uL 132*   NEUTROS PCT % 77*   LYMPHS PCT % 16   MONOS PCT % 7   EOS PCT % 0         Results from last 7 days  Lab Units 05/06/18  0531   05/01/18  0505   SODIUM mmol/L 133*  < > 128*   POTASSIUM mmol/L 3 9  < > 3 2*   CHLORIDE mmol/L 94*  < > 89*   CO2 mmol/L 29  < > 30   BUN mg/dL 118*  < > 164*   CREATININE mg/dL 2 47*  < > 2 88*   CALCIUM mg/dL 9 2  < > 9 4   TOTAL PROTEIN g/dL  --   --  6 9   BILIRUBIN TOTAL mg/dL  --   --  1 20*   ALK PHOS U/L  --   --  74   ALT U/L  --   --  31   AST U/L  --   --  18   GLUCOSE RANDOM mg/dL 129  < > 104     5/4 Chest x-ray: Slightly improved CHF with stable, small B/L pleural effusions  5/6 EKG: Atrial flutter with 4:1 A-V conduction, left axis deviation  Non-specific intra-ventricular conduction block  T wave abnormality  Age/Sex: [de-identified] y o  male     Assessment/Plan:     1  Acute respiratory failure with hypoxia  · baseline oxygen requirement is room air - currently at baseline room air during my encounter today - initial increase in requirements earlier during hospital course likely secondary to CHF exacerbation (see plan below)   · will start discharge planning as physical therapy recommends skilled rehabilitation - will appreciate case management input for hopeful discharge early this coming week     2  Acute on chronic diastolic CHF - NSTEMI type 2  · last EF of 45% with mild MO, severe TR, moderate-severe MR, and diffuse hypokinesis  · currently on Torsemide/Toprol-XL regimen - net fluid balance of (-) 9 46 L thus far  · Elevated troponin level on admission noted with serial repeat showing stabilization - no further trending in over four weeks since admission - likely secondary to CHF exacerbation - cardiology following  · portable CXR on 5/4 revealed improvement of CHF with stable effusions     3    Acute kidney injury - Chronic kidney disease stage 4  · creatinine has remained continuously stable @ 2 47-2 49 over last three days from a peak of 3 26 earlier in hospitalization with improvement in BUN as well - baseline creatinine is approximately 1 8-2 2 - likely progressively worsened initial secondary to CHF  · As renal function is slowly improving with conservative oral diuretic measures, nephrology has opted to hold off on consideration of dialysis at this point - continue monitoring with current Torsemide dosing     4  Insulin-dependent diabetes mellitus  · HgA1c of 7 6 on 3/2018   · c/w basal/prandial insulin regimen w/ additional SSI coverage per accuchecks   · Monitor for hypoglycemic episodes     5  Atrial fibrillation  · Rate controlled on Toprol-XL - continue ASA  · Not a candidate for chronic anticoagulation secondary to comorbidities and fall risk     6  CAD - Aortic valvular disease  · Status post prior CABG - status post bioprosthetic aortic valve replacement   · Continue ASA/Lipitor/Toprol-XL     7   Hypokalemia  · Monitor/replete potassium as necessary (remaining normalized currently)  · Serum magnesium within normal limits      8  Hyponatremia  · has been waxing/waning secondary to CHF/fluid overload but improved today with second daily dose of Samsca - nephrology following  · Monitor BMP and monitor fluid intake     9  Anemia of chronic disease  · H/H remain stable - continue to monitor         DVT Prophylaxis:  Heparin SC       Discharge Plan: TBD    ==============================================================  5/6/2018 Nephrology Note:  ASSESSMENT and PLAN:  1  SABINE:  · Due to cardiorenal syndrome  UA is bland and renal US is negative  · In early hospital course, BUN and creatinine had risen with aggressive diuresis and patient had uremic symptoms at peak creatinine  · However, family refusing dialysis until nutrition and PT are optimized  · With cutting back on aggressive diuresis and placing only on Torsemide 40 mg daily, BUN has improved and continues to get better  Creatinine also stable 2 5 to 2 6 x past 5 days       2   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  Follows with Dr Charlie Garcia  3  CHF, volume overload: Continue Torsemide 40 mg PO daily  4  Hyponatremia: Improved after Samsca 15 mg yesterday  5  Azotemia: Due to CRS  Improving  6  Hypokalemia: Stable on Kdur 10 meq daily  7  PAF, valvular heart disease, CAD s/p CABG  8  Anemia: Hgb stable  9  MGUS  10  MBD:  Hyperphosphatemia resolved with Sevelamer  11  Weakness, deconditioning: per primary team        ==========================================================================  5/6/2018 Cardiology Note:    Assessment:  Principal Problem:    Acute on chronic diastolic congestive heart failure (HCC)  Active Problems:    Rapid atrial fibrillation (HCC)    Benign essential hypertension    Type 2 diabetes mellitus ([facility] )    Peripheral arterial disease (HCC)    Acute-on-chronic kidney injury ([facility] )    Non-ST elevation myocardial infarction (NSTEMI) ([facility] )    Pulmonary hypertension (HCC)    Azotemia    Hyponatremia     [de-identified]year old man with acute on chronic diastolic CHF  Severe AS s/p AVR  Severe MR, TR  Atrial fibrillation, rate controlled  CAD without angina  SABINE on CKD      Plan:  Diuretics per nephrology team  Currently on oral torsemide  Not open heart valve surgery candidate  Unlikely Mitral Clip candidate, and evaluation only could be done with better functional status  Not currently on anticoagulation, due to fall risk

## 2018-05-08 NOTE — PLAN OF CARE
Problem: DISCHARGE PLANNING - CARE MANAGEMENT  Goal: Discharge to post-acute care or home with appropriate resources  INTERVENTIONS:  - Conduct assessment to determine patient/family and health care team treatment goals, and need for post-acute services based on payer coverage, community resources, and patient preferences, and barriers to discharge  - Address psychosocial, clinical, and financial barriers to discharge as identified in assessment in conjunction with the patient/family and health care team  - Arrange appropriate level of post-acute services according to patient's   needs and preference and payer coverage in collaboration with the physician and health care team  - Communicate with and update the patient/family, physician, and health care team regarding progress on the discharge plan  - Arrange appropriate transportation to post-acute venues   Outcome: Progressing  CM was informed today that patient will most likely discharge Thursday pending medical clearance  Rehab informed today  Cm will follow patient for possible d/c [facility] Thursday

## 2018-05-08 NOTE — ASSESSMENT & PLAN NOTE
· Urine cultures positive for E coli/Enterococcus fecalis  · He already completed at least 3 days of oral cephalexin  · Will switch to Amoxil

## 2018-05-08 NOTE — ASSESSMENT & PLAN NOTE
· Baseline creatinine 1 8-2 2  SABINE likely d/t ATN +/-cardiorenal syndrome  Creatinine has improved to 2 3 today, BUN remains elevated at 113  He was started on a renal based probiotic today per Nephrology    · Continue torsemide at current dose  · Discussed with Dr Concha Yeager

## 2018-05-08 NOTE — ASSESSMENT & PLAN NOTE
· Rate controlled    Not on anticoagulation currently as this was declined by patient and family  · Continue aspirin

## 2018-05-08 NOTE — SOCIAL WORK
CM was informed today that patient will most likely discharge Thursday pending medical clearance  Rehab informed today  Cm will follow patient for possible d/c [facility] Thursday

## 2018-05-08 NOTE — PROGRESS NOTES
Progress Note - Cardiology   Modesta Gaviria  [de-identified] y o  male MRN: [id number]  Unit/Bed#: -01 Encounter: [id number]  05/08/18  1:06 PM        Subjective/Objective   Chief Complaint:   Chief Complaint   Patient presents with    Shortness of Breath     pt reports being short of breath over the past week  Pt reports when he gets up and walks distances he has trouble breathing and chest pain  Daughter reports recent trip to [facility] where pt did not take lasix to avoid voiding  Subjective: Patient denies any complaints    Specifically denies chest pains or shortness of breath    Objective: Comfortable , no distress at the time of exam      Patient Active Problem List   Diagnosis    Rapid atrial fibrillation ([address] )    Acute on chronic diastolic congestive heart failure ([facility])    Benign essential hypertension    Acute respiratory failure with hypoxia ([facility])    Type 2 diabetes mellitus ([address] )    MADISON (dyspnea on exertion)    Cough    S/P aortic valve replacement with bioprosthetic valve    Acute encephalopathy    Nasal laceration, sequela    Coronary artery disease involving native coronary artery of native heart    Chronic kidney disease, stage 3    Closed nondisplaced fracture of greater tuberosity of left humerus    Cellulitis    Edema    Hyperparathyroidism ([address] )    Vitamin D deficiency    Pain in left shoulder    Peripheral arterial disease ([address] )    Atherosclerosis of artery of extremity with ulceration ([address] )    Aortic valve disorder    Asymptomatic bilateral carotid artery stenosis    Congestive heart failure ([facility])    Esophageal reflux    Impingement syndrome of left shoulder    Mild mitral regurgitation    Monoclonal gammopathy of undetermined significance    Non-proliferative diabetic retinopathy ([address] )    Peripheral vascular disease ([address] )    Acute-on-chronic kidney injury ([address] )    Cellulitis of foot, left    Non-ST elevation myocardial infarction (NSTEMI) ([address] )    Other hyperlipidemia    Pulmonary hypertension (HCC)    Azotemia    Hyponatremia       Vitals: BP (!) 95/46 (BP Location: Left arm)   Pulse 91   Temp 98 7 °F (37 1 °C) (Oral)   Resp 18   Ht 5' 7" (1 702 m)   Wt 90 2 kg (198 lb 13 7 oz)   SpO2 98%   BMI 31 15 kg/m²     I/O this shift:  In: -   Out: 250 [Urine:250]  Wt Readings from Last 3 Encounters:   05/08/18 90 2 kg (198 lb 13 7 oz)   04/02/18 106 kg (233 lb 3 2 oz)   02/28/18 98 kg (216 lb)       Intake/Output Summary (Last 24 hours) at 05/08/18 1306  Last data filed at 05/08/18 1232   Gross per 24 hour   Intake             1040 ml   Output             1345 ml   Net             -305 ml     I/O last 3 completed shifts: In: 1400 [P O :1400]  Out: 1620 [Urine:1620]    Invasive Devices     Peripheral Intravenous Line            Peripheral IV 05/03/18 Right Antecubital 4 days                  Physical Exam:  GEN: Royal Fowler   appears well, alert and oriented x 3, pleasant and cooperative   HEENT: pupils equal, round, and reactive to light; extraocular muscles intact  NECK: supple, no carotid bruits or JVD  HEART: regular rhythm, normal S1 and S2, ejection systolic murmur, no clicks, gallops or rubs   LUNGS: clear to auscultation bilaterally; no wheezes, rales, or rhonchi   ABDOMEN/GI: normal bowel sounds, soft, no tenderness, no distention  EXTREMITIES/Musculoskeltal: peripheral pulses normal; no clubbing, cyanosis, or edema, chronic stasis changes are present  NEURO: no focal findings   SKIN: normal without suspicious lesions on exposed skin            Lab Results:       Results from last 7 days  Lab Units 05/08/18  0503 05/07/18  0612 05/06/18  0531   WBC Thousand/uL 7 84 7 41 7 45   HEMOGLOBIN g/dL 8 4* 8 6* 8 2*   HEMATOCRIT % 26 0* 26 8* 25 7*   PLATELETS Thousands/uL 130* 145* 132*           Results from last 7 days  Lab Units 05/08/18  0503 05/07/18  0612 05/06/18  0531   SODIUM mmol/L 130* 131* 133*   POTASSIUM mmol/L 4 3 4 3 3 9   CHLORIDE mmol/L 93* 93* 94*   CO2 mmol/L 26 26 29   BUN mg/dL 113* 113* 118*   CREATININE mg/dL 2 36* 2 47* 2 47*   CALCIUM mg/dL 9 1 9 4 9 2   GLUCOSE RANDOM mg/dL 102 86 129         Imaging: I have personally reviewed pertinent reports      EKG:  No events    Scheduled Meds:    Current Facility-Administered Medications:  acetaminophen 650 mg Oral Q6H PRN Molly Ruiz PA-C   aspirin 81 mg Oral Daily Chase Alberto MD   atorvastatin 40 mg Oral Daily With Dinner Chase Alberto MD   calcium carbonate 1,000 mg Oral Daily PRN Chase Alberto MD   cephalexin 250 mg Oral Q8H [address] Placido Wayne PA-C   diphenhydrAMINE 12 5 mg Oral HS PRN Saman Pandey PA-C   docusate sodium 100 mg Oral BID PRN Chase Alberto MD   docusate sodium 100 mg Oral BID Susan Garcia MD   heparin (porcine) 5,000 Units Subcutaneous Q8H [address] Lisseth Seals PA-C   insulin glargine 20 Units Subcutaneous HS Candice Nuñez MD   insulin lispro 1-5 Units Subcutaneous TID AC Chase Alberto MD   insulin lispro 1-5 Units Subcutaneous HS Chase Alberto MD   insulin lispro 12 Units Subcutaneous TID With Meals Jakob Gonzales MD   melatonin 6 mg Oral HS Adrienne Pires MD   metoprolol succinate 25 mg Oral Q12H Elma Jolley MD   multivitamin-minerals 1 tablet Oral Daily JEWELL Vee   NATRUL PROBIOTIC 1 each Oral BID before lunch/dinner Harrison Kang DO   ondansetron 4 mg Intravenous Q6H PRN Chase Alberto MD   pantoprazole 40 mg Oral Early Morning Chase Alberto MD   polyethylene glycol 17 g Oral Daily PRN Saman Pandey PA-C   potassium chloride 10 mEq Oral Daily Jose L Arguello MD   sevelamer 800 mg Oral TID With Meals Georgiana Maria MD   sodium chloride 1 spray Each Nare PRN Susan Garcia MD   tamsulosin 0 4 mg Oral Daily With Dinner Lisseth Seals PA-C   torsemide 40 mg Oral Daily Jose L Arguello MD   zolpidem 5 mg Oral HS PRN Susan Garcia MD     Continuous Infusions:       VTE Pharmacologic Prophylaxis: Heparin  VTE Mechanical Prophylaxis: sequential compression device       [de-identified]year-old with a prolonged hospital stay-over 1 month, known history of coronary artery disease, prior CABG, prior bioprosthetic AVR, known PAF admitted with acute on chronic CHF, atrial fibrillation     Currently, active issues are SABINE on CKD which appears to be stable, volume overload, steadily and slowly diuresing and predominantly deconditioning needing rehabilitation after discharge     Plan:     Acute on chronic diastolic CHF:  Currently on torsemide 40 mg daily, on 20 mg alternating with 40 mg daily at home, continue the same, has JVD but without significant pulmonary edema, has mild lower extremity edema   Creatinine seems to have stabilized around 2 4, Peak creatinine of 3 2 during this hospitalization, pre hospital baseline probably around 1 9  BUN remains high     Atrial fibrillation:  Rate controlled, not on anticoagulation at baseline  Continue aspirin alone     Coronary artery disease:  Stable, continue aspirin, statin, beta-blocker     Status post bioprosthetic AVR-last echo from 1 month ago-normal function     Cardiomyopathy:  Ejection fraction of 45% with diffuse hypokinesis, continue beta-blocker, not a candidate for ACE-inhibitor this time     Overall, considering multiple comorbidities, prognosis in the long term will be poor    I did discuss with the patient and his wife about his current situation as well as briefly about the indications for hemodialysis and possible need for the same in the future as well as the possibility of further deterioration of renal function  Will need a good amount of rehabilitation after discharge     Counseling / Coordination of Care  Total time spent 20 minutes including teaching and family updates  More than 50% was spent counseling pt and family

## 2018-05-08 NOTE — PROGRESS NOTES
Progress Note - Hugh Mail  1935, [de-identified] y o  male MRN: [id number]    Unit/Bed#: -01 Encounter: [id number]    Primary Care Provider: Molly Hill MD   Date and time admitted to hospital: 4/4/2018  6:39 AM        Peripheral arterial disease ([address] )   Assessment & Plan    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)  -s/p diagnostic LLE angiogram ([facility]) 12/20/2017:  Long segment  mid SFA, below knee popliteal and tibioperoneal trunk with single-vessel peroneal runoff  -s/p RIGHT fem-TP trunk bypass w/in situ GSV, popliteal ligation '02 by Dr Melinda Clifford   -s/p RIGHT PTA for mid graft stenosis in '03 by Dr Lencho Calvo    Assessment/Recommendations:  Overall, 82M with multiple co-morbidities and active medical conditions now hospitalized for decompensated CHF, AF and SABINE/ CKD  Continue medical management at this time and optimize cards and renal status  Per wife, patient to go to Rehab this week  Will set up follow-up with Dr Kirsten Lesches as outpatient for revascularization options  From a vascular standpoint, there is progressively worsened LEFT LE tissue loss involving the left heel and left 3rd and 5th toe which are gangrenous  Recent angiogram as above shows significant multilevel disease which was not amenable to intervention  Lower extremity arterial duplex shows undetected metatarsal pressures and GTP undetected / flat-lined bilaterally  ADIEL 04/05/2018: Right NOEL 1 42/met undetected/ GTP flat  LEFT NOEL 0 64 high grade stenosis v occlusion of SFA/ met undetected/ GTP flat   - Continue LWC with Betadine for gangrenous wounds  - Podiatry following, appreciate wound care and input  - Once he is compensated from cardiac and renal standpoints, we can re-assess his vascular status  To consider eventual LE bypass should he be a candidate for it from medical and technical standpoints     - Will obtain office visit for f/u  - Will discuss with Dr Deras Backbone Acute-on-chronic kidney injury ([facility] )   Assessment & Plan    SABINE on CKD III  -creatinine down to 2 36 (baseline about 1 8 - 2 2)   -nephrology following          Type 2 diabetes mellitus (HCC)   Assessment & Plan    -HgbA1C 7 6  -continue current medical regimen  -optimize BS control for optimization of wound healing        * Acute on chronic diastolic congestive heart failure (HCC)   Assessment & Plan    CHF with SABINE/CKD; improving  -Cards following  -Medical optimization and will need cardiac clearances if Left LE bypass needed              Subjective:  Per wife, she asked Dr Judge Calvo to have us come by to see Mr Mckee Ear 2/2 to increased left foot pain last night  Patient has a non-healing left lateral heel wound, 3rd and 5th toe with dry gangrene  Patient reports occasional pain to the foot and said it was bad last night but denies any pain with walking or sitting in the chair today  Doppler signal only found at area near Left AT (possibly collateral?)  Warm, dusky foot with >3sec cap refill  Motor intact  Sensation decreased  Discussed with Dr Estrella Barron  Limb not threatened and given lengthy hospital stay with cardio-renal issues, would recommend continuing medical optimization and rehabilitation as well as outpatient office follow-up with Dr Marti Man to discuss revascularization options  Vitals:  /53 (BP Location: Right arm)   Pulse 86   Temp 97 6 °F (36 4 °C) (Axillary)   Resp 18   Ht 5' 7" (1 702 m)   Wt 90 2 kg (198 lb 13 7 oz)   SpO2 100%   BMI 31 15 kg/m²     I/Os:  I/O last 3 completed shifts: In: 1400 [P O :1400]  Out: 1620 [Urine:1620]  I/O this shift:   In: 26 [P O :460]  Out: 450 [Urine:450]    Lab Results and Cultures:   Lab Results   Component Value Date    WBC 7 84 05/08/2018    HGB 8 4 (L) 05/08/2018    HCT 26 0 (L) 05/08/2018    MCV 85 05/08/2018     (L) 05/08/2018     Lab Results   Component Value Date    GLUCOSE 102 05/08/2018    CALCIUM 9 1 05/08/2018  (L) 05/08/2018    K 4 3 05/08/2018    CO2 26 05/08/2018    CL 93 (L) 05/08/2018     (H) 05/08/2018    CREATININE 2 36 (H) 05/08/2018     Lab Results   Component Value Date    INR 1 11 04/30/2018    INR 1 04 04/04/2018    INR 1 08 12/13/2017    PROTIME 14 7 (H) 04/30/2018    PROTIME 13 9 04/04/2018    PROTIME 14 0 12/13/2017        Blood Culture:   Lab Results   Component Value Date    BLOODCX No Growth After 5 Days   04/04/2018   ,   Urinalysis:   Lab Results   Component Value Date    COLORU Yellow 05/05/2018    COLORU Yellow 12/12/2017    CLARITYU Slightly Cloudy 05/05/2018    CLARITYU Transparent 12/12/2017    SPECGRAV <=1 005 05/05/2018    SPECGRAV 1 005 12/12/2017    PHUR 5 0 05/05/2018    PHUR 5 0 12/12/2017    LEUKOCYTESUR Large (A) 05/05/2018    LEUKOCYTESUR - 12/12/2017    NITRITE Positive (A) 05/05/2018    NITRITE - 12/12/2017    PROTEINUA Negative 05/05/2018    PROTEINUA - 12/12/2017    GLUCOSEU Negative 05/05/2018    GLUCOSEU Negative 11/24/2015    KETONESU Negative 05/05/2018    KETONESU - 12/12/2017    BILIRUBINUR Negative 05/05/2018    BILIRUBINUR - 12/12/2017    BLOODU Moderate (A) 05/05/2018    BLOODU - 12/12/2017   ,   Urine Culture:   Lab Results   Component Value Date    URINECX >100,000 cfu/ml Escherichia coli (A) 05/05/2018    URINECX >100,000 cfu/ml Enterococcus faecalis (A) 05/05/2018   ,   Wound Culure: No results found for: WOUNDCULT    Medications:  Current Facility-Administered Medications   Medication Dose Route Frequency    acetaminophen (TYLENOL) tablet 650 mg  650 mg Oral Q6H PRN    amoxicillin (AMOXIL) capsule 500 mg  500 mg Oral Q12H [address]    aspirin (ECOTRIN LOW STRENGTH) EC tablet 81 mg  81 mg Oral Daily    atorvastatin (LIPITOR) tablet 40 mg  40 mg Oral Daily With Dinner    calcium carbonate (TUMS) chewable tablet 1,000 mg  1,000 mg Oral Daily PRN    diphenhydrAMINE (BENADRYL) tablet 12 5 mg  12 5 mg Oral HS PRN    docusate sodium (COLACE) capsule 100 mg  100 mg Oral BID PRN    docusate sodium (COLACE) capsule 100 mg  100 mg Oral BID    heparin (porcine) subcutaneous injection 5,000 Units  5,000 Units Subcutaneous Q8H [facility]    insulin glargine (LANTUS) subcutaneous injection 20 Units 0 2 mL  20 Units Subcutaneous HS    insulin lispro (HumaLOG) 100 units/mL subcutaneous injection 1-5 Units  1-5 Units Subcutaneous TID AC    insulin lispro (HumaLOG) 100 units/mL subcutaneous injection 1-5 Units  1-5 Units Subcutaneous HS    insulin lispro (HumaLOG) 100 units/mL subcutaneous injection 12 Units  12 Units Subcutaneous TID With Meals    melatonin tablet 6 mg  6 mg Oral HS    metoprolol succinate (TOPROL-XL) 24 hr tablet 25 mg  25 mg Oral Q12H    multivitamin-minerals (CENTRUM) tablet 1 tablet  1 tablet Oral Daily    NATRUL PROBIOTIC CAPS 1 capsule  1 each Oral BID before lunch/dinner    ondansetron (ZOFRAN) injection 4 mg  4 mg Intravenous Q6H PRN    pantoprazole (PROTONIX) EC tablet 40 mg  40 mg Oral Early Morning    polyethylene glycol (MIRALAX) packet 17 g  17 g Oral Daily PRN    potassium chloride (K-DUR,KLOR-CON) CR tablet 10 mEq  10 mEq Oral Daily    sevelamer (RENAGEL) tablet 800 mg  800 mg Oral TID With Meals    sodium chloride (OCEAN) 0 65 % nasal spray 1 spray  1 spray Each Nare PRN    tamsulosin (FLOMAX) capsule 0 4 mg  0 4 mg Oral Daily With Dinner    torsemide (DEMADEX) tablet 40 mg  40 mg Oral Daily    zolpidem (AMBIEN) tablet 5 mg  5 mg Oral HS PRN       Imaging:  Reviewed imaging  Physical Exam:    General appearance: alert, cooperative and fatigued  Skin: left foot with non-healing left lateral heel wound; left 3rd and 5th toe with gangrene  Right foot dusky with 2nd toe small area of necrotic tissue    Left shin with small dry ulceration  Neurologic: Grossly normal  Head: Normocephalic, without obvious abnormality, atraumatic  Lungs: clear to auscultation bilaterally  Heart: regular rate and rhythm, S1, S2 normal, no murmur, click, rub or gallop  Abdomen: soft, non-tender; bowel sounds normal; no masses,  no organomegaly  Extremities: as noted above in skin  See photos below  Doppler signals to the right AT/PT/Peroneal  Doppler signal to the area of left AT, monophasic    Wound/Incision:    Right foot/2nd toe with ulceration      Left lateral heel      Left 3rd and 5th toes        Pulse exam:  Radial: Right: 1+ Left[de-identified] 1+  Femoral: Right: 1+ Left: 2+  DP: Right: doppler signal Left: doppler signal  PT: Right: non-palpable Left: non-palpable     Faint, monophasic doppler signal at Left AT area        David Patel, [address]  5/8/2018  [facility]  [phone]

## 2018-05-08 NOTE — PROGRESS NOTES
Progress Note - Nephrology   Royal Fowler  [de-identified] y o  male MRN: [id number]  Unit/Bed#: -01 Encounter: [id number]    Assessment:  1  Acute renal failure on stage 4 chronic kidney disease:  Baseline creatinine is 1 8-2 2 as an outpatient  Acute kidney injury likely secondary to ATN plus or minus cardiorenal syndrome his creatinine is decreased to 2 3 and his BUN is 113  There is likely also some degree of loss of muscle mass compared to when I last saw him in the office as well  He is started on the kidney based probiotic  Maintain oral Demadex at this time  His fluid status is a lot better    2  Stage 4 chronic kidney disease:  Baseline creatinine 1 8-2 2     3   Fluid overload: This has improved his oxygen requirements have improved continue with torsemide 40 milligrams daily    4  Anemia:  Hemoglobin is 8 4 continue to follow    Plan:  All 4 of of us talked at length  I think the patient is stable enough to consider transitioning to [facility]  The patient's wife at 2 questions 1 with regards to the patient's foot as he had pain for which he did receive Tylenol which did help him with his pain the patient had been seen by vascular in the past and given his significant comorbidities was not a surgical candidate given the foot wanted to see vascular surgery could see the patient again in follow-up here to see what other options of any were available with regards to the foot lower extremity  We also discussed the low-dose Tylenol could be given for pain is it did help meet the patient's pain needs  Aiming for Thursday for possible transitioning to [facility] for rehab      Subjective:   Patient seen in follow-up for acute renal failure on chronic kidney disease and fluid management  The patient is sitting in a chair he denies any chest pressure shortness of breath  He is being started on the kidney based probiotic Renadyl   I talked with the patient and his wife with Dr Aminata Harrison, from [facility] as well    Objective:     Vitals: Blood pressure (!) 95/46, pulse 91, temperature 98 7 °F (37 1 °C), temperature source Oral, resp  rate 18, height 5' 7" (1 702 m), weight 90 2 kg (198 lb 13 7 oz), SpO2 98 %  ,Body mass index is 31 15 kg/m²  Weight (last 2 days)     Date/Time   Weight    05/08/18 0600  90 2 (198 86)    05/07/18 0600  87 2 (192 24)    05/06/18 0600  88 9 (195 99)    Weight: pt refusing standing weight at 05/06/18 0600                Intake/Output Summary (Last 24 hours) at 05/08/18 1450  Last data filed at 05/08/18 1417   Gross per 24 hour   Intake             1420 ml   Output             1195 ml   Net              225 ml            Physical Exam: General: patient is in NAD  Skin:  No new rash  Eyes:  No scleral icterus  Neck:  Supple no adenopathy  Chest:  Coarse breath sounds  CVS:  S1-S2 I did not detect a rub  Abdomen:  Positive bowel sounds  Extremities: There is no significant edema noted  Neuro:  Nonfocal no asterixis  Psych:   Answers questions appropriately              Prescriptions Prior to Admission   Medication    aspirin (ECOTRIN LOW STRENGTH) 81 mg EC tablet    atorvastatin (LIPITOR) 40 mg tablet    febuxostat (ULORIC) 40 mg tablet    metoprolol tartrate (LOPRESSOR) 25 mg tablet    metoprolol tartrate (LOPRESSOR) 50 mg tablet    multivitamin-iron-minerals-folic acid (CENTRUM) chewable tablet    NOVOLOG MIX 70/30 FLEXPEN (70-30) 100 UNIT/ML SUPN    omeprazole (PriLOSEC) 20 mg delayed release capsule    paricalcitol (ZEMPLAR) 1 mcg capsule    ramipril (ALTACE) 2 5 mg capsule    torsemide (DEMADEX) 20 mg tablet       Current Facility-Administered Medications   Medication Dose Route Frequency    acetaminophen (TYLENOL) tablet 650 mg  650 mg Oral Q6H PRN    aspirin (ECOTRIN LOW STRENGTH) EC tablet 81 mg  81 mg Oral Daily    atorvastatin (LIPITOR) tablet 40 mg  40 mg Oral Daily With Dinner    calcium carbonate (TUMS) chewable tablet 1,000 mg  1,000 mg Oral Daily PRN    cephalexin (KEFLEX) capsule 250 mg  250 mg Oral Q8H [address]    diphenhydrAMINE (BENADRYL) tablet 12 5 mg  12 5 mg Oral HS PRN    docusate sodium (COLACE) capsule 100 mg  100 mg Oral BID PRN    docusate sodium (COLACE) capsule 100 mg  100 mg Oral BID    heparin (porcine) subcutaneous injection 5,000 Units  5,000 Units Subcutaneous Q8H [address]    insulin glargine (LANTUS) subcutaneous injection 20 Units 0 2 mL  20 Units Subcutaneous HS    insulin lispro (HumaLOG) 100 units/mL subcutaneous injection 1-5 Units  1-5 Units Subcutaneous TID AC    insulin lispro (HumaLOG) 100 units/mL subcutaneous injection 1-5 Units  1-5 Units Subcutaneous HS    insulin lispro (HumaLOG) 100 units/mL subcutaneous injection 12 Units  12 Units Subcutaneous TID With Meals    melatonin tablet 6 mg  6 mg Oral HS    metoprolol succinate (TOPROL-XL) 24 hr tablet 25 mg  25 mg Oral Q12H    multivitamin-minerals (CENTRUM) tablet 1 tablet  1 tablet Oral Daily    NATRUL PROBIOTIC CAPS 1 capsule  1 each Oral BID before lunch/dinner    ondansetron (ZOFRAN) injection 4 mg  4 mg Intravenous Q6H PRN    pantoprazole (PROTONIX) EC tablet 40 mg  40 mg Oral Early Morning    polyethylene glycol (MIRALAX) packet 17 g  17 g Oral Daily PRN    potassium chloride (K-DUR,KLOR-CON) CR tablet 10 mEq  10 mEq Oral Daily    sevelamer (RENAGEL) tablet 800 mg  800 mg Oral TID With Meals    sodium chloride (OCEAN) 0 65 % nasal spray 1 spray  1 spray Each Nare PRN    tamsulosin (FLOMAX) capsule 0 4 mg  0 4 mg Oral Daily With Dinner    torsemide (DEMADEX) tablet 40 mg  40 mg Oral Daily    zolpidem (AMBIEN) tablet 5 mg  5 mg Oral HS PRN        Lab, Imaging and other studies: I have personally reviewed pertinent labs    CBC: Lab Results   Component Value Date    WBC 7 84 05/08/2018    WBC 9 45 11/24/2015    RBC 3 05 (L) 05/08/2018    RBC 4 78 11/24/2015     CMP: Lab Results   Component Value Date     (L) 05/08/2018     12/16/2015    CL 93 (L) 05/08/2018  12/16/2015    CO2 26 05/08/2018    CO2 30 12/16/2015    ANIONGAP 11 05/08/2018    ANIONGAP 5 12/16/2015     (H) 05/08/2018    BUN 32 (H) 12/16/2015    CREATININE 2 36 (H) 05/08/2018    CREATININE 1 98 (H) 12/16/2015    GLUCOSE 102 05/08/2018    GLUCOSE 167 (H) 12/16/2015    CALCIUM 9 1 05/08/2018    CALCIUM 9 2 12/16/2015    AST 18 05/01/2018    AST 15 11/24/2015    ALT 31 05/01/2018    ALT 19 11/24/2015    ALKPHOS 74 05/01/2018    ALKPHOS 88 11/24/2015    PROT 6 9 05/01/2018    PROT 7 2 11/24/2015    BILITOT 1 20 (H) 05/01/2018    BILITOT 0 47 11/24/2015    EGFR 25 05/08/2018     Phosphorus:   Lab Results   Component Value Date    PHOS 3 8 05/05/2018    PHOS 3 6 11/24/2015     Magnesium:   Lab Results   Component Value Date    MG 2 6 05/05/2018    MG 2 2 06/04/2015     Urinalysis: Lab Results   Component Value Date    COLORU Yellow 05/05/2018    COLORU Yellow 12/12/2017    CLARITYU Slightly Cloudy 05/05/2018    CLARITYU Transparent 12/12/2017    SPECGRAV <=1 005 05/05/2018    SPECGRAV 1 005 12/12/2017    PHUR 5 0 05/05/2018    PHUR 5 0 12/12/2017    LEUKOCYTESUR Large (A) 05/05/2018    LEUKOCYTESUR - 12/12/2017    NITRITE Positive (A) 05/05/2018    NITRITE - 12/12/2017    PROTEINUA Negative 05/05/2018    PROTEINUA - 12/12/2017    GLUCOSEU Negative 05/05/2018    GLUCOSEU Negative 11/24/2015    KETONESU Negative 05/05/2018    KETONESU - 12/12/2017    BILIRUBINUR Negative 05/05/2018    BILIRUBINUR - 12/12/2017    BLOODU Moderate (A) 05/05/2018    BLOODU - 12/12/2017     BMP: Lab Results   Component Value Date    GLUCOSE 102 05/08/2018    GLUCOSE 167 (H) 12/16/2015    CO2 26 05/08/2018    CO2 30 12/16/2015     (H) 05/08/2018    BUN 32 (H) 12/16/2015    CREATININE 2 36 (H) 05/08/2018    CREATININE 1 98 (H) 12/16/2015    CALCIUM 9 1 05/08/2018    CALCIUM 9 2 12/16/2015

## 2018-05-08 NOTE — ASSESSMENT & PLAN NOTE
CHF with SABINE/CKD; improving  -Cards following  -Medical optimization and will need cardiac clearances if Left LE bypass needed

## 2018-05-08 NOTE — ASSESSMENT & PLAN NOTE
· Seen by vascular earlier in his admission and recommended no intervention  Family requesting vascular surgery Re evaluation  · Reconsult placed  Continue local foot care    Podiatry also following

## 2018-05-08 NOTE — ASSESSMENT & PLAN NOTE
· In the setting of volume overload    Stable at 130 today  · Received few doses of Samsca per Nephrology  · Continue to monitor closely, oral fluid restriction

## 2018-05-08 NOTE — PROGRESS NOTES
Progress Note - Harjinder Seymour  1935, [de-identified] y o  male MRN: [id number]    Unit/Bed#: -01 Encounter: [id number]    Primary Care Provider: Dolly Tran MD   Date and time admitted to hospital: 4/4/2018  6:39 AM    * Acute on chronic diastolic congestive heart failure ([facility] [address] )   Assessment & Plan    · Overall volume status is improved  · Continue torsemide at current dose  Most recent CXR on 5/4/28 showed "Slightly improved congestive failure, with stable small bilateral pleural effusions"  · Continue to monitor with daily weights, I's and O's, oral fluid restriction 1500cc      Acute-on-chronic kidney injury ([facility] [address] )   Assessment & Plan    · Baseline creatinine 1 8-2 2  SABINE likely d/t ATN +/-cardiorenal syndrome  Creatinine has improved to 2 3 today, BUN remains elevated at 113  He was started on a renal based probiotic today per Nephrology  · Continue torsemide at current dose  · Discussed with Dr Arely Montez      Hyponatremia   [address] [place] [address]    · In the setting of volume overload  Stable at 130 today  · Received few doses of Samsca per Nephrology  · Continue to monitor closely, oral fluid restriction      Chronic atrial fibrillation ([facility] [address] )   Assessment & Plan    · Rate controlled  Not on anticoagulation currently as this was declined by patient and family  · Continue aspirin      Type 2 diabetes mellitus ([facility] [address] )   Assessment & Plan    · Most recent hemoglobin A1c 7 6 2 months ago  · Continue Lantus/Humalog at current dose and continue to adjust as indicated      Pulmonary hypertension (HCC)   Assessment & Plan    · Severe pulmonary hypertension with estimated peak PA pressure at 65 mmHg  · Will probably benefit from sildenafil once euvolemic      Peripheral arterial disease ([facility] [address] )   Assessment & Plan    · Seen by vascular earlier in his admission and recommended no intervention  Family requesting vascular surgery Re evaluation  · Reconsult placed  Continue local foot care    Podiatry also following UTI (urinary tract infection)   Assessment & Plan    · Urine cultures positive for E coli/Enterococcus fecalis  · He already completed at least 3 days of oral cephalexin  · Will switch to Amoxil       Anemia in chronic kidney disease   Assessment & Plan    · Hemoglobin stable at 8 4 today        VTE Pharmacologic Prophylaxis:   Pharmacologic: Heparin  Mechanical VTE Prophylaxis in Place: Yes    Patient Centered Rounds: I have performed bedside rounds with nursing staff today  Discussions with Specialists or Other Care Team Provider:  Nursing/nephrology    Education and Discussions with Family / Patient:   Discussed with patient and spouse at the bedside along with Dr Justin Dunbar from Nephrology  All questions were adequately addressed  Patient's spouse requested vascular surgery Re evaluation  Tentative discharge planning for [facility] was also discussed which from the nephrologist standpoint patient was stable for discharge  Also discussed regarding the use of Tylenol which patient reports helped him tremendously for the relief of foot pain  Dr Justin Dunbar informed patient and spouse that the current low-dose Tylenol being used for control of pain were not toxic to patient's liver  Current Length of Stay: 34 day(s)    Current Patient Status: Inpatient   Certification Statement: The patient will continue to require additional inpatient hospital stay due to Above diagnosis and care plan    Discharge Plan:  Anticipate transition to [facility] for rehab on Thursday    Code Status: Level 1 - Full Code      Subjective:   Patient seen and evaluated  Had a rough night but was much improved by the afternoon  Denies chest pain or shortness of breath  Objective:     Vitals:   Temp (24hrs), Av 2 °F (36 8 °C), Min:97 6 °F (36 4 °C), Max:98 7 °F (37 1 °C)    HR:  [86-91] 86  Resp:  [18] 18  BP: ()/(46-55) 114/53  SpO2:  [95 %-100 %] 100 %  Body mass index is 31 15 kg/m²       Input and Output Summary (last 24 hours): Intake/Output Summary (Last 24 hours) at 05/08/18 1650  Last data filed at 05/08/18 1417   Gross per 24 hour   Intake             1420 ml   Output             1075 ml   Net              345 ml       Physical Exam:  General Appearance:    Alert, cooperative, no distress, appropriately responsive    Head:    Normocephalic, without obvious abnormality, atraumatic, mucous membranes moist    Eyes:    Conjunctiva/corneas clear, EOM's intact   Neck:   Supple   Lungs:     Bibasilar rales, decreased breath sounds bilaterally     Heart:    Irregularly irregular, S1 and S2    Abdomen:     Soft, non-tender, nondistended   Extremities:  Improved edema  +1, no calf tenderness, left foot with chronic, gangrenous changes  Rt foot with stable ischemic changes    Neurologic:  nonfocal         Additional Data:     Labs:      Results from last 7 days  Lab Units 05/08/18  0503   WBC Thousand/uL 7 84   HEMOGLOBIN g/dL 8 4*   HEMATOCRIT % 26 0*   PLATELETS Thousands/uL 130*   NEUTROS PCT % 78*   LYMPHS PCT % 13*   MONOS PCT % 9   EOS PCT % 0       Results from last 7 days  Lab Units 05/08/18  0503   SODIUM mmol/L 130*   POTASSIUM mmol/L 4 3   CHLORIDE mmol/L 93*   CO2 mmol/L 26   BUN mg/dL 113*   CREATININE mg/dL 2 36*   CALCIUM mg/dL 9 1   GLUCOSE RANDOM mg/dL 102           * I Have Reviewed All Lab Data Listed Above  * Additional Pertinent Lab Tests Reviewed: [address] [facility] Admission Reviewed    Cultures:   Blood Culture:   Lab Results   Component Value Date    BLOODCX No Growth After 5 Days  04/04/2018    BLOODCX No Growth After 5 Days   04/04/2018     Urine Culture:   Lab Results   Component Value Date    URINECX >100,000 cfu/ml Escherichia coli (A) 05/05/2018    URINECX >100,000 cfu/ml Enterococcus faecalis (A) 05/05/2018     Sputum Culture: No components found for: SPUTUMCX  Wound Culture: No results found for: WOUNDCULT    Last 24 Hours Medication List:     Current Facility-Administered Medications:  acetaminophen 650 mg Oral Q6H PRN Molly Ruiz PA-C   amoxicillin 500 mg Oral Q12H [facility] Koki Islas MD   aspirin 81 mg Oral Daily Chase Alberto MD   atorvastatin 40 mg Oral Daily With Dinner Chase Alberto MD   calcium carbonate 1,000 mg Oral Daily PRN Chase Alberto MD   diphenhydrAMINE 12 5 mg Oral HS PRN Mayank Mishra PA-C   docusate sodium 100 mg Oral BID PRN Chase Alberto MD   docusate sodium 100 mg Oral BID Gentry Ayoub MD   heparin (porcine) 5,000 Units Subcutaneous Q8H [facility] Lisseth Seals PA-C   insulin glargine 20 Units Subcutaneous HS Sergio Walker MD   insulin lispro 1-5 Units Subcutaneous TID AC Chase Alberto MD   insulin lispro 1-5 Units Subcutaneous HS Chase Alberto MD   insulin lispro 12 Units Subcutaneous TID With Meals Koki Islas MD   melatonin 6 mg Oral HS Patrizia Hoyt MD   metoprolol succinate 25 mg Oral Q12H Bayron Mendes MD   multivitamin-minerals 1 tablet Oral Daily JEWELL Alvarez   NATRUL PROBIOTIC 1 each Oral BID before lunch/dinner Marina Barrera DO   ondansetron 4 mg Intravenous Q6H PRN Chase Alberto MD   pantoprazole 40 mg Oral Early Morning Chase Alberto MD   polyethylene glycol 17 g Oral Daily PRN Mayank Mishra PA-C   potassium chloride 10 mEq Oral Daily Anish Heath MD   sevelamer 800 mg Oral TID With Meals James Frias MD   sodium chloride 1 spray Each Nare PRN Gentry Ayoub MD   tamsulosin 0 4 mg Oral Daily With Dinner Lisseth Seals PA-C   torsemide 40 mg Oral Daily Anish Heath MD   zolpidem 5 mg Oral HS PRN Gentry Ayoub MD        Today, Patient Was Seen By: Koki Islas MD    ** Please Note: Dragon 360 Dictation voice to text software may have been used in the creation of this document   **

## 2018-05-08 NOTE — ASSESSMENT & PLAN NOTE
· Overall volume status is improved  · Continue torsemide at current dose   Most recent CXR on 5/4/28 showed "Slightly improved congestive failure, with stable small bilateral pleural effusions"  · Continue to monitor with daily weights, I's and O's, oral fluid restriction 1500cc

## 2018-05-08 NOTE — PLAN OF CARE
Problem: PHYSICAL THERAPY ADULT  Goal: Performs mobility at highest level of function for planned discharge setting  See evaluation for individualized goals  Treatment/Interventions: Functional transfer training, LE strengthening/ROM, Therapeutic exercise, Endurance training, Patient/family training, Equipment eval/education, Bed mobility, Gait training (PT to see when stair training is appropriate )          See flowsheet documentation for full assessment, interventions and recommendations  Outcome: Progressing  Prognosis: Fair  Problem List: Decreased strength, Decreased range of motion, Decreased endurance, Impaired balance, Decreased mobility, Decreased coordination, Decreased safety awareness, Obesity, Decreased skin integrity, Impaired sensation, Pain  Assessment: Patient required decreased levels of assistance for transfers however requires increased amounts of time and verbal cues  Patient consistent with gait distance of 10 feet however able to increase trails with seated rest breaks in between  Patient requires verbal cuing for hand placement for transfers as well as cuing for RW management during turns  Patient motivated throughout session however continues to remain limtied by fatigue & SOB  Performed seated B LE exercise program with good understanding and form  Continue to focus on endurance and gait progression to maximize functional mobility  Barriers to Discharge: Inaccessible home environment     Recommendation: Post acute IP rehab     PT - OK to Discharge: Yes (when medically cleared to LT skilled PT)    See flowsheet documentation for full assessment

## 2018-05-09 NOTE — ASSESSMENT & PLAN NOTE
· Remains stable from a volume standpoint  · Continue torsemide at current dose   Most recent CXR on 5/4/28 showed "Slightly improved congestive failure, with stable small bilateral pleural effusions"  · Continue to monitor with daily weights, I's and O's, oral fluid restriction 1500cc

## 2018-05-09 NOTE — PHYSICAL THERAPY NOTE
PHYSICAL THERAPY NOTE    Patient Name: Hipolito Gerard  Today's Date: 5/9/2018 05/09/18 1138   Pain Assessment   Pain Assessment No/denies pain   Pain Score No Pain   Restrictions/Precautions   Weight Bearing Precautions Per Order No   Other Precautions Chair Alarm; Bed Alarm;Multiple lines;O2;Telemetry; Fall Risk;Pain   General   Family/Caregiver Present No   Subjective   Subjective Patient seated OOB in recliner and is agreeable to therapy session  Bed Mobility   Supine to Sit Unable to assess   Sit to Supine Unable to assess   Additional Comments Patient seated OOB pre and post session in recliner with call bell and belongings in reach  Transfers   Sit to Stand 4  Minimal assistance  (contact guard)   Additional items Assist x 1; Increased time required;Verbal cues   Stand to Sit 4  Minimal assistance  (contact guard )   Additional items Assist x 1; Armrests; Increased time required;Verbal cues   Stand pivot 4  Minimal assistance  (contact guard)   Additional items Assist x 1; Increased time required;Verbal cues   Ambulation/Elevation   Gait pattern Decreased foot clearance;Shuffling; Short stride; Excessively slow; Foward flexed   Gait Assistance 4  Minimal assist  (chair follow)   Additional items Assist x 1;Verbal cues   Assistive Device Rolling walker   Distance 15' x 2, 20' x 2   Balance   Static Sitting Fair +   Dynamic Sitting Fair   Static Standing Fair -   Dynamic Standing Poor +   Ambulatory Poor +  (roller walker)   Endurance Deficit   Endurance Deficit Yes   Endurance Deficit Description limited ambulation distance and standing tolerance   Activity Tolerance   Activity Tolerance Patient limited by fatigue;Treatment limited secondary to medical complications (Comment)   Nurse Made Aware spoke to ADDY Saldaña   Exercises   Quad Sets Sitting;10 reps;AROM; Bilateral   Heelslides Sitting;10 reps;AROM; Bilateral   Glute Sets Sitting;10 reps;AROM; Bilateral   Hip Adduction Sitting;10 reps;AROM; Bilateral   Knee AROM Long Arc Quad Sitting;10 reps;AROM; Bilateral   Ankle Pumps Sitting;20 reps;AROM; Bilateral   Marching Sitting;10 reps;AROM; Bilateral   Assessment   Prognosis Fair   Problem List Decreased strength;Decreased range of motion;Decreased endurance; Impaired balance;Decreased mobility; Decreased coordination;Decreased safety awareness; Obesity; Decreased skin integrity; Impaired sensation;Pain   Assessment Patient motivated and cooperative throughout session  Demonstrated ability to increase gait distance with less person a and improved step length and christian  Provided verbal instruction for hand placement and body positioning for transfers as well as RW management during turns  Provided education on breathing techniques during mobility as patient expressed SOB despite SpO2 above 90%  Seated rest breaks between gait trials and further ambulation limited secondary to fatigue  Goals   Patient Goals to get better   STG Expiration Date 05/17/18   Short Term Goal #1 Per last PT goal assessment    pt will: Increase L LE strength 1 grade to facilitate independent mobility, Perform all bed mobility tasks w/ supervision to decrease fall risk factors, Perform all transfers w/ supervision to improve independence, Ambulate 100 ft  with roller walker w/ supervision w/o LOB, Increase ambulatory balance 1 grade to decrease risk for falls, Complete exercise program independently, Tolerate 3 hr OOB to faciliate upright tolerance and Improve Barthel Index score to 75 or greater to facilitate independence   Treatment Day 3   Plan   Treatment/Interventions Functional transfer training;LE strengthening/ROM; Therapeutic exercise; Endurance training;Patient/family training;Bed mobility; Equipment eval/education;Gait training   Progress Slow progress, decreased activity tolerance   PT Frequency 5x/wk   Recommendation   Recommendation Post acute IP rehab Equipment Recommended Other (Comment)  (citlali johnson)       Chantale Leung, PTA

## 2018-05-09 NOTE — ASSESSMENT & PLAN NOTE
· Status post vascular Re evaluation yesterday  Patient is to follow up in the office to discuss revascularization options once acute cardiac/renal issues have been optimized    · Continue local foot care per Podiatry

## 2018-05-09 NOTE — ASSESSMENT & PLAN NOTE
· Severe pulmonary hypertension with estimated peak PA pressure at 65 mmHg  · Per Cardiology, patient should probably benefit from sildenafil once euvolemic

## 2018-05-09 NOTE — ASSESSMENT & PLAN NOTE
· Most recent hemoglobin A1c 7 6 2 months ago    Blood glucose levels acceptable  · Continue Lantus/Humalog at current dose and continue to adjust as indicated

## 2018-05-09 NOTE — PLAN OF CARE
Problem: PHYSICAL THERAPY ADULT  Goal: Performs mobility at highest level of function for planned discharge setting  See evaluation for individualized goals  Treatment/Interventions: Functional transfer training, LE strengthening/ROM, Therapeutic exercise, Endurance training, Patient/family training, Bed mobility, Equipment eval/education, Gait training  Equipment Recommended: Other (Comment) (roller walker)       See flowsheet documentation for full assessment, interventions and recommendations  Outcome: Progressing  Prognosis: Fair  Problem List: Decreased strength, Decreased range of motion, Decreased endurance, Impaired balance, Decreased mobility, Decreased coordination, Decreased safety awareness, Obesity, Decreased skin integrity, Impaired sensation, Pain  Assessment: Patient motivated and cooperative throughout session  Demonstrated ability to increase gait distance with less person a and improved step length and christian  Provided verbal instruction for hand placement and body positioning for transfers as well as RW management during turns  Provided education on breathing techniques during mobility as patient expressed SOB despite SpO2 above 90%  Seated rest breaks between gait trials and further ambulation limited secondary to fatigue  Barriers to Discharge: Inaccessible home environment     Recommendation: Post acute IP rehab     PT - OK to Discharge: Yes (when medically cleared to LT skilled PT)    See flowsheet documentation for full assessment

## 2018-05-09 NOTE — ASSESSMENT & PLAN NOTE
· In the setting of volume overload    Remains stable at 130 today  · Received few doses of Samsca per Nephrology over the past week  · Continue to monitor closely, oral fluid restriction

## 2018-05-09 NOTE — PROGRESS NOTES
Progress Note - Nephrology   Barbara Nix  [de-identified] y o  male MRN: [id number]  Unit/Bed#: -01 Encounter: [id number]    Assessment:  1  Acute renal failure/acute kidney injury on stage 4 chronic kidney disease:  Baseline creatinine is 1 8-2 2 as an outpatient  Acute kidney injury likely secondary to ATN plus or minus cardiorenal syndrome  His creatinine has increased to 2 5 slightly today's BUN is still 113  He is on oral diuretic and he is net -963 mL  I hesitate to completely stop the diuretic as he still had some degree oxygen requirements and fluid overload that has improved as well  He has significantly altered cardiac hemodynamics with valvular dysfunction which is also likely affecting his kidney perfusion and kidney function  I spoke with Cardiology yesterday  He has severe mitral regurgitation as well as pulmonary hypertension and tricuspid regurgitation with right ventricular hypokinesis all of which is likely contributing to his kidney dysfunction  He is not a surgical candidate for a valve replacement given his other comorbidities although consideration of mitral clip in the future may be an option as per prior cardiology notes  Earlier during this hospitalization when he had been uremic and discussions with regards to dialysis had occurred this was not something that the patient and his wife wish to pursue  Trying the kidney based probiotic Renadyl to see if this will help in kidney mgt  I did explain how the kidney based probiotic works in speaking with the patient's wife and stated that in about 50-60% of patients that I have had this on and has had a positive affect reducing creatinine and BUN levels  Patient is only taking this twice daily right now is not having any GI side effects  He started this yesterday  2   Stage 4 chronic kidney disease:  Baseline creatinine is 1 8-2 2    3  Fluid overload: This has improved continue with Demadex 40 mg daily for right now    4  Anemia:  Likely secondary to chronic kidney disease is hemoglobin is 8 4 continue to follow      Plan:  Recheck labs tomorrow he looks the best that I have seen him over the past few days  Maintain oral diuretic strongly tentative for discharge tomorrow  To [employer]  Appreciate vascular input from visit yesterday  Subjective:   Patient seen in follow-up for chronic kidney disease and fluid management  The patient feels okay denies any chest pressure shortness of breath  His wife is present at chair side  He was ambulating twice this morning he went outside for some fresh air as well this morning  Objective:     Vitals: Blood pressure 110/71, pulse 86, temperature 98 2 °F (36 8 °C), temperature source Oral, resp  rate 16, height 5' 7" (1 702 m), weight 87 3 kg (192 lb 6 4 oz), SpO2 100 %  ,Body mass index is 30 13 kg/m²  Weight (last 2 days)     Date/Time   Weight    05/09/18 0557  87 3 (192 4)    05/08/18 0600  90 2 (198 86)    05/07/18 0600  87 2 (192 24)                Intake/Output Summary (Last 24 hours) at 05/09/18 1317  Last data filed at 05/09/18 1200   Gross per 24 hour   Intake              600 ml   Output             1536 ml   Net             -936 ml            Physical Exam: General: patient is in NAD  Skin:  No new rash  Eyes:  No scleral icterus  Neck:  Supple no adenopathy  Chest:  Decreased breath sounds/crackles at bases  CVS:  S1-S2  Abdomen:  Positive bowel sounds no rub  Extremities:  1+ leg edema dramatically improved from admission  Neuro:  Nonfocal  Psych:   Answers questions appropriately              Prescriptions Prior to Admission   Medication    aspirin (ECOTRIN LOW STRENGTH) 81 mg EC tablet    atorvastatin (LIPITOR) 40 mg tablet    febuxostat (ULORIC) 40 mg tablet    metoprolol tartrate (LOPRESSOR) 25 mg tablet    metoprolol tartrate (LOPRESSOR) 50 mg tablet    multivitamin-iron-minerals-folic acid (CENTRUM) chewable tablet    NOVOLOG MIX 70/30 FLEXPEN (70-30) 100 UNIT/ML SUPN    omeprazole (PriLOSEC) 20 mg delayed release capsule    paricalcitol (ZEMPLAR) 1 mcg capsule    ramipril (ALTACE) 2 5 mg capsule    torsemide (DEMADEX) 20 mg tablet       Current Facility-Administered Medications   Medication Dose Route Frequency    acetaminophen (TYLENOL) tablet 650 mg  650 mg Oral Q6H PRN    amoxicillin (AMOXIL) capsule 500 mg  500 mg Oral Q12H [facility]    aspirin (ECOTRIN LOW STRENGTH) EC tablet 81 mg  81 mg Oral Daily    atorvastatin (LIPITOR) tablet 40 mg  40 mg Oral Daily With Dinner    calcium carbonate (TUMS) chewable tablet 1,000 mg  1,000 mg Oral Daily PRN    diphenhydrAMINE (BENADRYL) tablet 12 5 mg  12 5 mg Oral HS PRN    docusate sodium (COLACE) capsule 100 mg  100 mg Oral BID PRN    docusate sodium (COLACE) capsule 100 mg  100 mg Oral BID    heparin (porcine) subcutaneous injection 5,000 Units  5,000 Units Subcutaneous Q8H [facility]    insulin glargine (LANTUS) subcutaneous injection 20 Units 0 2 mL  20 Units Subcutaneous HS    insulin lispro (HumaLOG) 100 units/mL subcutaneous injection 1-5 Units  1-5 Units Subcutaneous TID AC    insulin lispro (HumaLOG) 100 units/mL subcutaneous injection 1-5 Units  1-5 Units Subcutaneous HS    insulin lispro (HumaLOG) 100 units/mL subcutaneous injection 12 Units  12 Units Subcutaneous TID With Meals    melatonin tablet 6 mg  6 mg Oral HS    metoprolol succinate (TOPROL-XL) 24 hr tablet 25 mg  25 mg Oral Q12H    multivitamin-minerals (CENTRUM) tablet 1 tablet  1 tablet Oral Daily    NATRUL PROBIOTIC CAPS 1 capsule  1 each Oral BID before lunch/dinner    ondansetron (ZOFRAN) injection 4 mg  4 mg Intravenous Q6H PRN    pantoprazole (PROTONIX) EC tablet 40 mg  40 mg Oral Early Morning    polyethylene glycol (MIRALAX) packet 17 g  17 g Oral Daily PRN    potassium chloride (K-DUR,KLOR-CON) CR tablet 10 mEq  10 mEq Oral Daily    sevelamer (RENAGEL) tablet 800 mg  800 mg Oral TID With Meals    sodium chloride (OCEAN) 0 65 % nasal spray 1 spray  1 spray Each Nare PRN    tamsulosin (FLOMAX) capsule 0 4 mg  0 4 mg Oral Daily With Dinner    torsemide (DEMADEX) tablet 40 mg  40 mg Oral Daily    zolpidem (AMBIEN) tablet 5 mg  5 mg Oral HS PRN        Lab, Imaging and other studies: I have personally reviewed pertinent labs    CBC: Lab Results   Component Value Date    WBC 7 84 05/08/2018    WBC 9 45 11/24/2015    RBC 3 05 (L) 05/08/2018    RBC 4 78 11/24/2015     CMP: Lab Results   Component Value Date     (L) 05/09/2018     12/16/2015    CL 93 (L) 05/09/2018     12/16/2015    CO2 26 05/09/2018    CO2 30 12/16/2015    ANIONGAP 11 05/09/2018    ANIONGAP 5 12/16/2015     (H) 05/09/2018    BUN 32 (H) 12/16/2015    CREATININE 2 58 (H) 05/09/2018    CREATININE 1 98 (H) 12/16/2015    GLUCOSE 126 05/09/2018    GLUCOSE 167 (H) 12/16/2015    CALCIUM 9 0 05/09/2018    CALCIUM 9 2 12/16/2015    AST 18 05/01/2018    AST 15 11/24/2015    ALT 31 05/01/2018    ALT 19 11/24/2015    ALKPHOS 74 05/01/2018    ALKPHOS 88 11/24/2015    PROT 6 9 05/01/2018    PROT 7 2 11/24/2015    BILITOT 1 20 (H) 05/01/2018    BILITOT 0 47 11/24/2015    EGFR 23 05/09/2018     Phosphorus:   Lab Results   Component Value Date    PHOS 3 8 05/05/2018    PHOS 3 6 11/24/2015     Magnesium:   Lab Results   Component Value Date    MG 2 6 05/05/2018    MG 2 2 06/04/2015     Urinalysis: Lab Results   Component Value Date    COLORU Yellow 05/05/2018    COLORU Yellow 12/12/2017    CLARITYU Slightly Cloudy 05/05/2018    CLARITYU Transparent 12/12/2017    SPECGRAV <=1 005 05/05/2018    SPECGRAV 1 005 12/12/2017    PHUR 5 0 05/05/2018    PHUR 5 0 12/12/2017    LEUKOCYTESUR Large (A) 05/05/2018    LEUKOCYTESUR - 12/12/2017    NITRITE Positive (A) 05/05/2018    NITRITE - 12/12/2017    PROTEINUA Negative 05/05/2018    PROTEINUA - 12/12/2017    GLUCOSEU Negative 05/05/2018    GLUCOSEU Negative 11/24/2015    KETONESU Negative 05/05/2018    Yayo Servin - 12/12/2017 BILIRUBINUR Negative 05/05/2018    BILIRUBINUR - 12/12/2017    BLOODU Moderate (A) 05/05/2018    BLOODU - 12/12/2017     BMP: Lab Results   Component Value Date    GLUCOSE 126 05/09/2018    GLUCOSE 167 (H) 12/16/2015    CO2 26 05/09/2018    CO2 30 12/16/2015     (H) 05/09/2018    BUN 32 (H) 12/16/2015    CREATININE 2 58 (H) 05/09/2018    CREATININE 1 98 (H) 12/16/2015    CALCIUM 9 0 05/09/2018    CALCIUM 9 2 12/16/2015

## 2018-05-09 NOTE — ASSESSMENT & PLAN NOTE
· Urine cultures positive for E coli/Enterococcus fecalis  · He already completed at least 3 days of oral cephalexin  · Continue amoxicillin

## 2018-05-09 NOTE — ASSESSMENT & PLAN NOTE
· Baseline creatinine 1 8-2 2  SABINE likely d/t ATN +/-cardiorenal syndrome  Creatinine up at 2 58 today, BUN remains elevated at 113  He was started on a renal based probiotic yesterday per Nephrology    · Continue torsemide at current dose  · Management per Nephrology

## 2018-05-09 NOTE — PLAN OF CARE
Problem: Potential for Falls  Goal: Patient will remain free of falls  INTERVENTIONS:  - Assess patient frequently for physical needs  -  Identify cognitive and physical deficits and behaviors that affect risk of falls    -  [place] fall precautions as indicated by assessment   - Educate patient/family on patient safety including physical limitations  - Instruct patient to call for assistance with activity based on assessment  - Modify environment to reduce risk of injury  - Consider OT/PT consult to assist with strengthening/mobility   Outcome: Progressing      Problem: PAIN - ADULT  Goal: Verbalizes/displays adequate comfort level or baseline comfort level  Interventions:  - Encourage patient to monitor pain and request assistance  - Assess pain using appropriate pain scale  - Administer analgesics based on type and severity of pain and evaluate response  - Implement non-pharmacological measures as appropriate and evaluate response  - Consider cultural and social influences on pain and pain management  - Notify physician/advanced practitioner if interventions unsuccessful or patient reports new pain   Outcome: Progressing      Problem: INFECTION - ADULT  Goal: Absence or prevention of progression during hospitalization  INTERVENTIONS:  - Assess and monitor for signs and symptoms of infection  - Monitor lab/diagnostic results  - Monitor all insertion sites, i e  indwelling lines, tubes, and drains  - Monitor endotracheal (as able) and nasal secretions for changes in amount and color  - [place] appropriate cooling/warming therapies per order  - Administer medications as ordered  - Instruct and encourage patient and family to use good hand hygiene technique  - Identify and instruct in appropriate isolation precautions for identified infection/condition   Outcome: Progressing    Goal: Absence of fever/infection during neutropenic period  INTERVENTIONS:  - Monitor WBC  - Implement neutropenic guidelines   Outcome: Progressing      Problem: SAFETY ADULT  Goal: Maintain or return to baseline ADL function  INTERVENTIONS:  -  Assess patient's ability to carry out ADLs; assess patient's baseline for ADL function and identify physical deficits which impact ability to perform ADLs (bathing, care of mouth/teeth, toileting, grooming, dressing, etc )  - Assess/evaluate cause of self-care deficits   - Assess range of motion  - Assess patient's mobility; develop plan if impaired  - Assess patient's need for assistive devices and provide as appropriate  - Encourage maximum independence but intervene and supervise when necessary  ¯ Involve family in performance of ADLs  ¯ Assess for home care needs following discharge   ¯ Request OT consult to assist with ADL evaluation and planning for discharge  ¯ Provide patient education as appropriate   Outcome: Progressing    Goal: Maintain or return mobility status to optimal level  INTERVENTIONS:  - Assess patient's baseline mobility status (ambulation, transfers, stairs, etc )    - Identify cognitive and physical deficits and behaviors that affect mobility  - Identify mobility aids required to assist with transfers and/or ambulation (gait belt, sit-to-stand, lift, walker, cane, etc )  - [place] fall precautions as indicated by assessment  - Record patient progress and toleration of activity level on Mobility SBAR; progress patient to next Phase/Stage  - Instruct patient to call for assistance with activity based on assessment  - Request Rehabilitation consult to assist with strengthening/weightbearing, etc    Outcome: Progressing      Problem: DISCHARGE PLANNING  Goal: Discharge to home or other facility with appropriate resources  INTERVENTIONS:  - Identify barriers to discharge w/patient and caregiver  - Arrange for needed discharge resources and transportation as appropriate  - Identify discharge learning needs (meds, wound care, etc )  - Arrange for interpretive services to assist at discharge as needed  - Refer to Case Management Department for coordinating discharge planning if the patient needs post-hospital services based on physician/advanced practitioner order or complex needs related to functional status, cognitive ability, or social support system   Outcome: Progressing      Problem: Knowledge Deficit  Goal: Patient/family/caregiver demonstrates understanding of disease process, treatment plan, medications, and discharge instructions  Complete learning assessment and assess knowledge base  Interventions:  - Provide teaching at level of understanding  - Provide teaching via preferred learning methods   Outcome: Progressing      Problem: Nutrition/Hydration-ADULT  Goal: Nutrient/Hydration intake appropriate for improving, restoring or maintaining nutritional needs  Monitor and assess patient's nutrition/hydration status for malnutrition (ex- brittle hair, bruises, dry skin, pale skin and conjunctiva, muscle wasting, smooth red tongue, and disorientation)  Collaborate with interdisciplinary team and initiate plan and interventions as ordered  Monitor patient's weight and dietary intake as ordered or per policy  Utilize nutrition screening tool and intervene per policy  Determine patient's food preferences and provide high-protein, high-caloric foods as appropriate       INTERVENTIONS:  - Monitor oral intake, urinary output, labs, and treatment plans  - Assess nutrition and hydration status and recommend course of action  - Evaluate amount of meals eaten  - Assist patient with eating if necessary   - Allow adequate time for meals  - Recommend/ encourage appropriate diets, oral nutritional supplements, and vitamin/mineral supplements  - Order, calculate, and assess calorie counts as needed  - Recommend, monitor, and adjust tube feedings and TPN/PPN based on assessed needs  - Assess need for intravenous fluids  - Provide specific nutrition/hydration education as appropriate  - Include patient/family/caregiver in decisions related to nutrition   Outcome: Progressing      Problem: Prexisting or High Potential for Compromised Skin Integrity  Goal: Skin integrity is maintained or improved  INTERVENTIONS:  - Identify patients at risk for skin breakdown  - Assess and monitor skin integrity  - Assess and monitor nutrition and hydration status  - Monitor labs (i e  albumin)  - Assess for incontinence   - Turn and reposition patient  - Assist with mobility/ambulation  - Relieve pressure over bony prominences  - Avoid friction and shearing  - Provide appropriate hygiene as needed including keeping skin clean and dry  - Evaluate need for skin moisturizer/barrier cream  - Collaborate with interdisciplinary team (i e  Nutrition, Rehabilitation, etc )   - Patient/family teaching   Outcome: Progressing      Problem: DISCHARGE PLANNING - CARE MANAGEMENT  Goal: Discharge to post-acute care or home with appropriate resources  INTERVENTIONS:  - Conduct assessment to determine patient/family and health care team treatment goals, and need for post-acute services based on payer coverage, community resources, and patient preferences, and barriers to discharge  - Address psychosocial, clinical, and financial barriers to discharge as identified in assessment in conjunction with the patient/family and health care team  - Arrange appropriate level of post-acute services according to patient's   needs and preference and payer coverage in collaboration with the physician and health care team  - Communicate with and update the patient/family, physician, and health care team regarding progress on the discharge plan  - Arrange appropriate transportation to post-acute venues   Outcome: Progressing      Problem: CARDIOVASCULAR - ADULT  Goal: Maintains optimal cardiac output and hemodynamic stability  INTERVENTIONS:  - Monitor I/O, vital signs and rhythm  - Monitor for S/S and trends of decreased cardiac output i e  bleeding, hypotension  - Administer and titrate ordered vasoactive medications to optimize hemodynamic stability  - Assess quality of pulses, skin color and temperature  - Assess for signs of decreased coronary artery perfusion - ex   Angina  - Instruct patient to report change in severity of symptoms   Outcome: Progressing    Goal: Absence of cardiac dysrhythmias or at baseline rhythm  INTERVENTIONS:  - Continuous cardiac monitoring, monitor vital signs, obtain 12 lead EKG if indicated  - Administer antiarrhythmic and heart rate control medications as ordered  - Monitor electrolytes and administer replacement therapy as ordered   Outcome: Progressing

## 2018-05-09 NOTE — PROGRESS NOTES
Progress Note - Rossana Stephens  1935, [de-identified] y o  male MRN: [id number]    Unit/Bed#: -01 Encounter: [id number]    Primary Care Provider: Bela Smith MD   Date and time admitted to hospital: 4/4/2018  6:39 AM    * Acute on chronic diastolic congestive heart failure ([facility] )   Assessment & Plan    · Remains stable from a volume standpoint  · Continue torsemide at current dose  Most recent CXR on 5/4/28 showed "Slightly improved congestive failure, with stable small bilateral pleural effusions"  · Continue to monitor with daily weights, I's and O's, oral fluid restriction 1500cc      Acute-on-chronic kidney injury ([facility] 75 )   Assessment & Plan    · Baseline creatinine 1 8-2 2  SABINE likely d/t ATN +/-cardiorenal syndrome  Creatinine up at 2 58 today, BUN remains elevated at 113  He was started on a renal based probiotic yesterday per Nephrology  · Continue torsemide at current dose  · Management per Nephrology      Hyponatremia   Assessment & Plan    · In the setting of volume overload  Remains stable at 130 today  · Received few doses of Samsca per Nephrology over the past week  · Continue to monitor closely, oral fluid restriction      Chronic atrial fibrillation (HCC)   Assessment & Plan    · Rate controlled  Not on anticoagulation currently as this was declined by patient and family  · Continue aspirin      Type 2 diabetes mellitus ([facility] 75 )   Assessment & Plan    · Most recent hemoglobin A1c 7 6 2 months ago  Blood glucose levels acceptable  · Continue Lantus/Humalog at current dose and continue to adjust as indicated      Pulmonary hypertension (HCC)   Assessment & Plan    · Severe pulmonary hypertension with estimated peak PA pressure at 65 mmHg  · Per Cardiology, patient should probably benefit from sildenafil once euvolemic      Peripheral arterial disease ([facility] )   Assessment & Plan    · Status post vascular Re evaluation yesterday    Patient is to follow up in the office to discuss revascularization options once acute cardiac/renal issues have been optimized  · Continue local foot care per Podiatry      UTI (urinary tract infection)   Assessment & Plan    · Urine cultures positive for E coli/Enterococcus fecalis  · He already completed at least 3 days of oral cephalexin  · Continue amoxicillin       Anemia in chronic kidney disease   Assessment & Plan    · Hemoglobin stable  Continue to monitor closely         VTE Pharmacologic Prophylaxis:   Pharmacologic: Heparin  Mechanical VTE Prophylaxis in Place: Yes    Patient Centered Rounds: I have performed bedside rounds with nursing staff today  Discussions with Specialists or Other Care Team Provider: Nursing    Education and Discussions with Family / Patient: Patient    Current Length of Stay: 28 day(s)    Current Patient Status: Inpatient   Certification Statement: The patient will continue to require additional inpatient hospital stay due to Above diagnosis and care plan    Discharge Plan:  Anticipate discharge to rehab either today or tomorrow once cleared from the nephrology standpoint    Code Status: Level 1 - Full Code      Subjective:   Patient seen and evaluated  Had a better night last night  Up in bed for breakfast today  Denies any new complaints  Feels overall improved  Objective:     Vitals:   Temp (24hrs), Av 1 °F (36 7 °C), Min:97 6 °F (36 4 °C), Max:98 6 °F (37 °C)    HR:  [86-94] 94  Resp:  [18] 18  BP: (105-114)/(53-57) 105/57  SpO2:  [100 %] 100 %  Body mass index is 30 13 kg/m²  Input and Output Summary (last 24 hours):        Intake/Output Summary (Last 24 hours) at 18 0843  Last data filed at 18 8550   Gross per 24 hour   Intake              600 ml   Output             1686 ml   Net            -1086 ml       Physical Exam:  General Appearance:    Alert, cooperative, no distress, appropriately responsive    Head:    Normocephalic, without obvious abnormality, atraumatic, mucous membranes moist    Eyes: Conjunctiva/corneas clear, EOM's intact   Neck:   Supple   Lungs:     Bibasilar rales, decreased bilaterally     Heart:    Irregularly irregular, S1 and S2    Abdomen:     Soft, non-tender, bowel sounds active all four quadrants,     no masses, no organomegaly   Extremities:   +1 ble edema, left foot dressing intact, stable right foot ischemic changes   Neurologic:  nonfocal         Additional Data:     Labs:      Results from last 7 days  Lab Units 05/08/18  0503   WBC Thousand/uL 7 84   HEMOGLOBIN g/dL 8 4*   HEMATOCRIT % 26 0*   PLATELETS Thousands/uL 130*   NEUTROS PCT % 78*   LYMPHS PCT % 13*   MONOS PCT % 9   EOS PCT % 0       Results from last 7 days  Lab Units 05/09/18  0546   SODIUM mmol/L 130*   POTASSIUM mmol/L 4 5   CHLORIDE mmol/L 93*   CO2 mmol/L 26   BUN mg/dL 113*   CREATININE mg/dL 2 58*   CALCIUM mg/dL 9 0   GLUCOSE RANDOM mg/dL 126           * I Have Reviewed All Lab Data Listed Above  * Additional Pertinent Lab Tests Reviewed: All Labs Within Last 24 Hours Reviewed    Cultures:   Blood Culture:   Lab Results   Component Value Date    BLOODCX No Growth After 5 Days  04/04/2018    BLOODCX No Growth After 5 Days   04/04/2018     Urine Culture:   Lab Results   Component Value Date    URINECX >100,000 cfu/ml Escherichia coli (A) 05/05/2018    URINECX >100,000 cfu/ml Enterococcus faecalis (A) 05/05/2018     Sputum Culture: No components found for: SPUTUMCX  Wound Culture: No results found for: WOUNDCULT    Last 24 Hours Medication List:     Current Facility-Administered Medications:  acetaminophen 650 mg Oral Q6H PRN Molly Ruiz PA-C   amoxicillin 500 mg Oral Q12H [facility] Og Juárez MD   aspirin 81 mg Oral Daily Chase Alberto MD   atorvastatin 40 mg Oral Daily With Dinner Chase Alberto MD   calcium carbonate 1,000 mg Oral Daily PRN Chase Alberto MD   diphenhydrAMINE 12 5 mg Oral HS PRN Dylan Sandoval PA-C   docusate sodium 100 mg Oral BID PRN Chase Alberto MD docusate sodium 100 mg Oral BID Catherine Sandhu MD   heparin (porcine) 5,000 Units Subcutaneous Q8H [address] Lisseth Seals PA-C   insulin glargine 20 Units Subcutaneous HS Charlie Beauchamp MD   insulin lispro 1-5 Units Subcutaneous TID AC Chase Alberto MD   insulin lispro 1-5 Units Subcutaneous HS Chase Alberto MD   insulin lispro 12 Units Subcutaneous TID With Meals Yakov Hutchins MD   melatonin 6 mg Oral HS Maryanne Collins MD   metoprolol succinate 25 mg Oral Q12H Bon Monsalve MD   multivitamin-minerals 1 tablet Oral Daily JEWELL Rodríguez   NATRUL PROBIOTIC 1 each Oral BID before lunch/dinner Apple Esquivel DO   ondansetron 4 mg Intravenous Q6H PRN Chase Alberto MD   pantoprazole 40 mg Oral Early Morning Chase Alberto MD   polyethylene glycol 17 g Oral Daily PRN Channing Buckley PA-C   potassium chloride 10 mEq Oral Daily Erika Cooper MD   sevelamer 800 mg Oral TID With Meals Juan Smith MD   sodium chloride 1 spray Each Nare PRN Catherine Sandhu MD   tamsulosin 0 4 mg Oral Daily With Dinner Lisseth Seals PA-C   torsemide 40 mg Oral Daily Erika Cooper MD   zolpidem 5 mg Oral HS PRN Catherine Sandhu MD        Today, Patient Was Seen By: Yakov Hutchins MD    ** Please Note: Dragon 360 Dictation voice to text software may have been used in the creation of this document   **

## 2018-05-10 NOTE — ASSESSMENT & PLAN NOTE
· Status post vascular Re evaluation on Tuesday  Patient is to follow up in the office to discuss revascularization options once acute cardiac/renal issues have been optimized    · Continue local foot care per Podiatry

## 2018-05-10 NOTE — PROGRESS NOTES
Progress Note - Modesta Gaviria  1935, [de-identified] y o  male MRN: [id number]    Unit/Bed#: -01 Encounter: [id number]    Primary Care Provider: Kyung Belle MD   Date and time admitted to hospital: 4/4/2018  6:39 AM    * Acute on chronic diastolic congestive heart failure ([facility] )   Assessment & Plan    · Patient developed recurrent episode of hypotension this morning and reported chest pain with shortness of breath and labs showed mild troponin elevation  · Has not been able to receive metoprolol over the past 48 hr due to hypotension  Dose of metoprolol has been decreased with lower holding parameters to help control heart rate better  · Continue daily torsemide dose, oral fluid restriction  · Overall poor prognosis in view of hypotension with volume overload unable to adequately diurese with severe mitral valvular disease and not a surgical candidate      Acute-on-chronic kidney injury ([facility] 75 )   Assessment & Plan    · Baseline creatinine 1 8-2 2  SABINE likely d/t ATN +/-cardiorenal syndrome  Creatinine stable at 2 58 today  BUN remains elevated  · Torsemide not given this morning due to hypotension  · Management per Nephrology      Hyponatremia   Assessment & Plan    · In the setting of volume overload  · Received few doses of Samsca per Nephrology over the past week  · Continue to monitor closely, oral fluid restriction      Chronic atrial fibrillation ([facility] 75 )   Assessment & Plan    · Increase rate today as patient has not been receiving metoprolol  Dose adjusted per Cardiology  Not on anticoagulation currently as this was declined by patient and family  · Continue aspirin      Type 2 diabetes mellitus ([facility] )   Assessment & Plan    · Most recent hemoglobin A1c 7 6 2 months ago    Blood glucose levels acceptable  · Continue Lantus/Humalog at current dose and continue to adjust as indicated      Pulmonary hypertension ([facility] )   Assessment & Plan    · Severe pulmonary hypertension with estimated peak PA pressure at 65 mmHg  · Per Cardiology, patient should probably benefit from sildenafil once euvolemic      Peripheral arterial disease ([facility] )   Assessment & Plan    · Status post vascular Re evaluation on Tuesday  Patient is to follow up in the office to discuss revascularization options once acute cardiac/renal issues have been optimized  · Continue local foot care per Podiatry      UTI (urinary tract infection)   Assessment & Plan    · Urine cultures positive for E coli/Enterococcus fecalis  · He already completed at least 3 days of oral cephalexin  · Continue amoxicillin for 1 more day      Anemia in chronic kidney disease   Assessment & Plan    · Hemoglobin stable  Continue to monitor closely         VTE Pharmacologic Prophylaxis:   Pharmacologic: Heparin  Mechanical VTE Prophylaxis in Place: Yes    Patient Centered Rounds: I have performed bedside rounds with nursing staff today  Discussions with Specialists or Other Care Team Provider:  Nursing/    Education and Discussions with Family / Patient:  Patient    Current Length of Stay: 39 day(s)    Current Patient Status: Inpatient   Certification Statement: The patient will continue to require additional inpatient hospital stay due to Above diagnosis and care plan    Discharge Plan:  Hold discharge today due to recurrent symptoms    Code Status: Level 1 - Full Code      Subjective:   Patient seen and evaluated  Complains of mild chest discomfort  No shortness of breath currently at rest    Objective:     Vitals:   Temp (24hrs), Av 7 °F (36 5 °C), Min:97 5 °F (36 4 °C), Max:97 9 °F (36 6 °C)    HR:  [] 101  Resp:  [16] 16  BP: ()/(50-64) 94/64  SpO2:  [97 %-100 %] 97 %  Body mass index is 30 56 kg/m²  Input and Output Summary (last 24 hours):        Intake/Output Summary (Last 24 hours) at 05/10/18 1643  Last data filed at 05/10/18 1509   Gross per 24 hour   Intake              590 ml   Output              925 ml   Net             -335 ml       Physical Exam:  General Appearance:    Alert, cooperative, no distress, appropriately responsive    Head:    Normocephalic, without obvious abnormality, atraumatic, mucous membranes moist    Eyes:    Conjunctiva/corneas clear, EOM's intact   Neck:   Supple   Lungs:     Bibasilar rales    Heart:    Irregularly irregular, S1 and S2    Abdomen:     Soft, non-tender, bowel sounds active all four quadrants,     no masses, no organomegaly   Extremities:   +1 ble edema, left foot dressing intact, stable right foot ischemic changes   Neurologic:  nonfocal         Additional Data:     Labs:      Results from last 7 days  Lab Units 05/10/18  0924   WBC Thousand/uL 8 18   HEMOGLOBIN g/dL 8 3*   HEMATOCRIT % 25 5*   PLATELETS Thousands/uL 131*   NEUTROS PCT % 87*   LYMPHS PCT % 7*   MONOS PCT % 6   EOS PCT % 0       Results from last 7 days  Lab Units 05/10/18  0924   SODIUM mmol/L 129*   POTASSIUM mmol/L 4 8   CHLORIDE mmol/L 92*   CO2 mmol/L 25   BUN mg/dL 108*   CREATININE mg/dL 2 58*   CALCIUM mg/dL 9 1   GLUCOSE RANDOM mg/dL 152*           * I Have Reviewed All Lab Data Listed Above  * Additional Pertinent Lab Tests Reviewed: [facility] Review[facility]    Cultures:   Blood Culture:   Lab Results   Component Value Date    BLOODCX No Growth After 5 Days  04/04/2018    BLOODCX No Growth After 5 Days   04/04/2018     Urine Culture:   Lab Results   Component Value Date    URINECX >100,000 cfu/ml Escherichia coli (A) 05/05/2018    URINECX >100,000 cfu/ml Enterococcus faecalis (A) 05/05/2018     Sputum Culture: No components found for: SPUTUMCX  Wound Culture: No results found for: WOUNDCULT    Last 24 Hours Medication List:     Current Facility-Administered Medications:  acetaminophen 650 mg Oral Q6H PRN Molly Ruiz PA-C   amoxicillin 500 mg Oral Q12H [address] Sixto Guadarrama MD   aspirin 81 mg Oral Daily Chase Alberto MD   atorvastatin 40 mg Oral Daily With Dinner Mindi Jones MD calcium carbonate 1,000 mg Oral Daily PRN Chase Alberto MD   diphenhydrAMINE 12 5 mg Oral HS PRN Josh Monsalve PA-C   docusate sodium 100 mg Oral BID PRN Chase Alberto MD   docusate sodium 100 mg Oral BID Latoya Velasco MD   heparin (porcine) 5,000 Units Subcutaneous Q8H [address] Lisseth Seals PA-C   insulin glargine 20 Units Subcutaneous HS Lynn Nice MD   insulin lispro 1-5 Units Subcutaneous TID AC Chase Alberto MD   insulin lispro 1-5 Units Subcutaneous HS Chase Alberto MD   insulin lispro 12 Units Subcutaneous TID With Meals Mary Johnson MD   melatonin 6 mg Oral HS Raymond De Jesus MD   metoprolol succinate 12 5 mg Oral Q12H Prosper Hernandes MD   multivitamin-minerals 1 tablet Oral Daily Ethlyn Venessa, HANNAHNP   NATRUL PROBIOTIC 1 each Oral BID before lunch/dinner Jhonathan Levine DO   ondansetron 4 mg Intravenous Q6H PRN Chase Alberto MD   pantoprazole 40 mg Oral Early Morning Chase Alberto MD   polyethylene glycol 17 g Oral Daily PRN Josh Monsalve PA-C   potassium chloride 10 mEq Oral Daily Leela Gilliland MD   sevelamer 800 mg Oral TID With Meals Cassi Brunson MD   sodium chloride 1 spray Each Nare PRN Latoya Velasco MD   tamsulosin 0 4 mg Oral Daily With Dinner Lisseth Seals PA-C   torsemide 40 mg Oral Daily Leela Gilliland MD   zolpidem 5 mg Oral HS PRN Latoya Velasco MD        Today, Patient Was Seen By: Mary Johnson MD    ** Please Note: Dragon 360 Dictation voice to text software may have been used in the creation of this document   **

## 2018-05-10 NOTE — PHYSICAL THERAPY NOTE
PHYSICAL THERAPY NOTE    Patient Name: Yunior Cuba  Today's Date: 5/10/2018        05/10/18 1326   Pain Assessment   Pain Assessment No/denies pain   Pain Score No Pain   Restrictions/Precautions   Weight Bearing Precautions Per Order No   Other Precautions Multiple lines;Telemetry;O2;Fall Risk;Pain   General   Family/Caregiver Present Yes  (wife)   Subjective   Subjective Patient supine in bed with complaints of feeling nauseous however is agreeble to therapy session  Bed Mobility   Additional Comments Patient participated in bed mobility to reposition in bed with mod a from therapist    Balance   Static Sitting Fair +   Dynamic Sitting Fair   Static Standing Fair -   Dynamic Standing Poor +   Ambulatory Poor +  (roller walker)   Endurance Deficit   Endurance Deficit Yes   Endurance Deficit Description increased fatigue, decreased activity tolerance   Activity Tolerance   Activity Tolerance Patient limited by fatigue;Treatment limited secondary to medical complications (Comment)   Nurse Made Aware spoke to Joseph Orellana   Smart Devices Supine;10 reps;AROM; Bilateral   Heelslides Supine;10 reps;AROM; Bilateral   Hip Abduction Supine;10 reps;AROM; Bilateral   Hip Adduction Supine;10 reps;AROM; Bilateral   Knee AROM Short Arc Quad Supine;10 reps;AROM; Bilateral   Ankle Pumps Supine;20 reps;AROM; Bilateral   Assessment   Prognosis Fair   Problem List Decreased strength;Decreased range of motion;Decreased endurance; Impaired balance;Decreased mobility; Decreased coordination;Decreased safety awareness; Obesity; Decreased skin integrity; Impaired sensation  (LE edema)   Assessment Patient presents with increased fatigue level as well as feelings of nausea impacting session, nursing aware  Participated in supine B LE exercise program with good understanding and form, required frequent rest breaks in between sets secondary to fatigue  Patient refused any additional mobility secondary to nausesous feeling  Bed mobility performed to provide comfort and improve positioning with mod a from therapist required  Barriers to Discharge Inaccessible home environment   Goals   Patient Goals to get better   STG Expiration Date 05/17/18   Short Term Goal #1 pt will: Increase bilateral LE strength 1 grade to facilitate independent mobility, Perform all bed mobility tasks w/ supervision to decrease fall risk factors, Perform all transfers w/ supervision to improve independence, Ambulate 120 ft  with least restrictive assistive device w/ supervision w/o LOB, Increase ambulatory balance 1 grade to decrease risk for falls, Complete exercise program independently, Tolerate 3 hr OOB to faciliate upright tolerance and Improve Barthel Index score to 70 or greater to facilitate independence  PT to see when stair training is appropriate  Treatment Day 4   Plan   Treatment/Interventions Functional transfer training;LE strengthening/ROM; Therapeutic exercise; Endurance training;Patient/family training;Equipment eval/education; Bed mobility;Gait training  (PT to see when stair training is appropriate)   Progress Slow progress, decreased activity tolerance   PT Frequency 5x/wk   Recommendation   Recommendation Post acute IP rehab   Equipment Recommended Other (Comment)  (roller walker)       Alejandro Richter, PTA

## 2018-05-10 NOTE — ASSESSMENT & PLAN NOTE
· Patient developed recurrent episode of hypotension this morning and reported chest pain with shortness of breath and labs showed mild troponin elevation  · Has not been able to receive metoprolol over the past 48 hr due to hypotension    Dose of metoprolol has been decreased with lower holding parameters to help control heart rate better  · Continue daily torsemide dose, oral fluid restriction  · Overall poor prognosis in view of hypotension with volume overload unable to adequately diurese with severe mitral valvular disease and not a surgical candidate

## 2018-05-10 NOTE — PROGRESS NOTES
Progress Note - Cardiology   Tor Willoughby  [de-identified] y o  male MRN: [id number]  Unit/Bed#: -01 Encounter: [id number]  05/10/18  3:53 PM        Subjective/Objective    Chief Complaint:   Chief Complaint   Patient presents with    Shortness of Breath     pt reports being short of breath over the past week  Pt reports when he gets up and walks distances he has trouble breathing and chest pain  Daughter reports recent trip to [facility] where pt did not take lasix to avoid voiding  Subjective:   Tachycardia last night with some chest discomfort and mild troponin elevation    Objective: Comfortable , no distress at the time of exam      Patient Active Problem List   Diagnosis    Chronic atrial fibrillation ([address] )    Acute on chronic diastolic congestive heart failure (HCC)    Benign essential hypertension    Acute respiratory failure with hypoxia ([facility])    Type 2 diabetes mellitus ([facility])    MADISON (dyspnea on exertion)    Cough    S/P aortic valve replacement with bioprosthetic valve    Acute encephalopathy    Nasal laceration, sequela    Coronary artery disease involving native coronary artery of native heart    Chronic kidney disease, stage 3    Closed nondisplaced fracture of greater tuberosity of left humerus    Cellulitis    Edema    Hyperparathyroidism ([address] )    Vitamin D deficiency    Pain in left shoulder    Peripheral arterial disease ([address] )    Atherosclerosis of artery of extremity with ulceration ([address] )    Aortic valve disorder    Asymptomatic bilateral carotid artery stenosis    Congestive heart failure ([facility])    Esophageal reflux    Impingement syndrome of left shoulder    Mild mitral regurgitation    Monoclonal gammopathy of undetermined significance    Non-proliferative diabetic retinopathy ([address] )    Peripheral vascular disease ([address] )    Acute-on-chronic kidney injury ([address] )    Cellulitis of foot, left    Non-ST elevation myocardial infarction (NSTEMI) ([address] )    Other hyperlipidemia    Pulmonary hypertension (HCC)    Anemia in chronic kidney disease    Hyponatremia    UTI (urinary tract infection)       Vitals: BP 94/64 (BP Location: Left arm)   Pulse 101   Temp 97 8 °F (36 6 °C) (Oral)   Resp 16   Ht 5' 7" (1 702 m)   Wt 88 5 kg (195 lb 1 7 oz)   SpO2 97%   BMI 30 56 kg/m²     I/O this shift:  In: 170 [P O :170]  Out: 525 [Urine:525]  Wt Readings from Last 3 Encounters:   05/10/18 88 5 kg (195 lb 1 7 oz)   04/02/18 106 kg (233 lb 3 2 oz)   02/28/18 98 kg (216 lb)       Intake/Output Summary (Last 24 hours) at 05/10/18 1553  Last data filed at 05/10/18 1509   Gross per 24 hour   Intake              678 ml   Output              925 ml   Net             -247 ml     I/O last 3 completed shifts: In: 968 [P O :968]  Out: 1566 [Urine:1566]    Invasive Devices     Peripheral Intravenous Line            Peripheral IV 05/10/18 Right Antecubital less than 1 day                  Physical Exam:  GEN: Lorene Chisholm   appears well, alert and oriented x 3, pleasant and cooperative   HEENT: pupils equal, round, and reactive to light; extraocular muscles intact  NECK: supple, no carotid bruits or JVD  HEART:  Irregular rhythm, normal S1 and S2, systolic murmur, no clicks, gallops or rubs   LUNGS: clear to auscultation bilaterally; no wheezes, rales, or rhonchi   ABDOMEN/GI: normal bowel sounds, soft, no tenderness, no distention  EXTREMITIES/Musculoskeltal: peripheral pulses normal; no clubbing, cyanosis, or edema  NEURO: no focal findings   SKIN: normal without suspicious lesions on exposed skin            Lab Results:     Results from last 7 days  Lab Units 05/10/18  1137 05/10/18  0924 05/10/18  0514   TROPONIN I ng/mL 0 50* 0 20* 0 09*     Results from last 7 days  Lab Units 05/10/18  0924 05/08/18  0503 05/07/18  0612   WBC Thousand/uL 8 18 7 84 7 41   HEMOGLOBIN g/dL 8 3* 8 4* 8 6*   HEMATOCRIT % 25 5* 26 0* 26 8*   PLATELETS Thousands/uL 131* 130* 145*           Results from last 7 days  Lab Units 05/10/18  0924 05/09/18  0546 05/08/18  0503   SODIUM mmol/L 129* 130* 130*   POTASSIUM mmol/L 4 8 4 5 4 3   CHLORIDE mmol/L 92* 93* 93*   CO2 mmol/L 25 26 26   BUN mg/dL 108* 113* 113*   CREATININE mg/dL 2 58* 2 58* 2 36*   CALCIUM mg/dL 9 1 9 0 9 1   GLUCOSE RANDOM mg/dL 152* 126 102         Imaging: I have personally reviewed pertinent reports      EKG:  Atrial fibrillation with mildly rapid rates    Scheduled Meds:    Current Facility-Administered Medications:  acetaminophen 650 mg Oral Q6H PRN Molly Ruiz PA-C   amoxicillin 500 mg Oral Q12H [facility] Vicki Hooker MD   aspirin 81 mg Oral Daily Chase Alberto MD   atorvastatin 40 mg Oral Daily With Dinner Chase Alberto MD   calcium carbonate 1,000 mg Oral Daily PRN Chase Alberto MD   diphenhydrAMINE 12 5 mg Oral HS PRN Robert Pascual PA-C   docusate sodium 100 mg Oral BID PRN Chase Alberto MD   docusate sodium 100 mg Oral BID Carla Jackman MD   heparin (porcine) 5,000 Units Subcutaneous Q8H [facility] Lisseth Seals PA-C   insulin glargine 20 Units Subcutaneous HS Arley Chong MD   insulin lispro 1-5 Units Subcutaneous TID AC Chase Alberto MD   insulin lispro 1-5 Units Subcutaneous HS Chase Alberto MD   insulin lispro 12 Units Subcutaneous TID With Meals Vicki Hooker MD   melatonin 6 mg Oral HS France Solano MD   metoprolol succinate 12 5 mg Oral Q12H Sunny Morales MD   multivitamin-minerals 1 tablet Oral Daily JEWELL Castro   NATRICHARD PROBIOTIC 1 each Oral BID before lunch/dinner Yanique Levin DO   ondansetron 4 mg Intravenous Q6H PRN Chase Alberto MD   pantoprazole 40 mg Oral Early Morning Chase Alberto MD   polyethylene glycol 17 g Oral Daily PRN Robert Pascual PA-C   potassium chloride 10 mEq Oral Daily Svetlana Night, MD   sevelamer 800 mg Oral TID With Meals Isaac Stewart MD   sodium chloride 1 spray Each Nare PRN Carla Jackman MD   tamsulosin 0 4 mg Oral Daily With Anne Marie Seals PA-C   torsemide 40 mg Oral Daily Sascha Pritchard MD   zolpidem 5 mg Oral HS PRN Maribel Gipson MD     Continuous Infusions:         [de-identified]year-old with a prolonged hospital stay-over 1 month, known history of coronary artery disease, prior CABG, prior bioprosthetic AVR, known PAF admitted with acute on chronic CHF, atrial fibrillation     Currently, active issues are SABINE on CKD which appears to be stable, volume overload, steadily and slowly diuresing and deconditioning needing rehabilitation after discharge     Plan:     Acute on chronic diastolic CHF:  Currently on torsemide 40 mg daily, on 20 mg alternating with 40 mg daily at home, continue the same, has JVD but without significant pulmonary edema, has mild lower extremity edema   Creatinine seems to have stabilized around 2 6, Peak creatinine of 3 2 during this hospitalization, pre hospital baseline probably around 1 9  BUN remains high but improving    Tachycardia and chest pains:  Has not received a dose of metoprolol in over 48 hours, blood pressures have been borderline and I will decrease his metoprolol to 12 5 twice daily and lower his systolic parameters to 243 mm  Considering hypotension with volume overload, needing diuresis, in the setting of severe MR-not a surgical candidate, prognosis will be poor    Eventually consideration for a MitraClip after rehabilitation      Atrial fibrillation:  Rate controlled currently, was elevated yesterday, not on anticoagulation at baseline   Continue aspirin alone     Coronary artery disease:  Stable, continue aspirin, statin, beta-blocker, troponin elevation represents a type 2 NSTEMI     Status post bioprosthetic AVR-last echo from 1 month ago-normal function     Cardiomyopathy:  Ejection fraction of 45% with diffuse hypokinesis, continue beta-blocker, not a candidate for ACE-inhibitor this time     Overall, considering multiple comorbidities, prognosis in the long term will be poor     Will need a good amount of rehabilitation after discharge     Counseling / Coordination of Care  Total time spent 20 minutes including teaching and family updates  More than 50% was spent counseling pt and family

## 2018-05-10 NOTE — CASE MANAGEMENT
Continued Stay Review    Date: 5/10/18      Vital Signs: BP 94/64 (BP Location: Left arm)   Pulse 101   Temp 97 8 °F (36 6 °C) (Oral)   Resp 16   Ht 5' 7" (1 702 m)   Wt 88 5 kg (195 lb 1 7 oz)   SpO2 97%   BMI 30 56 kg/m²     Medications:   Scheduled Meds:   Current Facility-Administered Medications:  acetaminophen 650 mg Oral Q6H PRN Molly Ruiz PA-C   amoxicillin 500 mg Oral Q12H [facility] Mary Johnson MD   aspirin 81 mg Oral Daily Chase Alberto MD   atorvastatin 40 mg Oral Daily With Dinner Chase Alberto MD   calcium carbonate 1,000 mg Oral Daily PRN Chase Alberto MD   diphenhydrAMINE 12 5 mg Oral HS PRN Josh Monsalve PA-C   docusate sodium 100 mg Oral BID PRN Chase Alberto MD   docusate sodium 100 mg Oral BID Latoya Velasco MD   heparin (porcine) 5,000 Units Subcutaneous Q8H [facility] Lisseth Seals PA-C   insulin glargine 20 Units Subcutaneous HS Lynn Nice MD   insulin lispro 1-5 Units Subcutaneous TID AC Chase Alberto MD   insulin lispro 1-5 Units Subcutaneous HS Chase Alberto MD   insulin lispro 12 Units Subcutaneous TID With Meals Mary Johnson MD   melatonin 6 mg Oral HS Raymond De Jesus MD   metoprolol succinate 12 5 mg Oral Q12H Prosper Hernandes MD   multivitamin-minerals 1 tablet Oral Daily Ethlyn JEWELL Clifford   NATRUL PROBIOTIC 1 each Oral BID before lunch/dinner Jhonathan Levine DO   ondansetron 4 mg Intravenous Q6H PRN Chase Alberto MD   pantoprazole 40 mg Oral Early Morning Chase Alberto MD   polyethylene glycol 17 g Oral Daily PRN Josh Monsalve PA-C   potassium chloride 10 mEq Oral Daily Leela Gilliland MD   sevelamer 800 mg Oral TID With Meals Cassi Brunson MD   sodium chloride 1 spray Each Nare PRN Latoya Velasco MD   tamsulosin 0 4 mg Oral Daily With Dinner Lisseth Seals PA-C   torsemide 40 mg Oral Daily Leela Gilliland MD   zolpidem 5 mg Oral HS PRN Latoya Velasco MD     Continuous Infusions:    PRN Meds:   acetaminophen   calcium carbonate    diphenhydrAMINE    docusate sodium    ondansetron    polyethylene glycol    sodium chloride    zolpidem    Abnormal Labs/Diagnostic Results:     Results from last 7 days  Lab Units 05/10/18  0924   WBC Thousand/uL 8 18   HEMOGLOBIN g/dL 8 3*   HEMATOCRIT % 25 5*   PLATELETS Thousands/uL 131*   NEUTROS PCT % 87*   LYMPHS PCT % 7*   MONOS PCT % 6   EOS PCT % 0         Results from last 7 days  Lab Units 05/10/18  0924   SODIUM mmol/L 129*   POTASSIUM mmol/L 4 8   CHLORIDE mmol/L 92*   CO2 mmol/L 25   BUN mg/dL 108*   CREATININE mg/dL 2 58*   CALCIUM mg/dL 9 1   GLUCOSE RANDOM mg/dL 152*     Age/Sex: [de-identified] y o  male     Assessment/Plan:     Date and time admitted to hospital: 4/4/2018  6:39 AM         * Acute on chronic diastolic congestive heart failure (HCC)   Assessment & Plan     · Patient developed recurrent episode of hypotension this morning and reported chest pain with shortness of breath and labs showed mild troponin elevation  · Has not been able to receive metoprolol over the past 48 hr due to hypotension  Dose of metoprolol has been decreased with lower holding parameters to help control heart rate better  · Continue daily torsemide dose, oral fluid restriction  · Overall poor prognosis in view of hypotension with volume overload unable to adequately diurese with severe mitral valvular disease and not a surgical candidate       Acute-on-chronic kidney injury ([facility] )   Assessment & Plan     · Baseline creatinine 1 8-2 2  SABINE likely d/t ATN +/-cardiorenal syndrome  Creatinine stable at 2 58 today  BUN remains elevated  · Torsemide not given this morning due to hypotension  · Management per Nephrology       Hyponatremia   Assessment & Plan     · In the setting of volume overload      · Received few doses of Samsca per Nephrology over the past week  · Continue to monitor closely, oral fluid restriction       Chronic atrial fibrillation ([facility] )   Assessment & Plan     · Increase rate today as patient has not been receiving metoprolol  Dose adjusted per Cardiology  Not on anticoagulation currently as this was declined by patient and family  · Continue aspirin       Type 2 diabetes mellitus ([facility] )   Assessment & Plan     · Most recent hemoglobin A1c 7 6 2 months ago  Blood glucose levels acceptable  · Continue Lantus/Humalog at current dose and continue to adjust as indicated       Pulmonary hypertension (HCC)   Assessment & Plan     · Severe pulmonary hypertension with estimated peak PA pressure at 65 mmHg  · Per Cardiology, patient should probably benefit from sildenafil once euvolemic       Peripheral arterial disease ([facility] )   Assessment & Plan     · Status post vascular Re evaluation on Tuesday  Patient is to follow up in the office to discuss revascularization options once acute cardiac/renal issues have been optimized  · Continue local foot care per Podiatry       UTI (urinary tract infection)   Assessment & Plan     · Urine cultures positive for E coli/Enterococcus fecalis  · He already completed at least 3 days of oral cephalexin  · Continue amoxicillin for 1 more day       Anemia in chronic kidney disease   Assessment & Plan     · Hemoglobin stable  Continue to monitor closely           VTE Pharmacologic Prophylaxis:   Pharmacologic: Heparin  Mechanical VTE Prophylaxis in Place:  Yes     Patient Centered Rounds: I have performed bedside rounds with nursing staff today      Discussions with Specialists or Other Care Team Provider:  Nursing/     Education and Discussions with Family / Patient:  Patient     Current Length of Stay: 39 day(s)     Current Patient Status: Inpatient   Certification Statement: The patient will continue to require additional inpatient hospital stay due to Above diagnosis and care plan      Discharge Plan: TBD

## 2018-05-10 NOTE — PLAN OF CARE
Problem: PHYSICAL THERAPY ADULT  Goal: Performs mobility at highest level of function for planned discharge setting  See evaluation for individualized goals  Treatment/Interventions: Functional transfer training, LE strengthening/ROM, Therapeutic exercise, Endurance training, Patient/family training, Equipment eval/education, Bed mobility, Gait training (PT to see when stair training is appropriate )          See flowsheet documentation for full assessment, interventions and recommendations  Outcome: Not Progressing  Prognosis: Fair  Problem List: Decreased strength, Decreased range of motion, Decreased endurance, Impaired balance, Decreased mobility, Decreased coordination, Decreased safety awareness, Obesity, Decreased skin integrity, Impaired sensation (LE edema)  Assessment: Patient presents with increased fatigue level as well as feelings of nausea impacting session, nursing aware  Participated in supine B LE exercise program with good understanding and form, required frequent rest breaks in between sets secondary to fatigue  Patient refused any additional mobility secondary to nausesous feeling  Bed mobility performed to provide comfort and improve positioning with mod a from therapist required  Barriers to Discharge: Inaccessible home environment     Recommendation: Post acute IP rehab     PT - OK to Discharge: Yes (when medically cleared to LT skilled PT)    See flowsheet documentation for full assessment

## 2018-05-10 NOTE — ASSESSMENT & PLAN NOTE
· Increase rate today as patient has not been receiving metoprolol  Dose adjusted per Cardiology    Not on anticoagulation currently as this was declined by patient and family  · Continue aspirin

## 2018-05-10 NOTE — ASSESSMENT & PLAN NOTE
· In the setting of volume overload      · Received few doses of Samsca per Nephrology over the past week  · Continue to monitor closely, oral fluid restriction

## 2018-05-10 NOTE — ASSESSMENT & PLAN NOTE
· Baseline creatinine 1 8-2 2  SABINE likely d/t ATN +/-cardiorenal syndrome  Creatinine stable at 2 58 today    BUN remains elevated  · Torsemide not given this morning due to hypotension  · Management per Nephrology

## 2018-05-10 NOTE — PROGRESS NOTES
NEPHROLOGY PROGRESS NOTE   Modesta Gaviria  [de-identified] y o  male MRN: [id number]  Unit/Bed#: -01 Encounter: [id number]  Reason for Consult:  Acute kidney injury, CKD    ASSESSMENT and PLAN:  1  Acute kidney injury:  Baseline creatinine 1 8-2 2  Creatinine has plateaued in the mid 2s  · Patient using his own probiotic- renadyl kidney supplement  · Renal ultrasound: diffuse echogenicity reflecting chronic medical renal disease  · Urinalysis no protein no RBCs, no bacteria  2  Chronic kidney disease stage 4:  Baseline creatinine 1 8-2 2  Follows with Dr Daniela Contreras for management   3  Hyponatremia:  Sodium level has remained in the low 130s  Sodium level 129 today but blood sugar 152 therefore likely corrects to 130  · Volume mediated  · Had a dose of Samsca on 5/4 and 5/5  4  Cardiomyopathy,  Acute on chronic combined CHF, Fluid overload:   · Currently on torsemide 40 mg daily- did not receive a dose today ( BP in the 90's)  · Ejection fraction 45%, diffuse hypokinesis  · Weight stable  5  Anemia:  Likely due to chronic kidney disease  Hemoglobin stable in the mid 8's  6  Chronic atrial fibrillation:  Rate intermittently elevated- on beta-blocker  Blood pressure lower today due to elevated heart rate  7  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 Troy Shha  8  PVD, PAD:  Seen by vascular for evaluation of foot wound/gangrene  9  Deconditioning:  Lengthy hospitalization, multiple comorbidities  10  CKD MBD:  Continue binder  11  Other:  PVD, CAD prior CABG, AS status post AVR- bioprosthetic    SUMMARY OF RECOMMENDATIONS:  · No overnight events  · Patient did not meet hold parameters for diuretic therefore no dose of torsemide given today    SUBJECTIVE / INTERVAL HISTORY:  Patient seen earlier today   No complaints  Rather sleepy at the time of visit since it was early morning      OBJECTIVE:  Current Weight: Weight - Scale: 88 5 kg (195 lb 1 7 oz)  Vitals:    05/10/18 0449 05/10/18 0703 05/10/18 1123 05/10/18 1509   BP: (!) 86/52 96/57 90/58 94/64   BP Location: Left arm Left arm Right arm Left arm   Pulse:  (!) 117 (!) 111 101   Resp:  16  16   Temp: 97 5 °F (36 4 °C)   97 8 °F (36 6 °C)   TempSrc: Oral   Oral   SpO2:  99%  97%   Weight:       Height:           Intake/Output Summary (Last 24 hours) at 05/10/18 1606  Last data filed at 05/10/18 1509   Gross per 24 hour   Intake              590 ml   Output              925 ml   Net             -335 ml   General:  No acute distress  Patient comfortable at rest  Skin:  Warm and dry, no rash  Eyes:  Sclera clear  ENT:  Oropharynx appears moist  Neck:  Supple, no JVD appreciated  Chest:  Decreased breath sounds at the bases, clear anteriorly  CVS:  Irregular rhythm, slightly tachycardic    Soft systolic murmur noted  Abdomen:  Soft, nondistended, nontender, bowel sounds present  Extremities:  Mild lower extremity edema  Neuro:  Oriented, no acute deficit  Psych:  Seems depressed    Medications:    Current Facility-Administered Medications:     acetaminophen (TYLENOL) tablet 650 mg, 650 mg, Oral, Q6H PRN, Molly Ruiz PA-C, 650 mg at 05/09/18 2040    amoxicillin (AMOXIL) capsule 500 mg, 500 mg, Oral, Q12H [address], Maximo Wang MD, 500 mg at 05/10/18 0915    aspirin (ECOTRIN LOW STRENGTH) EC tablet 81 mg, 81 mg, Oral, Daily, Chase Alberto MD, 81 mg at 05/10/18 0914    atorvastatin (LIPITOR) tablet 40 mg, 40 mg, Oral, Daily With Dinner, Chase Alberto MD, 40 mg at 05/09/18 1648    calcium carbonate (TUMS) chewable tablet 1,000 mg, 1,000 mg, Oral, Daily PRN, Chase Alberto MD    diphenhydrAMINE (BENADRYL) tablet 12 5 mg, 12 5 mg, Oral, HS PRN, Jasmin Alfaro PA-C, 12 5 mg at 05/07/18 0050    docusate sodium (COLACE) capsule 100 mg, 100 mg, Oral, BID PRN, Chase Alberto MD, 100 mg at 04/10/18 1005    docusate sodium (COLACE) capsule 100 mg, 100 mg, Oral, BID, Kirt Doshi MD, 100 mg at 05/09/18 1652    heparin (porcine) subcutaneous injection 5,000 Units, 5,000 Units, Subcutaneous, Q8H [address], Lisseth Seals PA-C, 5,000 Units at 05/08/18 2138    insulin glargine (LANTUS) subcutaneous injection 20 Units 0 2 mL, 20 Units, Subcutaneous, HS, Vick Bal MD, 20 Units at 05/09/18 2210    insulin lispro (HumaLOG) 100 units/mL subcutaneous injection 1-5 Units, 1-5 Units, Subcutaneous, TID AC, 1 Units at 05/10/18 1340 **AND** [CANCELED] Fingerstick Glucose (POCT), , , TID AC, Chase Alberto MD    insulin lispro (HumaLOG) 100 units/mL subcutaneous injection 1-5 Units, 1-5 Units, Subcutaneous, HS, Chase Alberto MD, 2 Units at 05/08/18 2140    insulin lispro (HumaLOG) 100 units/mL subcutaneous injection 12 Units, 12 Units, Subcutaneous, TID With Meals, Mariola Boateng MD, 12 Units at 05/09/18 1757    melatonin tablet 6 mg, 6 mg, Oral, HS, Virginia Esquivel MD, 6 mg at 05/09/18 2210    metoprolol succinate (TOPROL-XL) 24 hr tablet 12 5 mg, 12 5 mg, Oral, Q12H, Lucy Gtz MD    multivitamin-minerals (CENTRUM) tablet 1 tablet, 1 tablet, Oral, Daily, JEWELL Man, 1 tablet at 05/10/18 0914    NATRUL PROBIOTIC CAPS 1 capsule, 1 each, Oral, BID before lunch/dinner, Casey Chapman DO, Stopped at 05/09/18 1345    ondansetron (ZOFRAN) injection 4 mg, 4 mg, Intravenous, Q6H PRN, Chase Alberto MD, 4 mg at 05/10/18 1021    pantoprazole (PROTONIX) EC tablet 40 mg, 40 mg, Oral, Early Morning, Chase Alberto MD, 40 mg at 05/10/18 0515    polyethylene glycol (MIRALAX) packet 17 g, 17 g, Oral, Daily PRN, Izabella Mary PA-C, 17 g at 04/29/18 0856    potassium chloride (K-DUR,KLOR-CON) CR tablet 10 mEq, 10 mEq, Oral, Daily, Timothy Bateman MD, 10 mEq at 05/10/18 0914    sevelamer (RENAGEL) tablet 800 mg, 800 mg, Oral, TID With Meals, Carlota Maloney MD, 800 mg at 05/10/18 1347    sodium chloride (OCEAN) 0 65 % nasal spray 1 spray, 1 spray, Each Nare, PRN, Milagros Olivas MD    tamsulosin (FLOMAX) capsule 0 4 mg, 0 4 mg, Oral, Daily With Leana Seals PA-C, 0 4 mg at 05/09/18 1648    torsemide (DEMADEX) tablet 40 mg, 40 mg, Oral, Daily, Giselle Mooney MD, Stopped at 05/10/18 0914    zolpidem (AMBIEN) tablet 5 mg, 5 mg, Oral, HS PRN, Aaliyah Hendrickson MD    Laboratory Results:    Results from last 7 days  Lab Units 05/10/18  7794 05/09/18  0546 05/08/18  0503 05/07/18  0612 05/06/18  0531 05/05/18  0520 05/04/18  0531   WBC Thousand/uL 8 18  --  7 84 7 41 7 45 7 77 7 65   HEMOGLOBIN g/dL 8 3*  --  8 4* 8 6* 8 2* 8 7* 8 3*   HEMATOCRIT % 25 5*  --  26 0* 26 8* 25 7* 27 2* 26 3*   PLATELETS Thousands/uL 131*  --  130* 145* 132* 142* 142*   SODIUM mmol/L 129* 130* 130* 131* 133* 128* 131*   POTASSIUM mmol/L 4 8 4 5 4 3 4 3 3 9 3 9 4 0   CHLORIDE mmol/L 92* 93* 93* 93* 94* 90* 92*   CO2 mmol/L 25 26 26 26 29 26 29   BUN mg/dL 108* 113* 113* 113* 118* 132* 134*   CREATININE mg/dL 2 58* 2 58* 2 36* 2 47* 2 47* 2 49* 2 48*   CALCIUM mg/dL 9 1 9 0 9 1 9 4 9 2 9 6 9 5   MAGNESIUM mg/dL  --   --   --   --   --  2 6  --    PHOSPHORUS mg/dL  --   --   --   --   --  3 8  --    GLUCOSE RANDOM mg/dL 152* 126 102 86 129 90 99     Previous work up:

## 2018-05-10 NOTE — ASSESSMENT & PLAN NOTE
· Urine cultures positive for E coli/Enterococcus fecalis  · He already completed at least 3 days of oral cephalexin  · Continue amoxicillin for 1 more day

## 2018-05-11 NOTE — PLAN OF CARE
Problem: SLP ADULT - SWALLOWING, IMPAIRED  Goal: Initial SLP swallow eval performed  Outcome: Completed Date Met: 05/11/18

## 2018-05-11 NOTE — PROGRESS NOTES
Progress Note - Cardiology   Rossana Stephens  [de-identified] y o  male MRN: [id number]  Unit/Bed#: -01 Encounter: [id number]  05/11/18  4:09 PM        Subjective/Objective   Chief Complaint:   Chief Complaint   Patient presents with    Shortness of Breath     pt reports being short of breath over the past week  Pt reports when he gets up and walks distances he has trouble breathing and chest pain  Daughter reports recent trip to [facility] where pt did not take lasix to avoid voiding  Subjective: Patient denies any complaints    Specifically denies chest pains or shortness of breath    Objective: Comfortable , no distress at the time of exam      Patient Active Problem List   Diagnosis    Chronic atrial fibrillation ([facility] )    Acute on chronic diastolic congestive heart failure ([facility])    Benign essential hypertension    Acute respiratory failure with hypoxia ([facility])    Type 2 diabetes mellitus ([address] )    MADISON (dyspnea on exertion)    Cough    S/P aortic valve replacement with bioprosthetic valve    Acute encephalopathy    Nasal laceration, sequela    Coronary artery disease involving native coronary artery of native heart    Chronic kidney disease, stage 3    Closed nondisplaced fracture of greater tuberosity of left humerus    Cellulitis    Edema    Hyperparathyroidism ([address] )    Vitamin D deficiency    Pain in left shoulder    Peripheral arterial disease ([address]    Atherosclerosis of artery of extremity with ulceration ([address]    Aortic valve disorder    Asymptomatic bilateral carotid artery stenosis    Congestive heart failure ([facility])    Esophageal reflux    Impingement syndrome of left shoulder    Mild mitral regurgitation    Monoclonal gammopathy of undetermined significance    Non-proliferative diabetic retinopathy ([address]    Peripheral vascular disease ([address]    Acute-on-chronic kidney injury ([address] )    Cellulitis of foot, left    Non-ST elevation myocardial infarction (NSTEMI) ([address] )    Other hyperlipidemia    Pulmonary hypertension (HCC)    Anemia in chronic kidney disease    Hyponatremia    UTI (urinary tract infection)       Vitals: /65 (BP Location: Left arm)   Pulse 91   Temp 97 8 °F (36 6 °C) (Oral)   Resp 16   Ht 5' 7" (1 702 m)   Wt 88 kg (194 lb 0 1 oz)   SpO2 99%   BMI 30 39 kg/m²     I/O this shift:  In: 240 [P O :240]  Out: 120 [Urine:120]  Wt Readings from Last 3 Encounters:   05/11/18 88 kg (194 lb 0 1 oz)   04/02/18 106 kg (233 lb 3 2 oz)   02/28/18 98 kg (216 lb)       Intake/Output Summary (Last 24 hours) at 05/11/18 1609  Last data filed at 05/11/18 1508   Gross per 24 hour   Intake              360 ml   Output              420 ml   Net              -60 ml     I/O last 3 completed shifts: In: 410 [P O :410]  Out: 1225 [Urine:1225]    Invasive Devices          No matching active lines, drains, or airways            Physical Exam:  GEN: Lorraine Degree   appears well, alert and oriented x 3, pleasant and cooperative   HEENT: pupils equal, round, and reactive to light; extraocular muscles intact  NECK: supple, no carotid bruits or JVD  HEART: regular rhythm, normal S1 and S2, no murmurs, clicks, gallops or rubs   LUNGS: clear to auscultation bilaterally; no wheezes, rales, or rhonchi   ABDOMEN/GI: normal bowel sounds, soft, no tenderness, no distention  EXTREMITIES/Musculoskeltal: peripheral pulses normal; no clubbing, cyanosis, mild edema present bilaterally  NEURO: no focal findings   SKIN: normal without suspicious lesions on exposed skin            Lab Results:     Results from last 7 days  Lab Units 05/10/18  1137 05/10/18  0924 05/10/18  0514   TROPONIN I ng/mL 0 50* 0 20* 0 09*     Results from last 7 days  Lab Units 05/11/18  0503 05/10/18  0924 05/08/18  0503   WBC Thousand/uL 7 81 8 18 7 84   HEMOGLOBIN g/dL 8 7* 8 3* 8 4*   HEMATOCRIT % 27 8* 25 5* 26 0*   PLATELETS Thousands/uL 140* 131* 130*           Results from last 7 days  Lab Units 05/11/18  0503 05/10/18  0924 05/09/18  0546   SODIUM mmol/L 128* 129* 130*   POTASSIUM mmol/L 5 2 4 8 4 5   CHLORIDE mmol/L 92* 92* 93*   CO2 mmol/L 25 25 26   BUN mg/dL 109* 108* 113*   CREATININE mg/dL 2 88* 2 58* 2 58*   CALCIUM mg/dL 9 3 9 1 9 0   GLUCOSE RANDOM mg/dL 127 152* 126         Imaging: I have personally reviewed pertinent reports      EKG: No events on telemetry      Scheduled Meds:    Current Facility-Administered Medications:  acetaminophen 650 mg Oral Q6H PRN Molly Ruiz PA-C   aspirin 81 mg Oral Daily Chase Alberto MD   atorvastatin 40 mg Oral Daily With Dinner Chase Alberto MD   calcium carbonate 1,000 mg Oral Daily PRN Chase Alberto MD   diphenhydrAMINE 12 5 mg Oral HS PRN Jet Rodriguez PA-C   docusate sodium 100 mg Oral BID PRN Chase Alberto MD   docusate sodium 100 mg Oral BID Moe Clarke MD   heparin (porcine) 5,000 Units Subcutaneous Q8H [address] Lisseth Seals PA-C   insulin glargine 20 Units Subcutaneous HS Jon Peraza MD   insulin lispro 1-5 Units Subcutaneous TID AC Chase Alberto MD   insulin lispro 1-5 Units Subcutaneous HS Chase Alberto MD   insulin lispro 12 Units Subcutaneous TID With Meals Jasmin Real MD   melatonin 6 mg Oral HS Nighat Ceron MD   metoprolol succinate 12 5 mg Oral Q12H Estefanía Yusuf MD   multivitamin-minerals 1 tablet Oral Daily JEWELL Headley   NATRUL PROBIOTIC 1 each Oral BID before lunch/dinner Amina Saldaña DO   ondansetron 4 mg Intravenous Q6H PRN Chase Alberto MD   pantoprazole 40 mg Oral Early Morning Chase Alberto MD   polyethylene glycol 17 g Oral Daily PRN Jet Rodriguez PA-C   sevelamer 800 mg Oral TID With Meals Lester Csatle MD   sodium chloride 1 spray Each Nare PRN Moe Clarke MD   tamsulosin 0 4 mg Oral Daily With Dinner Lisseth Seals PA-C   torsemide 40 mg Oral Daily Jim Tapia MD   zolpidem 5 mg Oral HS PRN Moe Clarke MD     Continuous Infusions: Impression:    72-year-old with a prolonged hospital stay-over 1 month, known history of coronary artery disease, prior CABG, prior bioprosthetic AVR, known PAF admitted with acute on chronic CHF, atrial fibrillation     Currently, active issues are SABINE on CKD volume overload, steadily - slowly diuresing and deconditioning needing rehabilitation after discharge     Plan:     Acute on chronic diastolic CHF:  Currently on torsemide 40 mg daily, was on 20 mg alternating with 40 mg daily at home, has mild lower extremity edema   Creatinine is slowly rising but her BUN is improving which was substantially elevated at 160 at 1 point, Peak creatinine of 3 2 during this hospitalization, pre hospital baseline probably around 1 9  Currently 2 8  BUN remains high but improving  Nephrology is also following     Tachycardia and chest pains:   tolerating a low dose of metoprolol 12 5 twice daily   Considering hypotension with volume overload, needing diuresis, in the setting of severe MR-not a surgical candidate, prognosis will be poor  Eventually consideration for a MitraClip after rehabilitation      Atrial fibrillation:  Rate controlled currently, was elevated yesterday, not on anticoagulation at baseline   Continue aspirin alone     Coronary artery disease:  Stable, continue aspirin, statin, beta-blocker, troponin elevation represents a type 2 NSTEMI     Status post bioprosthetic AVR-last echo from 1 month ago-normal function     Cardiomyopathy:  Ejection fraction of 45% with diffuse hypokinesis, continue beta-blocker, not a candidate for ACE-inhibitor this time     Overall, considering multiple comorbidities, prognosis in the long term will be poor     Being discharged with rehabilitation with follow-up with Nephrology     Counseling / Coordination of Care  Total time spent 20 minutes including teaching and family updates  More than 50% was spent counseling pt and family

## 2018-05-11 NOTE — PROGRESS NOTES
Pt coughs when swallowing pills one at a time with thin liquids  Pt also occasionally spits pills up and throws up   I feel like he would benefit from a speech eval

## 2018-05-11 NOTE — DISCHARGE INSTRUCTIONS
Take your medications as directed, and keep your follow up appointments  Adhere to a heart healthy lifestyle, maintaining a low sodium diet  Daily weight and record  If your weight increases 2-3 lbs in one day, or 5 lbs in 2 days, you are short of breath or have lower extremity swelling, please call the heart failure team at  [facility] at [phone]  You are to have blood work done on Sunday 5/13/18 and results to be sent to your primary nephrologist Dr Charlie Garcia  Also you are to resume oral fluid restriction of 1500cc/day on Monday 5/14/18    Intermittent episodes of hypotension occur necessitating transient holding of beta-blocker and sometimes diuretics  Please follow with your primary care physician/cardiologist/nephrologist for continued monitoring and medication management

## 2018-05-11 NOTE — PHYSICAL THERAPY NOTE
PHYSICAL THERAPY NOTE    Patient Name: Yunior Cuba  Today's Date: 5/11/2018 05/11/18 8317   Pain Assessment   Pain Assessment 0-10   Pain Score 6   Pain Type Chronic pain   Pain Location Foot   Pain Orientation Left   Restrictions/Precautions   Weight Bearing Precautions Per Order No   Other Precautions Multiple lines;Telemetry;O2;Fall Risk;Pain   General   Family/Caregiver Present No   Subjective   Subjective Patient supine in bed on 2L O2 via NC and is agreeable to therapy session  Patient stated "I feel better today but still a little nauseous "   Bed Mobility   Supine to Sit 3  Moderate assistance   Additional items Assist x 1;HOB elevated; Bedrails; Increased time required;Verbal cues;LE management   Sit to Supine Unable to assess   Additional Comments Patient seated OOB in recliner post session with call bell and belongings in reach  Transfers   Sit to Stand 4  Minimal assistance   Additional items Assist x 1; Increased time required;Verbal cues  (contact guard)   Stand to Sit 4  Minimal assistance  (contact guard)   Additional items Assist x 1;Verbal cues   Stand pivot 4  Minimal assistance   Additional items Assist x 1; Increased time required;Verbal cues   Ambulation/Elevation   Gait pattern Decreased foot clearance;Shuffling; Short stride; Excessively slow; Foward flexed   Gait Assistance 4  Minimal assist   Additional items Assist x 1;Verbal cues   Assistive Device Rolling walker   Distance 2 feet, 3 feet forward, 3 feet backward    Balance   Static Sitting Fair +   Dynamic Sitting Fair   Static Standing Fair -   Dynamic Standing Poor +   Ambulatory Poor +  (with roller walker)   Endurance Deficit   Endurance Deficit Yes   Endurance Deficit Description increased fatigue, decreased activity tolerance    Activity Tolerance   Activity Tolerance Patient limited by fatigue   Nurse Made Aware spoke to Joseph Keating Exercises   Quad Sets Supine;10 reps;AROM; Bilateral   Heelslides Supine;10 reps;AROM; Bilateral   Hip Abduction Supine;10 reps;AROM; Bilateral   Hip Adduction Supine;10 reps;AROM; Bilateral   Knee AROM Short Arc Quad Supine;10 reps;AROM; Bilateral   Ankle Pumps Supine;20 reps;AROM; Bilateral   Assessment   Prognosis Fair   Problem List Decreased strength;Decreased range of motion;Decreased endurance; Impaired balance;Decreased mobility; Decreased coordination;Decreased safety awareness; Obesity; Decreased skin integrity; Impaired sensation  (LE edema)   Assessment Patient with less nausea today and willing to participate in todays session  Performed supine B LE exercise program with good form and understanding  Patient encouraged to perform seated exercises independently later today  Supine to sit with mod a and verbal cues for use of bed rail  Sit to stand transfers remain consistent with contact guard assist and verbal cuing for hand placement and body positioning  Gait distance impacted by increase of pain in L heel, nursing notified  Continue to focus on gait progression and tolerance as able  Barriers to Discharge Inaccessible home environment   Goals   Patient Goals to feel better   STG Expiration Date 05/17/18   Short Term Goal #1 Per last PT goal assessment    pt will: Increase bilateral LE strength 1 grade to facilitate independent mobility, Perform all bed mobility tasks w/ supervision to decrease fall risk factors, Perform all transfers w/ supervision to improve independence, Ambulate 120 ft  with least restrictive assistive device w/ supervision w/o LOB, Increase ambulatory balance 1 grade to decrease risk for falls, Complete exercise program independently, Tolerate 3 hr OOB to faciliate upright tolerance and Improve Barthel Index score to 70 or greater to facilitate independence  PT to see when stair training is appropriate     Treatment Day 5   Plan   Treatment/Interventions Functional transfer training;LE strengthening/ROM; Therapeutic exercise; Endurance training;Patient/family training;Equipment eval/education; Bed mobility;Gait training;Spoke to nursing  (PT to see when stair training is appropriate  )   Progress Slow progress, decreased activity tolerance   PT Frequency 5x/wk   Recommendation   Recommendation Post acute IP rehab   Equipment Recommended Other (Comment)  (roller walker)     Soo Franco, PTA

## 2018-05-11 NOTE — DISCHARGE SUMMARY
Discharge Summary - [facility] Internal Medicine    Patient Information: Mihir Márquez  [de-identified] y o  male MRN: [id number]  Unit/Bed#: -01 Encounter: [id number]    Discharging Physician / Practitioner: Shashi Liao MD  PCP: Kia Yang MD  Admission Date: 4/4/2018  Discharge Date: 05/11/18    Reason for Admission:  Acute CHF exacerbation    Discharge Diagnoses:     Principal Problem:    Acute on chronic diastolic congestive heart failure ([facility] )  Active Problems:    Acute-on-chronic kidney injury ([facility] )    Hyponatremia    Chronic atrial fibrillation (HCC)    Type 2 diabetes mellitus ([facility] )    Non-ST elevation myocardial infarction (NSTEMI) ([facility] )    Pulmonary hypertension ([facility] )    Peripheral arterial disease ([facility] )    UTI (urinary tract infection)    Anemia in chronic kidney disease    Benign essential hypertension      Consultations During Hospital Stay:  · Cardiology - discussed with Dr Jazz Avalos  · Nephrology - discussed with Dr Merlinda Ports  · Vascular surgery  · Podiatry  · Psychologist    Procedures Performed:   · None    Significant findings:  · Echocardiogram - EF 45%, moderate to severe MR, bioprosthetic aortic valve, CVR TR, pulmonary hypertension with estimated peak PA pressure at 65 mmHg    Hospital Course:   Mihir Márquez  is a [de-identified] y o  male patient who originally presented to the hospital on 4/4/2018 due to shortness of breath, weight gain with increasing lower extremity swelling  Patient has an underlying history of diastolic CHF, paroxysmal atrial fibrillation and aortic stenosis  He was recently on vacation to [facility] and had been noncompliant with his diuretic regimen and diet  He presented to the emergency department volume overloaded and was started IV diuretics  He had a prolonged hospital course complicated by acute kidney injury, hypotension, CHF resistant to IV diuretic    Course was also complicated by rapid atrial fibrillation, severe peripheral arterial disease for which he was seen by vascular surgery and not felt to be a good surgical candidate due to his decompensated CHF, SABINE on CKD and multiple comorbidities including severe mitral regurgitation, pulmonary hypertension, tricuspid regurgitation  He was determined not to be a surgical candidate for valve replacement given his comorbidities following evaluation by Cardiology  He was seen by multiple sub specialties over his hospital stay with multiple meetings with patient and family to discuss clinical course and poor prognosis  At a point, dialysis was recommended as he was not responding to aggressive IV diuretics including Bumex infusion  Family however declined dialysis  Eventually, renal function stabilized enough and he was transitioned to oral diuretics  He remained however to an extent volume overloaded  A higher baseline creatinine was felt acceptable to maintain reasonably volume status considering the limitations of family not wanting dialysis  Patient remains at very high risk for worsening renal/cardiac function due to advanced disease  He was recommended on multiple occasions by multiple providers for dialysis which patient and family continued to adamantly decline  Patient's overall clinical status and prognosis remains very poor and he is a very high risk for readmission  Patient became significantly deconditioned over his hospital course and was recommended for rehab at discharge  He is planned for discharge to [facility] today    Condition at Discharge: fair     Discharge Day Visit / Exam:     Subjective:  Patient seen and evaluated  Overall feels better today  Has some nausea but able to tolerate some of his diet and denies vomiting    No abdominal pain, no fever or chills, no urinary symptoms    Vitals: Blood Pressure: 110/65 (05/11/18 0721)  Pulse: 91 (05/11/18 0721)  Temperature: 97 8 °F (36 6 °C) (05/11/18 0721)  Temp Source: Oral (05/11/18 0721)  Respirations: 16 (05/11/18 0721)  Height: 5' 7" (170 2 cm) (04/05/18 1407)  Weight - Scale: 88 kg (194 lb 0 1 oz) (05/11/18 0600)  SpO2: 99 % (05/11/18 0721)    General Appearance:    Alert, cooperative, no distress, appropriately responsive    Head:    Normocephalic, without obvious abnormality, atraumatic, mucous membranes moist    Eyes:    Conjunctiva/corneas clear, EOM's intact   Neck:   Supple   Lungs:     Bibasilar rales     Heart:    Irregularly irregular, S1 and S2    Abdomen:     Soft, non-tender, bowel sounds active all four quadrants,     no masses, no organomegaly   Extremities:   +1 bilateral lower extremity edema, left foot dressing intact, stable right foot ischemic changes   Neurologic:  nonfocal      Discharge instructions/Information to patient and family:   See after visit summary for information provided to patient and family  Provisions for Follow-Up Care:  See after visit summary for information related to follow-up care and any pertinent home health orders  Disposition: Skilled nursing facility at [facility] plans discussed with the patient and spouse at the bedside in attendance with patient's nurse and   All questions answered      Discharge Statement:  I spent >60 minutes discharging the patient  This time was spent on the day of discharge  I had direct contact with the patient on the day of discharge  Greater than 50% of the total time was spent examining patient, answering all patient questions, arranging and discussing plan of care with patient as well as directly providing post-discharge instructions  Additional time then spent on discharge activities  Discharge Medications:  See after visit summary for reconciled discharge medications provided to patient and family  ** Please Note: Dragon 360 Dictation voice to text software may have been used in the creation of this document   **

## 2018-05-11 NOTE — SPEECH THERAPY NOTE
Speech Language/Pathology  Speech/Language Pathology  Assessment    Patient Name: Neisha Maldonado    Today's Date: 5/11/2018     Problem List  Patient Active Problem List   Diagnosis    Chronic atrial fibrillation (HCC)    Acute on chronic diastolic congestive heart failure (HCC)    Benign essential hypertension    Acute respiratory failure with hypoxia (HCC)    Type 2 diabetes mellitus ([address] )    MADISON (dyspnea on exertion)    Cough    S/P aortic valve replacement with bioprosthetic valve    Acute encephalopathy    Nasal laceration, sequela    Coronary artery disease involving native coronary artery of native heart    Chronic kidney disease, stage 3    Closed nondisplaced fracture of greater tuberosity of left humerus    Cellulitis    Edema    Hyperparathyroidism ([address] )    Vitamin D deficiency    Pain in left shoulder    Peripheral arterial disease ([address] )    Atherosclerosis of artery of extremity with ulceration ([address] )    Aortic valve disorder    Asymptomatic bilateral carotid artery stenosis    Congestive heart failure (HCC)    Esophageal reflux    Impingement syndrome of left shoulder    Mild mitral regurgitation    Monoclonal gammopathy of undetermined significance    Non-proliferative diabetic retinopathy ([address] )    Peripheral vascular disease ([address] )    Acute-on-chronic kidney injury ([address] )    Cellulitis of foot, left    Non-ST elevation myocardial infarction (NSTEMI) ([address] )    Other hyperlipidemia    Pulmonary hypertension ([address] )    Anemia in chronic kidney disease    Hyponatremia    UTI (urinary tract infection)     Past Medical History  Past Medical History:   Diagnosis Date    Cardiac disease     CHF (congestive heart failure) ([address] )     Diabetes mellitus ([address] )     Hypertension     Renal disorder      Past Surgical History  Past Surgical History:   Procedure Laterality Date    AORTIC VALVE REPLACEMENT      CARDIAC VALVE REPLACEMENT      CATARACT EXTRACTION, BILATERAL      COLONOSCOPY      [place] SLCTV CATHJ 3RD+ ORD SLCTV ABDL PEL/LXTR [facility] Left 12/22/2017    Procedure: LEG ANGIOGRAM; RIGHT FEMORAL ACCESS; CO2-CONTRAST ;  Surgeon: Domonique Darnell MD;  [facility]: [facility];  Service: Vascular    REPLACEMENT TOTAL KNEE BILATERAL      VASCULAR SURGERY          05/11/18 1308   Patient Information   Current Medical Pt is an [de-identified] y/o male admitted to [facility] on 4/4/18 with acute on chronic diastolic congestive heart failure  Pt was observed coughing during medication administration this morning  PO intake has also been poor  Request was made for swallowing evaluation  Special Studies CXR: Slightly improved congestive failure, with stable small bilateral pleural effusions  Social/Educational/Vocational Hx Home   Swallow Information   Current Risks for Dysphagia & Aspiration Respiratory compromise   Current Symptoms/Concerns Cough;During meals; With pills   Current Diet Regular; Thin liquid   Baseline Diet Regular; Thin liquids   Baseline Assessment   Behavior/Cognition Alert; Cooperative; Interactive   Speech/Language Status Speech is clear, min hoarse phonation  Patient Positioning Upright in bed   Swallow Mechanism Exam   Labial Symmetry WFL   Labial Strength WFL   Labial ROM WFL   Facial Symmetry WFL   Facial Strength WFL   Facial ROM WFL   Lingual Symmetry WFL   Lingual Strength WFL   Lingual ROM WFL   Velum WFL   Mandible WFL   Dentition Dentures top;Dentures bottom   Volitional Cough Strong   Consistencies Assessed and Performance   Materials Adminstered Comment 1 bite applesauce, 3 bites jello, 1 minor cracker, 3 oz thin water by straw, 2 oz NTL by straw   Oral Stage Mild impaired   Oral Stage Comment Bolus retrieval and lip seal were WFL  Pt demonstrated slow mastication of minor cracker  Breakdown and oral clearance appeared to be [facility]  Phargngeal Stage Mild impaired; Moderate impaired   Pharyngeal Stage Comment Pt had immediate gagging with applesauce and room temp NTL   Pt stated "I can't do it I'm too nauseas " Pt did accept 1 cracker which he demonstrated dry cough x3 on  With thin water he demonstrated cough x2  With NTL; no s/s of aspiration noted  No s/s of aspiration noted with 3 bites jello  Voice remained clear post all swallows  Swallow Mechanics Mild delayed   Esophageal Concerns Other (Comment)  (c/o nausea, gagging, reports of vomiting)   Summary   Swallow Summary Assessment was limited as pt was refusing majority of PO trials due to feeling nauseaus  He had gagging on applesauce; suspect due to nausea and not wanting to eat it  He tolerated jello and NTL without s/s of aspiration or dysphagia however dry coughing was observed with minor cracker and thin water  With limited assessment SLP would recommend a pureed diet with nectar thick liquids for now  Speech to follow  Recommendations   Risk for Aspiration Moderate   Recommendations Consider oral diet; Dysphagia treatment   Diet Solid Recommendation Level 1 Dysphagia/pureed   Diet Liquid Recommendation Nectar thick liquid   Recommended Form of Meds Whole;Crushed; With puree; As tolerated   General Precautions Aspiration precautions; Feed only when alert;Minimize distractions;Upright as possible for all oral intake;Remain upright for 45 mins after meals; Supervision with meals;Assist with feeding   Compensatory Swallowing Strategies Alternate solids and liquids   Further Evaluations Gastroenterology   Results Reviewed with MD;RN;PT/Family/Caregiver   Treatment Recommendations   Follow up treatments Strategy training;Continue clinical assessment; Assure diet tolerance; Patient/family education   Dysphagia Goals Patient will tolerate recommended diet without observed clinical signs of oral/pharngeal dysphagia; Patient will tolerate advanced diet with no signs of oral or pharngeal dysphagia; Patient will utilize appropriate strategies for swallowing safety   Speech Therapy Prognosis   Prognosis Fair   Prognosis Considerations Co-Morbidities; Patient Participation Level; Availability of Services;Previous Level of Function;Severity of Impairments

## 2018-05-11 NOTE — SOCIAL WORK
4:30pm transport setup vis [facility] slotted for today at 4:30pm with 2LO2  Patient is going to [facility] for rehab  CM will follow up with information once spouse is here with attending  CM will follow patient

## 2018-05-11 NOTE — PROGRESS NOTES
NEPHROLOGY PROGRESS NOTE   Devante Barcenas  [de-identified] y o  male MRN: [id number]  Unit/Bed#: -01 Encounter: [id number]  Reason for Consult: SABINE on CKD    ASSESSMENT and PLAN:    61-year-old male who initially presented on 04/04 with shortness of breath  Patient was on vacation prior to admission and was not taking diuretics for at least 5 days prior to admission  Course has been complicated with atrial fibrillation, hypotension, worsening renal function  Nephrology is on board for acute on chronic kidney injury     1) SABINE on CKD - pt follows with Dr Jessie Stein as outpatient  baseline Cr 1 8-2 2 mg/dL prior now likely higher  Etiology multifactorial likely prerenal versus poor effective circulating volume/cardiorenal versus hypotension  Patient also had postobstructive likely contributing and Geronimo catheter was discontinued on 04/12  Their recommendations and many discussions with the patient and the family prior to the start/considered hemodialysis  Family has been reluctant with this recommendation      - renal u/s -diffusely echogenic kidneys  -urine relatively bland prior  -pt was started on renal supplement (does not appear to be taking per [facility])  -BUN is slowly improving from peak of 160  -samsca given today  Has required intermittently  This is due to hyponatremia in setting of cardiorenal  -continue torsemide  - potassium upper limit of nl, but potassium supplement has been held and received torsemide today    - overall, guarded prognosis  Discussed with family at bedside  Wife was surprised that patient is not on lasix which we had reviewed yesterday  I reviewed torsemide vs lasix  Wife was satisfied with this response  Then the patient's daughter had stated that the patient's renal function is worse because of the Lasix initially when the patient was 1st hospitalized, while I was on service at that time    I explained to the patient and the patient's family that the furosemide was initially started due to gross volume overload which the daughter does admit to an agree with and the daughter does agree that we had this discussion at that time  The daughter stated that it was the Lasix drip that caused renal failure  I explained that the patient had poor urine output with this at that time  To which the daughter responded yes the patient only had 1 L urine output but it was the Lasix that had damage the kidneys  I reviewed that generally the furosemide does not have direct nephrotoxic properties  But rather if there is over diuresis, then can developed renal toxicity due to prerenal/hypovolemia which was not the case as was pointed out by the patient's daughter  The daughter was not satisfied with his response  Then the patient's wife stated that it was the Bumex  We reviewed Bumex versus Lasix  Then the patient's wife asked for all of the blood work which I provided immediately  Provided the daily weights  We reviewed that the patient's renal function is not improving  Overall has remained between 2 5-3  It is slightly rising with this could be reflection of the hypotension to the 90H systolic yesterday  The wife stated that the patient's blood pressure has been this low before, but I reviewed the blood pressures recently and yesterday was the 1st day over the last couple days with the blood pressure was down into the 80 systolic with tachycardia  I gathered that the family was not satisfied with my responses  I attempted to readdress the overall guarded prognosis  I attempted to readdress that the family has declined dialysis in an attempt to have the patient's strength increased to which both the wife and the daughter stated yes  Since the the plan is for discharge today, but the patient is going to a center where patient will have daily monitoring and care  I recommended a BMP in 1-2 days  No fluid restriction due to the Boom Gonzales is only 1 time dose today    Will need to readdress frequently  The potassium level should be followed closely  Should improve with diuretics being restarted today  Overall I greatly appreciate the patient, family concerns  I attempted answer all the questions to their satisfaction  I reviewed this case multiple times today with the primary team attending  Patient has high likelihood for readmission to the hospital   We are limited in our medical options on the inpatient setting     2) Volume -      -patient is volume overloaded  Please see above      3) PAF beta-blocker being adjusted by primary cardiology team     4) valvular heart disease with AVR and mod to severe MR/TR     5) 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 - followed by Hematology     6) acid/base-stable     7) weakness     8) hypokalemia-hold potassium supplement for now given the potassium is slowly rising      9) Hyponatremia -      - Pt was given samsca last week  - redose samsca today      10) Anemia     11) UTI - treatment as per Primary Team  Appears to have received keflex prior week  Now on PCN    SUBJECTIVE / INTERVAL HISTORY:    Pt appears improved strength-wise today  Sitting in chair  Talking  Denies complaints       OBJECTIVE:  Current Weight: Weight - Scale: 88 kg (194 lb 0 1 oz)  Vitals:    05/10/18 1509 05/10/18 2219 05/11/18 0600 05/11/18 0721   BP: 94/64 100/54  110/65   BP Location: Left arm Left arm  Left arm   Pulse: 101 (!) 114 100 91   Resp: 16 16  16   Temp: 97 8 °F (36 6 °C) 97 7 °F (36 5 °C)  97 8 °F (36 6 °C)   TempSrc: Oral Oral  Oral   SpO2: 97% 99%  99%   Weight:   88 kg (194 lb 0 1 oz)    Height:           Intake/Output Summary (Last 24 hours) at 05/11/18 1711  Last data filed at 05/11/18 1508   Gross per 24 hour   Intake              360 ml   Output              420 ml   Net              -60 ml     General: NAD  Skin: no rash  Eyes: anicteric sclera  ENT: moist mucous membrane  Neck: supple  Chest: rales b/l bases  CVS: s1s2  Abdomen: soft, nontender  Extremities: 1+ LE edema  : no viera  Neuro: AAOX3  Psych: normal affect      Medications:    Current Facility-Administered Medications:     acetaminophen (TYLENOL) tablet 650 mg, 650 mg, Oral, Q6H PRN, Molly Ruiz PA-C, 650 mg at 05/11/18 1638    aspirin (ECOTRIN LOW STRENGTH) EC tablet 81 mg, 81 mg, Oral, Daily, Chase Alberto MD, 81 mg at 05/11/18 0941    atorvastatin (LIPITOR) tablet 40 mg, 40 mg, Oral, Daily With Chiquis Alberto MD, 40 mg at 05/10/18 1934    calcium carbonate (TUMS) chewable tablet 1,000 mg, 1,000 mg, Oral, Daily PRN, Chase Alberto MD    diphenhydrAMINE (BENADRYL) tablet 12 5 mg, 12 5 mg, Oral, HS PRN, Elicia Hadley PA-C, 12 5 mg at 05/07/18 0050    docusate sodium (COLACE) capsule 100 mg, 100 mg, Oral, BID PRN, Chase Alberto MD, 100 mg at 04/10/18 1005    docusate sodium (COLACE) capsule 100 mg, 100 mg, Oral, BID, Petrona Aragon MD, 100 mg at 05/10/18 1934    heparin (porcine) subcutaneous injection 5,000 Units, 5,000 Units, Subcutaneous, Q8H [facility], Lisseth Seals PA-C, 5,000 Units at 05/08/18 2138    insulin glargine (LANTUS) subcutaneous injection 20 Units 0 2 mL, 20 Units, Subcutaneous, HS, Vallorie Fabry, MD, 20 Units at 05/10/18 2222    insulin lispro (HumaLOG) 100 units/mL subcutaneous injection 1-5 Units, 1-5 Units, Subcutaneous, TID AC, 1 Units at 05/10/18 1340 **AND** [CANCELED] Fingerstick Glucose (POCT), , , TID AC, Chase Alberto MD    insulin lispro (HumaLOG) 100 units/mL subcutaneous injection 1-5 Units, 1-5 Units, Subcutaneous, HS, Chase Alberto MD, 2 Units at 05/08/18 2140    insulin lispro (HumaLOG) 100 units/mL subcutaneous injection 12 Units, 12 Units, Subcutaneous, TID With Meals, Cleopatra Hill MD, 12 Units at 05/09/18 1757    melatonin tablet 6 mg, 6 mg, Oral, HS, Tray Ariza MD, 6 mg at 05/10/18 2222    metoprolol succinate (TOPROL-XL) 24 hr tablet 12 5 mg, 12 5 mg, Oral, Q12H, Lisa Howell MD, 12 5 mg at 05/11/18 0941    multivitamin-minerals (CENTRUM) tablet 1 tablet, 1 tablet, Oral, Daily, Valentín Thomas, JEWELL, 1 tablet at 05/11/18 0941    NATRUL PROBIOTIC CAPS 1 capsule, 1 each, Oral, BID before lunch/dinner, Chayo Uribe DO, 1 capsule at 05/11/18 1318    ondansetron (ZOFRAN) injection 4 mg, 4 mg, Intravenous, Q6H PRN, Chase Alberto MD, 4 mg at 05/11/18 0944    pantoprazole (PROTONIX) EC tablet 40 mg, 40 mg, Oral, Early Morning, Chase Alberto MD, Stopped at 05/11/18 0552    polyethylene glycol (MIRALAX) packet 17 g, 17 g, Oral, Daily PRN, Tj Butler PA-C, 17 g at 04/29/18 0856    sevelamer (RENAGEL) tablet 800 mg, 800 mg, Oral, TID With Meals, John Paul Renee MD, 800 mg at 05/11/18 0944    sodium chloride (OCEAN) 0 65 % nasal spray 1 spray, 1 spray, Each Nare, PRN, Maribel Gispon MD    tamsulosin [facility]) capsule 0 4 mg, 0 4 mg, Oral, Daily With Maryan Angi Seals PA-C, 0 4 mg at 05/10/18 1935    torsemide [facility]) tablet 40 mg, 40 mg, Oral, Daily, Sascha Pritchard MD, 40 mg at 05/11/18 0941    zolpidem (AMBIEN) tablet 5 mg, 5 mg, Oral, HS PRN, Maribel Gipson MD    Laboratory Results:    Results from last 7 days  Lab Units 05/11/18  0503 05/10/18  2299 05/09/18  6919 05/08/18  0503 05/07/18  0612 05/06/18  0531 05/05/18  0520   WBC Thousand/uL 7 81 8 18  --  7 84 7 41 7 45 7 77   HEMOGLOBIN g/dL 8 7* 8 3*  --  8 4* 8 6* 8 2* 8 7*   HEMATOCRIT % 27 8* 25 5*  --  26 0* 26 8* 25 7* 27 2*   PLATELETS Thousands/uL 140* 131*  --  130* 145* 132* 142*   SODIUM mmol/L 128* 129* 130* 130* 131* 133* 128*   POTASSIUM mmol/L 5 2 4 8 4 5 4 3 4 3 3 9 3 9   CHLORIDE mmol/L 92* 92* 93* 93* 93* 94* 90*   CO2 mmol/L 25 25 26 26 26 29 26   BUN mg/dL 109* 108* 113* 113* 113* 118* 132*   CREATININE mg/dL 2 88* 2 58* 2 58* 2 36* 2 47* 2 47* 2 49*   CALCIUM mg/dL 9 3 9 1 9 0 9 1 9 4 9 2 9 6   MAGNESIUM mg/dL  --   --   --   --   --   --  2 6   PHOSPHORUS mg/dL  --   --   --   --   --   --  3 8 GLUCOSE RANDOM mg/dL 127 152* 126 102 86 129 90

## 2018-05-11 NOTE — PLAN OF CARE
Problem: PHYSICAL THERAPY ADULT  Goal: Performs mobility at highest level of function for planned discharge setting  See evaluation for individualized goals  Treatment/Interventions: Functional transfer training, LE strengthening/ROM, Therapeutic exercise, Endurance training, Patient/family training, Equipment eval/education, Bed mobility, Gait training (PT to see when stair training is appropriate )          See flowsheet documentation for full assessment, interventions and recommendations  Outcome: Progressing  Prognosis: Fair  Problem List: Decreased strength, Decreased range of motion, Decreased endurance, Impaired balance, Decreased mobility, Decreased coordination, Decreased safety awareness, Obesity, Decreased skin integrity, Impaired sensation (LE edema)  Assessment: Patient with less nausea today and willing to participate in todays session  Performed supine B LE exercise program with good form and understanding  Patient encouraged to perform seated exercises independently later today  Supine to sit with mod a and verbal cues for use of bed rail  Sit to stand transfers remain consistent with contact guard assist and verbal cuing for hand placement and body positioning  Gait distance impacted by increase of pain in L heel, nursing notified  Continue to focus on gait progression and tolerance as able  Barriers to Discharge: Inaccessible home environment     Recommendation: Post acute IP rehab     PT - OK to Discharge: Yes (when medically cleared to LT skilled PT)    See flowsheet documentation for full assessment

## 2018-05-11 NOTE — PLAN OF CARE
CARDIOVASCULAR - ADULT     Maintains optimal cardiac output and hemodynamic stability Progressing     Absence of cardiac dysrhythmias or at baseline rhythm Progressing        DISCHARGE PLANNING     Discharge to home or other facility with appropriate resources Progressing        DISCHARGE PLANNING - CARE MANAGEMENT     Discharge to post-acute care or home with appropriate resources Progressing        INFECTION - ADULT     Absence or prevention of progression during hospitalization Progressing        Knowledge Deficit     Patient/family/caregiver demonstrates understanding of disease process, treatment plan, medications, and discharge instructions Progressing        Nutrition/Hydration-ADULT     Nutrient/Hydration intake appropriate for improving, restoring or maintaining nutritional needs Progressing        PAIN - ADULT     Verbalizes/displays adequate comfort level or baseline comfort level Progressing        Potential for Falls     Patient will remain free of falls Progressing        Prexisting or High Potential for Compromised Skin Integrity     Skin integrity is maintained or improved Progressing        SAFETY ADULT     Maintain or return to baseline ADL function Progressing     Maintain or return mobility status to optimal level Progressing

## 2018-05-15 NOTE — PROGRESS NOTES
I spoke with Mrs Mckee Silvia on the phone  Would it be possible to have them fax weights over, Monday and thursday he should get bmp Monday and Thursday for right now and repeat ua c and s  I can order these   thanks

## 2018-05-15 NOTE — PROGRESS NOTES
Spoke to Pat Haney RN caring for Mr Omaira Mccoy  She is going to fax over all of the current weights and CMP's  I will also fax the BMP and UA culture and sensitivity orders over to Chris Barone

## 2018-05-15 NOTE — TELEPHONE ENCOUNTER
I spoke with Mrs Charity Vaughn on the phone at length this afternoon  She stated Candida Reed seems to be feeling better at [facility]  His Cr was 3 8 had decreased to 3 6  He is voiding better as per her  I stated that his heart function has worsened as given his most recent echo results in the hospital with the moderate to severe MR, pulmonary hypertemsion, and TR with RV hypokinesis  She acknowledged this  I stated that there is this condition called cardiorenal syndrome where the heart affects the kidney and vice-versa  I think that his heart function is going to affect his kidney function  I explained to her that his kidney function may have to be higher than his prior baseline to keep the fluid out of his lungs  She asked me about the diuretics in the hospital: I stated as I had in the hospital that the diuretics were needed because his lungs were full of fluid  Despite the diuretics and decrease weight and leg edema  He had fluid in his lungs and his kidney function was worsening and that was why discussions regarding dialysis ensued  She is not sure how well he would do on dialysis as she noted she had seen others on dialysis  I further explained that we may reach a point where either the diuretics may not work because of the worsening kidney function, worsening heart function or a combination of both and dialysis may be the only option  The patient had been seen by cardiology in the hospital and was not felt to be a surgery candidate for valve replacement  I reiterated that all of us are trying to help alejandra as best as we can  She asked about using bumex  His diuretics dose had been increased to 40 mg daily at [facility]  I asked that bmp be done twice weekly and weights faxed over and we can see how he is doing and further adjust from there  All questions answered as best as I could answer them

## 2018-05-18 NOTE — PROGRESS NOTES
Neri Goss was scanning his legs for a vein mapping  On the right he has a bypass and the left GSV was harvasted for a CABG  She will do the UE  Orders placed

## 2018-05-22 NOTE — PATIENT INSTRUCTIONS
Dry gangrene tip of toes 3  And 5, anasarca this swelling bilateral lower extremities with left forefoot discoloration  Also he will dry eschar lateral heel  Continue Betadine on toes as well as lateral heel eschar left foot  Coordinated care to reduce bilateral lower extremity edema however should set up his hospital bed with reverse Trendelenburg or legs down position as he is having ischemic rest pain as well  During the day he can keep his legs up for short periods of time  Will follow up in the office in 6 weeks

## 2018-05-22 NOTE — ASSESSMENT & PLAN NOTE
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

## 2018-05-22 NOTE — PROGRESS NOTES
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  Challenging situation with extensive bilateral lower extremity swelling and edema in a patient who will likely benefit from a distal bypass  Problematic non ambulatory state which is worsening the edema making it more difficult from a limb salvage standpoint  Continue efforts to reduce swelling follow-up office visit in 6-8 weeks       Diagnoses and all orders for this visit:    Atherosclerosis of artery of extremity with ulceration (HCC)        Subjective:      Patient ID: Gabriel Vazquez  is a [de-identified] y o  male  HPI ischemic rest pain left foot, bilateral severe leg swelling and edema anasarc  The following portions of the patient's history were reviewed and updated as appropriate: allergies, current medications, past family history, past medical history, past social history, past surgical history and problem list     Review of Systems  bilateral leg swelling, nonambulatory state, painful heel other systems negative    Objective:      /76 (BP Location: Left arm, Patient Position: Sitting, Cuff Size: Standard)   Pulse 70   Resp 18   Ht 5' 7" (1 702 m)          Physical Exam      2+ edema bilateral lower extremities dry gangrene left lateral heel and  3rd and 5th toe  Chest with mild rhonchi, neck supple left carotid bruits heard      Vitals:    05/22/18 1523   BP: 114/76   BP Location: Left arm   Patient Position: Sitting   Cuff Size: Standard   Pulse: 70   Resp: 18   Height: 5' 7" (1 702 m)       Patient Active Problem List   Diagnosis    Chronic atrial fibrillation (HCC)    Acute on chronic diastolic congestive heart failure (HCC)    Benign essential hypertension    Acute respiratory failure with hypoxia (HCC)    Type 2 diabetes mellitus (HCC)    MADISON (dyspnea on exertion)    Cough    S/P aortic valve replacement with bioprosthetic valve    Acute encephalopathy    Nasal laceration, sequela    Coronary artery disease involving native coronary artery of native heart    Chronic kidney disease, stage 3    Closed nondisplaced fracture of greater tuberosity of left humerus    Cellulitis    Edema    [facility]parathyroidism ([facility]    Vitamin D deficiency    Pain in left shoulder    Peripheral arterial disease (HCC)    Atherosclerosis of artery of extremity with ulceration ([address]    Aortic valve disorder    Asymptomatic bilateral carotid artery stenosis    Congestive heart failure (HCC)    Esophageal reflux    Impingement syndrome of left shoulder    Mild mitral regurgitation    Monoclonal gammopathy of undetermined significance    Non-proliferative diabetic retinopathy ([facility] )    Peripheral vascular disease ([facility] )    Acute-on-chronic kidney injury ([address] )    Cellulitis of foot, left    Non-ST elevation myocardial infarction (NSTEMI) ([address] )    Other hyperlipidemia    Pulmonary hypertension (HCC)    Anemia in chronic kidney disease    Hyponatremia    UTI (urinary tract infection)       Past Surgical History:   Procedure Laterality Date    AORTIC VALVE REPLACEMENT      CARDIAC VALVE REPLACEMENT      CATARACT EXTRACTION, BILATERAL      COLONOSCOPY      [facility] SLCTV CATHJ 3RD+ ORD SLCTV ABDL PEL/LXTR [facility] Left 12/22/2017    Procedure: LEG ANGIOGRAM; RIGHT FEMORAL ACCESS; CO2-CONTRAST ;  Surgeon: Julia Vazquez MD;  [facility]: [facility];  Service: Vascular    REPLACEMENT TOTAL KNEE BILATERAL      VASCULAR SURGERY         Family History   Problem Relation Age of Onset    Cancer Mother     Heart disease Father     Diabetes Sister     Heart disease Sister     Diabetes Brother        Social History     Social History    Marital status: /Civil Union     Spouse name: N/A    Number of children: N/A    Years of education: N/A     Occupational History    SELF EMPLOYED      Social History Main Topics    Smoking status: Former Smoker     Quit date: 10/11/1987    Smokeless tobacco: Never Used    Alcohol use No    Drug use: No    Sexual activity: No     Other Topics Concern    Not on file     Social History Narrative    No narrative on file       Allergies   Allergen Reactions    Codeine      "swelling shaky"    Hydrocodone      Other reaction(s): Other (See Comments)  Heart racing         Current Outpatient Prescriptions:     acetaminophen (TYLENOL) 325 mg tablet, Take 2 tablets (650 mg total) by mouth every 8 (eight) hours as needed for mild pain, headaches or fever, Disp: , Rfl: 0    aspirin (ECOTRIN LOW STRENGTH) 81 mg EC tablet, Take 81 mg by mouth daily  , Disp: , Rfl:     atorvastatin (LIPITOR) 40 mg tablet, Take 40 mg by mouth daily, Disp: , Rfl:     calcium carbonate (TUMS) 500 mg chewable tablet, Chew 2 tablets (1,000 mg total) daily as needed for indigestion or heartburn, Disp: , Rfl: 0    diphenhydrAMINE (BENADRYL) 25 mg tablet, Take 0 5 tablets (12 5 mg total) by mouth daily at bedtime as needed for sleep, Disp: 30 tablet, Rfl: 0    docusate sodium (COLACE) 100 mg capsule, Take 1 capsule (100 mg total) by mouth 2 (two) times a day, Disp: , Rfl: 0    insulin glargine (LANTUS) 100 units/mL subcutaneous injection, Inject 20 Units under the skin daily at bedtime, Disp: 10 mL, Rfl: 0    insulin lispro (HumaLOG) 100 units/mL injection, Inject 1-5 Units under the skin 3 (three) times a day before meals, Disp: , Rfl: 0    insulin lispro (HumaLOG) 100 units/mL injection, Inject 1-5 Units under the skin daily at bedtime, Disp: , Rfl: 0    melatonin 3 mg, Take 2 tablets (6 mg total) by mouth daily at bedtime, Disp: , Rfl: 0    metoprolol succinate (TOPROL-XL) 25 mg 24 hr tablet, Take 0 5 tablets (12 5 mg total) by mouth every 12 (twelve) hours, Disp: , Rfl: 0    multivitamin-iron-minerals-folic acid (CENTRUM) chewable tablet, Chew 1 tablet daily  , Disp: , Rfl:     omeprazole (PriLOSEC) 20 mg delayed release capsule, Take 20 mg by mouth daily , Disp: , Rfl:     polyethylene glycol (MIRALAX) 17 g packet, Take 17 g by mouth daily as needed (Constipation), Disp: 14 each, Rfl: 0    Probiotic Product (NATRUL PROBIOTIC) CAPS, Take 1 capsule by mouth 2 (two) times a day before lunch and dinner, Disp: , Rfl: 0    sevelamer (RENAGEL) 800 mg tablet, Take 1 tablet (800 mg total) by mouth 3 (three) times a day with meals, Disp: , Rfl: 0    tamsulosin (FLOMAX) 0 4 mg, Take 1 capsule (0 4 mg total) by mouth daily with dinner, Disp: , Rfl: 0    torsemide (DEMADEX) 20 mg tablet, Take 2 tablets (40 mg total) by mouth daily, Disp: , Rfl: 0    zolpidem (AMBIEN) 5 mg tablet, Take 1 tablet (5 mg total) by mouth daily at bedtime as needed for sleep for up to 10 days, Disp: 30 tablet, Rfl: 0

## 2018-05-23 NOTE — ASSESSMENT & PLAN NOTE
He was treated for UTI and has been on abx since 5/18  He had transient urinary retention which seems to have resolved

## 2018-05-23 NOTE — ASSESSMENT & PLAN NOTE
I had the opportunity to see the patient while in the hospital and I spoke with the patient's wife last week  I reviewed the most recent labs as well  His Cr has been as high as 3 8, the last 2 creatinine levels are 3 0 and 3 2 respectively and BUN in 110-125 range  I think that this will be his new range  I think that while there may be an element of ATN, a significant element of this is likely cardiorenal syndrome  The patient has moderate to severe regurgitation on echo as well as increased PAP, tricuspid regurgitation and RV hypokinesis  The patient's weights have been between 198-204 pounds as outpatient  His Cr I think will need to be at this range to keep the patient as euvolemic as possible  I reviewed with the patient and family aspects of his recent hospitalization  He has baseline stage IV CKD with a prior baseline of 1 9-2 2 prior to this most recent hospitalization    He has increased leg edema and I did state when I spoke with the patient's wife that the side effect of increasing his Lasix especially given the worsening in his cardiac function is that there may be a rise in his creatinine level  I stated that given how much diuretic dosing that he had in the hospital were he required intravenous infusion and he still had fluid in his lungs as noted on chart notes that is we may again need to revisit dialysis  No decision was made during his vision although I did say that a compromise would be a trial of dialysis if and when we got to that point in terms of trying to remove fluid with worsening kidney function with decreased cardiac function again as his echocardiogram from his recent hospitalization shows a dramatic difference from September 2017  She did state he is due to see Cardiology next week

## 2018-05-23 NOTE — PROGRESS NOTES
Assessment/Plan:    Acute-on-chronic kidney injury ([facility] [address] )  I had the opportunity to see the patient while in the hospital and I spoke with the patient's wife last week  I reviewed the most recent labs as well  His Cr has been as high as 3 8, the last 2 creatinine levels are 3 0 and 3 2 respectively and BUN in 110-125 range  I think that this will be his new range  I think that while there may be an element of ATN, a significant element of this is likely cardiorenal syndrome  The patient has moderate to severe regurgitation on echo as well as increased PAP, tricuspid regurgitation and RV hypokinesis  The patient's weights have been between 198-204 pounds as outpatient  His Cr I think will need to be at this range to keep the patient as euvolemic as possible  I reviewed with the patient and family aspects of his recent hospitalization  He has baseline stage IV CKD with a prior baseline of 1 9-2 2 prior to this most recent hospitalization    He has increased leg edema and I did state when I spoke with the patient's wife that the side effect of increasing his Lasix especially given the worsening in his cardiac function is that there may be a rise in his creatinine level  I stated that given how much diuretic dosing that he had in the hospital were he required intravenous infusion and he still had fluid in his lungs as noted on chart notes that is we may again need to revisit dialysis  No decision was made during his vision although I did say that a compromise would be a trial of dialysis if and when we got to that point in terms of trying to remove fluid with worsening kidney function with decreased cardiac function again as his echocardiogram from his recent hospitalization shows a dramatic difference from September 2017  She did state he is due to see Cardiology next week  Congestive heart failure [facility])  The patient has echocardiographic findings on echo as noted above   These are different than from sept 2017 where he had only mild MR and mild TR and EF 65%  On his most recent echo in April 2018, his echo decreased to 45% The patient is on demadex 40 mg daily  Continue with diuretics; we will increase Demadex to 40 mg twice daily given the significant leg swelling  Again is noted above I did say that we need to watch his kidney function as trying to decreased leg edema with his worsening cardiac hemodynamics will increase his creatinine most likely  He also has borderline hypotension with systolic blood pressure 56Z which may be reflective of worsening cardiac function  I also do think that he will require dialysis in the not too distant future especially given the problems with regards to diuretic resistance  I am not sure if he is a candidate for any type of intervention given the moderate to severe regurgitation IE something like a mitral clip  He is due to see Cardiology next Tuesday  I will do a follow-up phone call with Mrs Jayashree Guevara on Friday to see how he is doing with regards to the increased diuretic dose and will recheck blood work on Monday  He may be discharged from the rehabilitation center tomorrow  Anemia in chronic kidney disease  His hgb has been between 8 9-9 0 on his most recent bloodwork  Recheck iron studies and ferritin and repeat CBC  UTI (urinary tract infection)  He was treated for UTI and has been on abx since 5/18  He had transient urinary retention which seems to have resolved  Overall:  As stated to the patient's wife, we are going to increase his Demadex to 40 mg twice daily  Will see what his weights are and I will do a follow-up phone:  Friday  Will get repeat labs on Monday  He scheduled to see us in follow-up in a couple of weeks with Jeffery Chiu practitioner and I will see him 2 weeks and follow up after that approximately  I do think given his underlying cardiac issues and likely cardiorenal syndrome I do think that he will require dialysis    I did bring this up during this visit and again Mrs Oriana Hung was very has 10 about this  She does understand my thought process especially given his worsening cardiac issues and how they affect his renal function  I did say that if he did have a repeat hospitalization that are [address] may be appropriate in that they have a heart failure service  He is due to see Cardiology next week and further discussions to see if he would be a candidate for any type of intervention with regards to his mitral valve with 0 he also has severe tricuspid regurgitation as well with pulmonary hypertension and RV dilatation and hypokinesis  His comorbidities may preclude any type of valvular intervention but I defer to Cardiology with regards to management  All questions answered to the best my ability  During this visit, Mrs Oriana Hung had also reviewed other medications that he had received during the hospitalization was questioning the use of pain meds including tramadol as well as Ambien to help with his sleeping  She stated she preferred something more natural and had been asking me about the use something like medical marijuana which I stated I needed to further research to see if it would applicable or even appropriate for Mr Oriana Hung  She stated she was very upset over those medications being given during his inpatient stay as she felt that he had side effects from those medications  I will list those medications as allergies on his medication record  I reviewed his current medication list with her including his current diuretic regimen and what to expect including possible side effects from the diuretic including a possible worsening of kidney function and lowering of blood pressure however given where his kidney function cardiac status are we may be limited in future options except for possible dialysis    Again all questions answered regarding his medications to the best of my ability during this visit    Subjective:      Patient ID: Hugh Cabezas  is a [de-identified] y o  male  HPI    Patient seen in follow-up for acute kidney injury and chronic kidney disease as well as fluid management  Mr Pacheco Wakefield had a long hospitalization at [facility] for over a month secondary to worsening acute kidney injury and fluid overload  During hospitalization he had been placed on a diuretic infusion help with fluid overload  His creatinine had gone as high as 3 6-3 8 and blood urea nitrogen level of 163  Diuretics had been held given his worsening uremia  During this hospitalization, there was discussions with regards to initiating hemodialysis  The patient's wife, Mrs Pacheco Wakefield, was very reluctant to initiate hemodialysis at that time given his poor oral intake as well as generalized fatigue and did not feel at that time that he would do well with dialysis  Discussions in detailed as there was concern that uremia causing him to not eat as well as significant diuretic resistance in that despite the diuretic infusion he still had signs of fluid overload  His diuretics were held and his creatinine had decreased to approximately 2 7-028 at discharge and his BUN was 115-122 range  He has been at [facility] for rehabilitation post hospital discharge  I spoke with the patient's wife on the phone at length last week with regards to his status  I reviewed his lab work and had asked that weights be faxed over prior to today's visit  He had been running weights of approximately 198-204 lb and his diuretic dose had been increased to Demadex 40 mg daily by the physician at the rehabilitation center  He has been getting physical therapy daily  He has had worsening leg edema  His creatinine had been as high as 3 8 his most recent creatinine has decreased to 3 2  He seems to be running around 3-3 2 on his most recent blood work over the past week    He denies any uremic symptoms for me denies any nausea vomiting no metallic taste no itching no hiccups  We talked at length with regards to his recent hospitalization other medications as well as diuretic dosing given the persistent leg edema and light of his underlying worsening cardiac hemodynamics  Total visit today when the office was approximately  For 1 5 hours    Review of Systems  increased leg edema mild shortness of breath no chest pressure questionable mild dizziness  No trouble with urination urgency no frequency no nausea no vomiting food is not his leg metal he states his appetite is okay  Objective: There were no vitals taken for this visit  Blood pressure is 96/60; the patient is sitting in a wheelchair this was done on his right arm with an appropriate size cuff  Physical Exam   Constitutional: He is oriented to person, place, and time  Mild cachexia compared to when I have seen him earlier in the year   HENT:   Head: Atraumatic  Eyes: No scleral icterus  Neck: Neck supple  Cardiovascular: Normal rate  Variable S1-S2 he has a known history of atrial fibrillation   Abdominal: Soft  Bowel sounds are normal    Musculoskeletal: He exhibits edema  He has pitting edema in addition to bilateral lymphedema   Lymphadenopathy:     He has no cervical adenopathy  Neurological: He is alert and oriented to person, place, and time  Skin: Skin is warm and dry     He does have venous stasis changes

## 2018-05-23 NOTE — PATIENT INSTRUCTIONS
1  We will increase Torsemide 40 mg twice a day; I will call you on Friday and we will see how Kirill Martino is doing  2  We will repeat labs on Monday and I will call you next week as well       3

## 2018-05-23 NOTE — ASSESSMENT & PLAN NOTE
His hgb has been between 8 9-9 0 on his most recent bloodwork  Recheck iron studies and ferritin and repeat CBC

## 2018-05-23 NOTE — ASSESSMENT & PLAN NOTE
The patient has echocardiographic findings on echo as noted above  These are different than from sept 2017 where he had only mild MR and mild TR and EF 65%  On his most recent echo in April 2018, his echo decreased to 45% The patient is on demadex 40 mg daily  Continue with diuretics; we will increase Demadex to 40 mg twice daily given the significant leg swelling  Again is noted above I did say that we need to watch his kidney function as trying to decreased leg edema with his worsening cardiac hemodynamics will increase his creatinine most likely  He also has borderline hypotension with systolic blood pressure 22J which may be reflective of worsening cardiac function  I also do think that he will require dialysis in the not too distant future especially given the problems with regards to diuretic resistance  I am not sure if he is a candidate for any type of intervention given the moderate to severe regurgitation IE something like a mitral clip  He is due to see Cardiology next Tuesday  I will do a follow-up phone call with Mrs Lester Solorio on Friday to see how he is doing with regards to the increased diuretic dose and will recheck blood work on Monday  He may be discharged from the rehabilitation center tomorrow

## 2018-05-24 NOTE — TELEPHONE ENCOUNTER
I did a follow-up phone call with Mrs Man this afternoon  He did not get physical therapy today  I also wanted to follow up with regards to her concern with regards to Ambien and Tramadol of the question being given in the hospital   I reviewed his hospitalization medications and I also spoke with the pharmacist to confirm  The patient did not receive any Tramadol during this hospitalization  I could not even find it as an active p r n  order  Ambien had been ordered as it p r n  order and it was noted that it was almost given once but was returned to the cabinet so he did not receive this during his hospitalization  Ambien is an as needed basis was listed on his discharge summary but I did not see Tramadol listed as a discharge medication  I reiterated that I could not see where he was given Ambien or Tramadol during this hospitalization and again I confirmed this with pharmacy  I answered all questions to the best of my ability

## 2018-05-25 PROBLEM — I13.0 CARDIORENAL SYNDROME, STAGE 1-4 OR UNSPECIFIED CHRONIC KIDNEY DISEASE, WITH HEART FAILURE (HCC): Status: ACTIVE | Noted: 2018-01-01

## 2018-05-25 PROBLEM — E13.9 DIABETES 1.5, MANAGED AS TYPE 2 (HCC): Chronic | Status: ACTIVE | Noted: 2018-01-01

## 2018-05-25 PROBLEM — I48.0 PAROXYSMAL ATRIAL FIBRILLATION (HCC): Chronic | Status: ACTIVE | Noted: 2018-01-01

## 2018-05-25 PROBLEM — I48.0 PAROXYSMAL ATRIAL FIBRILLATION (HCC): Status: ACTIVE | Noted: 2018-01-01

## 2018-05-25 PROBLEM — E13.9 DIABETES 1.5, MANAGED AS TYPE 2 (HCC): Status: ACTIVE | Noted: 2018-01-01

## 2018-05-26 PROBLEM — I73.9 PERIPHERAL ARTERIAL DISEASE (HCC): Chronic | Status: ACTIVE | Noted: 2018-01-01

## 2018-05-26 NOTE — CONSULTS
Consult - [id number] [facility]  [de-identified] y o  male MRN: [id number]  Unit/Bed#: [facility] 512-01 Encounter: [id number]    Assessment/Plan     1  [de-identified] y/o diabetic male with multiple areas of dry gangrene to R 2nd toe, L 3rd and 5th toes 2/2 to severe PAD  2  Eschars x2 L lateral heel 2/2 PAD  3  ID maceration  4  PAD  5  DM  6  CHF    -dry gangrene to distal tip of right 2nd toe, left distal 3rd and 5th toes stable without clinical signs of infection; applied Betadine   -eschars x2 to left lateral heel are stable without clinical signs of infection; applied Betadine and dry sterile dressing  -mild maceration interdigital spaces, applied Betadine to interspaces x8  -ordered Prevalon boots for bilateral foot while in bed to offload heels  -there are no clinical signs infection however have ordered bilateral foot x-rays to rule out gas/osteomyelitis  -reviewed leads from 05/18/2018: R NOEL 1 51, great toe flat line PPG waveform , L NOEL 0 77, PPG waveform flat line of great toe; right patent common femoral to tib/peroneal trunk bypass graft, left shows known occlusion of proximal SFA and popliteal artery and suggest occluded posterior tibial artery  -Known severe vascular disease however would recommend revascularization to left lower extremity if patient medically stable  -will discuss with attending and update plan  -patient normally follows with Dr Bennie Tucker and can follow with him upon discharge    History of Present Illness     HPI:  Geraldine Damian  is a [de-identified] y o  male who presents with bilateral foot dry gangrene and eschars  He was admitted to the hospital from rehab for CHF  Patient was unable to give history as he was extremely groggy however obtain history from hospital note from 04/06/2018  Approximately 15 years ago he had a right lower extremity bypass by Dr Allan Lee and currently has a dry gangrene of the right 2nd toe    He is also known left lower extremity arterial disease and started to notice black areas last October  In December 2017 he underwent a left lower extremity angiogram with extensive occlusions  He has been treating the gangrene with Betadine and offloading and sees his podiatrist Dr Yolanda Coleman for routine local wound care  He is resting comfortably however is extremely groggy  Consults  Review of Systems   Constitutional: Negative  HENT: Negative  Eyes: Negative  Respiratory: Negative  Cardiovascular: Negative  Gastrointestinal: Negative  Musculoskeletal:  Peripheral edema  Skin:  Dry gangrene bilateral feet   Neurological: Negative  Psych: negative         Historical Information   Past Medical History:   Diagnosis Date    Cardiac disease     [facility] [facility]     Diabetes mellitus [facility] [facility]     Dyspnea     last assessed-7/10/2015    Embolism, arterial, leg, right (HCC)     last assessed-7/10/2015    Hematuria     resolved-7/24/2017    History of herniated intervertebral disc     Hypertension     Myocardial infarction ([facility] [address] [facility]     Pneumonia     Premature atrial contractions     Productive cough     last assessed-10/31/2016    Renal disorder      Past Surgical History:   Procedure Laterality Date    AORTIC VALVE REPLACEMENT      BYPASS GRAFT      using vein: femoral-popliteal    CARDIAC VALVE REPLACEMENT      CATARACT EXTRACTION, BILATERAL      COLONOSCOPY      CORONARY ARTERY BYPASS GRAFT      X6    KNEE SURGERY Bilateral     knee replacement    [place] SLCTV CATHJ 3RD+ ORD SLCTV ABDL PEL/LXTR [facility] Left 12/22/2017    Procedure: LEG ANGIOGRAM; RIGHT FEMORAL ACCESS; CO2-CONTRAST ;  Surgeon: Chato Nelson MD;  [facility];  Service: Vascular    REPLACEMENT TOTAL KNEE BILATERAL      VASCULAR SURGERY       Social History   History   Alcohol Use No     History   Drug Use No     History   Smoking Status    Former Smoker    Quit date: 10/11/1987   Smokeless Tobacco    Never Used     Comment: Per huey owens smoker cigarettes-heavy (20-25/day) Family History:   Family History   Problem Relation Age of Onset    Cancer Mother     Heart disease Father     Diabetes Sister     Heart disease Sister     Diabetes Brother     Diabetes Other        Meds/Allergies   Prescriptions Prior to Admission   Medication    acetaminophen (TYLENOL) 325 mg tablet    aspirin (ECOTRIN LOW STRENGTH) 81 mg EC tablet    atorvastatin (LIPITOR) 40 mg tablet    febuxostat (ULORIC) 40 mg tablet    insulin aspart protamine-insulin aspart (NovoLOG 70/30) 100 units/mL injection    metoprolol succinate (TOPROL-XL) 25 mg 24 hr tablet    multivitamin-iron-minerals-folic acid (CENTRUM) chewable tablet    NON FORMULARY    omeprazole (PriLOSEC) 20 mg delayed release capsule    paricalcitol (ZEMPLAR) 1 mcg capsule    ramipril (ALTACE) 2 5 mg capsule    torsemide (DEMADEX) 20 mg tablet    calcium carbonate (TUMS) 500 mg chewable tablet    diphenhydrAMINE (BENADRYL) 25 mg tablet    docusate sodium (COLACE) 100 mg capsule    insulin glargine (LANTUS) 100 units/mL subcutaneous injection    insulin lispro (HumaLOG) 100 units/mL injection    insulin lispro (HumaLOG) 100 units/mL injection    melatonin 3 mg    polyethylene glycol (MIRALAX) 17 g packet    Probiotic Product (NATRUL PROBIOTIC) CAPS    tamsulosin (FLOMAX) 0 4 mg    zolpidem (AMBIEN) 5 mg tablet     Allergies   Allergen Reactions    Codeine      "swelling shaky"    Hydrocodone      Other reaction(s):  Other (See Comments)  Heart racing       Objective   First Vitals:   Blood Pressure: 98/56 (05/25/18 1930)  Pulse: (!) 112 (05/25/18 1930)  Temperature: 97 9 °F (36 6 °C) (05/25/18 1930)  Temp Source: Oral (05/25/18 1930)  Respirations: 18 (05/25/18 1930)  Height: 5' 7" (170 2 cm) (05/25/18 3004)  Weight - Scale: 95 3 kg (210 lb) (05/25/18 1930)  SpO2: 99 % (05/25/18 1930)    Current Vitals:   Blood Pressure: 90/60 (05/26/18 0900)  Pulse: 101 (05/26/18 0715)  Temperature: 97 9 °F (36 6 °C) (05/26/18 0715)  Temp Source: Oral (05/26/18 0715)  Respirations: 20 (05/26/18 0715)  Height: 5' 7" (170 2 cm) (05/25/18 7054)  Weight - Scale: 95 kg (209 lb 7 oz) (05/26/18 0528)  SpO2: 100 % (05/26/18 9889)        BP 90/60   Pulse 101   Temp 97 9 °F (36 6 °C) (Oral)   Resp 20   Ht 5' 7" (1 702 m)   Wt 95 kg (209 lb 7 oz)   SpO2 100%   BMI 32 80 kg/m²      General Appearance:    Alert, cooperative, no distress   Head:    Normocephalic, without obvious abnormality, atraumatic   Eyes:    PERRL, conjunctiva/corneas clear, EOM's intact        Nose:   Moist mucous membranes   Neck:   Supple, symmetrical, trachea midline   Back:     Symmetric   Lungs:     Respirations unlabored   Heart:    Regular rate and rhythm, S1 and S2 normal, no murmur, rub   or gallop   Abdomen:     Soft, non-tender   Extremities:  +3 pitting edema to bilateral feet and legs  Does not seem to have calf tenderness to palpation however feet do seem to be tender upon palpation   Pulses:   Pedal pulses are nonpalpable due to edema and known vascular disease  Capillary refill time is less than 3 sec  Pedal hair is absent  Skin:   Dry gangrene to the right distal 2nd toe without clinical signs of infection  Dry gangrene to the left distal 3rd and 5th digits without clinical signs of infection  Dry stable eschars to the left lateral heel, proximal measures 2 x 1 cm, distal measures 1 x 1 cm  These are without acute signs of infection  Mild interdigital macerations to interspaces x8  Neurologic:   Gross sensation is intact  Protective sensation is diminished                       Lab Results:   Admission on 05/25/2018   Component Date Value    Sodium 05/25/2018 130*    Potassium 05/25/2018 4 9     Chloride 05/25/2018 95*    CO2 05/25/2018 26     Anion Gap 05/25/2018 9     BUN 05/25/2018 108*    Creatinine 05/25/2018 3 28*    Glucose 05/25/2018 157*    Calcium 05/25/2018 8 8     AST 05/25/2018 17     ALT 05/25/2018 28     Alkaline Phosphatase 05/25/2018 76     Total Protein 05/25/2018 6 6     Albumin 05/25/2018 3 2*    Total Bilirubin 05/25/2018 0 66     eGFR 05/25/2018 17     WBC 05/25/2018 6 63     RBC 05/25/2018 3 36*    Hemoglobin 05/25/2018 9 2*    Hematocrit 05/25/2018 28 6*    MCV 05/25/2018 85     MCH 05/25/2018 27 4     MCHC 05/25/2018 32 2     RDW 05/25/2018 17 0*    MPV 05/25/2018 9 7     Platelets 00/87/1924 147*    nRBC 05/25/2018 0     Neutrophils Relative 05/25/2018 70     Lymphocytes Relative 05/25/2018 20     Monocytes Relative 05/25/2018 8     Eosinophils Relative 05/25/2018 1     Basophils Relative 05/25/2018 0     Neutrophils Absolute 05/25/2018 4 66     Lymphocytes Absolute 05/25/2018 1 33     Monocytes Absolute 05/25/2018 0 51     Eosinophils Absolute 05/25/2018 0 09     Basophils Absolute 05/25/2018 0 01     Troponin I 05/25/2018 0 07*    Protime 05/25/2018 15 4*    INR 05/25/2018 1 21*    PTT 05/25/2018 28     NT-proBNP 05/25/2018 44597*    Magnesium 05/26/2018 2 9*    Sodium 05/26/2018 130*    Potassium 05/26/2018 4 6     Chloride 05/26/2018 96*    CO2 05/26/2018 26     Anion Gap 05/26/2018 8     BUN 05/26/2018 112*    Creatinine 05/26/2018 2 95*    Glucose 05/26/2018 172*    Calcium 05/26/2018 8 8     eGFR 05/26/2018 19     Platelets 80/42/9540 162     MPV 05/26/2018 10 3     POC Glucose 05/26/2018 205*    WBC 05/26/2018 6 69     RBC 05/26/2018 3 36*    Hemoglobin 05/26/2018 9 2*    Hematocrit 05/26/2018 29 5*    MCV 05/26/2018 88     MCH 05/26/2018 27 4     MCHC 05/26/2018 31 2*    RDW 05/26/2018 17 1*    Platelets 99/66/0466 159     MPV 05/26/2018 10 1     POC Glucose 05/26/2018 178*                   Invalid input(s): LABAEARO            Imaging: I have personally reviewed pertinent films in PACS  EKG, Pathology, and Other Studies: I have personally reviewed pertinent reports        Code Status: Level 1 - Full Code  Advance Directive and Living Will:      Power of :    POLST:

## 2018-05-26 NOTE — ASSESSMENT & PLAN NOTE
With left foot ulcers, podiatry consultation appreciated  Not a candidate for vascular intervention at this point

## 2018-05-26 NOTE — PLAN OF CARE
Problem: DISCHARGE PLANNING - CARE MANAGEMENT  Goal: Discharge to post-acute care or home with appropriate resources  INTERVENTIONS:  - Conduct assessment to determine patient/family and health care team treatment goals, and need for post-acute services based on payer coverage, community resources, and patient preferences, and barriers to discharge  - Address psychosocial, clinical, and financial barriers to discharge as identified in assessment in conjunction with the patient/family and health care team  - Arrange appropriate level of post-acute services according to patient's   needs and preference and payer coverage in collaboration with the physician and health care team  - Communicate with and update the patient/family, physician, and health care team regarding progress on the discharge plan  - Arrange appropriate transportation to post-acute venues  - CM will assist Pt with his return to SNF for completion of rehab  Outcome: Progressing

## 2018-05-26 NOTE — H&P
H&P- Harjinder Seymour  1935, [de-identified] y o  male MRN: [id number]    Unit/Bed#: [facility] 512-01 Encounter: [id number]    Primary Care Provider: Dolly Tran MD   Date and time admitted to hospital: 5/25/2018  7:22 PM        * Acute on chronic combined systolic and diastolic CHF (congestive heart failure) [facility])   Assessment & Plan    Patient presented from rehab with evidence of acute on chronic combined heart failure  Of notes he had prolonged hospitalization on 4/4-5/11 due to acute on chronic heart failure and cardiorenal syndrome  Repeated echo reveals significantly decreased ejection fraction ( 65% in September 2017 dropped to 45% )   noted getting worse renal function most likely secondary to cardiorenal syndrome and  need of aggressive IV diuresis to improve fluid overload state as patient declined dialysis  Management of this patient with IV diuresis is challenging situation as his renal function most likely will deteriorate further given the need of IV diuresis  To help go through Catch- 22 I will request cardiology and nephrology consult  Patient will be admitted to telemetry , SD2  Continue fluid and salt restricted HF diet  Daily weight( his usual dry weight 198-204 lb, on admission today his weight 210 lb), strict intake and output  Start Bumex 1 mg IV b i d   Monitor renal function and electrolytes  Hold torsemide         Cardiorenal syndrome, stage 1-4 or unspecified chronic kidney disease, with heart failure [facility])   Assessment & Plan    Please refer to Dr Arely Montez outpatient notes   on May 23rd  Management as above  Apparently his new baseline of creatinine 3-3 2    Today's creatinine 3 28         Benign essential hypertension   Assessment & Plan    Continue IV diuresis  Beta-blocker        Paroxysmal atrial fibrillation (HCC)   Assessment & Plan    Declined AC due to high fall risk   continue aspirin and beta-blocker        Coronary artery disease involving native coronary artery of native heart Assessment & Plan    Continue aspirin beta-blocker statin        Diabetes 1 5, managed as type 2 (HCC)   Assessment & Plan    Continue long-acting insulin Accu-Cheks insulin sliding scale        S/P aortic valve replacement with bioprosthetic valve   Assessment & Plan    Cardiology to follow        Peripheral arterial disease ([address] )   Assessment & Plan    With left foot ulcers, follows podiatry  Patient daughter requested podiatry consult            VTE Prophylaxis: Heparin  / sequential compression device   Code Status: full  POLST: There is no POLST form on file for this patient (pre-hospital)  Discussion with family:  Daughter at bedside    Anticipated Length of Stay:  Patient will be admitted on an Inpatient basis with an anticipated length of stay of  > 2 midnights  Justification for Hospital Stay:  Cardiology Nephrology podiatry consult, IV diuresis    Total Time for Visit, including Counseling / Coordination of Care: 45 minutes  Greater than 50% of this total time spent on direct patient counseling and coordination of care  Chief Complaint:   Getting worse dyspnea with minimum exertion approximately 10 lb weight gain orthopnea    History of Present Illness:    Dylan Israel  is a [de-identified] y o  male with multiple comorbidities including combined heart failur, CKD 4 , Afib, foot ulcers who presented with getting worse shortness of breath with minimal exertion, increased bilateral lower extremity edema, orthopnea and approximately 10 lb of weight gain since his discharge on May 11  Of notes he was hospitalized between 4/4-5/11 due to acute on chronic heart failure ,cardio renal syndrome  Patient declined it dialysis as the option to remove excessive fluid  He underwent aggressive diuresis with Bumex IV ,this  was managed by Cardiology and Nephrology  Unfortunate a repeated echocardiogram found  decreased ejection fraction compared to his previous data    Patient was discharged to rehab   and presented last night with above-mentioned complaints of CHF exacerbation  ProBNP 41849,  vascular congestion and fusion imaging study  His  today's weight 210 lbs  He admitted on an inpatient basis for acute on chronic combined heart failure          Review of Systems:    Review of Systems   Constitutional: Positive for activity change  Respiratory: Positive for shortness of breath  Cardiovascular: Positive for leg swelling  Past Medical and Surgical History:     Past Medical History:   Diagnosis Date    Cardiac disease     [facility]est[facility]) [facility] [address] [facility]     Diabetes mellitus ([facility]     Dyspnea     last assessed-7/10/2015    Embolism, arterial, leg, right (HCC)     last assessed-7/10/2015    Hematuria     resolved-7/24/2017    History of herniated intervertebral disc     Hypertension     [facility]     Pneumonia     Premature atrial contractions     Productive cough     last assessed-10/31/2016    Renal disorder        Past Surgical History:   Procedure Laterality Date    AORTIC VALVE REPLACEMENT      BYPASS GRAFT      using vein: femoral-popliteal    CARDIAC VALVE REPLACEMENT      CATARACT EXTRACTION, BILATERAL      COLONOSCOPY      CORONARY ARTERY BYPASS GRAFT      X6    KNEE SURGERY Bilateral     knee replacement    [place] SLCTV CATHJ 3RD+ ORD SLCTV ABDL PEL/LXTR [facility] Left 12/22/2017    Procedure: LEG ANGIOGRAM; RIGHT FEMORAL ACCESS; CO2-CONTRAST ;  Surgeon: Jessica Riley MD;  [facility]: [facility];  Service: Vascular    REPLACEMENT TOTAL KNEE BILATERAL      VASCULAR SURGERY         Meds/Allergies:    Prior to Admission medications    Medication Sig Start Date End Date Taking?  Authorizing Provider   acetaminophen (TYLENOL) 325 mg tablet Take 2 tablets (650 mg total) by mouth every 8 (eight) hours as needed for mild pain, headaches or fever 5/11/18  Yes Neda Rivera MD   aspirin (ECOTRIN LOW STRENGTH) 81 mg EC tablet Take 81 mg by mouth daily     Yes Historical Provider, MD   atorvastatin (LIPITOR) 40 mg tablet Take 40 mg by mouth daily   Yes Historical Provider, MD   febuxostat (ULORIC) 40 mg tablet Take 40 mg by mouth daily   Yes Historical Provider, MD   insulin aspart protamine-insulin aspart (NovoLOG 70/30) 100 units/mL injection Inject 24 Units under the skin 2 (two) times a day before meals   Yes Historical Provider, MD   metoprolol succinate (TOPROL-XL) 25 mg 24 hr tablet Take 0 5 tablets (12 5 mg total) by mouth every 12 (twelve) hours  Patient taking differently: Take by mouth every 12 (twelve) hours Family reports that pt has been changing doses frequently  Family unable to state dose at this time  5/11/18  Yes Maximo Wang MD   multivitamin-iron-minerals-folic acid (CENTRUM) chewable tablet Chew 1 tablet daily  Yes Historical Provider, MD   omeprazole (PriLOSEC) 20 mg delayed release capsule Take 20 mg by mouth daily     Yes Historical Provider, MD   paricalcitol (ZEMPLAR) 1 mcg capsule Take 1 mcg by mouth 3 (three) times a week MWF   Yes Historical Provider, MD   ramipril (ALTACE) 2 5 mg capsule Take 2 5 mg by mouth daily   Yes Historical Provider, MD   sevelamer (RENAGEL) 800 mg tablet Take 1 tablet (800 mg total) by mouth 3 (three) times a day with meals 5/11/18  Yes Maximo Wang MD   torsemide (DEMADEX) 20 mg tablet TAKE 2 TABLETS BY MOUTH TWICE DAILY 5/24/18  Yes Lucien Carlson,    calcium carbonate (TUMS) 500 mg chewable tablet Chew 2 tablets (1,000 mg total) daily as needed for indigestion or heartburn 5/11/18   Maximo Wang MD   diphenhydrAMINE (BENADRYL) 25 mg tablet Take 0 5 tablets (12 5 mg total) by mouth daily at bedtime as needed for sleep 5/11/18   Maximo Wang MD   docusate sodium (COLACE) 100 mg capsule Take 1 capsule (100 mg total) by mouth 2 (two) times a day 5/11/18   Maximo Wang MD   insulin glargine (LANTUS) 100 units/mL subcutaneous injection Inject 20 Units under the skin daily at bedtime 5/11/18   Maximo Wang MD   insulin lispro (HumaLOG) 100 units/mL injection Inject 1-5 Units under the skin 3 (three) times a day before meals 5/11/18   Mary Johnson MD   insulin lispro (HumaLOG) 100 units/mL injection Inject 1-5 Units under the skin daily at bedtime 5/11/18   Mary Johnson MD   melatonin 3 mg Take 2 tablets (6 mg total) by mouth daily at bedtime 5/11/18   Mary Johnson MD   polyethylene glycol (MIRALAX) 17 g packet Take 17 g by mouth daily as needed (Constipation) 5/11/18   Mary Johnson MD   Probiotic Product (NATRUL PROBIOTIC) CAPS Take 1 capsule by mouth 2 (two) times a day before lunch and dinner 5/11/18   Mary Johnson MD   tamsulosin (FLOMAX) 0 4 mg Take 1 capsule (0 4 mg total) by mouth daily with dinner 5/11/18   Mary Johnson MD   zolpidem (AMBIEN) 5 mg tablet Take 1 tablet (5 mg total) by mouth daily at bedtime as needed for sleep for up to 10 days 5/11/18 5/21/18  Mary Johnson MD     I have reviewed home medications with a medical source (PCP, Pharmacy, other)  Allergies: Allergies   Allergen Reactions    Codeine      "swelling shaky"    Hydrocodone      Other reaction(s):  Other (See Comments)  Heart racing       Social History:     Marital Status: /Civil Union   Occupation: none  Patient Pre-hospital Living Situation: rehab  Patient Pre-hospital Level of Mobility:  Unknown  Patient Pre-hospital Diet Restrictions: reg  Substance Use History:   History   Alcohol Use No     History   Smoking Status    Former Smoker    Quit date: 10/11/1987   Smokeless Tobacco    Never Used     Comment: Per huey owens smoker cigarettes-heavy (20-25/day)     History   Drug Use No       Family History:    non-contributory    Physical Exam:     Vitals:   Blood Pressure: 118/67 (05/25/18 2354)  Pulse: (!) 110 (05/25/18 2354)  Temperature: 97 7 °F (36 5 °C) (05/25/18 2354)  Temp Source: Oral (05/25/18 2354)  Respirations: 18 (05/25/18 2354)  Height: 5' 7" (170 2 cm) (05/25/18 2354)  Weight - Scale: 95 3 kg (210 lb) (05/25/18 1930)  SpO2: 100 % (05/25/18 2354)    Physical Exam   Constitutional: No distress  HENT:   Head: Normocephalic  Eyes: Pupils are equal, round, and reactive to light  Cardiovascular:   Murmur heard  iregular   Pulmonary/Chest: He has rales  Abdominal: He exhibits no distension  Musculoskeletal: He exhibits edema  4+ b/l   Neurological: He is alert  Skin:   Left lower extremity wound   Psychiatric: He has a normal mood and affect  Additional Data:     Lab Results: I have personally reviewed pertinent reports  Results from last 7 days  Lab Units 05/25/18  1944   WBC Thousand/uL 6 63   HEMOGLOBIN g/dL 9 2*   HEMATOCRIT % 28 6*   PLATELETS Thousands/uL 147*   NEUTROS PCT % 70   LYMPHS PCT % 20   MONOS PCT % 8   EOS PCT % 1       Results from last 7 days  Lab Units 05/25/18 1944   SODIUM mmol/L 130*   POTASSIUM mmol/L 4 9   CHLORIDE mmol/L 95*   CO2 mmol/L 26   BUN mg/dL 108*   CREATININE mg/dL 3 28*   CALCIUM mg/dL 8 8   TOTAL PROTEIN g/dL 6 6   BILIRUBIN TOTAL mg/dL 0 66   ALK PHOS U/L 76   ALT U/L 28   AST U/L 17   GLUCOSE RANDOM mg/dL 157*       Results from last 7 days  Lab Units 05/25/18  1944   INR  1 21*       Results from last 7 days  Lab Units 05/26/18  0042   POC GLUCOSE mg/dl 205*           Imaging: I have personally reviewed pertinent reports  X-ray chest 2 views   ED Interpretation by Dennise Gan MD (05/25 2105)   Vascular congestion noted bilaterally  Allscripts / Epic Records Reviewed: Yes     ** Please Note: This note has been constructed using a voice recognition system   **

## 2018-05-26 NOTE — CONSULTS
Consultation - Nephrology   Rossana Stephens  [de-identified] y o  male MRN: [id number]  Unit/Bed#: [facility] 12-46 Encounter: [id number]      ASSESSMENT and PLAN:    [de-identified] male history of valvular cardiomyopathy with recent admission of shortness of breath and volume overload present with shortness of breath secondary to decompensated heart failure complicated by stage 4 chronic kidney disease    1  Stage 4 chronic kidney disease with severe azotemia-creatinine at baseline between 2 5-3 point  -creatinine 1 year ago was around 2 1  -patient presented with shortness of breath, weight is up to 209 lb has been as low as 198 lb as an outpatient  -seems to not have had much urine output with the increase in the Bumex to 2 mg  -blood pressures are borderline low-will give albumin 25 g Q 12 x2 doses and start on a Bumex drip at 2 mg an  -low threshold to place a Geronimo catheter as patient has marked volume overload which could potentially cause increased intra-abdominal hypertension leading to reduced urine output-will place for 24 hours and monitor urine output  -check a urinalysis  -if no response to Bumex drip over the next 24-48 hours he does show some signs of uremia and will require renal replacement therapy  -fluid restrict to 1500 cc  -will discontinue ACE-inhibitor in light of hypotension and to attempt to improve diuresis  -renal ultrasound reviewed from April shows bilateral medical renal disease  -check a bladder scan    2  Decompensated congestive heart failure  -cardiology following    3  Volume overload-as above  -weight upon last discharge was 194 currently 209 lb        HISTORY OF PRESENT ILLNESS:  Requesting Physician: Berto Back MD  Reason for Consult:  Chronic kidney disease and volume overload    Rossana Stephens  is a [de-identified]y o  year old male who was admitted to [facility] after presenting with shortness of breath   A renal consultation is requested today for assistance in the management of chronic kidney disease  Patient saw nephrology as an outpatient and had a discussion about dialysis at that point  He has a past medical history of stage 4 chronic kidney disease with a baseline creatinine between 2 5 and 3 0, valvular cardiomyopathy who was recently hospitalized with volume overload at [facility] patient was diuresed there and sent home upon follow-up patient's azotemia and renal function remained stable around 3 a discussion was had regarding potential dialysis and patient stated he would think about  Presented yesterday evening with increased shortness of breath, chest x-ray with worsening vascular congestion and weight is up about 15 lb    He had did urinate about 200 cc    PAST MEDICAL HISTORY:  Past Medical History:   Diagnosis Date    Cardiac disease     [facility] [address] [facility]     Diabetes mellitus ([facility])     Dyspnea     last assessed-7/10/2015    Embolism, arterial, leg, right ([facility])     last assessed-7/10/2015    Hematuria     resolved-7/24/2017    History of herniated intervertebral disc     Hypertension     Myocardial infarction ([facility] [address] )     Pneumonia     Premature atrial contractions     Productive cough     last assessed-10/31/2016    Renal disorder        PAST SURGICAL HISTORY:  Past Surgical History:   Procedure Laterality Date    AORTIC VALVE REPLACEMENT      BYPASS GRAFT      using vein: femoral-popliteal    CARDIAC VALVE REPLACEMENT      CATARACT EXTRACTION, BILATERAL      COLONOSCOPY      CORONARY ARTERY BYPASS GRAFT      X6    KNEE SURGERY Bilateral     knee replacement    TN SLCTV CATHJ 3RD+ ORD SLCTV ABDL PEL/LXTR [facility] Left 12/22/2017    Procedure: LEG ANGIOGRAM; RIGHT FEMORAL ACCESS; CO2-CONTRAST ;  Surgeon: Michelle Oates MD;  [facility]: [facility];  Service: Vascular    REPLACEMENT TOTAL KNEE BILATERAL      VASCULAR SURGERY         ALLERGIES:  Allergies   Allergen Reactions    Codeine      "swelling shaky"    Hydrocodone      Other reaction(s):  Other (See Comments)  Heart racing       SOCIAL HISTORY:  History   Alcohol Use No     History   Drug Use No     History   Smoking Status    Former Smoker    Quit date: 10/11/1987   Smokeless Tobacco    Never Used     Comment: Per huey owens smoker cigarettes-heavy (20-25/day)       FAMILY HISTORY:  Family History   Problem Relation Age of Onset    Cancer Mother     Heart disease Father     Diabetes Sister     Heart disease Sister     Diabetes Brother     Diabetes Other        MEDICATIONS:    Current Facility-Administered Medications:     acetaminophen (TYLENOL) tablet 650 mg, 650 mg, Oral, Q6H PRN, Junior Rudy MD, 650 mg at 05/26/18 0523    aspirin (ECOTRIN LOW STRENGTH) EC tablet 81 mg, 81 mg, Oral, Daily, Junior Rudy MD, 81 mg at 05/26/18 0916    atorvastatin (LIPITOR) tablet 40 mg, 40 mg, Oral, Daily, Junior Rudy MD, 40 mg at 05/26/18 0916    bumetanide (BUMEX) injection 1 mg, 1 mg, Intravenous, Once, Fer Ramirez DO    bumetanide (BUMEX) injection 2 mg, 2 mg, Intravenous, Q12H, Brittany Denton MD, 2 mg at 05/26/18 1127    calcium carbonate (TUMS) chewable tablet 1,000 mg, 1,000 mg, Oral, Daily PRN, Junior Rudy MD    docusate sodium (COLACE) capsule 100 mg, 100 mg, Oral, BID, Junior Rudy MD    febuxostat (ULORIC) tablet 40 mg, 40 mg, Oral, Daily PRN, Johnnie Booth MD    heparin (porcine) 25,000 units in 250 mL infusion (premix), 3-20 Units/kg/hr (Order-Specific), Intravenous, Titrated, Brittany Denton MD, Last Rate: 10 mL/hr at 05/26/18 1340, 11 1 Units/kg/hr at 05/26/18 1340    heparin (porcine) injection 2,000 Units, 2,000 Units, Intravenous, PRN, Brittany Denton MD    heparin (porcine) injection 4,000 Units, 4,000 Units, Intravenous, PRN, Brittany Denton MD    heparin (porcine) subcutaneous injection 5,000 Units, 5,000 Units, Subcutaneous, Q8H [address], 5,000 Units at 05/26/18 0524 **AND** Platelet count, , , Once, Junior Grant, MD    insulin glargine (LANTUS) subcutaneous injection 20 Units 0 2 mL, 20 Units, Subcutaneous, HS, Bakari Farias MD, 20 Units at 05/26/18 0048    insulin lispro (HumaLOG) 100 units/mL subcutaneous injection 1-6 Units, 1-6 Units, Subcutaneous, TID AC, 1 Units at 05/26/18 1149 **AND** Fingerstick Glucose (POCT), , , TID AC, Bakari Farias MD    insulin lispro (HumaLOG) 100 units/mL subcutaneous injection 1-6 Units, 1-6 Units, Subcutaneous, HS, Bakari Farias MD    melatonin tablet 6 mg, 6 mg, Oral, HS, Bakari Farias MD, 6 mg at 05/26/18 0057    [START ON 5/27/2018] metoprolol succinate (TOPROL-XL) 24 hr tablet 25 mg, 25 mg, Oral, Daily, Paul Dey MD    multivitamin-minerals (CENTRUM) tablet 1 tablet, 1 tablet, Oral, Daily, Bakari Farias MD, 1 tablet at 05/26/18 0916    ondansetron (ZOFRAN) injection 4 mg, 4 mg, Intravenous, Q6H PRN, Bakari Farias MD    pantoprazole (PROTONIX) EC tablet 20 mg, 20 mg, Oral, Early Morning, Bakari Farias MD, 20 mg at 05/26/18 8406    paricalcitol (ZEMPLAR) capsule 1 mcg, 1 mcg, Oral, Once per day on Mon Wed Fri, MD Eve Sheppard  [START ON 5/27/2018] patient supplied medication, 2 each, Oral, BID before breakfast/lunch, Ceci Alcazar MD    polyethylene glycol (MIRALAX) packet 17 g, 17 g, Oral, Daily, Bakari Farias MD    ramipril (ALTACE) capsule 2 5 mg, 2 5 mg, Oral, Daily, Bakari Farias MD    saccharomyces boulardii (FLORASTOR) capsule 250 mg, 250 mg, Oral, BID, Bakari Farias MD, 250 mg at 05/26/18 3506    senna (SENOKOT) tablet 8 6 mg, 1 tablet, Oral, Daily, Bakari Farias MD    tamsulosin [facility]) capsule 0 4 mg, 0 4 mg, Oral, Daily With Salbador Alonso MD    REVIEW OF SYSTEMS:  All the systems were reviewed and were negative except as documented on the HPI        PHYSICAL EXAM:  Current Weight: Weight - Scale: 95 kg (209 lb 7 oz)  First Weight: Weight - Scale: 95 3 kg (210 lb)  Vitals:    05/26/18 0715 05/26/18 0759 05/26/18 0900 05/26/18 1129   BP: (!) 86/52  90/60 96/60   BP Location:       Pulse: 101   104   Resp: 20   18   Temp: 97 9 °F (36 6 °C)   98 7 °F (37 1 °C)   TempSrc: Oral   Oral   SpO2: 100% 100%  98%   Weight:       Height:           Intake/Output Summary (Last 24 hours) at 05/26/18 1414  Last data filed at 05/26/18 1214   Gross per 24 hour   Intake              180 ml   Output              600 ml   Net             -420 ml     General: conscious, cooperative, in not acute distress  Eyes: conjunctivae pink, anicteric sclerae  ENT: lips and mucous membranes moist  Neck: supple, no JVD  Chest:  Decreased breath sounds bilaterally  CVS: distinct S1 & S2, normal rate, regular rhythm  Abdomen: soft, non-tender, non-distended, normoactive bowel sounds  Extremities:  Marked 3+ pitting edema up to back  Skin: no rash  Neuro: awake, alert, oriented      Invasive Devices:      Lab Results:     Results from last 7 days  Lab Units 05/26/18  1249 05/26/18  0516 05/26/18  0515 05/25/18  1944   WBC Thousand/uL 6 39 6 69  --  6 63   HEMOGLOBIN g/dL 9 6* 9 2*  --  9 2*   HEMATOCRIT % 30 8* 29 5*  --  28 6*   PLATELETS Thousands/uL 136* 159 162 147*   SODIUM mmol/L  --   --  130* 130*   POTASSIUM mmol/L  --   --  4 6 4 9   CHLORIDE mmol/L  --   --  96* 95*   CO2 mmol/L  --   --  26 26   BUN mg/dL  --   --  112* 108*   CREATININE mg/dL  --   --  2 95* 3 28*   CALCIUM mg/dL  --   --  8 8 8 8   MAGNESIUM mg/dL  --   --  2 9*  --    TOTAL PROTEIN g/dL  --   --   --  6 6   BILIRUBIN TOTAL mg/dL  --   --   --  0 66   ALK PHOS U/L  --   --   --  76   ALT U/L  --   --   --  28   AST U/L  --   --   --  17   GLUCOSE RANDOM mg/dL  --   --  172* 157*       Other Studies:  Please see previous notes

## 2018-05-26 NOTE — RESPIRATORY THERAPY NOTE
RT Protocol Note  Mag Pereira  [de-identified] y o  male MRN: [id number]  Unit/Bed#: [facility] 12-46 Encounter: [id number]    Assessment    Principal Problem:    Acute on chronic combined systolic and diastolic CHF (congestive heart failure) ([facility] )  Active Problems:    Benign essential hypertension    S/P aortic valve replacement with bioprosthetic valve    Coronary artery disease involving native coronary artery of native heart    Peripheral arterial disease (HCC)    Cardiorenal syndrome, stage 1-4 or unspecified chronic kidney disease, with heart failure (HCC)    Paroxysmal atrial fibrillation (HCC)    Diabetes 1 5, managed as type 2 ([place] [address] )      Home Pulmonary Medications:         Past Medical History:   Diagnosis Date    Cardiac disease     CHF (congestive heart failure) ([place] [address] )     Diabetes mellitus ([place] [address] )     Dyspnea     last assessed-7/10/2015    Embolism, arterial, leg, right ([place] [address] )     last assessed-7/10/2015    Hematuria     resolved-7/24/2017    History of herniated intervertebral disc     Hypertension     Myocardial infarction ([place] [address] )     Pneumonia     Premature atrial contractions     Productive cough     last assessed-10/31/2016    Renal disorder      Social History     Social History    Marital status: /Civil Union     Spouse name: N/A    Number of children: N/A    Years of education: N/A     Occupational History    SELF EMPLOYED      Social History Main Topics    Smoking status: Former Smoker     Quit date: 10/11/1987    Smokeless tobacco: Never Used      Comment: Per huey owens smoker cigarettes-heavy (20-25/day)    Alcohol use No    Drug use: No    Sexual activity: No     Other Topics Concern    None     Social History Narrative    None       Subjective         Objective    Physical Exam:   Assessment Type: Assess only  General Appearance: Alert, Awake  Respiratory Pattern: Dyspnea with exertion  Chest Assessment: Chest expansion symmetrical  Bilateral Breath Sounds: Diminished    Vitals:  Blood pressure 90/58, pulse (!) 110, temperature 97 7 °F (36 5 °C), temperature source Oral, resp  rate 18, height 5' 7" (1 702 m), weight 95 3 kg (210 lb), SpO2 100 %  Imaging and other studies: I have personally reviewed pertinent reports  Plan    Respiratory Plan: Vent/NIV/HFNC        Resp Comments: PA requesting pt to be placed on HFNC due to increased WOB   Pt SpO2 100% on 2 LNC

## 2018-05-26 NOTE — ED PROVIDER NOTES
History  Chief Complaint   Patient presents with    Shortness of Breath     PT WITH DYPSNEA ON EXERTION  PT DENIES CP/N/V/D  PT ALSO HAS 4+ PITTING EDEMA IN [place]  [de-identified]year old male presents to the ED for evaluation of shortness of breath since last night  Patient is in [address]  for rehab since discharge may 11th for chf  He states that he felt sob last night and was worse today when he had to transport himself from camode to bed  Patient has hx of cabg, cad, chf, ckd III  Patient states that he has gain 15 pounds in the past 2 weeks  He feels that his legs are more edematous  He does endorse orthopnea  He denies hx or risk factors of PE  He has been compliant with all of his meds  Patient denies fever, chest pain, cough, nausea, vomiting, abdominal pain, dysuria, constipation, diarrhea  Prior to Admission Medications   Prescriptions Last Dose Informant Patient Reported? Taking?    NON FORMULARY  Family Member Yes Yes   Sig: Take 2 Doses by mouth 2 (two) times a day before breakfast and lunch   Probiotic Product (NATRUL PROBIOTIC) CAPS   No No   Sig: Take 1 capsule by mouth 2 (two) times a day before lunch and dinner   acetaminophen (TYLENOL) 325 mg tablet 5/24/2018 at Unknown time  No Yes   Sig: Take 2 tablets (650 mg total) by mouth every 8 (eight) hours as needed for mild pain, headaches or fever   aspirin (ECOTRIN LOW STRENGTH) 81 mg EC tablet 5/25/2018 at Unknown time Spouse/Significant Other Yes Yes   Sig: Take 81 mg by mouth daily     atorvastatin (LIPITOR) 40 mg tablet 5/25/2018 at Unknown time Spouse/Significant Other Yes Yes   Sig: Take 40 mg by mouth daily   calcium carbonate (TUMS) 500 mg chewable tablet   No No   Sig: Chew 2 tablets (1,000 mg total) daily as needed for indigestion or heartburn   diphenhydrAMINE (BENADRYL) 25 mg tablet   No No   Sig: Take 0 5 tablets (12 5 mg total) by mouth daily at bedtime as needed for sleep   docusate sodium (COLACE) 100 mg capsule   No No Sig: Take 1 capsule (100 mg total) by mouth 2 (two) times a day   febuxostat (ULORIC) 40 mg tablet   Yes Yes   Sig: Take 40 mg by mouth daily   insulin aspart protamine-insulin aspart (NovoLOG 70/30) 100 units/mL injection 5/24/2018 at Unknown time  Yes Yes   Sig: Inject 24 Units under the skin 2 (two) times a day before meals   insulin glargine (LANTUS) 100 units/mL subcutaneous injection   No No   Sig: Inject 20 Units under the skin daily at bedtime   insulin lispro (HumaLOG) 100 units/mL injection   No No   Sig: Inject 1-5 Units under the skin 3 (three) times a day before meals   insulin lispro (HumaLOG) 100 units/mL injection   No No   Sig: Inject 1-5 Units under the skin daily at bedtime   melatonin 3 mg   No No   Sig: Take 2 tablets (6 mg total) by mouth daily at bedtime   metoprolol succinate (TOPROL-XL) 25 mg 24 hr tablet 5/24/2018 at Unknown time  No Yes   Sig: Take 0 5 tablets (12 5 mg total) by mouth every 12 (twelve) hours   Patient taking differently: Take by mouth every 12 (twelve) hours Family reports that pt has been changing doses frequently  Family unable to state dose at this time  multivitamin-iron-minerals-folic acid (CENTRUM) chewable tablet 5/24/2018 at Unknown time Spouse/Significant Other Yes Yes   Sig: Chew 1 tablet daily  omeprazole (PriLOSEC) 20 mg delayed release capsule 5/24/2018 at Unknown time Spouse/Significant Other Yes Yes   Sig: Take 20 mg by mouth daily     paricalcitol (ZEMPLAR) 1 mcg capsule 5/24/2018 at Unknown time  Yes Yes   Sig: Take 1 mcg by mouth 3 (three) times a week MWF   polyethylene glycol (MIRALAX) 17 g packet   No No   Sig: Take 17 g by mouth daily as needed (Constipation)   ramipril (ALTACE) 2 5 mg capsule 5/24/2018 at Unknown time  Yes Yes   Sig: Take 2 5 mg by mouth daily   tamsulosin (FLOMAX) 0 4 mg   No No   Sig: Take 1 capsule (0 4 mg total) by mouth daily with dinner   torsemide (DEMADEX) 20 mg tablet 5/24/2018 at Unknown time  No Yes   Sig: TAKE 2 TABLETS BY MOUTH TWICE DAILY   zolpidem (AMBIEN) 5 mg tablet   No No   Sig: Take 1 tablet (5 mg total) by mouth daily at bedtime as needed for sleep for up to 10 days      Facility-Administered Medications: None       Past Medical History:   Diagnosis Date    Cardiac disease     CHF (congestive heart failure) ([facility])     Diabetes mellitus ([facility] )     Dyspnea     last assessed-7/10/2015    Embolism, arterial, leg, right ([facility])     last assessed-7/10/2015    Hematuria     resolved-7/24/2017    History of herniated intervertebral disc     Hypertension     Myocardial infarction ([facility] )     Pneumonia     Premature atrial contractions     Productive cough     last assessed-10/31/2016    Renal disorder        Past Surgical History:   Procedure Laterality Date    AORTIC VALVE REPLACEMENT      BYPASS GRAFT      using vein: femoral-popliteal    CARDIAC VALVE REPLACEMENT      CATARACT EXTRACTION, BILATERAL      COLONOSCOPY      CORONARY ARTERY BYPASS GRAFT      X6    KNEE SURGERY Bilateral     knee replacement    CA SLCTV CATHJ 3RD+ ORD SLCTV ABDL PEL/LXTR [facility] Left 12/22/2017    Procedure: LEG ANGIOGRAM; RIGHT FEMORAL ACCESS; CO2-CONTRAST ;  Surgeon: Gabriela Zarco MD;  [facility]: [facility];  Service: Vascular    REPLACEMENT TOTAL KNEE BILATERAL      VASCULAR SURGERY         Family History   Problem Relation Age of Onset    Cancer Mother     Heart disease Father     Diabetes Sister     Heart disease Sister     Diabetes Brother     Diabetes Other      I have reviewed and agree with the history as documented  Social History   Substance Use Topics    Smoking status: Former Smoker     Quit date: 10/11/1987    Smokeless tobacco: Never Used      Comment: Per huey owens smoker cigarettes-heavy (20-25/day)    Alcohol use No        Review of Systems   Constitutional: Negative for appetite change, chills, diaphoresis, fatigue and fever     HENT: Negative for congestion, ear discharge, ear pain, hearing loss, postnasal drip, rhinorrhea, sneezing and sore throat  Eyes: Negative for pain, discharge and redness  Respiratory: Positive for shortness of breath  Negative for cough, choking, chest tightness, wheezing and stridor  Cardiovascular: Negative for chest pain and palpitations  Gastrointestinal: Negative for abdominal distention, abdominal pain, blood in stool, constipation, diarrhea, nausea and vomiting  Genitourinary: Negative for decreased urine volume, difficulty urinating, dysuria, flank pain, frequency and hematuria  Musculoskeletal: Negative for arthralgias, gait problem, joint swelling and neck pain  Bilateral pitting edema   Skin: Negative for color change, pallor and rash  Allergic/Immunologic: Negative for environmental allergies, food allergies and immunocompromised state  Neurological: Negative for dizziness, seizures, weakness, light-headedness, numbness and headaches  Hematological: Negative for adenopathy  Does not bruise/bleed easily  Psychiatric/Behavioral: Negative for agitation and behavioral problems  Physical Exam  ED Triage Vitals   Temperature Pulse Respirations Blood Pressure SpO2   05/25/18 1930 05/25/18 1930 05/25/18 1930 05/25/18 1930 05/25/18 1930   97 9 °F (36 6 °C) (!) 112 18 98/56 99 %      Temp Source Heart Rate Source Patient Position - Orthostatic VS BP Location FiO2 (%)   05/25/18 1930 05/25/18 1930 05/25/18 2010 05/25/18 2010 05/26/18 1600   Oral Monitor Lying Right arm 35      Pain Score       05/25/18 1930       8           Orthostatic Vital Signs  Vitals:    05/28/18 0712 05/28/18 1029 05/28/18 1536 05/28/18 1846   BP: (!) 88/65 98/54 92/50 113/79   Pulse: (!) 117 (!) 121 (!) 124 (!) 115   Patient Position - Orthostatic VS:   Sitting Sitting       Physical Exam   Constitutional: He is oriented to person, place, and time  He appears well-developed and well-nourished  HENT:   Head: Normocephalic and atraumatic     Nose: Nose normal  Mouth/Throat: Oropharynx is clear and moist    Eyes: Conjunctivae and EOM are normal  Pupils are equal, round, and reactive to light  Neck: Normal range of motion  Neck supple  Cardiovascular: Regular rhythm and normal heart sounds  Exam reveals no gallop and no friction rub  No murmur heard  tachycardia   Pulmonary/Chest: Effort normal and breath sounds normal  No respiratory distress  He has no wheezes  He has no rales  Abdominal: Soft  Bowel sounds are normal  He exhibits no distension  There is no tenderness  There is no rebound and no guarding  Musculoskeletal: Normal range of motion  He exhibits edema  He exhibits no tenderness or deformity  Bilateral pitting edema   Neurological: He is alert and oriented to person, place, and time  Skin: Skin is warm and dry  Psychiatric: He has a normal mood and affect  His behavior is normal    Nursing note and vitals reviewed        ED Medications  Medications   aspirin (ECOTRIN LOW STRENGTH) EC tablet 81 mg (81 mg Oral Given 5/28/18 0854)   atorvastatin (LIPITOR) tablet 40 mg (40 mg Oral Given 5/28/18 0854)   calcium carbonate (TUMS) chewable tablet 1,000 mg (not administered)   insulin glargine (LANTUS) subcutaneous injection 20 Units 0 2 mL (20 Units Subcutaneous Given 5/28/18 2139)   melatonin tablet 6 mg (6 mg Oral Given 5/28/18 2141)   multivitamin-minerals (CENTRUM) tablet 1 tablet (1 tablet Oral Given 5/28/18 0854)   pantoprazole (PROTONIX) EC tablet 20 mg (20 mg Oral Given 5/28/18 0528)   saccharomyces boulardii (FLORASTOR) capsule 250 mg (250 mg Oral Given 5/28/18 1709)   tamsulosin (FLOMAX) capsule 0 4 mg (0 4 mg Oral Given 5/28/18 1649)   docusate sodium (COLACE) capsule 100 mg (100 mg Oral Given 5/28/18 1709)   senna (SENOKOT) tablet 8 6 mg (8 6 mg Oral Not Given 5/28/18 0854)   polyethylene glycol (MIRALAX) packet 17 g (17 g Oral Not Given 5/28/18 0855)   ondansetron (ZOFRAN) injection 4 mg (4 mg Intravenous Given 5/26/18 1539)   insulin lispro (HumaLOG) 100 units/mL subcutaneous injection 1-6 Units (1 Units Subcutaneous Given 5/28/18 1649)   insulin lispro (HumaLOG) 100 units/mL subcutaneous injection 1-6 Units (1 Units Subcutaneous Given 5/28/18 2140)   acetaminophen (TYLENOL) tablet 650 mg (650 mg Oral Given 5/28/18 2139)   patient supplied medication (2 each Oral Given 5/28/18 1117)   heparin (porcine) 25,000 units in 250 mL infusion (premix) (11 1 Units/kg/hr × 90 kg (Order-Specific) Intravenous New Bag 5/28/18 1649)   heparin (porcine) injection 4,000 Units (not administered)   heparin (porcine) injection 2,000 Units (not administered)   doxercalciferol (HECTOROL) capsule 0 5 mcg (0 5 mcg Oral Given 5/28/18 0854)   allopurinol (ZYLOPRIM) tablet 200 mg (200 mg Oral Given 5/28/18 0854)   bumetanide (BUMEX) 12 5 mg infusion 50 mL (1 5 mg/hr Intravenous New Bag 5/28/18 1952)   albumin human (FLEXBUMIN) 25 % injection 25 g (25 g Intravenous New Bag 5/28/18 2139)   metoprolol succinate (TOPROL-XL) 24 hr tablet 12 5 mg (not administered)   metoprolol tartrate (LOPRESSOR) partial tablet 12 5 mg (12 5 mg Oral Given 5/25/18 2157)   heparin (porcine) injection 4,000 Units (4,000 Units Intravenous Given 5/26/18 1333)   albumin human (FLEXBUMIN) 25 % injection 25 g (0 g Intravenous Stopped 5/26/18 1717)   albumin human (FLEXBUMIN) 5 % injection 25 g (0 g Intravenous Stopped 5/27/18 1032)       Diagnostic Studies  Results Reviewed     Procedure Component Value Units Date/Time    Troponin I [[id number]] Collected:  05/27/18 0211    Lab Status:  No result Specimen:  Blood from Arm, Left     Troponin I [[id number]] Collected:  05/27/18 0211    Lab Status:  No result Specimen:  Blood from Arm, Left     Troponin I [[id number]]  (Abnormal) Collected:  05/26/18 1250    Lab Status:  Final result Specimen:  Blood from Hand, Right Updated:  05/26/18 1319     Troponin I 0 06 (H) ng/mL     Narrative:         [employer] Chemistry analyzer 99% cutoff is > 0 04 ng/mL in network labs    o cTnI 99% cutoff is useful only when applied to patients in the clinical setting of myocardial ischemia  o cTnI 99% cutoff should be interpreted in the context of clinical history, ECG findings and possibly cardiac imaging to establish correct diagnosis  o cTnI 99% cutoff may be suggestive but clearly not indicative of a coronary event without the clinical setting of myocardial ischemia  Platelet count [[id number]]  (Normal) Collected:  05/26/18 0515    Lab Status:  Final result Specimen:  Blood from Arm, Right Updated:  05/26/18 0658     Platelets 003 Thousands/uL      MPV 10 3 fL     CBC (With Platelets) [[id number]]  (Abnormal) Collected:  05/26/18 0516    Lab Status:  Final result Specimen:  Blood from Arm, Right Updated:  05/26/18 0657     WBC 6 69 Thousand/uL      RBC 3 36 (L) Million/uL      Hemoglobin 9 2 (L) g/dL      Hematocrit 29 5 (L) %      MCV 88 fL      MCH 27 4 pg      MCHC 31 2 (L) g/dL      RDW 17 1 (H) %      Platelets 932 Thousands/uL      MPV 10 1 fL     Magnesium [[id number]]  (Abnormal) Collected:  05/26/18 0515    Lab Status:  Final result Specimen:  Blood from Arm, Right Updated:  05/26/18 0615     Magnesium 2 9 (H) mg/dL     Basic metabolic panel [[id number]]  (Abnormal) Collected:  05/26/18 0515    Lab Status:  Final result Specimen:  Blood from Arm, Right Updated:  05/26/18 3588     Sodium 130 (L) mmol/L      Potassium 4 6 mmol/L      Chloride 96 (L) mmol/L      CO2 26 mmol/L      Anion Gap 8 mmol/L       (H) mg/dL      Creatinine 2 95 (H) mg/dL      Glucose 172 (H) mg/dL      Calcium 8 8 mg/dL      eGFR 19 ml/min/1 73sq m     Narrative:         National Kidney Disease Education Program recommendations are as follows:  GFR calculation is accurate only with a steady state creatinine  Chronic Kidney disease less than 60 ml/min/1 73 sq  meters  Kidney failure less than 15 ml/min/1 73 sq  meters      BNP [[id number]]  (Abnormal) Collected:  05/25/18 1944    Lab Status:  Final result Specimen:  Blood from Arm, Right Updated:  05/25/18 2214     NT-proBNP 10,694 (H) pg/mL     Troponin I [[id number]]  (Abnormal) Collected:  05/25/18 1944    Lab Status:  Final result Specimen:  Blood from Arm, Right Updated:  05/25/18 2011     Troponin I 0 07 (H) ng/mL     Narrative:         [employer] Chemistry analyzer 99% cutoff is > 0 04 ng/mL in network labs    o cTnI 99% cutoff is useful only when applied to patients in the clinical setting of myocardial ischemia  o cTnI 99% cutoff should be interpreted in the context of clinical history, ECG findings and possibly cardiac imaging to establish correct diagnosis  o cTnI 99% cutoff may be suggestive but clearly not indicative of a coronary event without the clinical setting of myocardial ischemia  Comprehensive metabolic panel [[id number]]  (Abnormal) Collected:  05/25/18 1944    Lab Status:  Final result Specimen:  Blood from Arm, Right Updated:  05/25/18 2009     Sodium 130 (L) mmol/L      Potassium 4 9 mmol/L      Chloride 95 (L) mmol/L      CO2 26 mmol/L      Anion Gap 9 mmol/L       (H) mg/dL      Creatinine 3 28 (H) mg/dL      Glucose 157 (H) mg/dL      Calcium 8 8 mg/dL      AST 17 U/L      ALT 28 U/L      Alkaline Phosphatase 76 U/L      Total Protein 6 6 g/dL      Albumin 3 2 (L) g/dL      Total Bilirubin 0 66 mg/dL      eGFR 17 ml/min/1 73sq m     Narrative:         National Kidney Disease Education Program recommendations are as follows:  GFR calculation is accurate only with a steady state creatinine  Chronic Kidney disease less than 60 ml/min/1 73 sq  meters  Kidney failure less than 15 ml/min/1 73 sq  meters      Protime-INR [[id number]]  (Abnormal) Collected:  05/25/18 1944    Lab Status:  Final result Specimen:  Blood from Arm, Right Updated:  05/25/18 2006     Protime 15 4 (H) seconds      INR 1 21 (H)    APTT [[id number]]  (Normal) Collected:  05/25/18 1944    Lab Status:  Final result Specimen:  Blood from Arm, Right Updated:  05/25/18 2006 PTT 28 seconds     CBC and differential [[id number]]  (Abnormal) Collected:  05/25/18 1944    Lab Status:  Final result Specimen:  Blood from Arm, Right Updated:  05/25/18 1957     WBC 6 63 Thousand/uL      RBC 3 36 (L) Million/uL      Hemoglobin 9 2 (L) g/dL      Hematocrit 28 6 (L) %      MCV 85 fL      MCH 27 4 pg      MCHC 32 2 g/dL      RDW 17 0 (H) %      MPV 9 7 fL      Platelets 300 (L) Thousands/uL      nRBC 0 /100 WBCs      Neutrophils Relative 70 %      Lymphocytes Relative 20 %      Monocytes Relative 8 %      Eosinophils Relative 1 %      Basophils Relative 0 %      Neutrophils Absolute 4 66 Thousands/µL      Lymphocytes Absolute 1 33 Thousands/µL      Monocytes Absolute 0 51 Thousand/µL      Eosinophils Absolute 0 09 Thousand/µL      Basophils Absolute 0 01 Thousands/µL                  XR foot 3+ vw right   Final Result by Tri Braga DO (05/27 5483)      Extensive soft tissue swelling best seen on the lateral projection  No definitive acute osseous abnormality identified  Workstation performed: [id number]         XR foot 3+ vw left   Final Result by Tri Braga DO (05/27 9880)      Old fracture involving the medial aspect of the proximal phalanx of the 1st digit, unchanged in alignment  Stable degenerative osteoarthritis  Workstation performed: [id number]         X-ray chest 2 views   ED Interpretation by Long Smith MD (05/25 2105)   Vascular congestion noted bilaterally        Final Result by Jack Nelson MD (05/26 3807)   Recurrent CHF, small left pleural effusion, moderate cardiomegaly      Status post cardiac valve replacement          Findings are consistent with emergency provider's preliminary reading                     Workstation performed: [id number]               Procedures  Procedures      Phone Consults  ED Phone Contact    ED Course  ED Course as of May 28 2212   Fri May 25, 2018   2059 Troponin I: (!) 0 07   2100 Hemoglobin: (!) 9 2   2104 Creatinine: (!) 3 28   2148 Glucose: (!) 157   2214 NT-proBNP: (!) 97,247           Identification of Seniors at Risk      Most Recent Value   (ISAR) Identification of Seniors at Risk   Before the illness or injury that brought you to the Emergency, did you need someone to help you on a regular basis? 1 Filed at: 05/25/2018 1932   In the last 24 hours, have you needed more help than usual?  0 Filed at: 05/25/2018 1932   Have you been hospitalized for one or more nights during the past 6 months? 1 Filed at: 05/25/2018 1932   In general, do you see well?  0 Filed at: 05/25/2018 1932   In general, do you have serious problems with your memory? 0 Filed at: 05/25/2018 1932   Do you take more than three different medications every day? 1 Filed at: 05/25/2018 1932   ISAR Score  3 Filed at: 05/25/2018 1932                          [facility]  Number of Diagnoses or Management Options  CHF exacerbation [facility]):   Shortness of breath:   Diagnosis management comments: 62-year-old male presents to the ED for evaluation of shortness of breath   I will order CBC, CMP EKG troponin chest x-ray, coags    CritCare Time    Disposition  Final diagnoses:   CHF exacerbation (HCC)   Shortness of breath     Time reflects when diagnosis was documented in both MDM as applicable and the Disposition within this note     Time User Action Codes Description Comment    5/25/2018 10:56 PM Rylie Caves Add [I50 9] CHF exacerbation ([facility] )     5/25/2018 10:56 PM Rylie Caves Add [R06 02] Shortness of breath     5/25/2018 11:12 PM Lucendia Copa Add [I50 33] Acute on chronic diastolic congestive heart failure ([facility] )     5/25/2018 11:12 PM Lucendia Copa Modify [I50 33] Acute on chronic diastolic congestive heart failure ([facility] )     5/25/2018 11:12 PM Patrica Amor Add [N18 4] CKD (chronic kidney disease) stage 4, GFR 15-29 ml/min ([facility] )     5/25/2018 11:12 PM Lucendia Copa Add [E87 70] Fluid overload     5/26/2018  1:17 AM Rafi Pereira [X67 065] Foot ulcer [facility])       ED Disposition     ED Disposition Condition Comment    Admit  Case was discussed with HERBER and the patient's admission status was agreed to be Admission Status: inpatient status to the service of Dr Shante Recinos   Follow-up Information     Follow up With Specialties Details Why Contact Ezekiel Meade DPM Podiatry Schedule an appointment as soon as possible for a visit in 2 week(s)  [address]  [phone]            Current Discharge Medication List      CONTINUE these medications which have NOT CHANGED    Details   acetaminophen (TYLENOL) 325 mg tablet Take 2 tablets (650 mg total) by mouth every 8 (eight) hours as needed for mild pain, headaches or fever  Refills: 0    Associated Diagnoses: Atherosclerosis of artery of extremity with ulceration (HCC)      aspirin (ECOTRIN LOW STRENGTH) 81 mg EC tablet Take 81 mg by mouth daily        atorvastatin (LIPITOR) 40 mg tablet Take 40 mg by mouth daily      febuxostat (ULORIC) 40 mg tablet Take 40 mg by mouth daily      insulin aspart protamine-insulin aspart (NovoLOG 70/30) 100 units/mL injection Inject 24 Units under the skin 2 (two) times a day before meals      metoprolol succinate (TOPROL-XL) 25 mg 24 hr tablet Take 0 5 tablets (12 5 mg total) by mouth every 12 (twelve) hours  Refills: 0    Associated Diagnoses: Chronic atrial fibrillation (HCC)      multivitamin-iron-minerals-folic acid (CENTRUM) chewable tablet Chew 1 tablet daily  NON FORMULARY Take 2 Doses by mouth 2 (two) times a day before breakfast and lunch      omeprazole (PriLOSEC) 20 mg delayed release capsule Take 20 mg by mouth daily        paricalcitol (ZEMPLAR) 1 mcg capsule Take 1 mcg by mouth 3 (three) times a week MWF      ramipril (ALTACE) 2 5 mg capsule Take 2 5 mg by mouth daily      torsemide (DEMADEX) 20 mg tablet TAKE 2 TABLETS BY MOUTH TWICE DAILY  Qty: 360 tablet, Refills: 5    Comments: **Patient requests 90 days supply**  Associated Diagnoses: Acute on chronic diastolic congestive heart failure (HCC)      calcium carbonate (TUMS) 500 mg chewable tablet Chew 2 tablets (1,000 mg total) daily as needed for indigestion or heartburn  Refills: 0    Associated Diagnoses: Esophageal reflux      diphenhydrAMINE (BENADRYL) 25 mg tablet Take 0 5 tablets (12 5 mg total) by mouth daily at bedtime as needed for sleep  Qty: 30 tablet, Refills: 0    Associated Diagnoses: Insomnia      docusate sodium (COLACE) 100 mg capsule Take 1 capsule (100 mg total) by mouth 2 (two) times a day  Refills: 0    Associated Diagnoses: Constipation      insulin glargine (LANTUS) 100 units/mL subcutaneous injection Inject 20 Units under the skin daily at bedtime  Qty: 10 mL, Refills: 0    Associated Diagnoses: Type 2 diabetes mellitus with hyperglycemia, with long-term current use of insulin ([address] )      ! ! insulin lispro (HumaLOG) 100 units/mL injection Inject 1-5 Units under the skin 3 (three) times a day before meals  Refills: 0    Associated Diagnoses: Type 2 diabetes mellitus with hyperglycemia, with long-term current use of insulin ([address] )      ! ! insulin lispro (HumaLOG) 100 units/mL injection Inject 1-5 Units under the skin daily at bedtime  Refills: 0    Associated Diagnoses: Type 2 diabetes mellitus with hyperglycemia, with long-term current use of insulin ([facility])      melatonin 3 mg Take 2 tablets (6 mg total) by mouth daily at bedtime  Refills: 0    Associated Diagnoses: Constipation      polyethylene glycol (MIRALAX) 17 g packet Take 17 g by mouth daily as needed (Constipation)  Qty: 14 each, Refills: 0    Associated Diagnoses: Constipation      Probiotic Product (NATRUL PROBIOTIC) CAPS Take 1 capsule by mouth 2 (two) times a day before lunch and dinner  Refills: 0    Associated Diagnoses: Acute-on-chronic kidney injury (HCC)      tamsulosin (FLOMAX) 0 4 mg Take 1 capsule (0 4 mg total) by mouth daily with dinner  Refills: 0    Associated Diagnoses: Urinary retention      zolpidem (AMBIEN) 5 mg tablet Take 1 tablet (5 mg total) by mouth daily at bedtime as needed for sleep for up to 10 days  Qty: 30 tablet, Refills: 0    Associated Diagnoses: Insomnia       !! - Potential duplicate medications found  Please discuss with provider  No discharge procedures on file  ED Provider  Attending physically available and evaluated Dylan Israel I managed the patient along with the ED Attending      Electronically Signed by         Rebekah Santana MD  05/28/18 3266

## 2018-05-26 NOTE — ASSESSMENT & PLAN NOTE
Please refer to Dr Lamine Bernstein outpatient notes   on May 23rd  Nephrology and cardiology evaluation appreciated  Nephrology is planning to change  To  Bumex drip  Monitor blood pressure  Strict I&Os and daily weight

## 2018-05-26 NOTE — ASSESSMENT & PLAN NOTE
Patient presented from rehab with evidence of acute on chronic combined heart failure  Of notes he had prolonged hospitalization on 4/4-5/11 due to acute on chronic heart failure and cardiorenal syndrome  Repeated echo reveals significantly decreased ejection fraction ( 65% in September 2017 dropped to 45% )   noted getting worse renal function most likely secondary to cardiorenal syndrome and  need of aggressive IV diuresis to improve fluid overload state as patient declined dialysis  Management of this patient with IV diuresis is challenging situation as his renal function most likely will deteriorate further given the need of IV diuresis  To help go through Catch- 22 I will request cardiology and nephrology consult    Patient will be admitted to telemetry , SD2  Continue fluid and salt restricted HF diet  Daily weight( his usual dry weight 198-204 lb, on admission today his weight 210 lb), strict intake and output  Start Bumex 1 mg IV b i d   Monitor renal function and electrolytes  Hold torsemide

## 2018-05-26 NOTE — PROGRESS NOTES
SLIM paged due to two Bumex orders overdue at this time, To clarify again that the patients home medication is Renadyl not Renagel, and whether the pt needs to be step down for level of care  Lakeisha Mcgee with [facility] stated that she will not order Renadyl, only one dose of Bumex should be given at this time and that patient should be upgraded to Step down so that "you won't keep calling me about pt's low blood pressure " Will continue to monitor

## 2018-05-26 NOTE — SOCIAL WORK
CM met with Pt with an introduction and explanation of role  Pt reported being admitted from [facility] where he was placed on 5/11, and plan to return there upon d/c for completion of rehab  CM made a referral to [facility]  CM reviewed d/c planning process including the following: identifying help at home, patient preference for d/c planning needs, Discharge Lounge, [facility] Meds to Bed program, availability of treatment team to discuss questions or concerns patient and/or family may have regarding understanding medications and recognizing signs and symptoms once discharged  CM also encouraged patient to follow up with all recommended appointments after discharge  Patient advised of importance for patient and family to participate in managing patients medical well being

## 2018-05-26 NOTE — OCCUPATIONAL THERAPY NOTE
Occupational Therapy Cancellation Notice     OT orders received and chart review completed-Attempted to see pt for OT evaluation today however pt currently pending podiatry consult for large ulcer on bottom of R LE  Pt reports the ucler is extremely painful to bare any weight through  Will hold therapy pending podiatry consult and WBS  Thank you        Diaz Martinez MS, OTR/L

## 2018-05-26 NOTE — ASSESSMENT & PLAN NOTE
Patient presented from rehab with evidence of acute on chronic combined heart failure  Of notes he had prolonged hospitalization on 4/4-5/11 due to acute on chronic heart failure and cardiorenal syndrome  Repeated echo reveals significantly decreased ejection fraction ( 65% in September 2017 dropped to 45% )   noted getting worse renal function most likely secondary to cardiorenal syndrome and  need of aggressive IV diuresis to improve fluid overload state as patient declined dialysis  Management of this patient with IV diuresis is challenging situation as his renal function most likely will deteriorate further given the need of IV diuresis  To help go through Catch- 22 I will request cardiology and nephrology consult    Patient will be admitted to telemetry , SD2  Continue fluid and salt restricted HF diet  Daily weight( his usual dry weight 198-204 lb, on admission today his weight 210 lb), strict intake and output  Started on Bumex drip  Monitor renal function and electrolytes

## 2018-05-26 NOTE — ED ATTENDING ATTESTATION
Jim Elizabeth DO, saw and evaluated the patient  I have discussed the patient with the resident/non-physician practitioner and agree with the resident's/non-physician practitioner's findings, Plan of Care, and MDM as documented in the resident's/non-physician practitioner's note, except where noted  All available labs and Radiology studies were reviewed  At this point I agree with the current assessment done in the Emergency Department  I have conducted an independent evaluation of this patient including a focused history and a physical exam     ED Note - Aramis Arias  [de-identified] y o  male MRN: [id number]  Unit/Bed#: ED 29 Encounter: [id number]    History of Present Illness   HPI  Aramis Arias  is a [de-identified] y o  male who presents for evaluation of progressively worsening dyspnea/ [place] and noted approx 10 lbs weight gain  Symptoms were exacerbated today when the patient was moving from bed to the commode  Patient had been doing reasonably well until there was a slight setback at rehab possibly related to having tramadol which resulted in somnolence  Recent admission for CHF exacerbation and has been at rehab  No fever or chills  Mild nonproductive cough  Patient does feel somewhat anxious but denies chest pain, overt dyspnea, dizziness, diaphoresis  Patient with chronic lower extremity edema and does have his legs wrapped  No other complaints at this time  REVIEW OF SYSTEMS  See HPI for further details  12 systems reviewed and otherwise negative except as noted     Historical Information     PAST MEDICAL HISTORY  Past Medical History:   Diagnosis Date    Cardiac disease     [facility]     Diabetes mellitus [facility]     Dyspnea     last assessed-7/10/2015    Embolism, arterial, leg, right ([address] )     last assessed-7/10/2015    Hematuria     resolved-7/24/2017    History of herniated intervertebral disc     Hypertension     Myocardial infarction ([address] )     Pneumonia  Premature atrial contractions     Productive cough     last assessed-10/31/2016    Renal disorder        FAMILY HISTORY  Family History   Problem Relation Age of Onset    Cancer Mother     Heart disease Father     Diabetes Sister     Heart disease Sister     Diabetes Brother     Diabetes Other        SOCIAL HISTORY  Social History     Social History    Marital status: /Civil Union     Spouse name: N/A    Number of children: N/A    Years of education: N/A     Occupational History    SELF EMPLOYED      Social History Main Topics    Smoking status: Former Smoker     Quit date: 10/11/1987    Smokeless tobacco: Never Used      Comment: Per huey owens smoker cigarettes-heavy (20-25/day)    Alcohol use No    Drug use: No    Sexual activity: No     Other Topics Concern    None     Social History Narrative    None       SURGICAL HISTORY  Past Surgical History:   Procedure Laterality Date    AORTIC VALVE REPLACEMENT      BYPASS GRAFT      using vein: femoral-popliteal    CARDIAC VALVE REPLACEMENT      CATARACT EXTRACTION, BILATERAL      COLONOSCOPY      CORONARY ARTERY BYPASS GRAFT      X6    KNEE SURGERY Bilateral     knee replacement    ND Adelina Bauman 3RD+ ORD SLCTV ABDL PEL/LXTR [facility] Left 12/22/2017    Procedure: LEG ANGIOGRAM; RIGHT FEMORAL ACCESS; CO2-CONTRAST ;  Surgeon: Renae Babinski, MD;  [facility]: [facility];  Service: Vascular    REPLACEMENT TOTAL KNEE BILATERAL      VASCULAR SURGERY       Meds/Allergies     CURRENT MEDICATIONS  No current facility-administered medications for this encounter       Current Outpatient Prescriptions:     aspirin (ECOTRIN LOW STRENGTH) 81 mg EC tablet, Take 81 mg by mouth daily  , Disp: , Rfl:     atorvastatin (LIPITOR) 40 mg tablet, Take 40 mg by mouth daily, Disp: , Rfl:     acetaminophen (TYLENOL) 325 mg tablet, Take 2 tablets (650 mg total) by mouth every 8 (eight) hours as needed for mild pain, headaches or fever, Disp: , Rfl: 0   calcium carbonate (TUMS) 500 mg chewable tablet, Chew 2 tablets (1,000 mg total) daily as needed for indigestion or heartburn, Disp: , Rfl: 0    diphenhydrAMINE (BENADRYL) 25 mg tablet, Take 0 5 tablets (12 5 mg total) by mouth daily at bedtime as needed for sleep, Disp: 30 tablet, Rfl: 0    docusate sodium (COLACE) 100 mg capsule, Take 1 capsule (100 mg total) by mouth 2 (two) times a day, Disp: , Rfl: 0    insulin glargine (LANTUS) 100 units/mL subcutaneous injection, Inject 20 Units under the skin daily at bedtime, Disp: 10 mL, Rfl: 0    insulin lispro (HumaLOG) 100 units/mL injection, Inject 1-5 Units under the skin 3 (three) times a day before meals, Disp: , Rfl: 0    insulin lispro (HumaLOG) 100 units/mL injection, Inject 1-5 Units under the skin daily at bedtime, Disp: , Rfl: 0    melatonin 3 mg, Take 2 tablets (6 mg total) by mouth daily at bedtime, Disp: , Rfl: 0    metoprolol succinate (TOPROL-XL) 25 mg 24 hr tablet, Take 0 5 tablets (12 5 mg total) by mouth every 12 (twelve) hours, Disp: , Rfl: 0    multivitamin-iron-minerals-folic acid (CENTRUM) chewable tablet, Chew 1 tablet daily  , Disp: , Rfl:     omeprazole (PriLOSEC) 20 mg delayed release capsule, Take 20 mg by mouth daily  , Disp: , Rfl:     polyethylene glycol (MIRALAX) 17 g packet, Take 17 g by mouth daily as needed (Constipation), Disp: 14 each, Rfl: 0    Probiotic Product (NATRUL PROBIOTIC) CAPS, Take 1 capsule by mouth 2 (two) times a day before lunch and dinner, Disp: , Rfl: 0    sevelamer (RENAGEL) 800 mg tablet, Take 1 tablet (800 mg total) by mouth 3 (three) times a day with meals, Disp: , Rfl: 0    tamsulosin (FLOMAX) 0 4 mg, Take 1 capsule (0 4 mg total) by mouth daily with dinner, Disp: , Rfl: 0    torsemide (DEMADEX) 20 mg tablet, TAKE 2 TABLETS BY MOUTH TWICE DAILY, Disp: 360 tablet, Rfl: 5    zolpidem (AMBIEN) 5 mg tablet, Take 1 tablet (5 mg total) by mouth daily at bedtime as needed for sleep for up to 10 days, Disp: 30 tablet, Rfl: 0    (Not in a hospital admission)    ALLERGIES  Allergies   Allergen Reactions    Codeine      "swelling shaky"    Hydrocodone      Other reaction(s): Other (See Comments)  Heart racing     Objective     PHYSICAL EXAM    VITAL SIGNS: Blood pressure 100/56, pulse (!) 123, temperature 97 9 °F (36 6 °C), temperature source Oral, resp  rate 18, weight 95 3 kg (210 lb), SpO2 97 %  Constitutional:  Appears well developed and well nourished, no acute distress, non-toxic appearance   Eyes:  PERRL, EOMI, conjunctivae pink, sclerae non-icteric    HENT:  Normocephalic/Atraumatic, no rhinorrhea, mucous membranes moist  Neck: normal range of motion, no tenderness, supple   Respiratory:  No respiratory distress, no intercostal retractions, no crackles, no rhonchi, fine end expiratory wheezing, no stridor   Cardiovascular:  Tachycardia, normal rhythm, no murmurs, no gallops, no rubs, peripheral pulses intact, no carotid bruits, no JVD  GI:  Soft, non-tender, non-distended, no mass, no rebound, no guarding   :  No CVAT, no flank ecchymosis   Musculoskeletal:  +ve swelling/ +4 pitting edema of lower legs and feet, no tenderness, no deformities  Healing wounds to left foot/ 4th toe  Integument:  Pink, warm, dry, Well hydrated, no rash, no erythema, no bullae   Lymphatic:  No cervical/ tonsillar/ submandibular lymphadenopathy noted   Neurologic:  Awake, Alert & oriented x 3, CN 2-12 intact, no focal neurological deficits  Psychiatric:  Speech and behavior appropriate       ED COURSE and MDM:    Assessment/Plan   Assessment:  Harjinder Seymour  is a [de-identified] y o  male presents for evaluation of progressively worsening dyspnea on exertion and concern for CHF exacerbation with associated weight gain  Plan:  Labs, EKG, CXR, Symptom management  Disposition as appropriate  Portions of the record may have been created with voice recognition software   Occasional wrong word or "sound a like" substitutions may have occurred due to the inherent limitations of voice recognition software       ED Provider  Electronically Signed by

## 2018-05-26 NOTE — CONSULTS
Consultation - Cardiology   [facility]  [de-identified] y o  male MRN: [id number]  Unit/Bed#: [facility] 12-46 Encounter: [id number]      Assessment:  Principal Problem:    Acute on chronic combined systolic and diastolic CHF (congestive heart failure) ([address] )  Active Problems:    Benign essential hypertension    S/P aortic valve replacement with bioprosthetic valve    Coronary artery disease involving native coronary artery of native heart    Peripheral arterial disease (HCC)    Cardiorenal syndrome, stage 1-4 or unspecified chronic kidney disease, with heart failure (HCC)    Paroxysmal atrial fibrillation (HCC)    Diabetes 1 5, managed as type 2 (HCC)   severe mitral regurgitation     Plan: This is an 63-year-old gentleman with a past medical history of chronic systolic heart failure, hypertension, bioprosthetic AVR, chronic kidney disease, atrial fibrillation and mod severe mitral regurgitation who presents with worsening shortness of breath  He had a prolonged hospital admission in April for acute on chronic systolic heart failure, mitral regurgitation and cardiorenal syndrome  He had required aggressive diuresis  Weight on discharge was 194 lb  Patient was discharged to rehab and now returns with progressively worsening shortness of breath  States his dyspnea worsened relatively soon after discharge  Had been compliant with his medications and diet  I suspect his worsening heart failure is in the setting of severe mitral valvular regurgitation  Will need a repeat echo once more euvolemic to re-evaluate MR  Currently , short of breath  Has elevated JVD , bilateral crackles and bilateral lower limb edema BNP is 21081  Weight has increased 15 lb since discharge  Chest x-ray consistent with pulmonary edema  Would increase his Bumex to 2 mg IV b i d  May given additional dose of 2 mg IV Bumex today based on his urine output  At home he was on 40 mg b i d  of torsemide  Strict I&Os  Daily weights    Continue to monitor creatinine  Creatinine on admission was 3 2  On metoprolol for rate control  Not on [facility] as per home regiment  Would need coumadin given advanced CKD  Continue ramipril and metoprolol for mitral regurgitation  Consider CT surgery evaluation once euvolemic  However, would first need a repeat echo once more euvolemic to re-evaluate MR  History of Present Illness   Physician Requesting Consult: Adan Harvey MD  Reason for Consult / Principal Problem:  Heart failure  HPI: Hipolito Gerard  is a [de-identified]y o  year old male with a past medical history of chronic systolic and diastolic heart failure, hypertension, bioprosthetic AVR, coronary artery disease, peripheral artery disease, cardiorenal syndrome, paroxysmal atrial fibrillation, diabetes and severe mitral regurgitation who presents with worsening shortness of breath  Patient had a prolonged hospital admission in April for acute on chronic systolic heart failure, severe mitral regurgitation and cardiorenal syndrome  He was discharged in early May to rehab  Since discharge he has progressively felt more shortness of breath  Worse on exertion  Associated with orthopnea and paroxysmal nocturnal dyspnea  Shortness of breath is worse when he lies down  Presented to the hospital   Denies chest pain, lightheadedness, dizziness, presyncope or syncope  Inpatient consult to Cardiology     Performed by  Shannan Corbin     Authorized by Mohan Donato              Review of Systems:  Review of Systems   Constitutional: Negative  Negative for diaphoresis  HENT: Negative  Eyes: Negative  Respiratory: Positive for shortness of breath  Negative for apnea and chest tightness  Cardiovascular: Positive for leg swelling  Negative for chest pain and palpitations  Gastrointestinal: Negative  Negative for abdominal distention and abdominal pain  Endocrine: Negative  Genitourinary: Negative  Musculoskeletal: Negative  Skin: Negative  Allergic/Immunologic: Negative  Neurological: Negative for dizziness, weakness, light-headedness and numbness  Hematological: Negative  Psychiatric/Behavioral: Negative  All other systems reviewed and are negative  14 systems reviewed and negative with the exception of the above and the following    Historical Information   Past Medical History:   Diagnosis Date    Cardiac disease     [facility]     Diabetes mellitus ([facility] )     Dyspnea     last assessed-7/10/2015    [facility], [facility]     last assessed-7/10/2015    Hematuria     resolved-7/24/2017    History of herniated intervertebral disc     Hypertension     Myocardial infarction ([facility] )     Pneumonia     Premature atrial contractions     Productive cough     last assessed-10/31/2016    Renal disorder      Past Surgical History:   Procedure Laterality Date    AORTIC VALVE REPLACEMENT      BYPASS GRAFT      using vein: femoral-popliteal    CARDIAC VALVE REPLACEMENT      CATARACT EXTRACTION, BILATERAL      COLONOSCOPY      CORONARY ARTERY BYPASS GRAFT      X6    KNEE SURGERY Bilateral     knee replacement    [place] SLCTV CATHJ 3RD+ ORD SLCTV ABDL PEL/LXTR [facility] Left 12/22/2017    Procedure: LEG ANGIOGRAM; RIGHT FEMORAL ACCESS; CO2-CONTRAST ;  Surgeon: Elke Ibrahim MD;  Location: [facility];  Service: Vascular    REPLACEMENT TOTAL KNEE BILATERAL      VASCULAR SURGERY       History   Alcohol Use No     History   Drug Use No     History   Smoking Status    Former Smoker    Quit date: 10/11/1987   Smokeless Tobacco    Never Used     Comment: Per huey owens smoker cigarettes-heavy (20-25/day)     Family History: non-contributory    Meds/Allergies   all current active meds have been reviewed  Allergies   Allergen Reactions    Codeine      "swelling shaky"    Hydrocodone      Other reaction(s):  Other (See Comments)  Heart racing       Objective   Vitals: Blood pressure 90/60, pulse 101, temperature 97 9 °F (36 6 °C), temperature source Oral, resp  rate 20, height 5' 7" (1 702 m), weight 95 kg (209 lb 7 oz), SpO2 100 %  , Body mass index is 32 8 kg/m² , Orthostatic Blood Pressures      Most Recent Value   Blood Pressure  90/60 filed at 05/26/2018 0900   Patient Position - Orthostatic VS  Lying filed at 05/26/2018 0200            Intake/Output Summary (Last 24 hours) at 05/26/18 1006  Last data filed at 05/26/18 0825   Gross per 24 hour   Intake              180 ml   Output              400 ml   Net             -220 ml       Invasive Devices     Peripheral Intravenous Line            Peripheral IV 05/25/18 Right Antecubital less than 1 day              Physical Exam   Constitutional: He is oriented to person, place, and time  He appears well-developed  He appears distressed  HENT:   Head: Normocephalic  Eyes: Pupils are equal, round, and reactive to light  Neck: JVD present  Cardiovascular: An irregularly irregular rhythm present  Murmur heard  Pulmonary/Chest: He is in respiratory distress  He has rales  Abdominal: Soft  He exhibits no distension  Musculoskeletal: He exhibits edema  Neurological: He is alert and oriented to person, place, and time  No cranial nerve deficit  Skin: Skin is warm and dry  Psychiatric: He has a normal mood and affect         Lab Results:     Lab Results   Component Value Date    CKTOTAL 65 04/04/2018    TROPONINI 0 07 (H) 05/25/2018    TROPONINI 0 50 (H) 05/10/2018    TROPONINI 0 20 (H) 05/10/2018       Lab Results   Component Value Date    GLUCOSE 172 (H) 05/26/2018    CALCIUM 8 8 05/26/2018     (L) 05/26/2018    K 4 6 05/26/2018    CO2 26 05/26/2018    CL 96 (L) 05/26/2018     (H) 05/26/2018    CREATININE 2 95 (H) 05/26/2018       Lab Results   Component Value Date    WBC 6 69 05/26/2018    HGB 9 2 (L) 05/26/2018    HCT 29 5 (L) 05/26/2018    MCV 88 05/26/2018     05/26/2018       Lab Results   Component Value Date    CHOL 110 03/06/2018    CHOL 137 05/10/2017    CHOL 125 01/03/2017     Lab Results   Component Value Date    HDL 31 (L) 03/06/2018    HDL 36 (L) 05/10/2017    HDL 35 (L) 01/03/2017     Lab Results   Component Value Date    LDLCALC 66 03/06/2018    LDLCALC 89 05/10/2017    LDLCALC 71 01/03/2017     Lab Results   Component Value Date    TRIG 65 03/06/2018    TRIG 61 05/10/2017    TRIG 96 01/03/2017       Lab Results   Component Value Date    ALT 28 05/25/2018    AST 17 05/25/2018         Results from last 7 days  Lab Units 05/25/18  1944   INR  1 21*     Imaging: I have personally reviewed pertinent reports        EKG:  Atrial fibrillation

## 2018-05-26 NOTE — ASSESSMENT & PLAN NOTE
Please refer to Dr Shaheed León outpatient notes   on May 23rd  Management as above  Apparently his new baseline of creatinine 3-3 2    Today's creatinine 3 28

## 2018-05-27 PROBLEM — N18.9 ACUTE-ON-CHRONIC KIDNEY INJURY (HCC): Status: RESOLVED | Noted: 2018-01-01 | Resolved: 2018-01-01

## 2018-05-27 PROBLEM — M75.42 IMPINGEMENT SYNDROME OF LEFT SHOULDER: Status: RESOLVED | Noted: 2017-01-01 | Resolved: 2018-01-01

## 2018-05-27 PROBLEM — E55.9 VITAMIN D DEFICIENCY: Status: RESOLVED | Noted: 2018-01-01 | Resolved: 2018-01-01

## 2018-05-27 PROBLEM — N17.9 ACUTE-ON-CHRONIC KIDNEY INJURY (HCC): Status: RESOLVED | Noted: 2018-01-01 | Resolved: 2018-01-01

## 2018-05-27 PROBLEM — R60.9 EDEMA: Status: RESOLVED | Noted: 2018-01-01 | Resolved: 2018-01-01

## 2018-05-27 PROBLEM — L03.90 CELLULITIS: Status: RESOLVED | Noted: 2017-01-01 | Resolved: 2018-01-01

## 2018-05-27 PROBLEM — E87.1 HYPONATREMIA: Status: RESOLVED | Noted: 2018-01-01 | Resolved: 2018-01-01

## 2018-05-27 PROBLEM — I21.4 NON-ST ELEVATION MYOCARDIAL INFARCTION (NSTEMI) (HCC): Status: RESOLVED | Noted: 2018-01-01 | Resolved: 2018-01-01

## 2018-05-27 PROBLEM — I27.20 PULMONARY HYPERTENSION (HCC): Status: RESOLVED | Noted: 2018-01-01 | Resolved: 2018-01-01

## 2018-05-27 PROBLEM — L03.116 CELLULITIS OF FOOT, LEFT: Status: RESOLVED | Noted: 2018-01-01 | Resolved: 2018-01-01

## 2018-05-27 PROBLEM — I50.9 CONGESTIVE HEART FAILURE (HCC): Status: RESOLVED | Noted: 2017-01-10 | Resolved: 2018-01-01

## 2018-05-27 PROBLEM — S42.255A CLOSED NONDISPLACED FRACTURE OF GREATER TUBEROSITY OF LEFT HUMERUS: Status: RESOLVED | Noted: 2017-01-01 | Resolved: 2018-01-01

## 2018-05-27 PROBLEM — R06.00 DOE (DYSPNEA ON EXERTION): Status: RESOLVED | Noted: 2017-01-03 | Resolved: 2018-01-01

## 2018-05-27 PROBLEM — R05.9 COUGH: Status: RESOLVED | Noted: 2017-01-03 | Resolved: 2018-01-01

## 2018-05-27 PROBLEM — G93.40 ACUTE ENCEPHALOPATHY: Status: RESOLVED | Noted: 2017-01-01 | Resolved: 2018-01-01

## 2018-05-27 PROBLEM — S01.21XS: Chronic | Status: RESOLVED | Noted: 2017-01-01 | Resolved: 2018-01-01

## 2018-05-27 NOTE — PROGRESS NOTES
Notified by RN that pt c/o 8/10 left sided chest pain  EKG obtained and reviewed, unremarkable  Troponin x 3 ordered  At time of evaluation pt states his chest pain has resolved on its own  Reports he was doing leg exercises when he began to have left sided chest pain  States it felt like pressure, not associated with abdominal pain, N/V  Pt reports to no worsening SOB  Heart tachycardic on exam, no chest wall tenderness to palpation, decreased BS B/L   CP resolved on its own, no additional interventions at this time, f/u labs, clinical course

## 2018-05-27 NOTE — PROGRESS NOTES
Progress Note - Tor Willoughby  1935, [de-identified] y o  male MRN: [id number]    Unit/Bed#: [facility] 506-01 Encounter: [id number]    Primary Care Provider: Veronica Bella MD   Date and time admitted to hospital: 5/25/2018  7:22 PM        Cardiorenal syndrome, stage 1-4 or unspecified chronic kidney disease, with heart failure [facility])   Assessment & Plan    Please refer to Dr Ly Perez outpatient notes   on May 23rd  Nephrology and cardiology evaluation appreciated  Patient was on Bumex strict yesterday with low blood pressure   Bumex 2 post dropped in the morning  Plan is to restart back on the Bumex drip with IV albumin  Monitor blood pressure-nephrology is going to start on holding parameters  Strict I&Os and daily weight  Discussed with Cardiology about the possibility of a ionotropic agents-plan is to continue with Bumex along with albumin  May need to revisit the idea of ionotrops if the patient remains persistently hypotensive        S/P aortic valve replacement with bioprosthetic valve   Assessment & Plan    Cardiology on board  Patient with severe mitral regurgitation showing on the previous echocardiogram-not a surgical candidate as per Cardiology        Benign essential hypertension   Assessment & Plan    Continue IV diuresis  Beta-blocker  Currently patient is hypotensive        * Acute on chronic combined systolic and diastolic CHF (congestive heart failure) [facility])   Assessment & Plan    Patient presented from rehab with evidence of acute on chronic combined heart failure  Of notes he had prolonged hospitalization on 4/4-5/11 due to acute on chronic heart failure and cardiorenal syndrome  Repeated echo reveals significantly decreased ejection fraction ( 65% in September 2017 dropped to 45% )   noted getting worse renal function most likely secondary to cardiorenal syndrome and  need of aggressive IV diuresis to improve fluid overload state as patient declined dialysis    Management of this patient with IV diuresis is challenging situation as his renal function most likely will deteriorate further given the need of IV diuresis  To help go through Catch- 22 I will request cardiology and nephrology consult    Patient will be admitted to telemetry , SD2  Continue fluid and salt restricted HF diet  Daily weight( his usual dry weight 198-204 lb,   Today patient weight  is 203 lb  Strict I/Oand daily weight  Continue with Bumex drip        Paroxysmal atrial fibrillation ([facility] [address] )   Assessment & Plan    Cardiology started on heparin drip  Heart rate is better controlled today  Monitor on the telemetry  Frequent PVCs noted on the telemetry-electrolytes appears to be stable        Peripheral arterial disease ([facility] )   Assessment & Plan    With left foot ulcers, podiatry consultation appreciated  Local wound care by Podiatry  X-ray reviewed-no signs of infection  Not a candidate for vascular intervention at this point due to multiple cor morbidities        Coronary artery disease involving native coronary artery of native heart   Assessment & Plan    Continue aspirin beta-blocker statin  Cardiology on board  Had chest pain yesterday- troponin mildly elevated-  Troponin elevation was felt to be secondary to acute kidney injury/  Trend troponin  Currently patient denies any chest        Type 2 diabetes mellitus ([facility] )   Assessment & Plan    · Monitor blood sugar  · Continue with the current care        Acute respiratory failure with hypoxia ([facility] )   Assessment & Plan    · Patient with acute hypoxic respiratory insufficiency requiring-high-flow oxygen  · Continue with the diuresis and monitor  · Will obtain a chest x-ray today        Anemia in chronic kidney disease   Assessment & Plan    · Hemoglobin appears to be 9 1 at baseline  · Monitor        Chronic kidney disease, stage 3   Assessment & Plan    · Patient with creatinine 2 81 today  · Nephrology on board  · Creatinine is improving          VTE Pharmacologic Prophylaxis: Pharmacologic: Heparin Drip  Mechanical VTE Prophylaxis in Place: Yes    Patient Centered Rounds: I have performed bedside rounds with nursing staff today  Discussions with Specialists or Other Care Team Provider: nephrology and cardiology    Education and Discussions with Family / Patient:     Time Spent for Care: 40 minutes  More than 50% of total time spent on counseling and coordination of care as described above  Current Length of Stay: 2 day(s)    Current Patient Status: Inpatient   Certification Statement: The patient will continue to require additional inpatient hospital stay due to hypotension    Discharge Plan:     Code Status: Level 1 - Full Code      Subjective:   Patient seen and examined, overnight events noted    Objective:     Vitals:   Temp (24hrs), Av 8 °F (36 6 °C), Min:97 4 °F (36 3 °C), Max:98 5 °F (36 9 °C)    HR:  [100-125] 103  Resp:  [18-20] 18  BP: ()/(40-68) 97/58  SpO2:  [94 %-100 %] 99 %  Body mass index is 31 94 kg/m²  Input and Output Summary (last 24 hours): Intake/Output Summary (Last 24 hours) at 18 1145  Last data filed at 18 0740   Gross per 24 hour   Intake           734 14 ml   Output             1610 ml   Net          -875 86 ml       Physical Exam:     Physical Exam   Constitutional: He appears well-developed and well-nourished  HENT:   Head: Normocephalic and atraumatic  Eyes: EOM are normal  Pupils are equal, round, and reactive to light  Neck: Normal range of motion  Neck supple  JVD present  Cardiovascular:   Murmur heard  Irregular   Pulmonary/Chest: Breath sounds normal  No respiratory distress  Bilateral basal crackles   Abdominal: Soft  Bowel sounds are normal  He exhibits no distension  There is no tenderness  Musculoskeletal: He exhibits edema  Lower extremity wound covered in dressing   Neurological: He is alert  No cranial nerve deficit  Skin: Skin is warm and dry  No erythema         Additional Data: Labs:      Results from last 7 days  Lab Units 05/27/18  0223   WBC Thousand/uL 6 53   HEMOGLOBIN g/dL 9 1*   HEMATOCRIT % 28 9*   PLATELETS Thousands/uL 131*   NEUTROS PCT % 71   LYMPHS PCT % 18   MONOS PCT % 10   EOS PCT % 1       Results from last 7 days  Lab Units 05/27/18  0234   SODIUM mmol/L 132*   POTASSIUM mmol/L 4 6   CHLORIDE mmol/L 96*   CO2 mmol/L 27   BUN mg/dL 109*   CREATININE mg/dL 2 81*   CALCIUM mg/dL 8 8   TOTAL PROTEIN g/dL 6 6   BILIRUBIN TOTAL mg/dL 0 79   ALK PHOS U/L 78   ALT U/L 25   AST U/L 18   GLUCOSE RANDOM mg/dL 105       Results from last 7 days  Lab Units 05/26/18  1250   INR  1 25*       * I Have Reviewed All Lab Data Listed Above  * Additional Pertinent Lab Tests Reviewed:  Colin 66 Admission Reviewed    Imaging:    Imaging Reports Reviewed Today Include:   Imaging Personally Reviewed by Myself Includes:      Recent Cultures (last 7 days):           Last 24 Hours Medication List:     Current Facility-Administered Medications:  acetaminophen 650 mg Oral Q6H PRN Junior Rudy MD    albumin human 25 g Intravenous Q6H Johnine Booth MD    allopurinol 200 mg Oral Daily Edgardo Amherst, DO    aspirin 81 mg Oral Daily Junior Rudy MD    atorvastatin 40 mg Oral Daily Junior Rudy MD    bumetanide 1 mg/hr Intravenous Continuous McDowell Sherrie, DO    calcium carbonate 1,000 mg Oral Daily PRN Junior Rudy MD    docusate sodium 100 mg Oral BID Junior Rudy MD    [START ON 5/28/2018] doxercalciferol 0 5 mcg Oral Once per day on Mon Wed Fri Johnnie Booth MD    heparin (porcine) 3-20 Units/kg/hr (Order-Specific) Intravenous Titrated Brittany Denton MD Last Rate: 11 1 Units/kg/hr (05/26/18 1340)   heparin (porcine) 2,000 Units Intravenous PRN Brittany Denton MD    heparin (porcine) 4,000 Units Intravenous PRN Brittany Denton MD    insulin glargine 20 Units Subcutaneous HS Junior Rudy MD    insulin lispro 1-6 Units Subcutaneous TID CHAU Dunham Yoly Gallegos MD    insulin lispro 1-6 Units Subcutaneous HS Alyson Morejon MD    melatonin 6 mg Oral HS Alyson Morejon MD    metoprolol succinate 25 mg Oral Daily Arina Chatterjee MD    multivitamin-minerals 1 tablet Oral Daily Alyson Morejon MD    ondansetron 4 mg Intravenous Q6H PRN Alyson Morejon MD    pantoprazole 20 mg Oral Early Morning Alyson Morejon MD    patient supplied medication 2 each Oral BID before breakfast/lunch Rafi Wood MD    polyethylene glycol 17 g Oral Daily Alyson Morejon MD    saccharomyces boulardii 250 mg Oral BID Alyson Morejon MD    senna 1 tablet Oral Daily Alyson Morejon MD    tamsulosin 0 4 mg Oral Daily With Maggi Anderson MD         Today, Patient Was Seen By: Rafi Wood MD    ** Please Note: Dictation voice to text software may have been used in the creation of this document   **

## 2018-05-27 NOTE — ASSESSMENT & PLAN NOTE
Patient presented from rehab with evidence of acute on chronic combined heart failure  Of notes he had prolonged hospitalization on 4/4-5/11 due to acute on chronic heart failure and cardiorenal syndrome  Repeated echo reveals significantly decreased ejection fraction ( 65% in September 2017 dropped to 45% )   noted getting worse renal function most likely secondary to cardiorenal syndrome and  need of aggressive IV diuresis to improve fluid overload state as patient declined dialysis  Management of this patient with IV diuresis is challenging situation as his renal function most likely will deteriorate further given the need of IV diuresis  To help go through Catch- 22 I will request cardiology and nephrology consult    Patient will be admitted to telemetry , SD2  Continue fluid and salt restricted HF diet  Daily weight( his usual dry weight 198-204 lb,   Today patient weight  is 203 lb  Strict I/Oand daily weight  Continue with Bumex drip

## 2018-05-27 NOTE — ASSESSMENT & PLAN NOTE
Please refer to Dr Shaheed León outpatient notes   on May 23rd  Nephrology and cardiology evaluation appreciated  Patient was on Bumex strict yesterday with low blood pressure   Bumex 2 post dropped in the morning  Plan is to restart back on the Bumex drip with IV albumin  Monitor blood pressure-nephrology is going to start on holding parameters  Strict I&Os and daily weight  Discussed with Cardiology about the possibility of a ionotropic agents-plan is to continue with Bumex along with albumin    May need to revisit the idea of ionotrops if the patient remains persistently hypotensive

## 2018-05-27 NOTE — CASE MANAGEMENT
Initial Clinical Review    Admission: Date/Time/Statement: 5/25/18 @ 2254     Orders Placed This Encounter   Procedures    Inpatient Admission (expected length of stay for this patient is greater than two midnights)     Standing Status:   Standing     Number of Occurrences:   1     Order Specific Question:   Admitting Physician     Answer:   Jermaine Ch     Order Specific Question:   Level of Care     Answer:   Med Surg [16]     Order Specific Question:   Estimated length of stay     Answer:   More than 2 Midnights     Order Specific Question:   Certification     Answer:   I certify that inpatient services are medically necessary for this patient for a duration of greater than two midnights  See H&P and MD Progress Notes for additional information about the patient's course of treatment  ED: Date/Time/Mode of Arrival:   ED Arrival Information     Expected Arrival Acuity Means of Arrival Escorted By Service Admission Type    - 5/25/2018 19:22 Emergent Ambulance [facility] Emergency    Arrival Complaint    sob          Chief Complaint:   Chief Complaint   Patient presents with    Shortness of Breath     PT WITH DYPSNEA ON EXERTION  PT DENIES CP/N/V/D  PT ALSO HAS 4+ PITTING EDEMA IN BLLE  History of Illness:  [de-identified] y o  male with multiple comorbidities including combined heart failur, CKD 4 , Afib, foot ulcers who presented with getting worse shortness of breath with minimal exertion, increased bilateral lower extremity edema, orthopnea and approximately 10 lb of weight gain since his discharge on May 11  Of notes he was hospitalized between 4/4-5/11 due to acute on chronic heart failure ,cardio renal syndrome  Patient declined it dialysis as the option to remove excessive fluid  He underwent aggressive diuresis with Bumex IV ,this  was managed by Cardiology and Nephrology    Unfortunate a repeated echocardiogram found  decreased ejection fraction compared to his previous data  Patient was discharged to rehab   and presented last night with above-mentioned complaints of CHF exacerbation  ProBNP 96414,  vascular congestion and fusion imaging study  His  today's weight 210 lbs  ED Vital Signs:   ED Triage Vitals   Temperature Pulse Respirations Blood Pressure SpO2   05/25/18 1930 05/25/18 1930 05/25/18 1930 05/25/18 1930 05/25/18 1930   97 9 °F (36 6 °C) (!) 112 18 98/56 99 %      Temp Source Heart Rate Source Patient Position - Orthostatic VS BP Location FiO2 (%)   05/25/18 1930 05/25/18 1930 05/25/18 2010 05/25/18 2010 05/26/18 1600   Oral Monitor Lying Right arm 35      Pain Score       05/25/18 1930       8        Wt Readings from Last 1 Encounters:   05/27/18 92 5 kg (203 lb 14 4 oz)       Vital Signs:  05/26 0701  05/27 0700 05/27 0701 05/27 1829  Most Recent     Temperature (°F) 97 498 7 9898 5  98 2 (36 8)    Pulse 101125 100103  103    Respirations 1820 18  18    Blood Pressure 76/50100/68 73/5097/58  94/63    SpO2 (%) 98100 9499  97        Abnormal Labs/Diagnostic Test Results:  Hgb 9 2, Hct 28 6, , , CL 95, , Cr 3 28, Glucose 157    CXR -- Recurrent CHF, small left pleural effusion, moderate cardiomegaly    Status post cardiac valve replacement       ED Treatment:   Medication Administration from 05/25/2018 1922 to 05/25/2018 2340       Date/Time Order Dose Route Action Action by Comments     05/25/2018 5390 metoprolol tartrate (LOPRESSOR) partial tablet 12 5 mg 12 5 mg Oral Given Gregory Koch RN           Past Medical/Surgical History:   Past Medical History:   Diagnosis Date    Cardiac disease     [facility] [address] [facility]     Diabetes mellitus ([facility] )     Dyspnea     Embolism, arterial, leg, right (HCC)     Hematuria     History of herniated intervertebral disc     Hypertension     Myocardial infarction ([facility] )     Pneumonia     Premature atrial contractions     Productive cough     Renal disorder Admitting Diagnosis: Shortness of breath [R06 02]  CKD (chronic kidney disease) stage 4, GFR 15-29 ml/min ([facility]) [N18 4]  CHF exacerbation ([facility]) [I50 9]  Acute on chronic diastolic congestive heart failure ([facility]) [I50 33]  Fluid overload [E87 70]    Age/Sex: [de-identified] y o  male    Assessment/Plan:   * Acute on chronic combined systolic and diastolic CHF (congestive heart failure) ([facility])   Assessment & Plan     Patient presented from rehab with evidence of acute on chronic combined heart failure  Of notes he had prolonged hospitalization on 4/4-5/11 due to acute on chronic heart failure and cardiorenal syndrome  Repeated echo reveals significantly decreased ejection fraction ( 65% in September 2017 dropped to 45% )   noted getting worse renal function most likely secondary to cardiorenal syndrome and  need of aggressive IV diuresis to improve fluid overload state as patient declined dialysis  Management of this patient with IV diuresis is challenging situation as his renal function most likely will deteriorate further given the need of IV diuresis  To help go through Catch- 22 I will request cardiology and nephrology consult  Patient will be admitted to telemetry , SD2  Continue fluid and salt restricted HF diet  Daily weight( his usual dry weight 198-204 lb, on admission today his weight 210 lb), strict intake and output  Start Bumex 1 mg IV b i d   Monitor renal function and electrolytes  Hold torsemide           Cardiorenal syndrome, stage 1-4 or unspecified chronic kidney disease, with heart failure [facility])   Assessment & Plan     Please refer to Dr Pancho Jerez outpatient notes   on May 23rd  Management as above  Apparently his new baseline of creatinine 3-3 2    Today's creatinine 3 28           Benign essential hypertension   Assessment & Plan     Continue IV diuresis  Beta-blocker          Paroxysmal atrial fibrillation (HCC)   Assessment & Plan     Declined AC due to high fall risk   continue aspirin and beta-blocker          Coronary artery disease involving native coronary artery of native heart   Assessment & Plan     Continue aspirin beta-blocker statin          Diabetes 1 5, managed as type 2 (HCC)   Assessment & Plan     Continue long-acting insulin Accu-Cheks insulin sliding scale          S/P aortic valve replacement with bioprosthetic valve   Assessment & Plan     Cardiology to follow          Peripheral arterial disease ([facility] [address] )   Assessment & Plan     With left foot ulcers, follows podiatry  Patient daughter requested podiatry consult                VTE Prophylaxis: Heparin  / sequential compression device   Code Status: full  POLST: There is no POLST form on file for this patient (pre-hospital)  Discussion with family:  Daughter at bedside     Anticipated Length of Stay:  Patient will be admitted on an Inpatient basis with an anticipated length of stay of  > 2 midnights     Justification for Hospital Stay:  Cardiology Nephrology podiatry consult, IV diuresis       Admission Orders:  M/S/Tele unit  Telem  HFNC, FiO2 35%  Fingerstick glucose checks w/ ssi  cardiac diet  Up with assistance  Consult cardiology, nephrology, podiatry  PT/OT evals  Monitor I&O's   Daily weights      Scheduled Meds:   Current Facility-Administered Medications:  acetaminophen 650 mg Oral Q6H PRN Anya Bentley MD    albumin human 25 g Intravenous Q6H Maya Crook MD    allopurinol 200 mg Oral Daily Amira Escobedo DO    aspirin 81 mg Oral Daily Anya Bentley MD    atorvastatin 40 mg Oral Daily Anya Bentley MD    bumetanide 1 mg/hr Intravenous Continuous Amira Escobedo DO Last Rate: 1 mg/hr (05/27/18 1130)   calcium carbonate 1,000 mg Oral Daily PRN Anya Bentley MD    docusate sodium 100 mg Oral BID Anya Bentley MD    [START ON 5/28/2018] doxercalciferol 0 5 mcg Oral Once per day on Mon Wed Fri Maya Crook MD    heparin (porcine) 3-20 Units/kg/hr (Order-Specific) Intravenous Titrated Solomon Montoya MD Last Rate: 11 1 Units/kg/hr (05/27/18 1507)   heparin (porcine) 2,000 Units Intravenous PRN Des Posada MD    heparin (porcine) 4,000 Units Intravenous PRN Des Posada MD    insulin glargine 20 Units Subcutaneous HS Jonelle Chadwick MD    insulin lispro 1-6 Units Subcutaneous TID AC Jonelle Chadwick MD    insulin lispro 1-6 Units Subcutaneous HS Jonelle Chadwick MD    melatonin 6 mg Oral HS Jonelle Chadwick MD    metoprolol succinate 25 mg Oral Daily Des Posada MD    multivitamin-minerals 1 tablet Oral Daily Jonelle Chadwick MD    ondansetron 4 mg Intravenous Q6H PRN Jonelle Chadwick MD    pantoprazole 20 mg Oral Early Morning Jonelle Chadwick MD    patient supplied medication 2 each Oral BID before breakfast/lunch Guzman Patel MD    polyethylene glycol 17 g Oral Daily Jonelle Chadwick MD    saccharomyces boulardii 250 mg Oral BID Jonelle Chadwick MD    senna 1 tablet Oral Daily Jonelle Chadwick MD    tamsulosin 0 4 mg Oral Daily With Enrike Thomas MD      Continuous Infusions:   bumetanide 1 mg/hr Last Rate: 1 mg/hr (05/27/18 1130)   heparin (porcine) 3-20 Units/kg/hr (Order-Specific) Last Rate: 11 1 Units/kg/hr (05/27/18 1507)     PRN Meds:   acetaminophen    calcium carbonate    heparin (porcine)    heparin (porcine)    ondansetron

## 2018-05-27 NOTE — ASSESSMENT & PLAN NOTE
Continue aspirin beta-blocker statin  Cardiology on board  Had chest pain yesterday- troponin mildly elevated-  Troponin elevation was felt to be secondary to acute kidney injury/  Trend troponin  Currently patient denies any chest

## 2018-05-27 NOTE — ASSESSMENT & PLAN NOTE
· Patient with acute hypoxic respiratory insufficiency requiring-high-flow oxygen  · Continue with the diuresis and monitor  · Will obtain a chest x-ray today

## 2018-05-27 NOTE — ASSESSMENT & PLAN NOTE
Cardiology on board  Patient with severe mitral regurgitation showing on the previous echocardiogram-not a surgical candidate as per Cardiology

## 2018-05-27 NOTE — ASSESSMENT & PLAN NOTE
Cardiology started on heparin drip  Heart rate is better controlled today  Monitor on the telemetry  Frequent PVCs noted on the telemetry-electrolytes appears to be stable

## 2018-05-27 NOTE — PROGRESS NOTES
Pt c/o sudden 8/10 pain in his left chest   Christine Spurling of [facility] notified and at bedside to evaluate  ECG and troponins ordered and done  Pt reports that the chest pain went away on its own and his pain is now 0/10  Will continue to monitor

## 2018-05-27 NOTE — PROGRESS NOTES
Cardiology Progress Note - Royal Fowler  [de-identified] y o  male MRN: [id number]    Unit/Bed#: [facility] 506-01 Encounter: [id number]      Assessment:  Principal Problem:    Acute on chronic combined systolic and diastolic CHF (congestive heart failure) (HCC)  Active Problems:    Benign essential hypertension    Type 2 diabetes mellitus (HCC)    S/P aortic valve replacement with bioprosthetic valve    Coronary artery disease involving native coronary artery of native heart    Peripheral arterial disease (HCC)    Cardiorenal syndrome, stage 1-4 or unspecified chronic kidney disease, with heart failure (HCC)    Paroxysmal atrial fibrillation (HCC)    Diabetes 1 5, managed as type 2 ([facility] [address] )      Plan:  Patient continues on supplemental oxygen  He had chest pain earlier this morning but it is resolved  He is in atrial fibrillation on telemetry with a moderate ventricular response  He is on intravenous heparin  The nephrology note is appreciated  His BUN/ creatinine today are comparable to yesterday  His creatinine is 2 81 which is down a little bit  His troponin this morning is minimally elevated which is likely a type 2 elevation in the setting of renal insufficiency and atrial fibrillation  Subjective:   Patient seen and examined  No significant events overnight   negative  Objective:     Vitals: Blood pressure (!) 73/50, pulse 100, temperature 98 5 °F (36 9 °C), resp  rate 18, height 5' 7" (1 702 m), weight 92 5 kg (203 lb 14 4 oz), SpO2 97 %  , Body mass index is 31 94 kg/m² , Orthostatic Blood Pressures      Most Recent Value   Blood Pressure   73/50 filed at 05/27/2018 0715   Patient Position - Orthostatic VS  Lying filed at 05/27/2018 0236      ,      Intake/Output Summary (Last 24 hours) at 05/27/18 0850  Last data filed at 05/27/18 0740   Gross per 24 hour   Intake           734 14 ml   Output             1810 ml   Net         -1075 86 ml       No significant arrhythmias seen on telemetry review   Atrial fibrillation with a moderate ventricular response      Physical Exam:    GEN: Tor Case     NECK: supple, no carotid bruits, no JVD or HJR  HEART: normal rate, regular rhythm, normal S1 and S2, systolic murmur  LUNGS:  Diminished breath sounds at the bases  ABDOMEN: normal bowel sounds, soft, no tenderness, no distention  EXTREMITIES: edema  SKIN: warm and well perfused, no suspicious lesions on exposed skin    Labs & Results:    Admission on 05/25/2018   Component Date Value    Sodium 05/25/2018 130*    Potassium 05/25/2018 4 9     Chloride 05/25/2018 95*    CO2 05/25/2018 26     Anion Gap 05/25/2018 9     BUN 05/25/2018 108*    Creatinine 05/25/2018 3 28*    Glucose 05/25/2018 157*    Calcium 05/25/2018 8 8     AST 05/25/2018 17     ALT 05/25/2018 28     Alkaline Phosphatase 05/25/2018 76     Total Protein 05/25/2018 6 6     Albumin 05/25/2018 3 2*    Total Bilirubin 05/25/2018 0 66     eGFR 05/25/2018 17     WBC 05/25/2018 6 63     RBC 05/25/2018 3 36*    Hemoglobin 05/25/2018 9 2*    Hematocrit 05/25/2018 28 6*    MCV 05/25/2018 85     MCH 05/25/2018 27 4     MCHC 05/25/2018 32 2     RDW 05/25/2018 17 0*    MPV 05/25/2018 9 7     Platelets 94/19/9915 147*    nRBC 05/25/2018 0     Neutrophils Relative 05/25/2018 70     Lymphocytes Relative 05/25/2018 20     Monocytes Relative 05/25/2018 8     Eosinophils Relative 05/25/2018 1     Basophils Relative 05/25/2018 0     Neutrophils Absolute 05/25/2018 4 66     Lymphocytes Absolute 05/25/2018 1 33     Monocytes Absolute 05/25/2018 0 51     Eosinophils Absolute 05/25/2018 0 09     Basophils Absolute 05/25/2018 0 01     Troponin I 05/25/2018 0 07*    Protime 05/25/2018 15 4*    INR 05/25/2018 1 21*    PTT 05/25/2018 28     NT-proBNP 05/25/2018 35569*    Magnesium 05/26/2018 2 9*    Sodium 05/26/2018 130*    Potassium 05/26/2018 4 6     Chloride 05/26/2018 96*    CO2 05/26/2018 26     Anion Gap 05/26/2018 8     BUN 05/26/2018 112*    Creatinine 05/26/2018 2 95*    Glucose 05/26/2018 172*    Calcium 05/26/2018 8 8     eGFR 05/26/2018 19     Platelets 40/14/6669 162     MPV 05/26/2018 10 3     POC Glucose 05/26/2018 205*    WBC 05/26/2018 6 69     RBC 05/26/2018 3 36*    Hemoglobin 05/26/2018 9 2*    Hematocrit 05/26/2018 29 5*    MCV 05/26/2018 88     MCH 05/26/2018 27 4     MCHC 05/26/2018 31 2*    RDW 05/26/2018 17 1*    Platelets 28/70/3116 159     MPV 05/26/2018 10 1     POC Glucose 05/26/2018 178*    Troponin I 05/26/2018 0 06*    POC Glucose 05/26/2018 182*    PTT 05/26/2018 29     WBC 05/26/2018 6 39     RBC 05/26/2018 3 50*    Hemoglobin 05/26/2018 9 6*    Hematocrit 05/26/2018 30 8*    MCV 05/26/2018 88     MCH 05/26/2018 27 4     MCHC 05/26/2018 31 2*    RDW 05/26/2018 16 9*    Platelets 67/54/5746 136*    MPV 05/26/2018 9 3     Protime 05/26/2018 15 8*    INR 05/26/2018 1 25*    Clarity, UA 05/26/2018 Clear     Color, UA 05/26/2018 Yellow     Specific Gravity, UA 05/26/2018 1 012     pH, UA 05/26/2018 5 0     Glucose, UA 05/26/2018 Negative     Ketones, UA 05/26/2018 Negative     Blood, UA 05/26/2018 Negative     Protein, UA 05/26/2018 Negative     Nitrite, UA 05/26/2018 Negative     Bilirubin, UA 05/26/2018 Negative     Urobilinogen, UA 05/26/2018 0 2     Leukocytes, UA 05/26/2018 Negative     WBC, UA 05/26/2018 None Seen     RBC, UA 05/26/2018 None Seen     Hyaline Casts, UA 05/26/2018 None Seen     Bacteria, UA 05/26/2018 None Seen     Epithelial Cells 05/26/2018 None Seen     Sodium, Ur 05/26/2018 8     Creatinine, Ur 05/26/2018 71 2     PTT 05/26/2018 77*    POC Glucose 05/26/2018 154*    POC Glucose 05/26/2018 136     PTT 05/27/2018 77*    WBC 05/27/2018 6 53     RBC 05/27/2018 3 36*    Hemoglobin 05/27/2018 9 1*    Hematocrit 05/27/2018 28 9*    MCV 05/27/2018 86     MCH 05/27/2018 27 1     MCHC 05/27/2018 31 5     RDW 05/27/2018 17 0*    MPV 05/27/2018 9 4     Platelets 37/38/1021 131*    nRBC 05/27/2018 0     Neutrophils Relative 05/27/2018 71     Lymphocytes Relative 05/27/2018 18     Monocytes Relative 05/27/2018 10     Eosinophils Relative 05/27/2018 1     Basophils Relative 05/27/2018 0     Neutrophils Absolute 05/27/2018 4 61     Lymphocytes Absolute 05/27/2018 1 16     Monocytes Absolute 05/27/2018 0 68     Eosinophils Absolute 05/27/2018 0 03     Basophils Absolute 05/27/2018 0 01     Sodium 05/27/2018 132*    Potassium 05/27/2018 4 6     Chloride 05/27/2018 96*    CO2 05/27/2018 27     Anion Gap 05/27/2018 9     BUN 05/27/2018 109*    Creatinine 05/27/2018 2 81*    Glucose 05/27/2018 105     Calcium 05/27/2018 8 8     AST 05/27/2018 18     ALT 05/27/2018 25     Alkaline Phosphatase 05/27/2018 78     Total Protein 05/27/2018 6 6     Albumin 05/27/2018 3 3*    Total Bilirubin 05/27/2018 0 79     eGFR 05/27/2018 20     Magnesium 05/27/2018 2 7*    Troponin I 05/27/2018 0 11*    Ventricular Rate 05/27/2018 119     Atrial Rate 05/27/2018 119     QRSD Interval 05/27/2018 128     QT Interval 05/27/2018 344     QTC Interval 05/27/2018 483     P Axis 05/27/2018 -26     QRS [place] 05/27/2018 -50     T Wave Axis 05/27/2018 132     POC Glucose 05/27/2018 109        Xr Chest Portable    Result Date: 5/4/2018  Narrative: CHEST INDICATION:   Follow-up congestive failure  COMPARISON:  4/30/2018 EXAM PERFORMED/VIEWS:  XR CHEST PORTABLE FINDINGS: Cardiomediastinal silhouette is stable  Vascular congestion is slightly improved  Small bilateral pleural effusions again identified  Sternal wires are present  Visualized osseous structures appear otherwise unremarkable for the patient's age  Impression: Slightly improved congestive failure, with stable small bilateral pleural effusions  Workstation performed: [id number]     Xr Chest Portable    Result Date: 4/30/2018  Narrative: CHEST INDICATION:   CHF   COMPARISON:  Chest radiographs April 26, 2018 EXAM PERFORMED/VIEWS:  XR CHEST PORTABLE FINDINGS: The heart is enlarged  Atherosclerotic changes in the aorta  Median sternotomy  Lung volumes diminished  Bilateral pleural effusions  Diffuse alveolar infiltrates throughout the lungs bilaterally  These findings have progressed from the prior study  Osseous structures appear within normal limits for patient age  Impression: Worsening congestive heart failure  Workstation performed: [id number]     X-ray Chest 2 Views    Result Date: 5/26/2018  Narrative: CHEST INDICATION: 61-year-old male, shortness of breath, lower extremity edema COMPARISON:  5/4/2018 chest x-ray EXAM PERFORMED/VIEWS:  XR CHEST PA & LATERAL FINDINGS: Recurrent CHF Persistent moderate cardiomegaly Sternal wires and cardiac valve replacement, unchanged Persistent small left pleural effusion No pneumothorax Osseous structures appear within normal limits for patient age  Impression: Recurrent CHF, small left pleural effusion, moderate cardiomegaly Status post cardiac valve replacement Findings are consistent with emergency provider's preliminary reading Workstation performed: [id number]     Xr Foot 3+ Vw Left    Result Date: 5/27/2018  Narrative: LEFT FOOT INDICATION:   r/o om calc, 3rd, 5th digit dry gangrene  COMPARISON:  4/4/2018  VIEWS:  XR FOOT 3+ VW LEFT Images: 3 FINDINGS: Stable alignment of an intra-articular fracture involving the medial base of the 1st proximal phalanx best seen on the oblique projection  No new fracture identified  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, grossly unchanged  No lytic or blastic lesions seen  Mild soft tissue swelling of the forefoot involving both the dorsal and plantar surface  There is vascular calcification of the ankle and foot  Impression: Old fracture involving the medial aspect of the proximal phalanx of the 1st digit, unchanged in alignment   Stable degenerative osteoarthritis  Workstation performed: [id number]     Xr Foot 3+ Vw Right    Result Date: 5/27/2018  Narrative: RIGHT FOOT INDICATION:   r/o OM dry gangrene 2nd toe  COMPARISON:  None VIEWS:  XR FOOT 3+ VW RIGHT Images: 3 FINDINGS: There is no acute fracture or dislocation  No significant degenerative changes  No lytic or blastic lesions seen  Extensive soft tissue swelling especially along the dorsum of the midfoot and forefoot  Impression: Extensive soft tissue swelling best seen on the lateral projection  No definitive acute osseous abnormality identified  Workstation performed: [id number]     Vas Lower Limb Arterial Duplex, Complete Bilateral    Result Date: 5/21/2018  Narrative:  [facility] REPORT [facility]INICAL: Indications:  Left Atherosclerosis with Ulceration [I70 25]  Bilateral PVD, Unspecified [I73 9]  Non-healing wound to the left heel and left 3rd and 4th toes  Operative History 7/18/2003 Right PTA of mid and distal bypass graft stenosis  11/14/2002 Right femoral to tibioperoneal bypass with ligation of the distal popliteal artery  6/20/2002 Aortic valve replacement 6/20/2002 CABG Clinical Right Pressure:  98/ mm Hg, Left Pressure:  99/ mm Hg     FINDINGS:  Segment                Rig       Left                                          PSV  EDV  Impression  PSV  EDV  Inflow Anastomosis      52                             High Thigh (Graft)      48                             Mid Thigh (Graft)       49                             Low Thigh (Graft)       52                             Near Knee (Graft)       52                             Outflow Anastomosis     37                             Common Femoral Artery   86   11               58    0  Prox Profunda           53    0               69    4  Prox SFA                                      91    7  Mid SFA                          Occluded      0       Dist SFA                         Occluded      0    0  Proximal Pop Occluded      0    0  Distal Pop                       Occluded      0    0  Tibioperoneal           50       Occluded              Dist Post Tibial        56    9  Occluded      0    0  Dist  Ant  Tibial      111   10               20    9     CONCLUSION: Impression: RIGHT LOWER LIMB: Patent common femoral to tib/peroneal trunk bypass graft  Ankle Pressure: 149 mm Hg , NOEL: 1 51  NOEL are unreliable due to arterial calcification  The PPG waveform at the great toe is flat-line, suggesting diminished digital perfusion  LEFT LOWER LIMB Known occlusion of the proximal superficial femoral artery and popliteal artery   Finding suggests posterior tibial arterial is occluded  The anterior tibial artery is patent  Ankle Pressure 76  mm Hg ,  NOEL: 0 77 The PPG waveform at the great toe is flat-line, suggesting diminished digital perfusion  In comparison to the study of 04/05/2018, there is no significant interval change in the disease process  SIGNATURE: Electronically Signed by: Linda Robledo MD, RPVI on 2018-05-21 06:43:57 AM    Vas Lower Limb Vein Mapping Bypass Graft    Result Date: 5/21/2018  Narrative:  [facility] REPORT CLINICAL: Indications: Bilateral PAD unspecified [I73 9]  Left Other Specified Pre-Operative Examination [Z01 818]  Patient was refered for patncy and caliber of the Lt GSV prior to Lt lower extremity arterial bypass  Operative History: 2003-07-18 Right PTA of mid and distal bypass graft stenosis  2002-11-14 Right femoral to tibioperoneal bypass with ligation of the distal popliteal artery   2002-06-20 Aortic valve replacement 2002-06-20 CABG Clinical:  FINDINGS:  Left            AP(mm)  GSV Inguinal       5 7  GSV Prox Thigh     3 0  GSV Mid Thigh      2 3  GSV Dist Thigh     1 4  GSV Knee           1 6  GSV Prox Calf      2 1  GSV Mid Calf       0 7  GSV Dist Calf      2 1  GSV Ankle          2 2     CONCLUSION: Impression: RIGHT LOWER LIMB: The GSV harvested for lower extremity arterial bypass  LEFT LOWER LIMB: The main GSV proximal to distal thigh had been harvested for CABG The anterior accessory saphenous vein in the thigh is patent   However, there are multiple short segment of the GSV that is in small caliber in the mid to distal thigh, knee and mid calf  The vein is of inadequate caliber and quality for graft conduit with intraluminal diameters ranging from 3 0mm to 0 7mm throughout the leg  SIGNATURE: Electronically Signed by: Jesus Shipley MD, [address] on 2018-05-21 06:44:28 AM    Vas Upper Limb Vein Mapping    Result Date: 5/21/2018  Narrative:  [facility] REPORT [facility]INICAL: Indications: Other Specified Pre-Operative Examination [Z01 818]  Operative History: 2003-07-18 Right PTA of mid and distal bypass graft stenosis  2002-11-14 Right femoral to tibioperoneal bypass with ligation of the distal popliteal artery   2002-06-20 Aortic valve replacement 2002-06-20 CABG   FINDINGS:  Right                            Impression      AP (mm)  Bas Upper                                            4 9  Bas Mid Arm                                          3 1  Basilic Vein - Forearm Proximal  Not visualized           Bas Mid Forearm                  Not visualized           Basilic Vein - Forearm Distal    Not visualized           Ceph Upper                                           1 4  Ceph Mid Arm                                         1 8  Ceph AC                                              2 2  Ceph Mid Forearm                                     1 3   Left                             Impression      AP (mm)  Bas Upper                                            2 7  Bas Mid Arm                                          3 1  Bas AC                                               3 8  Basilic Vein - Forearm Proximal                      1 6  Bas Mid Forearm                                      1 2  Basilic Vein - Forearm Distal                        1 0  Ceph Upper 1 2  Ceph Mid Arm                     Not visualized      0 7  Ceph Mid Forearm                                     0 9     CONCLUSION: Impression RIGHT ARM: Evaluation shows patent and thrombus free cephalic vein and basilic vein  The intraluminal diameters of the cephalic vein are not adequate for use as and arterial conduit  The vein >2mm from mid upper arm to the proximal forearm  The intraluminal diameters of the basilic vein are not adequate for use as and arterial conduit  The vein >2mm from above the elbow to the axillary space and becomes too small to accurately measure below the elbow  LEFT ARM: Evaluation shows patent and thrombus free cephalic vein and basilic vein  The intraluminal diameters of the cephalic vein are not adequate for use as and arterial conduit  The intraluminal diameters of the basilic vein are not adequate for use as and arterial conduit, The vein >2mm caliber from the elbow to the axillary space and becomes small caliber below the elbow  SIGNATURE: Electronically Signed by: Taras Darby MD, RPVI on 2018-05-21 06:43:28 AM      EKG personally reviewed by Lindsay Andino MD      Counseling / Coordination of Care  Total floor / unit time spent today 30 minutes  Greater than 50% of total time was spent with the patient and / or family counseling and / or coordination of care

## 2018-05-27 NOTE — PROGRESS NOTES
Progress note - Nephrology   Aramis Arias  [de-identified] y o  male MRN: [id number]  Unit/Bed#: [facility] 025-09 Encounter: [id number]      ASSESSMENT and PLAN:    29-year-old male history of valvular cardiomyopathy with recent admission of shortness of breath and volume overload present with shortness of breath secondary to decompensated heart failure complicated by stage 4 chronic kidney disease    1  Stage 4 chronic kidney disease with severe azotemia-creatinine at baseline between 2 5-3 point  -creatinine 1 year ago was around 2 1  -patient presented with shortness of breath, weight is up to 209 lb has been as low as 198 lb as an outpatient  -continue to hold lisinopril  -Bumex held because of hypotension, this morning  -more rate control  -blood pressure is responsive to albumin so will place on 1 gram/kilogram of albumin every 6 hours  -would restart Bumex drip at 1 mg an hour, and titrate up to 2 mg an hour max with a goal of being net negative about 1 L over the next 2 hours-if urine output greater than 100 mL/hour blood pressure dropped can decrease Bumex drip to 0 5 mg per hour  -respiratory wise patient states he feels improved  -creatinine and azotemia improved with diuresis  -weight decreased from 95 kg to 92 5 kg however this is a bed weight  -still with JVD and volume overload    2  Decompensated congestive heart failure  -cardiology following  -discussed if inotrope support in the setting would improved overall symptomatology with Cardiology-will monitor with changes made above initially    3  Volume overload-as above  -as above    4    Gout-  -Patient is on febuxostat at home daily, here was given as p r n , changed to daily dose to avoid hyperuricemia and gout attack while diuresing    SUBJECTIVE:    Patient states breathing is somewhat better mentating well, blood pressure did drop into the 70 systolic overnight, Geronimo catheter now in place    MEDICATIONS:    Current Facility-Administered Medications:    acetaminophen (TYLENOL) tablet 650 mg, 650 mg, Oral, Q6H PRN, Anya Bentley MD, 650 mg at 05/26/18 0523    albumin human (FLEXBUMIN) 25 % injection 25 g, 25 g, Intravenous, Q6H, Maya Crook MD, 25 g at 05/27/18 1043    aspirin (ECOTRIN LOW STRENGTH) EC tablet 81 mg, 81 mg, Oral, Daily, Anya Bentley MD, 81 mg at 05/27/18 0801    atorvastatin (LIPITOR) tablet 40 mg, 40 mg, Oral, Daily, Anya Bentley MD, 40 mg at 05/27/18 0801    bumetanide (BUMEX) 12 5 mg infusion 50 mL, 2 mg/hr, Intravenous, Continuous, Jamal Aragon DO, Last Rate: 4 mL/hr at 05/27/18 1039, 1 mg/hr at 05/27/18 1039    calcium carbonate (TUMS) chewable tablet 1,000 mg, 1,000 mg, Oral, Daily PRN, Anya Bentley MD    docusate sodium (COLACE) capsule 100 mg, 100 mg, Oral, BID, Anya Bentley MD, 100 mg at 05/27/18 0801    [START ON 5/28/2018] doxercalciferol (HECTOROL) capsule 0 5 mcg, 0 5 mcg, Oral, Once per day on Mon Wed Fri, Maya Crook MD    febuxostat (ULORIC) tablet 40 mg, 40 mg, Oral, Daily PRN, Maya Crook MD    heparin (porcine) 25,000 units in 250 mL infusion (premix), 3-20 Units/kg/hr (Order-Specific), Intravenous, Titrated, Solomon Montoya MD, Last Rate: 10 mL/hr at 05/26/18 1340, 11 1 Units/kg/hr at 05/26/18 1340    heparin (porcine) injection 2,000 Units, 2,000 Units, Intravenous, PRN, Solomon Montoya MD    heparin (porcine) injection 4,000 Units, 4,000 Units, Intravenous, PRN, Solomon Montoya MD    insulin glargine (LANTUS) subcutaneous injection 20 Units 0 2 mL, 20 Units, Subcutaneous, HS, Anya Bentley MD, 20 Units at 05/26/18 2158    insulin lispro (HumaLOG) 100 units/mL subcutaneous injection 1-6 Units, 1-6 Units, Subcutaneous, TID AC, 1 Units at 05/26/18 1700 **AND** Fingerstick Glucose (POCT), , , TID AC, Anya Bentley MD    insulin lispro (HumaLOG) 100 units/mL subcutaneous injection 1-6 Units, 1-6 Units, Subcutaneous, HS, Anya Bentley MD    melatonin tablet 6 mg, 6 mg, Oral, HS, Ollen Homans, MD, 6 mg at 05/26/18 2158    metoprolol succinate (TOPROL-XL) 24 hr tablet 25 mg, 25 mg, Oral, Daily, Jennifer Agudelo MD    multivitamin-minerals (CENTRUM) tablet 1 tablet, 1 tablet, Oral, Daily, Ollen Homans, MD, 1 tablet at 05/27/18 0801    [facility]) injection 4 mg, 4 mg, Intravenous, Q6H PRN, Ollen Homans, MD, 4 mg at 05/26/18 1539    pantoprazole (PROTONIX) EC tablet 20 mg, 20 mg, Oral, Early Morning, Ollen Homans, MD, 20 mg at 05/27/18 0544    patient supplied medication, 2 each, Oral, BID before breakfast/lunch, Huey Godinez MD    polyethylene glycol (MIRALAX) packet 17 g, 17 g, Oral, Daily, Ollen Homans, MD, 17 g at 05/27/18 0801    saccharomyces boulardii (FLORASTOR) capsule 250 mg, 250 mg, Oral, BID, Ollen Homans, MD, 250 mg at 05/27/18 0801    senna (SENOKOT) tablet 8 6 mg, 1 tablet, Oral, Daily, Ollen Homans, MD, 8 6 mg at 05/27/18 0801    tamsulosin (FLOMAX) capsule 0 4 mg, 0 4 mg, Oral, Daily With Rhett Card MD, 0 4 mg at 05/26/18 1716      PHYSICAL EXAM:  Current Weight: Weight - Scale: 92 5 kg (203 lb 14 4 oz)  First Weight: Weight - Scale: 95 3 kg (210 lb)  Vitals:    05/27/18 0500 05/27/18 0715 05/27/18 0740 05/27/18 1031   BP:  (!) 73/50  97/58   BP Location:       Pulse:  100  103   Resp:  18 18   Temp:  98 5 °F (36 9 °C)  98 °F (36 7 °C)   TempSrc:       SpO2:  94% 97% 99%   Weight: 92 5 kg (203 lb 14 4 oz)      Height:           Intake/Output Summary (Last 24 hours) at 05/27/18 1115  Last data filed at 05/27/18 0740   Gross per 24 hour   Intake           734 14 ml   Output             1610 ml   Net          -875 86 ml     General: conscious, cooperative, in not acute distress  Eyes: conjunctivae pink, anicteric sclerae  ENT: lips and mucous membranes moist  Neck: supple, no JVD  Chest: clear breath sounds bilateral, no crackles, ronchus or wheezings  CVS: distinct S1 & S2, normal rate, regular rhythm  Abdomen: soft, non-tender, non-distended, normoactive bowel sounds  Extremities:  3+ pitting edema up till bath  Skin: no rash  Neuro: awake, alert, oriented      Invasive Devices:   Geronimo catheter in place     Lab Results:     Results from last 7 days  Lab Units 05/27/18  0234 05/27/18  0223 05/26/18  1615 05/26/18  1249 05/26/18  0516 05/26/18  0515 05/25/18  1944   WBC Thousand/uL  --  6 53  --  6 39 6 69  --  6 63   HEMOGLOBIN g/dL  --  9 1*  --  9 6* 9 2*  --  9 2*   HEMATOCRIT %  --  28 9*  --  30 8* 29 5*  --  28 6*   PLATELETS Thousands/uL  --  131*  --  136* 159 162 147*   SODIUM mmol/L 132*  --   --   --   --  130* 130*   POTASSIUM mmol/L 4 6  --   --   --   --  4 6 4 9   CHLORIDE mmol/L 96*  --   --   --   --  96* 95*   CO2 mmol/L 27  --   --   --   --  26 26   BUN mg/dL 109*  --   --   --   --  112* 108*   CREATININE mg/dL 2 81*  --   --   --   --  2 95* 3 28*   CALCIUM mg/dL 8 8  --   --   --   --  8 8 8 8   MAGNESIUM mg/dL 2 7*  --   --   --   --  2 9*  --    TOTAL PROTEIN g/dL 6 6  --   --   --   --   --  6 6   BILIRUBIN TOTAL mg/dL 0 79  --   --   --   --   --  0 66   ALK PHOS U/L 78  --   --   --   --   --  76   ALT U/L 25  --   --   --   --   --  28   AST U/L 18  --   --   --   --   --  17   GLUCOSE RANDOM mg/dL 105  --   --   --   --  172* 157*   PROTEIN UA mg/dl  --   --  Negative  --   --   --   --    NITRITE UA   --   --  Negative  --   --   --   --    BLOOD UA   --   --  Negative  --   --   --   --    LEUKOCYTES UA   --   --  Negative  --   --   --   --        Other Studies:  Please see previous notes

## 2018-05-27 NOTE — OCCUPATIONAL THERAPY NOTE
[address] Evaluation     Patient Name: Vanessa Mancilla    Today's Date: 5/27/2018  Problem List  Patient Active Problem List   Diagnosis    Acute on chronic combined systolic and diastolic CH[facility]congestive heart failure) [facility])    Benign essential hypertension    Acute respiratory failure with hypoxia ([facility])    Type 2 diabetes mellitus ([address] )    S/P aortic valve replacement with bioprosthetic valve    Coronary artery disease involving native coronary artery of native heart    Chronic kidney disease, stage 3    Hyperparathyroidism ([address] )    Peripheral arterial disease ([address] )    Atherosclerosis of artery of extremity with ulceration ([address] )    Anemia in chronic kidney disease    Cardiorenal syndrome, stage 1-4 or unspecified chronic kidney disease, with heart failure (HCC)    Paroxysmal atrial fibrillation ([address] )     Past Medical History  Past Medical History:   Diagnosis Date    Cardiac disease     CHF (congestive heart failure) ([address] )     Diabetes mellitus ([address] )     Dyspnea     last assessed-7/10/2015    Embolism, arterial, leg, right (HCC)     last assessed-7/10/2015    Hematuria     resolved-7/24/2017    History of herniated intervertebral disc     Hypertension     Myocardial infarction ([address] )     Pneumonia     Premature atrial contractions     Productive cough     last assessed-10/31/2016    Renal disorder      Past Surgical History  Past Surgical History:   Procedure Laterality Date    AORTIC VALVE REPLACEMENT      BYPASS GRAFT      using vein: femoral-popliteal    CARDIAC VALVE REPLACEMENT      CATARACT EXTRACTION, BILATERAL      COLONOSCOPY      CORONARY ARTERY BYPASS GRAFT      X6    KNEE SURGERY Bilateral     knee replacement    VA SLCTV CATHJ 3RD+ ORD SLCTV ABDL PEL/LXTR [facility] Left 12/22/2017    Procedure: LEG ANGIOGRAM; RIGHT FEMORAL ACCESS; CO2-CONTRAST ;  Surgeon: Amadeo Petit MD;  [facility];  Service: Vascular    REPLACEMENT TOTAL KNEE BILATERAL      VASCULAR SURGERY           05/27/18 1515   Note Type   Note type Eval/Treat   Restrictions/Precautions   Weight Bearing Precautions Per Order Yes   LLE Weight Bearing Per Order WBAT  (per podiatry via phonecall )   Other Precautions O2;Multiple lines; Fall Risk;Pain;Telemetry  (high flow O2)   Pain Assessment   Pain Assessment 0-10   Pain Score 7   Home Living   Type of Home House   Home Layout Two level; Able to live on main level with bedroom/bathroom   Bathroom Toilet Standard   Bathroom Equipment Grab bars in shower; Shower chair;Commode   Bathroom Accessibility Accessible   Home Equipment Walker;Cane   Prior Function   Level of Sharp Independent with ADLs and functional mobility   Lives With Spouse   Receives Help From Family   ADL Assistance Independent   IADLs Independent   Falls in the last 6 months 1 to 4  (1 fall)   Vocational Full time employment   Lifestyle   Autonomy Pt I with ADLs, IADLS, driving  Uses a cane at baseline  Reciprocal Relationships Pt lives w/ supportive spouse who is able to assist   Service to Others Works full time- owns a gas station/convenience store   Intrinsic Gratification Watching baseball   Psychosocial   Psychosocial (WDL) WDL   ADL   Where Assessed Edge of bed   Eating Assistance 5  Supervision/Setup   Eating Deficit Setup   Grooming Assistance 4  Minimal Assistance   [address] 4  Minimal Assistance   LB [address] 2  Maximal [address] 4  [address] 2  Maximal [address]  3  Moderate Assistance   Functional Assistance 3  Moderate Assistance   Bed Mobility   Supine to Sit 2  Maximal assistance   Additional items Assist x 1; Increased time required   Transfers   Sit to Stand 3  Moderate assistance   Additional items Assist x 1   Stand to Sit 3  Moderate assistance   Additional items Assist x 1   Stand pivot 4  Minimal assistance   Additional items Assist x 1   Additional Comments 2nd person for lines   Functional Mobility   Additional items Rolling walker   Balance   Static Sitting Fair +   Dynamic Sitting Fair   Static Standing Fair -   Dynamic Standing Poor +   Activity Tolerance   Activity Tolerance Patient limited by fatigue;Patient limited by pain   Nurse Made Aware Okay to see per Rowena WILSON Levels Assessment   RUE Assessment WFL   LUE Assessment   LUE Assessment WFL   Hand Function   Gross Motor Coordination Functional   Fine Motor Coordination Functional   Cognition   Overall Cognitive Status WFL   Arousal/Participation Alert; Responsive; Cooperative   Attention Within functional limits   Orientation Level Oriented X4   Memory Within functional limits   Following Commands Follows all commands and directions without difficulty   Comments Pt pleasant and cooperative  Able to communicate beyond basic needs  Assessment   Limitation Decreased ADL status; Decreased endurance;Decreased self-care trans;Decreased high-level ADLs   Prognosis Good   Assessment Pt is a [de-identified] y o  male who was admitted to [facility] on 5/25/2018 with Acute on chronic combined systolic and diastolic CHF (congestive heart failure) ([facility] )  Pt currently on high flow O2 and is WBAT on LLE per podiatry via phone  Pt admitted from [facility] where he was receiving rehab after hospitalization between 4/4-5/11 due to acute chronic heart failure and cardio renal syndrome  Pt w/ multiple comorbidities including heart failure, CKD 4, Afib, and foot ulcers who presented this admission w/ SOB, increased B/L LE edema, and orthopnea  Pt's PMH includes DM, embolism, hematuria, HTN, MI, and renal disorder  At baseline pt was I w/ ADLs and IADLs  He works full time and uses a cane  Pt lives w/ spouse in 2 story home but can stay on 1st floor w/ half bath  Currently pt requires min A for UB ADLs, max A for LB ADLs, and mod Ax1 for functional mobility/transfers w/ RW and 2nd person for line mngmt   Pt currently presents with impairments in the following categories -difficulty performing ADLS, difficulty performing IADLS,  activity tolerance, endurance, standing balance/tolerance and sitting balance/tolerance  These impairments, as well as pt's fatigue, SOB, pain, WBS  and risk for falls limit pt's ability to safely engage in all baseline areas of occupation, including grooming, bathing, dressing, toileting, functional mobility/transfers and leisure activities  From OT standpoint, recommend return to inpatient rehab upon D/C  OT will continue to follow to address the below stated goals  Goals   Patient Goals to get better   LTG Time Frame 10-14   Long Term Goal see below goals    Plan   Treatment Interventions ADL retraining;Functional transfer training; Endurance training;Equipment evaluation/education;Patient/family training; Compensatory technique education; Energy conservation; Activityengagement   Goal Expiration Date 06/10/18   OT Frequency 3-5x/wk   Recommendation   OT Discharge Recommendation Short Term Rehab   OT - OK to Discharge Yes  (when medically stable to STR)   Barthel Index   Feeding 10   Bathing 0   Grooming Score 5   Dressing Score 5   Bladder Score 0   Bowels Score 10   Toilet Use Score 5   Transfers (Bed/Chair) Score 5   Mobility (Level Surface) Score 0   Stairs Score 0   Barthel Index Score 40   Modified Trcay Scale   Modified Tracy Scale 4     LTG  (10-14 days)  Pt will perform all ADL's at SBA level with G balance and DME/AE as needed      Pt will perform functional transfers, including toilet transfer, at SBA level w/ use of DME/AE as needed       Pt will perform functional mobility at a SBA level w/ use of DME and G balance      Pt will demonstrate good carry over of RW safety and energy conservation techniques      Pt will demonstrate good carry over of pt/family education and training w/ 100% attention to task to assist w/ safe d/c planning      Pt will improve activity tolerance to G for 30-45 min treatment session      Pt will improve standing balance to G for 8-10 minutes of purposeful activity w/ G endurance        Ned Watkins, OTR/L

## 2018-05-27 NOTE — PROGRESS NOTES
Multiple manual blood pressure checks resulted in systolic BPs in the 22D-17X  Patient is asymptomatic at this time  Onnibonnie Ip of [facility] notified and Bumex gtt held for 1 hour  Will recheck BP after 1 hour and notify SLIM of result

## 2018-05-27 NOTE — PLAN OF CARE
Problem: OCCUPATIONAL THERAPY ADULT  Goal: Performs self-care activities at highest level of function for planned discharge setting  See evaluation for individualized goals  Treatment Interventions: ADL retraining, Functional transfer training, Endurance training, Equipment evaluation/education, Patient/family training, Compensatory technique education, Energy conservation, Activityengagement          See flowsheet documentation for full assessment, interventions and recommendations  Limitation: Decreased ADL status, Decreased endurance, Decreased self-care trans, Decreased high-level ADLs  Prognosis: Good  Assessment: Pt is a [de-identified] y o  male who was admitted to [facility] on 5/25/2018 with Acute on chronic combined systolic and diastolic CHF (congestive heart failure) ([address] )  Pt currently on high flow O2 and is WBAT on LLE per podiatry via phone  Pt admitted from [facility] where he was receiving rehab after hospitalization between 4/4-5/11 due to acute chronic heart failure and cardio renal syndrome  Pt w/ multiple comorbidities including heart failure, CKD 4, Afib, and foot ulcers who presented this admission w/ SOB, increased B/L LE edema, and orthopnea  Pt's PMH includes DM, embolism, hematuria, HTN, MI, and renal disorder  At baseline pt was I w/ ADLs and IADLs  He works full time and uses a cane  Pt lives w/ spouse in 2 story home but can stay on 1st floor w/ half bath  Currently pt requires min A for UB ADLs, max A for LB ADLs, and mod Ax1 for functional mobility/transfers w/ RW and 2nd person for line mngmt  Pt currently presents with impairments in the following categories -difficulty performing ADLS, difficulty performing IADLS,  activity tolerance, endurance, standing balance/tolerance and sitting balance/tolerance   These impairments, as well as pt's fatigue, SOB, pain, WBS  and risk for falls limit pt's ability to safely engage in all baseline areas of occupation, including grooming, bathing, dressing, toileting, functional mobility/transfers and leisure activities  From OT standpoint, recommend return to inpatient rehab upon D/C  OT will continue to follow to address the below stated goals        OT Discharge Recommendation: Short Term Rehab  OT - OK to Discharge: Yes (when medically stable to STR)

## 2018-05-27 NOTE — PROGRESS NOTES
Maual BP recheck was 84/40 at 0200  Pt remains asymptomatic  HR in 120s  Saad VacaIM notified of BP and OKed holding Bumex gtt until morning  Will continue to monitor

## 2018-05-27 NOTE — ASSESSMENT & PLAN NOTE
With left foot ulcers, podiatry consultation appreciated  Local wound care by Podiatry  X-ray reviewed-no signs of infection  Not a candidate for vascular intervention at this point due to multiple cor morbidities

## 2018-05-28 NOTE — PLAN OF CARE
Problem: PHYSICAL THERAPY ADULT  Goal: Performs mobility at highest level of function for planned discharge setting  See evaluation for individualized goals  Treatment/Interventions: Functional transfer training, LE strengthening/ROM, Elevations, Therapeutic exercise, Endurance training, Patient/family training, Equipment eval/education, Bed mobility, Gait training, Spoke to nursing, Family  Equipment Recommended: Mitali Sandra       See flowsheet documentation for full assessment, interventions and recommendations  Prognosis: Fair  Problem List: Decreased range of motion, Decreased strength, Decreased endurance, Impaired balance, Decreased mobility, Decreased safety awareness, Impaired hearing, Impaired sensation, Obesity, Decreased skin integrity, Pain  Assessment: Pt is [de-identified] y o  male seen for PT evaluation s/p admit to [facility] on 5/25/2018  Pt presenting from [facility] rehab w/ c/o SOB and MADISON- pt w/ recent admission for CHF and cardio-renal syndrome;  4/4- 5/11  Now dx w/ CHF  Pt is WBAT on LLE per podiatry via phone  Pt w/ multiple comorbidities including heart failure, CKD 4, Afib, and foot ulcers who presented this admission w/ SOB, increased B/L LE edema, and orthopnea  Pt's PMH includes DM, embolism, hematuria, HTN, MI, and renal disorder (see above)  At baseline pt was I w/ ADLs and IADLs  He was working full time and uses a cane for [employer]  Pt lives w/ spouse in 2 story home but can stay on 1st floor w/ half bath 2-3 NUNO  Pt has had multiple prolonged hospitalizations affecting his mobility and reports was just working w/ PT/OT at [facility] rehab and was starting to ambulate w/ RW 30' w/ assist prior to readmission- pt/ family planning on return to rehab prior to home 2* ongoing "weakness"  PT now consulted for assessment of mobility and d/c needs     Upon evaluation, pt currently is requiring min>modA for functional transfers including sit >stands; ambulation 5' x1 forward and backward stepping in from of chair w/ RW on 6L02 w/ Spo2 of 91% w/ same- pt w/ SOB and MADISON during same and w/ RPE reported of 8-   Pt presents functioning below baseline and currently w/ overall mobility deficits 2* to: decreased LE strength/AROM; limited flexibility;  generalized weakness/ deconditioning; decreased endurance; decreased activity tolerance; decreased coordination; impaired balance; gait deviations; decreased safety awareness; SOB/MADISON; fatigue; impaired safety and judgement; limited insight into current deficits;multiple lines; O2; viera; tele; hearing impairment/ visual impairments; LE edema and compromised skin integrity- Pt currently at risk for falls  (Please find additional objective findings from PT assessment regarding body systems outlined above ) Pt will continue to benefit from skilled PT interventions to address stated impairments; to maximize functional potential; for ongoing pt/ family training; and DME needs  PT is currently recommending d/c to inpatient rehab setting when medically cleared for safety and to maximize functional potential prior to d/c home  Moncho Milian Pt/ family agreeable to plan and goals as stated on evaluation  Barriers to Discharge: Inaccessible home environment     Recommendation: Short-term skilled PT     PT - OK to Discharge:  (back to rehab when medically cleared- was at [facility] PTA)    See flowsheet documentation for full assessment

## 2018-05-28 NOTE — PHYSICAL THERAPY NOTE
Physical Therapy Evaluation [id number]    Patient Name: Sunshine Colon      Today's Date: 5/28/2018     Problem List  Patient Active Problem List   Diagnosis    Acute on chronic combined systolic and diastolic CHF (congestive heart failure) ([facility])    Benign essential hypertension    Acute respiratory failure with hypoxia ([facility])    Type 2 diabetes mellitus ([address]    S/P aortic valve replacement with bioprosthetic valve    Coronary artery disease involving native coronary artery of native heart    Chronic kidney disease, stage 3    Hyperparathyroidism ([address] )    Peripheral arterial disease ([address] )    Atherosclerosis of artery of extremity with ulceration ([address] )    Anemia in chronic kidney disease    Cardiorenal syndrome, stage 1-4 or unspecified chronic kidney disease, with heart failure ([facility])    Paroxysmal atrial fibrillation ([address] )        Past Medical History  Past Medical History:   Diagnosis Date    Cardiac disease     CHF (congestive heart failure) ([address]     Diabetes mellitus ([address] )     Dyspnea     last assessed-7/10/2015    Embolism, arterial, leg, right ([facility])     last assessed-7/10/2015    Hematuria     resolved-7/24/2017    History of herniated intervertebral disc     Hypertension     Myocardial infarction ([address] )     Pneumonia     Premature atrial contractions     Productive cough     last assessed-10/31/2016    Renal disorder         Past Surgical History  Past Surgical History:   Procedure Laterality Date    AORTIC VALVE REPLACEMENT      BYPASS GRAFT      using vein: femoral-popliteal    CARDIAC VALVE REPLACEMENT      CATARACT EXTRACTION, BILATERAL      COLONOSCOPY      CORONARY ARTERY BYPASS GRAFT      X6    KNEE SURGERY Bilateral     knee replacement    [place] SLCTV CATHJ 3RD+ ORD SLCTV ABDL PEL/TR [facility] Left 12/22/2017    Procedure: LEG ANGIOGRAM; RIGHT FEMORAL ACCESS; CO2-CONTRAST ;  Surgeon: Asia Shaw MD;  Location: [facility] OR;  Service: Vascular    REPLACEMENT TOTAL KNEE BILATERAL      VASCULAR SURGERY        05/28/18 1053   Note Type   Note type Eval/Treat   Pain Assessment   Pain Assessment 0-10   Pain Score No Pain   Home Living   Type of Home House   Home Layout Two level   Home Equipment Walker;Cane   Prior Function   Level of [place] Independent with ADLs and functional mobility   Lives With Spouse   Receives Help From Family   ADL Assistance Independent   IADLs Independent   Falls in the last 6 months 1 to 4   Vocational Full time employment   Comments most recent admission from [facility] rehab- was ambualting w/ RW approx 30' w/ Ax1- pt reports I prior to March of this year- but w/ decreased functioning 2* multiple prolonged hospitalizations   Restrictions/Precautions   LLE Weight Bearing Per Order WBAT  (per podiatry)   Other Precautions O2;Fall Risk;Pain;Fluid restriction;Telemetry;Multiple lines;Hard of hearing;Visual impairment   General   Family/Caregiver Present Yes  (spouse and daughter)   Cognition   Overall Cognitive Status WFL   Arousal/Participation Alert   Attention Within functional limits   Orientation Level Oriented X4   Memory Within functional limits   Following Commands Follows one step commands without difficulty   Comments pleasant and cooperativer throughout    RLE Assessment   RLE Assessment WFL  (3+/5 throughout- (+) pitting edema )   LLE Assessment   LLE Assessment WFL  (3+/5 throughout- (+) pitting edema )   Light Touch   RLE Light Touch Impaired   RLE Light Touch Comments (below knees plantar surfaces of feet )   LLE Light Touch Impaired   LLE Light Touch Comments (below knees plantar surfaces of feet )   Bed Mobility   Additional Comments OOB to chair on arrival    Transfers   Sit to Stand 3  Moderate assistance   Additional items Assist x 1; Increased time required;Verbal cues   Stand to Sit 3  Moderate assistance   Additional items Assist x 1; Increased time required;Verbal cues   Stand pivot 3 Moderate assistance   Additional items Assist x 1; Increased time required;Verbal cues  (A for lines)   Ambulation/Elevation   Gait pattern Step to;Short stride; Foward flexed; Shuffling;Decreased foot clearance; Wide MARGO   Gait Assistance 3  Moderate assist   Additional items Verbal cues; Tactile cues   Assistive Device Rolling walker   Distance 5' forward / backward from chair w/ 6Lo2- Spo2 dropping from 100%to 90% w/ ambualtion- limited 2* severe SOB and MADISON and reported weakness and  fatigue- pt's HR throughout 119-128 bpm  pt seated in chair in NAD following w/ Spo2 increasing to 100% in <20 secs    Balance   Static Sitting Fair +   Dynamic Sitting Fair   Static Standing Poor +   Dynamic Standing Poor +   Ambulatory Poor   Endurance Deficit   Endurance Deficit Yes   Endurance Deficit Description limited upright and activity tolerance; deconditining and fatigue SOB/ MADISON   Activity Tolerance   Activity Tolerance Patient limited by fatigue   Nurse Made Aware yes- updated    Assessment   Prognosis Fair   Problem List Decreased range of motion;Decreased strength;Decreased endurance; Impaired balance;Decreased mobility; Decreased safety awareness; Impaired hearing; Impaired sensation;Obesity; Decreased skin integrity;Pain   Assessment Pt is [de-identified] y o  male seen for PT evaluation s/p admit to [facility] on 5/25/2018  Pt presenting from [facility] rehab w/ c/o SOB and MADISON- pt w/ recent admission for CHF and cardio-renal syndrome;  4/4- 5/11  Now dx w/ CHF  Pt is WBAT on LLE per podiatry via phone  Pt w/ multiple comorbidities including heart failure, CKD 4, Afib, and foot ulcers who presented this admission w/ SOB, increased B/L LE edema, and orthopnea  Pt's PMH includes DM, embolism, hematuria, HTN, MI, and renal disorder (see above)  At baseline pt was I w/ ADLs and IADLs  He was working full time and uses a cane for [employer]  Pt lives w/ spouse in 2 story home but can stay on 1st floor w/ half bath 2-3 NUNO   Pt has had multiple prolonged hospitalizations affecting his mobility and reports was just working w/ PT/OT at [facility] rehab and was starting to ambulate w/ RW 30' w/ assist prior to readmission- pt/ family planning on return to rehab prior to home 2* ongoing "weakness"  PT now consulted for assessment of mobility and d/c needs  Upon evaluation, pt currently is requiring min>modA for functional transfers including sit >stands; ambulation 5' x1 forward and backward stepping in from of chair w/ RW on 6L02 w/ Spo2 of 91% w/ same- pt w/ SOB and MADISON during same and w/ RPE reported of 8-   Pt presents functioning below baseline and currently w/ overall mobility deficits 2* to: decreased LE strength/AROM; limited flexibility;  generalized weakness/ deconditioning; decreased endurance; decreased activity tolerance; decreased coordination; impaired balance; gait deviations; decreased safety awareness; SOB/MADISON; fatigue; impaired safety and judgement; limited insight into current deficits;multiple lines; O2; viera; tele; hearing impairment/ visual impairments; LE edema and compromised skin integrity- Pt currently at risk for falls  (Please find additional objective findings from PT assessment regarding body systems outlined above ) Pt will continue to benefit from skilled PT interventions to address stated impairments; to maximize functional potential; for ongoing pt/ family training; and DME needs  PT is currently recommending d/c to inpatient rehab setting when medically cleared for safety and to maximize functional potential prior to d/c home  Preeti Cunning Pt/ family agreeable to plan and goals as stated on evaluation        Barriers to Discharge Inaccessible home environment   Goals   Patient Goals breathe better adn be able to go home soon    STG Expiration Date 06/07/18   Short Term Goal #1 In 10 days pt will complete: 1) Bed mobility skills with MI2) Functional transfers with MII 3) Ambulation with least restrictive ' x2 ' without LOB and stable vitals  4) Stair training up/ down 4 step/s with Juan rail/s  5) Improve balance grades to Good 6) Improve LE strength grades by 1   7) LE HEP independently  8) PT for ongoing pt and family education; DME needs and D/C planning to promote highest level of function in least restrictive environment  Treatment Day 0   Plan   Treatment/Interventions Functional transfer training;LE strengthening/ROM; Elevations; Therapeutic exercise; Endurance training;Patient/family training;Equipment eval/education; Bed mobility;Gait training;Spoke to nursing;Family   PT Frequency (3-5x/week as tolerated )   Recommendation   Recommendation Short-term skilled PT   Equipment Recommended Walker   PT - OK to Discharge (back to rehab when medically cleared- was at [facility])   Additional Comments up in chair w/ all needs in reach post session- family at bedside    Modified [place] Scale   Modified [place] Scale 4   Barthel Index   Feeding 10   Bathing 0   Grooming Score 0   Dressing Score 5   Bladder Score 0   Bowels Score 10   Toilet Use Score 5   Transfers (Bed/Chair) Score 5   Mobility (Level Surface) Score 0   Stairs Score 0   Barthel Index Score 35      PT TREATMENT [id number]   Pt seen for skilled PT session following evaluation consisting of instruction in seated therex/ HEP instruction; for increased LE strengthening to assist w/ increasing independence w/ transfers and gait  Spouse and daughter were present for same and were educated in cuing pt and reminding him to complete at min 3x daily- pt completed LAQ 15 reps; marching from seated position 15 reps as well as 25 reps of AP w/ LE's elevatted in chair  Pt was instructed in proper pacing as well as rest breaks and to stop therex if any  SOB or pain  Pt tolerates therex w/o complaints or increase in pain  Will continue to  benefit from repeated instruction for correct technique and compliance    Lissy Crowe, PT

## 2018-05-28 NOTE — ASSESSMENT & PLAN NOTE
Patient presented from rehab with evidence of acute on chronic combined heart failure  Of notes he had prolonged hospitalization on 4/4-5/11 due to acute on chronic heart failure and cardiorenal syndrome  Repeated echo reveals significantly decreased ejection fraction ( 65% in September 2017 dropped to 45% )   noted getting worse renal function most likely secondary to cardiorenal syndrome and  need of aggressive IV diuresis to improve fluid overload state as patient declined dialysis  Management of this patient with IV diuresis is challenging situation as his renal function most likely will deteriorate further given the need of IV diuresis  To help go through [place] [employer] I will request cardiology and nephrology consult    Patient will be admitted to telemetry , SD2  Continue fluid and salt restricted HF diet  Daily weight( his usual dry weight 198-204 lb,   Today patient weight  is 203 lb  Strict I/Oand daily weight  Continue with Bumex drip  Monitor blood pressure

## 2018-05-28 NOTE — PROGRESS NOTES
Cardiology Progress Note - Mag Pereira  [de-identified] y o  male MRN: [id number]    Unit/Bed#: [facility] 506-01 Encounter: [id number]      Assessment:  Principal Problem:    Acute on chronic combined systolic and diastolic CHF (congestive heart failure) ([facility])  Active Problems:    Benign essential hypertension    Type 2 diabetes mellitus ([facility])    S/P aortic valve replacement with bioprosthetic valve    Coronary artery disease involving native coronary artery of native heart    Chronic kidney disease, stage 3    Peripheral arterial disease ([facility])    Anemia in chronic kidney disease    Cardiorenal syndrome, stage 1-4 or unspecified chronic kidney disease, with heart failure (HCC)    Paroxysmal atrial fibrillation ([facility] [address] )      Plan:  Patient continues on supplemental oxygen  He continues on intravenous diuretic and intravenous heparin  He continues in atrial fibrillation on telemetry with a moderate ventricular response  His BP remains on the low side  The nephrology note is appreciated  We will recheck an echocardiogram this week to reassess LV function and mitral valve structure and function     I have made no change in his medical regimen  His BMP today is comparable to yesterday's value  Subjective:   Patient seen and examined  No significant events overnight   negative  Objective:     Vitals: Blood pressure (!) 88/65, pulse (!) 117, temperature 97 5 °F (36 4 °C), resp  rate 20, height 5' 7" (1 702 m), weight 92 5 kg (203 lb 14 4 oz), SpO2 99 %  , Body mass index is 31 94 kg/m² , Orthostatic Blood Pressures      Most Recent Value   Blood Pressure   88/65 filed at 05/28/2018 0712   Patient Position - Orthostatic VS  Lying filed at 05/28/2018 0252      ,      Intake/Output Summary (Last 24 hours) at 05/28/18 0846  Last data filed at 05/28/18 0831   Gross per 24 hour   Intake          1839 07 ml   Output             1820 ml   Net            19 07 ml       No significant arrhythmias seen on telemetry review   Atrial fibrillation with a moderate to rapid ventricular response      Physical Exam:    GEN: Angélica Wang      NECK: supple, no carotid bruits, no JVD or HJR  HEART: normal rate, irregular rhythm, normal S1 and systolic murmur  LUNGS:  Reduced at the bases  ABDOMEN: normal bowel sounds, soft, no tenderness, no distention  EXTREMITIES: peripheral pulses normal; no clubbing, cyanosis, or edema  SKIN: warm and well perfused, no suspicious lesions on exposed skin    Labs & Results:    Admission on 05/25/2018   Component Date Value    Sodium 05/25/2018 130*    Potassium 05/25/2018 4 9     Chloride 05/25/2018 95*    CO2 05/25/2018 26     Anion Gap 05/25/2018 9     BUN 05/25/2018 108*    Creatinine 05/25/2018 3 28*    Glucose 05/25/2018 157*    Calcium 05/25/2018 8 8     AST 05/25/2018 17     ALT 05/25/2018 28     Alkaline Phosphatase 05/25/2018 76     Total Protein 05/25/2018 6 6     Albumin 05/25/2018 3 2*    Total Bilirubin 05/25/2018 0 66     eGFR 05/25/2018 17     WBC 05/25/2018 6 63     RBC 05/25/2018 3 36*    Hemoglobin 05/25/2018 9 2*    Hematocrit 05/25/2018 28 6*    MCV 05/25/2018 85     MCH 05/25/2018 27 4     MCHC 05/25/2018 32 2     RDW 05/25/2018 17 0*    MPV 05/25/2018 9 7     Platelets 06/80/8685 147*    nRBC 05/25/2018 0     Neutrophils Relative 05/25/2018 70     Lymphocytes Relative 05/25/2018 20     Monocytes Relative 05/25/2018 8     Eosinophils Relative 05/25/2018 1     Basophils Relative 05/25/2018 0     Neutrophils Absolute 05/25/2018 4 66     Lymphocytes Absolute 05/25/2018 1 33     Monocytes Absolute 05/25/2018 0 51     Eosinophils Absolute 05/25/2018 0 09     Basophils Absolute 05/25/2018 0 01     Troponin I 05/25/2018 0 07*    Protime 05/25/2018 15 4*    INR 05/25/2018 1 21*    PTT 05/25/2018 28     NT-proBNP 05/25/2018 30463*    Magnesium 05/26/2018 2 9*    Sodium 05/26/2018 130*    Potassium 05/26/2018 4 6     Chloride 05/26/2018 96*    CO2 05/26/2018 26     Anion Gap 05/26/2018 8     BUN 05/26/2018 112*    Creatinine 05/26/2018 2 95*    Glucose 05/26/2018 172*    Calcium 05/26/2018 8 8     eGFR 05/26/2018 19     Platelets 82/47/2794 162     MPV 05/26/2018 10 3     POC Glucose 05/26/2018 205*    WBC 05/26/2018 6 69     RBC 05/26/2018 3 36*    Hemoglobin 05/26/2018 9 2*    Hematocrit 05/26/2018 29 5*    MCV 05/26/2018 88     MCH 05/26/2018 27 4     MCHC 05/26/2018 31 2*    RDW 05/26/2018 17 1*    Platelets 24/09/7439 159     MPV 05/26/2018 10 1     POC Glucose 05/26/2018 178*    Troponin I 05/26/2018 0 06*    POC Glucose 05/26/2018 182*    PTT 05/26/2018 29     WBC 05/26/2018 6 39     RBC 05/26/2018 3 50*    Hemoglobin 05/26/2018 9 6*    Hematocrit 05/26/2018 30 8*    MCV 05/26/2018 88     MCH 05/26/2018 27 4     MCHC 05/26/2018 31 2*    RDW 05/26/2018 16 9*    Platelets 12/53/8399 136*    MPV 05/26/2018 9 3     Protime 05/26/2018 15 8*    INR 05/26/2018 1 25*    Clarity, UA 05/26/2018 Clear     Color, UA 05/26/2018 Yellow     Specific Gravity, UA 05/26/2018 1 012     pH, UA 05/26/2018 5 0     Glucose, UA 05/26/2018 Negative     Ketones, UA 05/26/2018 Negative     Blood, UA 05/26/2018 Negative     Protein, UA 05/26/2018 Negative     Nitrite, UA 05/26/2018 Negative     Bilirubin, UA 05/26/2018 Negative     Urobilinogen, UA 05/26/2018 0 2     Leukocytes, UA 05/26/2018 Negative     WBC, UA 05/26/2018 None Seen     RBC, UA 05/26/2018 None Seen     Hyaline Casts, UA 05/26/2018 None Seen     Bacteria, UA 05/26/2018 None Seen     Epithelial Cells 05/26/2018 None Seen     Sodium, Ur 05/26/2018 8     Creatinine, Ur 05/26/2018 71 2     PTT 05/26/2018 77*    POC Glucose 05/26/2018 154*    POC Glucose 05/26/2018 136     PTT 05/27/2018 77*    WBC 05/27/2018 6 53     RBC 05/27/2018 3 36*    Hemoglobin 05/27/2018 9 1*    Hematocrit 05/27/2018 28 9*    MCV 05/27/2018 86     MCH 05/27/2018 27 1     MCHC 05/27/2018 31 5     RDW 05/27/2018 17 0*    MPV 05/27/2018 9 4     Platelets 92/44/7723 131*    nRBC 05/27/2018 0     Neutrophils Relative 05/27/2018 71     Lymphocytes Relative 05/27/2018 18     Monocytes Relative 05/27/2018 10     Eosinophils Relative 05/27/2018 1     Basophils Relative 05/27/2018 0     Neutrophils Absolute 05/27/2018 4 61     Lymphocytes Absolute 05/27/2018 1 16     Monocytes Absolute 05/27/2018 0 68     Eosinophils Absolute 05/27/2018 0 03     Basophils Absolute 05/27/2018 0 01     Sodium 05/27/2018 132*    Potassium 05/27/2018 4 6     Chloride 05/27/2018 96*    CO2 05/27/2018 27     Anion Gap 05/27/2018 9     BUN 05/27/2018 109*    Creatinine 05/27/2018 2 81*    Glucose 05/27/2018 105     Calcium 05/27/2018 8 8     AST 05/27/2018 18     ALT 05/27/2018 25     Alkaline Phosphatase 05/27/2018 78     Total Protein 05/27/2018 6 6     Albumin 05/27/2018 3 3*    Total Bilirubin 05/27/2018 0 79     eGFR 05/27/2018 20     Magnesium 05/27/2018 2 7*    Troponin I 05/27/2018 0 11*    Troponin I 05/27/2018 0 14*    Ventricular Rate 05/27/2018 119     Atrial Rate 05/27/2018 119     QRSD Interval 05/27/2018 128     QT Interval 05/27/2018 344     QTC Interval 05/27/2018 483     P Axis 05/27/2018 -26     QRS Axis 05/27/2018 -48     T Wave Axis 05/27/2018 132     POC Glucose 05/27/2018 109     Troponin I 05/27/2018 0 24*    POC Glucose 05/27/2018 149*    POC Glucose 05/27/2018 178*    POC Glucose 05/27/2018 194*    PTT 05/28/2018 80*    WBC 05/28/2018 5 80     RBC 05/28/2018 3 37*    Hemoglobin 05/28/2018 9 1*    Hematocrit 05/28/2018 30 0*    MCV 05/28/2018 89     MCH 05/28/2018 27 0     MCHC 05/28/2018 30 3*    RDW 05/28/2018 16 9*    MPV 05/28/2018 9 7     Platelets 61/66/1285 117*    nRBC 05/28/2018 0     Neutrophils Relative 05/28/2018 71     Lymphocytes Relative 05/28/2018 18     Monocytes Relative 05/28/2018 10     Eosinophils Relative 05/28/2018 0     Basophils Relative 05/28/2018 1     Neutrophils Absolute 05/28/2018 4 13     Lymphocytes Absolute 05/28/2018 1 03     Monocytes Absolute 05/28/2018 0 55     Eosinophils Absolute 05/28/2018 0 02     Basophils Absolute 05/28/2018 0 03     Sodium 05/28/2018 134*    Potassium 05/28/2018 4 6     Chloride 05/28/2018 98*    CO2 05/28/2018 25     Anion Gap 05/28/2018 11     BUN 05/28/2018 105*    Creatinine 05/28/2018 2 87*    Glucose 05/28/2018 145*    Calcium 05/28/2018 9 6     AST 05/28/2018 14     ALT 05/28/2018 24     Alkaline Phosphatase 05/28/2018 75     Total Protein 05/28/2018 6 8     Albumin 05/28/2018 4 1     Total Bilirubin 05/28/2018 1 03*    eGFR 05/28/2018 20     Magnesium 05/28/2018 2 8*    POC Glucose 05/28/2018 158*       Xr Chest Portable    Result Date: 5/4/2018  Narrative: CHEST INDICATION:   Follow-up congestive failure  COMPARISON:  4/30/2018 EXAM PERFORMED/VIEWS:  XR CHEST PORTABLE FINDINGS: Cardiomediastinal silhouette is stable  Vascular congestion is slightly improved  Small bilateral pleural effusions again identified  Sternal wires are present  Visualized osseous structures appear otherwise unremarkable for the patient's age  Impression: Slightly improved congestive failure, with stable small bilateral pleural effusions  Workstation performed: [id number]     Xr Chest Portable    Result Date: 4/30/2018  Narrative: CHEST INDICATION:   CHF  COMPARISON:  Chest radiographs April 26, 2018 EXAM PERFORMED/VIEWS:  XR CHEST PORTABLE FINDINGS: The heart is enlarged  Atherosclerotic changes in the aorta  Median sternotomy  Lung volumes diminished  Bilateral pleural effusions  Diffuse alveolar infiltrates throughout the lungs bilaterally  These findings have progressed from the prior study  Osseous structures appear within normal limits for patient age  Impression: Worsening congestive heart failure   Workstation performed: [id number]     X-ray Chest 2 Views    Result Date: 5/26/2018  Narrative: CHEST INDICATION: 82-year-old male, shortness of breath, lower extremity edema COMPARISON:  5/4/2018 chest x-ray EXAM PERFORMED/VIEWS:  XR CHEST PA & LATERAL FINDINGS: Recurrent CHF Persistent moderate cardiomegaly Sternal wires and cardiac valve replacement, unchanged Persistent small left pleural effusion No pneumothorax Osseous structures appear within normal limits for patient age  Impression: Recurrent CHF, small left pleural effusion, moderate cardiomegaly Status post cardiac valve replacement Findings are consistent with emergency provider's preliminary reading Workstation performed: [id number]     Xr Foot 3+ Vw Left    Result Date: 5/27/2018  Narrative: LEFT FOOT INDICATION:   r/o om calc, 3rd, 5th digit dry gangrene  COMPARISON:  4/4/2018  VIEWS:  XR FOOT 3+ VW LEFT Images: 3 FINDINGS: Stable alignment of an intra-articular fracture involving the medial base of the 1st proximal phalanx best seen on the oblique projection  No new fracture identified  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, grossly unchanged  No lytic or blastic lesions seen  Mild soft tissue swelling of the forefoot involving both the dorsal and plantar surface  There is vascular calcification of the ankle and foot  Impression: Old fracture involving the medial aspect of the proximal phalanx of the 1st digit, unchanged in alignment  Stable degenerative osteoarthritis  Workstation performed: [id number]     Xr Foot 3+ Vw Right    Result Date: 5/27/2018  Narrative: RIGHT FOOT INDICATION:   r/o OM dry gangrene 2nd toe  COMPARISON:  None VIEWS:  XR FOOT 3+ VW RIGHT Images: 3 FINDINGS: There is no acute fracture or dislocation  No significant degenerative changes  No lytic or blastic lesions seen  Extensive soft tissue swelling especially along the dorsum of the midfoot and forefoot       Impression: Extensive soft tissue swelling best seen on the lateral projection  No definitive acute osseous abnormality identified  Workstation performed: [id number]     Vas Lower Limb Arterial Duplex, Complete Bilateral    Result Date: 5/21/2018  Narrative:  [facility] REPORT [facility]INICAL: Indications:  Left Atherosclerosis with Ulceration [I70 25]  Bilateral PVD, Unspecified [I73 9]  Non-healing wound to the left heel and left 3rd and 4th toes  Operative History 7/18/2003 Right PTA of mid and distal bypass graft stenosis  11/14/2002 Right femoral to tibioperoneal bypass with ligation of the distal popliteal artery  6/20/2002 Aortic valve replacement 6/20/2002 CABG Clinical Right Pressure:  98/ mm Hg, Left Pressure:  99/ mm Hg  FINDINGS:  Segment                Rig       Left                                          PSV  EDV  Impression  PSV  EDV  Inflow Anastomosis      52                             High Thigh (Graft)      48                             Mid Thigh (Graft)       49                             Low Thigh (Graft)       52                             Near Knee (Graft)       52                             Outflow Anastomosis     37                             Common Femoral Artery   86   11               58    0  Prox Profunda           53    0               69    4  Prox SFA                                      91    7  Mid SFA                          Occluded      0       Dist SFA                         Occluded      0    0  Proximal Pop                     Occluded      0    0  Distal Pop                       Occluded      0    0  Tibioperoneal           50       Occluded              Dist Post Tibial        56    9  Occluded      0    0  Dist  Ant  Tibial      111   10               20    9     CONCLUSION: Impression: RIGHT LOWER LIMB: Patent common femoral to tib/peroneal trunk bypass graft  Ankle Pressure: 149 mm Hg , NOEL: 1 51  NOEL are unreliable due to arterial calcification   The PPG waveform at the great toe is flat-line, suggesting diminished digital perfusion  LEFT LOWER LIMB Known occlusion of the proximal superficial femoral artery and popliteal artery   Finding suggests posterior tibial arterial is occluded  The anterior tibial artery is patent  Ankle Pressure 76  mm Hg ,  NOEL: 0 77 The PPG waveform at the great toe is flat-line, suggesting diminished digital perfusion  In comparison to the study of 04/05/2018, there is no significant interval change in the disease process  SIGNATURE: Electronically Signed by: Lorie Diaz MD, RPVI on 2018-05-21 06:43:57 AM    Vas Lower Limb Vein Mapping Bypass Graft    Result Date: 5/21/2018  Narrative:  [facility] REPORT [facility]INICAL: Indications: Bilateral PAD unspecified [I73 9]  Left Other Specified Pre-Operative Examination [Z01 818]  Patient was refered for patncy and caliber of the Lt GSV prior to Lt lower extremity arterial bypass  Operative History: 2003-07-18 Right PTA of mid and distal bypass graft stenosis  2002-11-14 Right femoral to tibioperoneal bypass with ligation of the distal popliteal artery  2002-06-20 Aortic valve replacement 2002-06-20 CABG Clinical:  FINDINGS:  Left            AP(mm)  GSV Inguinal       5 7  GSV Prox Thigh     3 0  GSV Mid Thigh      2 3  GSV Dist Thigh     1 4  GSV Knee           1 6  GSV Prox Calf      2 1  GSV Mid Calf       0 7  GSV Dist Calf      2 1  GSV Ankle          2 2     CONCLUSION: Impression: RIGHT LOWER LIMB: The GSV harvested for lower extremity arterial bypass  LEFT LOWER LIMB: The main GSV proximal to distal thigh had been harvested for CABG The anterior accessory saphenous vein in the thigh is patent   However, there are multiple short segment of the GSV that is in small caliber in the mid to distal thigh, knee and mid calf  The vein is of inadequate caliber and quality for graft conduit with intraluminal diameters ranging from 3 0mm to 0 7mm throughout the leg    SIGNATURE: Electronically Signed by: Lorie Diaz MD, [id number]  on 2018-05-21 72:16:13 AM    Vas Upper Limb Vein Mapping    Result Date: 5/21/2018  Narrative:  [facility] REPORT [facility]IN[facility]: Indications: Other Specified Pre-Operative Examination [Z01 818]  Operative History: 2003-07-18 Right PTA of mid and distal bypass graft stenosis  2002-11-14 Right femoral to tibioperoneal bypass with ligation of the distal popliteal artery  2002-06-20 Aortic valve replacement 2002-06-20 CABG   FINDINGS:  Right                            Impression      AP (mm)  Bas Upper                                            4 9  Bas Mid Arm                                          3 1  Basilic Vein - Forearm Proximal  Not visualized           Bas Mid Forearm                  Not visualized           Basilic Vein - Forearm Distal    Not visualized           Ceph Upper                                           1 4  Ceph Mid Arm                                         1 8  Ceph AC                                              2 2  Ceph Mid Forearm                                     1 3   Left                             Impression      AP (mm)  Bas Upper                                            2 7  Bas Mid Arm                                          3 1  Bas AC                                               3 8  Basilic Vein - Forearm Proximal                      1 6  Bas Mid Forearm                                      1 2  Basilic Vein - Forearm Distal                        1 0  Ceph Upper                                           1 2  Ceph Mid Arm                     Not visualized      0 7  Ceph Mid Forearm                                     0 9     CONCLUSION: Impression RIGHT ARM: Evaluation shows patent and thrombus free cephalic vein and basilic vein  The intraluminal diameters of the cephalic vein are not adequate for use as and arterial conduit  The vein >2mm from mid upper arm to the proximal forearm  The intraluminal diameters of the basilic vein are not adequate for use as and arterial conduit   The vein >2mm from above the elbow to the axillary space and becomes too small to accurately measure below the elbow  LEFT ARM: Evaluation shows patent and thrombus free cephalic vein and basilic vein  The intraluminal diameters of the cephalic vein are not adequate for use as and arterial conduit  The intraluminal diameters of the basilic vein are not adequate for use as and arterial conduit, The vein >2mm caliber from the elbow to the axillary space and becomes small caliber below the elbow  SIGNATURE: Electronically Signed by: Ricardo Ospina MD, RPVI on 2018-05-21 06:43:28 AM      EKG personally reviewed by Robel Bass MD      Counseling / Coordination of Care  Total floor / unit time spent today 30 minutes  Greater than 50% of total time was spent with the patient and / or family counseling and / or coordination of care

## 2018-05-28 NOTE — ASSESSMENT & PLAN NOTE
Cardiology started on heparin drip  Heart rate still elevated-rate control is challenging at this point given low blood pressure  Continue with metoprolol  Monitor on the telemetry  Frequent PVCs noted on the telemetry-electrolytes appears to be stable

## 2018-05-28 NOTE — PHYSICIAN ADVISOR
This patient is a [de-identified] y o  y/o male who is admitted to the hospital for Shortness of Breath (PT WITH DYPSNEA ON EXERTION  PT DENIES CP/N/V/D  PT ALSO HAS 4+ PITTING EDEMA IN BLLE )       The patient presented to the ED on 5/25/18 at 1922 and was admitted to the hospital on 5/25/2018 1922  History of Present Illness includes: Lorraine Degree  is a [de-identified] y o  male with multiple comorbidities including combined heart failur, CKD 4 , Afib, foot ulcers who presented with getting worse shortness of breath with minimal exertion, increased bilateral lower extremity edema, orthopnea and approximately 10 lb of weight gain since his discharge on May 11  Of notes he was hospitalized between 4/4-5/11 due to acute on chronic heart failure ,cardio renal syndrome  Patient declined it dialysis as the option to remove excessive fluid  He underwent aggressive diuresis with Bumex IV ,this  was managed by Cardiology and Nephrology  Unfortunate a repeated echocardiogram found  decreased ejection fraction compared to his previous data  Patient was discharged to rehab   and presented last night with above-mentioned complaints of CHF exacerbation  ProBNP 59903,  vascular congestion and fusion imaging study  His  today's weight 210 lbs  He admitted on an inpatient basis for acute on chronic combined heart failure       Vital signs in the ER are as follows ED Triage Vitals   Temperature Pulse Respirations Blood Pressure SpO2   05/25/18 1930 05/25/18 1930 05/25/18 1930 05/25/18 1930 05/25/18 1930   97 9 °F (36 6 °C) (!) 112 18 98/56 99 %      Temp Source Heart Rate Source Patient Position - Orthostatic VS BP Location FiO2 (%)   05/25/18 1930 05/25/18 1930 05/25/18 2010 05/25/18 2010 05/26/18 1600   Oral Monitor Lying Right arm 35      Pain Score       05/25/18 1930       8         The patient is admitted as INPATIENT  and has remained hospitalized for 3 day(s)        NT-proBNP 10,649    CXR: Recurrent CHF, small left pleural effusion, moderate cardiomegaly  Status post cardiac valve replacement   Findings are consistent with emergency provider's preliminary reading    Last vital signs were Blood pressure (!) 88/65, pulse (!) 117, temperature 97 5 °F (36 4 °C), resp  rate 20, height 5' 7" (1 702 m), weight 92 5 kg (203 lb 14 4 oz), SpO2 99 %  Treatment includes: cardiology and nephrology consult, IV diuretic gtt, IV heparin, ECHO, tele, resp protocol, supp O2 and serial labs  Results include:       0  Lab Value Date/Time   TROPONINI 0 24 (H) 05/27/2018 1717   TROPONINI 0 14 (H) 05/27/2018 0848   TROPONINI 0 11 (H) 05/27/2018 0539   TROPONINI 0 06 (H) 05/26/2018 1250   TROPONINI 0 07 (H) 05/25/2018 1944   TROPONINI 0 50 (H) 05/10/2018 1137   TROPONINI 0 20 (H) 05/10/2018 0924   TROPONINI 0 09 (H) 05/10/2018 0514   TROPONINI 0 24 (H) 04/04/2018 1200   TROPONINI 0 24 (H) 04/04/2018 0655   TROPONINI <0 02 10/11/2017 0448   TROPONINI <0 02 10/11/2017 0206   TROPONINI 0 07 (H) 01/03/2017 0844   TROPONINI 0 09 (H) 01/03/2017 0539   TROPONINI <0 02 06/22/2016 1902                 Results from last 7 days  Lab Units 05/28/18  0523 05/27/18  0223 05/26/18  1249   WBC Thousand/uL 5 80 6 53 6 39   HEMOGLOBIN g/dL 9 1* 9 1* 9 6*   HEMATOCRIT % 30 0* 28 9* 30 8*   PLATELETS Thousands/uL 117* 131* 136*         Results from last 7 days  Lab Units 05/28/18  0523 05/27/18  0234 05/26/18  0515   SODIUM mmol/L 134* 132* 130*   POTASSIUM mmol/L 4 6 4 6 4 6   CHLORIDE mmol/L 98* 96* 96*   CO2 mmol/L 25 27 26   BUN mg/dL 105* 109* 112*   CREATININE mg/dL 2 87* 2 81* 2 95*   GLUCOSE RANDOM mg/dL 145* 105 172*   CALCIUM mg/dL 9 6 8 8 8 8       Recent Cultures: The patient is appropriate for  Inpatient Admission  The rationale is as follows: The patient is a [de-identified] y/o male who presented with SOB and was admitted for acute CHF    He required cardiology and nephrology consult, IV diuretic gtt, IV heparin, ECHO, tele, resp protocol, supp O2 and serial labs  There is documentation by the admitting physician that the patient would require greater than two midnight stay based on medical necessity  Hospitalization is necessary to prevent further deterioration of his clinical condition  After review of the medical chart, labs, imaging, and notes - I agree that the patient meets criteria for INPATIENT ADMISSION  The patient had a hospitalization within the last 30 days - after reviewing the chart from that admission it was determined that both admissions were for acute CHF  He was stable on discharge and remained out of the hospital for 2 weeks  He has a condition that will continue to have exacerbations  He was offered additional treatment with HD last admission but declined, this may have resulted in better control of his chronic condition preventing re-admissions  These two admissions are therefore unrelated

## 2018-05-28 NOTE — PROGRESS NOTES
Progress note - Nephrology   Man Crews  [de-identified] y o  male MRN: [id number]  [facility] Encounter: [id number]      ASSESSMENT and PLAN:    82-year-old male history of valvular cardiomyopathy with recent admission of shortness of breath and volume overload present with shortness of breath secondary to decompensated heart failure complicated by stage 4 chronic kidney disease    1  Stage 4 chronic kidney disease with severe azotemia-creatinine at baseline between 2 5-3 point  -creatinine 1 year ago was around 2 1  -patient presented with shortness of breath, weight is up to 209 lb has been as low as 198 lb as an outpatient  -needs a weight from today  -continue to hold lisinopril  -doing better with Bumex drip at 1 mg an hour, will increase to 1 5 mg an hour  -more rate control  -will change albumin to 25 g Q 12 and then can discontinue tomorrow  -increased Bumex drip to 1 5 mg per hour, I&Os even would like a net negative balance  -respiratory wise patient states he feels improved  -creatinine and azotemia stable  -needs a repeat weight  -still with JVD and volume overload    2  Decompensated congestive heart failure  -cardiology following  -discussed if inotrope support in the setting would improved overall symptomatology with Cardiology  -extremities cool    3  Volume overload-as above  -as above    4    Gout-  -Patient is on febuxostat at home daily, here was given as p r n , changed to daily dose to avoid hyperuricemia and gout attack while diuresing    SUBJECTIVE:    Patient is sitting up in chair feels somewhat better from a respiratory standpoint denies any fevers or chills    MEDICATIONS:    Current Facility-Administered Medications:     acetaminophen (TYLENOL) tablet 650 mg, 650 mg, Oral, Q6H PRN, Junior Rudy MD, 650 mg at 05/27/18 1705    albumin human (FLEXBUMIN) 25 % injection 25 g, 25 g, Intravenous, Q6H, Johnnie Booth MD, 25 g at 05/28/18 0940    allopurinol (ZYLOPRIM) tablet 200 mg, 200 mg, Oral, Daily, Jamal Aragon, , 200 mg at 05/28/18 0854    aspirin (ECOTRIN LOW STRENGTH) EC tablet 81 mg, 81 mg, Oral, Daily, Paige Olivares MD, 81 mg at 05/28/18 0854    atorvastatin (LIPITOR) tablet 40 mg, 40 mg, Oral, Daily, Paige Olivares MD, 40 mg at 05/28/18 0854    bumetanide (BUMEX) 12 5 mg infusion 50 mL, 1 mg/hr, Intravenous, Continuous, Jamal Aragon DO, Last Rate: 4 mL/hr at 05/27/18 2230, 1 mg/hr at 05/27/18 2230    calcium carbonate (TUMS) chewable tablet 1,000 mg, 1,000 mg, Oral, Daily PRN, Paige Olivares MD    docusate sodium (COLACE) capsule 100 mg, 100 mg, Oral, BID, Paige Olivares MD, 100 mg at 05/27/18 1705    doxercalciferol (HECTOROL) capsule 0 5 mcg, 0 5 mcg, Oral, Once per day on Mon Wed Fri, Kristen Huang MD, 0 5 mcg at 05/28/18 0854    heparin (porcine) 25,000 units in 250 mL infusion (premix), 3-20 Units/kg/hr (Order-Specific), Intravenous, Titrated, Rodriguez Tamez MD, Last Rate: 10 mL/hr at 05/27/18 1507, 11 1 Units/kg/hr at 05/27/18 1507    heparin (porcine) injection 2,000 Units, 2,000 Units, Intravenous, PRN, Rodriguez Tamez MD    heparin (porcine) injection 4,000 Units, 4,000 Units, Intravenous, PRN, Rodriguez aTmez MD    insulin glargine (LANTUS) subcutaneous injection 20 Units 0 2 mL, 20 Units, Subcutaneous, HS, Paige Olivares MD, 20 Units at 05/27/18 2111    insulin lispro (HumaLOG) 100 units/mL subcutaneous injection 1-6 Units, 1-6 Units, Subcutaneous, TID AC, 1 Units at 05/28/18 0855 **AND** Fingerstick Glucose (POCT), , , TID AC, Paige Olivares MD    insulin lispro (HumaLOG) 100 units/mL subcutaneous injection 1-6 Units, 1-6 Units, Subcutaneous, HS, Paige Olivares MD, 2 Units at 05/27/18 2110    melatonin tablet 6 mg, 6 mg, Oral, HS, Paige Olivares MD, 6 mg at 05/27/18 2114    metoprolol succinate (TOPROL-XL) 24 hr tablet 25 mg, 25 mg, Oral, Daily, Rodriguez Tamez MD    multivitamin-minerals (CENTRUM) tablet 1 tablet, 1 tablet, Oral, Daily, Sharmaine De Paz MD, 1 tablet at 05/28/18 0854    ondansetron [facility]) injection 4 mg, 4 mg, Intravenous, Q6H PRN, Sharmaine De Paz MD, 4 mg at 05/26/18 1539    pantoprazole (PROTONIX) EC tablet 20 mg, 20 mg, Oral, Early Morning, Sharmaine De Paz MD, 20 mg at 05/28/18 6248    patient supplied medication, 2 each, Oral, BID before breakfast/lunch, Adan Harvey MD, 2 each at 05/28/18 0855    polyethylene glycol (MIRALAX) packet 17 g, 17 g, Oral, Daily, Sharmaine De Paz MD, 17 g at 05/27/18 0801    saccharomyces boulardii (FLORASTOR) capsule 250 mg, 250 mg, Oral, BID, Sharmaine De Paz MD, 250 mg at 05/28/18 0869    senna (SENOKOT) tablet 8 6 mg, 1 tablet, Oral, Daily, Sharmaine De Paz MD, 8 6 mg at 05/27/18 0801    tamsulosin (FLOMAX) capsule 0 4 mg, 0 4 mg, Oral, Daily With Vandana Porras MD, 0 4 mg at 05/27/18 1705      PHYSICAL EXAM:  Current Weight: Weight - Scale: 92 5 kg (203 lb 14 4 oz)  First Weight: Weight - Scale: 95 3 kg (210 lb)  Vitals:    05/28/18 0007 05/28/18 0252 05/28/18 0327 05/28/18 0712   BP: (!) 85/64 93/60  (!) 88/65   BP Location: Left arm Left arm     Pulse: (!) 112 (!) 124  (!) 117   Resp: 18 22  20   Temp: (!) 97 4 °F (36 3 °C) (!) 97 4 °F (36 3 °C)  97 5 °F (36 4 °C)   TempSrc: Axillary Axillary     SpO2: 98% 99% 97% 99%   Weight:       Height:           Intake/Output Summary (Last 24 hours) at 05/28/18 1025  Last data filed at 05/28/18 0831   Gross per 24 hour   Intake          1839 07 ml   Output             1820 ml   Net            19 07 ml     General: conscious, cooperative, in not acute distress  Eyes: conjunctivae pink, anicteric sclerae  ENT: lips and mucous membranes moist  Neck: supple, no JVD  Chest:  Positive rales bilaterally  CVS: distinct S1 & S2, normal rate, regular rhythm  Abdomen: soft, non-tender, non-distended, normoactive bowel sounds  Extremities:  3+ pitting edema  Skin: no rash  Neuro: awake, alert, oriented      Invasive Devices:   Geronimo catheter in place     Lab Results:     Results from last 7 days  Lab Units 05/28/18  0523 05/27/18  0234 05/27/18  0223 05/26/18  1615 05/26/18  1249 05/26/18  0516 05/26/18  0515 05/25/18  1944   WBC Thousand/uL 5 80  --  6 53  --  6 39 6 69  --  6 63   HEMOGLOBIN g/dL 9 1*  --  9 1*  --  9 6* 9 2*  --  9 2*   HEMATOCRIT % 30 0*  --  28 9*  --  30 8* 29 5*  --  28 6*   PLATELETS Thousands/uL 117*  --  131*  --  136* 159 162 147*   SODIUM mmol/L 134* 132*  --   --   --   --  130* 130*   POTASSIUM mmol/L 4 6 4 6  --   --   --   --  4 6 4 9   CHLORIDE mmol/L 98* 96*  --   --   --   --  96* 95*   CO2 mmol/L 25 27  --   --   --   --  26 26   BUN mg/dL 105* 109*  --   --   --   --  112* 108*   CREATININE mg/dL 2 87* 2 81*  --   --   --   --  2 95* 3 28*   CALCIUM mg/dL 9 6 8 8  --   --   --   --  8 8 8 8   MAGNESIUM mg/dL 2 8* 2 7*  --   --   --   --  2 9*  --    TOTAL PROTEIN g/dL 6 8 6 6  --   --   --   --   --  6 6   BILIRUBIN TOTAL mg/dL 1 03* 0 79  --   --   --   --   --  0 66   ALK PHOS U/L 75 78  --   --   --   --   --  76   ALT U/L 24 25  --   --   --   --   --  28   AST U/L 14 18  --   --   --   --   --  17   GLUCOSE RANDOM mg/dL 145* 105  --   --   --   --  172* 157*   PROTEIN UA mg/dl  --   --   --  Negative  --   --   --   --    NITRITE UA   --   --   --  Negative  --   --   --   --    BLOOD UA   --   --   --  Negative  --   --   --   --    LEUKOCYTES UA   --   --   --  Negative  --   --   --   --        Other Studies:  Please see previous notes

## 2018-05-28 NOTE — ASSESSMENT & PLAN NOTE
Continue aspirin statin, beta-blocker  Cardiology on board  Had chest pain yesterday- troponin mildly elevated-  Troponin elevation was felt to be secondary to acute kidney injury/  No ischemic cardiac workup needed  Currently patient denies any chest

## 2018-05-28 NOTE — PROGRESS NOTES
Progress Note - Lorene Chisholm  1935, [de-identified] y o  male MRN: [id number]    Unit/Bed#: [facility] 506-01 Encounter: [id number]    Primary Care Provider: Audrey Vincent MD   Date and time admitted to hospital: 5/25/2018  7:22 PM        * Acute on chronic combined systolic and diastolic CHF (congestive heart failure) [facility])   Assessment & Plan    Patient presented from rehab with evidence of acute on chronic combined heart failure  Of notes he had prolonged hospitalization on 4/4-5/11 due to acute on chronic heart failure and cardiorenal syndrome  Repeated echo reveals significantly decreased ejection fraction ( 65% in September 2017 dropped to 45% )   noted getting worse renal function most likely secondary to cardiorenal syndrome and  need of aggressive IV diuresis to improve fluid overload state as patient declined dialysis  Management of this patient with IV diuresis is challenging situation as his renal function most likely will deteriorate further given the need of IV diuresis  To help go through [employer] [employer] I will request cardiology and nephrology consult    Patient will be admitted to telemetry , SD2  Continue fluid and salt restricted HF diet  Daily weight( his usual dry weight 198-204 lb,   Today patient weight  is 203 lb  Strict I/Oand daily weight  Continue with Bumex drip  Monitor blood pressure        Paroxysmal atrial fibrillation ([address] )   Assessment & Plan    Cardiology started on heparin drip  Heart rate still elevated-rate control is challenging at this point given low blood pressure  Continue with metoprolol  Monitor on the telemetry  Frequent PVCs noted on the telemetry-electrolytes appears to be stable        Peripheral arterial disease ([address] )   Assessment & Plan    With left foot ulcers, podiatry consultation appreciated  Local wound care by Podiatry  X-ray reviewed-no signs of infection  Not a candidate for vascular intervention at this point due to multiple cor morbidities        Coronary artery disease involving native coronary artery of native heart   Assessment & Plan    Continue aspirin statin, beta-blocker  Cardiology on board  Had chest pain yesterday- troponin mildly elevated-  Troponin elevation was felt to be secondary to acute kidney injury/  No ischemic cardiac workup needed  Currently patient denies any chest        Type 2 diabetes mellitus ([facility] )   Assessment & Plan    · Monitor blood sugar  · Continue with the current care        Acute respiratory failure with hypoxia ([facility] [address] [facility] )   Assessment & Plan    · Patient with acute hypoxic respiratory insufficiency requiring-high-flow oxygen at the time of presentation  · Currently down to nasal cannula  · Continue with the diuresis and monitor  ·         Benign essential hypertension   Assessment & Plan    Continue IV diuresis  Beta-blocker dose decreased due to hypotension  Monitor blood pressure        Anemia in chronic kidney disease   Assessment & Plan    · Hemoglobin appears to be 9 1 at baseline  · Monitor        Chronic kidney disease, stage 3   Assessment & Plan    · Patient with creatinine 2 87 today  · Nephrology on board  · Creatinine is improving        S/P aortic valve replacement with bioprosthetic valve   Assessment & Plan    Cardiology on board  Patient with severe mitral regurgitation showing on the previous echocardiogram-not a surgical candidate as per Cardiology            VTE Pharmacologic Prophylaxis:   Pharmacologic: Heparin Drip  Mechanical VTE Prophylaxis in Place: Yes    Patient Centered Rounds: I have performed bedside rounds with nursing staff today  Discussions with Specialists or Other Care Team Provider:     Education and Discussions with Family / Patient:    Time Spent for Care: 30 minutes  More than 50% of total time spent on counseling and coordination of care as described above      Current Length of Stay: 3 day(s)    Current Patient Status: Inpatient   Certification Statement: The patient will continue to require additional inpatient hospital stay due to iv diuresis    Discharge Plan:     Code Status: Level 1 - Full Code      Subjective:   Patient seen and examined  Objective:     Vitals:   Temp (24hrs), Av 8 °F (36 6 °C), Min:97 4 °F (36 3 °C), Max:98 8 °F (37 1 °C)    HR:  [112-124] 124  Resp:  [18-22] 18  BP: (83-98)/(50-65) 92/50  SpO2:  [97 %-100 %] 98 %  Body mass index is 31 94 kg/m²  Input and Output Summary (last 24 hours): Intake/Output Summary (Last 24 hours) at 18 1714  Last data filed at 18 1701   Gross per 24 hour   Intake              844 ml   Output             1620 ml   Net             -776 ml       Physical Exam:     Physical Exam   Constitutional: He appears well-developed and well-nourished  HENT:   Head: Normocephalic and atraumatic  Eyes: Pupils are equal, round, and reactive to light  Neck: Normal range of motion  Neck supple  JVD present  Cardiovascular: Normal rate  No murmur heard  Pulmonary/Chest: Effort normal  No respiratory distress  He has no wheezes  Abdominal: Soft  Bowel sounds are normal  He exhibits no distension  Musculoskeletal: He exhibits edema (Bilateral pitting pedal edema present)  Neurological: He is alert  No cranial nerve deficit  Skin: Skin is warm and dry         Additional Data:     Labs:      Results from last 7 days  Lab Units 18  0523   WBC Thousand/uL 5 80   HEMOGLOBIN g/dL 9 1*   HEMATOCRIT % 30 0*   PLATELETS Thousands/uL 117*   NEUTROS PCT % 71   LYMPHS PCT % 18   MONOS PCT % 10   EOS PCT % 0       Results from last 7 days  Lab Units 18  0523   SODIUM mmol/L 134*   POTASSIUM mmol/L 4 6   CHLORIDE mmol/L 98*   CO2 mmol/L 25   BUN mg/dL 105*   CREATININE mg/dL 2 87*   CALCIUM mg/dL 9 6   TOTAL PROTEIN g/dL 6 8   BILIRUBIN TOTAL mg/dL 1 03*   ALK PHOS U/L 75   ALT U/L 24   AST U/L 14   GLUCOSE RANDOM mg/dL 145*       Results from last 7 days  Lab Units 18  1250   INR  1 25*       * I Have Reviewed All Lab Data Listed Above  * Additional Pertinent Lab Tests Reviewed:  [address] [facility] Admission Reviewed    Imaging:    Imaging Reports Reviewed Today Include:   Imaging Personally Reviewed by Myself Includes:      Recent Cultures (last 7 days):           Last 24 Hours Medication List:     Current Facility-Administered Medications:  acetaminophen 650 mg Oral Q6H PRN Shanell Jacques MD    albumin human 25 g Intravenous Q12H Gelene Bennetts, DO    allopurinol 200 mg Oral Daily Michael Mario, DO    aspirin 81 mg Oral Daily Shanell Jacques MD    atorvastatin 40 mg Oral Daily Shanell Jacques MD    bumetanide 1 5 mg/hr Intravenous Continuous Gelstephon Mario, DO Last Rate: 1 5 mg/hr (05/28/18 1115)   calcium carbonate 1,000 mg Oral Daily PRN Shanell Jacques MD    docusate sodium 100 mg Oral BID Shanell Jacques MD    doxercalciferol 0 5 mcg Oral Once per day on Mon Wed Fri Jourdan Allan MD    heparin (porcine) 3-20 Units/kg/hr (Order-Specific) Intravenous Titrated John Carlos MD Last Rate: 11 1 Units/kg/hr (05/28/18 1649)   heparin (porcine) 2,000 Units Intravenous PRN John Carlos MD    heparin (porcine) 4,000 Units Intravenous PRN John Carlos MD    insulin glargine 20 Units Subcutaneous HS Shanell Jacques MD    insulin lispro 1-6 Units Subcutaneous TID AC Shanell Jacques MD    insulin lispro 1-6 Units Subcutaneous HS Shanell Jacques MD    melatonin 6 mg Oral HS Shanell Jacques MD    [START ON 5/29/2018] metoprolol succinate 12 5 mg Oral Daily Jourdan Allan MD    multivitamin-minerals 1 tablet Oral Daily Shanell Jacques MD    ondansetron 4 mg Intravenous Q6H PRN Shanell Jacques MD    pantoprazole 20 mg Oral Early Morning Shanell Jacques MD    patient supplied medication 2 each Oral BID before breakfast/lunch Jourdan Allan MD    polyethylene glycol 17 g Oral Daily Shanell Jacques MD    saccharomyces boulardii 250 mg Oral BID Shanell Jacques MD    senna 1 tablet Oral Daily Shanell Jacques MD    tamsulosin 0 4 mg Oral Daily With Ana Murphy MD         Today, Patient Was Seen By: Johnnie Booth MD    ** Please Note: Dictation voice to text software may have been used in the creation of this document   **

## 2018-05-29 NOTE — PROGRESS NOTES
35 beat run of V-tach on the monitor  BP stable  Labs drawn and within normal limits  Cardiology aware  Will continue to monitor

## 2018-05-29 NOTE — PROGRESS NOTES
[address] Internal Medicine Progress Note  Patient: Fadia Turcios  [de-identified] y o  male   MRN: [id number]  PCP: Dori Gaona MD  Unit/Bed#: [facility] 536-98 Encounter: [id number]  Date Of Visit: 18    Assessment/plan:  1  Acute on chronic combined systolic and diastolic heart failure - still volume overloaded  On Bumex drip at 1 5 milligram/hour  2  SABINE on CKD stage 4 - baseline creatinine 2 5 to 3  SABINE improved  Renal function now stable  Creatinine 3 02 yesterday  3   Moderate to severe mitral and severe tricuspid regurgitation - needs KRYSTLE once stabilized  Plan for possible MitraClip discussed with patient and family by Cardiology  4  Paroxysmal atrial fibrillation - on heparin drip  Continue BB  5  PAD - continue ASA, statins  6  CAD - continue ASA, statins, BB  7  Acute hypoxic respiratory failure - improved  Initially needed high-flow oxygen  8  Status post bioprosthetic aortic wall replacement  9  Type 2 diabetes - blood sugars acceptable  Continue Lantus 20 units HS, SSI  HbA1c 7 6 on 2018    VTE Pharmacologic Prophylaxis:   Pharmacologic: Heparin Drip  Mechanical: Mechanical VTE prophylaxis in place  Discussions with Specialists or Other Care Team Provider:  Discussed with Dr Laura Cevallos    Education and Discussions with Family / Patient:  Called daughter - left a message    Time Spent for Care: 20 minutes  More than 50% of total time spent on counseling and coordination of care as described above  Current Length of Stay: 4 day(s)    Current Patient Status: Inpatient   Certification Statement: The patient will continue to require additional inpatient hospital stay due to Acute on chronic combined CHF    Code Status: Level 1 - Full Code    Subjective:   Shortness of breath with exertion  No chest pain       Objective:     Vitals:   Temp (24hrs), Av 6 °F (36 4 °C), Min:97 4 °F (36 3 °C), Max:97 8 °F (36 6 °C)    HR:  [] 93  Resp:  [16-18] 16  BP: ()/(50-79) 92/66  SpO2:  [98 %-100 %] 98 %  Body mass index is 31 77 kg/m²  Physical Exam:     Physical Exam   Constitutional: He is oriented to person, place, and time  HENT:   Head: Atraumatic  Eyes: EOM are normal    Neck: Neck supple  JVD present  No tracheal deviation present  No thyromegaly present  Cardiovascular: Tachycardia present  Pulmonary/Chest: Effort normal    Decreased breath sounds in the bases   Abdominal: Soft  Bowel sounds are normal  He exhibits no distension  There is no tenderness  There is no rebound  Musculoskeletal:   1 to 2+ bilateral lower extremity edema   Neurological: He is alert and oriented to person, place, and time  Skin: Skin is warm and dry  Psychiatric: He has a normal mood and affect  Additional Data:     Labs:      Results from last 7 days  Lab Units 05/29/18  0653   WBC Thousand/uL 6 85   HEMOGLOBIN g/dL 8 8*   HEMATOCRIT % 29 5*   PLATELETS Thousands/uL 123*   NEUTROS PCT % 74   LYMPHS PCT % 16   MONOS PCT % 9   EOS PCT % 1       Results from last 7 days  Lab Units 05/28/18  1932 05/28/18  0523   SODIUM mmol/L 131* 134*   POTASSIUM mmol/L 4 9 4 6   CHLORIDE mmol/L 94* 98*   CO2 mmol/L 25 25   BUN mg/dL 105* 105*   CREATININE mg/dL 3 02* 2 87*   CALCIUM mg/dL 9 2 9 6   TOTAL PROTEIN g/dL  --  6 8   BILIRUBIN TOTAL mg/dL  --  1 03*   ALK PHOS U/L  --  75   ALT U/L  --  24   AST U/L  --  14   GLUCOSE RANDOM mg/dL 145* 145*       Results from last 7 days  Lab Units 05/26/18  1250   INR  1 25*       * I Have Reviewed All Lab Data Listed Above  * Additional Pertinent Lab Tests Reviewed:  [address] Admission Reviewed      Last 24 Hours Medication List:     Current Facility-Administered Medications:  acetaminophen 650 mg Oral Q6H PRN Jaime Harris MD    allopurinol 200 mg Oral Daily Sadaf Leiva DO    aspirin 81 mg Oral Daily Jaime Harris MD    atorvastatin 40 mg Oral Daily Jaime Harris MD    bumetanide 1 5 mg/hr Intravenous Continuous Danni Garcia DO Last Rate: 1 5 mg/hr (05/29/18 1441)   calcium carbonate 1,000 mg Oral Daily PRN Linda Ingram MD    docusate sodium 100 mg Oral BID Linda Ingram MD    doxercalciferol 0 5 mcg Oral Once per day on Mon Wed Fri Chaz Rutherford MD    heparin (porcine) 3-20 Units/kg/hr (Order-Specific) Intravenous Titrated Marylene Infield, MD Last Rate: 11 1 Units/kg/hr (05/28/18 1649)   heparin (porcine) 2,000 Units Intravenous PRN Marylene Infield, MD    heparin (porcine) 4,000 Units Intravenous PRN Marylene Infield, MD    insulin glargine 20 Units Subcutaneous HS Linda Ingram MD    insulin lispro 1-6 Units Subcutaneous TID AC Linda Ingram MD    insulin lispro 1-6 Units Subcutaneous HS Linda Ingram MD    melatonin 6 mg Oral HS Linda Ingram MD    metoprolol succinate 12 5 mg Oral Daily Chaz Rutherford MD    multivitamin-minerals 1 tablet Oral Daily Linda Ingram MD    ondansetron 4 mg Intravenous Q6H PRN Linda Ingram MD    pantoprazole 20 mg Oral Early Morning Linda Ingram MD    patient supplied medication 2 each Oral BID before breakfast/lunch Chaz Rutherford MD    polyethylene glycol 17 g Oral Daily Linda Ingram MD    saccharomyces boulardii 250 mg Oral BID Linda Ingram MD    senna 1 tablet Oral Daily Linda Ingram MD    tamsulosin 0 4 mg Oral Daily With Marco Helton MD         Today, Patient Was Seen By: Raisa Singleton MD    ** Please Note: Dragon 360 Dictation voice to text software may have been used in the creation of this document   **

## 2018-05-29 NOTE — PROGRESS NOTES
Cardiology Progress Note - Lorene Chisholm  [de-identified] y o  male MRN: [id number]    Unit/Bed#: [facility] 506-01 Encounter: [id number]    Assessment and plan  1  Acute on chronic combined systolic and diastolic congestive heart failure  2  Mitral and tricuspid regurgitation  3  Pulmonary hypertension  4  Mild LV dysfunction  5  Coronary artery disease  6  CKD stage 3  7  paroxysmal atrial fibrillation  8  bioprosthetic AVR    Recommendations:  Agree with IV Bumex he still remains volume overloaded  Will check LV function to see what LV stroke volume looks like  He is warm on my exam today appears to be perfusing  We will re-evaluate the tricuspid and mitral regurgitation  The plan of a possible mitral clip was discussed with the patient however he would need to be stabilized further to undergo transesophageal ECHO to see if he is a candidate  Patient has not been out of bed to walk in 2 weeks is quite deconditioned  Continue IV heparin until plan on long-term anticoagulant is decided  I do not feel he has an open surgical candidate    Subjective:    No significant events overnight  Complains of shortness of breath with exertion  No chest pain or lightheaded spells    ROS    Objective:   Vitals: Blood pressure 92/66, pulse 93, temperature (!) 97 4 °F (36 3 °C), temperature source Axillary, resp  rate 16, height 5' 7" (1 702 m), weight 92 5 kg (203 lb 14 4 oz), SpO2 98 %  , Body mass index is 31 94 kg/m² , Orthostatic Blood Pressures      Most Recent Value   Blood Pressure  92/66 filed at 05/29/2018 1048   Patient Position - Orthostatic VS  Sitting filed at 05/29/2018 6323         Systolic (36GES), KQF:32 , Min:92 , WOE:658     Diastolic (53UAA), SYX:35, Min:50, Max:79      Intake/Output Summary (Last 24 hours) at 05/29/18 1104  Last data filed at 05/29/18 1042   Gross per 24 hour   Intake           712 97 ml   Output              970 ml   Net          -257 03 ml     Weight (last 2 days)     Date/Time   Weight 05/27/18 0500  92 5 (203 9)                Telemetry Review: No significant arrhythmias seen on telemetry review  EKG personally reviewed by Enrike Elizabeth DO  Physical Exam   Constitutional: He is oriented to person, place, and time  He appears well-nourished  No distress  HENT:   Head: Atraumatic  Eyes: Conjunctivae are normal  Pupils are equal, round, and reactive to light  Neck: Neck supple  Cardiovascular: Regular rhythm and S1 normal   Tachycardia present  Exam reveals no friction rub  Murmur heard  Systolic murmur is present with a grade of 2/6   +JVD   Pulmonary/Chest: Effort normal and breath sounds normal  No respiratory distress  He has no wheezes  He has no rales  Abdominal: Bowel sounds are normal  He exhibits no distension  There is no tenderness  There is no rebound  Musculoskeletal: He exhibits edema  Neurological: He is alert and oriented to person, place, and time  No cranial nerve deficit  Skin: Skin is warm and dry  No erythema  Nursing note and vitals reviewed          Laboratory Results:    Results from last 7 days  Lab Units 05/27/18  1717 05/27/18  0848 05/27/18  0539   TROPONIN I ng/mL 0 24* 0 14* 0 11*       CBC with diff:   Results from last 7 days  Lab Units 05/29/18  0653 05/28/18  0523 05/27/18  0223 05/26/18  1249 05/26/18  0516 05/26/18  0515 05/25/18  1944   WBC Thousand/uL 6 85 5 80 6 53 6 39 6 69  --  6 63   HEMOGLOBIN g/dL 8 8* 9 1* 9 1* 9 6* 9 2*  --  9 2*   HEMATOCRIT % 29 5* 30 0* 28 9* 30 8* 29 5*  --  28 6*   MCV fL 90 89 86 88 88  --  85   PLATELETS Thousands/uL 123* 117* 131* 136* 159 162 147*   MCH pg 26 7* 27 0 27 1 27 4 27 4  --  27 4   MCHC g/dL 29 8* 30 3* 31 5 31 2* 31 2*  --  32 2   RDW % 17 0* 16 9* 17 0* 16 9* 17 1*  --  17 0*   MPV fL 9 9 9 7 9 4 9 3 10 1 10 3 9 7   NRBC AUTO /100 WBCs 0 0 0  --   --   --  0         CMP:  Results from last 7 days  Lab Units 05/28/18  1932 05/28/18  0015 05/27/18  0234 05/26/18  0515 18   SODIUM mmol/L 131* 134* 132* 130* 130*   POTASSIUM mmol/L 4 9 4 6 4 6 4 6 4 9   CHLORIDE mmol/L 94* 98* 96* 96* 95*   CO2 mmol/L    ANION GAP mmol/L 12 11 9 8 9   BUN mg/dL 105* 105* 109* 112* 108*   CREATININE mg/dL 3 02* 2 87* 2 81* 2 95* 3 28*   GLUCOSE RANDOM mg/dL 145* 145* 105 172* 157*   CALCIUM mg/dL 9 2 9 6 8 8 8 8 8 8   AST U/L  --  14 18  --  17   ALT U/L  --  24 25  --  28   ALK PHOS U/L  --  75 78  --  76   TOTAL PROTEIN g/dL  --  6 8 6 6  --  6 6   BILIRUBIN TOTAL mg/dL  --  1 03* 0 79  --  0 66   EGFR ml/min/1 73sq m 19 20 20 19 17         BMP:  Results from last 7 days  Lab Units 1815 18   SODIUM mmol/L 131* 134* 132* 130* 130*   POTASSIUM mmol/L 4 9 4 6 4 6 4 6 4 9   CHLORIDE mmol/L 94* 98* 96* 96* 95*   CO2 mmol/L    BUN mg/dL 105* 105* 109* 112* 108*   CREATININE mg/dL 3 02* 2 87* 2 81* 2 95* 3 28*   GLUCOSE RANDOM mg/dL 145* 145* 105 172* 157*   CALCIUM mg/dL 9 2 9 6 8 8 8 8 8 8       BNP: No results for input(s): BNP in the last 72 hours      Magnesium:   Results from last 7 days  Lab Units 18  0515   MAGNESIUM mg/dL 2 8* 2 8* 2 7* 2 9*       Coags:   Results from last 7 days  Lab Units 18  0523 18  0211 18  2000 18  1250 18  194   PTT seconds 78* 80* 77* 77* 29 28   INR   --   --   --   --  1 25* 1 21*       TSH:        Hemoglobin A1C       Lipid Profile:       Cardiac testing:   Results for orders placed during the hospital encounter of 18   Echo complete with contrast if indicated    Narrative [place] [address], [address] [facility]  [phone]    Transthoracic Echocardiogram  2D, M-mode, Doppler, and Color Doppler    Study date:  2018    Patient: Benson Obrien  MR number: [id number]  Account number: [de-identified]  : 1935  Age: [de-identified] years  Gender: Male  Status: Inpatient  Location: Bedside  Height: 67 in  Weight: 213 6 lb  BP: 108/ 58 mmHg    Indications: Assess left ventricular function  Diagnoses: I50 9 - Heart failure, unspecified    Sonographer:  MASON Quinn  Primary Physician:  Natividad Moreno MD  Referring Physician:  Abiodun Mcguire MD  Group:  [facility]  Interpreting Physician:  Abiodun Mcguire MD    SUMMARY    LEFT VENTRICLE:  Systolic function was mildly reduced  Ejection fraction was estimated to be 45 %  There was mild diffuse hypokinesis  There was moderate hypokinesis of the apical septal wall(s)  Wall thickness was mildly increased  There was mild concentric hypertrophy  RIGHT VENTRICLE:  The ventricle was mildly to moderately dilated  Systolic function was reduced  LEFT ATRIUM:  The atrium was moderately dilated  RIGHT ATRIUM:  The atrium was moderately dilated  MITRAL VALVE:  There was marked annular calcification  There was moderate to severe regurgitation  AORTIC VALVE:  A bioprosthesis was present  It exhibited normal function  Doppler cardiac output was 3 5 L/min, using LVOT flow data  Doppler cardiac index was 1 68 L/min/m squared, using LVOT flow data  TRICUSPID VALVE:  There was severe regurgitation  Pulmonary artery systolic pressure was markedly increased  Estimated peak PA pressure was 65 mmHg  PULMONIC VALVE:  Notching of the systolic pulse wave is noted, suggesting elevated pulmonary artery pressures  There was mild regurgitation  HISTORY: PRIOR HISTORY: CAD s/p CABG, s/p AVR, Afib, Hypertension, Hyperlipidemia, Heart failure    PROCEDURE: The procedure was performed at the bedside  This was a routine study  The transthoracic approach was used  The study included complete 2D imaging, M-mode, complete spectral Doppler, and color Doppler  The heart rate was 89 bpm,  at the start of the study   Images were obtained from the parasternal, apical, subcostal, and suprasternal notch acoustic windows  Echocardiographic views were limited due to decreased penetration and lung interference  This was a  technically difficult study  LEFT VENTRICLE: Size was normal  Systolic function was mildly reduced  Ejection fraction was estimated to be 45 %  There was mild diffuse hypokinesis  There was moderate hypokinesis of the apical septal wall(s)  Wall thickness was mildly  increased  There was mild concentric hypertrophy  DOPPLER: Transmitral flow pattern: atrial fibrillation  RIGHT VENTRICLE: The ventricle was mildly to moderately dilated  Systolic function was reduced  LEFT ATRIUM: The atrium was moderately dilated  RIGHT ATRIUM: The atrium was moderately dilated  MITRAL VALVE: There was marked annular calcification  Valve structure was normal  There was normal leaflet separation  DOPPLER: The transmitral velocity was within the normal range  There was no evidence for stenosis  There was moderate to  severe regurgitation  AORTIC VALVE: A bioprosthesis was present  It exhibited normal function  TRICUSPID VALVE: The annulus was dilated  There was normal leaflet separation  DOPPLER: The transtricuspid velocity was within the normal range  There was no evidence for stenosis  There was severe regurgitation  Pulmonary artery systolic  pressure was markedly increased  Estimated peak PA pressure was 65 mmHg  PULMONIC VALVE: Notching of the systolic pulse wave is noted, suggesting elevated pulmonary artery pressures  Leaflets exhibited normal thickness, no calcification, and normal cuspal separation  DOPPLER: The transpulmonic velocity was  within the normal range  There was mild regurgitation  PERICARDIUM: There was no pericardial effusion  AORTA: The root exhibited normal size      MEASUREMENT TABLES    2D MEASUREMENTS  LVOT   (Reference normals)  Diam   20 5 mm   (--)    DOPPLER MEASUREMENTS  LVOT   (Reference normals)  VTI   13 6 cm   (--)  R-R interval   769 ms   (--)  HR   78 bpm   (--)  Stroke vol   44 89 ml   (--)  Cardiac output   3 5 L/min   (--)  Cardiac index   1 68 L/min/m squared   (--)    SYSTEM MEASUREMENT TABLES    2D  %FS: 21 04 %  AV Diam: 3 14 cm  EDV(Teich): 91 37 ml  EF(Cube): 50 77 %  EF(Teich): 42 96 %  ESV(Cube): 44 19 ml  ESV(Teich): 52 12 ml  IVSd: 1 25 cm  LA Area: 32 18 cm2  LA Diam: 4 78 cm  LVEDV MOD A4C: 143 38 ml  LVEF MOD A4C: 30 68 %  LVESV MOD A4C: 99 4 ml  LVIDd: 4 48 cm  LVIDs: 3 54 cm  LVLd A4C: 8 59 cm  LVLs A4C: 8 06 cm  LVPWd: 1 17 cm  RA Area: 26 08 cm2  RV Diam: 4 04 cm  SI(Cube): 21 92 ml/m2  SI(Teich): 18 87 ml/m2  SV MOD A4C: 43 98 ml  SV(Cube): 45 58 ml  SV(Teich): 39 25 ml    CW  TR MaxP 39 mmHg  TR Vmax: 3 85 m/s    MM  TAPSE: 1 9 cm    [facility]    Prepared and electronically signed by    Rowdy Gee MD  Signed 2018 18:07:57               Meds/Allergies   all current active meds have been reviewed  Prescriptions Prior to Admission   Medication    acetaminophen (TYLENOL) 325 mg tablet    aspirin (ECOTRIN LOW STRENGTH) 81 mg EC tablet    atorvastatin (LIPITOR) 40 mg tablet    febuxostat (ULORIC) 40 mg tablet    insulin aspart protamine-insulin aspart (NovoLOG 70/30) 100 units/mL injection    metoprolol succinate (TOPROL-XL) 25 mg 24 hr tablet    multivitamin-iron-minerals-folic acid (CENTRUM) chewable tablet    NON FORMULARY    omeprazole (PriLOSEC) 20 mg delayed release capsule    paricalcitol (ZEMPLAR) 1 mcg capsule    ramipril (ALTACE) 2 5 mg capsule    torsemide (DEMADEX) 20 mg tablet    calcium carbonate (TUMS) 500 mg chewable tablet    diphenhydrAMINE (BENADRYL) 25 mg tablet    docusate sodium (COLACE) 100 mg capsule    insulin glargine (LANTUS) 100 units/mL subcutaneous injection    insulin lispro (HumaLOG) 100 units/mL injection    insulin lispro (HumaLOG) 100 units/mL injection    melatonin 3 mg    polyethylene glycol (MIRALAX) 17 g packet    Probiotic Product (NATRUL PROBIOTIC) CAPS    tamsulosin (FLOMAX) 0 4 mg    zolpidem (AMBIEN) 5 mg tablet         bumetanide 1 5 mg/hr Last Rate: 1 5 mg/hr (05/29/18 3717)   heparin (porcine) 3-20 Units/kg/hr (Order-Specific) Last Rate: 11 1 Units/kg/hr (05/28/18 9857)     Assessment:  Principal Problem:    Acute on chronic combined systolic and diastolic CHF (congestive heart failure) ([facility])  Active Problems:    Benign essential hypertension    Type 2 diabetes mellitus (HCC)    S/P aortic valve replacement with bioprosthetic valve    Coronary artery disease involving native coronary artery of native heart    Chronic kidney disease, stage 3    Peripheral arterial disease (HCC)    Anemia in chronic kidney disease    Cardiorenal syndrome, stage 1-4 or unspecified chronic kidney disease, with heart failure (HCC)    [facility]

## 2018-05-29 NOTE — PROGRESS NOTES
Spoke with Dr Cory Griffiths from Cardiology in regard to frequent runs of V-Tach  Metoprolol dose held during day due to low BP's  Current BP 98/62-12 5mg metoprolol given  Told to call if runs of V-Tach become greater than 15 seconds in total  Pt asymptomatic currently, will continue to monitor

## 2018-05-29 NOTE — PROGRESS NOTES
NEPHROLOGY PROGRESS NOTE   Kaden Kennedy  [de-identified] y o  male MRN: [id number]  Unit/Bed#: [facility] 506-01 Encounter: [id number]  Reason for Consult:  Acute kidney injury    ASSESSMENT/PLAN:  1  Acute kidney injury, likely secondary to cardiorenal syndrome/volume overload  2  Stage 4 chronic kidney disease, baseline creatinine 2 5-3 0  3  Volume overload, currently on Bumex drip at 1 5 milligrams/hour  4  Valvular heart disease, ejection fraction of 45%  5  History of aortic valve replacement, severe mitral regurgitation, severe tricuspid regurgitation    PLAN:    · Overall renal function seems to be fairly stable  · Urine output somewhat marginal despite Bumex drip, will continue for now  · Blood pressure labile, systolic in the 21S  · Discussed with patient again in regards to possible hemodialysis, given patient's current cardiac status, labile blood pressures, would be a poor dialysis candidate although patient would want to try  SUBJECTIVE:  Seen and examined  Patient awake alert  Complains of shortness of breath with minimal exertion  Persistent lower extremity swelling  No chest pain or pressure  Review of Systems    OBJECTIVE:  Current Weight: Weight - Scale: 92 kg (202 lb 13 2 oz)  Vitals:    05/29/18 0740 05/29/18 0851 05/29/18 1048 05/29/18 1107   BP: 92/68  92/66    BP Location: Right arm  Left arm    Pulse: (!) 112 92 93    Resp: 16  16    Temp: (!) 97 4 °F (36 3 °C)  (!) 97 4 °F (36 3 °C)    TempSrc: Axillary  Axillary    SpO2: 99%  98%    Weight:    92 kg (202 lb 13 2 oz)   Height:           Intake/Output Summary (Last 24 hours) at 05/29/18 1339  Last data filed at 05/29/18 1238   Gross per 24 hour   Intake           822 97 ml   Output              820 ml   Net             2 97 ml       Physical Exam   Constitutional: He is oriented to person, place, and time  He appears ill  No distress  Eyes: No scleral icterus  Neck: Neck supple  Cardiovascular: Normal rate  An irregular rhythm present  Pulmonary/Chest: He has decreased breath sounds in the right lower field and the left lower field  Abdominal: Soft  He exhibits no distension  There is no tenderness  Musculoskeletal: He exhibits edema (1-2 +edema bilaterally)  Neurological: He is alert and oriented to person, place, and time  Skin: Skin is warm  Psychiatric: He has a normal mood and affect         Medications:    Current Facility-Administered Medications:     acetaminophen (TYLENOL) tablet 650 mg, 650 mg, Oral, Q6H PRN, Maya Arguelles MD, 650 mg at 05/28/18 2139    allopurinol (ZYLOPRIM) tablet 200 mg, 200 mg, Oral, Daily, Jamal Aragon, DO, 200 mg at 05/29/18 0849    aspirin (ECOTRIN LOW STRENGTH) EC tablet 81 mg, 81 mg, Oral, Daily, Maya Arguelles MD, 81 mg at 05/29/18 0848    atorvastatin (LIPITOR) tablet 40 mg, 40 mg, Oral, Daily, Maya Arguelles MD, 40 mg at 05/29/18 0849    bumetanide (BUMEX) 12 5 mg infusion 50 mL, 1 5 mg/hr, Intravenous, Continuous, Jamal Aragon DO, Last Rate: 6 mL/hr at 05/29/18 0418, 1 5 mg/hr at 05/29/18 0418    calcium carbonate (TUMS) chewable tablet 1,000 mg, 1,000 mg, Oral, Daily PRN, Maya Arguelles MD    docusate sodium (COLACE) capsule 100 mg, 100 mg, Oral, BID, Maya Arguelles MD, 100 mg at 05/29/18 0849    doxercalciferol (HECTOROL) capsule 0 5 mcg, 0 5 mcg, Oral, Once per day on Mon Wed Fri, Maycol Hines MD, 0 5 mcg at 05/28/18 0854    heparin (porcine) 25,000 units in 250 mL infusion (premix), 3-20 Units/kg/hr (Order-Specific), Intravenous, Titrated, Judd Diaz MD, Last Rate: 10 mL/hr at 05/28/18 1649, 11 1 Units/kg/hr at 05/28/18 1649    heparin (porcine) injection 2,000 Units, 2,000 Units, Intravenous, PRN, Judd Diaz MD    heparin (porcine) injection 4,000 Units, 4,000 Units, Intravenous, PRN, Judd Diaz MD    insulin glargine (LANTUS) subcutaneous injection 20 Units 0 2 mL, 20 Units, Subcutaneous, HS, Maya Arguelles MD, 20 Units at 05/28/18 1778  Sakshi Paula insulin lispro (HumaLOG) 100 units/mL subcutaneous injection 1-6 Units, 1-6 Units, Subcutaneous, TID AC, 1 Units at 05/29/18 1153 **AND** Fingerstick Glucose (POCT), , , TID AC, Yareli Akhtar MD    insulin lispro (HumaLOG) 100 units/mL subcutaneous injection 1-6 Units, 1-6 Units, Subcutaneous, HS, Yareli Akhtar MD, 1 Units at 05/28/18 2140    melatonin tablet 6 mg, 6 mg, Oral, HS, Yareli Akhtar MD, 6 mg at 05/28/18 2141    metoprolol succinate (TOPROL-XL) 24 hr tablet 12 5 mg, 12 5 mg, Oral, Daily, Rhett Martinez MD, 12 5 mg at 05/29/18 0848    multivitamin-minerals (CENTRUM) tablet 1 tablet, 1 tablet, Oral, Daily, Yareli Akhtar MD, 1 tablet at 05/29/18 0849    ondansetron (ZOFRAN) injection 4 mg, 4 mg, Intravenous, Q6H PRN, Yareli Akhtar MD, 4 mg at 05/26/18 1539    pantoprazole (PROTONIX) EC tablet 20 mg, 20 mg, Oral, Early Morning, Yareli Akhtar MD, 20 mg at 05/29/18 7619    patient supplied medication, 2 each, Oral, BID before breakfast/lunch, Rhett Martinez MD, 2 each at 05/29/18 1153    polyethylene glycol (MIRALAX) packet 17 g, 17 g, Oral, Daily, Yareli Akhtar MD, 17 g at 05/27/18 0801    saccharomyces boulardii (FLORASTOR) capsule 250 mg, 250 mg, Oral, BID, Yareli Akhtar MD, 250 mg at 05/29/18 0849    senna (SENOKOT) tablet 8 6 mg, 1 tablet, Oral, Daily, Yareli Akhtar MD, 8 6 mg at 05/29/18 0849    tamsulosin (FLOMAX) capsule 0 4 mg, 0 4 mg, Oral, Daily With Jj Little MD, 0 4 mg at 05/28/18 1649    Laboratory Results:    Results from last 7 days  Lab Units 05/29/18  0653 05/28/18  1932 05/28/18  0523 05/27/18  0234 05/27/18  0223 05/26/18  1249 05/26/18  0516 05/26/18  0515 05/25/18  1944   WBC Thousand/uL 6 85  --  5 80  --  6 53 6 39 6 69  --  6 63   HEMOGLOBIN g/dL 8 8*  --  9 1*  --  9 1* 9 6* 9 2*  --  9 2*   HEMATOCRIT % 29 5*  --  30 0*  --  28 9* 30 8* 29 5*  --  28 6*   PLATELETS Thousands/uL 123*  --  117*  --  131* 136* 159 162 147* SODIUM mmol/L  --  131* 134* 132*  --   --   --  130* 130*   POTASSIUM mmol/L  --  4 9 4 6 4 6  --   --   --  4 6 4 9   CHLORIDE mmol/L  --  94* 98* 96*  --   --   --  96* 95*   CO2 mmol/L  --  25 25 27  --   --   --  26 26   BUN mg/dL  --  105* 105* 109*  --   --   --  112* 108*   CREATININE mg/dL  --  3 02* 2 87* 2 81*  --   --   --  2 95* 3 28*   CALCIUM mg/dL  --  9 2 9 6 8 8  --   --   --  8 8 8 8   MAGNESIUM mg/dL  --  2 8* 2 8* 2 7*  --   --   --  2 9*  --    TOTAL PROTEIN g/dL  --   --  6 8 6 6  --   --   --   --  6 6   GLUCOSE RANDOM mg/dL  --  145* 145* 105  --   --   --  172* 157*

## 2018-05-29 NOTE — RESTORATIVE TECHNICIAN NOTE
Restorative Specialist Mobility Note       Activity: Chair, Dangle, Stand at bedside (Stood pt on scale for weight check)     Assistive Device: None, Other (Comment) (A x 2 )     Ambulation Response:  Tolerated fairly well  Repositioned: Sitting, Up in chair

## 2018-05-29 NOTE — SOCIAL WORK
Patient identified as HRR per criteria  Call made to DC appointment hotline with information as required for CM support follow up  Patient referred to outpatient CM

## 2018-05-30 NOTE — PROGRESS NOTES
During bedside report with incoming nurse patient weighed and assisted to chair  After sitting patient became unresponsive and started to become bradycardic with HR in 40's  CPR initiated and code called

## 2018-05-30 NOTE — DISCHARGE SUMMARY
INPATIENT DEATH NOTE  Mihir Sober  [de-identified] y o  male MRN: [id number]  Unit/Bed#: [facility] 506-01 Encounter: [id number]         Patient's Information  Pronounced by: Dr Dhiraj Titus  Did the patient's death occur in the ED?: No  Did the patient's death occur in the OR?: No  Did the patient's death occur less than 10 days post-op?: No  Did the patient's death occur within 24 hours of admission?: No  Was code status DNR at the time of death?: No    PHYSICAL EXAM:  Unresponsive to noxious stimuli, Spontaneous respirations absent, Breath sounds absent, Carotid pulse absent, Heart sounds absent, Pupillary light reflex absent, Corneal blink reflex absent and spontaneous breath sounds absent when no ventilator assisted breaths provided    Medical Examiner notification criteria:  Unexpected death   Medical Examiner's office notified?: Yes; wife did not want autopsy     Family Notification  Was the family notified?: Yes  Date Notified: 18  Time Notified: 732  Notified by:  Jhonathan Hernandez RN  Name of Family Notified of Death: Tim Kayser   Relationship to Patient: Spouse  Family Notification Route: Telephone  Was the family told to contact a  home?: Yes  Name of  Home[facility]de-identified] [facility]    Autopsy Options:  Post-mortem examination declined by next of kin    Primary Service Attending Physician notified?:  yes - Attending:  Bobby Le MD    Physician/Resident responsible for completing Discharge Summary:  Clayton Bloom PA-C    Discharge Summary - Mihir Sober  [de-identified] y o  male MRN: [id number]    Unit/Bed#: [facility] 506-01 Encounter: [id number] PCP: Kia Yang MD    Admission Date:   Admission Orders     Ordered        18 2255  Inpatient Admission (expected length of stay for this patient is greater than two midnights)  Once               Admitting Diagnosis: Shortness of breath [R06 02]  CKD (chronic kidney disease) stage 4, GFR 15-29 ml/min ([facility]) [N18 4]  CHF exacerbation ([facility] ) [I50 9]  Acute on chronic diastolic congestive heart failure (HCC) [I50 33]  Fluid overload [E87 70]    HPI / hospital course summary: [de-identified] yo M w PMH CHF, MR, TR, CAD, PAD, CKD 3admitted for CHF exacerbation being seen in consultation by cardiology  Patient stood up today to get on the scale w RN and subsequently became unresponsive  He was transitioned to the bed where CPR was initiated and ACLS protocol was followed  Initially was found to be bradycardic, subsequently in PE and then VF  He had no response to aggressive resuscitation attempts after 25 minutes and was pronounced dead at 7:43       Procedures Performed:   Orders Placed This Encounter   Procedures   [employer]    Intubation    IO Line Insertion       Significant Findings, Care, Treatment and Services Provided: intubation, IO, central line, CPR    Complications: cardiac / respiratory arrest     Disposition:      Final Diagnosis: acute on chronic combined systolic and diastolic CHF, cardiac arrest     Resolved Problems  Date Reviewed: 2018    None          Condition at Time of Death: dead

## 2018-05-30 NOTE — PROCEDURES
Central Line Insertion  Date/Time: 5/30/2018 7:40 AM  Performed by: Meenakshi Horan by: Sharron Hooks     Patient location:  Bedside  Other Assisting Provider: Yes (comment) (Dr Esther Sifuentes)    Consent:     Consent obtained:  Emergent situation  Universal protocol:     Imaging studies available: yes      Required blood products, implants, devices, and special equipment available: yes    Pre-procedure details:     Skin preparation:  2% chlorhexidine  Indications:     Central line indications: medications requiring central line    Anesthesia (see MAR for exact dosages): Anesthesia method:  None  Procedure details:     Location:  Right femoral    Vessel type: vein      Laterality:  Left and right    Approach: percutaneous technique used      Patient position:  Flat    Landmarks identified: yes      Number of attempts:  3    Successful placement: no    Comments:      1 attempt by myself at R femoral line unsuccessful as line and guidewire kinked with attempted dilation and insertion of catheter  2nd attempt by Dr Esther Sifuentes at R fem site with same complication  3rd attempt at L femoral line also unsuccessful; guidewire again was passed easily but dilator and catheter both became kinked with insertion

## 2018-05-30 NOTE — PROGRESS NOTES
05/30/18 Rhys Ye 647   Spiritual Beliefs/Perceptions   Concept of God Accepting   Plan of Care   Comments Provided a safe space for the family to express their grief  Prayed with family for the loss of their loved one  Provided a silent and supportive presence       Assessment Completed by: [facility] Referral)

## 2018-05-30 NOTE — PROGRESS NOTES
05/30/18 1000   Clinical Encounter Type   Visited With Family   Crisis Visit Death   Referral From Departmental follow-up   Referral To

## 2018-05-30 NOTE — RAPID RESPONSE
Progress Note - Code Blue  Vanessa Mancilla  [de-identified] y o  male MRN: [id number]    Time Called ( Time): 0720  Date Called: 5/30/2018  Level of Care: Step Down II  Room#: 043  [id number] Time ( Time): 0325  Event End Time ( Time): 8737  Primary reason for call: Pulseless Electrical Activity       Code Blue Documentation  Patient noted by nursing to become bradycardic into 40s  Code Blue called when patient began unresponsive and lost pulse at 0720  PEA noted 0720  Chest compressions begun immediately at 0720   Atropine 0 4mg was administered at 0724  Patient was intubated 0725  IO line was placed 0727  Epinephrine was administered for a total of 6 doses: 1mg x6 (first dose at 0726)  Sodium bicarb 50meQ was administered 0728  Calcium Chloride 1g was administered 0728  Subsequently, patient's rhythm was noted to be ventricular fibrillation  Patient was given defibrillation 200J x2 then 250J x3 (first shock at 200J)  Magnesium 2g was administered 0741  Unfortunately, the patient's pulse did not return   Code was stopped at  and patient pronounced dead at          HPI/Chief Complaint (Background/Situation):   Vanessa Mancilla  is a [de-identified]y o  year old male who was admitted to the hospital with acute on chronic systolic and diastolic congestive heart failure, mitral and tricuspid regurgitation, SABINE on CKD IV, and paroxysmal atrial fibrillation  The patient was being treated on the internal medicine service, with cardiology and nephrology as consultants  The patient was being diuresed with IV bumex, anticoagulated with IV heparin  On the morning of 5/30/2018, a code BLUE was called on the patient  CPR was begun immediately  See above and code documentation on epic for full details  Cardiology, Critical Care and Internal Medicine were all in the room managing the code  Unfortunately, despite all resuscitative efforts, the patient's pulse did not return   The code was stopped and the patient was pronounced dead at       Historical Information   Past Medical History:   Diagnosis Date    Cardiac disease     [facility] [address] [facility]     Diabetes mellitus [facility] [address] [facility]     Dyspnea     last assessed-7/10/2015    Embolism, arterial, leg, right (HCC)     last assessed-7/10/2015    Hematuria     resolved-7/24/2017    History of herniated intervertebral disc     Hypertension     Myocardial infarction ([facility] [address] [facility]     Pneumonia     Premature atrial contractions     Productive cough     last assessed-10/31/2016    Renal disorder      Past Surgical History:   Procedure Laterality Date    AORTIC VALVE REPLACEMENT      BYPASS GRAFT      using vein: femoral-popliteal    CARDIAC VALVE REPLACEMENT      CATARACT EXTRACTION, BILATERAL      COLONOSCOPY      CORONARY ARTERY BYPASS GRAFT      X6    KNEE SURGERY Bilateral     knee replacement    NE SLCTV CATHJ 3RD+ ORD SLCTV ABDL PEL/LXTR [facility] Left 12/22/2017    Procedure: LEG ANGIOGRAM; RIGHT FEMORAL ACCESS; CO2-CONTRAST ;  Surgeon: Puma Barron MD;  [facility]: [facility];  Service: Vascular    REPLACEMENT TOTAL KNEE BILATERAL      VASCULAR SURGERY       Social History   History   Alcohol Use No     History   Drug Use No     History   Smoking Status    Former Smoker    Quit date: 10/11/1987   Smokeless Tobacco    Never Used     Comment: Per huey owens smoker cigarettes-heavy (20-25/day)     Family History: non-contributory    Meds/Allergies     Current Facility-Administered Medications:  acetaminophen 650 mg Oral Q6H PRN Alycia Ganser, MD    allopurinol 200 mg Oral Daily Jamal Aragon DO    aspirin 81 mg Oral Daily Alycia Ganser, MD    atorvastatin 40 mg Oral Daily Alycia Ganser, MD    bumetanide 1 5 mg/hr Intravenous Continuous Osvaldo Jimenez DO Last Rate: 1 5 mg/hr (05/29/18 4622)   calcium carbonate 1,000 mg Oral Daily PRN Alycia Ganser, MD    docusate sodium 100 mg Oral BID Alycia Ganser, MD    doxercalciferol 0 5 mcg Oral Once per day on Mon Wed Fri Dean Alonso MD    heparin (porcine) 3-20 Units/kg/hr (Order-Specific) Intravenous Titrated Preston Baron MD Last Rate: 15 Units/kg/hr (05/30/18 0310)   heparin (porcine) 2,000 Units Intravenous PRN Preston Baron MD    heparin (porcine) 4,000 Units Intravenous PRN Preston Baron MD    insulin glargine 20 Units Subcutaneous HS Tianna Bowman MD    insulin lispro 1-6 Units Subcutaneous TID AC Tianna Bowman MD    insulin lispro 1-6 Units Subcutaneous HS Tianna Bowman MD    melatonin 6 mg Oral HS Tianna Bowman MD    metoprolol succinate 12 5 mg Oral Daily Dean Alonso MD    multivitamin-minerals 1 tablet Oral Daily Tianna Bowman MD    ondansetron 4 mg Intravenous Q6H PRN Tianna Bowman MD    pantoprazole 20 mg Oral Early Morning Tianna Bowman MD    patient supplied medication 2 each Oral BID before breakfast/lunch Dean Alonso MD    polyethylene glycol 17 g Oral Daily Tianna Bowman MD    saccharomyces boulardii 250 mg Oral BID Tianna Bowman MD    senna 1 tablet Oral Daily Tianna Bowman MD    tamsulosin 0 4 mg Oral Daily With Shelley Pearce MD          bumetanide 1 5 mg/hr Last Rate: 1 5 mg/hr (05/29/18 2234)   heparin (porcine) 3-20 Units/kg/hr (Order-Specific) Last Rate: 15 Units/kg/hr (05/30/18 0310)       Allergies   Allergen Reactions    Codeine      "swelling shaky"    Hydrocodone      Other reaction(s):  Other (See Comments)  Heart racing       ROS: unable to be obtained    Physical Exam:  Gen: Patient unresponsive, pale  HEENT: no abnormality  Neck: no abnormality  Cor: pulseless  Abd: soft, nondistended  Ext: edema  Neuro:unresponsive  Skin:pale, scattered ecchymosis noted      Intake/Output Summary (Last 24 hours) at 05/30/18 9833  Last data filed at 05/30/18 0639   Gross per 24 hour   Intake           816 92 ml   Output             1050 ml   Net          -233 08 ml       Respiratory    Lab Data (Last 4 hours)    None O2/Vent Data (Last 4 hours)    None              Invasive Devices     Peripheral Intravenous Line            Peripheral IV 05/30/18 Left Wrist less than 1 day          Intraosseous Line            Intraosseous Line 05/30/18 Tibia less than 1 day          Drain            Urethral Catheter Straight-tip 16 Fr  3 days          Airway            ETT  8 mm less than 1 day                DIAGNOSTIC DATA:    Lab: I have personally reviewed pertinent lab results  CBC:     Results from last 7 days  Lab Units 05/29/18  0653   WBC Thousand/uL 6 85   HEMOGLOBIN g/dL 8 8*   HEMATOCRIT % 29 5*   PLATELETS Thousands/uL 123*     CMP:     Results from last 7 days  Lab Units 05/30/18  0258 05/28/18  1932 05/28/18  0523 05/27/18  0234   SODIUM mmol/L 130* 131* 134* 132*   POTASSIUM mmol/L 4 5 4 9 4 6 4 6   CHLORIDE mmol/L 95* 94* 98* 96*   CO2 mmol/L 25 25 25 27   BUN mg/dL 115* 105* 105* 109*   CREATININE mg/dL 2 86* 3 02* 2 87* 2 81*   CALCIUM mg/dL 9 3 9 2 9 6 8 8   TOTAL PROTEIN g/dL 6 6  --  6 8 6 6   BILIRUBIN TOTAL mg/dL 0 96  --  1 03* 0 79   ALK PHOS U/L 94  --  75 78   ALT U/L 25  --  24 25   AST U/L 14  --  14 18   GLUCOSE RANDOM mg/dL 106 145* 145* 105     PT/INR:   No results found for: PT, INR,   Magnesium: No results found for: MAG,   Phosphorous: No results found for: PHOS    Microbiology:  Lab Results   Component Value Date    BLOODCX No Growth After 5 Days  04/04/2018    BLOODCX No Growth After 5 Days   04/04/2018    URINECX >100,000 cfu/ml Escherichia coli (A) 05/05/2018    URINECX >100,000 cfu/ml Enterococcus faecalis (A) 05/05/2018         OUTCOME:   Other: Patient pronounced dead at 1  Family notified: yes  Family member contacted: wife  Code Status: Level 1 - Full Code

## 2018-05-30 NOTE — PROCEDURES
Intubation  Date/Time: 5/30/2018 7:47 AM  Performed by: Marlo Homans by: Geraldine Wagner     Patient location:  Bedside  Consent:     Consent obtained:  Emergent situation  Universal protocol:     Patient identity confirmed:  Arm band  Pre-procedure details:     Patient status:  Unresponsive    Mallampati score:  1    Pretreatment medications:  None    Paralytics:  None  Indications:     Indications for intubation: respiratory distress, respiratory failure, airway protection, hypoxemia and other (comment)      Indications for intubation comment:  Code Blue  Procedure details:     Preoxygenation:  Bag valve mask    CPR in progress: yes      Intubation method:  Oral    Oral intubation technique:  Direct    Laryngoscope blade: Mac 4    Tube size (mm):  8 0    Tube type:  Cuffed    Number of attempts:  1    Ventilation between attempts: no      Cricoid pressure: no      Tube visualized through cords: yes    Placement assessment:     ETT to lip:  24    Tube secured with: Adhesive tape and ETT majano    Breath sounds:  Equal and absent over the epigastrium    Placement verification: chest rise, condensation, direct visualization, equal breath sounds, ETCO2 detector and tube exhalation    Post-procedure details:     Patient tolerance of procedure:   Tolerated well, no immediate complications

## 2018-05-30 NOTE — TREATMENT PLAN
I was present for the code blue  Per nursing report he was standing up to get on the scale and became unresponsive  Telemetry was reviewed patient had tachycardia preceding this and then episodes of ventricular bigeminy and couplets he developed severe bradycardia and went into a PEA arrest   The resident's for ready working on the patient on my arrival   He had received the usual medications  Her rhythm was PA  He was resuscitated aggressively  Morning blood work at showed potassium and magnesium were within normal limits  He had developed episodes of ventricular fibrillation and ventricular tachycardia during the code and was cardioverted out of these rhythms but never regained pulse  Despite aggressive resuscitation methods after 25 min of no pulse the code was called  I met with the patient's family at the bedside

## 2018-05-30 NOTE — ED PROCEDURE NOTE
Procedure  IO Line  Date/Time: 5/30/2018 7:41 AM  Performed by: Romeo Givens by: Michelle Prom     Patient location:  Bedside  Consent:     Consent obtained:  Emergent situation  Universal protocol:     Patient identity confirmed:  Arm band and hospital-assigned identification number  Indication:     Indications: rapid vascular access    Pre-procedure details:     Site preparation:  Alcohol  Procedure details:     Insertion site:  Proximal tibia    Laterality:  Left    IO Size:  25mm (blue)    Number of attempts:  1    Successful placement: yes      Insertion confirmation:  Easy infusion of fluids  Post-procedure details:     Patient tolerance of procedure:   Tolerated well, no immediate complications                     Vic Bob MD  05/30/18 8764

## 2018-06-13 LAB
ATRIAL RATE: 141 BPM
QRS AXIS: -55 DEGREES
QRSD INTERVAL: 126 MS
QT INTERVAL: 356 MS
QTC INTERVAL: 463 MS
T WAVE AXIS: 134 DEGREES
VENTRICULAR RATE: 102 BPM

## 2018-07-17 NOTE — ASSESSMENT & PLAN NOTE
Progress Note    Admit Date:  7/15/2018    Subjective:  Ms. Charles Trevino was admitted for ascites and some pedal edema. No prior history of ascites. She does have COPD and chronic systolic CHF. She was admitted to [facility] in March 2018 for cardiopulmonary arrest and she was successfully resuscitated. She has a history of ARDS and influenza A and 2 prior G-tubes. Her G-tube was removed about a month ago. Has chronic respiratory failure previous oxygen. She has history of ILD and sees pulmonary on a regular basis. She is HERACLIO but is not compliant with CPAP. S/P paracentesis 7/16. 2 L drained . Still has significant edema    Objective:   Patient Vitals for the past 4 hrs:   BP Temp Temp src Pulse Resp SpO2   07/17/18 1131 112/70 97.4 °F (36.3 °C) Oral 65 14 100 %   07/17/18 1059 - - - - 16 100 %   07/17/18 0909 101/65 97.2 °F (36.2 °C) Oral 66 16 -            Intake/Output Summary (Last 24 hours) at 07/17/18 1153  Last data filed at 07/17/18 9832   Gross per 24 hour   Intake              360 ml   Output             3550 ml   Net            -3190 ml       Physical Exam:  General appearance: alert, appears stated age and cooperative  Head: Normocephalic, without obvious abnormality, atraumatic  Eyes: conjunctivae/corneas clear. PERRL, EOM's intact.   Neck: no adenopathy, no carotid bruit, no JVD, supple, symmetrical, trachea midline and thyroid not enlarged, symmetric, no tenderness/mass/nodules  Lungs: diminished breath sounds , no rales or wheezes   Heart: regular rate and rhythm, S1, S2 normal, no murmur, click, rub or gallop  Abdomen: soft, mild tenderness, distended, ascites + bowel sounds normal; no masses,  no organomegaly  Extremities: extremities normal, atraumatic, no cyanosis , bilLE 2 + edema upto thighs  Pulses: 2+ and symmetric  Skin: Skin color, texture, turgor normal. No rashes or lesions  Neurologic: Grossly normal    Scheduled Meds:   amiodarone  100 mg Oral Daily    bromocriptine  2.5 · Patient with acute hypoxic respiratory insufficiency requiring-high-flow oxygen at the time of presentation  · Currently down to nasal cannula  · Continue with the diuresis and monitor  ·

## 2019-03-01 ENCOUNTER — TELEPHONE (OUTPATIENT)
Dept: FAMILY MEDICINE CLINIC | Facility: CLINIC | Age: 84
End: 2019-03-01

## 2019-03-26 ENCOUNTER — TELEPHONE (OUTPATIENT)
Dept: FAMILY MEDICINE CLINIC | Facility: CLINIC | Age: 84
End: 2019-03-26

## 2019-04-25 NOTE — CASE MANAGEMENT
Continued Stay Review    Date: 5/29/18   inpatient    Vital Signs: /58 (BP Location: Left arm)   Pulse (!) 117   Temp 97 6 °F (36 4 °C) (Oral)   Resp 20   Ht 5' 7" (1 702 m)   Wt 92 kg (202 lb 13 2 oz)   SpO2 96%   BMI 31 77 kg/m²    05/29/18 1534    117  20  102/58    05/29/18 1048   93  16  92/66    05/29/18 0740    112  16  92/68    05/29/18 0216    112  18  104/61    05/28/18 2306    119  18  98/62    05/28/18 1846    115  18  113/79    05/28/18 1536    124  18  92/50    05/28/18 1029    121  18  98/54    05/28/18 0712    117  20   88/65    05/28/18 0252    124  22  93/60    05/28/18 0007    112  18   85/64    05/27/18 1904    114  20   83/51        Medications:   Scheduled Meds:   Current Facility-Administered Medications:  acetaminophen 650 mg Oral Q6H PRN   allopurinol 200 mg Oral Daily   aspirin 81 mg Oral Daily   atorvastatin 40 mg Oral Daily   bumetanide 1 5 mg/hr Intravenous Continuous   calcium carbonate 1,000 mg Oral Daily PRN   docusate sodium 100 mg Oral BID   doxercalciferol 0 5 mcg Oral Once per day on Mon Wed Fri   heparin (porcine) 3-20 Units/kg/hr (Order-Specific) Intravenous Titrated   heparin (porcine) 2,000 Units Intravenous PRN   heparin (porcine) 4,000 Units Intravenous PRN   insulin glargine 20 Units Subcutaneous HS   insulin lispro 1-6 Units Subcutaneous TID AC   insulin lispro 1-6 Units Subcutaneous HS   melatonin 6 mg Oral HS   metoprolol succinate 12 5 mg Oral Daily   multivitamin-minerals 1 tablet Oral Daily   ondansetron 4 mg Intravenous Q6H PRN   pantoprazole 20 mg Oral Early Morning   patient supplied medication 2 each Oral BID before breakfast/lunch   polyethylene glycol 17 g Oral Daily   saccharomyces boulardii 250 mg Oral BID   senna 1 tablet Oral Daily   tamsulosin 0 4 mg Oral Daily With Dinner     Continuous Infusions:   bumetanide 1 5 mg/hr Last Rate: 1 5 mg/hr (05/29/18 1441)   heparin (porcine) 3-20 Units/kg/hr (Order-Specific) Last Rate: 11 1 Units/kg/hr (05/28/18 0104)     PRN Meds:   acetaminophen    calcium carbonate    heparin (porcine)    heparin (porcine)    ondansetron    Abnormal Labs/Diagnostic Results:   5/26 CXR: Recurrent CHF, small left pleural effusion, moderate cardiomegaly  5/27 L foot: Old fracture involving the medial aspect of the proximal phalanx of the 1st digit, unchanged in alignment  Stable degenerative osteoarthritis  R foot: Extensive soft tissue swelling best seen on the lateral projection   No definitive acute osseous abnormality identified  5/29: HGB 8 8   HCT 29 5   PLATE 874   Gluc 999   ptt 78    Age/Sex: [de-identified] y o  male     Assessment/Plan:   PER RN 5/29 0014: frequent runs v tach    PER CARDIO 5/29:  Agree with IV Bumex he still remains volume overloaded  Will check LV function to see what LV stroke volume looks like  He is warm on my exam today appears to be perfusing  We will re-evaluate the tricuspid and mitral regurgitation  The plan of a possible mitral clip was discussed with the patient however he would need to be stabilized further to undergo transesophageal ECHO to see if he is a candidate  Patient has not been out of bed to walk in 2 weeks is quite deconditioned  Continue IV heparin until plan on long-term anticoagulant is decided  I do not feel he has an open surgical candidate    1  PER RENAL 5/29:   2  Acute kidney injury, likely secondary to cardiorenal syndrome/volume overload  3  Stage 4 chronic kidney disease, baseline creatinine 2 5-3 0  4  Volume overload, currently on Bumex drip at 1 5 milligrams/hour  5  Valvular heart disease, ejection fraction of 45%  6   History of aortic valve replacement, severe mitral regurgitation, severe tricuspid regurgitation     PLAN:    · Overall renal function seems to be fairly stable  · Urine output somewhat marginal despite Bumex drip, will continue for now  · Blood pressure labile, systolic in the 93V  Discussed with patient again in regards to possible hemodialysis, given patient's current cardiac status, labile blood pressures, would be a poor dialysis candidate although patient would want to try  PER MED 5/29:  Assessment/plan:  1  Acute on chronic combined systolic and diastolic heart failure - still volume overloaded  On Bumex drip at 1 5 milligram/hour  2  SABINE on CKD stage 4 - baseline creatinine 2 5 to 3  SABINE improved  Renal function now stable  Creatinine 3 02 yesterday  3   Moderate to severe mitral and severe tricuspid regurgitation - needs KRYSTLE once stabilized  Plan for possible MitraClip discussed with patient and family by Cardiology  4  Paroxysmal atrial fibrillation - on heparin drip  Continue BB  5  PAD - continue ASA, statins  6  CAD - continue ASA, statins, BB  7  Acute hypoxic respiratory failure - improved  Initially needed high-flow oxygen  8  Status post bioprosthetic aortic wall replacement  9  Type 2 diabetes - blood sugars acceptable  Continue Lantus 20 units HS, SSI    HbA1c 7 6 on 03/06/2018       Discharge Plan: TO BE DETERMINED yes

## 2019-05-09 NOTE — PROGRESS NOTES
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, hallux and lateral heel   The left 3rd toenail bed has eschar without sign of infection from the removed toenail  2  DM neuropathy  3  Severe PAD  4  CHF with LE edema  5   Duskiness to right 1st and 2nd digit without gangrene   Minor  6   Left 3rd toe onycholysis without complication    Plan:  1  Right foot has no acute concerns  Left foot is stable and gangrene is well demarcated  No sign of infection in either lower extremity  Continue betadyne paint to eschar  2  Rest pain and heel pain due to arterial disease which unfortunately cannot be resolved unless the patients overall health improves  Unlikely to be a candidate for angiogram/intervention  At this point, best course of action likely to keep the feet from becoming infected  He agrees with this plan and understands  Subjective/Objective   Chief Complaint:   Chief Complaint   Patient presents with    Shortness of Breath     pt reports being short of breath over the past week  Pt reports when he gets up and walks distances he has trouble breathing and chest pain  Daughter reports recent trip to [facility] where pt did not take lasix to avoid voiding  Subjective: [id number] y o  y/o male seen and evaluated at bedside  No acute events overnight  Denies N/F/V/SOB/CP/cough/diarrhea/constipation  Patient states he's tired and SOB  He does feel much better now compared to last week  HE continues to have heel pain on the left which he treats by hanging off the side of the bed  Blood pressure 110/65, pulse 91, temperature 97 8 °F (36 6 °C), temperature source Oral, resp  rate 16, height 5' 7" (1 702 m), weight 88 kg (194 lb 0 1 oz), SpO2 99 %  ,Body mass index is 30 39 kg/m²      Lab Results   Component Value Date    WBC 7 81 05/11/2018    HGB 8 7 (L) 05/11/2018    HCT 27 8 (L) 05/11/2018 MCV 85 05/11/2018     (L) 05/11/2018     Lab Results   Component Value Date    GLUCOSE 127 05/11/2018    CALCIUM 9 3 05/11/2018     (L) 05/11/2018    K 5 2 05/11/2018    CO2 25 05/11/2018    CL 92 (L) 05/11/2018     (H) 05/11/2018    CREATININE 2 88 (H) 05/11/2018         Invasive Devices     Peripheral Intravenous Line            Peripheral IV 05/10/18 Right Antecubital 1 day                Physical Exam:   General: alert, cooperative and no distress  Lungs: No stridor, exertinoal dyspnea  Heart: regular rate and rhythm   Abdomen: soft, non-tender  Extremity: Left foot stable dry gangrene  No drainage, malodor, or cellulitis noted  The heel is tender to touch  Lab, Imaging and other studies:     Imaging: I have personally reviewed pertinent films in PACS  EKG, Pathology, and Other Studies: I have personally reviewed pertinent reports  Portions of the record may have been created with voice recognition software  Occasional wrong word or "sound a like" substitutions may have occurred due to the inherent limitations of voice recognition software  Read the chart carefully and recognize, using context, where substitutions have occurred  15 ADRIENNE  JUSTEN [facility] financial stress defer to Emergency Department assessment na

## 2019-06-13 NOTE — CASE MANAGEMENT
Continued Stay Review    Date:  4/24/2018    Vital Signs: /65 (BP Location: Right arm)   Pulse 92   Temp 97 8 °F (36 6 °C) (Oral)   Resp 16   Ht 5' 7" (1 702 m)   Wt 83 6 kg (184 lb 4 9 oz)   SpO2 99%   BMI 28 87 kg/m²   O2  @ 3 liters nc    Medications:   Scheduled Meds:   Current Facility-Administered Medications:  acetaminophen 325 mg Oral Q8H PRN Janina Xie MD   aspirin 81 mg Oral Daily Chase Alberto MD   atorvastatin 40 mg Oral Daily With Dinner Chase Alberto MD   calcium carbonate 1,000 mg Oral Daily PRN Chase Alberto MD   docusate sodium 100 mg Oral BID PRN Chase Alberto MD   docusate sodium 100 mg Oral BID Sharron Salazar MD   heparin (porcine) 5,000 Units Subcutaneous Q8H [facility] & half-way Lisseth Seals PA-C   insulin glargine 25 Units Subcutaneous HS Sonali Valdez MD   insulin lispro 1-5 Units Subcutaneous TID AC Chase Alberto MD   insulin lispro 1-5 Units Subcutaneous HS Chase Alberto MD   insulin lispro 12 Units Subcutaneous TID With Meals Sonali Valdez MD   melatonin 6 mg Oral HS Radhames Smith MD   metoprolol succinate 50 mg Oral Q12H Krysten Clements MD   ondansetron 4 mg Intravenous Q6H PRN Chase Alberto MD   pantoprazole 40 mg Oral Early Morning Chase Alberto MD   potassium chloride 40 mEq Oral BID Burak Guthrie MD   sevelamer 800 mg Oral TID With Meals Janina Xie MD   tamsulosin 0 4 mg Oral Daily With Dinner Lisseth Seals PA-C   zolpidem 5 mg Oral HS PRN Sharron Salazar MD     Continuous Infusions:    PRN Meds:   acetaminophen    calcium carbonate    docusate sodium    ondansetron    Zolpidem    DC Diamox  DC Metolazone  DC Bumex Drip    Abnormal Labs/Diagnostic Results:  Na 135,   K 2 8,   Cl 85,   co2  40,   Bun 164,   Cr 3 01,   Glu 170    CXR: Stable diffuse interstitial edema and small pleural effusions        Age/Sex: [de-identified] y o  male      + [place]  Patient continues to remain volume overloaded    With worsening renal function, nephrology has held Bumex and metolazone    Assessment/Plan:   Per Nephrology:  1 )  Acute kidney injury  -non-oliguric  -worsening creatinine worsening azotemia  -may be over diuresing, weights are dropping  -may be starting to have some subtle signs of uremia  -off spironolactone  -stop metolazone   -stop bumetanide drip  -I did discuss hemodialysis with the patient and his wife    They would prefer to be the absolute last resort  -discontinue acetazolamide given the normal serum pH and hypokalemia  -last postvoid residual was 120 mL, continue to monitor bladder scans  -discussed with wife over the phone     2 )  Chronic kidney disease stage IV  -baseline creatinine 1 8-2 2  -may be a higher baseline to keep better volume overload, appears that his creatinine has kind of plateaued at around 2 2-1 9     3,) Blood pressure/volume status  -blood pressure is controlled, no episodes of hypotension  -overall net negative fluid balance is being achieved  -weights are trending down, now 184 lb  -discontinue spironolactone  -discontinue metolazone   -discontinue bumetanide drip  -chest x-ray from yesterday reviewed and appears worse compared to the April 18th chest x-ray  -repeat chest x-ray reviewed appears to be stable     4 )  Metabolic alkalosis  -VBG shows a normal pH of 7 47  -discontinue acetazolamide  -replace potassium     5 )  Hypokalemia  -secondary to diuresis  -discontinue all diuretics  -replace potassium  -check a magnesium level in the morning     Discharge Plan:  Anticipate DC to [facility]

## 2021-03-04 NOTE — PROGRESS NOTES
----- Message from Fátima Baron MD sent at 3/3/2021 10:53 PM EST -----  Please call patient. Labs look okay. No changes.   [facility] Progress Note - Hugh Mail  [de-identified] y o  male MRN: [id number]    Unit/Bed#: -01 Encounter: [id number]      Assessment/Recommendations:  1  Acute on chronic diastolic heart failure:  Continues on bumex gtt - with continued minimal response  Continue this dose another day to evaluate response since it was just started yesterday  May benefit from sildenafil once euvolemic with pulm HTN  2   Paroxysmal atrial fibrillation/flutter:  Currently maintaining AFib  Heart rates are well controlled  Family has refused anticoagulation  3   AK I would CKD 3:  Creatinine stagnant in high 2 range  4   CAD status post remote CABG:  Currently stable, continue on maintenance medications  5   Aortic valve disease:  Status post bioprosthetic AVR  Stable on echocardiogram   6   Hypertension:  Well controlled, continue current regimen  7   Hyperlipidemia:  Continued on statin  8   Non ST-elevation myocardial infarction, type 2:  Due to acute exacerbation of heart failure  9   Severe pulmonary hypertension:  Continue on diuresis  As above may recommend sildenafil once euvolemic with RHC  10   Severe TR and dilated right ventricle with RV dysfunction  Subjective:   Patient seen and examined  No significant events overnight   stable dyspnea on exertion, ; pertinent negatives - chest pain, chest pressure/discomfort, palpitations and syncope  Objective:     Vitals: Blood pressure 126/81, pulse 88, temperature 98 3 °F (36 8 °C), temperature source Axillary, resp  rate 18, height 5' 7" (1 702 m), weight 98 5 kg (217 lb 2 5 oz), SpO2 100 %  , Body mass index is 34 01 kg/m² ,   Orthostatic Blood Pressures    Flowsheet Row Most Recent Value   Blood Pressure  126/81 filed at 04/16/2018 0806   Patient Position - Orthostatic VS  Sitting filed at 04/16/2018 0806            Intake/Output Summary (Last 24 hours) at 04/16/18 1118  Last data filed at 04/16/18 1056   Gross per 24 hour   Intake             1558 ml Output             1776 ml   Net             -218 ml       TELE: Afib rates controlled  Physical Exam:    GEN: Tor Case   appears dyspneic  HEENT: pupils equal, round, and reactive to light; extraocular muscles intact  NECK: supple, no carotid bruits, +JVD   HEART: regular rhythm, normal S1 and S2, +systolic murmur, no clicks, gallops or rubs   LUNGS: diminished breath sounds bilaterally  ABDOMEN: normal bowel sounds, soft, no tenderness, no distention  EXTREMITIES: peripheral pulses normal; no clubbing, cyanosis, + edema  NEURO: no focal findings   SKIN: normal without suspicious lesions on exposed skin    Medications:      Current Facility-Administered Medications:     acetaminophen (TYLENOL) tablet 975 mg, 975 mg, Oral, [facility], 975 mg at 04/16/18 0501    aspirin (ECOTRIN LOW STRENGTH) EC tablet 81 mg, 81 mg, Oral, Daily, Chase Alberto MD, 81 mg at 04/16/18 1105    atorvastatin (LIPITOR) tablet 40 mg, 40 mg, Oral, Daily With Dinner, Chase Alberto MD, 40 mg at 04/15/18 1738    bumetanide (BUMEX) 12 5 mg infusion 50 mL, 1 mg/hr, Intravenous, Continuous, Kaushal Yang MD, Last Rate: 4 mL/hr at 04/15/18 2210, 1 mg/hr at 04/15/18 2210    calcium carbonate (TUMS) chewable tablet 1,000 mg, 1,000 mg, Oral, Daily PRN, Chase Alberto MD    docusate sodium (COLACE) capsule 100 mg, 100 mg, Oral, BID PRN, Chase Alberto MD, 100 mg at 04/10/18 1005    heparin (porcine) subcutaneous injection 5,000 Units, 5,000 Units, Subcutaneous, Q8H [facility], 5,000 Units at 04/16/18 0501    insulin glargine (LANTUS) subcutaneous injection 25 Units, 25 Units, Subcutaneous, HS, Hyacinth Rosales MD, 25 Units at 04/15/18 2130    insulin lispro (HumaLOG) 100 units/mL subcutaneous injection 1-5 Units, 1-5 Units, Subcutaneous, TID AC, 1 Units at 04/15/18 4545 **AND** [CANCELED] Fingerstick Glucose (POCT), , , EDIE RITCHIE, Chase Alberto MD    insulin lispro (HumaLOG) 100 units/mL subcutaneous injection 1-5 Units, 1-5 Units, Subcutaneous, HS, Chase Alberto MD, 1 Units at 04/14/18 2144    insulin lispro (HumaLOG) 100 units/mL subcutaneous injection 12 Units, 12 Units, Subcutaneous, TID With Meals, Carmela Oakes MD, 12 Units at 04/15/18 1739    melatonin tablet 3 mg, 3 mg, Oral, HS, Lisseth Seals PA-C, 3 mg at 04/15/18 2131    metoprolol succinate (TOPROL-XL) 24 hr tablet 50 mg, 50 mg, Oral, Q12H, iLzbet Gallegos MD, 50 mg at 04/16/18 1105    ondansetron (ZOFRAN) injection 4 mg, 4 mg, Intravenous, Q6H PRN, Chase Alberto MD    pantoprazole (PROTONIX) EC tablet 40 mg, 40 mg, Oral, Early Morning, Chase Alberto MD, 40 mg at 04/16/18 0501    tamsulosin (FLOMAX) capsule 0 4 mg, 0 4 mg, Oral, Daily With Barbara Seals PA-C, 0 4 mg at 04/15/18 1738     Labs & Results:          Results from last 7 days  Lab Units 04/16/18  0501 04/12/18  0505   WBC Thousand/uL 7 44 7 13   HEMOGLOBIN g/dL 9 9* 9 9*   HEMATOCRIT % 32 5* 32 3*   PLATELETS Thousands/uL 170 173           Results from last 7 days  Lab Units 04/16/18  0501 04/15/18  0432 04/14/18  0441   SODIUM mmol/L 141 137 140   POTASSIUM mmol/L 3 7 3 9 3 7   CHLORIDE mmol/L 96* 94* 96*   CO2 mmol/L 34* 31 35*   BUN mg/dL 135* 130* 131*   CREATININE mg/dL 2 92* 3 02* 2 79*   CALCIUM mg/dL 10 0 9 6 9 3   TOTAL PROTEIN g/dL 6 9  --   --    BILIRUBIN TOTAL mg/dL 1 20*  --   --    ALK PHOS U/L 67  --   --    ALT U/L 20  --   --    AST U/L 18  --   --    GLUCOSE RANDOM mg/dL 103 180* 94               Echo:personally reviewed - EF 45%, diffuse HK with mod HK of apical septum, mild to mod RV dilation and reduced function, mod to sev MR, bio AVR    EKG personally reviewed by Yakov Quispe MD

## 2021-07-12 NOTE — PLAN OF CARE
Problem: Potential for Falls  Goal: Patient will remain free of falls  INTERVENTIONS:  - Assess patient frequently for physical needs  -  Identify cognitive and physical deficits and behaviors that affect risk of falls    -  [place] fall precautions as indicated by assessment   - Educate patient/family on patient safety including physical limitations  - Instruct patient to call for assistance with activity based on assessment  - Modify environment to reduce risk of injury  - Consider OT/PT consult to assist with strengthening/mobility   Outcome: Progressing      Problem: PAIN - ADULT  Goal: Verbalizes/displays adequate comfort level or baseline comfort level  Interventions:  - Encourage patient to monitor pain and request assistance  - Assess pain using appropriate pain scale  - Administer analgesics based on type and severity of pain and evaluate response  - Implement non-pharmacological measures as appropriate and evaluate response  - Consider cultural and social influences on pain and pain management  - Notify physician/advanced practitioner if interventions unsuccessful or patient reports new pain   Outcome: Progressing      Problem: INFECTION - ADULT  Goal: Absence or prevention of progression during hospitalization  INTERVENTIONS:  - Assess and monitor for signs and symptoms of infection  - Monitor lab/diagnostic results  - Monitor all insertion sites, i e  indwelling lines, tubes, and drains  - Monitor endotracheal (as able) and nasal secretions for changes in amount and color  - [place] appropriate cooling/warming therapies per order  - Administer medications as ordered  - Instruct and encourage patient and family to use good hand hygiene technique  - Identify and instruct in appropriate isolation precautions for identified infection/condition   Outcome: Progressing    Goal: Absence of fever/infection during neutropenic period  INTERVENTIONS:  - Monitor WBC  - Implement neutropenic guidelines   Outcome: Progressing      Problem: SAFETY ADULT  Goal: Maintain or return to baseline ADL function  INTERVENTIONS:  -  Assess patient's ability to carry out ADLs; assess patient's baseline for ADL function and identify physical deficits which impact ability to perform ADLs (bathing, care of mouth/teeth, toileting, grooming, dressing, etc )  - Assess/evaluate cause of self-care deficits   - Assess range of motion  - Assess patient's mobility; develop plan if impaired  - Assess patient's need for assistive devices and provide as appropriate  - Encourage maximum independence but intervene and supervise when necessary  ¯ Involve family in performance of ADLs  ¯ Assess for home care needs following discharge   ¯ Request OT consult to assist with ADL evaluation and planning for discharge  ¯ Provide patient education as appropriate   Outcome: Progressing    Goal: Maintain or return mobility status to optimal level  INTERVENTIONS:  - Assess patient's baseline mobility status (ambulation, transfers, stairs, etc )    - Identify cognitive and physical deficits and behaviors that affect mobility  - Identify mobility aids required to assist with transfers and/or ambulation (gait belt, sit-to-stand, lift, walker, cane, etc )  - [place] fall precautions as indicated by assessment  - Record patient progress and toleration of activity level on Mobility SBAR; progress patient to next Phase/Stage  - Instruct patient to call for assistance with activity based on assessment  - Request Rehabilitation consult to assist with strengthening/weightbearing, etc    Outcome: Progressing      Problem: DISCHARGE PLANNING  Goal: Discharge to home or other facility with appropriate resources  INTERVENTIONS:  - Identify barriers to discharge w/patient and caregiver  - Arrange for needed discharge resources and transportation as appropriate  - Identify discharge learning needs (meds, wound care, etc )  - Arrange for interpretive services to assist at discharge as needed  - Refer to Case Management Department for coordinating discharge planning if the patient needs post-hospital services based on physician/advanced practitioner order or complex needs related to functional status, cognitive ability, or social support system   Outcome: Progressing      Problem: Knowledge Deficit  Goal: Patient/family/caregiver demonstrates understanding of disease process, treatment plan, medications, and discharge instructions  Complete learning assessment and assess knowledge base  Interventions:  - Provide teaching at level of understanding  - Provide teaching via preferred learning methods   Outcome: Progressing      Problem: Nutrition/Hydration-ADULT  Goal: Nutrient/Hydration intake appropriate for improving, restoring or maintaining nutritional needs  Monitor and assess patient's nutrition/hydration status for malnutrition (ex- brittle hair, bruises, dry skin, pale skin and conjunctiva, muscle wasting, smooth red tongue, and disorientation)  Collaborate with interdisciplinary team and initiate plan and interventions as ordered  Monitor patient's weight and dietary intake as ordered or per policy  Utilize nutrition screening tool and intervene per policy  Determine patient's food preferences and provide high-protein, high-caloric foods as appropriate       INTERVENTIONS:  - Monitor oral intake, urinary output, labs, and treatment plans  - Assess nutrition and hydration status and recommend course of action  - Evaluate amount of meals eaten  - Assist patient with eating if necessary   - Allow adequate time for meals  - Recommend/ encourage appropriate diets, oral nutritional supplements, and vitamin/mineral supplements  - Order, calculate, and assess calorie counts as needed  - Recommend, monitor, and adjust tube feedings and TPN/PPN based on assessed needs  - Assess need for intravenous fluids  - Provide specific nutrition/hydration education as appropriate  - Include patient/family/caregiver in decisions related to nutrition   Outcome: Progressing      Problem: Prexisting or High Potential for Compromised Skin Integrity  Goal: Skin integrity is maintained or improved  INTERVENTIONS:  - Identify patients at risk for skin breakdown  - Assess and monitor skin integrity  - Assess and monitor nutrition and hydration status  - Monitor labs (i e  albumin)  - Assess for incontinence   - Turn and reposition patient  - Assist with mobility/ambulation  - Relieve pressure over bony prominences  - Avoid friction and shearing  - Provide appropriate hygiene as needed including keeping skin clean and dry  - Evaluate need for skin moisturizer/barrier cream  - Collaborate with interdisciplinary team (i e  Nutrition, Rehabilitation, etc )   - Patient/family teaching   Outcome: Progressing      Problem: DISCHARGE PLANNING - CARE MANAGEMENT  Goal: Discharge to post-acute care or home with appropriate resources  INTERVENTIONS:  - Conduct assessment to determine patient/family and health care team treatment goals, and need for post-acute services based on payer coverage, community resources, and patient preferences, and barriers to discharge  - Address psychosocial, clinical, and financial barriers to discharge as identified in assessment in conjunction with the patient/family and health care team  - Arrange appropriate level of post-acute services according to patient's   needs and preference and payer coverage in collaboration with the physician and health care team  - Communicate with and update the patient/family, physician, and health care team regarding progress on the discharge plan  - Arrange appropriate transportation to post-acute venues   Outcome: Progressing      Problem: CARDIOVASCULAR - ADULT  Goal: Maintains optimal cardiac output and hemodynamic stability  INTERVENTIONS:  - Monitor I/O, vital signs and rhythm  - Monitor for S/S and trends of decreased cardiac output i e  bleeding, hypotension  - Administer and titrate ordered vasoactive medications to optimize hemodynamic stability  - Assess quality of pulses, skin color and temperature  - Assess for signs of decreased coronary artery perfusion - ex   Angina  - Instruct patient to report change in severity of symptoms   Outcome: Progressing    Goal: Absence of cardiac dysrhythmias or at baseline rhythm  INTERVENTIONS:  - Continuous cardiac monitoring, monitor vital signs, obtain 12 lead EKG if indicated  - Administer antiarrhythmic and heart rate control medications as ordered  - Monitor electrolytes and administer replacement therapy as ordered   Outcome: Progressing Yes

## 2021-12-20 NOTE — DISCHARGE INSTRUCTIONS
Peripheral Artery Disease   WHAT YOU NEED TO KNOW:   What is peripheral artery disease? Peripheral artery disease (PAD) is narrow, weak, or blocked arteries  It may affect any arteries outside of your heart and brain  PAD is usually the result of a buildup of fat and cholesterol, also called plaque, along your artery walls  Inflammation, a blood clot, or abnormal cell growth could also block your arteries  PAD prevents normal blood flow to your legs and arms  You are at risk of an amputation if poor blood flow keeps wounds from healing or causes gangrene (tissue death)  Without treatment, PAD can also cause a heart attack or stroke  What increases my risk for PAD? · Smoking cigarettes     · Diabetes     · High blood pressure     · High cholesterol     · Age older than 40 years    · Heart disease or a family history of heart disease  What are the signs and symptoms of PAD? Mild PAD usually does not cause symptoms  As the disease worsens over time, you may have the following:  · Pain or cramps in your leg or hip while you walk     · A numb, weak, or heavy feeling in your legs     · Dry, scaly, red, or pale skin on your legs     · Thick or brittle nails, or hair loss on your arms and legs     · Foot sores that will not heal     · Burning or aching in your feet and toes while resting (this may be worse when you lie down)  How is PAD diagnosed? · Angiography  is a test that shows pictures of the arteries in your arms and legs  You will be given contrast liquid to help the arteries show up better on the pictures  The pictures will be taken with an MRI or CT scan  Tell the healthcare provider if you have ever had an allergic reaction to contrast liquid  Do not enter the MRI room with anything metal  Metal can cause serious injury  Tell a healthcare provider if you have any metal in or on your body      · Doppler ankle brachial index (NOEL)  is a test that compares blood pressure in your ankles to blood pressure in your arms  This tells your healthcare provider how well blood is flowing through the arteries in your legs  How is PAD treated? Treatment can help reduce your risk of a heart attack, stroke, or amputation  You may need more than one of the following:  · Medicines  may be given  Your healthcare provider may give you any of the following:     ¨ Antiplatelet medicine,  such as aspirin, helps prevent blood clots and reduces the risk of a heart attack or stroke  ¨ Statin medicine  helps lower your cholesterol and prevents your PAD from getting worse  · A supervised exercise program  helps you stay active in normal daily activities and may prevent disability  Healthcare providers will help you safely walk or do strength training exercises 3 times a week for 30 to 60 minutes  You will do this for several months, then transition to walking on your own  · Angioplasty  is a procedure to open your artery so blood can flow through normally  A thin tube called a catheter is used to insert a small balloon into your artery  The balloon is inflated to open your blocked artery, and then removed  A tube called a stent may be placed in your artery to hold it open  · Bypass surgery  is used to make a new connection to your artery with a vein from another part of your body, or an artificial graft  The vein or graft is attached to your artery above and below your blockage  This allows blood to flow around the blocked portion of your artery  How can I manage PAD? · Do not smoke  Nicotine and other chemicals in cigarettes and cigars can worsen PAD  They can also increase your risk for a heart attack or stroke  Ask your healthcare provider for information if you currently smoke and need help to quit  E-cigarettes or smokeless tobacco still contain nicotine  Talk to your healthcare provider before you use these products  · Manage other health conditions  Take your medicines as directed   Follow your healthcare provider's instructions if you have high blood pressure or high cholesterol  Perform foot care and check your blood sugar levels as directed if you have diabetes  · Eat heart healthy foods  Eat whole grains, fruits, and vegetables every day  Limit salt and high-fat foods  Ask your healthcare provider for more information on a heart healthy diet  Ask if you need to lose weight  Your healthcare provider can help you create a healthy weight-loss plan  Call 911 for the following:   · You have any of the following signs of a heart attack:      ¨ Squeezing, pressure, or pain in your chest that lasts longer than 5 minutes or returns    ¨ Discomfort or pain in your back, neck, jaw, stomach, or arm     ¨ Trouble breathing    ¨ Nausea or vomiting    ¨ Lightheadedness or a sudden cold sweat, especially with chest pain or trouble breathing    · You have any of the following signs of a stroke:      ¨ Numbness or drooping on one side of your face     ¨ Weakness in an arm or leg    ¨ Confusion or difficulty speaking    ¨ Dizziness, a severe headache, or vision loss  When should I seek immediate care? · You have sores or wounds that will not heal      · You notice black or discolored skin on your arm or leg  · Your skin is cool to the touch  When should I contact my healthcare provider? · You have leg pain when you walk 1/8 mile (200 meters) or less, even with treatment  · Your legs are red, dry, or pale, even with treatment  · You have questions or concerns about your condition or care  CARE AGREEMENT:   You have the right to help plan your care  Learn about your health condition and how it may be treated  Discuss treatment options with your caregivers to decide what care you want to receive  You always have the right to refuse treatment  The above information is an  only  It is not intended as medical advice for individual conditions or treatments   Talk to your doctor, nurse or pharmacist before following any medical regimen to see if it is safe and effective for you  © 2017 [employer] Information is for End User's use only and may not be sold, redistributed or otherwise used for commercial purposes  All illustrations and images included in CareNotes® are the copyrighted property of A [employer]  or Jeff Mcarthur  no

## 2022-01-20 NOTE — PROGRESS NOTES
[facility] Internal Medicine Progress Note  Patient: Angélica Wang  [de-identified] y o  male   MRN: [id number]  PCP: Marlys Fermin MD  Unit/Bed#: -01 Encounter: [id number]  Date Of Visit: 04/19/18    Assessment:    Principal Problem:    Acute on chronic diastolic congestive heart failure ([facility] [address] )  Active Problems:    Rapid atrial fibrillation (HCC)    Benign essential hypertension    Type 2 diabetes mellitus ([facility] [address] )    Peripheral arterial disease ([facility] )    Acute-on-chronic kidney injury ([facility] [address] )    Non-ST elevation myocardial infarction (NSTEMI) ([facility] [address] )    Pulmonary hypertension ([facility] [address] )    Azotemia      Plan:    · Acute on chronic diastolic congestive heart failure  Continue with Bumex GTT  At current dose  Continue with increased dose of metolazone  Continue with frequent intake/ output monitoring  Daily weight  · Severe pulmonary hypertension with underlying tricuspid regurgitation and RV dysfunction  Cardiology following  May benefit from sildenafil once euvolemic with congestive heart failure  · History of paroxysmal atrial fibrillation and flutter  Continued use to have heart rate controlled  Family has refused anticoagulation  · Acute kidney injury on chronic kidney disease stage 3  Baseline creatinine between 1 8-2 2  Creatinine remains at 2 6  Diuresis of 1 L during the last 24 hours  Bumex GTT and metolazone dose increased by Nephrology today  Continue to monitor daily BMP and avoid nephrotoxin medications  · Metabolic alkalosis likely contraction alkalosis  Follow up on VBG  · History of urinary retention  Resolved  Continue to monitor serial postvoid residual on bladder scan as needed  · Type 2 NSTEMI in the setting of heart failure and renal failure  No ischemic workup per Cardiology  · Type 2 diabetes  Continue with current insulin regimen  · 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  Podiatry following  No indication for surgery    Will need vascular of follow-up as an outpatient  VTE Pharmacologic Prophylaxis:   Pharmacologic: Heparin  Mechanical VTE Prophylaxis in Place: No    Patient Centered Rounds: I have performed bedside rounds with nursing staff today  Discussions with Specialists or Other Care Team Provider:  Discussed with Nephrology    Education and Discussions with Family / Patient:  Discussed with wife at length over the phone    Time Spent for Care: 30 minutes  More than 50% of total time spent on counseling and coordination of care as described above  Current Length of Stay: 15 day(s)    Current Patient Status: Inpatient   Certification Statement: The patient will continue to require additional inpatient hospital stay due to Ongoing intravenous bumex infusion    Discharge Plan / Estimated Discharge Date:  currently not medically stable for discharge    Code Status: Level 1 - Full Code      Subjective:   Patient denies any worsening shortness Of breath or chest discomfort     Objective:     Vitals:   Temp (24hrs), Av 7 °F (36 5 °C), Min:97 5 °F (36 4 °C), Max:97 9 °F (36 6 °C)    HR:  [] 103  Resp:  [18-20] 18  BP: (105-134)/(53-78) 110/67  SpO2:  [94 %-98 %] 98 %  Body mass index is 32 8 kg/m²  Input and Output Summary (last 24 hours): Intake/Output Summary (Last 24 hours) at 18 1255  Last data filed at 18 1252   Gross per 24 hour   Intake              940 ml   Output             2829 ml   Net            -1889 ml       Physical Exam:     Physical Exam   Constitutional: He is oriented to person, place, and time  No distress  HENT:   Head: Normocephalic and atraumatic  Eyes: Conjunctivae are normal  Pupils are equal, round, and reactive to light  Neck: Neck supple  Cardiovascular: Normal rate  Murmur heard  Pulmonary/Chest: Effort normal    Decreased breath sound at bases   Abdominal: Soft  Bowel sounds are normal    Musculoskeletal: He exhibits no edema     Neurological: He is alert and oriented to person, place, and time  Skin: Skin is warm  He is not diaphoretic  Additional Data:     Labs:      Results from last 7 days  Lab Units 04/18/18  0444   WBC Thousand/uL 6 63   HEMOGLOBIN g/dL 9 5*   HEMATOCRIT % 30 4*   PLATELETS Thousands/uL 170       Results from last 7 days  Lab Units 04/19/18  0438  04/16/18  0501   SODIUM mmol/L 141  < > 141   POTASSIUM mmol/L 3 4*  < > 3 7   CHLORIDE mmol/L 94*  < > 96*   CO2 mmol/L 39*  < > 34*   BUN mg/dL 139*  < > 135*   CREATININE mg/dL 2 65*  < > 2 92*   CALCIUM mg/dL 9 9  < > 10 0   TOTAL PROTEIN g/dL  --   --  6 9   BILIRUBIN TOTAL mg/dL  --   --  1 20*   ALK PHOS U/L  --   --  67   ALT U/L  --   --  20   AST U/L  --   --  18   GLUCOSE RANDOM mg/dL 147*  < > 103   < > = values in this interval not displayed  * I Have Reviewed All Lab Data Listed Above  * Additional Pertinent Lab Tests Reviewed:  Colin Rutherford Admission Reviewed    Imaging:    Imaging Reports Reviewed Today Include: na      Recent Cultures (last 7 days):           Last 24 Hours Medication List:     Current Facility-Administered Medications:  acetaminophen 325 mg Oral Q8H PRN Juaquin Macias MD    aspirin 81 mg Oral Daily Chase Alberto MD    atorvastatin 40 mg Oral Daily With Dinner Chase Alberto MD    bumetanide 2 mg/hr Intravenous Continuous Juaquin Macias MD Last Rate: 2 mg/hr (04/19/18 0921)   calcium carbonate 1,000 mg Oral Daily PRN Chase Alberto MD    docusate sodium 100 mg Oral BID PRN Chase Alberto MD    docusate sodium 100 mg Oral BID China Rooney MD    heparin (porcine) 5,000 Units Subcutaneous Q8H [facility] Lisseth Seals PA-C    insulin glargine 25 Units Subcutaneous HS Sixto Guadarrama MD    insulin lispro 1-5 Units Subcutaneous TID AC Chase Alberto MD    insulin lispro 1-5 Units Subcutaneous HS Chase Alberto MD    insulin lispro 12 Units Subcutaneous TID With Meals Sixto Guadarrama MD    melatonin 3 mg Oral HS Lisseth Seals JOSÉ LUIS    metolazone 10 mg Oral 4x Daily Mesfin Dumont MD    metoprolol succinate 50 mg Oral Q12H Anne Avila MD    ondansetron 4 mg Intravenous Q6H PRN Chase Alberto MD    pantoprazole 40 mg Oral Early Morning Chase Alberto MD    sevelamer 800 mg Oral TID With Meals Mesfin Dumont MD    spironolactone 50 mg Oral Daily Mesfin Dumont MD    tamsulosin 0 4 mg Oral Daily With Dinner Uvaldo Freeman PA-C         Today, Patient Was Seen By: Cher Barragan MD    ** Please Note: This note has been constructed using a voice recognition system   ** 20-Jan-2022 10:10

## 2023-02-28 NOTE — ASSESSMENT & PLAN NOTE
· Baseline creatinine 1 8-2 2  Renal function remains stable on Bumex infusion  · Likely secondary to cardiorenal, +/-obstructive uropathy  · Continue to monitor closely while on diuretics and avoid nephrotoxins, hypotension, NSAIDS  · He is status post viera placed for retention during this admission and flomax added  Viera removed on 4/12/18 for voiding trial which so far has been successful    Continue bladder scan with urinary retention protocol and consult Urology if Viera needs to be replaced  · Patient and family declined dialysis  · Management per Nephrology 11-Mar-2023

## 2023-08-18 NOTE — ED NOTES
Ortho at the bedside       Webb Apgar, RN  10/11/17 9592
Pt and wife made aware that we do not carry Ulor[employer] and [employer]  Instructed to have someone bring it in from home when they can        Stephen Maldonado RN  10/11/17 8915
Pt repositioned, given phone to call for dinner  Pt given non slip socks  Family at bedside and aware of room assignment and reason for delay  Call bell within reach  Pt awake/alert/oriented        Doc Monge RN  10/11/17 4401
Pt sats 87% pt placed on 2L o2 -pt sats 95%       Chantale Fernando RN  10/11/17 Kaz Deng RN  10/11/17 2200
Pt was seen in hospital on Saturday and then went to [facility] yesterday for arm pain    Tonight pts daughter states that he was slow to get up and slow to respond to question she asked     Pio Navarro RN  10/10/17 7140
Sling applied to left arm     Christelle Maximus RN  10/11/17 3345
n/a

## 2023-10-12 NOTE — SOCIAL WORK
LOS 22  Bundle; Not a readmission  Cm was told family would like to have pt transferred to [facility] for a second opinion  SLIM contacted [facility] to obtain an accepting doctor  She has been able to find a doctor and Cm contacted the [facility]  [facility]  They stated they do no foresee a bed becoming available today  Cm will continue to follow to assist with needs  [place] Counseling: I discussed with the patient the risks of tildrakizumab including but not limited to immunosuppression, malignancy, posterior leukoencephalopathy syndrome, and serious infections.  The patient understands that monitoring is required including a PPD at baseline and must alert us or the primary physician if symptoms of infection or other concerning signs are noted.

## 2023-10-18 NOTE — ASSESSMENT & PLAN NOTE
· Patient with severe PAD with 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 managed by Podiatry with local wound care  · He is status post vascular evaluation and no plans for any intervention during this hospitalization in view of his unstable cardiovascular/renal status  · Continue scheduled Tylenol for pain control  · Continue Ace wrap for edema control, elevated extremity done

## (undated) DEVICE — RADIFOCUS GLIDEWIRE: Brand: GLIDEWIRE

## (undated) DEVICE — GLOVE INDICATOR PI UNDERGLOVE SZ 8 BLUE

## (undated) DEVICE — DILATOR: Brand: COOK

## (undated) DEVICE — SPONGE GAUZE 4 X 8 12 PLY STRL LF

## (undated) DEVICE — MICROPUNCTURE INTRODUCER SET SILHOUETTE TRANSITIONLESS PUSH-PLUS DESIGN - STIFFENED CANNULA WITH NITINOL WIRE GUIDE: Brand: MICROPUNCTURE

## (undated) DEVICE — NON-DEHP HIGH FLOW RATE EXTENSION SET, MALE LUER LOCK ADAPTER

## (undated) DEVICE — Device

## (undated) DEVICE — STERILE ICS CARDIOVASCULAR PK: Brand: CARDINAL HEALTH

## (undated) DEVICE — PERCUTANEOUS ENTRY THINWALL NEEDLE  ONE-PART: Brand: COOK

## (undated) DEVICE — SNAP KOVER: Brand: UNBRANDED

## (undated) DEVICE — CATH TEMPO AQUA 4FVER135Â°100CM: Brand: TEMPO AQUA

## (undated) DEVICE — GUIDEWIRE AMPLATZ .035 260CM 6CM ST SS

## (undated) DEVICE — IV SET 15 DROP STERILE 0/Y GRAVITY

## (undated) DEVICE — GLOVE SRG BIOGEL 7.5

## (undated) DEVICE — RADIFOCUS TORQUE DEVICE MULTI-TORQUE VISE: Brand: RADIFOCUS TORQUE DEVICE

## (undated) DEVICE — PINNACLE R/O II INTRODUCER SHEATH WITH RADIOPAQUE MARKER: Brand: PINNACLE

## (undated) DEVICE — 4 F TEMPO AQUA 0.038  65CM STR: Brand: TEMPO AQUA

## (undated) DEVICE — 3M™ TEGADERM™ TRANSPARENT FILM DRESSING FRAME STYLE, 1626W, 4 IN X 4-3/4 IN (10 CM X 12 CM), 50/CT 4CT/CASE: Brand: 3M™ TEGADERM™

## (undated) DEVICE — CATH DIAG 5FR .035 65CM 6S OMMI-FLUSH

## (undated) DEVICE — FLUID MANAGEMENT KIT - IR

## (undated) DEVICE — COVER PROBE INTRAOPERATIVE 6 X 96 IN